# Patient Record
Sex: FEMALE | Race: WHITE | NOT HISPANIC OR LATINO
[De-identification: names, ages, dates, MRNs, and addresses within clinical notes are randomized per-mention and may not be internally consistent; named-entity substitution may affect disease eponyms.]

---

## 2017-01-09 ENCOUNTER — RX RENEWAL (OUTPATIENT)
Age: 82
End: 2017-01-09

## 2017-02-06 ENCOUNTER — NON-APPOINTMENT (OUTPATIENT)
Age: 82
End: 2017-02-06

## 2017-02-06 ENCOUNTER — APPOINTMENT (OUTPATIENT)
Dept: CARDIOLOGY | Facility: CLINIC | Age: 82
End: 2017-02-06

## 2017-02-06 VITALS
WEIGHT: 136 LBS | DIASTOLIC BLOOD PRESSURE: 80 MMHG | HEART RATE: 68 BPM | BODY MASS INDEX: 25.68 KG/M2 | HEIGHT: 61 IN | SYSTOLIC BLOOD PRESSURE: 180 MMHG | OXYGEN SATURATION: 96 %

## 2017-02-08 LAB
25(OH)D3 SERPL-MCNC: 34.2 NG/ML
ALBUMIN SERPL ELPH-MCNC: 4.5 G/DL
ALP BLD-CCNC: 90 U/L
ALT SERPL-CCNC: 21 U/L
ANION GAP SERPL CALC-SCNC: 14 MMOL/L
AST SERPL-CCNC: 27 U/L
BASOPHILS # BLD AUTO: 0.04 K/UL
BASOPHILS NFR BLD AUTO: 0.5 %
BILIRUB SERPL-MCNC: 0.8 MG/DL
BUN SERPL-MCNC: 22 MG/DL
CALCIUM SERPL-MCNC: 10.5 MG/DL
CHLORIDE SERPL-SCNC: 99 MMOL/L
CHOLEST SERPL-MCNC: 298 MG/DL
CHOLEST/HDLC SERPL: 5.5 RATIO
CO2 SERPL-SCNC: 25 MMOL/L
CREAT SERPL-MCNC: 0.9 MG/DL
EOSINOPHIL # BLD AUTO: 0.14 K/UL
EOSINOPHIL NFR BLD AUTO: 1.6 %
GLUCOSE SERPL-MCNC: 126 MG/DL
HBA1C MFR BLD HPLC: 6.2 %
HCT VFR BLD CALC: 37.6 %
HDLC SERPL-MCNC: 54 MG/DL
HGB BLD-MCNC: 11.5 G/DL
IMM GRANULOCYTES NFR BLD AUTO: 0.3 %
LDLC SERPL CALC-MCNC: 177 MG/DL
LYMPHOCYTES # BLD AUTO: 2.04 K/UL
LYMPHOCYTES NFR BLD AUTO: 23.3 %
MAN DIFF?: NORMAL
MCHC RBC-ENTMCNC: 28.6 PG
MCHC RBC-ENTMCNC: 30.6 GM/DL
MCV RBC AUTO: 93.5 FL
MONOCYTES # BLD AUTO: 0.55 K/UL
MONOCYTES NFR BLD AUTO: 6.3 %
NEUTROPHILS # BLD AUTO: 5.95 K/UL
NEUTROPHILS NFR BLD AUTO: 68 %
PLATELET # BLD AUTO: 448 K/UL
POTASSIUM SERPL-SCNC: 5.5 MMOL/L
PROT SERPL-MCNC: 7.1 G/DL
RBC # BLD: 4.02 M/UL
RBC # FLD: 12.8 %
SODIUM SERPL-SCNC: 138 MMOL/L
TRIGL SERPL-MCNC: 336 MG/DL
TSH SERPL-ACNC: 2.71 UIU/ML
WBC # FLD AUTO: 8.75 K/UL

## 2017-03-24 ENCOUNTER — NON-APPOINTMENT (OUTPATIENT)
Age: 82
End: 2017-03-24

## 2017-03-24 ENCOUNTER — APPOINTMENT (OUTPATIENT)
Dept: CARDIOLOGY | Facility: CLINIC | Age: 82
End: 2017-03-24

## 2017-03-24 VITALS
WEIGHT: 141 LBS | HEIGHT: 61 IN | DIASTOLIC BLOOD PRESSURE: 78 MMHG | HEART RATE: 79 BPM | OXYGEN SATURATION: 97 % | BODY MASS INDEX: 26.62 KG/M2 | SYSTOLIC BLOOD PRESSURE: 179 MMHG

## 2017-03-27 LAB
ALBUMIN SERPL ELPH-MCNC: 4.7 G/DL
ALP BLD-CCNC: 98 U/L
ALT SERPL-CCNC: 19 U/L
ANION GAP SERPL CALC-SCNC: 17 MMOL/L
AST SERPL-CCNC: 23 U/L
BASOPHILS # BLD AUTO: 0.07 K/UL
BASOPHILS NFR BLD AUTO: 1.2 %
BILIRUB SERPL-MCNC: 0.4 MG/DL
BUN SERPL-MCNC: 31 MG/DL
CALCIUM SERPL-MCNC: 9.8 MG/DL
CHLORIDE SERPL-SCNC: 100 MMOL/L
CHOLEST SERPL-MCNC: 274 MG/DL
CHOLEST/HDLC SERPL: 4.8 RATIO
CO2 SERPL-SCNC: 21 MMOL/L
CREAT SERPL-MCNC: 0.97 MG/DL
EOSINOPHIL # BLD AUTO: 0.26 K/UL
EOSINOPHIL NFR BLD AUTO: 4.4 %
GLUCOSE SERPL-MCNC: 120 MG/DL
HCT VFR BLD CALC: 34.5 %
HDLC SERPL-MCNC: 57 MG/DL
HGB BLD-MCNC: 11.1 G/DL
IMM GRANULOCYTES NFR BLD AUTO: 0.3 %
LDLC SERPL CALC-MCNC: 145 MG/DL
LYMPHOCYTES # BLD AUTO: 2.35 K/UL
LYMPHOCYTES NFR BLD AUTO: 40 %
MAN DIFF?: NORMAL
MCHC RBC-ENTMCNC: 28.8 PG
MCHC RBC-ENTMCNC: 32.2 GM/DL
MCV RBC AUTO: 89.4 FL
MONOCYTES # BLD AUTO: 0.45 K/UL
MONOCYTES NFR BLD AUTO: 7.7 %
NEUTROPHILS # BLD AUTO: 2.73 K/UL
NEUTROPHILS NFR BLD AUTO: 46.4 %
PLATELET # BLD AUTO: 393 K/UL
POTASSIUM SERPL-SCNC: 4.7 MMOL/L
PROT SERPL-MCNC: 7.3 G/DL
RBC # BLD: 3.86 M/UL
RBC # FLD: 13.4 %
SODIUM SERPL-SCNC: 138 MMOL/L
TRIGL SERPL-MCNC: 359 MG/DL
WBC # FLD AUTO: 5.88 K/UL

## 2017-04-21 ENCOUNTER — APPOINTMENT (OUTPATIENT)
Dept: CARDIOLOGY | Facility: CLINIC | Age: 82
End: 2017-04-21

## 2017-05-12 ENCOUNTER — APPOINTMENT (OUTPATIENT)
Dept: CARDIOLOGY | Facility: CLINIC | Age: 82
End: 2017-05-12

## 2017-05-12 ENCOUNTER — NON-APPOINTMENT (OUTPATIENT)
Age: 82
End: 2017-05-12

## 2017-05-12 VITALS
OXYGEN SATURATION: 93 % | HEART RATE: 82 BPM | SYSTOLIC BLOOD PRESSURE: 166 MMHG | BODY MASS INDEX: 25.51 KG/M2 | WEIGHT: 135 LBS | DIASTOLIC BLOOD PRESSURE: 66 MMHG

## 2017-05-30 ENCOUNTER — APPOINTMENT (OUTPATIENT)
Dept: CARDIOLOGY | Facility: CLINIC | Age: 82
End: 2017-05-30

## 2017-06-19 ENCOUNTER — RX RENEWAL (OUTPATIENT)
Age: 82
End: 2017-06-19

## 2017-07-10 ENCOUNTER — RX RENEWAL (OUTPATIENT)
Age: 82
End: 2017-07-10

## 2017-09-08 ENCOUNTER — APPOINTMENT (OUTPATIENT)
Dept: CARDIOLOGY | Facility: CLINIC | Age: 82
End: 2017-09-08
Payer: MEDICARE

## 2017-09-08 ENCOUNTER — NON-APPOINTMENT (OUTPATIENT)
Age: 82
End: 2017-09-08

## 2017-09-08 VITALS
HEIGHT: 61 IN | OXYGEN SATURATION: 97 % | DIASTOLIC BLOOD PRESSURE: 74 MMHG | SYSTOLIC BLOOD PRESSURE: 181 MMHG | HEART RATE: 70 BPM | BODY MASS INDEX: 24.92 KG/M2 | WEIGHT: 132 LBS

## 2017-09-08 PROCEDURE — 93000 ELECTROCARDIOGRAM COMPLETE: CPT

## 2017-09-08 PROCEDURE — 99214 OFFICE O/P EST MOD 30 MIN: CPT | Mod: 25

## 2017-10-17 ENCOUNTER — INPATIENT (INPATIENT)
Facility: HOSPITAL | Age: 82
LOS: 2 days | Discharge: HOME | End: 2017-10-20
Attending: INTERNAL MEDICINE | Admitting: INTERNAL MEDICINE
Payer: MEDICARE

## 2017-10-17 VITALS — HEIGHT: 60 IN | WEIGHT: 136.03 LBS

## 2017-10-17 DIAGNOSIS — E78.5 HYPERLIPIDEMIA, UNSPECIFIED: ICD-10-CM

## 2017-10-17 DIAGNOSIS — Z98.89 OTHER SPECIFIED POSTPROCEDURAL STATES: Chronic | ICD-10-CM

## 2017-10-17 DIAGNOSIS — I63.9 CEREBRAL INFARCTION, UNSPECIFIED: ICD-10-CM

## 2017-10-17 LAB
ALBUMIN SERPL ELPH-MCNC: 4.1 G/DL — SIGNIFICANT CHANGE UP (ref 3.3–5)
ALP SERPL-CCNC: 90 U/L — SIGNIFICANT CHANGE UP (ref 40–120)
ALT FLD-CCNC: 21 U/L — SIGNIFICANT CHANGE UP (ref 12–78)
ANION GAP SERPL CALC-SCNC: 7 MMOL/L — SIGNIFICANT CHANGE UP (ref 5–17)
AST SERPL-CCNC: 19 U/L — SIGNIFICANT CHANGE UP (ref 15–37)
BASOPHILS # BLD AUTO: 0.1 K/UL — SIGNIFICANT CHANGE UP (ref 0–0.2)
BASOPHILS NFR BLD AUTO: 1.1 % — SIGNIFICANT CHANGE UP (ref 0–2)
BILIRUB SERPL-MCNC: 0.7 MG/DL — SIGNIFICANT CHANGE UP (ref 0.2–1.2)
BUN SERPL-MCNC: 22 MG/DL — SIGNIFICANT CHANGE UP (ref 7–23)
CALCIUM SERPL-MCNC: 9.5 MG/DL — SIGNIFICANT CHANGE UP (ref 8.5–10.1)
CHLORIDE SERPL-SCNC: 104 MMOL/L — SIGNIFICANT CHANGE UP (ref 96–108)
CO2 SERPL-SCNC: 24 MMOL/L — SIGNIFICANT CHANGE UP (ref 22–31)
CREAT SERPL-MCNC: 0.98 MG/DL — SIGNIFICANT CHANGE UP (ref 0.5–1.3)
EOSINOPHIL # BLD AUTO: 0.1 K/UL — SIGNIFICANT CHANGE UP (ref 0–0.5)
EOSINOPHIL NFR BLD AUTO: 1.6 % — SIGNIFICANT CHANGE UP (ref 0–6)
GLUCOSE SERPL-MCNC: 151 MG/DL — HIGH (ref 70–99)
HCT VFR BLD CALC: 34.2 % — LOW (ref 34.5–45)
HGB BLD-MCNC: 11.6 G/DL — SIGNIFICANT CHANGE UP (ref 11.5–15.5)
LYMPHOCYTES # BLD AUTO: 1.9 K/UL — SIGNIFICANT CHANGE UP (ref 1–3.3)
LYMPHOCYTES # BLD AUTO: 25.3 % — SIGNIFICANT CHANGE UP (ref 13–44)
MAGNESIUM SERPL-MCNC: 2.2 MG/DL — SIGNIFICANT CHANGE UP (ref 1.6–2.6)
MCHC RBC-ENTMCNC: 30 PG — SIGNIFICANT CHANGE UP (ref 27–34)
MCHC RBC-ENTMCNC: 33.8 GM/DL — SIGNIFICANT CHANGE UP (ref 32–36)
MCV RBC AUTO: 89 FL — SIGNIFICANT CHANGE UP (ref 80–100)
MONOCYTES # BLD AUTO: 0.5 K/UL — SIGNIFICANT CHANGE UP (ref 0–0.9)
MONOCYTES NFR BLD AUTO: 7.3 % — SIGNIFICANT CHANGE UP (ref 2–14)
NEUTROPHILS # BLD AUTO: 4.9 K/UL — SIGNIFICANT CHANGE UP (ref 1.8–7.4)
NEUTROPHILS NFR BLD AUTO: 64.7 % — SIGNIFICANT CHANGE UP (ref 43–77)
PLATELET # BLD AUTO: 375 K/UL — SIGNIFICANT CHANGE UP (ref 150–400)
POTASSIUM SERPL-MCNC: 4.8 MMOL/L — SIGNIFICANT CHANGE UP (ref 3.5–5.3)
POTASSIUM SERPL-SCNC: 4.8 MMOL/L — SIGNIFICANT CHANGE UP (ref 3.5–5.3)
PROT SERPL-MCNC: 7.2 GM/DL — SIGNIFICANT CHANGE UP (ref 6–8.3)
RBC # BLD: 3.84 M/UL — SIGNIFICANT CHANGE UP (ref 3.8–5.2)
RBC # FLD: 12.5 % — SIGNIFICANT CHANGE UP (ref 10.3–14.5)
SODIUM SERPL-SCNC: 135 MMOL/L — SIGNIFICANT CHANGE UP (ref 135–145)
TROPONIN I SERPL-MCNC: <0.015 NG/ML — SIGNIFICANT CHANGE UP (ref 0.01–0.04)
TROPONIN I SERPL-MCNC: <0.015 NG/ML — SIGNIFICANT CHANGE UP (ref 0.01–0.04)
WBC # BLD: 7.5 K/UL — SIGNIFICANT CHANGE UP (ref 3.8–10.5)
WBC # FLD AUTO: 7.5 K/UL — SIGNIFICANT CHANGE UP (ref 3.8–10.5)

## 2017-10-17 PROCEDURE — 71010: CPT | Mod: 26

## 2017-10-17 PROCEDURE — 93010 ELECTROCARDIOGRAM REPORT: CPT

## 2017-10-17 PROCEDURE — 99285 EMERGENCY DEPT VISIT HI MDM: CPT

## 2017-10-17 RX ORDER — CARVEDILOL PHOSPHATE 80 MG/1
25 CAPSULE, EXTENDED RELEASE ORAL EVERY 12 HOURS
Qty: 0 | Refills: 0 | Status: DISCONTINUED | OUTPATIENT
Start: 2017-10-17 | End: 2017-10-20

## 2017-10-17 RX ORDER — LABETALOL HCL 100 MG
2 TABLET ORAL
Qty: 200 | Refills: 0 | Status: DISCONTINUED | OUTPATIENT
Start: 2017-10-17 | End: 2017-10-17

## 2017-10-17 RX ORDER — ONDANSETRON 8 MG/1
4 TABLET, FILM COATED ORAL EVERY 6 HOURS
Qty: 0 | Refills: 0 | Status: DISCONTINUED | OUTPATIENT
Start: 2017-10-17 | End: 2017-10-20

## 2017-10-17 RX ORDER — LABETALOL HCL 100 MG
10 TABLET ORAL ONCE
Qty: 0 | Refills: 0 | Status: COMPLETED | OUTPATIENT
Start: 2017-10-17 | End: 2017-10-17

## 2017-10-17 RX ORDER — ACETAMINOPHEN 500 MG
650 TABLET ORAL EVERY 6 HOURS
Qty: 0 | Refills: 0 | Status: DISCONTINUED | OUTPATIENT
Start: 2017-10-17 | End: 2017-10-20

## 2017-10-17 RX ORDER — AMLODIPINE BESYLATE 2.5 MG/1
10 TABLET ORAL ONCE
Qty: 0 | Refills: 0 | Status: COMPLETED | OUTPATIENT
Start: 2017-10-17 | End: 2017-10-17

## 2017-10-17 RX ORDER — LABETALOL HCL 100 MG
20 TABLET ORAL ONCE
Qty: 0 | Refills: 0 | Status: COMPLETED | OUTPATIENT
Start: 2017-10-17 | End: 2017-10-17

## 2017-10-17 RX ORDER — MULTIVIT-MIN/FERROUS GLUCONATE 9 MG/15 ML
1 LIQUID (ML) ORAL DAILY
Qty: 0 | Refills: 0 | Status: DISCONTINUED | OUTPATIENT
Start: 2017-10-17 | End: 2017-10-20

## 2017-10-17 RX ORDER — ENOXAPARIN SODIUM 100 MG/ML
40 INJECTION SUBCUTANEOUS DAILY
Qty: 0 | Refills: 0 | Status: DISCONTINUED | OUTPATIENT
Start: 2017-10-17 | End: 2017-10-20

## 2017-10-17 RX ORDER — LOSARTAN POTASSIUM 100 MG/1
25 TABLET, FILM COATED ORAL DAILY
Qty: 0 | Refills: 0 | Status: DISCONTINUED | OUTPATIENT
Start: 2017-10-17 | End: 2017-10-18

## 2017-10-17 RX ORDER — CLOPIDOGREL BISULFATE 75 MG/1
75 TABLET, FILM COATED ORAL DAILY
Qty: 0 | Refills: 0 | Status: DISCONTINUED | OUTPATIENT
Start: 2017-10-17 | End: 2017-10-20

## 2017-10-17 RX ORDER — HYDRALAZINE HCL 50 MG
25 TABLET ORAL
Qty: 0 | Refills: 0 | Status: DISCONTINUED | OUTPATIENT
Start: 2017-10-17 | End: 2017-10-18

## 2017-10-17 RX ORDER — CHOLECALCIFEROL (VITAMIN D3) 125 MCG
1000 CAPSULE ORAL DAILY
Qty: 0 | Refills: 0 | Status: DISCONTINUED | OUTPATIENT
Start: 2017-10-17 | End: 2017-10-20

## 2017-10-17 RX ORDER — OLMESARTAN MEDOXOMIL 5 MG/1
0 TABLET, FILM COATED ORAL
Qty: 0 | Refills: 0 | COMMUNITY

## 2017-10-17 RX ORDER — CALCIUM CARBONATE 500(1250)
1 TABLET ORAL DAILY
Qty: 0 | Refills: 0 | Status: DISCONTINUED | OUTPATIENT
Start: 2017-10-17 | End: 2017-10-20

## 2017-10-17 RX ORDER — INFLUENZA VIRUS VACCINE 15; 15; 15; 15 UG/.5ML; UG/.5ML; UG/.5ML; UG/.5ML
0.5 SUSPENSION INTRAMUSCULAR ONCE
Qty: 0 | Refills: 0 | Status: COMPLETED | OUTPATIENT
Start: 2017-10-17 | End: 2017-10-20

## 2017-10-17 RX ORDER — LABETALOL HCL 100 MG
5 TABLET ORAL
Qty: 200 | Refills: 0 | Status: DISCONTINUED | OUTPATIENT
Start: 2017-10-17 | End: 2017-10-17

## 2017-10-17 RX ADMIN — AMLODIPINE BESYLATE 10 MILLIGRAM(S): 2.5 TABLET ORAL at 12:07

## 2017-10-17 RX ADMIN — Medication 120 MG/MIN: at 13:48

## 2017-10-17 RX ADMIN — Medication 20 MILLIGRAM(S): at 10:43

## 2017-10-17 RX ADMIN — Medication 10 MILLIGRAM(S): at 09:47

## 2017-10-17 NOTE — H&P ADULT - NSHPPHYSICALEXAM_GEN_ALL_CORE
Physical Exam:   GENERAL APPEARANCE:  NAD, hemodynamically stable  T(C): 36.7 (10-17-17 @ 19:35), Max: 37 (10-17-17 @ 16:26)  HR: 77 (10-17-17 @ 19:35) (74 - 79)  BP: 127/83 (10-17-17 @ 19:35) (127/83 - 200/90)  RR: 17 (10-17-17 @ 19:35) (13 - 18)  SpO2: 98% (10-17-17 @ 19:35) (98% - 100%)    HEENT:  Head is normocephalic    Skin:  Warm and dry without any rash   NECK:  Supple without lymphadenopathy.   HEART:  Regular rate and rhythm. normal S1 and S2, No M/R/G  LUNGS:  Good ins/exp effort, no W/R/R/C  ABDOMEN:  Soft, nontender, nondistended with good bowel sounds heard  EXTREMITIES:  Without cyanosis, clubbing or edema.   NEUROLOGICAL:  Gross nonfocal

## 2017-10-17 NOTE — H&P ADULT - PROBLEM SELECTOR PLAN 1
Patient with hypertensive urgency  Admit to medicine: cardiac stepdown unit  BP improved s/p labetalol gtt -->130s  Resume home BP meds coreg, hydralazine, and benicar  consult cardiology  CHRIS panel  2D ECHO, TSH, and fasting lipid panel

## 2017-10-17 NOTE — ED PROVIDER NOTE - PMH
Basal cell carcinoma    Cerebrovascular accident (CVA), unspecified mechanism    Dyslipidemia    Edema, unspecified type    Essential hypertension    Hyponatremia    Mitral valve insufficiency, unspecified etiology    UTI (urinary tract infection)

## 2017-10-17 NOTE — H&P ADULT - NSHPLABSRESULTS_GEN_ALL_CORE
11.6   7.5   )-----------( 375      ( 17 Oct 2017 09:30 )             34.2     10-17    135  |  104  |  22  ----------------------------<  151<H>  4.8   |  24  |  0.98    Calcium    9.5      17 Oct 2017 09:30  Mg     2.2     10-17    T Protein  7.2  /  Alb  4.1  /  TBili  0.7  /  DBili  x   /  AST  19  /  ALT  21  /  AlkPhos  90  10-17    CARDIAC MARKERS ( 17 Oct 2017 09:30 )  <0.015 ng/mL / x     / x     / x     / x

## 2017-10-17 NOTE — ED PROVIDER NOTE - OBJECTIVE STATEMENT
Pt comes to the ED complaining of 3 weeks worth of HTN. Pt states has been working with her nephrologist to lower her Pt comes to the ED complaining of 3 weeks worth of HTN. Pt states has been working with her nephrologist to lower her BP and has been started on new BP meds. Pt is on carvedolol states she was on nifedepide and her BP was well controlle dbut she developed swelling and was taking off this. No chest pain no headache. Pt BP is 210/110 in ED. Pt is takign her meds.

## 2017-10-17 NOTE — ED ADULT NURSE REASSESSMENT NOTE - COMFORT CARE
4pm rounding complete, vitals done no other assistance needed
repositioned/side rails up/darkened lights/assisted in changing patient brief, patient is clean and dry at this time. hourly rounding completed

## 2017-10-17 NOTE — H&P ADULT - HISTORY OF PRESENT ILLNESS
The patient is a 86 year old female with a PMHx notable for CVA, HTN, basal cell carcinoma, dyslipidemia, and edema.  He presents to the ED complaining of 3 weeks worth of HTN. Pt states has been working with her nephrologist to lower her BP and has been started on new BP meds.  Patient is on coreg and states she was on nifedeipine and her BP was well controlled , however she developed swelling and was taking off this.   Denies chest pain no headache.  Pts BP is 210/110 in ED.   Pt reports compliance with her BP meds.    ED course:    BP = 210/110 patient was given norvasc 10mg, labetalol 10mg IV, labetalol 20mg IV, and started on labetalol infusion which was tapered off after BP check was 130/80

## 2017-10-18 DIAGNOSIS — I16.0 HYPERTENSIVE URGENCY: ICD-10-CM

## 2017-10-18 DIAGNOSIS — I38 ENDOCARDITIS, VALVE UNSPECIFIED: ICD-10-CM

## 2017-10-18 LAB
CHOLEST SERPL-MCNC: 214 MG/DL — HIGH (ref 10–199)
HDLC SERPL-MCNC: 42 MG/DL — SIGNIFICANT CHANGE UP (ref 40–125)
LIPID PNL WITH DIRECT LDL SERPL: 124 MG/DL — SIGNIFICANT CHANGE UP
TOTAL CHOLESTEROL/HDL RATIO MEASUREMENT: 5.1 RATIO — SIGNIFICANT CHANGE UP (ref 3.3–7.1)
TRIGL SERPL-MCNC: 240 MG/DL — HIGH (ref 10–149)
TSH SERPL-MCNC: 2.53 UU/ML — SIGNIFICANT CHANGE UP (ref 0.36–3.74)

## 2017-10-18 PROCEDURE — 93306 TTE W/DOPPLER COMPLETE: CPT | Mod: 26

## 2017-10-18 PROCEDURE — 99223 1ST HOSP IP/OBS HIGH 75: CPT

## 2017-10-18 RX ORDER — HYDRALAZINE HCL 50 MG
25 TABLET ORAL ONCE
Qty: 0 | Refills: 0 | Status: COMPLETED | OUTPATIENT
Start: 2017-10-18 | End: 2017-10-18

## 2017-10-18 RX ORDER — HYDRALAZINE HCL 50 MG
10 TABLET ORAL EVERY 6 HOURS
Qty: 0 | Refills: 0 | Status: DISCONTINUED | OUTPATIENT
Start: 2017-10-18 | End: 2017-10-20

## 2017-10-18 RX ORDER — LOSARTAN POTASSIUM 100 MG/1
50 TABLET, FILM COATED ORAL AT BEDTIME
Qty: 0 | Refills: 0 | Status: DISCONTINUED | OUTPATIENT
Start: 2017-10-18 | End: 2017-10-20

## 2017-10-18 RX ORDER — ACETAMINOPHEN 500 MG
650 TABLET ORAL EVERY 6 HOURS
Qty: 0 | Refills: 0 | Status: DISCONTINUED | OUTPATIENT
Start: 2017-10-18 | End: 2017-10-20

## 2017-10-18 RX ADMIN — CARVEDILOL PHOSPHATE 25 MILLIGRAM(S): 80 CAPSULE, EXTENDED RELEASE ORAL at 16:38

## 2017-10-18 RX ADMIN — LOSARTAN POTASSIUM 25 MILLIGRAM(S): 100 TABLET, FILM COATED ORAL at 05:29

## 2017-10-18 RX ADMIN — Medication 1000 UNIT(S): at 11:56

## 2017-10-18 RX ADMIN — Medication 1 TABLET(S): at 11:56

## 2017-10-18 RX ADMIN — Medication 650 MILLIGRAM(S): at 21:51

## 2017-10-18 RX ADMIN — CLOPIDOGREL BISULFATE 75 MILLIGRAM(S): 75 TABLET, FILM COATED ORAL at 11:56

## 2017-10-18 RX ADMIN — LOSARTAN POTASSIUM 50 MILLIGRAM(S): 100 TABLET, FILM COATED ORAL at 21:51

## 2017-10-18 RX ADMIN — ENOXAPARIN SODIUM 40 MILLIGRAM(S): 100 INJECTION SUBCUTANEOUS at 11:56

## 2017-10-18 RX ADMIN — Medication 25 MILLIGRAM(S): at 05:29

## 2017-10-18 RX ADMIN — CARVEDILOL PHOSPHATE 25 MILLIGRAM(S): 80 CAPSULE, EXTENDED RELEASE ORAL at 05:29

## 2017-10-18 RX ADMIN — Medication 25 MILLIGRAM(S): at 17:22

## 2017-10-18 RX ADMIN — Medication 1 PATCH: at 13:47

## 2017-10-18 RX ADMIN — Medication 650 MILLIGRAM(S): at 03:21

## 2017-10-18 NOTE — CONSULT NOTE ADULT - PROBLEM SELECTOR RECOMMENDATION 9
BP has improved over past 24 hours; continue Coreg, Hydralazine, Losartan and observe; can increase Losartan if BP rises.  I asked Dr. Victoria to see patient - she has been seeing patient in the outpatient setting for BP management.

## 2017-10-18 NOTE — CONSULT NOTE ADULT - ASSESSMENT
87 yo woman with HTN admitted with HTN urgency.  HTN control has been very labile as outpt.     Med adjustment has been difficult due to side effects including hyponatremia, hyperkalemia and GI and subjective symptoms.  Secondary HT work has been negative.  Will repeat at this point with Renal artery doppler and plasma metanephrines.  Will attempt to stabilize BP control and add catapress patch and d/c Hydralazine, --May respond better to longer acting agents.

## 2017-10-18 NOTE — CONSULT NOTE ADULT - SUBJECTIVE AND OBJECTIVE BOX
Chief complaints.  Admitted with SBP >220 at home with HA.    HPI:  87 yo woman with Long term Hx of HTN and CVA with recent attempt to adjust BP meds due to intolerable side effects.   Pt had been on Nifedipine for some time.   Pt reported intolerable side effects when Nifedipine  dose was increased to 60 mg from 30mg.  However, even with decreased dose, pt continued to complain of generalized malaise including "heavy feeling in her chest and vague GI symptoms.    Therefore, Nifedipine  was changed to Hydralazine with much improved symptoms and fair BP control until yesterday  am.   Of note, pt's Benicar dose was reduced as well due to recurrent Hyperkalemia even on K restricted diet.  Pt has been very compliant with Salt restriction and her meds.  Pt awoke up yesterday morning with vague HA and generalized malaise and tingling in her LE.  SBP was 220 at home and was brought to ER.  Pt was treated with IV Labetalol infusion in ED and now BP in 120-150 range.  Pt reports feeling well today and wants to go home.      PMHX and PSHX.  1.HTN  2.CVA  3.Hx of Basal cell CA post Moh's procedure   4.Hx of Hyponatremia due to diuretics and post op  5.Hx of Bilateral total Hip replacements.    FAMILY HISTORY:  No pertinent family history in first degree relatives      SOCIAL HISTORY :  No hx of smoking or ETOH.  Allergies    Keflex (Unknown)  verapamil (Other)    REVIEW OF SYSTEMS  At present feeling well  Had HA yesterday.  Denies chest pain  Denies SOB  Denies Abdominal discomfort  Denies LE pain.    Home Medications:   * Patient Currently Takes Medications as of 17-Oct-2017 14:46 documented in Structured Notes  · 	Multiple Vitamins with Minerals oral tablet: 1 tab(s) orally once a day, Last Dose Taken:    · 	Tylenol 500 mg oral tablet: 2 tab(s) orally every 8 hours, As Needed, Last Dose Taken:    · 	Vitamin D3 1000 intl units oral tablet: 1 tab(s) orally once a day, Last Dose Taken:    · 	calcium (as carbonate) 500 mg oral tablet: 1 tab(s) orally once a day, Last Dose Taken:    · 	hydrALAZINE 25 mg oral tablet: 1 tab(s) orally 4 times a day  	**Pt takes 35mg four times a day, Last Dose Taken:    · 	hydrALAZINE 10 mg oral tablet: 1 tab(s) orally 4 times a day  	**Pt takes 35mg 4 times a day, Last Dose Taken:    · 	Benicar 20 mg oral tablet: 1 tab(s) orally once a day (at bedtime), Last Dose Taken:    · 	carvedilol 25 mg oral tablet: 1 tab(s) orally 2 times a day, Last Dose Taken:    · 	clopidogrel 75 mg oral tablet: 1 tab(s) orally once a day, Last Dose Taken:      MEDICATIONS  (STANDING):  calcium carbonate 500 mG (Tums) Chewable 1 Tablet(s) Chew daily  carvedilol 25 milliGRAM(s) Oral every 12 hours  cholecalciferol 1000 Unit(s) Oral daily  clopidogrel Tablet 75 milliGRAM(s) Oral daily  enoxaparin Injectable 40 milliGRAM(s) SubCutaneous daily  hydrALAZINE 25 milliGRAM(s) Oral four times a day  influenza   Vaccine 0.5 milliLiter(s) IntraMuscular once  losartan 25 milliGRAM(s) Oral daily  multivitamin/minerals 1 Tablet(s) Oral daily    MEDICATIONS  (PRN):  acetaminophen   Tablet 650 milliGRAM(s) Oral every 6 hours PRN For Temp greater than 38.5 C (101.3 F)  acetaminophen   Tablet 650 milliGRAM(s) Oral every 6 hours PRN pain and fever  ondansetron Injectable 4 milliGRAM(s) IV Push every 6 hours PRN Nausea         Vital Signs Last 24 Hrs  T(C): 36.4 (18 Oct 2017 08:00), Max: 37 (17 Oct 2017 16:26)  T(F): 97.5 (18 Oct 2017 08:00), Max: 98.6 (17 Oct 2017 16:26)  HR: 76 (18 Oct 2017 08:00) (66 - 88)  BP: 140/65 (18 Oct 2017 08:00) (109/55 - 196/82)  BP(mean): 82 (18 Oct 2017 08:00) (61 - 104)  RR: 20 (18 Oct 2017 01:00) (13 - 22)  SpO2: 97% (18 Oct 2017 07:00) (91% - 100%)  Daily     Daily   I&O's Summary    18 Oct 2017 07:01  -  18 Oct 2017 09:19  --------------------------------------------------------  IN: 240 mL / OUT: 0 mL / NET: 240 mL        PHYSICAL EXAM:  Alert and appropriate  GEN: No acute distress  HEENT: WNL  NECK : supple  CV: S1S2 RRR  LUNGS: clear to aus  ABD: soft  EXT: trace edema    LABS:                        11.6   7.5   )-----------( 375      ( 17 Oct 2017 09:30 )             34.2     10-17    135  |  104  |  22  ----------------------------<  151<H>  4.8   |  24  |  0.98    Ca    9.5      17 Oct 2017 09:30  Mg     2.2     10-17    TPro  7.2  /  Alb  4.1  /  TBili  0.7  /  DBili  x   /  AST  19  /  ALT  21  /  AlkPhos  90  10-17        Magnesium, Serum: 2.2 mg/dL (10-17 @ 09:30)

## 2017-10-18 NOTE — PROGRESS NOTE ADULT - SUBJECTIVE AND OBJECTIVE BOX
History of Present Illness:  Chief Complaint/Reason for Admission: hypertension    History of Present Illness:   The patient is a 86 year old female with a PMHx notable for CVA, HTN, basal cell carcinoma, dyslipidemia, and edema.  He presents to the ED complaining of 3 weeks worth of HTN. Pt states has been working with her nephrologist to lower her BP and has been started on new BP meds.  Patient is on coreg and states she was on nifedeipine and her BP was well controlled , however she developed swelling and was taking off this.   Denies chest pain no headache.  Pts BP is 210/110 in ED.   Pt reports compliance with her BP meds.    ED course:    BP = 210/110 patient was given norvasc 10mg, labetalol 10mg IV, labetalol 20mg IV, and started on labetalol infusion which was tapered off after BP check was 130/80    10/18 - feels well. no cp, sob.  outlined plan of care with patient     ICU Vital Signs Last 24 Hrs  T(C): 36.4 (18 Oct 2017 15:48), Max: 36.9 (17 Oct 2017 19:21)  T(F): 97.6 (18 Oct 2017 15:48), Max: 98.4 (17 Oct 2017 19:21)  HR: 68 (18 Oct 2017 16:00) (65 - 92)  BP: 175/73 (18 Oct 2017 16:00) (109/55 - 188/86)  BP(mean): 99 (18 Oct 2017 16:00) (61 - 109)  ABP: --  ABP(mean): --  RR: 20 (18 Oct 2017 01:00) (16 - 22)  SpO2: 97% (18 Oct 2017 07:00) (91% - 98%)    	HEENT:  Head is normocephalic    	Skin:  Warm and dry without any rash   	NECK:  Supple without lymphadenopathy.   	HEART:  Regular rate and rhythm. normal S1 and S2, No M/R/G  	LUNGS:  Good ins/exp effort, no W/R/R/C  	ABDOMEN:  Soft, nontender, nondistended with good bowel sounds heard  	EXTREMITIES:  Without cyanosis, clubbing or edema.   NEUROLOGICAL:  Gross nonfocal                              11.6   7.5   )-----------( 375      ( 17 Oct 2017 09:30 )             34.2     10-17    135  |  104  |  22  ----------------------------<  151<H>  4.8   |  24  |  0.98    Ca    9.5      17 Oct 2017 09:30  Mg     2.2     10-17    TPro  7.2  /  Alb  4.1  /  TBili  0.7  /  DBili  x   /  AST  19  /  ALT  21  /  AlkPhos  90  10-17    CARDIAC MARKERS ( 17 Oct 2017 22:16 )  <0.015 ng/mL / x     / x     / x     / x      CARDIAC MARKERS ( 17 Oct 2017 09:30 )  <0.015 ng/mL / x     / x     / x     / x          LIVER FUNCTIONS - ( 17 Oct 2017 09:30 )  Alb: 4.1 g/dL / Pro: 7.2 gm/dL / ALK PHOS: 90 U/L / ALT: 21 U/L / AST: 19 U/L / GGT: x                       Assessment and Plan:    Assessment:  · Assessment	  The patient is a 86 year old female with a PMHx notable for CVA, HTN, basal cell carcinoma, dyslipidemia, and edema.  He presents to the ED complaining of 3 weeks worth of HTN. Pt states has been working with her nephrologist to lower her BP and has been started on new BP meds.  Patient is on coreg and states she was on nifedeipine and her BP was well controlled , however she developed swelling and was taking off this.   Denies chest pain no headache.  Pts BP is 210/110 in ED.   Pt reports compliance with her BP meds.    ED course:    BP = 210/110 patient was given norvasc 10mg, labetalol 10mg IV, labetalol 20mg IV, and started on labetalol infusion which was tapered off after BP check was 130/80        #  Hypertensive urgency.  P  -  labile BP with SBP 110s-190s  - BP meds adjusted with clonidine patch, losartan, coreg - as per renal   - will add hydralazine prn for sbP > 200  2D ECHO, TSH, and fasting lipid panel.   - cardio eval     #  Cerebrovascular accident (CVA), unspecified mechanism.  Plan: Stable  Resume home med plavix, and consider addition of statin.     #  Dyslipidemia.  Plan: Low fat diet.       dispo - likley home tomorrow if BP controlled

## 2017-10-19 LAB
ANION GAP SERPL CALC-SCNC: 4 MMOL/L — LOW (ref 5–17)
BUN SERPL-MCNC: 26 MG/DL — HIGH (ref 7–23)
CALCIUM SERPL-MCNC: 8.8 MG/DL — SIGNIFICANT CHANGE UP (ref 8.5–10.1)
CHLORIDE SERPL-SCNC: 107 MMOL/L — SIGNIFICANT CHANGE UP (ref 96–108)
CK SERPL-CCNC: 57 U/L — SIGNIFICANT CHANGE UP (ref 26–192)
CO2 SERPL-SCNC: 26 MMOL/L — SIGNIFICANT CHANGE UP (ref 22–31)
CORTIS AM PEAK SERPL-MCNC: 9.7 UG/DL — SIGNIFICANT CHANGE UP (ref 6–18.4)
CREAT SERPL-MCNC: 0.95 MG/DL — SIGNIFICANT CHANGE UP (ref 0.5–1.3)
GLUCOSE SERPL-MCNC: 111 MG/DL — HIGH (ref 70–99)
HCT VFR BLD CALC: 29.4 % — LOW (ref 34.5–45)
HGB BLD-MCNC: 9.9 G/DL — LOW (ref 11.5–15.5)
MCHC RBC-ENTMCNC: 29.8 PG — SIGNIFICANT CHANGE UP (ref 27–34)
MCHC RBC-ENTMCNC: 33.5 GM/DL — SIGNIFICANT CHANGE UP (ref 32–36)
MCV RBC AUTO: 88.8 FL — SIGNIFICANT CHANGE UP (ref 80–100)
PLATELET # BLD AUTO: 306 K/UL — SIGNIFICANT CHANGE UP (ref 150–400)
POTASSIUM SERPL-MCNC: 3.9 MMOL/L — SIGNIFICANT CHANGE UP (ref 3.5–5.3)
POTASSIUM SERPL-SCNC: 3.9 MMOL/L — SIGNIFICANT CHANGE UP (ref 3.5–5.3)
RBC # BLD: 3.31 M/UL — LOW (ref 3.8–5.2)
RBC # FLD: 12.6 % — SIGNIFICANT CHANGE UP (ref 10.3–14.5)
SODIUM SERPL-SCNC: 137 MMOL/L — SIGNIFICANT CHANGE UP (ref 135–145)
TROPONIN I SERPL-MCNC: <0.015 NG/ML — SIGNIFICANT CHANGE UP (ref 0.01–0.04)
WBC # BLD: 5.6 K/UL — SIGNIFICANT CHANGE UP (ref 3.8–10.5)
WBC # FLD AUTO: 5.6 K/UL — SIGNIFICANT CHANGE UP (ref 3.8–10.5)

## 2017-10-19 PROCEDURE — 99233 SBSQ HOSP IP/OBS HIGH 50: CPT

## 2017-10-19 PROCEDURE — 93010 ELECTROCARDIOGRAM REPORT: CPT

## 2017-10-19 RX ORDER — SPIRONOLACTONE 25 MG/1
25 TABLET, FILM COATED ORAL DAILY
Qty: 0 | Refills: 0 | Status: DISCONTINUED | OUTPATIENT
Start: 2017-10-19 | End: 2017-10-20

## 2017-10-19 RX ADMIN — Medication 1 TABLET(S): at 11:14

## 2017-10-19 RX ADMIN — Medication 1 TABLET(S): at 12:08

## 2017-10-19 RX ADMIN — LOSARTAN POTASSIUM 50 MILLIGRAM(S): 100 TABLET, FILM COATED ORAL at 21:20

## 2017-10-19 RX ADMIN — SPIRONOLACTONE 25 MILLIGRAM(S): 25 TABLET, FILM COATED ORAL at 11:19

## 2017-10-19 RX ADMIN — CARVEDILOL PHOSPHATE 25 MILLIGRAM(S): 80 CAPSULE, EXTENDED RELEASE ORAL at 06:42

## 2017-10-19 RX ADMIN — CARVEDILOL PHOSPHATE 25 MILLIGRAM(S): 80 CAPSULE, EXTENDED RELEASE ORAL at 17:24

## 2017-10-19 RX ADMIN — Medication 0.5 MILLIGRAM(S): at 11:13

## 2017-10-19 RX ADMIN — CLOPIDOGREL BISULFATE 75 MILLIGRAM(S): 75 TABLET, FILM COATED ORAL at 11:14

## 2017-10-19 RX ADMIN — ENOXAPARIN SODIUM 40 MILLIGRAM(S): 100 INJECTION SUBCUTANEOUS at 11:20

## 2017-10-19 RX ADMIN — Medication 1000 UNIT(S): at 11:14

## 2017-10-19 RX ADMIN — Medication 0.5 MILLIGRAM(S): at 21:20

## 2017-10-19 NOTE — PROGRESS NOTE ADULT - SUBJECTIVE AND OBJECTIVE BOX
History of Present Illness:  Chief Complaint/Reason for Admission: hypertension    History of Present Illness:   The patient is a 86 year old female with a PMHx notable for CVA, HTN, basal cell carcinoma, dyslipidemia, and edema.  He presents to the ED complaining of 3 weeks worth of HTN. Pt states has been working with her nephrologist to lower her BP and has been started on new BP meds.  Patient is on coreg and states she was on nifedeipine and her BP was well controlled , however she developed swelling and was taking off this.   Denies chest pain no headache.  Pts BP is 210/110 in ED.   Pt reports compliance with her BP meds.    ED course:    BP = 210/110 patient was given norvasc 10mg, labetalol 10mg IV, labetalol 20mg IV, and started on labetalol infusion which was tapered off after BP check was 130/80    10/18 - feels well. no cp, sob.  outlined plan of care with patient   10/19 - pt feeling a bit anxious today.        ICU Vital Signs Last 24 Hrs  T(C): 36.8 (19 Oct 2017 14:05), Max: 36.8 (19 Oct 2017 14:05)  T(F): 98.2 (19 Oct 2017 14:05), Max: 98.2 (19 Oct 2017 14:05)  HR: 73 (19 Oct 2017 14:00) (66 - 87)  BP: 123/58 (19 Oct 2017 14:00) (121/75 - 194/100)  BP(mean): 73 (19 Oct 2017 14:00) (70 - 124)  ABP: --  ABP(mean): --  RR: 20 (19 Oct 2017 14:00) (16 - 23)  SpO2: 90% (19 Oct 2017 14:00) (90% - 99%)    gen - sitting NAD   	HEENT:  Head is normocephalic    	Skin:  Warm and dry without any rash   	NECK:  Supple without lymphadenopathy.   	HEART:  Regular rate and rhythm. normal S1 and S2, No M/R/G  	LUNGS:  Good ins/exp effort, no W/R/R/C  	ABDOMEN:  Soft, nontender, nondistended with good bowel sounds heard  	EXTREMITIES:  Without cyanosis, clubbing or edema.   NEUROLOGICAL:  Gross nonfocal                                9.9    5.6   )-----------( 306      ( 19 Oct 2017 04:45 )             29.4     10-19    137  |  107  |  26<H>  ----------------------------<  111<H>  3.9   |  26  |  0.95    Ca    8.8      19 Oct 2017 04:45      CARDIAC MARKERS ( 19 Oct 2017 01:42 )  <0.015 ng/mL / x     / 57 U/L / x     / x      CARDIAC MARKERS ( 17 Oct 2017 22:16 )  <0.015 ng/mL / x     / x     / x     / x                      Assessment and Plan:    Assessment:  · Assessment	  The patient is a 86 year old female with a PMHx notable for CVA, HTN, basal cell carcinoma, dyslipidemia, and edema.  He presents to the ED complaining of 3 weeks worth of HTN. Pt states has been working with her nephrologist to lower her BP and has been started on new BP meds.  Patient is on coreg and states she was on nifedeipine and her BP was well controlled , however she developed swelling and was taking off this.   Denies chest pain no headache.  Pts BP is 210/110 in ED.   Pt reports compliance with her BP meds.    ED course:    BP = 210/110 patient was given norvasc 10mg, labetalol 10mg IV, labetalol 20mg IV, and started on labetalol infusion which was tapered off after BP check was 130/80        #  Hypertensive urgency.  improving   -  labile BP with SBP 110s-190s  - BP meds adjusted with clonidine patch, losartan, coreg - as per renal   - add aldactone   - ativan for anxiety as this maybe a component in her BP as per dr traore   - will add hydralazine prn for sbP > 200  2D ECHO, TSH, and fasting lipid panel.   - cardio eval     #  Cerebrovascular accident (CVA), unspecified mechanism.  Plan: Stable  Resume home med plavix, and consider addition of statin.     #  Dyslipidemia.  Plan: Low fat diet.     dispo - likey home tomorrow if BP controlle d  dispo - likley home tomorrow if BP controlled History of Present Illness:  Chief Complaint/Reason for Admission: hypertension    History of Present Illness:   The patient is a 86 year old female with a PMHx notable for CVA, HTN, basal cell carcinoma, dyslipidemia, and edema.  He presents to the ED complaining of 3 weeks worth of HTN. Pt states has been working with her nephrologist to lower her BP and has been started on new BP meds.  Patient is on coreg and states she was on nifedeipine and her BP was well controlled , however she developed swelling and was taking off this.   Denies chest pain no headache.  Pts BP is 210/110 in ED.   Pt reports compliance with her BP meds.    ED course:    BP = 210/110 patient was given norvasc 10mg, labetalol 10mg IV, labetalol 20mg IV, and started on labetalol infusion which was tapered off after BP check was 130/80    10/18 - feels well. no cp, sob.  outlined plan of care with patient   10/19 - pt feeling a bit anxious today.        ICU Vital Signs Last 24 Hrs  T(C): 36.8 (19 Oct 2017 14:05), Max: 36.8 (19 Oct 2017 14:05)  T(F): 98.2 (19 Oct 2017 14:05), Max: 98.2 (19 Oct 2017 14:05)  HR: 73 (19 Oct 2017 14:00) (66 - 87)  BP: 123/58 (19 Oct 2017 14:00) (121/75 - 194/100)  BP(mean): 73 (19 Oct 2017 14:00) (70 - 124)  ABP: --  ABP(mean): --  RR: 20 (19 Oct 2017 14:00) (16 - 23)  SpO2: 90% (19 Oct 2017 14:00) (90% - 99%)    gen - sitting NAD   	HEENT:  Head is normocephalic    	Skin:  Warm and dry without any rash   	NECK:  Supple without lymphadenopathy.   	HEART:  Regular rate and rhythm. normal S1 and S2, No M/R/G  	LUNGS:  Good ins/exp effort, no W/R/R/C  	ABDOMEN:  Soft, nontender, nondistended with good bowel sounds heard  	EXTREMITIES:  Without cyanosis, clubbing or edema.   NEUROLOGICAL:  Gross nonfocal                                9.9    5.6   )-----------( 306      ( 19 Oct 2017 04:45 )             29.4     10-19    137  |  107  |  26<H>  ----------------------------<  111<H>  3.9   |  26  |  0.95    Ca    8.8      19 Oct 2017 04:45      CARDIAC MARKERS ( 19 Oct 2017 01:42 )  <0.015 ng/mL / x     / 57 U/L / x     / x      CARDIAC MARKERS ( 17 Oct 2017 22:16 )  <0.015 ng/mL / x     / x     / x     / x                      Assessment and Plan:    Assessment:  · Assessment	  The patient is a 86 year old female with a PMHx notable for CVA, HTN, basal cell carcinoma, dyslipidemia, and edema.  He presents to the ED complaining of 3 weeks worth of HTN. Pt states has been working with her nephrologist to lower her BP and has been started on new BP meds.  Patient is on coreg and states she was on nifedeipine and her BP was well controlled , however she developed swelling and was taking off this.   Denies chest pain no headache.  Pts BP is 210/110 in ED.   Pt reports compliance with her BP meds.    ED course:    BP = 210/110 patient was given norvasc 10mg, labetalol 10mg IV, labetalol 20mg IV, and started on labetalol infusion which was tapered off after BP check was 130/80        #  Hypertensive urgency.  improving   -  labile BP with SBP 110s-190s  - BP meds adjusted with clonidine patch, losartan, coreg - as per renal   - add aldactone   - ativan for anxiety as this maybe a component in her BP as per dr traore   - will add hydralazine prn for sbP > 200  2D ECHO, TSH, and fasting lipid panel.   - cardio eval appreciated    # anemia - check iron studies  - check CBC in AM  - check FOBT    #  Cerebrovascular accident (CVA), unspecified mechanism.  Plan: Stable  Resume home med plavix, and consider addition of statin.     #  Dyslipidemia.  Plan: Low fat diet.     dispo - likey home tomorrow if BP controlle d

## 2017-10-19 NOTE — PROGRESS NOTE ADULT - ASSESSMENT
87 yo woman with HTN admitted with HTN urgency.  HTN control has been very labile as outpt.     Med adjustment has been difficult due to side effects including hyponatremia, hyperkalemia and GI and subjective symptoms.  Secondary HT work has been negative.  Will repeat at this point with Renal artery doppler and plasma metanephrines.  Will attempt to stabilize BP control and add catapress patch and d/c Hydralazine, --May respond better to longer acting agents.    10/19 SY  --HTN urgency.  BP control slightly improved.  Continue Catapress patch.  --Pt previously had been on diuretic tx with Dyazide which was d/c'd when hosp with Na level below 120.  Pt has had variable degree of LE edema.  Therefore, will give a trial of adding spironolactone as inpt with close follow up.  D/w Dr Vila. 85 yo woman with HTN admitted with HTN urgency.  HTN control has been very labile as outpt.     Med adjustment has been difficult due to side effects including hyponatremia, hyperkalemia and GI and subjective symptoms.  Secondary HT work has been negative.  Will repeat at this point with Renal artery doppler and plasma metanephrines.  Will attempt to stabilize BP control and add catapress patch and d/c Hydralazine, --May respond better to longer acting agents.    10/19 SY  --HTN urgency.  BP control slightly improved.  Continue Catapress patch.  --Pt previously had been on diuretic tx with Dyazide which was d/c'd when hosp with Na level below 120.  Pt has had variable degree of LE edema.  Therefore, will give a trial of adding spironolactone as inpt with close follow up.  D/w Dr Vila.  --Decrease in HGB.  Follow up, no signs of acute loss.

## 2017-10-19 NOTE — PROGRESS NOTE ADULT - SUBJECTIVE AND OBJECTIVE BOX
PCP:    REQUESTING PHYSICIAN:    REASON FOR CONSULT:    CHIEF COMPLAINT:    HPI:  86 year old woman with a history of CVA, HTN, basal cell carcinoma, dyslipidemia presented to the ER with complaints of poorly controlled BP x several weeks acutely worse on the day of presentation.  She says that her BP worsened when she discontinued nifedipine due to lower extremity edema; she has had multiple titrations of other agents (e.g. hydralazine) and has been seeing Dr. Victoria (nephrology) in consultation.  On 10/17/17 she noted a home BP measurement of ~ 210/110 prompting her to come to the ER for evaluation.  She describes associated "funny feeling" in her head and legs; no associated angina or dyspnea.  BP was 200/90 in the ED - treated with IV labetalol and oral amlodipine.  She is unable to identify exacerbating or alleviating factors.    10/19/17: Pt denies chest pain or shortness of breath. Clonidine patch ordered by nephrology. Dtr is at bedside. Pt admits that blood pressure is usually elevated with emotional agitation.       PAST MEDICAL & SURGICAL HISTORY:  Basal cell carcinoma  Mitral valve insufficiency, unspecified etiology  Edema, unspecified type  Cerebrovascular accident (CVA), unspecified mechanism  Hyponatremia  Dyslipidemia  Essential hypertension  UTI (urinary tract infection)  S/P Mohs surgery for basal cell carcinoma      SOCIAL HISTORY:    FAMILY HISTORY:  No pertinent family history in first degree relatives      ALLERGIES:  Allergies    Keflex (Unknown)  verapamil (Other)    Intolerances        MEDICATIONS:    MEDICATIONS  (STANDING):  calcium carbonate 500 mG (Tums) Chewable 1 Tablet(s) Chew daily  carvedilol 25 milliGRAM(s) Oral every 12 hours  cholecalciferol 1000 Unit(s) Oral daily  cloNIDine Patch 0.2 mG/24Hr(s) 1 patch Topical every 7 days  clopidogrel Tablet 75 milliGRAM(s) Oral daily  enoxaparin Injectable 40 milliGRAM(s) SubCutaneous daily  influenza   Vaccine 0.5 milliLiter(s) IntraMuscular once  LORazepam     Tablet 0.5 milliGRAM(s) Oral every 12 hours  losartan 50 milliGRAM(s) Oral at bedtime  multivitamin/minerals 1 Tablet(s) Oral daily    MEDICATIONS  (PRN):  acetaminophen   Tablet 650 milliGRAM(s) Oral every 6 hours PRN For Temp greater than 38.5 C (101.3 F)  acetaminophen   Tablet 650 milliGRAM(s) Oral every 6 hours PRN pain and fever  hydrALAZINE Injectable 10 milliGRAM(s) IV Push every 6 hours PRN for SBP >200  ondansetron Injectable 4 milliGRAM(s) IV Push every 6 hours PRN Nausea      Vital Signs Last 24 Hrs  T(C): 36.1 (19 Oct 2017 05:41), Max: 36.6 (18 Oct 2017 20:46)  T(F): 97 (19 Oct 2017 05:41), Max: 97.8 (18 Oct 2017 20:46)  HR: 82 (19 Oct 2017 08:00) (65 - 87)  BP: 166/77 (19 Oct 2017 08:00) (116/51 - 194/100)  BP(mean): 99 (19 Oct 2017 08:00) (69 - 124)  RR: 16 (19 Oct 2017 08:00) (16 - 22)  SpO2: 98% (19 Oct 2017 08:00) (96% - 99%)Daily     Daily I&O's Summary    18 Oct 2017 07:01  -  19 Oct 2017 07:00  --------------------------------------------------------  IN: 700 mL / OUT: 0 mL / NET: 700 mL        PHYSICAL EXAM:    Constitutional: NAD, awake and alert, well-developed  HEENT: PERR, EOMI,  No oral cyananosis.  Neck:  supple,  No JVD  Respiratory: Breath sounds are clear bilaterally, No wheezing, rales or rhonchi  Cardiovascular: S1 and S2, regular rate and rhythm, no Murmurs, gallops or rubs  Gastrointestinal: Bowel Sounds present, soft, nontender.   Extremities: No peripheral edema. No clubbing or cyanosis.  Vascular: 2+ peripheral pulses  Neurological: A/O x 3, no focal deficits  Musculoskeletal: no calf tenderness.  Skin: No rashes.      LABS: All Labs Reviewed:                        9.9    5.6   )-----------( 306      ( 19 Oct 2017 04:45 )             29.4                         11.6   7.5   )-----------( 375      ( 17 Oct 2017 09:30 )             34.2     19 Oct 2017 04:45    137    |  107    |  26     ----------------------------<  111    3.9     |  26     |  0.95   17 Oct 2017 09:30    135    |  104    |  22     ----------------------------<  151    4.8     |  24     |  0.98     Ca    8.8        19 Oct 2017 04:45  Ca    9.5        17 Oct 2017 09:30  Mg     2.2       17 Oct 2017 09:30    TPro  7.2    /  Alb  4.1    /  TBili  0.7    /  DBili  x      /  AST  19     /  ALT  21     /  AlkPhos  90     17 Oct 2017 09:30      CARDIAC MARKERS ( 19 Oct 2017 01:42 )  <0.015 ng/mL / x     / 57 U/L / x     / x      CARDIAC MARKERS ( 17 Oct 2017 22:16 )  <0.015 ng/mL / x     / x     / x     / x          Blood Culture:     10-18 @ 05:07  TSH: 2.530      RADIOLOGY/EKG:      ECHO/CARDIAC CATHTERIZATION/STRESS TEST:< from: Transthoracic Echocardiogram (10.18.17 @ 09:21) >  Summary     Fibrocalcific changes noted to the Aortic valve leaflets with preserved   leaflet excursion.   Mild (1+) aortic regurgitation is present.   The left atrium is moderately dilated.   The left ventricle is normal in size, wall thickness, wall motion and   contractility.   Estimated left ventricular ejection fraction is 60-65 %.     Signature     ----------------------------------------------------------------   Electronically signed by Samuel Ríos MD(Interpreting   physician) on 10/18/2017 05:13 PM   ----------------------------------------------------------------    Valves     Mitral Valve    < end of copied text >

## 2017-10-19 NOTE — PROGRESS NOTE ADULT - SUBJECTIVE AND OBJECTIVE BOX
NEPHROLOGY INTERVAL HPI/OVERNIGHT EVENTS:  10/19 SY  Resting comfortably.  No acute event.  Catapress patch applied.  SBP continued to increase toward evening,   One dose of Hydralazine was given po.  This am -160 range.    HPI:  85 yo woman with Long term Hx of HTN and CVA with recent attempt to adjust BP meds due to intolerable side effects.   Pt had been on Nifedipine for some time.   Pt reported intolerable side effects when Nifedipine  dose was increased to 60 mg from 30mg.  However, even with decreased dose, pt continued to complain of generalized malaise including "heavy feeling in her chest and vague GI symptoms.    Therefore, Nifedipine  was changed to Hydralazine with much improved symptoms and fair BP control until yesterday  am.   Of note, pt's Benicar dose was reduced as well due to recurrent Hyperkalemia even on K restricted diet.  Pt has been very compliant with Salt restriction and her meds.  Pt awoke up yesterday morning with vague HA and generalized malaise and tingling in her LE.  SBP was 220 at home and was brought to ER.  Pt was treated with IV Labetalol infusion in ED and now BP in 120-150 range.  Pt reports feeling well today and wants to go home.      PMHX and PSHX.  1.HTN  2.CVA  3.Hx of Basal cell CA post Moh's procedure   4.Hx of Hyponatremia due to diuretics and post op    MEDICATIONS  (STANDING):  calcium carbonate 500 mG (Tums) Chewable 1 Tablet(s) Chew daily  carvedilol 25 milliGRAM(s) Oral every 12 hours  cholecalciferol 1000 Unit(s) Oral daily  cloNIDine Patch 0.2 mG/24Hr(s) 1 patch Topical every 7 days  clopidogrel Tablet 75 milliGRAM(s) Oral daily  enoxaparin Injectable 40 milliGRAM(s) SubCutaneous daily  influenza   Vaccine 0.5 milliLiter(s) IntraMuscular once  LORazepam     Tablet 0.5 milliGRAM(s) Oral every 12 hours  losartan 50 milliGRAM(s) Oral at bedtime  multivitamin/minerals 1 Tablet(s) Oral daily    MEDICATIONS  (PRN):  acetaminophen   Tablet 650 milliGRAM(s) Oral every 6 hours PRN For Temp greater than 38.5 C (101.3 F)  acetaminophen   Tablet 650 milliGRAM(s) Oral every 6 hours PRN pain and fever  hydrALAZINE Injectable 10 milliGRAM(s) IV Push every 6 hours PRN for SBP >200  ondansetron Injectable 4 milliGRAM(s) IV Push every 6 hours PRN Nausea          Vital Signs Last 24 Hrs  T(C): 36.1 (19 Oct 2017 05:41), Max: 36.6 (18 Oct 2017 20:46)  T(F): 97 (19 Oct 2017 05:41), Max: 97.8 (18 Oct 2017 20:46)  HR: 82 (19 Oct 2017 08:00) (65 - 87)  BP: 166/77 (19 Oct 2017 08:00) (116/51 - 194/100)  BP(mean): 99 (19 Oct 2017 08:00) (69 - 124)  RR: 16 (19 Oct 2017 08:00) (16 - 22)  SpO2: 98% (19 Oct 2017 08:00) (96% - 99%)  Daily     Daily     10-18 @ 07:01  -  10-19 @ 07:00  --------------------------------------------------------  IN: 700 mL / OUT: 0 mL / NET: 700 mL        PHYSICAL EXAM:  Alert and appropriate  GENERAL: No distress  CHEST/LUNG: fair air entry  HEART: S1S2 RRR  ABDOMEN: soft  EXTREMITIES: 1+ ankle edema  SKIN:     LABS:                        9.9    5.6   )-----------( 306      ( 19 Oct 2017 04:45 )             29.4     10-19    137  |  107  |  26<H>  ----------------------------<  111<H>  3.9   |  26  |  0.95    Ca    8.8      19 Oct 2017 04:45      RADIOLOGY & ADDITIONAL TESTS:

## 2017-10-19 NOTE — PROVIDER CONTACT NOTE (OTHER) - SITUATION
Pt had chest pain with pressure 6/10 NRS, with a some SOB, Ekg done with no changes/ CPK/Trop WNL, resolved and then went back to bed. No distress noted. VSS at this time.

## 2017-10-20 ENCOUNTER — TRANSCRIPTION ENCOUNTER (OUTPATIENT)
Age: 82
End: 2017-10-20

## 2017-10-20 VITALS
HEART RATE: 74 BPM | RESPIRATION RATE: 11 BRPM | SYSTOLIC BLOOD PRESSURE: 167 MMHG | OXYGEN SATURATION: 95 % | DIASTOLIC BLOOD PRESSURE: 70 MMHG

## 2017-10-20 LAB
ANION GAP SERPL CALC-SCNC: 6 MMOL/L — SIGNIFICANT CHANGE UP (ref 5–17)
BUN SERPL-MCNC: 32 MG/DL — HIGH (ref 7–23)
CALCIUM SERPL-MCNC: 9.1 MG/DL — SIGNIFICANT CHANGE UP (ref 8.5–10.1)
CHLORIDE SERPL-SCNC: 106 MMOL/L — SIGNIFICANT CHANGE UP (ref 96–108)
CO2 SERPL-SCNC: 25 MMOL/L — SIGNIFICANT CHANGE UP (ref 22–31)
CREAT SERPL-MCNC: 0.99 MG/DL — SIGNIFICANT CHANGE UP (ref 0.5–1.3)
FERRITIN SERPL-MCNC: 42 NG/ML — SIGNIFICANT CHANGE UP (ref 15–150)
GLUCOSE SERPL-MCNC: 105 MG/DL — HIGH (ref 70–99)
HCT VFR BLD CALC: 29 % — LOW (ref 34.5–45)
HGB BLD-MCNC: 9.9 G/DL — LOW (ref 11.5–15.5)
IRON SATN MFR SERPL: 10 % — LOW (ref 14–50)
IRON SATN MFR SERPL: 34 UG/DL — SIGNIFICANT CHANGE UP (ref 30–160)
MCHC RBC-ENTMCNC: 30.3 PG — SIGNIFICANT CHANGE UP (ref 27–34)
MCHC RBC-ENTMCNC: 34 GM/DL — SIGNIFICANT CHANGE UP (ref 32–36)
MCV RBC AUTO: 89.2 FL — SIGNIFICANT CHANGE UP (ref 80–100)
METANEPHRINE, PL: 51 PG/ML — SIGNIFICANT CHANGE UP (ref 0–62)
NORMETANEPHRINE, PL: 164 PG/ML — HIGH (ref 0–145)
PLATELET # BLD AUTO: 312 K/UL — SIGNIFICANT CHANGE UP (ref 150–400)
POTASSIUM SERPL-MCNC: 4 MMOL/L — SIGNIFICANT CHANGE UP (ref 3.5–5.3)
POTASSIUM SERPL-SCNC: 4 MMOL/L — SIGNIFICANT CHANGE UP (ref 3.5–5.3)
RBC # BLD: 3.25 M/UL — LOW (ref 3.8–5.2)
RBC # FLD: 12.4 % — SIGNIFICANT CHANGE UP (ref 10.3–14.5)
SODIUM SERPL-SCNC: 137 MMOL/L — SIGNIFICANT CHANGE UP (ref 135–145)
TIBC SERPL-MCNC: 332 UG/DL — SIGNIFICANT CHANGE UP (ref 220–430)
UIBC SERPL-MCNC: 298 UG/DL — SIGNIFICANT CHANGE UP (ref 110–370)
WBC # BLD: 5.8 K/UL — SIGNIFICANT CHANGE UP (ref 3.8–10.5)
WBC # FLD AUTO: 5.8 K/UL — SIGNIFICANT CHANGE UP (ref 3.8–10.5)

## 2017-10-20 PROCEDURE — 93975 VASCULAR STUDY: CPT | Mod: 26

## 2017-10-20 PROCEDURE — 99233 SBSQ HOSP IP/OBS HIGH 50: CPT

## 2017-10-20 RX ORDER — ASCORBIC ACID 60 MG
500 TABLET,CHEWABLE ORAL
Qty: 0 | Refills: 0 | Status: DISCONTINUED | OUTPATIENT
Start: 2017-10-20 | End: 2017-10-20

## 2017-10-20 RX ORDER — FERROUS SULFATE 325(65) MG
325 TABLET ORAL
Qty: 0 | Refills: 0 | Status: DISCONTINUED | OUTPATIENT
Start: 2017-10-20 | End: 2017-10-20

## 2017-10-20 RX ORDER — LOSARTAN POTASSIUM 100 MG/1
50 TABLET, FILM COATED ORAL ONCE
Qty: 0 | Refills: 0 | Status: COMPLETED | OUTPATIENT
Start: 2017-10-20 | End: 2017-10-20

## 2017-10-20 RX ORDER — SPIRONOLACTONE 25 MG/1
1 TABLET, FILM COATED ORAL
Qty: 30 | Refills: 0 | OUTPATIENT
Start: 2017-10-20 | End: 2017-11-19

## 2017-10-20 RX ORDER — HYDRALAZINE HCL 50 MG
1 TABLET ORAL
Qty: 0 | Refills: 0 | COMMUNITY

## 2017-10-20 RX ORDER — FERROUS SULFATE 325(65) MG
1 TABLET ORAL
Qty: 0 | Refills: 0 | COMMUNITY
Start: 2017-10-20

## 2017-10-20 RX ORDER — OLMESARTAN MEDOXOMIL 5 MG/1
1 TABLET, FILM COATED ORAL
Qty: 0 | Refills: 0 | COMMUNITY

## 2017-10-20 RX ORDER — ASCORBIC ACID 60 MG
1 TABLET,CHEWABLE ORAL
Qty: 0 | Refills: 0 | COMMUNITY
Start: 2017-10-20

## 2017-10-20 RX ADMIN — Medication 1 TABLET(S): at 10:41

## 2017-10-20 RX ADMIN — Medication 500 MILLIGRAM(S): at 15:21

## 2017-10-20 RX ADMIN — CARVEDILOL PHOSPHATE 25 MILLIGRAM(S): 80 CAPSULE, EXTENDED RELEASE ORAL at 05:11

## 2017-10-20 RX ADMIN — Medication 1 TABLET(S): at 08:49

## 2017-10-20 RX ADMIN — CLOPIDOGREL BISULFATE 75 MILLIGRAM(S): 75 TABLET, FILM COATED ORAL at 08:48

## 2017-10-20 RX ADMIN — Medication 325 MILLIGRAM(S): at 15:21

## 2017-10-20 RX ADMIN — INFLUENZA VIRUS VACCINE 0.5 MILLILITER(S): 15; 15; 15; 15 SUSPENSION INTRAMUSCULAR at 08:49

## 2017-10-20 RX ADMIN — LOSARTAN POTASSIUM 50 MILLIGRAM(S): 100 TABLET, FILM COATED ORAL at 13:36

## 2017-10-20 RX ADMIN — SPIRONOLACTONE 25 MILLIGRAM(S): 25 TABLET, FILM COATED ORAL at 05:11

## 2017-10-20 RX ADMIN — Medication 1 PATCH: at 17:21

## 2017-10-20 RX ADMIN — Medication 0.5 MILLIGRAM(S): at 05:11

## 2017-10-20 RX ADMIN — Medication 1000 UNIT(S): at 08:48

## 2017-10-20 RX ADMIN — CARVEDILOL PHOSPHATE 25 MILLIGRAM(S): 80 CAPSULE, EXTENDED RELEASE ORAL at 17:22

## 2017-10-20 NOTE — DISCHARGE NOTE ADULT - CARE PROVIDER_API CALL
Yun, SukHyeon (MD), Nephrology  [address] [facility]  [address] [place] [address]  Phone: [phone]  Fax: [phone]    Marcos Leal), Internal Medicine  [address] [place] [address]  Phone: [phone]  Fax: [phone]

## 2017-10-20 NOTE — DISCHARGE NOTE ADULT - MEDICATION SUMMARY - MEDICATIONS TO TAKE
I will START or STAY ON the medications listed below when I get home from the hospital:    spironolactone 25 mg oral tablet  -- 1 tab(s) by mouth once a day  -- Indication: For Hypertension    Tylenol 500 mg oral tablet  -- 2 tab(s) by mouth every 8 hours, As Needed  -- Indication: For Home med    valsartan 160 mg oral tablet  -- 1 tab(s) by mouth once a day  -- Indication: For Home med    calcium (as carbonate) 500 mg oral tablet  -- 1 tab(s) by mouth once a day  -- Indication: For Home med    cloNIDine 0.3 mg/24 hr transdermal film, extended release  -- 1 film(s) by transdermal patch every 7 days   -- Indication: For Hypertension      LORazepam 0.5 mg oral tablet  -- 1 tab(s) by mouth 2 times a day x 30 days, As Needed -for anxiety MDD:2 tabs  -- Indication: For anxiety    clopidogrel 75 mg oral tablet  -- 1 tab(s) by mouth once a day  -- Indication: For Home med    carvedilol 25 mg oral tablet  -- 1 tab(s) by mouth 2 times a day  -- Indication: For Home med    ferrous sulfate 325 mg (65 mg elemental iron) oral tablet  -- 1  by mouth 2 times a day  -- Indication: For anemia    Multiple Vitamins with Minerals oral tablet  -- 1 tab(s) by mouth once a day  -- Indication: For Home med    Vitamin D3 1000 intl units oral tablet  -- 1 tab(s) by mouth once a day  -- Indication: For Home med    ascorbic acid 500 mg oral tablet  -- 1 tab(s) by mouth 2 times a day  -- Indication: For to enhance iron absorption

## 2017-10-20 NOTE — DISCHARGE NOTE ADULT - CARE PLAN
Principal Discharge DX:	Hypertensive urgency  Goal:	bp control  Instructions for follow-up, activity and diet:	take meds as advised. Follow up with Dr. Barry on tuesday regarding bp follow up and further management of possible renal artery stenosis (narrowing of your renal artery).

## 2017-10-20 NOTE — DISCHARGE NOTE ADULT - ADDITIONAL INSTRUCTIONS
1. Follow up with Dr Victoria regarding blood pressure/renal artery stenosis  2. Follow up with your primary care doctor for further workup of iron deficiency anemia including GI referral for further workup  3. Also talk to your primary care physician regarding starting cholesterol medicines if no contraindications.

## 2017-10-20 NOTE — PROGRESS NOTE ADULT - ASSESSMENT
85 yo woman with HTN admitted with HTN urgency.  HTN control has been very labile as outpt.     Med adjustment has been difficult due to side effects including hyponatremia, hyperkalemia and GI and subjective symptoms.  Secondary HT work has been negative.  Will repeat at this point with Renal artery doppler and plasma metanephrines.  Will attempt to stabilize BP control and add catapress patch and d/c Hydralazine, --May respond better to longer acting agents.    10/19 SY  --HTN urgency.  BP control slightly improved.  Continue Catapress patch.  --Pt previously had been on diuretic tx with Dyazide which was d/c'd when hosp with Na level below 120.  Pt has had variable degree of LE edema.  Therefore, will give a trial of adding spironolactone as inpt with close follow up.  D/w Dr Vila.  --Decrease in HGB.  Follow up, no signs of acute loss.    10/20 MK  - HTN urgency: BP better controlled.    dw dr Mustafa, will continue with TTS 3, aldactone 25 qd and home dose of valsartan 160 mg qd, coreg 25 bid  fu with dr nicholson next week wednesday  continue with low K diet

## 2017-10-20 NOTE — PROGRESS NOTE ADULT - SUBJECTIVE AND OBJECTIVE BOX
PCP:    REQUESTING PHYSICIAN:    REASON FOR CONSULT:    CHIEF COMPLAINT:    HPI:  86 year old woman with a history of CVA, HTN, basal cell carcinoma, dyslipidemia presented to the ER with complaints of poorly controlled BP x several weeks acutely worse on the day of presentation.  She says that her BP worsened when she discontinued nifedipine due to lower extremity edema; she has had multiple titrations of other agents (e.g. hydralazine) and has been seeing Dr. Victoria (nephrology) in consultation.  On 10/17/17 she noted a home BP measurement of ~ 210/110 prompting her to come to the ER for evaluation.  She describes associated "funny feeling" in her head and legs; no associated angina or dyspnea.  BP was 200/90 in the ED - treated with IV labetalol and oral amlodipine.  She is unable to identify exacerbating or alleviating factors.    10/19/17: Pt denies chest pain or shortness of breath. Clonidine patch ordered by nephrology. Dtr is at bedside. Pt admits that blood pressure is usually elevated with emotional agitation.   10/20/17: Pt in chair. She denies chest pain or shortness of breath. No arrhythmia.      ED course:    BP = 210/110 patient was given norvasc 10mg, labetalol 10mg IV, labetalol 20mg IV, and started on labetalol infusion which was tapered off after BP check was 130/80 (17 Oct 2017 19:01)      PAST MEDICAL & SURGICAL HISTORY:  Basal cell carcinoma  Mitral valve insufficiency, unspecified etiology  Edema, unspecified type  Cerebrovascular accident (CVA), unspecified mechanism  Hyponatremia  Dyslipidemia  Essential hypertension  UTI (urinary tract infection)  S/P Mohs surgery for basal cell carcinoma      SOCIAL HISTORY:    FAMILY HISTORY:  No pertinent family history in first degree relatives      ALLERGIES:  Allergies    Keflex (Unknown)  verapamil (Other)    Intolerances        MEDICATIONS:    MEDICATIONS  (STANDING):  calcium carbonate 500 mG (Tums) Chewable 1 Tablet(s) Chew daily  carvedilol 25 milliGRAM(s) Oral every 12 hours  cholecalciferol 1000 Unit(s) Oral daily  cloNIDine Patch 0.2 mG/24Hr(s) 1 patch Topical every 7 days  clopidogrel Tablet 75 milliGRAM(s) Oral daily  enoxaparin Injectable 40 milliGRAM(s) SubCutaneous daily  LORazepam     Tablet 0.5 milliGRAM(s) Oral every 12 hours  losartan 50 milliGRAM(s) Oral at bedtime  multivitamin/minerals 1 Tablet(s) Oral daily  spironolactone 25 milliGRAM(s) Oral daily    MEDICATIONS  (PRN):  acetaminophen   Tablet 650 milliGRAM(s) Oral every 6 hours PRN For Temp greater than 38.5 C (101.3 F)  acetaminophen   Tablet 650 milliGRAM(s) Oral every 6 hours PRN pain and fever  hydrALAZINE Injectable 10 milliGRAM(s) IV Push every 6 hours PRN for SBP >200  ondansetron Injectable 4 milliGRAM(s) IV Push every 6 hours PRN Nausea        Vital Signs Last 24 Hrs  T(C): 36.3 (20 Oct 2017 07:00), Max: 36.8 (19 Oct 2017 14:05)  T(F): 97.3 (20 Oct 2017 07:00), Max: 98.3 (19 Oct 2017 20:27)  HR: 67 (20 Oct 2017 06:00) (66 - 93)  BP: 131/57 (20 Oct 2017 06:00) (110/87 - 188/75)  BP(mean): 77 (20 Oct 2017 06:00) (73 - 108)  RR: 20 (19 Oct 2017 19:00) (13 - 23)  SpO2: 96% (20 Oct 2017 06:00) (90% - 99%)Daily     Daily Weight in k (20 Oct 2017 03:05)I&O's Summary    19 Oct 2017 07:01  -  20 Oct 2017 07:00  --------------------------------------------------------  IN: 0 mL / OUT: 775 mL / NET: -775 mL        PHYSICAL EXAM:    Constitutional: NAD, awake and alert, well-developed  HEENT: PERR, EOMI,  No oral cyananosis.  Neck:  supple,  No JVD  Respiratory: Breath sounds are clear bilaterally, No wheezing, rales or rhonchi  Cardiovascular: S1 and S2, regular rate and rhythm, no Murmurs, gallops or rubs  Gastrointestinal: Bowel Sounds present, soft, nontender.   Extremities: No peripheral edema. No clubbing or cyanosis.  Vascular: 2+ peripheral pulses  Neurological: A/O x 3, no focal deficits  Musculoskeletal: no calf tenderness.  Skin: No rashes.      LABS: All Labs Reviewed:                        9.9    5.8   )-----------( 312      ( 20 Oct 2017 05:00 )             29.0                         9.9    5.6   )-----------( 306      ( 19 Oct 2017 04:45 )             29.4                         11.6   7.5   )-----------( 375      ( 17 Oct 2017 09:30 )             34.2     20 Oct 2017 05:00    137    |  106    |  32     ----------------------------<  105    4.0     |  25     |  0.99   19 Oct 2017 04:45    137    |  107    |  26     ----------------------------<  111    3.9     |  26     |  0.95   17 Oct 2017 09:30    135    |  104    |  22     ----------------------------<  151    4.8     |  24     |  0.98     Ca    9.1        20 Oct 2017 05:00  Ca    8.8        19 Oct 2017 04:45  Ca    9.5        17 Oct 2017 09:30  Mg     2.2       17 Oct 2017 09:30    TPro  7.2    /  Alb  4.1    /  TBili  0.7    /  DBili  x      /  AST  19     /  ALT  21     /  AlkPhos  90     17 Oct 2017 09:30      CARDIAC MARKERS ( 19 Oct 2017 01:42 )  <0.015 ng/mL / x     / 57 U/L / x     / x          Blood Culture:     10-18 @ 05:07  TSH: 2.530      RADIOLOGY/EKG:      ECHO/CARDIAC CATHTERIZATION/STRESS TEST:

## 2017-10-20 NOTE — DISCHARGE NOTE ADULT - PLAN OF CARE
bp control take meds as advised. Follow up with Dr. Barry on tuesday regarding bp follow up and further management of possible renal artery stenosis (narrowing of your renal artery).

## 2017-10-20 NOTE — DISCHARGE NOTE ADULT - HOSPITAL COURSE
86F, PMHx of CVA, HTN, basal cell carcinoma, dyslipidemia, and edema who presented to the ED complaining of 3 weeks of uncontrolled HTN.  She was on nifedipine for a long time, but recently developed adverse effects, her dose was decreased without improvement so she was started on hydralazine. She also had her arb decreased due to hyperkalemia.  BP was 210/110 in ED. She was given iv labetolol and started on infusion. She was admitted to CCU. Taken off labetolol gtt and meds were titrated for better control. She was also started on xanax for anxiety which was felt to be playing a role in her uncontrolled htn. 2Decho showed mild AI, dilated LA, LVEF 60-65%. Renal doppler did show suspected narrowing at origin of right renal artery. These findings were d/w nephrology. The plan is for the patient to be discharged on aldactone/catapress/home dose of valsartan and coreg. She will f/u with nephrology on tuesday for further mx. of note she was found to have iron deficiency here and has been started on iron supplementation and recommended outpt f/u.   Also has been recommended outpt f/u with pcp for starting statin if no contraindications.  for physical exam please see progress note from 10/20/17  LABS:                        9.9    5.8   )-----------( 312      ( 20 Oct 2017 05:00 )             29.0     10-20    137  |  106  |  32<H>  ----------------------------<  105<H>  4.0   |  25  |  0.99    Ca    9.1      20 Oct 2017 05:00    US Abdomen Doppler (10.20.17 @ 13:01) >  IMPRESSION:  Renal artery stenosis suspected at the right origin.       Transthoracic Echocardiogram (10.18.17 @ 09:21) >   Summary   Fibrocalcific changes noted to the Aortic valve leaflets with preserved   leaflet excursion.   Mild (1+) aortic regurgitation is present.   The left atrium is moderately dilated.   The left ventricle is normal in size, wall thickness, wall motion and   contractility.   Estimated left ventricular ejection fraction is 60-65 %.    FINAL DIAGNOSIS:    1. Hypertensive urgency  2. Right renal artery stenosis  3. Iron deficiency anemia  4. Dyslipidemia  5. h/o CVA  6. Anxiety     Time taken in preparation for dc 75 min   attempted to contact pcp re: dc. (Dr. Fco Leal), Line persistently busy.

## 2017-10-20 NOTE — PROGRESS NOTE ADULT - SUBJECTIVE AND OBJECTIVE BOX
NEPHROLOGY INTERVAL HPI/OVERNIGHT EVENTS:  doing well, no new complaints..no headaches.  Bp starting to rise again        MEDICATIONS  (STANDING):  ascorbic acid 500 milliGRAM(s) Oral two times a day  calcium carbonate 500 mG (Tums) Chewable 1 Tablet(s) Chew daily  carvedilol 25 milliGRAM(s) Oral every 12 hours  cholecalciferol 1000 Unit(s) Oral daily  cloNIDine Patch 0.3 mG/24Hr(s) 1 patch Topical every 7 days  clopidogrel Tablet 75 milliGRAM(s) Oral daily  enoxaparin Injectable 40 milliGRAM(s) SubCutaneous daily  ferrous    sulfate 325 milliGRAM(s) Oral two times a day with meals  LORazepam     Tablet 0.5 milliGRAM(s) Oral every 12 hours  losartan 50 milliGRAM(s) Oral at bedtime  multivitamin/minerals 1 Tablet(s) Oral daily  spironolactone 25 milliGRAM(s) Oral daily    MEDICATIONS  (PRN):  acetaminophen   Tablet 650 milliGRAM(s) Oral every 6 hours PRN For Temp greater than 38.5 C (101.3 F)  acetaminophen   Tablet 650 milliGRAM(s) Oral every 6 hours PRN pain and fever  hydrALAZINE Injectable 10 milliGRAM(s) IV Push every 6 hours PRN for SBP >200  ondansetron Injectable 4 milliGRAM(s) IV Push every 6 hours PRN Nausea      Allergies    Keflex (Unknown)  verapamil (Other)    Intolerances        I&O's Detail    19 Oct 2017 07:  -  20 Oct 2017 07:00  --------------------------------------------------------  IN:  Total IN: 0 mL    OUT:    Voided: 775 mL  Total OUT: 775 mL    Total NET: -775 mL      20 Oct 2017 07:  -  20 Oct 2017 15:52  --------------------------------------------------------  IN:    Oral Fluid: 708 mL  Total IN: 708 mL    OUT:  Total OUT: 0 mL    Total NET: 708 mL           Vital Signs Last 24 Hrs  T(C): 36.3 (20 Oct 2017 07:00), Max: 36.8 (19 Oct 2017 20:27)  T(F): 97.3 (20 Oct 2017 07:00), Max: 98.3 (19 Oct 2017 20:27)  HR: 74 (20 Oct 2017 15:00) (66 - 93)  BP: 167/70 (20 Oct 2017 15:00) (110/87 - 198/90)  BP(mean): 92 (20 Oct 2017 15:00) (73 - 109)  RR: 11 (20 Oct 2017 15:00) (11 - 20)  SpO2: 95% (20 Oct 2017 15:00) (92% - 99%)  Daily     Daily Weight in k (20 Oct 2017 03:05)    PHYSICAL EXAM:  General: alert. awake Ox3  HEENT: MMM  CV: s1s2 rrr  LUNGS: B/L CTA  EXT: no edema    LABS:                        9.9    5.8   )-----------( 312      ( 20 Oct 2017 05:00 )             29.0     10-20    137  |  106  |  32<H>  ----------------------------<  105<H>  4.0   |  25  |  0.99    Ca    9.1      20 Oct 2017 05:00

## 2017-10-20 NOTE — DISCHARGE NOTE ADULT - PATIENT PORTAL LINK FT
“You can access the [employer] Patient Portal, offered by [facility], by registering with the following website: [email or web address]”

## 2017-10-20 NOTE — DISCHARGE NOTE ADULT - MEDICATION SUMMARY - MEDICATIONS TO STOP TAKING
I will STOP taking the medications listed below when I get home from the hospital:    hydrALAZINE 25 mg oral tablet  -- 1 tab(s) by mouth 4 times a day  **Pt takes 35mg four times a day    hydrALAZINE 10 mg oral tablet  -- 1 tab(s) by mouth 4 times a day  **Pt takes 35mg 4 times a day

## 2017-10-20 NOTE — PROGRESS NOTE ADULT - PROBLEM SELECTOR PLAN 1
Continue current meds. D/W Renal. Will add aldactone and low dose lorazepam.
Continue current medications. Pt improved. Ambulating

## 2017-10-20 NOTE — PROGRESS NOTE ADULT - SUBJECTIVE AND OBJECTIVE BOX
c/c: HTN    HPI:  86F, PMHx of CVA, HTN, basal cell carcinoma, dyslipidemia, and edema who presented to the ED complaining of 3 weeks of uncontrolled HTN.  She was on nifedipine for a long time, but recently developed adverse effects, her dose was decreased without improvement so she was started on hydralazine. She also had her arb decreased due to hyperkalemia.  BP was 210/110 in ED. She was given iv labetolol and started on infusion. She was admitted to CCU. Taken off labetolol gtt and meds were titrated for better control. She was also started on xanax for anxiety which was felt to be playing a role in her uncontrolled htn. 2Decho showed mild AI, dilated LA, LVEF 60-65%.     Vital Signs Last 24 Hrs  T(C): 36.3 (20 Oct 2017 07:00), Max: 36.8 (19 Oct 2017 20:27)  T(F): 97.3 (20 Oct 2017 07:00), Max: 98.3 (19 Oct 2017 20:27)  HR: 73 (20 Oct 2017 14:00) (66 - 93)  BP: 195/80 (20 Oct 2017 14:00) (110/87 - 198/90)  BP(mean): 109 (20 Oct 2017 14:00) (73 - 109)  RR: 14 (20 Oct 2017 14:00) (13 - 20)  SpO2: 94% (20 Oct 2017 14:00) (92% - 99%)    PHYSICAL EXAM:    GENERAL: Comfortable, no acute distress   HEAD:  Normocephalic, atraumatic  EYES: EOMI, PERRLA  HEENT: Moist mucous membranes  NECK: Supple, No JVD  NERVOUS SYSTEM:  Alert & Oriented X3, Motor Strength 5/5 B/L upper and lower extremities  CHEST/LUNG: Clear to auscultation bilaterally  HEART: Regular rate and rhythm  ABDOMEN: Soft, Nontender, Nondistended, Bowel sounds present  GENITOURINARY: Voiding, no palpable bladder  EXTREMITIES:   No clubbing, cyanosis. b/l LE edema++  MUSCULOSKELTAL- No muscle tenderness, no joint tenderness  SKIN-no rash      LABS:                        9.9    5.8   )-----------( 312      ( 20 Oct 2017 05:00 )             29.0     10-20    137  |  106  |  32<H>  ----------------------------<  105<H>  4.0   |  25  |  0.99    Ca    9.1      20 Oct 2017 05:00        CARDIAC MARKERS ( 19 Oct 2017 01:42 )  <0.015 ng/mL / x     / 57 U/L / x     / x      MEDICATIONS  (STANDING):  calcium carbonate 500 mG (Tums) Chewable 1 Tablet(s) Chew daily  carvedilol 25 milliGRAM(s) Oral every 12 hours  cholecalciferol 1000 Unit(s) Oral daily  cloNIDine Patch 0.3 mG/24Hr(s) 1 patch Topical every 7 days  clopidogrel Tablet 75 milliGRAM(s) Oral daily  enoxaparin Injectable 40 milliGRAM(s) SubCutaneous daily  LORazepam     Tablet 0.5 milliGRAM(s) Oral every 12 hours  losartan 50 milliGRAM(s) Oral at bedtime  multivitamin/minerals 1 Tablet(s) Oral daily  spironolactone 25 milliGRAM(s) Oral daily    MEDICATIONS  (PRN):  acetaminophen   Tablet 650 milliGRAM(s) Oral every 6 hours PRN For Temp greater than 38.5 C (101.3 F)  acetaminophen   Tablet 650 milliGRAM(s) Oral every 6 hours PRN pain and fever  hydrALAZINE Injectable 10 milliGRAM(s) IV Push every 6 hours PRN for SBP >200  ondansetron Injectable 4 milliGRAM(s) IV Push every 6 hours PRN Nausea        ASSESSMENT AND PLAN:  86F, PMH AS ABOVE A/W:    1. Hypertensive urgency:  -currently on losartan 50mg qhs, catapress 0.2 patch, coreg 25 bid, aldactone 25mg daily, and prn iv hydralazine  -d/w nephrology, will give extra dose losartan X 1 and increase catapress to 0.3  -f/u renal artery doppler  -2Decho as above    2. Iron deficiency anemia:  -start iron supplementation  -check FOBT  -outpt gi eval .    3. Dyslipidemia:  -statin if no contraindication    4. h/o CVA:  -on antiplatelet not on statin    5. Anxiety:  -started on xanax.   6. DVT px        dispo - likey home tomorrow if BP controlle d

## 2017-10-21 RX ORDER — VALSARTAN 80 MG/1
1 TABLET ORAL
Qty: 30 | Refills: 0 | OUTPATIENT
Start: 2017-10-21 | End: 2017-11-20

## 2017-10-24 DIAGNOSIS — I16.0 HYPERTENSIVE URGENCY: ICD-10-CM

## 2017-10-24 DIAGNOSIS — Z88.8 ALLERGY STATUS TO OTHER DRUGS, MEDICAMENTS AND BIOLOGICAL SUBSTANCES: ICD-10-CM

## 2017-10-24 DIAGNOSIS — F41.9 ANXIETY DISORDER, UNSPECIFIED: ICD-10-CM

## 2017-10-24 DIAGNOSIS — Z96.643 PRESENCE OF ARTIFICIAL HIP JOINT, BILATERAL: ICD-10-CM

## 2017-10-24 DIAGNOSIS — I70.1 ATHEROSCLEROSIS OF RENAL ARTERY: ICD-10-CM

## 2017-10-24 DIAGNOSIS — Z85.828 PERSONAL HISTORY OF OTHER MALIGNANT NEOPLASM OF SKIN: ICD-10-CM

## 2017-10-24 DIAGNOSIS — I08.0 RHEUMATIC DISORDERS OF BOTH MITRAL AND AORTIC VALVES: ICD-10-CM

## 2017-10-24 DIAGNOSIS — D50.9 IRON DEFICIENCY ANEMIA, UNSPECIFIED: ICD-10-CM

## 2017-10-24 DIAGNOSIS — E78.5 HYPERLIPIDEMIA, UNSPECIFIED: ICD-10-CM

## 2017-10-27 ENCOUNTER — APPOINTMENT (OUTPATIENT)
Dept: CARDIOLOGY | Facility: CLINIC | Age: 82
End: 2017-10-27

## 2017-11-10 ENCOUNTER — NON-APPOINTMENT (OUTPATIENT)
Age: 82
End: 2017-11-10

## 2017-11-10 ENCOUNTER — APPOINTMENT (OUTPATIENT)
Dept: CARDIOLOGY | Facility: CLINIC | Age: 82
End: 2017-11-10
Payer: MEDICARE

## 2017-11-10 VITALS — OXYGEN SATURATION: 99 % | HEART RATE: 70 BPM

## 2017-11-10 VITALS — HEART RATE: 70 BPM | DIASTOLIC BLOOD PRESSURE: 92 MMHG | OXYGEN SATURATION: 99 % | SYSTOLIC BLOOD PRESSURE: 199 MMHG

## 2017-11-10 VITALS — HEIGHT: 61 IN | BODY MASS INDEX: 23.03 KG/M2 | WEIGHT: 122 LBS

## 2017-11-10 PROCEDURE — 99214 OFFICE O/P EST MOD 30 MIN: CPT | Mod: 25

## 2017-11-10 PROCEDURE — 93000 ELECTROCARDIOGRAM COMPLETE: CPT

## 2017-12-18 ENCOUNTER — APPOINTMENT (OUTPATIENT)
Dept: CARDIOLOGY | Facility: CLINIC | Age: 82
End: 2017-12-18

## 2017-12-19 ENCOUNTER — INPATIENT (INPATIENT)
Facility: HOSPITAL | Age: 82
LOS: 2 days | Discharge: HOME | DRG: 305 | End: 2017-12-22
Attending: INTERNAL MEDICINE | Admitting: INTERNAL MEDICINE
Payer: MEDICARE

## 2017-12-19 VITALS
TEMPERATURE: 98 F | HEART RATE: 70 BPM | OXYGEN SATURATION: 94 % | DIASTOLIC BLOOD PRESSURE: 88 MMHG | WEIGHT: 119.93 LBS | RESPIRATION RATE: 16 BRPM | SYSTOLIC BLOOD PRESSURE: 220 MMHG

## 2017-12-19 DIAGNOSIS — Z98.89 OTHER SPECIFIED POSTPROCEDURAL STATES: Chronic | ICD-10-CM

## 2017-12-19 LAB
ALBUMIN SERPL ELPH-MCNC: 4.1 G/DL — SIGNIFICANT CHANGE UP (ref 3.3–5)
ALP SERPL-CCNC: 80 U/L — SIGNIFICANT CHANGE UP (ref 40–120)
ALT FLD-CCNC: 26 U/L — SIGNIFICANT CHANGE UP (ref 12–78)
ANION GAP SERPL CALC-SCNC: 9 MMOL/L — SIGNIFICANT CHANGE UP (ref 5–17)
AST SERPL-CCNC: 23 U/L — SIGNIFICANT CHANGE UP (ref 15–37)
BASOPHILS # BLD AUTO: 0.1 K/UL — SIGNIFICANT CHANGE UP (ref 0–0.2)
BASOPHILS NFR BLD AUTO: 0.7 % — SIGNIFICANT CHANGE UP (ref 0–2)
BILIRUB SERPL-MCNC: 1.1 MG/DL — SIGNIFICANT CHANGE UP (ref 0.2–1.2)
BUN SERPL-MCNC: 43 MG/DL — HIGH (ref 7–23)
CALCIUM SERPL-MCNC: 8.9 MG/DL — SIGNIFICANT CHANGE UP (ref 8.5–10.1)
CHLORIDE SERPL-SCNC: 102 MMOL/L — SIGNIFICANT CHANGE UP (ref 96–108)
CK SERPL-CCNC: 61 U/L — SIGNIFICANT CHANGE UP (ref 26–192)
CO2 SERPL-SCNC: 21 MMOL/L — LOW (ref 22–31)
CREAT SERPL-MCNC: 1.5 MG/DL — HIGH (ref 0.5–1.3)
EOSINOPHIL # BLD AUTO: 0.1 K/UL — SIGNIFICANT CHANGE UP (ref 0–0.5)
EOSINOPHIL NFR BLD AUTO: 1 % — SIGNIFICANT CHANGE UP (ref 0–6)
GLUCOSE SERPL-MCNC: 139 MG/DL — HIGH (ref 70–99)
HCT VFR BLD CALC: 37.1 % — SIGNIFICANT CHANGE UP (ref 34.5–45)
HGB BLD-MCNC: 12.4 G/DL — SIGNIFICANT CHANGE UP (ref 11.5–15.5)
INR BLD: 0.96 RATIO — SIGNIFICANT CHANGE UP (ref 0.88–1.16)
LYMPHOCYTES # BLD AUTO: 19.8 % — SIGNIFICANT CHANGE UP (ref 13–44)
LYMPHOCYTES # BLD AUTO: 2.1 K/UL — SIGNIFICANT CHANGE UP (ref 1–3.3)
MCHC RBC-ENTMCNC: 31.3 PG — SIGNIFICANT CHANGE UP (ref 27–34)
MCHC RBC-ENTMCNC: 33.4 GM/DL — SIGNIFICANT CHANGE UP (ref 32–36)
MCV RBC AUTO: 93.5 FL — SIGNIFICANT CHANGE UP (ref 80–100)
MONOCYTES # BLD AUTO: 0.5 K/UL — SIGNIFICANT CHANGE UP (ref 0–0.9)
MONOCYTES NFR BLD AUTO: 4.7 % — SIGNIFICANT CHANGE UP (ref 1–9)
NEUTROPHILS # BLD AUTO: 7.9 K/UL — HIGH (ref 1.8–7.4)
NEUTROPHILS NFR BLD AUTO: 73.8 % — SIGNIFICANT CHANGE UP (ref 43–77)
PLATELET # BLD AUTO: 357 K/UL — SIGNIFICANT CHANGE UP (ref 150–400)
POTASSIUM SERPL-MCNC: 3.8 MMOL/L — SIGNIFICANT CHANGE UP (ref 3.5–5.3)
POTASSIUM SERPL-SCNC: 3.8 MMOL/L — SIGNIFICANT CHANGE UP (ref 3.5–5.3)
PROT SERPL-MCNC: 7.1 G/DL — SIGNIFICANT CHANGE UP (ref 6–8.3)
PROTHROM AB SERPL-ACNC: 10.5 SEC — SIGNIFICANT CHANGE UP (ref 9.8–12.7)
RBC # BLD: 3.97 M/UL — SIGNIFICANT CHANGE UP (ref 3.8–5.2)
RBC # FLD: 12.6 % — SIGNIFICANT CHANGE UP (ref 10.3–14.5)
SODIUM SERPL-SCNC: 132 MMOL/L — LOW (ref 135–145)
TROPONIN I SERPL-MCNC: <.015 NG/ML — SIGNIFICANT CHANGE UP (ref 0.01–0.04)
WBC # BLD: 10.8 K/UL — HIGH (ref 3.8–10.5)
WBC # FLD AUTO: 10.8 K/UL — HIGH (ref 3.8–10.5)

## 2017-12-19 PROCEDURE — 71010: CPT | Mod: 26

## 2017-12-19 PROCEDURE — 93010 ELECTROCARDIOGRAM REPORT: CPT

## 2017-12-19 RX ORDER — ACETAMINOPHEN 500 MG
2 TABLET ORAL
Qty: 0 | Refills: 0 | COMMUNITY

## 2017-12-19 RX ORDER — CARVEDILOL PHOSPHATE 80 MG/1
25 CAPSULE, EXTENDED RELEASE ORAL ONCE
Qty: 0 | Refills: 0 | Status: COMPLETED | OUTPATIENT
Start: 2017-12-19 | End: 2017-12-19

## 2017-12-19 RX ORDER — LABETALOL HCL 100 MG
10 TABLET ORAL ONCE
Qty: 0 | Refills: 0 | Status: COMPLETED | OUTPATIENT
Start: 2017-12-19 | End: 2017-12-19

## 2017-12-19 RX ORDER — VALSARTAN 80 MG/1
1 TABLET ORAL
Qty: 0 | Refills: 0 | COMMUNITY

## 2017-12-19 RX ORDER — CALCIUM CARBONATE 500(1250)
1 TABLET ORAL
Qty: 0 | Refills: 0 | COMMUNITY

## 2017-12-19 RX ORDER — SODIUM CHLORIDE 9 MG/ML
500 INJECTION INTRAMUSCULAR; INTRAVENOUS; SUBCUTANEOUS ONCE
Qty: 0 | Refills: 0 | Status: COMPLETED | OUTPATIENT
Start: 2017-12-19 | End: 2017-12-19

## 2017-12-19 RX ORDER — CHOLECALCIFEROL (VITAMIN D3) 125 MCG
1 CAPSULE ORAL
Qty: 0 | Refills: 0 | COMMUNITY

## 2017-12-19 RX ADMIN — Medication 10 MILLIGRAM(S): at 21:47

## 2017-12-19 RX ADMIN — SODIUM CHLORIDE 500 MILLILITER(S): 9 INJECTION INTRAMUSCULAR; INTRAVENOUS; SUBCUTANEOUS at 23:25

## 2017-12-19 RX ADMIN — CARVEDILOL PHOSPHATE 25 MILLIGRAM(S): 80 CAPSULE, EXTENDED RELEASE ORAL at 22:50

## 2017-12-19 NOTE — CONSULT NOTE ADULT - SUBJECTIVE AND OBJECTIVE BOX
CHIEF COMPLAINT: Patient is a 86y old  Female who presents with a chief complaint of     HPI:      EKG:    REVIEW OF SYSTEMS:   All other review of systems are negative unless indicated above    PAST MEDICAL & SURGICAL HISTORY:  Basal cell carcinoma  Mitral valve insufficiency, unspecified etiology  Edema, unspecified type  Cerebrovascular accident (CVA), unspecified mechanism  Hyponatremia  Dyslipidemia  Essential hypertension  UTI (urinary tract infection)  S/P Mohs surgery for basal cell carcinoma      SOCIAL HISTORY:  No tobacco, ethanol, or drug abuse.    FAMILY HISTORY:  No pertinent family history in first degree relatives    No family history of acute MI or sudden cardiac death.    MEDICATIONS  (STANDING):  sodium chloride 0.9% Bolus 500 milliLiter(s) IV Bolus once    MEDICATIONS  (PRN):      Allergies    Keflex (Unknown)  verapamil (Other)    Intolerances        Home meds:  Home Medications:  carvedilol 25 mg oral tablet: 1 tab(s) orally 2 times a day (19 Dec 2017 21:15)  cloNIDine 0.2 mg oral tablet: 1 tab(s) orally 2 times a day (19 Dec 2017 21:15)  Plavix 75 mg oral tablet: 1 tab(s) orally once a day (19 Dec 2017 21:15)  spironolactone 25 mg oral tablet: orally once a day (19 Dec 2017 21:15)  valsartan 160 mg oral tablet: 1 tab(s) orally once a day (19 Dec 2017 21:15)  Veltassa:  (19 Dec 2017 21:15)        VITAL SIGNS:   Vital Signs Last 24 Hrs  T(C): 36.5 (19 Dec 2017 21:06), Max: 36.5 (19 Dec 2017 21:06)  T(F): 97.7 (19 Dec 2017 21:06), Max: 97.7 (19 Dec 2017 21:06)  HR: 72 (19 Dec 2017 22:17) (70 - 72)  BP: 186/76 (19 Dec 2017 22:17) (186/76 - 220/88)  BP(mean): --  RR: 16 (19 Dec 2017 21:06) (16 - 16)  SpO2: 94% (19 Dec 2017 21:06) (94% - 94%)    I&O's Summary      On Exam:  TELE:   Constitutional: NAD, awake and alert, well-developed  HEENT: Moist Mucous Membranes, Anicteric  Pulmonary: Non-labored, breath sounds are clear bilaterally, No wheezing, rales or rhonchi  Cardiovascular: Regular, S1 and S2, No murmurs, rubs, gallops or clicks  Gastrointestinal: Bowel Sounds present, soft, nontender.   Lymph: No peripheral edema. No lymphadenopathy.  Skin: No visible rashes or ulcers.  Psych:  Mood & affect appropriate    LABS: All Labs Reviewed:                        12.4   10.8  )-----------( 357      ( 19 Dec 2017 22:07 )             37.1     19 Dec 2017 22:07    132    |  102    |  43     ----------------------------<  139    3.8     |  21     |  1.50     Ca    8.9        19 Dec 2017 22:07    TPro  7.1    /  Alb  4.1    /  TBili  1.1    /  DBili  x      /  AST  23     /  ALT  26     /  AlkPhos  80     19 Dec 2017 22:07    PT/INR - ( 19 Dec 2017 22:07 )   PT: 10.5 sec;   INR: 0.96 ratio           CARDIAC MARKERS ( 19 Dec 2017 22:07 )  <.015 ng/mL / x     / 61 U/L / x     / x          Blood Culture:         RADIOLOGY: CHIEF COMPLAINT: Patient is a 86y old  Female who presents with a chief complaint of     HPI: 86 year old woman w HTN, CVA, HLD, hyponatremia p.w syncope. She states that her medications were recently changed. She was in her USOH. Yesterday she tried to get up and walk to the other room. The next thing she knew she was on the floor. Denies any chest pain, dyspnea, PND, orthopnea,  lower extremity edema, stroke like symptoms.   She states that she can only drink 6 glass of water a day.       EKG: SR with nonspecific ST changes.    REVIEW OF SYSTEMS:   All other review of systems are negative unless indicated above    PAST MEDICAL & SURGICAL HISTORY:  Basal cell carcinoma  Mitral valve insufficiency, unspecified etiology  Edema, unspecified type  Cerebrovascular accident (CVA), unspecified mechanism  Hyponatremia  Dyslipidemia  Essential hypertension  UTI (urinary tract infection)  S/P Mohs surgery for basal cell carcinoma      SOCIAL HISTORY:  No tobacco, ethanol, or drug abuse.    FAMILY HISTORY:  No pertinent family history in first degree relatives    No family history of acute MI or sudden cardiac death.    MEDICATIONS  (STANDING):  sodium chloride 0.9% Bolus 500 milliLiter(s) IV Bolus once    MEDICATIONS  (PRN):      Allergies    Keflex (Unknown)  verapamil (Other)           Home meds:  Home Medications:  carvedilol 25 mg oral tablet: 1 tab(s) orally 2 times a day (19 Dec 2017 21:15)  cloNIDine 0.2 mg oral tablet: 1 tab(s) orally 3 times a day (19 Dec 2017 21:15)  Plavix 75 mg oral tablet: 1 tab(s) orally once a day (19 Dec 2017 21:15)  spironolactone 25 mg oral tablet: orally once a day (19 Dec 2017 21:15)  valsartan 160 mg oral tablet: 1 tab(s) orally once a day (19 Dec 2017 21:15)  Veltassa:  (19 Dec 2017 21:15)        VITAL SIGNS:   Vital Signs Last 24 Hrs  T(C): 36.5 (19 Dec 2017 21:06), Max: 36.5 (19 Dec 2017 21:06)  T(F): 97.7 (19 Dec 2017 21:06), Max: 97.7 (19 Dec 2017 21:06)  HR: 72 (19 Dec 2017 22:17) (70 - 72)  BP: 186/76 (19 Dec 2017 22:17) (186/76 - 220/88)  BP(mean): --  RR: 16 (19 Dec 2017 21:06) (16 - 16)  SpO2: 94% (19 Dec 2017 21:06) (94% - 94%)    I&O's Summary      On Exam:     Constitutional: NAD, awake and alert, well-developed  HEENT: Dry Mucous Membranes, Anicteric  Pulmonary: Non-labored, breath sounds are clear bilaterally, No wheezing, rales or rhonchi  Cardiovascular: Regular, S1 and S2, No murmurs, rubs, gallops or clicks  Gastrointestinal: Bowel Sounds present, soft, nontender.   Lymph: No peripheral edema. No lymphadenopathy.  Skin: No visible rashes or ulcers.  Psych:  Mood & affect appropriate    LABS: All Labs Reviewed:                        12.4   10.8  )-----------( 357      ( 19 Dec 2017 22:07 )             37.1     19 Dec 2017 22:07    132    |  102    |  43     ----------------------------<  139    3.8     |  21     |  1.50     Ca    8.9        19 Dec 2017 22:07    TPro  7.1    /  Alb  4.1    /  TBili  1.1    /  DBili  x      /  AST  23     /  ALT  26     /  AlkPhos  80     19 Dec 2017 22:07    PT/INR - ( 19 Dec 2017 22:07 )   PT: 10.5 sec;   INR: 0.96 ratio           CARDIAC MARKERS ( 19 Dec 2017 22:07 )  <.015 ng/mL / x     / 61 U/L / x     / x          Blood Culture:         RADIOLOGY:    imaging pending.

## 2017-12-20 ENCOUNTER — TRANSCRIPTION ENCOUNTER (OUTPATIENT)
Age: 82
End: 2017-12-20

## 2017-12-20 DIAGNOSIS — R60.9 EDEMA, UNSPECIFIED: ICD-10-CM

## 2017-12-20 DIAGNOSIS — I63.9 CEREBRAL INFARCTION, UNSPECIFIED: ICD-10-CM

## 2017-12-20 DIAGNOSIS — I16.0 HYPERTENSIVE URGENCY: ICD-10-CM

## 2017-12-20 DIAGNOSIS — R55 SYNCOPE AND COLLAPSE: ICD-10-CM

## 2017-12-20 DIAGNOSIS — E78.5 HYPERLIPIDEMIA, UNSPECIFIED: ICD-10-CM

## 2017-12-20 DIAGNOSIS — Z29.9 ENCOUNTER FOR PROPHYLACTIC MEASURES, UNSPECIFIED: ICD-10-CM

## 2017-12-20 DIAGNOSIS — Z96.643 PRESENCE OF ARTIFICIAL HIP JOINT, BILATERAL: Chronic | ICD-10-CM

## 2017-12-20 DIAGNOSIS — Z90.49 ACQUIRED ABSENCE OF OTHER SPECIFIED PARTS OF DIGESTIVE TRACT: Chronic | ICD-10-CM

## 2017-12-20 DIAGNOSIS — E87.1 HYPO-OSMOLALITY AND HYPONATREMIA: ICD-10-CM

## 2017-12-20 DIAGNOSIS — N17.9 ACUTE KIDNEY FAILURE, UNSPECIFIED: ICD-10-CM

## 2017-12-20 DIAGNOSIS — I70.1 ATHEROSCLEROSIS OF RENAL ARTERY: ICD-10-CM

## 2017-12-20 DIAGNOSIS — Z90.710 ACQUIRED ABSENCE OF BOTH CERVIX AND UTERUS: Chronic | ICD-10-CM

## 2017-12-20 LAB
ANION GAP SERPL CALC-SCNC: 7 MMOL/L — SIGNIFICANT CHANGE UP (ref 5–17)
BUN SERPL-MCNC: 35 MG/DL — HIGH (ref 7–23)
CALCIUM SERPL-MCNC: 9.7 MG/DL — SIGNIFICANT CHANGE UP (ref 8.5–10.1)
CHLORIDE SERPL-SCNC: 106 MMOL/L — SIGNIFICANT CHANGE UP (ref 96–108)
CK MB BLD-MCNC: 4.2 % — HIGH (ref 0–3.5)
CK MB BLD-MCNC: 4.8 % — HIGH (ref 0–3.5)
CK MB CFR SERPL CALC: 2.7 NG/ML — SIGNIFICANT CHANGE UP (ref 0–3.6)
CK MB CFR SERPL CALC: 3.2 NG/ML — SIGNIFICANT CHANGE UP (ref 0–3.6)
CK SERPL-CCNC: 56 U/L — SIGNIFICANT CHANGE UP (ref 26–192)
CK SERPL-CCNC: 76 U/L — SIGNIFICANT CHANGE UP (ref 26–192)
CO2 SERPL-SCNC: 24 MMOL/L — SIGNIFICANT CHANGE UP (ref 22–31)
CREAT SERPL-MCNC: 1.1 MG/DL — SIGNIFICANT CHANGE UP (ref 0.5–1.3)
GLUCOSE SERPL-MCNC: 109 MG/DL — HIGH (ref 70–99)
HCT VFR BLD CALC: 35.5 % — SIGNIFICANT CHANGE UP (ref 34.5–45)
HGB BLD-MCNC: 11.6 G/DL — SIGNIFICANT CHANGE UP (ref 11.5–15.5)
MCHC RBC-ENTMCNC: 30.2 PG — SIGNIFICANT CHANGE UP (ref 27–34)
MCHC RBC-ENTMCNC: 32.8 GM/DL — SIGNIFICANT CHANGE UP (ref 32–36)
MCV RBC AUTO: 92.1 FL — SIGNIFICANT CHANGE UP (ref 80–100)
PLATELET # BLD AUTO: 335 K/UL — SIGNIFICANT CHANGE UP (ref 150–400)
POTASSIUM SERPL-MCNC: 4.5 MMOL/L — SIGNIFICANT CHANGE UP (ref 3.5–5.3)
POTASSIUM SERPL-SCNC: 4.5 MMOL/L — SIGNIFICANT CHANGE UP (ref 3.5–5.3)
RBC # BLD: 3.85 M/UL — SIGNIFICANT CHANGE UP (ref 3.8–5.2)
RBC # FLD: 12.5 % — SIGNIFICANT CHANGE UP (ref 10.3–14.5)
SODIUM SERPL-SCNC: 137 MMOL/L — SIGNIFICANT CHANGE UP (ref 135–145)
TROPONIN I SERPL-MCNC: 0.01 NG/ML — SIGNIFICANT CHANGE UP (ref 0.01–0.04)
TROPONIN I SERPL-MCNC: 0.02 NG/ML — SIGNIFICANT CHANGE UP (ref 0.01–0.04)
WBC # BLD: 9.5 K/UL — SIGNIFICANT CHANGE UP (ref 3.8–10.5)
WBC # FLD AUTO: 9.5 K/UL — SIGNIFICANT CHANGE UP (ref 3.8–10.5)

## 2017-12-20 PROCEDURE — 70450 CT HEAD/BRAIN W/O DYE: CPT | Mod: 26

## 2017-12-20 PROCEDURE — 99233 SBSQ HOSP IP/OBS HIGH 50: CPT

## 2017-12-20 PROCEDURE — 99285 EMERGENCY DEPT VISIT HI MDM: CPT

## 2017-12-20 PROCEDURE — 70551 MRI BRAIN STEM W/O DYE: CPT | Mod: 26

## 2017-12-20 RX ORDER — DOCUSATE SODIUM 100 MG
100 CAPSULE ORAL THREE TIMES A DAY
Qty: 0 | Refills: 0 | Status: DISCONTINUED | OUTPATIENT
Start: 2017-12-20 | End: 2017-12-22

## 2017-12-20 RX ORDER — SENNA PLUS 8.6 MG/1
2 TABLET ORAL AT BEDTIME
Qty: 0 | Refills: 0 | Status: DISCONTINUED | OUTPATIENT
Start: 2017-12-20 | End: 2017-12-22

## 2017-12-20 RX ORDER — SPIRONOLACTONE 25 MG/1
0 TABLET, FILM COATED ORAL
Qty: 0 | Refills: 0 | COMMUNITY

## 2017-12-20 RX ORDER — FAMOTIDINE 10 MG/ML
20 INJECTION INTRAVENOUS DAILY
Qty: 0 | Refills: 0 | Status: DISCONTINUED | OUTPATIENT
Start: 2017-12-20 | End: 2017-12-22

## 2017-12-20 RX ORDER — LABETALOL HCL 100 MG
20 TABLET ORAL ONCE
Qty: 0 | Refills: 0 | Status: COMPLETED | OUTPATIENT
Start: 2017-12-20 | End: 2017-12-20

## 2017-12-20 RX ORDER — VALSARTAN 80 MG/1
160 TABLET ORAL DAILY
Qty: 0 | Refills: 0 | Status: DISCONTINUED | OUTPATIENT
Start: 2017-12-20 | End: 2017-12-22

## 2017-12-20 RX ORDER — CARVEDILOL PHOSPHATE 80 MG/1
25 CAPSULE, EXTENDED RELEASE ORAL EVERY 12 HOURS
Qty: 0 | Refills: 0 | Status: DISCONTINUED | OUTPATIENT
Start: 2017-12-20 | End: 2017-12-22

## 2017-12-20 RX ORDER — CLOPIDOGREL BISULFATE 75 MG/1
75 TABLET, FILM COATED ORAL DAILY
Qty: 0 | Refills: 0 | Status: DISCONTINUED | OUTPATIENT
Start: 2017-12-20 | End: 2017-12-22

## 2017-12-20 RX ORDER — PATIROMER 8.4 G/1
0 POWDER, FOR SUSPENSION ORAL
Qty: 0 | Refills: 0 | COMMUNITY

## 2017-12-20 RX ORDER — ALPRAZOLAM 0.25 MG
0.25 TABLET ORAL
Qty: 0 | Refills: 0 | Status: DISCONTINUED | OUTPATIENT
Start: 2017-12-20 | End: 2017-12-22

## 2017-12-20 RX ORDER — SPIRONOLACTONE 25 MG/1
25 TABLET, FILM COATED ORAL DAILY
Qty: 0 | Refills: 0 | Status: DISCONTINUED | OUTPATIENT
Start: 2017-12-20 | End: 2017-12-22

## 2017-12-20 RX ORDER — HEPARIN SODIUM 5000 [USP'U]/ML
5000 INJECTION INTRAVENOUS; SUBCUTANEOUS EVERY 12 HOURS
Qty: 0 | Refills: 0 | Status: DISCONTINUED | OUTPATIENT
Start: 2017-12-20 | End: 2017-12-22

## 2017-12-20 RX ADMIN — CARVEDILOL PHOSPHATE 25 MILLIGRAM(S): 80 CAPSULE, EXTENDED RELEASE ORAL at 18:08

## 2017-12-20 RX ADMIN — Medication 20 MILLIGRAM(S): at 02:18

## 2017-12-20 RX ADMIN — SENNA PLUS 2 TABLET(S): 8.6 TABLET ORAL at 23:09

## 2017-12-20 RX ADMIN — VALSARTAN 160 MILLIGRAM(S): 80 TABLET ORAL at 06:01

## 2017-12-20 RX ADMIN — SPIRONOLACTONE 25 MILLIGRAM(S): 25 TABLET, FILM COATED ORAL at 06:01

## 2017-12-20 RX ADMIN — HEPARIN SODIUM 5000 UNIT(S): 5000 INJECTION INTRAVENOUS; SUBCUTANEOUS at 18:08

## 2017-12-20 RX ADMIN — Medication 100 MILLIGRAM(S): at 23:09

## 2017-12-20 RX ADMIN — Medication 0.2 MILLIGRAM(S): at 23:11

## 2017-12-20 RX ADMIN — CLOPIDOGREL BISULFATE 75 MILLIGRAM(S): 75 TABLET, FILM COATED ORAL at 11:12

## 2017-12-20 RX ADMIN — Medication 100 MILLIGRAM(S): at 14:34

## 2017-12-20 RX ADMIN — CARVEDILOL PHOSPHATE 25 MILLIGRAM(S): 80 CAPSULE, EXTENDED RELEASE ORAL at 06:00

## 2017-12-20 RX ADMIN — HEPARIN SODIUM 5000 UNIT(S): 5000 INJECTION INTRAVENOUS; SUBCUTANEOUS at 06:01

## 2017-12-20 RX ADMIN — Medication 0.3 MILLIGRAM(S): at 06:00

## 2017-12-20 RX ADMIN — Medication 0.3 MILLIGRAM(S): at 14:34

## 2017-12-20 NOTE — DISCHARGE NOTE ADULT - HOSPITAL COURSE
86F PMhx HTN, Dyslipidemia, Chronic hyponatremia, past CVA in 1986 (with no major residual dysfunction), recent diagnosis Rt renal artery stenosis brought in by ambulance due to syncope. Pt stated that 12/19 around 8:15 pm, she was resting and took her BP which showed systolic BP was 81, which is unusually low for Pt, Pt then stood up and went to bedroom and tried to get second bp machine to verify the reading, pt was suddenly pass out. Pt estimate she was lying on the floor for about 10-12 mins. Pt was able to woke up herself and called her daughter thru her cell phone and her son too. Pt was got up with the help of family member. Upon the ambulance arrival, pt also felt nausea and dizziness. Pt stated this couple days she is not feeling well with unsteady gait. Pt's nephrologist recent changed pt 's BP medications . Pt denies of any CP/palpitaion/sob/dizziness/blurry vison/HA/weakness before her syncope. Pt was hospitalized in [place] for hypertensive urgency and had TTE done which showed grossly normal EF, pt also had renal US showed Rt renal artery stenosis.  Pt reported that she is on low sodium and fluid restriction diet for months.    In ED, pt was hypertensive at 220/88, S/p one coreg and one IV labetalol, pt 's bp trending to 186/76. Mild leukocytosis at 10.8. hyponatremia at 132, Cr 1.5 and BUN 43. GFR is 31. 1st cardiac enzyme was negative. EKG showed normal sinus rhythm with nospecific ? lead II  T wave inversion. CXR and CT head unofficial read showed no acute pathology. Pt was also seen by cardio in ED. 86F PMhx HTN, Dyslipidemia, Chronic hyponatremia, past CVA in 1986 (with no major residual dysfunction), recent diagnosis Rt renal artery stenosis brought in by ambulance due to syncope. Pt stated that 12/19 around 8:15 pm, she was resting and took her BP which showed systolic BP was 81, which is unusually low for Pt, Pt then stood up and went to bedroom and tried to get second bp machine to verify the reading, pt was suddenly pass out. Pt estimate she was lying on the floor for about 10-12 mins. Pt was able to woke up herself and called her daughter thru her cell phone and her son too. Pt was got up with the help of family member. Upon the ambulance arrival, pt also felt nausea and dizziness. Pt stated this couple days she is not feeling well with unsteady gait. Pt's nephrologist recent changed pt 's BP medications . Pt denies of any CP/palpitaion/sob/dizziness/blurry vison/HA/weakness before her syncope. Pt was hospitalized in [place] for hypertensive urgency and had TTE done which showed grossly normal EF, pt also had renal US showed Rt renal artery stenosis.  Pt reported that she is on low sodium and fluid restriction diet for months.    In ED, pt was hypertensive at 220/88, S/p one coreg and one IV labetalol, pt 's bp trending to 186/76. Mild leukocytosis at 10.8. hyponatremia at 132, Cr 1.5 and BUN 43. GFR is 31. 1st cardiac enzyme was negative. EKG showed normal sinus rhythm with nonspecific ? lead II  T wave inversion. CXR and CT head unofficial read showed no acute pathology. Pt was also seen by cardio in ED.     Cardiac enzymes continued to be negative x3.  It was felt that the pt's syncopal episode was due to structural cardiac issue given the home SBP reading in the 80s and recent BP medication adjustments. 86F PMhx HTN, Dyslipidemia, Chronic hyponatremia, past CVA in 1986 (with no major residual dysfunction), recent diagnosis Rt renal artery stenosis brought in by ambulance due to syncope. Pt stated that 12/19 around 8:15 pm, she was resting and took her BP which showed systolic BP was 81, which is unusually low for Pt, Pt then stood up and went to bedroom and tried to get second bp machine to verify the reading, pt was suddenly pass out. Pt estimate she was lying on the floor for about 10-12 mins. Pt was able to woke up herself and called her daughter thru her cell phone and her son too. Pt was got up with the help of family member. Upon the ambulance arrival, pt also felt nausea and dizziness. Pt stated this couple days she is not feeling well with unsteady gait. Pt's nephrologist recent changed pt 's BP medications . Pt denies of any CP/palpitaion/sob/dizziness/blurry vison/HA/weakness before her syncope. Pt was hospitalized in [place] for hypertensive urgency and had TTE done which showed grossly normal EF, pt also had renal US showed Rt renal artery stenosis.  Pt reported that she is on low sodium and fluid restriction diet for months.    In ED, pt was hypertensive at 220/88, S/p one coreg and one IV labetalol, pt 's bp trending to 186/76. Mild leukocytosis at 10.8. hyponatremia at 132, Cr 1.5 and BUN 43. GFR is 31. 1st cardiac enzyme was negative. EKG showed normal sinus rhythm with nonspecific ? lead II  T wave inversion. CXR and CT head unofficial read showed no acute pathology. Pt was also seen by cardio in ED.     Pt admitted to telemetry. Cardiac enzymes continued to be negative x3.  It was felt that the pt's syncopal episode was due to orthostatic hypotension given the home SBP reading in the 80s and recent BP medication adjustments.  Structural cardiac issue unlikely given lack of arrhythmia during continuous tele monitoring.    CT Head was negative for intracranial hemorrhage or pathology.  Neuro ([facility]) was consulted.  MRI head showed______________.      PT was ordered for evaluation. Recommended __________.      Pt was medically optimized during admission and was stable for discharge to ___________. 86F PMhx HTN, Dyslipidemia, Chronic hyponatremia, past CVA in 1986 (with no major residual dysfunction), recent diagnosis Rt renal artery stenosis brought in by ambulance due to syncope. Pt stated that 12/19 around 8:15 pm, she was resting and took her BP which showed systolic BP was 81, which is unusually low for Pt, Pt then stood up and went to bedroom and tried to get second bp machine to verify the reading, pt was suddenly pass out. Pt estimate she was lying on the floor for about 10-12 mins. Pt was able to woke up herself and called her daughter thru her cell phone and her son too. Pt was got up with the help of family member. Upon the ambulance arrival, pt also felt nausea and dizziness. Pt stated this couple days she is not feeling well with unsteady gait. Pt's nephrologist recent changed pt 's BP medications . Pt denies of any CP/palpitaion/sob/dizziness/blurry vison/HA/weakness before her syncope. Pt was hospitalized in [place] for hypertensive urgency and had TTE done which showed grossly normal EF, pt also had renal US showed Rt renal artery stenosis.  Pt reported that she is on low sodium and fluid restriction diet for months.    In ED, pt was hypertensive at 220/88, S/p one coreg and one IV labetalol, pt 's bp trending to 186/76. Mild leukocytosis at 10.8. hyponatremia at 132, Cr 1.5 and BUN 43. GFR is 31. 1st cardiac enzyme was negative. EKG showed normal sinus rhythm with nonspecific ? lead II  T wave inversion. CXR and CT head unofficial read showed no acute pathology. Pt was also seen by cardio in ED.     Pt admitted to telemetry. Cardiac enzymes continued to be negative x3.  It was felt that the pt's syncopal episode was due to orthostatic hypotension given the home SBP reading in the 80s and recent BP medication adjustments.  Orthostatics positive x3.  Structural cardiac issue unlikely given lack of arrhythmia during continuous tele monitoring.  BP continued to be labile (systolic ) throughout admission.  No medication adjustments made as BP readings fluctuated significantly.  Pt given thigh-high compression stockings or orthostasis and counseled regarding home safety.     CT Head was negative for intracranial hemorrhage or pathology.  [facility]  MRI head negative for acute pathology.      PT evaluation ordered. Recommended discharge home with assistance and home PT.      Pt was medically optimized during admission and was stable for discharge to ___________. 86F PMhx HTN, Dyslipidemia, Chronic hyponatremia, past CVA in 1986 (with no major residual dysfunction), recent diagnosis Rt renal artery stenosis brought in by ambulance due to syncope. Pt stated that 12/19 around 8:15 pm, she was resting and took her BP which showed systolic BP was 81, which is unusually low for Pt.  Shortly after that reading, pt stood up and syncopized.  Pt estimated she was lying on the floor for about 10-12 mins. Pt called her daughter who called EMS. Upon the ambulance arrival, pt also felt nausea and dizziness. Pt stated this couple days she is not feeling well with unsteady gait.  Pt's nephrologist recently changed pt's BP medications.  Pt denied any CP/palpitation/sob/dizziness/blurry vison/HA/weakness before her syncope.  Pt was recently hospitalized in [place] for hypertensive urgency and had TTE done which showed grossly normal EF.  Pt also had renal US showing possible Rt renal artery stenosis.  Pt reported that she is on low sodium, low potassium, and fluid restriction diet for months.    In ED, pt was hypertensive at 220/88, S/p one coreg and one IV labetalol, /76. Mild leukocytosis at 10.8. Hyponatremia at 132, Cr 1.5 and BUN 43. GFR is 31. 1st cardiac enzyme was negative. EKG showed normal sinus rhythm with nonspecific lead II T wave inversion.  CXR and CT head showed no acute pathology. Pt was also seen by cardio ([facility]) in ED.     Pt admitted to telemetry. Cardiac enzymes continued to be negative x3.  It was felt that the pt's syncopal episode was due to orthostatic hypotension given the home SBP reading in the 80s and recent BP medication adjustments.  Orthostatics positive throughout admission.  Structural cardiac issue unlikely given lack of arrhythmia during continuous tele monitoring and multiple recent TTEs showing no abnormalities.  BP continued to be labile (systolic ) throughout admission.  No medication adjustments made as BP readings fluctuated significantly.  Pt given thigh-high compression stockings or orthostasis and counseled regarding home safety. Plan was discussed extensively with daughter.     Initial CT Head was negative for intracranial hemorrhage or pathology.  [facility]  MRI head negative for acute pathology.      PT evaluation ordered. Recommended discharge home with assistance and home PT.      Pt seen and examined on day of discharge.  She was medically optimized during admission and was stable for discharge to home. 86F PMhx HTN, Dyslipidemia, Chronic hyponatremia, past CVA in 1986 (with no major residual dysfunction), recent diagnosis Rt renal artery stenosis brought in by ambulance due to syncope. Pt stated that 12/19 around 8:15 pm, she was resting and took her BP which showed systolic BP was 81, which is unusually low for Pt.  Shortly after that reading, pt stood up and syncopized.  Pt estimated she was lying on the floor for about 10-12 mins. Pt called her daughter who called EMS. Upon the ambulance arrival, pt also felt nausea and dizziness. Pt stated this couple days she is not feeling well with unsteady gait.  Pt's nephrologist recently changed pt's BP medications.  Pt denied any CP/palpitation/sob/dizziness/blurry vison/HA/weakness before her syncope.  Pt was recently hospitalized in [place] for hypertensive urgency and had TTE done which showed grossly normal EF.  Pt also had renal US showing possible Rt renal artery stenosis.  Pt reported that she is on low sodium, low potassium, and fluid restriction diet for months.    In ED, pt was hypertensive at 220/88, S/p one coreg and one IV labetalol, /76. Mild leukocytosis at 10.8. Hyponatremia at 132, Cr 1.5 and BUN 43. GFR is 31. 1st cardiac enzyme was negative. EKG showed normal sinus rhythm with nonspecific lead II T wave inversion.  CXR and CT head showed no acute pathology. Pt was also seen by cardio ([facility]) in ED.     Pt admitted to telemetry. Cardiac enzymes continued to be negative x3.  It was felt that the pt's syncopal episode was due to orthostatic hypotension given the home SBP reading in the 80s and recent BP medication adjustments.  Orthostatics positive throughout admission.  Structural cardiac issue unlikely given lack of arrhythmia during continuous tele monitoring and multiple recent TTEs showing no abnormalities.  BP continued to be labile (systolic ) throughout admission.  No medication adjustments made as BP readings fluctuated significantly.  Pt given thigh-high compression stockings or orthostasis and counseled regarding home safety. Plan was discussed extensively with daughter.     Initial CT Head was negative for intracranial hemorrhage or pathology.  [facility] ([facility]) was consulted.  MRI head negative for acute pathology. Pt was given IV fluids as per cardiology with + Orthostasis, CAMILO stockings started, D/W Dr Victoria & Dr Vila at detail, NO Need for CT angio for Eval of Rt Renal Artery stenosis as they DO NOT Recommend intervention at her age, out pt follow up & work up if needed, dtr refusing anti anxiety meds, pt's BP has improved BUT now positive Orthostasis, Pt remains on all home meds.      PT evaluation ordered. Recommended discharge home with assistance and home PT.      Pt seen and examined on day of discharge.  She was medically optimized during admission and was stable for discharge to home.

## 2017-12-20 NOTE — H&P ADULT - PROBLEM SELECTOR PLAN 9
-heparin for DVT ppx  IMPROVE VTE Individual Risk Assessment        RISK                                                          Points  [  ] Previous VTE                                                3  [  ] Thrombophilia                                             2  [  ] Lower limb paralysis                                   2        (unable to hold up >15 seconds)    [  ] Current Cancer                                            2         (within 6 months)  [  ] Immobilization > 24 hrs                              1  [  ] ICU/CCU stay > 24 hours                            1  [ x ] Age > 60                                                    1  IMPROVE VTE Score 1    Pt's daughter Brittney Smith # [phone] is healthy proxy, who pt will ask to bring [facility] in AM -heparin for DVT ppx  IMPROVE VTE Individual Risk Assessment        RISK                                                          Points  [  ] Previous VTE                                                3  [  ] Thrombophilia                                             2  [  ] Lower limb paralysis                                   2        (unable to hold up >15 seconds)    [  ] Current Cancer                                            2         (within 6 months)  [  ] Immobilization > 24 hrs                              1  [  ] ICU/CCU stay > 24 hours                            1  [ x ] Age > 60                                                    1  IMPROVE VTE Score 1  Pt will need to bring home Mayra   Pt's daughter Brittney Smith # [phone] is healthy proxy, who pt will ask to bring [facility] in AM

## 2017-12-20 NOTE — H&P ADULT - NEGATIVE CARDIOVASCULAR SYMPTOMS
no paroxysmal nocturnal dyspnea/no orthopnea/no peripheral edema/no palpitations/no chest pain/no dyspnea on exertion

## 2017-12-20 NOTE — PATIENT PROFILE ADULT. - FAMILY HISTORY
Sibling  Still living? Unknown  Family history of uterine cancer, Age at diagnosis: Age Unknown  Family history of carotid artery stenosis, Age at diagnosis: Age Unknown

## 2017-12-20 NOTE — H&P ADULT - NEGATIVE MUSCULOSKELETAL SYMPTOMS
no arthralgia/no arthritis/no joint swelling/no myalgia/no stiffness/no muscle cramps/no muscle weakness

## 2017-12-20 NOTE — H&P ADULT - NSHPSOCIALHISTORY_GEN_ALL_CORE
Marriage status:   Living condition: be herself   Physical condition: use cane only outdoor , no O2 needed  no smoking, social drinker, no recreational drug using  Immunization: +flushot,                        +Pneumonia shot,                          last colonoscopy yrs ago and result wnl

## 2017-12-20 NOTE — DISCHARGE NOTE ADULT - CARE PROVIDERS DIRECT ADDRESSES
,[email or web address] ,[email or web address],DirectAddress_Unknown ,[email or web address],DirectAddress_Unknown,DirectAddress_Unknown

## 2017-12-20 NOTE — PROGRESS NOTE ADULT - PROBLEM SELECTOR PLAN 9
-heparin for DVT ppx  IMPROVE VTE Individual Risk Assessment        RISK                                                          Points  [  ] Previous VTE                                                3  [  ] Thrombophilia                                             2  [  ] Lower limb paralysis                                   2        (unable to hold up >15 seconds)    [  ] Current Cancer                                            2         (within 6 months)  [  ] Immobilization > 24 hrs                              1  [  ] ICU/CCU stay > 24 hours                            1  [ x ] Age > 60                                                    1  IMPROVE VTE Score 1  Pt will need to bring home Mayra   Pt's daughter Brittney Smith # [phone] is healthy proxy, who pt will ask to bring [facility] in AM

## 2017-12-20 NOTE — H&P ADULT - NEGATIVE OPHTHALMOLOGIC SYMPTOMS
no blurred vision R/no photophobia/no discharge R/no diplopia/no lacrimation L/no lacrimation R/no blurred vision L/no discharge L

## 2017-12-20 NOTE — H&P ADULT - ATTENDING COMMENTS
Pt seen, examined, case & care plan d/w residents, pt , Cardiology Dr García & Neurology Dr Nair at detail.

## 2017-12-20 NOTE — PROGRESS NOTE ADULT - PROBLEM SELECTOR PLAN 1
- multifactorial essential HTN, possibly secondary to renal artery stenosis and recent meds adjustment   -s/p Coreg and Labetalol in ED, BP remains elevated at 177/74  - Continue with carvedilol, clonidine, valsartan  - Cardiology consulted, appreciate recommendations by Dr. García, may increase valsartan and add hydralazine. Patient likely has resistant hypertension as evidenced by her need for clonidine. Cardiology will continue to monitor closely.  - Continue to monitor vitals - multifactorial essential HTN, possibly secondary to renal artery stenosis and recent meds adjustment   -s/p Coreg and Labetalol in ED, BP remains elevated at 177/74  - Continue with carvedilol, clonidine, valsartan  - Cardiology consulted, appreciate recommendations by Dr. García, may increase valsartan and add hydralazine. Patient likely has resistant hypertension as evidenced by her need for clonidine. Cardiology will continue to monitor closely.  - Continue to monitor vitals  -f/u nephro recs - multifactorial essential HTN, possibly secondary to ?? Rt renal artery stenosis and recent meds adjustment   -s/p Coreg and Labetalol in ED, BP remains elevated at 177/74  - Continue with carvedilol, clonidine, valsartan, aldactone  - Cardiology consulted, appreciate recommendations by Dr. García, may increase valsartan and add hydralazine.   Patient likely has resistant hypertension as evidenced by her need for clonidine. Cardiology will continue to monitor closely.  - Continue to monitor vitals  -f/u nephro recs

## 2017-12-20 NOTE — H&P ADULT - FAMILY HISTORY
Mother  Still living? Unknown  Family history of uterine cancer, Age at diagnosis: Age Unknown     Sibling  Still living? Unknown  Family history of uterine cancer, Age at diagnosis: Age Unknown  Family history of carotid artery stenosis, Age at diagnosis: Age Unknown

## 2017-12-20 NOTE — DISCHARGE NOTE ADULT - NS AS ACTIVITY OBS
Walking-Indoors allowed/No Heavy lifting/straining/Do not drive or operate machinery No Heavy lifting/straining/Showering allowed/Walking-Indoors allowed/Do not drive or operate machinery No Heavy lifting/straining/Showering allowed/Walking-Indoors allowed/Do not drive or operate machinery/walk with walker, please put on Don stockings to your legs before you get out of bed.

## 2017-12-20 NOTE — H&P ADULT - PROBLEM SELECTOR PLAN 4
-acute on chronic  -? medication induced vs SIADH  -s/p fluid resuscitation   -continue f/u with BMP -acute on chronic, stable Na level  -? Etiology unclear , pt is on Free H2O restriction at home  -s/p fluid resuscitation   -continue f/u with BMP

## 2017-12-20 NOTE — H&P ADULT - ASSESSMENT
86 y.o F w/PMhx of HTN, Dyslipidemia, Chronic hyponatremia, past CVA in 1986( with no major residual dysfunction), recently diagnosis Rt renal artery stenosis brought in by ambulance due to syncope a/w hypertensive urgency and syncope

## 2017-12-20 NOTE — DISCHARGE NOTE ADULT - NS AS DC STROKE ED MATERIALS
High Blood pressure is a risk factor for Strokes/Stroke Education Booklet/Risk Factors for Stroke/Prescribed Medications/Need for Followup After Discharge/Stroke Warning Signs and Symptoms/Call 911 for Stroke

## 2017-12-20 NOTE — H&P ADULT - PSH
H/O bilateral hip replacements    History of cholecystectomy    S/P Mohs surgery for basal cell carcinoma    Status post hysterectomy

## 2017-12-20 NOTE — H&P ADULT - PROBLEM SELECTOR PLAN 3
-acute on chronic  -? due to dehydration vs syncope  -s/p 500ml bolus  -will follow AM BMP -acute on chronic Pre Renal with Aldactone  -? due to dehydration   -s/p 500ml bolus  -will follow AM BMP

## 2017-12-20 NOTE — DISCHARGE NOTE ADULT - PLAN OF CARE
BP control You were admitted to the hospital because your blood pressure was very high.  Please continue to be vigilant about your diet, limiting your potassium intake and restricting your fluid intake as instructed by your outpatient cardiologist. Please continue your fluid restricted diet. You fainted prior to coming to the hospital.  This was likely due to a drop in your blood pressure when you stood up.  Please continue to drink water as instructed and to take your blood pressure medications as instructed above.  Please follow up with your cardiologist (Dr. Traylor) within 1-2 weeks of discharge. You fainted prior to coming to the hospital.  This was likely due to a drop in your blood pressure when you stood up.  Please continue to drink water as instructed and to take your blood pressure medications as instructed above.  Please follow up with your cardiologist (Dr. Vila) within 1-2 weeks of discharge. You were admitted to the hospital because your blood pressure was very high.  Please continue to be vigilant about your diet, limiting your potassium intake and restricting your fluid intake as instructed by your outpatient cardiologist and/or primary care physician.  Please continue to take your medications as instructed above and follow up with your cardiologist (Dr. Vila) within 1 week of discharge. Please continue your fluid restricted diet. Please follow up with your kidney doctor/Primary care physician (Dr. Victoria) within 1 week of discharge. Please follow up with Dr. Victoria within 1 week of discharge. You fainted prior to coming to the hospital.  This was likely due to a drop in your blood pressure when you stood up.  Please continue to drink water as instructed and to take your blood pressure medications as instructed above.  Also continue to wear the compression stockings you were given in the hospital.  When going from seated or lying position to standing, take your time and lie down if you start feeling dizzy. Please follow up with your cardiologist (Dr. Vila) within 1 weeks of discharge. You fainted prior to coming to the hospital.  This was likely due to a drop in your blood pressure when you stood up.  Please continue to drink water as instructed and to take your blood pressure medications as instructed above.  Also continue to wear the compression stockings you were given in the hospital.  When going from seated or lying position to standing, take your time and lie down if you start feeling dizzy. Please use your walker or cane when ambulating.  Please follow up with your cardiologist (Dr. Vila) within 1 weeks of discharge. You were admitted to the hospital because your blood pressure was very high. BP stable    Please continue to be vigilant about your diet, limiting your potassium intake  as instructed by your outpatient cardiologist and/or primary care physician.    Please continue to take your medications as instructed above and follow up with your cardiologist (Dr. Vila) within 1 week of discharge. Resolved , Please    Please follow up with your kidney doctor/Primary care physician (Dr. Victoria) within 1 week of discharge. Please follow up with Dr. Victoria within 1 week of discharge. for out pt work up & care You fainted prior to coming to the hospital. likely with orthostatic with Vasovagal event.  Please put on your CAMILO stockings on both your legs before you get out of your bed daily in morning.use walker to walk  - Please continue to drink water as instructed and to take your blood pressure medications as instructed above.  Also continue to wear the compression stockings you were given in the hospital.  When going from seated or lying position to standing, take your time and lie down if you start feeling dizzy. Please use your walker or cane when ambulating.  Please follow up with your cardiologist (Dr. Vila) within 1 weeks of discharge. on Plavix diet control, follow up with Cardiology

## 2017-12-20 NOTE — H&P ADULT - HISTORY OF PRESENT ILLNESS
86 y.o F w/PMhx of HTN, Dyslipidemia, Chronic hyponatremia, past CVA in 1986( with no major residual dysfunction), recently diagnosis Rt renal artery stenosis brought in by ambulance due to syncope. Pt stated that last night (12/19) around 8:15 pm, she was resting and took her BP which showed systolic BP was 81, which is unusually low for Pt, Pt then stood up and went to bedroom and tried to get second bp machine to verify the reading, pt was suddenly pass out. Pt estimate she was lying on the floor for about 10-12 mins. Pt was able to woke up herself and called her daughter thru her cell phone and her son too. Pt was got up with the help of family member. Upon the ambulance arrival, pt also felt nausea and dizziness. Pt stated this couple days she is not feeling well with unsteady gait. Pt's nephrologist recent changed pt 's BP medications . Pt denies of any CP/palpitaion/sob/dizziness/blurry vison/HA/weakness before her syncope. Pt was hospitalized in [place] for hypertensive urgency and had TTE done which showed grossly normal EF, pt also had renal US showed Rt renal artery stenosis.      In ED, pt was hypertensive at 220/88, S/p one coreg and one IV labetalol, pt 's bp trending to 186/76. Mild leukocytosis at 10.8. hyponatremia at 132, Cr 1.5 and BUN 43. GFR is 31. 1st cardiac enzyme was negative. EKG showed normal sinus rhythm with nospecific ? lead II  T wave inversion. CXR and CT head unofficial read showed no acute pathology. Pt was also seen by cardio in ED. 86 y.o F w/PMhx of HTN, Dyslipidemia, Chronic hyponatremia, past CVA in 1986( with no major residual dysfunction), recently diagnosis Rt renal artery stenosis brought in by ambulance due to syncope. Pt stated that last night (12/19) around 8:15 pm, she was resting and took her BP which showed systolic BP was 81, which is unusually low for Pt, Pt then stood up and went to bedroom and tried to get second bp machine to verify the reading, pt was suddenly pass out. Pt estimate she was lying on the floor for about 10-12 mins. Pt was able to woke up herself and called her daughter thru her cell phone and her son too. Pt was got up with the help of family member. Upon the ambulance arrival, pt also felt nausea and dizziness. Pt stated this couple days she is not feeling well with unsteady gait. Pt's nephrologist recent changed pt 's BP medications . Pt denies of any CP/palpitaion/sob/dizziness/blurry vison/HA/weakness before her syncope. Pt was hospitalized in [place] for hypertensive urgency and had TTE done which showed grossly normal EF, pt also had renal US showed Rt renal artery stenosis.  Pt reported that she is on low sodium and fluid restriction diet for months.    In ED, pt was hypertensive at 220/88, S/p one coreg and one IV labetalol, pt 's bp trending to 186/76. Mild leukocytosis at 10.8. hyponatremia at 132, Cr 1.5 and BUN 43. GFR is 31. 1st cardiac enzyme was negative. EKG showed normal sinus rhythm with nospecific ? lead II  T wave inversion. CXR and CT head unofficial read showed no acute pathology. Pt was also seen by cardio in ED. 86 y.o F w/PMhx of HTN, Dyslipidemia, Chronic hyponatremia,Hyperkalemia past CVA in 1986( with no major residual dysfunction), recently was at [facility] for uncontrolled HTN ,work up showed suspect Rt renal artery stenosis brought in by ambulance due to syncope. Pt stated that last night (12/19) around 8:15 pm, she was resting and took her BP which showed systolic BP was 81, which is unusually low for Pt, Pt then stood up and went to bedroom and tried to get second bp machine to verify the reading, pt was suddenly pass out. Pt estimate she was lying on the floor for about 10-12 mints. Pt was able to woke up herself and called her daughter thru her cell phone and her son too. Family came & Pt got up with the help of family member. Upon the ambulance arrival, pt also felt nausea and dizziness. Pt stated this couple days she is not feeling well with unsteady gait. Pt's nephrologist recent changed pt 's BP medications . Pt denies of any CP/palpitaion/sob/dizziness/blurry vison/HA/weakness before her syncope. Pt was hospitalized in [place] few weeks ago  for hypertensive urgency and had TTE,  done which showed grossly normal EF, pt also had CD &  renal US showed suspect Rt renal artery stenosis.  Pt reported that she is on low sodium and fluid restriction diet for months. as per Pt, her  BP meds got changed by Dr Victoria on last week Friday visit.  In ED, pt was hypertensive at 220/88, S/p one coreg and one IV labetalol, pt 's bp trending to 186/76. Mild leukocytosis at 10.8. hyponatremia at 132, Cr 1.5 and BUN 43. GFR is 31. 1st cardiac enzyme was negative. EKG showed normal sinus rhythm with unspecific ? lead II  T wave inversion. CXR and CT head unofficial read showed no acute pathology. Pt was also seen by cardio Dr MADELYN byrne in ED.

## 2017-12-20 NOTE — PROGRESS NOTE ADULT - PROBLEM SELECTOR PLAN 5
-newly diagnosed   -likely contributing to meds resistant HTN -pt had recent US suggesting renal artery stenosis  -likely contributing to meds resistant HTN  -Nephro (Dr. Montano) consulted; f/u recs -pt had recent US suspect renal artery stenosis  As d/w Dr Victoria No intervention for above findings   As per DR Vila Cardiology- No intervention for above findings

## 2017-12-20 NOTE — H&P ADULT - GASTROINTESTINAL DETAILS
soft/bowel sounds normal/no distention/no masses palpable no guarding/no organomegaly/no bruit/no masses palpable/soft/nontender/no rigidity/no distention/bowel sounds normal/no rebound tenderness

## 2017-12-20 NOTE — ED ADULT NURSE REASSESSMENT NOTE - NS ED NURSE REASSESS COMMENT FT1
pt sleeping, equal chest rise and fall. easily arousable. still on cont cardiac monitor. pt ate well this am. will monitor
ct done.  pending results
no distress at this time.  pending bed assignment
pt at MRI
return from ct, pending results
seen by admitting team
sleeping at this time
sleeping, awaiting bed
sleeping, awaiting bed
report received from Ann Newell, assumed pt care. pt resting comfortably in hospital bed, on cardiac monitor. will monitor

## 2017-12-20 NOTE — PROGRESS NOTE ADULT - PROBLEM SELECTOR PLAN 3
-acute on chronic  -likely 2/2 dehydration as pt is fluid restricted at home due to chronic hyponatremia  - BUN/Cr improved s/p fluid bolus, continue to monitor with am BMP -acute on chronic  -likely 2/2 dehydration as pt is free fluid restricted at home due to chronic hyponatremia  - BUN/Cr improved s/p fluid bolus, continue to monitor with am BMP

## 2017-12-20 NOTE — H&P ADULT - PMH
Basal cell carcinoma    Cerebrovascular accident (CVA), unspecified mechanism    Dyslipidemia    Edema, unspecified type    Essential hypertension    Hyponatremia    Mitral valve insufficiency, unspecified etiology    Renal artery stenosis    UTI (urinary tract infection) Basal cell carcinoma    Cerebrovascular accident (CVA), unspecified mechanism    Dyslipidemia    Edema, unspecified type    Essential hypertension    Hyponatremia  h/o Hypokelema  Mitral valve insufficiency, unspecified etiology    Renal artery stenosis  possible on doppler  UTI (urinary tract infection)

## 2017-12-20 NOTE — PROGRESS NOTE ADULT - PROBLEM SELECTOR PLAN 2
- Likely 2/2 acute medication adjustment.  -pt with brief dizziness on standing; able to walk with assistance  - orthostatic BP readings negative, repeat and follow up results   - CT head negative for acute intracranial pathology   -continue with tele monitor, no arrhythmic events noted thus far  -cardio rec. appreciated: syncope likely 2/2 orthostasis vs structural disease. Doubt structural disease as a cause, as 2 echos this year have not shown evidence of structural abnormalities.    -Bloodwork is consistent with a prerenal/volume-depleted state as a cause.  - Neurology consulted, follow up recommendations.  - out of bed to chair and with assistant  - fall precaution - Likely 2/2  likely Orthostasis, H/O CVA  -pt with brief dizziness on standing; able to walk with assistance  - orthostatic BP readings negative, repeat and follow up results   - CT head negative for acute intracranial pathology   -continue with tele monitor, no arrhythmic events noted thus far  -cardio rec. appreciated: syncope likely 2/2 orthostasis vs structural disease. Doubt structural disease as a cause, as 2 echos this year have not shown evidence of structural abnormalities.    -Blood work is consistent with a prerenal/volume-depleted state as a cause.  - Neurology consulted, follow up recommendations.  - out of bed to chair and with assistant  - fall precaution

## 2017-12-20 NOTE — PROGRESS NOTE ADULT - SUBJECTIVE AND OBJECTIVE BOX
[facility]    Stefano Emmanuel, Aleksandar, Ricky, Avelina, Ramsey, Servandobrittany      [phone]    CHIEF COMPLAINT: Patient is a 86y old  Female who presents with a chief complaint of syncope (20 Dec 2017 02:33)      Follow Up: syncope, uncontrolled htn    Interim history: The patient reports no new symptoms.  Denies chest discomfort and shortness of breath.  No abdominal pain.  No new neurologic symptoms.      MEDICATIONS  (STANDING):  carvedilol 25 milliGRAM(s) Oral every 12 hours  cloNIDine 0.3 milliGRAM(s) Oral three times a day  clopidogrel Tablet 75 milliGRAM(s) Oral daily  docusate sodium 100 milliGRAM(s) Oral three times a day  heparin  Injectable 5000 Unit(s) SubCutaneous every 12 hours  senna 2 Tablet(s) Oral at bedtime  spironolactone 25 milliGRAM(s) Oral daily  valsartan 160 milliGRAM(s) Oral daily    MEDICATIONS  (PRN):      REVIEW OF SYSTEMS:  eye, ent, GI, , allergic, dermatologic, musculoskeletal and neurologic are negative except as described above    Vital Signs Last 24 Hrs  T(C): 36.4 (20 Dec 2017 08:03), Max: 36.7 (20 Dec 2017 03:36)  T(F): 97.6 (20 Dec 2017 08:03), Max: 98 (20 Dec 2017 03:36)  HR: 66 (20 Dec 2017 08:03) (66 - 72)  BP: 160/77 (20 Dec 2017 08:03) (160/77 - 220/88)  BP(mean): 132 (20 Dec 2017 02:33) (132 - 132)  RR: 16 (20 Dec 2017 08:03) (16 - 18)  SpO2: 96% (20 Dec 2017 08:03) (94% - 96%)    I&O's Summary      Telemetry past 24h: sr, apcs    PHYSICAL EXAM:    Constitutional: well-nourished, well-developed, NAD   HEENT:  MMM, sclerae anicteric, conjunctivae clear, no oral cyanosis.  Pulmonary: Non-labored, breath sounds are clear bilaterally, No wheezing, rales or rhonchi  Cardiovascular: Regular, S1 and S2.  occasional premature beats.  No murmur.  No rubs, gallops or clicks  Gastrointestinal: Bowel Sounds present, soft, nontender.   Lymph: No peripheral edema.   Neurological: Alert, no focal deficits  Skin: No rashes.  Psych:  Mood & affect appropriate    LABS: All Labs Reviewed:                        11.6   9.5   )-----------( 335      ( 20 Dec 2017 06:17 )             35.5                         12.4   10.8  )-----------( 357      ( 19 Dec 2017 22:07 )             37.1     20 Dec 2017 06:17    137    |  106    |  35     ----------------------------<  109    4.5     |  24     |  1.10   19 Dec 2017 22:07    132    |  102    |  43     ----------------------------<  139    3.8     |  21     |  1.50     Ca    9.7        20 Dec 2017 06:17  Ca    8.9        19 Dec 2017 22:07    TPro  7.1    /  Alb  4.1    /  TBili  1.1    /  DBili  x      /  AST  23     /  ALT  26     /  AlkPhos  80     19 Dec 2017 22:07    PT/INR - ( 19 Dec 2017 22:07 )   PT: 10.5 sec;   INR: 0.96 ratio           CARDIAC MARKERS ( 20 Dec 2017 06:17 )  .016 ng/mL / x     / 56 U/L / x     / 2.7 ng/mL  CARDIAC MARKERS ( 19 Dec 2017 22:07 )  <.015 ng/mL / x     / 61 U/L / x     / x          Blood Culture:         RADIOLOGY:    EKG:    Echo:

## 2017-12-20 NOTE — DISCHARGE NOTE ADULT - CARE PLAN
Principal Discharge DX:	Hypertensive urgency  Goal:	BP control  Instructions for follow-up, activity and diet:	You were admitted to the hospital because your blood pressure was very high.  Please continue to be vigilant about your diet, limiting your potassium intake and restricting your fluid intake as instructed by your outpatient cardiologist.  Secondary Diagnosis:	Hyponatremia  Instructions for follow-up, activity and diet:	Please continue your fluid restricted diet.  Secondary Diagnosis:	Renal artery stenosis  Secondary Diagnosis:	Syncope and collapse  Instructions for follow-up, activity and diet:	You fainted prior to coming to the hospital.  This was likely due to a drop in your blood pressure when you stood up.  Please continue to drink water as instructed and to take your blood pressure medications as instructed above.  Please follow up with your cardiologist (Dr. Traylor) within 1-2 weeks of discharge. Principal Discharge DX:	Hypertensive urgency  Goal:	BP control  Instructions for follow-up, activity and diet:	You were admitted to the hospital because your blood pressure was very high.  Please continue to be vigilant about your diet, limiting your potassium intake and restricting your fluid intake as instructed by your outpatient cardiologist.  Secondary Diagnosis:	Hyponatremia  Instructions for follow-up, activity and diet:	Please continue your fluid restricted diet.  Secondary Diagnosis:	Renal artery stenosis  Secondary Diagnosis:	Syncope and collapse  Instructions for follow-up, activity and diet:	You fainted prior to coming to the hospital.  This was likely due to a drop in your blood pressure when you stood up.  Please continue to drink water as instructed and to take your blood pressure medications as instructed above.  Please follow up with your cardiologist (Dr. Vila) within 1-2 weeks of discharge. Principal Discharge DX:	Hypertensive urgency  Goal:	BP control  Instructions for follow-up, activity and diet:	You were admitted to the hospital because your blood pressure was very high.  Please continue to be vigilant about your diet, limiting your potassium intake and restricting your fluid intake as instructed by your outpatient cardiologist and/or primary care physician.  Please continue to take your medications as instructed above and follow up with your cardiologist (Dr. Vila) within 1 week of discharge.  Secondary Diagnosis:	Hyponatremia  Instructions for follow-up, activity and diet:	Please continue your fluid restricted diet. Please follow up with your kidney doctor/Primary care physician (Dr. Victoria) within 1 week of discharge.  Secondary Diagnosis:	Renal artery stenosis  Instructions for follow-up, activity and diet:	Please follow up with Dr. Vicotria within 1 week of discharge.  Secondary Diagnosis:	Syncope and collapse  Instructions for follow-up, activity and diet:	You fainted prior to coming to the hospital.  This was likely due to a drop in your blood pressure when you stood up.  Please continue to drink water as instructed and to take your blood pressure medications as instructed above.  Also continue to wear the compression stockings you were given in the hospital.  When going from seated or lying position to standing, take your time and lie down if you start feeling dizzy. Please follow up with your cardiologist (Dr. Vila) within 1 weeks of discharge. Principal Discharge DX:	Hypertensive urgency  Goal:	BP control  Instructions for follow-up, activity and diet:	You were admitted to the hospital because your blood pressure was very high.  Please continue to be vigilant about your diet, limiting your potassium intake and restricting your fluid intake as instructed by your outpatient cardiologist and/or primary care physician.  Please continue to take your medications as instructed above and follow up with your cardiologist (Dr. Vila) within 1 week of discharge.  Secondary Diagnosis:	Hyponatremia  Instructions for follow-up, activity and diet:	Please continue your fluid restricted diet. Please follow up with your kidney doctor/Primary care physician (Dr. Victoria) within 1 week of discharge.  Secondary Diagnosis:	Renal artery stenosis  Instructions for follow-up, activity and diet:	Please follow up with Dr. Victoria within 1 week of discharge.  Secondary Diagnosis:	Syncope and collapse  Instructions for follow-up, activity and diet:	You fainted prior to coming to the hospital.  This was likely due to a drop in your blood pressure when you stood up.  Please continue to drink water as instructed and to take your blood pressure medications as instructed above.  Also continue to wear the compression stockings you were given in the hospital.  When going from seated or lying position to standing, take your time and lie down if you start feeling dizzy. Please use your walker or cane when ambulating.  Please follow up with your cardiologist (Dr. Vila) within 1 weeks of discharge. Principal Discharge DX:	Hypertensive urgency  Goal:	BP control  Instructions for follow-up, activity and diet:	You were admitted to the hospital because your blood pressure was very high. BP stable    Please continue to be vigilant about your diet, limiting your potassium intake  as instructed by your outpatient cardiologist and/or primary care physician.    Please continue to take your medications as instructed above and follow up with your cardiologist (Dr. Vila) within 1 week of discharge.  Secondary Diagnosis:	Hyponatremia  Instructions for follow-up, activity and diet:	Resolved , Please    Please follow up with your kidney doctor/Primary care physician (Dr. Victoria) within 1 week of discharge.  Secondary Diagnosis:	Renal artery stenosis  Instructions for follow-up, activity and diet:	Please follow up with Dr. Victoria within 1 week of discharge. for out pt work up & care  Secondary Diagnosis:	Syncope and collapse  Instructions for follow-up, activity and diet:	You fainted prior to coming to the hospital. likely with orthostatic with Vasovagal event.  Please put on your CAMILO stockings on both your legs before you get out of your bed daily in morning.use walker to walk  - Please continue to drink water as instructed and to take your blood pressure medications as instructed above.  Also continue to wear the compression stockings you were given in the hospital.  When going from seated or lying position to standing, take your time and lie down if you start feeling dizzy. Please use your walker or cane when ambulating.  Please follow up with your cardiologist (Dr. Vila) within 1 weeks of discharge.  Secondary Diagnosis:	Cerebrovascular accident (CVA), unspecified mechanism  Instructions for follow-up, activity and diet:	on Plavix  Secondary Diagnosis:	Dyslipidemia  Instructions for follow-up, activity and diet:	diet control, follow up with Cardiology

## 2017-12-20 NOTE — CONSULT NOTE ADULT - ASSESSMENT
loc check orthostatic   tele eval  h/o ataxia check mri   pt eval
86 year old woman w HTN, CVA, HLD, hyponatremia p.w syncope.  - Most likely she had syncope from orthostasis. She appears dry on exam. This could be from the Aldactone.  Check orthostatics. Fluid bolus.   - Her blood pressures are erratic. Will check after IVF  - Monitor and replete electrolytes. Keep K>4.0 and Mg>2.0.    - Doubt ACS. Trend Ce  - HTN. I would increase the clonidine to 0.3q8 and reassess bp. cont all other home meds.   - Further cardiac workup will depend on clinical course.   All other workup per primary team. Will followup.

## 2017-12-20 NOTE — H&P ADULT - NEGATIVE ENMT SYMPTOMS
no hearing difficulty/no nasal congestion/no vertigo/no sinus symptoms/no dry mouth/no tinnitus/no ear pain/no nasal discharge

## 2017-12-20 NOTE — DISCHARGE NOTE ADULT - MEDICATION SUMMARY - MEDICATIONS TO TAKE
I will START or STAY ON the medications listed below when I get home from the hospital:    spironolactone 25 mg oral tablet  -- 1 tab(s) by mouth once a day  -- Indication: For Hypertension    valsartan 160 mg oral tablet  -- 1 tab(s) by mouth once a day  -- Indication: For Hypertension    cloNIDine 0.2 mg oral tablet  -- 1 tab(s) by mouth 3 times a day  -- Indication: For Hypertension    Plavix 75 mg oral tablet  -- 1 tab(s) by mouth once a day  -- Indication: For Stroke prevention    carvedilol 25 mg oral tablet  -- 1 tab(s) by mouth 2 times a day  -- Indication: For Hypertension    Veltassa 16.8 g oral powder for reconstitution  -- 1 dose(s) by mouth once a day  -- Indication: For Hyperkalemia

## 2017-12-20 NOTE — PROGRESS NOTE ADULT - PROBLEM SELECTOR PLAN 4
- resolved, s/p fluid resuscitation  -continue f/u with BMP - resolved, s/p fluid resuscitation, BMP stable  -continue f/u with BMP

## 2017-12-20 NOTE — DISCHARGE NOTE ADULT - PROVIDER TOKENS
TOKJOLIE:'[id number]' TOKEN:'[id number]:'[id number]' TOKEN:'[id number]:'[id number]',TOKEN:'[id number]'

## 2017-12-20 NOTE — H&P ADULT - NSHPOUTPATIENTPROVIDERS_GEN_ALL_CORE
current PCP and nephro:  in [facility]   original PCP Dr. Leal in [place]  Cardio  in [place]  urology  current PCP and nephro:  in [place], Urology Dr Molina, Cardiology Dr Vila   original PCP Dr. Leal in [place]  Cardio  in [place]  urology

## 2017-12-20 NOTE — H&P ADULT - NEUROLOGICAL DETAILS
alert and oriented x 3/responds to pain/sensation intact/responds to verbal commands/cranial nerves intact sensation intact/cranial nerves intact/responds to pain/responds to verbal commands/alert and oriented x 3/normal strength

## 2017-12-20 NOTE — DISCHARGE NOTE ADULT - INSTRUCTIONS
may resume low potassium, fluid restricted diet. may resume low potassium, fluid restricted diet. Please limit your fluid intake as directed by your primary care physician.

## 2017-12-20 NOTE — H&P ADULT - NEGATIVE GASTROINTESTINAL SYMPTOMS
no constipation/no vomiting/no nausea/no diarrhea/no change in bowel habits/no flatulence/no melena/no hematochezia

## 2017-12-20 NOTE — H&P ADULT - NEGATIVE GENERAL GENITOURINARY SYMPTOMS
no urine discoloration/no flank pain R/no bladder infections/no gas in urine/no dysuria/no urinary hesitancy/no renal colic/no incontinence/no flank pain L/no hematuria

## 2017-12-20 NOTE — H&P ADULT - PROBLEM SELECTOR PLAN 1
-admit to tele   -? multifactors essential HTN and secondary to renal artery stenosis and recent meds adjustment   -s/p one po COREG and 10ml Labetalol  -will give one stat 20ml labetalol   -continue with home dose of carvedilol, valsartan, and spironolactone for now  -increase clonidine to 0.3 Q8hr per cardio rec.  -continue with monitor  -cardiologist ( Dr. Mustafa) on board -admit to tele S/P IV labetalol, BP improved  -? multifactors essential HTN and secondary to  suspect renal artery stenosis and recent meds adjustment   -s/p one po COREG and 10ml Labetalol  -will give one stat 20ml labetalol   -continue with home dose of carvedilol, valsartan, and spironolactone for now  -increase clonidine to 0.3 Q8hr per cardio rec.  -continue with monitor  -cardiologist ( Dr. Mustafa) on board

## 2017-12-20 NOTE — H&P ADULT - PROBLEM SELECTOR PLAN 5
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

## 2017-12-20 NOTE — H&P ADULT - PROBLEM SELECTOR PLAN 2
-continue with tele monitor  ? due to acute hypotensive episode due to meds adjustment  -cardio rec. appreciated:        trending CE       orthostatic negative  -Pt had TTE done within 3 month and ? Carotid US done by cardio outpt within 6 months, will hold TEMO and US carotid for now, consider contact outpt provider for report  -consult  ( neuro)  -out of bed to chair and with assistant  -fall precaution -continue with tele monitor  ? due to Orthostasis event likely , s/p acute hypotensive episode due to ? meds adjustment  -cardio rec. appreciated: DR MADELYN byrne       trending CE       orthostatic VS_negative  -Pt had TTE done within 3 month and ? Carotid US done by cardio outpt within 6 months, will hold TEMO and US carotid for now,as done as [facility]  -consult ( neuro)  -out of bed to chair and with assistant, PT Eval  -fall precaution

## 2017-12-20 NOTE — ED PROVIDER NOTE - OBJECTIVE STATEMENT
86 female presents to ER with family at the bedside with report of syncope. Patient states she was at home alone, had called her daughter that she was feeling dizzy and then walked over to the next room and passed out. Daughter arrived at the house and patient was on the floor, awake and oriented and came to ER. Patient currently states she feels better, denies chest pain or diffculty breathing.

## 2017-12-20 NOTE — H&P ADULT - NEGATIVE GENERAL SYMPTOMS
no fever/no chills/no sweating/no anorexia/no malaise/no weight loss/no weight gain/no polyphagia/no polyuria/no polydipsia/no fatigue

## 2017-12-20 NOTE — PROGRESS NOTE ADULT - ASSESSMENT
86 y.o F w/PMhx of HTN, Dyslipidemia, Chronic hyponatremia, past CVA in 1986( with no major residual dysfunction), recently diagnosis Rt renal artery stenosis, recent admission to [facility] for hypertensive urgency, and recent change in her hypertensive medications brought in by ambulance due to syncope a/w hypertensive urgency. 86 y.o F w/PMhx of HTN, Dyslipidemia, Chronic hyponatremia, past CVA in 1986( with no major residual dysfunction), recently diagnosis Rt renal artery stenosis, recent admission to [facility] for hypertensive urgency & work up, and recent change in her hypertensive medications brought in by ambulance due to syncope at home  a/w hypertensive urgency with ..

## 2017-12-20 NOTE — DISCHARGE NOTE ADULT - CARE PROVIDER_API CALL
Rafa Vila), Cardiovascular Disease  [address] [place] [address]  Phone: [phone]  Fax: [phone] Rafa Vila), Cardiovascular Disease  [address] [place] [address]  Phone: [phone]  Fax: [phone]    Yun, SukHyeon (MD), Nephrology  [address] [place] [address]  Phone: [phone]  Fax: [phone] Rafa Vila (MD), Cardiovascular Disease  [address] [place] [address]  Phone: [phone]  Fax: [phone]    Yun, SukHyeon (MD), Nephrology  [address] [place] [address]  Phone: [phone]  Fax: [phone]    Marcos Leal), Internal Medicine  [address] [place] [address]  Phone: [phone]  Fax: [phone]

## 2017-12-20 NOTE — DISCHARGE NOTE ADULT - PATIENT PORTAL LINK FT
“You can access the [facility] Patient Portal, offered by [facility], by registering with the following website: [email or web address]”

## 2017-12-20 NOTE — PROGRESS NOTE ADULT - ASSESSMENT
86 year old woman w HTN, CVA, HLD, hyponatremia presents with syncope.    -there is no evidence of acute ischemia  -troponins negative x 2  -no documented arrhythmias on telemetry to explain her syncope  -it is concerning that her syncopal episode was without warning, which might suggest an arrhythmic basis.  However, her exam does not suggest significant structural heart disease, two echocardiograms in the last year have suggestive a normal lvef, and her bloodwork is consistent with a pre-renal/volume depleted state  -if additional evidence suggests arrhythmia, may need to consider eps or ilr  - Most likely she had syncope from orthostasis, though her orthostatics were negative.   - leo improved with fluid etc  - Her blood pressures are extremely high, and are not yet responding to treatement.  Her valsartan may need to be increased, and hydralazine added. 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 Keep K>4.0 and Mg>2.0.    - Further cardiac workup will depend on clinical course.   - All other workup per primary team. Will follow

## 2017-12-20 NOTE — PROGRESS NOTE ADULT - SUBJECTIVE AND OBJECTIVE BOX
Patient is a 86y old  Female who presents with a chief complaint of syncope (20 Dec 2017 02:33)      INTERVAL HPI: 86 y.o F w/PMhx of HTN, Dyslipidemia, Chronic hyponatremia, past CVA in 1986( with no major residual dysfunction), recently diagnosis Rt renal artery stenosis brought in by ambulance due to syncope. Pt stated that last night (12/19) around 8:15 pm, she was resting and took her BP which showed systolic BP was 81, which is unusually low for Pt, Pt then stood up and went to bedroom and tried to get second bp machine to verify the reading, pt was suddenly pass out. Pt estimate she was lying on the floor for about 10-12 mins. Pt was able to woke up herself and called her daughter thru her cell phone and her son too. Pt was got up with the help of family member. Upon the ambulance arrival, pt also felt nausea and dizziness. Pt stated this couple days she is not feeling well with unsteady gait. Pt's nephrologist recent changed pt 's BP medications . Pt denies of any CP/palpitaion/sob/dizziness/blurry vison/HA/weakness before her syncope. Pt was hospitalized in [place] for hypertensive urgency and had TTE done which showed grossly normal EF, pt also had renal US showed Rt renal artery stenosis.  Pt reported that she is on low sodium and fluid restriction diet for months.    In ED, pt was hypertensive at 220/88, S/p one coreg and one IV labetalol, pt 's bp trending to 186/76. Mild leukocytosis at 10.8. hyponatremia at 132, Cr 1.5 and BUN 43. GFR is 31. 1st cardiac enzyme was negative. EKG showed normal sinus rhythm with nospecific ? lead II  T wave inversion. CXR and CT head unofficial read showed no acute pathology. Pt was also seen by cardio in ED.     OVERNIGHT EVENTS: Patient was seen and examined at bedside in the ED resting comfortably in bed, accompanied by her son. Patient reports no dizziness while lying down, and states she is overall feeling a little better. She denies any current nausea, headache, chest pain, palpitations.    Home Medications:  carvedilol 25 mg oral tablet: 1 tab(s) orally 2 times a day (20 Dec 2017 05:18)  cloNIDine 0.2 mg oral tablet: 1 tab(s) orally 3 times a day (20 Dec 2017 05:18)  Plavix 75 mg oral tablet: 1 tab(s) orally once a day (20 Dec 2017 05:18)  spironolactone 25 mg oral tablet: 1 tab(s) orally once a day (20 Dec 2017 05:18)  valsartan 160 mg oral tablet: 1 tab(s) orally once a day (20 Dec 2017 05:18)  Veltassa 16.8 g oral powder for reconstitution: 1 dose(s) orally once a day (20 Dec 2017 05:18)    MEDICATIONS  (STANDING):  carvedilol 25 milliGRAM(s) Oral every 12 hours  cloNIDine 0.3 milliGRAM(s) Oral three times a day  clopidogrel Tablet 75 milliGRAM(s) Oral daily  docusate sodium 100 milliGRAM(s) Oral three times a day  heparin  Injectable 5000 Unit(s) SubCutaneous every 12 hours  senna 2 Tablet(s) Oral at bedtime  spironolactone 25 milliGRAM(s) Oral daily  valsartan 160 milliGRAM(s) Oral daily    Allergies    Keflex (Unknown)  verapamil (Other)    REVIEW OF SYSTEMS:  CONSTITUTIONAL: No fever, No chills, No fatigue, No myalgia, No Body ache, No Weakness  EYES: No eye pain,  No visual disturbances, No discharge, NO Redness  ENMT:  No ear pain, No nose bleed, No vertigo; No sinus or throat pain, No Congestion  NECK: No pain, No stiffness  RESPIRATORY: No cough, wheezing, No  hemoptysis, No shortness of breath  CARDIOVASCULAR: No chest pain, palpitations  GASTROINTESTINAL: No abdominal or epigastric pain. No nausea, No vomiting; No diarrhea or constipation.   GENITOURINARY: No dysuria, No frequency, No urgency, No hematuria, or incontinence  NEUROLOGICAL: No headaches, No dizziness, No numbness, No tingling, No tremors, No weakness  EXT: No Swelling, No Pain, No Edema  SKIN: No itching, burning, rashes, or lesions   MUSCULOSKELETAL: No joint pain or swelling; No muscle pain, No back pain, No extremity pain  PSYCHIATRIC: No depression, anxiety, mood swings or difficulty sleeping at night  PAIN SCALE: [ x ] None  [  ] Other-  REST OF REVIEW Of SYSTEM - [ x ] Normal     Vital Signs Last 24 Hrs  T(C): 36.4 (20 Dec 2017 08:03), Max: 36.7 (20 Dec 2017 03:36)  T(F): 97.6 (20 Dec 2017 08:03), Max: 98 (20 Dec 2017 03:36)  HR: 66 (20 Dec 2017 08:03) (66 - 72)  BP: 160/77 (20 Dec 2017 08:03) (160/77 - 220/88)  BP(mean): 132 (20 Dec 2017 02:33) (132 - 132)  RR: 16 (20 Dec 2017 08:03) (16 - 18)  SpO2: 96% (20 Dec 2017 08:03) (94% - 96%)    PHYSICAL EXAM:  GENERAL:  [ x ] NAD , [  ] well appearing, [  ] Agitated, [  ] Drowsy,  [  ] Lethargy, [  ] confused   HEAD:  [ x ] Normal, [  ] Other  CHEST/LUNG:  [ x ] Clear to auscultation bilaterally, [  ] Breath Sounds equal OR Decrease,  [ x ] No rales, [ x ] No rhonchi  [ x ]  No wheezing  HEART:  [ x ] Regular rate and rhythm, [  ] tachycardia, [  ] Bradycardia,  [  ] irregular  [ x ] No murmurs, No rubs, No gallops,   ABDOMEN:  [ x ] Soft, [ x ] Nontender, [ x ] Nondistended, [ x ] No mass, [ x ] Bowel sounds present, [  ] obese  NERVOUS SYSTEM:  [ x ] Alert & Oriented X3, [  ] Nonfocal  [  ] Confusion  [  ] Encephalopathic [  ] Sedated [  ] Unable to assess, [  ] Other-  LYMPH: No lymphadenopathy noted  SKIN:  [ x ] No rashes or lesions, [  ] Pressure Ulcers, [  ] ecchymosis, [  ] Skin Tears, [  ] Other    DIET: DASH/TLC    LABS:                        11.6   9.5   )-----------( 335      ( 20 Dec 2017 06:17 )             35.5     20 Dec 2017 06:17    137    |  106    |  35     ----------------------------<  109    4.5     |  24     |  1.10     Ca    9.7        20 Dec 2017 06:17    TPro  7.1    /  Alb  4.1    /  TBili  1.1    /  DBili  x      /  AST  23     /  ALT  26     /  AlkPhos  80     19 Dec 2017 22:07    PT/INR - ( 19 Dec 2017 22:07 )   PT: 10.5 sec;   INR: 0.96 ratio      CARDIAC MARKERS ( 20 Dec 2017 06:17 )  .016 ng/mL / x     / 56 U/L / x     / 2.7 ng/mL  CARDIAC MARKERS ( 19 Dec 2017 22:07 )  <.015 ng/mL / x     / 61 U/L / x     / x        HEALTH ISSUES - PROBLEM Dx:  Prophylactic measure: Prophylactic measure  Edema, unspecified type: Edema, unspecified type  Dyslipidemia: Dyslipidemia  Cerebrovascular accident (CVA), unspecified mechanism: Cerebrovascular accident (CVA), unspecified mechanism  Renal artery stenosis: Renal artery stenosis  Hyponatremia: Hyponatremia  TERESA (acute kidney injury): TERESA (acute kidney injury)  Syncope and collapse: Syncope and collapse  Hypertensive urgency: Hypertensive urgency    Consultant(s) Notes Reviewed:  [ x ] YES     Care Discussed with [X] Consultants  [  ] Patient  [  ] Family  [  ]   [  ] Social Service  [  ] RN, [  ] Physical Therapy  DVT PPX: [  ] Lovenox, [ x ] S C Heparin, [  ] Coumadin, [  ] Xarelto, [  ] Eliquis, [  ] Pradaxa, [  ] IV Heparin drip, [  ] SCD [  ] Contraindication 2 to GI Bleed,[  ] Ambulation  Advanced directive: [ x ] None, [  ] DNR/DNI Patient is a 86y old  Female who presents with a chief complaint of syncope (20 Dec 2017 02:33)      INTERVAL HPI: 86 y.o F w/PMhx of  uncontrolled HTN, Dyslipidemia, Chronic hyponatremia ,Hypokalemia, past CVA in 1986( with no major residual dysfunction), recently diagnosis Rt renal artery stenosis brought in by ambulance due to syncope. Pt stated that last night (12/19) around 8:15 pm, she was resting and took her BP which showed systolic BP was 81, which is unusually low for Pt, Pt then stood up and went to bedroom and tried to get second bp machine to verify the reading, pt was suddenly pass out. Pt estimate she was lying on the floor for about 10-12 mins. Pt was able to woke up herself and called her daughter thru her cell phone and her son too. Pt was got up with the help of family member. Upon the ambulance arrival, pt also felt nausea and dizziness. Pt stated this couple days she is not feeling well with unsteady gait. Pt's nephrologist recent changed pt 's BP medications . Pt denies of any CP/palpitaion/sob/dizziness/blurry vison/HA/weakness before her syncope. Pt was hospitalized in [place] for hypertensive urgency and had TTE done which showed grossly normal EF, pt also had renal US showed Rt renal artery stenosis.  Pt reported that she is on low sodium and fluid restriction diet for months.    In ED, pt was hypertensive at 220/88, S/p one coreg and one IV labetalol, pt 's bp trending to 186/76. Mild leukocytosis at 10.8. hyponatremia at 132, Cr 1.5 and BUN 43. GFR is 31. 1st cardiac enzyme was negative. EKG showed normal sinus rhythm with nospecific ? lead II  T wave inversion. CXR and CT head unofficial read showed no acute pathology. Pt was also seen by cardio in ED.     OVERNIGHT EVENTS: Patient was seen and examined at bedside in the ED resting comfortably in bed, accompanied by her son. Patient reports no dizziness while lying down, and states she is overall feeling a little better. She denies any current nausea, headache, chest pain, palpitations.    Home Medications:  carvedilol 25 mg oral tablet: 1 tab(s) orally 2 times a day (20 Dec 2017 05:18)  cloNIDine 0.2 mg oral tablet: 1 tab(s) orally 3 times a day (20 Dec 2017 05:18)  Plavix 75 mg oral tablet: 1 tab(s) orally once a day (20 Dec 2017 05:18)  spironolactone 25 mg oral tablet: 1 tab(s) orally once a day (20 Dec 2017 05:18)  valsartan 160 mg oral tablet: 1 tab(s) orally once a day (20 Dec 2017 05:18)  Veltassa 16.8 g oral powder for reconstitution: 1 dose(s) orally once a day (20 Dec 2017 05:18)    MEDICATIONS  (STANDING):  carvedilol 25 milliGRAM(s) Oral every 12 hours  cloNIDine 0.3 milliGRAM(s) Oral three times a day  clopidogrel Tablet 75 milliGRAM(s) Oral daily  docusate sodium 100 milliGRAM(s) Oral three times a day  heparin  Injectable 5000 Unit(s) SubCutaneous every 12 hours  senna 2 Tablet(s) Oral at bedtime  spironolactone 25 milliGRAM(s) Oral daily  valsartan 160 milliGRAM(s) Oral daily    Allergies    Keflex (Unknown)  verapamil (Other)    REVIEW OF SYSTEMS:No complaints, wants to walk  CONSTITUTIONAL: No fever, No chills, No fatigue, No myalgia, No Body ache, No Weakness  EYES: No eye pain,  No visual disturbances, No discharge, NO Redness  ENMT:  No ear pain, No nose bleed, No vertigo; No sinus or throat pain, No Congestion  NECK: No pain, No stiffness  RESPIRATORY: No cough, wheezing, No  hemoptysis, No shortness of breath  CARDIOVASCULAR: No chest pain, palpitations  GASTROINTESTINAL: No abdominal or epigastric pain. No nausea, No vomiting; No diarrhea or constipation.   GENITOURINARY: No dysuria, No frequency, No urgency, No hematuria, or incontinence  NEUROLOGICAL: No headaches, No dizziness, No numbness, No tingling, No tremors, No weakness  EXT: No Swelling, No Pain, No Edema  SKIN: No itching, burning, rashes, or lesions   MUSCULOSKELETAL: No joint pain or swelling; No muscle pain, No back pain, No extremity pain  PSYCHIATRIC: No depression, anxiety, mood swings or difficulty sleeping at night  PAIN SCALE: [ x ] None  [  ] Other-  REST OF REVIEW Of SYSTEM - [ x ] Normal     Vital Signs Last 24 Hrs  T(C): 36.4 (20 Dec 2017 08:03), Max: 36.7 (20 Dec 2017 03:36)  T(F): 97.6 (20 Dec 2017 08:03), Max: 98 (20 Dec 2017 03:36)  HR: 66 (20 Dec 2017 08:03) (66 - 72)  BP: 160/77 (20 Dec 2017 08:03) (160/77 - 220/88)  BP(mean): 132 (20 Dec 2017 02:33) (132 - 132)  RR: 16 (20 Dec 2017 08:03) (16 - 18)  SpO2: 96% (20 Dec 2017 08:03) (94% - 96%)    PHYSICAL EXAM:  GENERAL:  [ x ] NAD , [ x ] well appearing, [  ] Agitated, [  ] Drowsy,  [  ] Lethargy, [  ] confused   HEAD:  [ x ] Normal, [  ] Other  CHEST/LUNG:  [ x ] Clear to auscultation bilaterally, [ x ] Breath Sounds equal OR Decrease,  [ x ] No rales, [ x ] No rhonchi  [ x ]  No wheezing  HEART:  [ x ] Regular rate and rhythm, [  ] tachycardia, [  ] Bradycardia,  [  ] irregular  [ x ] No murmurs, No rubs, No gallops,   ABDOMEN:  [ x ] Soft, [ x ] Nontender, [ x ] Nondistended, [ x ] No mass, [ x ] Bowel sounds present, [  ] obese  NERVOUS SYSTEM:  [ x ] Alert & Oriented X3, [x  ] Nonfocal  [  ] Confusion  [  ] Encephalopathic [  ] Sedated [  ] Unable to assess, [  ] Other-  LYMPH: No lymphadenopathy noted  SKIN:  [ x ] No rashes or lesions, [  ] Pressure Ulcers, [  ] ecchymosis, [  ] Skin Tears, [  ] Other    DIET: DASH/TLC    LABS:                        11.6   9.5   )-----------( 335      ( 20 Dec 2017 06:17 )             35.5     20 Dec 2017 06:17    137    |  106    |  35     ----------------------------<  109    4.5     |  24     |  1.10     Ca    9.7        20 Dec 2017 06:17    TPro  7.1    /  Alb  4.1    /  TBili  1.1    /  DBili  x      /  AST  23     /  ALT  26     /  AlkPhos  80     19 Dec 2017 22:07    PT/INR - ( 19 Dec 2017 22:07 )   PT: 10.5 sec;   INR: 0.96 ratio      CARDIAC MARKERS ( 20 Dec 2017 06:17 )  .016 ng/mL / x     / 56 U/L / x     / 2.7 ng/mL  CARDIAC MARKERS ( 19 Dec 2017 22:07 )  <.015 ng/mL / x     / 61 U/L / x     / x        HEALTH ISSUES - PROBLEM Dx:  Prophylactic measure: Prophylactic measure  Edema, unspecified type: Edema, unspecified type  Dyslipidemia: Dyslipidemia  Cerebrovascular accident (CVA), unspecified mechanism: Cerebrovascular accident (CVA), unspecified mechanism  Renal artery stenosis: Renal artery stenosis  Hyponatremia: Hyponatremia  TERESA (acute kidney injury): TERESA (acute kidney injury)  Syncope and collapse: Syncope and collapse  Hypertensive urgency: Hypertensive urgency    Consultant(s) Notes Reviewed:  [ x ] YES     Care Discussed with [X] Consultants  [  ] Patient  [  ] Family  [  ]   [  ] Social Service  [  ] RN, [  ] Physical Therapy  DVT PPX: [  ] Lovenox, [ x ] S C Heparin, [  ] Coumadin, [  ] Xarelto, [  ] Eliquis, [  ] Pradaxa, [  ] IV Heparin drip, [  ] SCD [  ] Contraindication 2 to GI Bleed,[  ] Ambulation  Advanced directive: [ x ] None, [  ] DNR/DNI

## 2017-12-21 DIAGNOSIS — E87.5 HYPERKALEMIA: ICD-10-CM

## 2017-12-21 LAB
ANION GAP SERPL CALC-SCNC: 8 MMOL/L — SIGNIFICANT CHANGE UP (ref 5–17)
BUN SERPL-MCNC: 27 MG/DL — HIGH (ref 7–23)
CALCIUM SERPL-MCNC: 9.3 MG/DL — SIGNIFICANT CHANGE UP (ref 8.5–10.1)
CHLORIDE SERPL-SCNC: 106 MMOL/L — SIGNIFICANT CHANGE UP (ref 96–108)
CO2 SERPL-SCNC: 22 MMOL/L — SIGNIFICANT CHANGE UP (ref 22–31)
CREAT SERPL-MCNC: 1 MG/DL — SIGNIFICANT CHANGE UP (ref 0.5–1.3)
GLUCOSE SERPL-MCNC: 153 MG/DL — HIGH (ref 70–99)
HCT VFR BLD CALC: 32.6 % — LOW (ref 34.5–45)
HGB BLD-MCNC: 11.3 G/DL — LOW (ref 11.5–15.5)
MCHC RBC-ENTMCNC: 31.1 PG — SIGNIFICANT CHANGE UP (ref 27–34)
MCHC RBC-ENTMCNC: 34.5 GM/DL — SIGNIFICANT CHANGE UP (ref 32–36)
MCV RBC AUTO: 90.2 FL — SIGNIFICANT CHANGE UP (ref 80–100)
PLATELET # BLD AUTO: 296 K/UL — SIGNIFICANT CHANGE UP (ref 150–400)
POTASSIUM SERPL-MCNC: 4.3 MMOL/L — SIGNIFICANT CHANGE UP (ref 3.5–5.3)
POTASSIUM SERPL-SCNC: 4.3 MMOL/L — SIGNIFICANT CHANGE UP (ref 3.5–5.3)
RBC # BLD: 3.62 M/UL — LOW (ref 3.8–5.2)
RBC # FLD: 12.5 % — SIGNIFICANT CHANGE UP (ref 10.3–14.5)
SODIUM SERPL-SCNC: 136 MMOL/L — SIGNIFICANT CHANGE UP (ref 135–145)
WBC # BLD: 11 K/UL — HIGH (ref 3.8–10.5)
WBC # FLD AUTO: 11 K/UL — HIGH (ref 3.8–10.5)

## 2017-12-21 PROCEDURE — 99233 SBSQ HOSP IP/OBS HIGH 50: CPT

## 2017-12-21 RX ORDER — ALPRAZOLAM 0.25 MG
1 TABLET ORAL
Qty: 0 | Refills: 0 | COMMUNITY
Start: 2017-12-21

## 2017-12-21 RX ADMIN — CARVEDILOL PHOSPHATE 25 MILLIGRAM(S): 80 CAPSULE, EXTENDED RELEASE ORAL at 05:14

## 2017-12-21 RX ADMIN — HEPARIN SODIUM 5000 UNIT(S): 5000 INJECTION INTRAVENOUS; SUBCUTANEOUS at 05:14

## 2017-12-21 RX ADMIN — VALSARTAN 160 MILLIGRAM(S): 80 TABLET ORAL at 05:14

## 2017-12-21 RX ADMIN — HEPARIN SODIUM 5000 UNIT(S): 5000 INJECTION INTRAVENOUS; SUBCUTANEOUS at 17:23

## 2017-12-21 RX ADMIN — Medication 0.2 MILLIGRAM(S): at 05:14

## 2017-12-21 RX ADMIN — FAMOTIDINE 20 MILLIGRAM(S): 10 INJECTION INTRAVENOUS at 12:12

## 2017-12-21 RX ADMIN — Medication 0.2 MILLIGRAM(S): at 21:26

## 2017-12-21 RX ADMIN — CARVEDILOL PHOSPHATE 25 MILLIGRAM(S): 80 CAPSULE, EXTENDED RELEASE ORAL at 17:23

## 2017-12-21 RX ADMIN — SPIRONOLACTONE 25 MILLIGRAM(S): 25 TABLET, FILM COATED ORAL at 05:14

## 2017-12-21 RX ADMIN — Medication 0.2 MILLIGRAM(S): at 14:41

## 2017-12-21 RX ADMIN — CLOPIDOGREL BISULFATE 75 MILLIGRAM(S): 75 TABLET, FILM COATED ORAL at 12:12

## 2017-12-21 NOTE — PHYSICAL THERAPY INITIAL EVALUATION ADULT - GAIT DEVIATIONS NOTED, PT EVAL
maximino increased, patient told to slow down. unsteady and needs rolling walker and assist for safety/decreased stride length/decreased velocity of limb motion/decreased weight-shifting ability/decreased step length

## 2017-12-21 NOTE — PROGRESS NOTE ADULT - PROBLEM SELECTOR PLAN 3
-acute on chronic  -likely 2/2 dehydration as pt is free fluid restricted at home due to chronic hyponatremia  - BUN/Cr improved s/p fluid bolus, continue to monitor with am BMP -acute on chronic  -likely 2/2 dehydration as pt is fluid restricted at home due to chronic hyponatremia  - BUN/Cr improved s/p fluid bolus, continue to monitor with am BMP -acute on chronic Improved with IV Fluids  -likely 2/2 dehydration as pt is fluid restricted at home due to chronic hyponatremia  - BUN/Cr improved s/p fluid bolus, continue to monitor with am BMP

## 2017-12-21 NOTE — PHYSICAL THERAPY INITIAL EVALUATION ADULT - TRANSFER SAFETY CONCERNS NOTED: SIT/STAND, REHAB EVAL
decreased balance during turns/decreased proprioception/losing balance/decreased weight-shifting ability/decreased safety awareness/decreased sequencing ability

## 2017-12-21 NOTE — PROGRESS NOTE ADULT - SUBJECTIVE AND OBJECTIVE BOX
Neurology follow up note    MARU MORRISI86yFemale      Interval History:    Patient feels ok no new complaints.    MEDICATIONS    ALPRAZolam 0.25 milliGRAM(s) Oral two times a day PRN  carvedilol 25 milliGRAM(s) Oral every 12 hours  cloNIDine 0.2 milliGRAM(s) Oral three times a day  clopidogrel Tablet 75 milliGRAM(s) Oral daily  docusate sodium 100 milliGRAM(s) Oral three times a day  famotidine    Tablet 20 milliGRAM(s) Oral daily  heparin  Injectable 5000 Unit(s) SubCutaneous every 12 hours  senna 2 Tablet(s) Oral at bedtime  spironolactone 25 milliGRAM(s) Oral daily  valsartan 160 milliGRAM(s) Oral daily      Allergies    Keflex (Unknown)  verapamil (Other)    Intolerances            Vital Signs Last 24 Hrs  T(C): 37 (21 Dec 2017 08:35), Max: 37 (21 Dec 2017 08:35)  T(F): 98.6 (21 Dec 2017 08:35), Max: 98.6 (21 Dec 2017 08:35)  HR: 64 (21 Dec 2017 08:35) (64 - 79)  BP: 80/50 (21 Dec 2017 10:11) (80/50 - 185/76)  BP(mean): --  RR: 16 (21 Dec 2017 08:35) (16 - 18)  SpO2: 98% (21 Dec 2017 08:35) (96% - 99%)      REVIEW OF SYSTEMS:     Constitutional: No fever, chills, fatigue, weakness  Eyes: no eye pain, visual disturbances, or discharge  ENT:  No difficulty hearing, tinnitus, vertigo; No sinus or throat pain  Neck: No pain or stiffness  Respiratory: No cough, dyspnea, wheezing   Cardiovascular: No chest pain, palpitations,   Gastrointestinal: No abdominal or epigastric pain. No nausea, vomiting  No diarrhea or constipation.   Genitourinary: No dysuria, frequency, hematuria or incontinence  Neurological: No headaches, lightheadedness, vertigo, numbness or tremors  Psychiatric: No depression, anxiety, mood swings or difficulty sleeping  Musculoskeletal: No joint pain or swelling; No muscle, back or extremity pain  Skin: No itching, burning, rashes or lesions   Lymph Nodes: No enlarged glands  Endocrine: No heat or cold intolerance; No hair loss   Allergy and Immunologic: No hives or eczema    On Neurological Examination:    Mental Status - Patient is alert, awake, oriented X3.       Follow simple commands  Follow complex commands    Speech -   Fluent            Cranial Nerves - Pupils 3 mm equal and reactive to light,   extraocular eye movements intact.   smile symmetric  intact bilateral NLF    Motor Exam -   Right upper 4/5  Left upper 4/5  Right lower 4/5  Left lower  4/5      Muscle tone - is normal all over.  No asymmetry is seen.      Sensory    Bilateral intact to light touch       GENERAL Exam: Nontoxic , No Acute Distress   	  HEENT:  normocephalic, atraumatic  		  LUNGS:   Decreased bilaterally  	  HEART: Normal S1S2   No murmur RRR        	  GI/ ABDOMEN:  Soft  Non tender    EXTREMITIES:   No Edema  No Clubbing  No Cyanosis No Edema    MUSCULOSKELETAL: decreased Range of Motion all 4 extremities   	   SKIN: Normal  No Ecchymosis               LABS:  CBC Full  -  ( 21 Dec 2017 06:50 )  WBC Count : 11.0 K/uL  Hemoglobin : 11.3 g/dL  Hematocrit : 32.6 %  Platelet Count - Automated : 296 K/uL  Mean Cell Volume : 90.2 fl  Mean Cell Hemoglobin : 31.1 pg  Mean Cell Hemoglobin Concentration : 34.5 gm/dL  Auto Neutrophil # : x  Auto Lymphocyte # : x  Auto Monocyte # : x  Auto Eosinophil # : x  Auto Basophil # : x  Auto Neutrophil % : x  Auto Lymphocyte % : x  Auto Monocyte % : x  Auto Eosinophil % : x  Auto Basophil % : x      12-21    136  |  106  |  27<H>  ----------------------------<  153<H>  4.3   |  22  |  1.00    Ca    9.3      21 Dec 2017 06:50    TPro  7.1  /  Alb  4.1  /  TBili  1.1  /  DBili  x   /  AST  23  /  ALT  26  /  AlkPhos  80  12-19    Hemoglobin A1C:     LIVER FUNCTIONS - ( 19 Dec 2017 22:07 )  Alb: 4.1 g/dL / Pro: 7.1 g/dL / ALK PHOS: 80 U/L / ALT: 26 U/L / AST: 23 U/L / GGT: x           Vitamin B12   PT/INR - ( 19 Dec 2017 22:07 )   PT: 10.5 sec;   INR: 0.96 ratio               RADIOLOGY    ANALYSIS AND PLAN:  An 86-year with history of cerebrovascular accident, episode of loss of consciousness.  1.	For episode of loss of consciousness, most likely secondary to hypotension.  2.	check orthostatic as needed   3.	Cardiology followup.  4.	Adjustment of blood pressure medications as needed   5.	For history of cerebrovascular accident, continue the patient on Plavix.  6.	MRI was normal   7.	For episode of ataxia, possibly secondary to age related changes, surgical intervention,   8.	I spoke with daughter, Brittney in great detail.  Her telephone number is [phone]; alternate number [phone]. 12/21/17    Thank you for the courtesy of consultation.    Physical therapy evaluation as tolerated  OOB to chair/ambulation with assistance only if possible.  Advanced care planning was discussed with family.  Pain is accessed and addressed.  Risk of falls accessed. Fall prevention and plan of care was discussed with family.  Plan of care was discussed with family. Questions answered.  Would continue to follow.  Greater than 45 minutes spent in direct patient care reviewing  the notes, lab data/ imaging , discussion with multidisciplinary team.

## 2017-12-21 NOTE — PROGRESS NOTE ADULT - ASSESSMENT
86 y.o F w/PMhx of HTN, Dyslipidemia, Chronic hyponatremia, past CVA in 1986( with no major residual dysfunction), recently diagnosed with Rt renal artery stenosis, recent admission to [facility] for hypertensive urgency & work up, and recent change in her hypertensive medications brought in by ambulance due to syncope at home a/w hypertensive urgency with .. 86 y.o F w/PMhx of HTN, Dyslipidemia, Chronic hyponatremia, past CVA in 1986( with no major residual dysfunction), recently diagnosed with Rt renal artery stenosis, recent admission to [facility] for hypertensive urgency & work up, and recent change in her hypertensive medications brought in by ambulance due to syncope at home a/w hypertensive urgency with ..Pt 's BP has improved, stable but labile with + orthostasis. seen by PT.

## 2017-12-21 NOTE — PHYSICAL THERAPY INITIAL EVALUATION ADULT - ADDITIONAL COMMENTS
Patient lives at home alone.  Patient  has 4 steps to enter the home with bilateral railings.  Patient owns and uses a rolling walker and single axis cane as needed.  Patient has two supportive children who live near by.  Her son helps her with food shopping, laundry and taking care of the house.  Her daughter assists with doctors appointments. Patient lives at home alone.  Patient  has 4 steps to enter the home with bilateral railings.  Patient owns and uses a rolling walker and 2 quad canes as needed but reports she has not been using any assistive devices prior to admission. Pt also has a shower & chair grab. Patient has two supportive children who live near by. Her son helps her with food shopping, laundry and taking care of the house.  Her daughter assists with doctors appointments. Pt reports that her children are looking into getting an aide for pt.

## 2017-12-21 NOTE — PHYSICAL THERAPY INITIAL EVALUATION ADULT - GENERAL OBSERVATIONS, REHAB EVAL
Patient received semi foley ni bed, no apparent distress, cardiac monitor in place and call bell in reach. Patient received in bathroom on toilet /c NAD or c/o. Pt denies any c/o and is agreeable /c PT eval.

## 2017-12-21 NOTE — PHYSICAL THERAPY INITIAL EVALUATION ADULT - GAIT TRAINING, PT EVAL
Ambulate > 200 ft with appropriate assistive device independent within 3-5 sessions. Ambulate > 200 ft with rolling walker and independent within 5 sessions

## 2017-12-21 NOTE — PHYSICAL THERAPY INITIAL EVALUATION ADULT - IMPAIRMENTS FOUND, PT EVAL
aerobic capacity/endurance/stair negotiation/gait, locomotion, and balance/muscle strength stair negotiation/poor safety awareness/cognitive impairment/aerobic capacity/endurance/gait, locomotion, and balance/arousal, attention, and cognition/muscle strength

## 2017-12-21 NOTE — PHYSICAL THERAPY INITIAL EVALUATION ADULT - BALANCE TRAINING, PT EVAL
Balance = G-/G within 3-5 sessions. Improve static and dynamic balance to G-/G /c RW and within 5 sessions

## 2017-12-21 NOTE — PROGRESS NOTE ADULT - PROBLEM SELECTOR PLAN 4
- resolved, s/p fluid resuscitation, BMP stable  -continue f/u with BMP -hx of hyperkalemia  -currently WNL  -continue to monitor daily BMPs, continue tele monitor  -as discussed with pt's daughter, will treat with pt's home veltassa if appropriate

## 2017-12-21 NOTE — PHYSICAL THERAPY INITIAL EVALUATION ADULT - AMBULATION SKILLS, REHAB EVAL
independent rolling walker as needed but states she has not been using any device/independent/needs device

## 2017-12-21 NOTE — PROGRESS NOTE ADULT - PROBLEM SELECTOR PLAN 1
- multifactorial essential HTN, possibly secondary to Right renal artery stenosis and recent meds adjustment   - Continue with carvedilol, clonidine, valsartan, aldactone  - Cardiology consulted, appreciate recommendations by Dr. García, may increase valsartan and add hydralazine.   Patient likely has resistant hypertension as evidenced by her need for clonidine. Cardiology will continue to monitor closely.  - Continue to monitor vitals  -f/u nephro recs - multifactorial essential HTN,  -may also be secondary HTN d/t Right renal artery stenosis   -pt with recent BP med adjustment   - Continue with carvedilol, clonidine, valsartan, aldactone  - Cardiology consulted, appreciate recommendations by Dr. García, may increase valsartan and add hydralazine.   Patient likely has resistant hypertension as evidenced by her need for clonidine. Cardiology will continue to monitor closely.  - Continue to monitor vitals  -f/u nephro recs - multifactorial essential HTN,+ Orthostasis   -may also be secondary HTN possible Right renal artery stenosis   -pt with recent BP med adjustment   - Continue with carvedilol, clonidine, valsartan, aldactone  - Cardiology consulted, appreciate recommendations by Dr. García, may increase valsartan and add hydralazine.   Patient likely has resistant hypertension as evidenced by her need for clonidine. Cardiology will continue to monitor closely.  - Continue to monitor vitals  D/W DR Huertas -No change in Meds, will try thigh high Don stocking to legs when OOB to chairs

## 2017-12-21 NOTE — PHYSICAL THERAPY INITIAL EVALUATION ADULT - DID THE PATIENT HAVE SURGERY?
n/a n/a/CT head: (-) active bleeding, volume loss & chronic lacunar infracts; MRI head: (-) acute hemorrhage or ischemia. Chronic left occipital lobe infarct, Ultrasound Abdomen: (+) renal artery stenosis; Chest X-ray: Sizable hiatal hernia; WBC 11.0

## 2017-12-21 NOTE — PROGRESS NOTE ADULT - PROBLEM SELECTOR PLAN 2
- Likely 2/2  likely Orthostasis, H/O CVA  -pt with brief dizziness on standing; able to walk with assistance  - orthostatic BP readings negative, repeat and follow up results   - CT head negative for acute intracranial pathology   -continue with tele monitor, no arrhythmic events noted thus far  -cardio rec. appreciated: syncope likely 2/2 orthostasis vs structural disease. Doubt structural disease as a cause, as 2 echos this year have not shown evidence of structural abnormalities.    -Blood work is consistent with a prerenal/volume-depleted state as a cause.  - Neurology consulted, follow up recommendations.  - out of bed to chair and with assistant  - fall precaution - Likely 2/2  likely Orthostasis  -pt now ambulating with walker and supervision without dizziness/ataxia  - orthostatics (+) x2   -pt has hx of CVA (1986); MRI head negative for acute intracranial pathology   -continue with tele monitor, no arrhythmic events noted thus far  -cardio rec. appreciated: syncope likely 2/2 orthostasis vs structural disease. Doubt structural disease as a cause, as 2 echos this year have not shown evidence of structural abnormalities.    - Neurology consulted, follow up recommendations.  - out of bed to chair and with assist  - fall precaution - Likely 2/2  likely Orthostasis-Vasovagal syncope  -pt now ambulating with walker and supervision without dizziness/ataxia  - orthostatics (+) x2   -pt has hx of CVA (1986); MRI head negative for acute intracranial pathology   -continue with tele monitor, no arrhythmic events noted thus far  -cardio rec. appreciated: syncope likely 2/2 orthostasis vs structural disease. Doubt structural disease as a cause, as 2 echos this year have not shown evidence of structural abnormalities.    - Neurology consulted, follow up recommendations.  - out of bed to chair and with assist  - fall precaution PT ----> HCPT

## 2017-12-21 NOTE — PHYSICAL THERAPY INITIAL EVALUATION ADULT - DISCHARGE DISPOSITION, PT EVAL
Patient is recommended for Home care physical therapy.  Patients children to assist as needed. home w/ home PT/home w/ assist/HCPT /c increased assistance

## 2017-12-21 NOTE — PROGRESS NOTE ADULT - PROBLEM SELECTOR PLAN 6
-chronic, stable with no residual deficits   -continue with Plavix -pt had recent US suspect renal artery stenosis  As d/w Dr Victoria No intervention for above findings   As per DR [facility]- No intervention for above findings -pt had recent US suspect renal artery stenosis  As d/w Dr Victoria No intervention for above findings   As per DR [facility]- No intervention for above findings, dtr does NOT want any intervention

## 2017-12-21 NOTE — PROGRESS NOTE ADULT - SUBJECTIVE AND OBJECTIVE BOX
Patient is a 86y old  Female who presents with a chief complaint of syncope (20 Dec 2017 14:25)    INTERVAL HPI: 86 y.o F w/PMhx of  uncontrolled HTN, Dyslipidemia, Chronic hyponatremia ,Hypokalemia, past CVA in 1986( with no major residual dysfunction), recently diagnosis Rt renal artery stenosis brought in by ambulance due to syncope. Pt stated that last night (12/19) around 8:15 pm, she was resting and took her BP which showed systolic BP was 81, which is unusually low for Pt, Pt then stood up and went to bedroom and tried to get second bp machine to verify the reading, pt was suddenly pass out. Pt estimate she was lying on the floor for about 10-12 mins. Pt was able to woke up herself and called her daughter thru her cell phone and her son too. Pt was got up with the help of family member. Upon the ambulance arrival, pt also felt nausea and dizziness. Pt stated this couple days she is not feeling well with unsteady gait. Pt's nephrologist recent changed pt 's BP medications . Pt denies of any CP/palpitaion/sob/dizziness/blurry vison/HA/weakness before her syncope. Pt was hospitalized in [place] for hypertensive urgency and had TTE done which showed grossly normal EF, pt also had renal US showed Rt renal artery stenosis.  Pt reported that she is on low sodium and fluid restriction diet for months.    In ED, pt was hypertensive at 220/88, S/p one coreg and one IV labetalol, pt 's bp trending to 186/76. Mild leukocytosis at 10.8. hyponatremia at 132, Cr 1.5 and BUN 43. GFR is 31. 1st cardiac enzyme was negative. EKG showed normal sinus rhythm with nospecific ? lead II  T wave inversion. CXR and CT head unofficial read showed no acute pathology. Pt was also seen by cardio in ED.     OVERNIGHT EVENTS: Patient was seen and examined resting comfortably in bed accompanied by her daughter. Patient is feeling well overnight, with no acute complaints this morning. She denies any dizziness, nausea, headache, visual changes, or unsteadiness.     Home Medications:  carvedilol 25 mg oral tablet: 1 tab(s) orally 2 times a day (20 Dec 2017 05:18)  cloNIDine 0.2 mg oral tablet: 1 tab(s) orally 3 times a day (20 Dec 2017 05:18)  Plavix 75 mg oral tablet: 1 tab(s) orally once a day (20 Dec 2017 05:18)  spironolactone 25 mg oral tablet: 1 tab(s) orally once a day (20 Dec 2017 05:18)  valsartan 160 mg oral tablet: 1 tab(s) orally once a day (20 Dec 2017 05:18)  Veltassa 16.8 g oral powder for reconstitution: 1 dose(s) orally once a day (20 Dec 2017 05:18)    MEDICATIONS  (STANDING):  carvedilol 25 milliGRAM(s) Oral every 12 hours  cloNIDine 0.2 milliGRAM(s) Oral three times a day  clopidogrel Tablet 75 milliGRAM(s) Oral daily  docusate sodium 100 milliGRAM(s) Oral three times a day  famotidine    Tablet 20 milliGRAM(s) Oral daily  heparin  Injectable 5000 Unit(s) SubCutaneous every 12 hours  senna 2 Tablet(s) Oral at bedtime  spironolactone 25 milliGRAM(s) Oral daily  valsartan 160 milliGRAM(s) Oral daily    MEDICATIONS  (PRN):  ALPRAZolam 0.25 milliGRAM(s) Oral two times a day PRN anxiety    Allergies    Keflex (Unknown)  verapamil (Other)    REVIEW OF SYSTEMS:  CONSTITUTIONAL: No fever, No chills, No fatigue, No myalgia, No Body ache, No Weakness  EYES: No eye pain,  No visual disturbances, No discharge, NO Redness  ENMT:  No ear pain, No nose bleed, No vertigo; No sinus or throat pain, No Congestion  NECK: No pain, No stiffness  RESPIRATORY: No cough, wheezing, No  hemoptysis, No shortness of breath  CARDIOVASCULAR: No chest pain, palpitations  GASTROINTESTINAL: No abdominal or epigastric pain. No nausea, No vomiting; No diarrhea or constipation.   GENITOURINARY: No dysuria, No frequency, No urgency, No hematuria, or incontinence  NEUROLOGICAL: No headaches, No dizziness, No numbness, No tingling, No tremors, No weakness  EXT: No Swelling, No Pain, No Edema  SKIN:  [ x ] No itching, burning, rashes, or lesions   MUSCULOSKELETAL: No joint pain or swelling; No muscle pain, No back pain, No extremity pain  PSYCHIATRIC: No depression, anxiety, mood swings or difficulty sleeping at night  PAIN SCALE: [ x ] None  [  ] Other-  REST OF REVIEW Of SYSTEM - [ x ] Normal     Vital Signs Last 24 Hrs  T(C): 37 (21 Dec 2017 08:35), Max: 37 (21 Dec 2017 08:35)  T(F): 98.6 (21 Dec 2017 08:35), Max: 98.6 (21 Dec 2017 08:35)  HR: 64 (21 Dec 2017 08:35) (64 - 79)  BP: 80/50 (21 Dec 2017 10:11) (80/50 - 185/76)  BP(mean): --  RR: 16 (21 Dec 2017 08:35) (16 - 18)  SpO2: 98% (21 Dec 2017 08:35) (96% - 99%)    12-20 @ 07:01  -  12-21 @ 07:00  --------------------------------------------------------  IN: 240 mL / OUT: 0 mL / NET: 240 mL    PHYSICAL EXAM:  GENERAL:  [ x ] NAD , [ x ] well appearing, [  ] Agitated, [  ] Drowsy,  [  ] Lethargy, [  ] confused   HEAD:  [ x ] Normal, [  ] Other  ENMT:  [  ] Normal, [ x ] Moist mucous membranes, [  ] Good dentition, [  ] No Thrush  NECK:  [ x ] Supple, [ x ] No JVD, [  ] Normal thyroid, [  ] Lymphadenopathy [  ] Other  CHEST/LUNG:  [ x ] Clear to auscultation bilaterally, [ x ] Breath Sounds equal  [ x ] No rales, [ x ] No rhonchi  [ x ]  No wheezing  HEART:  [ x ] Regular rate and rhythm, [  ] tachycardia, [  ] Bradycardia,  [  ] irregular  [ x ] No murmurs, No rubs, No gallops, [  ] PPM in place (Mfr:  )  ABDOMEN:  [ x ] Soft, [ x ] Nontender, [ x ] Nondistended, [ x ] No mass, [ x ] Bowel sounds present, [  ] obese  NERVOUS SYSTEM:  [ x ] Alert & Oriented X3, [  ] Nonfocal  [  ] Confusion  [  ] Encephalopathic [  ] Sedated [  ] Unable to assess, [  ] Other-  SKIN:  [ x ] No rashes or lesions, [  ] Pressure Ulcers, [  ] ecchymosis, [  ] Skin Tears, [  ] Other    DIET: DASH/TLC    LABS:                        11.3   11.0  )-----------( 296      ( 21 Dec 2017 06:50 )             32.6     21 Dec 2017 06:50    136    |  106    |  27     ----------------------------<  153    4.3     |  22     |  1.00     Ca    9.3        21 Dec 2017 06:50      PT/INR - ( 19 Dec 2017 22:07 )   PT: 10.5 sec;   INR: 0.96 ratio      CARDIAC MARKERS ( 20 Dec 2017 16:02 )  .015 ng/mL / x     / 76 U/L / x     / 3.2 ng/mL  CARDIAC MARKERS ( 20 Dec 2017 06:17 )  .016 ng/mL / x     / 56 U/L / x     / 2.7 ng/mL  CARDIAC MARKERS ( 19 Dec 2017 22:07 )  <.015 ng/mL / x     / 61 U/L / x     / x        HEALTH ISSUES - PROBLEM Dx:  Prophylactic measure: Prophylactic measure  Edema, unspecified type: Edema, unspecified type  Dyslipidemia: Dyslipidemia  Cerebrovascular accident (CVA), unspecified mechanism: Cerebrovascular accident (CVA), unspecified mechanism  Hyponatremia: Hyponatremia  TERESA (acute kidney injury): TERESA (acute kidney injury)  Syncope and collapse: Syncope and collapse  Hypertensive urgency: Hypertensive urgency    Consultant(s) Notes Reviewed:  [ x ] YES     Care Discussed with [X] Consultants  [  ] Patient  [  ] Family  [  ]   [  ] Social Service  [  ] RN, [  ] Physical Therapy  DVT PPX: [  ] Lovenox, [ x ] S C Heparin, [  ] Coumadin, [  ] Xarelto, [  ] Eliquis, [  ] Pradaxa, [  ] IV Heparin drip, [  ] SCD [  ] Contraindication 2 to GI Bleed,[  ] Ambulation  Advanced directive: [ x ] None, [  ] DNR/DNI Patient is a 86y old  Female who presents with a chief complaint of syncope (20 Dec 2017 14:25)    INTERVAL HPI: 86 y.o F w/PMhx of  uncontrolled HTN, Dyslipidemia, Chronic hyponatremia ,Hypokalemia, past CVA in 1986( with no major residual dysfunction), recently diagnosis Rt renal artery stenosis brought in by ambulance due to syncope. Pt stated that last night (12/19) around 8:15 pm, she was resting and took her BP which showed systolic BP was 81, which is unusually low for Pt, Pt then stood up and went to bedroom and tried to get second bp machine to verify the reading, pt was suddenly pass out. Pt estimate she was lying on the floor for about 10-12 mins. Pt was able to woke up herself and called her daughter thru her cell phone and her son too. Pt was got up with the help of family member. Upon the ambulance arrival, pt also felt nausea and dizziness. Pt stated this couple days she is not feeling well with unsteady gait. Pt's nephrologist recent changed pt 's BP medications . Pt denies of any CP/palpitaion/sob/dizziness/blurry vison/HA/weakness before her syncope. Pt was hospitalized in [place] for hypertensive urgency and had TTE done which showed grossly normal EF, pt also had renal US showed Rt renal artery stenosis.  Pt reported that she is on low sodium and fluid restriction diet for months.    In ED, pt was hypertensive at 220/88, S/p one coreg and one IV labetalol, pt 's bp trending to 186/76. Mild leukocytosis at 10.8. hyponatremia at 132, Cr 1.5 and BUN 43. GFR is 31. 1st cardiac enzyme was negative. EKG showed normal sinus rhythm with nonspecific ? lead II  T wave inversion. CXR and CT head unofficial read showed no acute pathology. Pt was also seen by cardio in ED.    Pt seen, examined. No complaints. Feels fine. MRI brain -No Acute event. BP & Orthostatic VS reviewed    OVERNIGHT EVENTS: Patient was seen and examined resting comfortably in bed accompanied by her daughter. Patient is feeling well overnight, with no acute complaints this morning. She denies any dizziness, nausea, headache, visual changes, or unsteadiness.     Home Medications:  carvedilol 25 mg oral tablet: 1 tab(s) orally 2 times a day (20 Dec 2017 05:18)  cloNIDine 0.2 mg oral tablet: 1 tab(s) orally 3 times a day (20 Dec 2017 05:18)  Plavix 75 mg oral tablet: 1 tab(s) orally once a day (20 Dec 2017 05:18)  spironolactone 25 mg oral tablet: 1 tab(s) orally once a day (20 Dec 2017 05:18)  valsartan 160 mg oral tablet: 1 tab(s) orally once a day (20 Dec 2017 05:18)  Veltassa 16.8 g oral powder for reconstitution: 1 dose(s) orally once a day (20 Dec 2017 05:18)    MEDICATIONS  (STANDING):  carvedilol 25 milliGRAM(s) Oral every 12 hours  cloNIDine 0.2 milliGRAM(s) Oral three times a day  clopidogrel Tablet 75 milliGRAM(s) Oral daily  docusate sodium 100 milliGRAM(s) Oral three times a day  famotidine    Tablet 20 milliGRAM(s) Oral daily  heparin  Injectable 5000 Unit(s) SubCutaneous every 12 hours  senna 2 Tablet(s) Oral at bedtime  spironolactone 25 milliGRAM(s) Oral daily  valsartan 160 milliGRAM(s) Oral daily    MEDICATIONS  (PRN):  ALPRAZolam 0.25 milliGRAM(s) Oral two times a day PRN anxiety    Allergies    Keflex (Unknown)  verapamil (Other)    REVIEW OF SYSTEMS: I feel fine ,  CONSTITUTIONAL: No fever, No chills, No fatigue, No myalgia, No Body ache, No Weakness  EYES: No eye pain,  No visual disturbances, No discharge, NO Redness  ENMT:  No ear pain, No nose bleed, No vertigo; No sinus or throat pain, No Congestion  NECK: No pain, No stiffness  RESPIRATORY: No cough, wheezing, No  hemoptysis, No shortness of breath  CARDIOVASCULAR: No chest pain, palpitations  GASTROINTESTINAL: No abdominal or epigastric pain. No nausea, No vomiting; No diarrhea or constipation.   GENITOURINARY: No dysuria, No frequency, No urgency, No hematuria, or incontinence  NEUROLOGICAL: No headaches, No dizziness, No numbness, No tingling, No tremors, No weakness  EXT: No Swelling, No Pain, No Edema  SKIN:  [ x ] No itching, burning, rashes, or lesions   MUSCULOSKELETAL: No joint pain or swelling; No muscle pain, No back pain, No extremity pain  PSYCHIATRIC: No depression, anxiety, mood swings or difficulty sleeping at night  PAIN SCALE: [ x ] None  [  ] Other-  REST OF REVIEW Of SYSTEM - [ x ] Normal     Vital Signs Last 24 Hrs  T(C): 37 (21 Dec 2017 08:35), Max: 37 (21 Dec 2017 08:35)  T(F): 98.6 (21 Dec 2017 08:35), Max: 98.6 (21 Dec 2017 08:35)  HR: 64 (21 Dec 2017 08:35) (64 - 79)  BP: 80/50 (21 Dec 2017 10:11) (80/50 - 185/76)  BP(mean): --  RR: 16 (21 Dec 2017 08:35) (16 - 18)  SpO2: 98% (21 Dec 2017 08:35) (96% - 99%)    12-20 @ 07:01  -  12-21 @ 07:00  --------------------------------------------------------  IN: 240 mL / OUT: 0 mL / NET: 240 mL    PHYSICAL EXAM:  GENERAL:  [ x ] NAD , [ x ] well appearing, [  ] Agitated, [  ] Drowsy,  [  ] Lethargy, [  ] confused   HEAD:  [ x ] Normal, [  ] Other  ENMT:  [ x ] Normal, [ x ] Moist mucous membranes, [ x ] Good dentition, [ x ] No Thrush  NECK:  [ x ] Supple, [ x ] No JVD, [x  ] Normal thyroid, [  ] Lymphadenopathy [  ] Other  CHEST/LUNG:  [ x ] Clear to auscultation bilaterally, [ x ] Breath Sounds equal  [ x ] No rales, [ x ] No rhonchi  [ x ]  No wheezing  HEART:  [ x ] Regular rate and rhythm, [  ] tachycardia, [  ] Bradycardia,  [  ] irregular  [ x ] No murmurs, No rubs, No gallops, [  ] PPM in place (Mfr:  )  ABDOMEN:  [ x ] Soft, [ x ] Nontender, [ x ] Nondistended, [ x ] No mass, [ x ] Bowel sounds present, [  ] obese  NERVOUS SYSTEM:  [ x ] Alert & Oriented X3, [ x ] Nonfocal  [  ] Confusion  [  ] Encephalopathic [  ] Sedated [  ] Unable to assess, [  ] Other-  SKIN:  [ x ] No rashes or lesions, [  ] Pressure Ulcers, [  ] ecchymosis, [  ] Skin Tears, [  ] Other    DIET: DASH/TLC    LABS:                        11.3   11.0  )-----------( 296      ( 21 Dec 2017 06:50 )             32.6     21 Dec 2017 06:50    136    |  106    |  27     ----------------------------<  153    4.3     |  22     |  1.00     Ca    9.3        21 Dec 2017 06:50    < from: MR Head No Cont (12.20.17 @ 16:52) >    EXAM:  MR BRAIN                            PROCEDURE DATE:  12/20/2017          INTERPRETATION:  .    CLINICAL INFORMATION: Ataxia    TECHNIQUE: Multiplanar multisequential MRI of the brain was acquired   without the administration of IV gadolinium.    COMPARISON: Prior CT examination of the head from 12/20/2017.    FINDINGS: Multiple nonspecific confluent patchy foci of T2/FLAIR   hyperintensity are noted throughout the deep and periventricular white   matter of the cerebral hemispheres. Thereis no associated mass effect.   There is no evidence of acute ischemia on the diffusion-weighted images.   There is redemonstration of a chronic left occipital lobe infarct. There   is ex vacuo dilatation of the left occipital horn.    There is diffuse cerebral volume loss with prominence of the sulci,   fissures, and cisternal spaces which is normal for the patient's age.   Ventricular size and configuration is unremarkable apart from axial vacuo   dilatation of the left occipital horn. Flow-voids are noted throughout   the major intracranial vessels, on the T2 weighted images, consistent   with their patency. The sellar region and posterior fossa appear   unremarkable.    The paranasal sinuses and mastoid air cells are clear. Calvarial signal   is within normal limits. The orbits appear unremarkable.    IMPRESSION: No acute intracranial hemorrhage or evidence of acute   ischemia.    Similar-appearing extensive microvascular disease and chronic left   occipital lobe infarct.        < end of copied text >    PT/INR - ( 19 Dec 2017 22:07 )   PT: 10.5 sec;   INR: 0.96 ratio      CARDIAC MARKERS ( 20 Dec 2017 16:02 )  .015 ng/mL / x     / 76 U/L / x     / 3.2 ng/mL  CARDIAC MARKERS ( 20 Dec 2017 06:17 )  .016 ng/mL / x     / 56 U/L / x     / 2.7 ng/mL  CARDIAC MARKERS ( 19 Dec 2017 22:07 )  <.015 ng/mL / x     / 61 U/L / x     / x        HEALTH ISSUES - PROBLEM Dx:  Prophylactic measure: Prophylactic measure  Edema, unspecified type: Edema, unspecified type  Dyslipidemia: Dyslipidemia  Cerebrovascular accident (CVA), unspecified mechanism: Cerebrovascular accident (CVA), unspecified mechanism  Hyponatremia: Hyponatremia  TERESA (acute kidney injury): TERESA (acute kidney injury)  Syncope and collapse: Syncope and collapse  Hypertensive urgency: Hypertensive urgency    Consultant(s) Notes Reviewed:  [ x ] YES     Care Discussed with [X] Consultants  [ x ] Patient  [ x ] Family  [x  ]   [  ] Social Service  [x  ] RN, [  ] Physical Therapy  DVT PPX: [  ] Lovenox, [ x ] S C Heparin, [  ] Coumadin, [  ] Xarelto, [  ] Eliquis, [  ] Pradaxa, [  ] IV Heparin drip, [  ] SCD [  ] Contraindication 2 to GI Bleed,[  ] Ambulation  Advanced directive: [ x ] None, [  ] DNR/DNI

## 2017-12-21 NOTE — PHYSICAL THERAPY INITIAL EVALUATION ADULT - PERTINENT HX OF CURRENT PROBLEM, REHAB EVAL
Patient reports falling at home after trying to get up to go the bathroom.  Patient states feeling dizzy then fell. As per H&P:"85 y/o F recently was at [facility] for uncontrolled HTN ,work up showed suspect Rt renal artery stenosis brought in by ambulance due to syncope. Pt stated that last night (12/19) around 8:15 pm, she was resting and took her BP which showed systolic BP was 81, which is unusually low for Pt, Pt then stood up and went to bedroom and tried to get second bp machine to verify the reading, pt was suddenly pass out. Pt estimate she was lying on the floor for about 10-12 minutes. "

## 2017-12-21 NOTE — PHYSICAL THERAPY INITIAL EVALUATION ADULT - CRITERIA FOR SKILLED THERAPEUTIC INTERVENTIONS
anticipated discharge recommendation/impairments found/functional limitations in following categories/therapy frequency/risk reduction/prevention/rehab potential

## 2017-12-21 NOTE — PROGRESS NOTE ADULT - SUBJECTIVE AND OBJECTIVE BOX
HPI:  86 y.o F w/PMhx of HTN, Dyslipidemia, Chronic hyponatremia,Hyperkalemia past CVA in ( with no major residual dysfunction), recently was at [facility] for uncontrolled HTN ,work up showed suspect Rt renal artery stenosis brought in by ambulance due to syncope. Pt stated that last night () around 8:15 pm, she was resting and took her BP which showed systolic BP was 81, which is unusually low for Pt, Pt then stood up and went to bedroom and tried to get second bp machine to verify the reading, pt was suddenly pass out. Pt estimate she was lying on the floor for about 10-12 mints. Pt was able to woke up herself and called her daughter thru her cell phone and her son too. Family came & Pt got up with the help of family member. Upon the ambulance arrival, pt also felt nausea and dizziness. Pt stated this couple days she is not feeling well with unsteady gait. Pt's nephrologist recent changed pt 's BP medications . Pt denies of any CP/palpitaion/sob/dizziness/blurry vison/HA/weakness before her syncope. Pt was hospitalized in [place] few weeks ago  for hypertensive urgency and had TTE,  done which showed grossly normal EF, pt also had CD &  renal US showed suspect Rt renal artery stenosis.  Pt reported that she is on low sodium and fluid restriction diet for months. as per Pt, her  BP meds got changed by Dr Victoria on last week Friday visit.  In ED, pt was hypertensive at 220/88, S/p one coreg and one IV labetalol, pt 's bp trending to 186/76. Mild leukocytosis at 10.8. hyponatremia at 132, Cr 1.5 and BUN 43. GFR is 31. 1st cardiac enzyme was negative. EKG showed normal sinus rhythm with unspecific ? lead II  T wave inversion. CXR and CT head unofficial read showed no acute pathology. Pt was also seen by cardio Dr MADELYN byrne in ED. (20 Dec 2017 02:33)      SUBJECTIVE:  Patient is a 86y old  Female who presents with a chief complaint of syncope (20 Dec 2017 14:25)    Awake and alert.  Comfortable on RA.  Denies CP, palpitations, SOB, RASCON, dizziness or lightheadedness.    OBJECTIVE:  Review Of Systems:  Constitutional: [ ] Fever [ ] Chills [ ] Fatigue [ ] Weight change   HEENT: [ ] Blurred vision [ ] Eye Pain [ ] Headache [ ] Runny nose [ ] Sore Throat   Respiratory: [ ] Cough [ ] Wheezing [ ] Shortness of breath  Cardiovascular: [ ] Chest Pain [ ] Palpitations [ ] RASCON [ ] PND [ ] Orthopnea  Gastrointestinal: [ ] Abdominal Pain [ ] Diarrhea [ ] Constipation [ ] Hemorrhoids [ ] Nausea [ ] Vomiting  Genitourinary: [ ] Nocturia [ ] Dysuria [ ] Incontinence  Extremities: [ ] Swelling [ ] Joint Pain  Neurologic: [ ] Focal deficit [ ] Paresthesias [ ] Syncope  Lymphatic: [ ] Swelling [ ] Lymphadenopathy   Skin: [ ] Rash [ ] Ecchymoses [ ] Wounds [ ] Lesions  Psychiatry: [ ] Depression [ ] Suicidal/Homicidal Ideation [ ] Anxiety [ ] Sleep Disturbances  [x ] 10 point review of systems is otherwise negative except as mentioned above            [ ]Unable to obtain    Allergy:  Allergies    Keflex (Unknown)  verapamil (Other)    Intolerances        Medications:  MEDICATIONS  (STANDING):  carvedilol 25 milliGRAM(s) Oral every 12 hours  cloNIDine 0.2 milliGRAM(s) Oral three times a day  clopidogrel Tablet 75 milliGRAM(s) Oral daily  docusate sodium 100 milliGRAM(s) Oral three times a day  famotidine    Tablet 20 milliGRAM(s) Oral daily  heparin  Injectable 5000 Unit(s) SubCutaneous every 12 hours  senna 2 Tablet(s) Oral at bedtime  spironolactone 25 milliGRAM(s) Oral daily  valsartan 160 milliGRAM(s) Oral daily    MEDICATIONS  (PRN):  ALPRAZolam 0.25 milliGRAM(s) Oral two times a day PRN anxiety      PMH/PSH/FH/SH: [ ] Unchanged    Vitals:  T(C): 37 (17 @ 08:35), Max: 37 (17 @ 08:35)  HR: 83 (17 @ 11:59) (64 - 83)  BP: 80/50 (17 @ 10:11) (80/50 - 185/76)  BP(mean): --  RR: 16 (17 @ 08:35) (16 - 18)  SpO2: 97% (17 @ 11:59) (96% - 99%)  Wt(kg): --  Daily     Daily Weight in k.1 (21 Dec 2017 05:22)  I&O's Summary    20 Dec 2017 07:01  -  21 Dec 2017 07:00  --------------------------------------------------------  IN: 240 mL / OUT: 0 mL / NET: 240 mL    Labs:                        11.3   11.0  )-----------( 296      ( 21 Dec 2017 06:50 )             32.6     12    136  |  106  |  27<H>  ----------------------------<  153<H>  4.3   |  22  |  1.00    Ca    9.3      21 Dec 2017 06:50    TPro  7.1  /  Alb  4.1  /  TBili  1.1  /  DBili  x   /  AST  23  /  ALT  26  /  AlkPhos  80  12-19    PT/INR - ( 19 Dec 2017 22:07 )   PT: 10.5 sec;   INR: 0.96 ratio        CARDIAC MARKERS ( 20 Dec 2017 16:02 )  .015 ng/mL / x     / 76 U/L / x     / 3.2 ng/mL  CARDIAC MARKERS ( 20 Dec 2017 06:17 )  .016 ng/mL / x     / 56 U/L / x     / 2.7 ng/mL  CARDIAC MARKERS ( 19 Dec 2017 22:07 )  <.015 ng/mL / x     / 61 U/L / x     / x        ECG:  < from: 12 Lead ECG (17 @ 21:31) >    Ventricular Rate 71 BPM    Atrial Rate 71 BPM    P-R Interval 144 ms    QRS Duration 92 ms    Q-T Interval 400 ms    QTC Calculation(Bezet) 434 ms    P Axis 33 degrees    R Axis 24 degrees    T Axis 34 degrees    Diagnosis Line Normal sinus rhythm  Nonspecific ST and T wave abnormality  Abnormal ECG  When compared with ECG of 2016 03:32,  Nonspecific T wave abnormality now evident in Lateral leads  Confirmed by Robert García MD ([id number]) on 2017 5:33:15 PM    < end of copied text >    Echo:  < from: Transthoracic Echocardiogram (10.18.17 @ 09:21) >     EXAM:  2D ECHOCARDIOGRAM AD         PROCEDURE DATE:  10/18/2017        INTERPRETATION:  Transthoracic Echocardiography Report (TTE)     Demographics     Patient name        NITA          Age           86 year(s)                       MARU     [facility] Rec #           [id number]         Gender        Female     Account #           [id number]           Date of Birth 1931     Interpreting        Samuel Ríos MD  Room Number   0004   Physician     Referring Physician Ginny Marshall    Sonographer   Moraima Pool                                                       [facility]     Date of study       10/18/2017 09:07                       AM     Height              59.84 in          Weight        136.69 pounds    Type of Study:     TTE procedure: 2D echocardiogram, Adult echo - follow up     BP: 123/52 mmHg     Study Location: 3ETechnical Quality: Fair    Indications   1) I11.9 - Hypertensive heart disease without heart failure    M-Mode Measurements (cm)     LVEDd: 4.23 cmLVESd: 2.83 cm   IVSEd: 0.94 cm   LVPWd: 1.1 cm             AO Root Dimension: 3.4 cm                             ACS: 2.2 cm                             LA: 5.1 cm    Doppler Measurements:                                   MV Peak E-Wave: 91.8 cm/s   TR Velocity:280 cm/s          MV Peak A-Wave: 106 cm/s   TR Gradient:31.36 mmHg        MV E/A Ratio: 0.87 %   Estimated RAP:10 mmHg         MV Peak Gradient: 3.37 mmHg   RVSP:41 mmHg     Findings     Mitral Valve   Normal appearing mitral valve structure and function.   Mild (1+) mitral regurgitation is present.     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   Mild (1+) aortic regurgitation is present.     Tricuspid Valve   Normal appearing tricuspid valve structure and function.   Mild (1+) tricuspid valve regurgitation is present.     Pulmonic Valve   Normal appearing pulmonic valve structure and function.     Left Atrium   The left atrium is moderately dilated.     Left Ventricle   The left ventricle is normal in size, wall thickness, wall motion and   contractility.   Estimated left ventricular ejection fraction is 60-65 %.     Right Atrium   Normal appearing right atrium.     Right Ventricle   Normal appearing right ventricle structure and function.     Pericardial Effusion   No evidence of pericardial effusion.     Pleural Effusion   No evidence of pleural effusion.     Miscellaneous   All visualized extra cardiac structures appears to be normal.     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   Mild (1+) aortic regurgitation is present.   The left atrium is moderately dilated.   The left ventricle is normal in size, wall thickness, wall motion and   contractility.   Estimated left ventricular ejection fraction is 60-65 %.     Signature     ----------------------------------------------------------------   Electronically signed by Samuel Ríos MD(Interpreting   physician) on 10/18/2017 05:13 PM   ----------------------------------------------------------------    Valves     Mitral Valve     Peak E-Wave: 91.8 cm/s   Peak A-Wave: 106 cm/s   Peak Gradient: 3.37 mmHg                                                 E/A Ratio: 0.87     Aortic Valve     Cusp Separation: 2.2 cm     Tricuspid Valve     TR Velocity: 280 cm/s                Estimated RAP: 10 mmHg   TR Gradient: 31.36 mmHg              Estimated RVSP: 41 mmHg     Pulmonic Valve              Estimated PASP: 41.36 mmHg    Structures     Left Atrium     LA Dimension: 5.1 cm          LA Area: 22.8 cm^2   LA/Aorta: 1.5                 LA Volume/Index: 70 ml /44m^2     Left Ventricle     Diastolic Dimension: 4.23 cm          Systolic Dimension: 2.83 cm   Septum Diastolic: 0.94 cm   PW Diastolic: 1.1 cm     FS: 33.1 %     Right Atrium     RA Systolic Pressure: 10 mmHg     Right Ventricle              RV Systolic Pressure: 41.36 mmHg     Miscellaneous     Aorta     Aortic Root: 3.4 cm    SAMUEL RÍOS M.D., ATTENDING CARDIOLOGIST  This document has been electronically signed. Oct 18 2017  5:13PM      < end of copied text >    Stress Testing:     Cath:    Imaging:    Interpretation of Telemetry:  NSR at 60's    Physical Exam:  Appearance: [x ] Normal  [ ] abnormal [x ] NAD   Eyes: [x ] PERRL [ ] EOMI  HENT: [x ] Normal [ ] Abnormal oral muscosa [ ]NC/AT  Cardiovascular: [ x] S1 [x ] S2 [ x] RRR [ ] m/r/g [ ]edema [ ] JVP  Procedural Access Site: [ ]  hematoma [ ] tender to palpation [ ] 2+ pulse [ ] bruit [ ] Ecchymosis  Respiratory: [x ] Clear to auscultation bilaterally  Gastrointestinal: [ x] Soft [ ] tenderness[ ] distension [x ] BS  Musculoskeletal: [ ] clubbing [ ] joint deformity   Neurologic: [ x] Non-focal  Lymphatic: [ ] lymphadenopathy  Psychiatry: [x ] AAOx3  [ ] confused [ ] disoriented [x ] Mood & affect appropriate  Skin: [ ]  rashes [ ] ecchymoses [ ] cyanosis

## 2017-12-21 NOTE — PHYSICAL THERAPY INITIAL EVALUATION ADULT - LEVEL OF CONSCIOUSNESS, REHAB EVAL
confused/alert confused, forgetful noted. Pt noted to have socks off when PT went to get a pulse ox, pt states "I thought you told me to check my socks". PT did not./alert/confused

## 2017-12-21 NOTE — PROGRESS NOTE ADULT - PROBLEM SELECTOR PLAN 5
-pt had recent US suspect renal artery stenosis  As d/w Dr Victoria No intervention for above findings   As per DR [facility]- No intervention for above findings -chronic, stable   -Na currently WNL  - s/p fluid resuscitation, BMP stable  -continue f/u with BMP

## 2017-12-21 NOTE — PHYSICAL THERAPY INITIAL EVALUATION ADULT - PLANNED THERAPY INTERVENTIONS, PT EVAL
stair training/gait training/balance training gait training/transfer training/balance training/bed mobility training/Neg. steps up and down 15 /c bilateral HR and supervision in 4-5 sessions

## 2017-12-21 NOTE — PHYSICAL THERAPY INITIAL EVALUATION ADULT - IMPAIRMENTS CONTRIBUTING TO GAIT DEVIATIONS, PT EVAL
impaired balance cognition/impaired postural control/decreased strength/impaired coordination/impaired balance

## 2017-12-21 NOTE — PROGRESS NOTE ADULT - ASSESSMENT
86 year old woman w/ HTN, CVA, HLD, hyponatremia presents with syncope.    - Continue tele to monitor for occult arrhythmias  - TTE showed normal LVSF and no significant valvular disease  - there is no evidence of acute ischemia  - troponins negative x 2  - no documented arrhythmias on telemetry to explain her syncope.  Remained on NSR at 60's  - it is concerning that her syncopal episode was without warning, which might suggest an arrhythmic basis.  However, her exam does not suggest significant structural heart disease, two echocardiograms in the last year have suggestive a normal lvef, and her bloodwork is consistent with a pre-renal/volume depleted state  - if additional evidence suggests arrhythmia, may need to consider eps or ilr.  Patient follows with Dr. Easley for Cards  - Most likely she had syncope from orthostasis, though her orthostatics were negative and patient was totally asymptomatic prior to syncope  -  leo improved with fluid etc; Hyponatremia resolved  - Her blood pressures are labile, ranging from 160 and 80 but remains asymptomatic.    - the use of clonidine itself suggests that she has resistant htn  - Continue present BP management for now.  Patient aware of her very labile BP which makes it harder to change BP meds  - Monitor and replete electrolytes. Keep K>4.0 and Mg>2.0.  - Fall precaution    - Further cardiac workup will depend on clinical course.   - All other workup per primary team. Will follow    Jen Harrison NP   Cardiology 86 year old woman w/ HTN, CVA, HLD, hyponatremia presents with syncope. there is no evidence of acute ischemia TTE showed normal LVSF and no significant valvular disease Portending a benign prognosis.  Possible vasovagal/orthostatic mechanism with hyponatremia      Recommend  - Continue tele to monitor for occult arrhythmias  - no documented arrhythmias on telemetry to explain her syncope.  Remained on NSR at 60's  - it is concerning that her syncopal episode was without warning, which might suggest an arrhythmic basis.  However, her exam does not suggest significant structural heart disease, two echocardiograms in the last year have suggestive a normal lvef, and her bloodwork is consistent with a pre-renal/volume depleted state  - if additional evidence suggests arrhythmia, may need to consider eps or ilr.  Patient follows with Dr. Easley for Cards  -  leo improved with fluid etc; Hyponatremia resolved  - Her blood pressures are labile, ranging from 160 and 80 but remains asymptomatic.    - Continue present BP management for now.  Patient aware of her very labile BP which makes it harder to change BP meds  - Monitor and replete electrolytes. Keep K>4.0 and Mg>2.0.  - Fall precaution    - Further cardiac workup will depend on clinical course.   - All other workup per primary team. Will follow    Jen Harrison NP   Cardiology

## 2017-12-22 VITALS
OXYGEN SATURATION: 98 % | TEMPERATURE: 98 F | SYSTOLIC BLOOD PRESSURE: 130 MMHG | RESPIRATION RATE: 18 BRPM | HEART RATE: 65 BPM | DIASTOLIC BLOOD PRESSURE: 70 MMHG

## 2017-12-22 LAB
ANION GAP SERPL CALC-SCNC: 9 MMOL/L — SIGNIFICANT CHANGE UP (ref 5–17)
BUN SERPL-MCNC: 32 MG/DL — HIGH (ref 7–23)
CALCIUM SERPL-MCNC: 9.4 MG/DL — SIGNIFICANT CHANGE UP (ref 8.5–10.1)
CHLORIDE SERPL-SCNC: 106 MMOL/L — SIGNIFICANT CHANGE UP (ref 96–108)
CO2 SERPL-SCNC: 23 MMOL/L — SIGNIFICANT CHANGE UP (ref 22–31)
CREAT SERPL-MCNC: 1.2 MG/DL — SIGNIFICANT CHANGE UP (ref 0.5–1.3)
GLUCOSE SERPL-MCNC: 129 MG/DL — HIGH (ref 70–99)
HCT VFR BLD CALC: 32 % — LOW (ref 34.5–45)
HGB BLD-MCNC: 10.5 G/DL — LOW (ref 11.5–15.5)
MAGNESIUM SERPL-MCNC: 2 MG/DL — SIGNIFICANT CHANGE UP (ref 1.6–2.6)
MCHC RBC-ENTMCNC: 30.3 PG — SIGNIFICANT CHANGE UP (ref 27–34)
MCHC RBC-ENTMCNC: 32.7 GM/DL — SIGNIFICANT CHANGE UP (ref 32–36)
MCV RBC AUTO: 92.7 FL — SIGNIFICANT CHANGE UP (ref 80–100)
PHOSPHATE SERPL-MCNC: 3.1 MG/DL — SIGNIFICANT CHANGE UP (ref 2.5–4.5)
PLATELET # BLD AUTO: 281 K/UL — SIGNIFICANT CHANGE UP (ref 150–400)
POTASSIUM SERPL-MCNC: 4 MMOL/L — SIGNIFICANT CHANGE UP (ref 3.5–5.3)
POTASSIUM SERPL-SCNC: 4 MMOL/L — SIGNIFICANT CHANGE UP (ref 3.5–5.3)
RBC # BLD: 3.45 M/UL — LOW (ref 3.8–5.2)
RBC # FLD: 13 % — SIGNIFICANT CHANGE UP (ref 10.3–14.5)
SODIUM SERPL-SCNC: 138 MMOL/L — SIGNIFICANT CHANGE UP (ref 135–145)
WBC # BLD: 9.4 K/UL — SIGNIFICANT CHANGE UP (ref 3.8–10.5)
WBC # FLD AUTO: 9.4 K/UL — SIGNIFICANT CHANGE UP (ref 3.8–10.5)

## 2017-12-22 PROCEDURE — 70551 MRI BRAIN STEM W/O DYE: CPT

## 2017-12-22 PROCEDURE — 85610 PROTHROMBIN TIME: CPT

## 2017-12-22 PROCEDURE — 97110 THERAPEUTIC EXERCISES: CPT

## 2017-12-22 PROCEDURE — 80053 COMPREHEN METABOLIC PANEL: CPT

## 2017-12-22 PROCEDURE — 97530 THERAPEUTIC ACTIVITIES: CPT

## 2017-12-22 PROCEDURE — 83735 ASSAY OF MAGNESIUM: CPT

## 2017-12-22 PROCEDURE — 84100 ASSAY OF PHOSPHORUS: CPT

## 2017-12-22 PROCEDURE — 82550 ASSAY OF CK (CPK): CPT

## 2017-12-22 PROCEDURE — 97162 PT EVAL MOD COMPLEX 30 MIN: CPT

## 2017-12-22 PROCEDURE — 96374 THER/PROPH/DIAG INJ IV PUSH: CPT

## 2017-12-22 PROCEDURE — 96372 THER/PROPH/DIAG INJ SC/IM: CPT | Mod: XU

## 2017-12-22 PROCEDURE — 70450 CT HEAD/BRAIN W/O DYE: CPT

## 2017-12-22 PROCEDURE — 99233 SBSQ HOSP IP/OBS HIGH 50: CPT

## 2017-12-22 PROCEDURE — 71045 X-RAY EXAM CHEST 1 VIEW: CPT

## 2017-12-22 PROCEDURE — 93005 ELECTROCARDIOGRAM TRACING: CPT

## 2017-12-22 PROCEDURE — 99285 EMERGENCY DEPT VISIT HI MDM: CPT | Mod: 25

## 2017-12-22 PROCEDURE — 80048 BASIC METABOLIC PNL TOTAL CA: CPT

## 2017-12-22 PROCEDURE — 82553 CREATINE MB FRACTION: CPT

## 2017-12-22 PROCEDURE — 85027 COMPLETE CBC AUTOMATED: CPT

## 2017-12-22 PROCEDURE — 84484 ASSAY OF TROPONIN QUANT: CPT

## 2017-12-22 RX ORDER — SODIUM CHLORIDE 9 MG/ML
500 INJECTION INTRAMUSCULAR; INTRAVENOUS; SUBCUTANEOUS ONCE
Qty: 0 | Refills: 0 | Status: COMPLETED | OUTPATIENT
Start: 2017-12-22 | End: 2017-12-22

## 2017-12-22 RX ORDER — SODIUM CHLORIDE 9 MG/ML
250 INJECTION INTRAMUSCULAR; INTRAVENOUS; SUBCUTANEOUS ONCE
Qty: 0 | Refills: 0 | Status: DISCONTINUED | OUTPATIENT
Start: 2017-12-22 | End: 2017-12-22

## 2017-12-22 RX ORDER — SODIUM CHLORIDE 9 MG/ML
250 INJECTION INTRAMUSCULAR; INTRAVENOUS; SUBCUTANEOUS ONCE
Qty: 0 | Refills: 0 | Status: COMPLETED | OUTPATIENT
Start: 2017-12-22 | End: 2017-12-22

## 2017-12-22 RX ADMIN — HEPARIN SODIUM 5000 UNIT(S): 5000 INJECTION INTRAVENOUS; SUBCUTANEOUS at 17:39

## 2017-12-22 RX ADMIN — CLOPIDOGREL BISULFATE 75 MILLIGRAM(S): 75 TABLET, FILM COATED ORAL at 11:48

## 2017-12-22 RX ADMIN — SODIUM CHLORIDE 250 MILLILITER(S): 9 INJECTION INTRAMUSCULAR; INTRAVENOUS; SUBCUTANEOUS at 10:15

## 2017-12-22 RX ADMIN — CARVEDILOL PHOSPHATE 25 MILLIGRAM(S): 80 CAPSULE, EXTENDED RELEASE ORAL at 17:39

## 2017-12-22 RX ADMIN — FAMOTIDINE 20 MILLIGRAM(S): 10 INJECTION INTRAVENOUS at 11:48

## 2017-12-22 RX ADMIN — SODIUM CHLORIDE 1000 MILLILITER(S): 9 INJECTION INTRAMUSCULAR; INTRAVENOUS; SUBCUTANEOUS at 15:04

## 2017-12-22 RX ADMIN — HEPARIN SODIUM 5000 UNIT(S): 5000 INJECTION INTRAVENOUS; SUBCUTANEOUS at 06:05

## 2017-12-22 RX ADMIN — SPIRONOLACTONE 25 MILLIGRAM(S): 25 TABLET, FILM COATED ORAL at 06:04

## 2017-12-22 RX ADMIN — CARVEDILOL PHOSPHATE 25 MILLIGRAM(S): 80 CAPSULE, EXTENDED RELEASE ORAL at 06:04

## 2017-12-22 RX ADMIN — VALSARTAN 160 MILLIGRAM(S): 80 TABLET ORAL at 06:04

## 2017-12-22 RX ADMIN — Medication 0.2 MILLIGRAM(S): at 06:04

## 2017-12-22 RX ADMIN — SODIUM CHLORIDE 500 MILLILITER(S): 9 INJECTION INTRAMUSCULAR; INTRAVENOUS; SUBCUTANEOUS at 11:45

## 2017-12-22 NOTE — PROGRESS NOTE ADULT - PROBLEM SELECTOR PLAN 1
-likely multifactorial essential HTN, (+) Orthostasis   -may also be secondary/resistant HTN due to possible Right renal artery stenosis   -cardio ([facility]) following closely  -per cardio: given lability of BPs, will not adjust meds at this time.  Thigh high compression stockings started yesterday.  Pt remains orthostatic with stockings.  -pt with recent BP med adjustment transdermal --> PO cardizem  - Continue with carvedilol, clonidine, valsartan, aldactone  - Cardiology consulted, appreciate daily recommendations by [facility] physicians  -Patient has resistant hypertension as evidenced by her need for clonidine.   -Continue to monitor vitals -likely multifactorial essential HTN, (+) Orthostasis   -may also be secondary/resistant HTN due to possible Right renal artery stenosis   -BP remains labile; pt hypotensive on standing despite compression stockings  - mL bolus x2, 250mL x1 given today  -cardio ([facility]) following closely  -per cardio: given lability of BPs, will not adjust meds at this time.  Thigh high compression stockings started yesterday.  Pt remains orthostatic with stockings.  -pt with recent BP med adjustment transdermal --> PO cardizem  - Continue with carvedilol, clonidine, valsartan, aldactone  - Cardiology consulted, appreciate daily recommendations by [facility] physicians  -Patient has resistant hypertension as evidenced by her need for clonidine.   -Continue to monitor vitals

## 2017-12-22 NOTE — PROGRESS NOTE ADULT - PROBLEM SELECTOR PLAN 10
-heparin for DVT ppx  IMPROVE VTE Individual Risk Assessment        RISK                                                          Points  [  ] Previous VTE                                                3  [  ] Thrombophilia                                             2  [  ] Lower limb paralysis                                   2        (unable to hold up >15 seconds)    [  ] Current Cancer                                            2         (within 6 months)  [  ] Immobilization > 24 hrs                              1  [  ] ICU/CCU stay > 24 hours                            1  [ x ] Age > 60                                                    1  IMPROVE VTE Score 1  Pt will need to bring home Mayra   Pt's daughter Brittney Smith # [phone] is healthy proxy, who pt will ask to bring [facility] in AM
-heparin for DVT ppx  IMPROVE VTE Individual Risk Assessment        RISK                                                          Points  [  ] Previous VTE                                                3  [  ] Thrombophilia                                             2  [  ] Lower limb paralysis                                   2        (unable to hold up >15 seconds)    [  ] Current Cancer                                            2         (within 6 months)  [  ] Immobilization > 24 hrs                              1  [  ] ICU/CCU stay > 24 hours                            1  [ x ] Age > 60                                                    1  IMPROVE VTE Score 1  Pt will need to bring home Mayra   Pt's daughter Brittney Smith # [phone] is healthy proxy, who pt will ask to bring [facility] in AM

## 2017-12-22 NOTE — PROGRESS NOTE ADULT - PROBLEM SELECTOR PLAN 4
-hx of hyperkalemia  -currently WNL  -continue to monitor daily BMPs, continue tele monitor  -as discussed with pt's daughter, will treat with pt's home veltassa if appropriate

## 2017-12-22 NOTE — PROGRESS NOTE ADULT - SUBJECTIVE AND OBJECTIVE BOX
Patient is a 86y old  Female who presents with a chief complaint of syncope (20 Dec 2017 14:25)      INTERVAL HPI:      OVERNIGHT EVENTS:    Home Medications:  ALPRAZolam 0.25 mg oral tablet: 1 tab(s) orally 2 times a day, As needed, anxiety (21 Dec 2017 16:14)  carvedilol 25 mg oral tablet: 1 tab(s) orally 2 times a day (20 Dec 2017 05:18)  cloNIDine 0.2 mg oral tablet: 1 tab(s) orally 3 times a day (20 Dec 2017 05:18)  Plavix 75 mg oral tablet: 1 tab(s) orally once a day (20 Dec 2017 05:18)  spironolactone 25 mg oral tablet: 1 tab(s) orally once a day (20 Dec 2017 05:18)  valsartan 160 mg oral tablet: 1 tab(s) orally once a day (20 Dec 2017 05:18)  Veltassa 16.8 g oral powder for reconstitution: 1 dose(s) orally once a day (20 Dec 2017 05:18)      MEDICATIONS  (STANDING):  carvedilol 25 milliGRAM(s) Oral every 12 hours  cloNIDine 0.2 milliGRAM(s) Oral three times a day  clopidogrel Tablet 75 milliGRAM(s) Oral daily  docusate sodium 100 milliGRAM(s) Oral three times a day  famotidine    Tablet 20 milliGRAM(s) Oral daily  heparin  Injectable 5000 Unit(s) SubCutaneous every 12 hours  senna 2 Tablet(s) Oral at bedtime  spironolactone 25 milliGRAM(s) Oral daily  valsartan 160 milliGRAM(s) Oral daily    MEDICATIONS  (PRN):  ALPRAZolam 0.25 milliGRAM(s) Oral two times a day PRN anxiety      Allergies    Keflex (Unknown)  verapamil (Other)    Intolerances        REVIEW OF SYSTEMS:  CONSTITUTIONAL: No fever, No chills, No fatigue, No myalgia, No Body ache, No Weakness  EYES: No eye pain,  No visual disturbances, No discharge, NO Redness  ENMT:  No ear pain, No nose bleed, No vertigo; No sinus or throat pain, No Congestion  NECK: No pain, No stiffness  RESPIRATORY: No cough, wheezing, No  hemoptysis, No shortness of breath  CARDIOVASCULAR: No chest pain, palpitations  GASTROINTESTINAL: No abdominal or epigastric pain. No nausea, No vomiting; No diarrhea or constipation. [  ] BM  GENITOURINARY: No dysuria, No frequency, No urgency, No hematuria, or incontinence  NEUROLOGICAL: No headaches, No dizziness, No numbness, No tingling, No tremors, No weakness  EXT: No Swelling, No Pain, No Edema  SKIN:  [  ] No itching, burning, rashes, or lesions   MUSCULOSKELETAL: No joint pain or swelling; No muscle pain, No back pain, No extremity pain  PSYCHIATRIC: No depression, anxiety, mood swings or difficulty sleeping at night  PAIN SCALE: [  ] None  [  ] Other-  ROS Unable to obtain due to - [  ] Dementia  [  ] Lethargy  [  ] Sedated [  ] non verbal  REST OF REVIEW Of SYSTEM - [  ] Normal     Vital Signs Last 24 Hrs  T(C): 36.5 (22 Dec 2017 07:19), Max: 36.9 (21 Dec 2017 13:00)  T(F): 97.7 (22 Dec 2017 07:19), Max: 98.4 (21 Dec 2017 13:00)  HR: 59 (22 Dec 2017 07:19) (56 - 83)  BP: 176/80 (22 Dec 2017 07:19) (80/50 - 176/80)  BP(mean): --  RR: 18 (22 Dec 2017 07:19) (16 - 18)  SpO2: 96% (22 Dec 2017 07:19) (93% - 98%)  Finger Stick          PHYSICAL EXAM:  GENERAL:  [  ] NAD , [  ] well appearing, [  ] Agitated, [  ] Drowsy,  [  ] Lethargy, [  ] confused   HEAD:  [  ] Normal, [  ] Other  EYES:  [  ] EOMI, [  ] PERRLA, [  ] conjunctiva and sclera clear normal, [  ] Other,  [  ] Pallor,[  ] Discharge  ENMT:  [  ] Normal, [  ] Moist mucous membranes, [  ] Good dentition, [  ] No Thrush  NECK:  [  ] Supple, [  ] No JVD, [  ] Normal thyroid, [  ] Lymphadenopathy [  ] Other  CHEST/LUNG:  [  ] Clear to auscultation bilaterally, [  ] Breath Sounds equal OR Decrease,  [  ] No rales, [  ] No rhonchi  [  ]  No wheezing  HEART:  [  ] Regular rate and rhythm, [  ] tachycardia, [  ] Bradycardia,  [  ] irregular  [  ] No murmurs, No rubs, No gallops, [  ] PPM in place (Mfr:  )  ABDOMEN:  [  ] Soft, [  ] Nontender, [  ] Nondistended, [  ] No mass, [  ] Bowel sounds present, [  ] obese  NERVOUS SYSTEM:  [  ] Alert & Oriented X3, [  ] Nonfocal  [  ] Confusion  [  ] Encephalopathic [  ] Sedated [  ] Unable to assess, [  ] Other-  EXTREMITIES: [  ] 2+ Peripheral Pulses, No clubbing, No cyanosis,  [  ] edema B/L lower EXT. [  ] PVD stasis skin changes B/L Lower EXT  LYMPH: No lymphadenopathy noted  SKIN:  [  ] No rashes or lesions, [  ] Pressure Ulcers, [  ] ecchymosis, [  ] Skin Tears, [  ] Other    DIET:     LABS:                        10.5   9.4   )-----------( 281      ( 22 Dec 2017 07:03 )             32.0     22 Dec 2017 07:03    138    |  106    |  32     ----------------------------<  129    4.0     |  23     |  1.20     Ca    9.4        22 Dec 2017 07:03  Phos  3.1       22 Dec 2017 07:03  Mg     2.0       22 Dec 2017 07:03                    CARDIAC MARKERS ( 20 Dec 2017 16:02 )  .015 ng/mL / x     / 76 U/L / x     / 3.2 ng/mL  CARDIAC MARKERS ( 20 Dec 2017 06:17 )  .016 ng/mL / x     / 56 U/L / x     / 2.7 ng/mL  CARDIAC MARKERS ( 19 Dec 2017 22:07 )  <.015 ng/mL / x     / 61 U/L / x     / x                 Anemia Panel:      Thyroid Panel:                RADIOLOGY & ADDITIONAL TESTS:      HEALTH ISSUES - PROBLEM Dx:  Hyperkalemia: Hyperkalemia  Prophylactic measure: Prophylactic measure  Edema, unspecified type: Edema, unspecified type  Dyslipidemia: Dyslipidemia  Cerebrovascular accident (CVA), unspecified mechanism: Cerebrovascular accident (CVA), unspecified mechanism  Hyponatremia: Hyponatremia  TERESA (acute kidney injury): TERESA (acute kidney injury)  Syncope and collapse: Syncope and collapse  Hypertensive urgency: Hypertensive urgency          Consultant(s) Notes Reviewed:  [  ] YES     Care Discussed with [X] Consultants  [  ] Patient  [  ] Family  [  ]   [  ] Social Service  [  ] RN, [  ] Physical Therapy  DVT PPX: [  ] Lovenox, [  ] S C Heparin, [  ] Coumadin, [  ] Xarelto, [  ] Eliquis, [  ] Pradaxa, [  ] IV Heparin drip, [  ] SCD [  ] Contraindication 2 to GI Bleed,[  ] Ambulation  Advanced directive: [  ] None, [  ] DNR/DNI Patient is a 86y old  Female who presents with a chief complaint of syncope (20 Dec 2017 14:25)      INTERVAL HPI: 86 y.o F w/PMhx of  uncontrolled HTN, Dyslipidemia, Chronic hyponatremia ,Hypokalemia, past CVA in 1986( with no major residual dysfunction), recently diagnosis Rt renal artery stenosis brought in by ambulance due to syncope. Pt stated that last night (12/19) around 8:15 pm, she was resting and took her BP which showed systolic BP was 81, which is unusually low for Pt, Pt then stood up and went to bedroom and tried to get second bp machine to verify the reading, pt was suddenly pass out. Pt estimate she was lying on the floor for about 10-12 mins. Pt was able to woke up herself and called her daughter thru her cell phone and her son too. Pt was got up with the help of family member. Upon the ambulance arrival, pt also felt nausea and dizziness. Pt stated this couple days she is not feeling well with unsteady gait. Pt's nephrologist recent changed pt 's BP medications . Pt denies of any CP/palpitaion/sob/dizziness/blurry vison/HA/weakness before her syncope. Pt was hospitalized in [place] for hypertensive urgency and had TTE done which showed grossly normal EF, pt also had renal US showed Rt renal artery stenosis.  Pt reported that she is on low sodium and fluid restriction diet for months.    In ED, pt was hypertensive at 220/88, S/p one coreg and one IV labetalol, pt 's bp trending to 186/76. Mild leukocytosis at 10.8. hyponatremia at 132, Cr 1.5 and BUN 43. GFR is 31. 1st cardiac enzyme was negative. EKG showed normal sinus rhythm with nonspecific ? lead II  T wave inversion. CXR and CT head unofficial read showed no acute pathology. Pt was also seen by cardio in ED.       OVERNIGHT EVENTS: No acute events.  Pt seen and examined lying in bed.  A/O x3.  Denies dizziness, loss of balance, CP/SOB.  Has been wearing thigh-high compression stockings since last night.  Orthostatics overnight (+) x2.  BP remains extremely labile.     Home Medications:  ALPRAZolam 0.25 mg oral tablet: 1 tab(s) orally 2 times a day, As needed, anxiety (21 Dec 2017 16:14)  carvedilol 25 mg oral tablet: 1 tab(s) orally 2 times a day (20 Dec 2017 05:18)  cloNIDine 0.2 mg oral tablet: 1 tab(s) orally 3 times a day (20 Dec 2017 05:18)  Plavix 75 mg oral tablet: 1 tab(s) orally once a day (20 Dec 2017 05:18)  spironolactone 25 mg oral tablet: 1 tab(s) orally once a day (20 Dec 2017 05:18)  valsartan 160 mg oral tablet: 1 tab(s) orally once a day (20 Dec 2017 05:18)  Veltassa 16.8 g oral powder for reconstitution: 1 dose(s) orally once a day (20 Dec 2017 05:18)      MEDICATIONS  (STANDING):  carvedilol 25 milliGRAM(s) Oral every 12 hours  cloNIDine 0.2 milliGRAM(s) Oral three times a day  clopidogrel Tablet 75 milliGRAM(s) Oral daily  docusate sodium 100 milliGRAM(s) Oral three times a day  famotidine    Tablet 20 milliGRAM(s) Oral daily  heparin  Injectable 5000 Unit(s) SubCutaneous every 12 hours  senna 2 Tablet(s) Oral at bedtime  spironolactone 25 milliGRAM(s) Oral daily  valsartan 160 milliGRAM(s) Oral daily    MEDICATIONS  (PRN):  ALPRAZolam 0.25 milliGRAM(s) Oral two times a day PRN anxiety      Allergies    Keflex (Unknown)  verapamil (Other)    Intolerances      REVIEW OF SYSTEMS:  CONSTITUTIONAL: No fever, No chills, No fatigue, No myalgia, No Body ache, No Weakness  EYES: No eye pain,  No visual disturbances, No discharge, NO Redness  ENMT:  No ear pain, No nose bleed, No vertigo; No sinus or throat pain, No Congestion  NECK: No pain, No stiffness  RESPIRATORY: No cough, wheezing, No  hemoptysis, No shortness of breath  CARDIOVASCULAR: No chest pain, palpitations  GASTROINTESTINAL: No abdominal or epigastric pain. No nausea, No vomiting; No diarrhea or constipation. [  ] BM  GENITOURINARY: No dysuria, No frequency, No urgency, No hematuria, or incontinence  NEUROLOGICAL: No headaches, No dizziness, No numbness, No tingling, No tremors, No weakness  EXT: No Swelling, No Pain, No Edema  SKIN:  [  ] No itching, burning, rashes, or lesions   MUSCULOSKELETAL: No joint pain or swelling; No muscle pain, No back pain, No extremity pain  PSYCHIATRIC: No depression, anxiety, mood swings or difficulty sleeping at night  PAIN SCALE: [x  ] None  [  ] Other-  ROS Unable to obtain due to - [  ] Dementia  [  ] Lethargy  [  ] Sedated [  ] non verbal  REST OF REVIEW Of SYSTEM - [x  ] Normal     Vital Signs Last 24 Hrs  T(C): 36.5 (22 Dec 2017 07:19), Max: 36.9 (21 Dec 2017 13:00)  T(F): 97.7 (22 Dec 2017 07:19), Max: 98.4 (21 Dec 2017 13:00)  HR: 59 (22 Dec 2017 07:19) (56 - 83)  BP: 176/80 (22 Dec 2017 07:19) (80/50 - 176/80)  BP(mean): --  RR: 18 (22 Dec 2017 07:19) (16 - 18)  SpO2: 96% (22 Dec 2017 07:19) (93% - 98%)  Finger Stick          PHYSICAL EXAM:  GENERAL:  [x  ] NAD , [ x ] well appearing, [  ] Agitated, [  ] Drowsy,  [  ] Lethargy, [  ] confused   HEAD:  [x  ] Normal, [  ] Other  EYES:  [x  ] EOMI, [ x ] PERRLA, [x  ] conjunctiva and sclera clear normal, [  ] Other,  [  ] Pallor,[  ] Discharge  ENMT:  [x  ] Normal, [ x ] Moist mucous membranes, [  ] Good dentition, [  ] No Thrush  NECK:  [ x ] Supple, [x  ] No JVD, [  ] Normal thyroid, [  ] Lymphadenopathy [  ] Other  CHEST/LUNG:  [x  ] Clear to auscultation bilaterally, [ x ] Breath Sounds equal,  [ x ] No rales, [x  ] No rhonchi  [ x ]  No wheezing  HEART:  [ x ] Regular rate and rhythm, [ ]  tachycardia, [  ] Bradycardia,  [  ] irregular  [ x ] No murmurs, No rubs, No gallops, [  ] PPM in place (Mfr:  )  ABDOMEN:  [ x ] Soft, [x  ] Nontender, [ x ] Nondistended, [x  ] No mass, [ x ] Bowel sounds present, [  ] obese  NERVOUS SYSTEM:  [x  ] Alert & Oriented X3, [ x ] Nonfocal  [  ] Confusion  [  ] Encephalopathic [  ] Sedated [  ] Unable to assess, [  ] Other-  EXTREMITIES: [ x ] 2+ Peripheral Pulses, No clubbing, No cyanosis,  [  ] edema B/L lower EXT. [  ] PVD stasis skin changes B/L Lower EXT  LYMPH: No lymphadenopathy noted  SKIN:  [ x ] No rashes or lesions, [  ] Pressure Ulcers, [  ] ecchymosis, [  ] Skin Tears, [  ] Other    DIET:     LABS:                        10.5   9.4   )-----------( 281      ( 22 Dec 2017 07:03 )             32.0     22 Dec 2017 07:03    138    |  106    |  32     ----------------------------<  129    4.0     |  23     |  1.20     Ca    9.4        22 Dec 2017 07:03  Phos  3.1       22 Dec 2017 07:03  Mg     2.0       22 Dec 2017 07:03        CARDIAC MARKERS ( 20 Dec 2017 16:02 )  .015 ng/mL / x     / 76 U/L / x     / 3.2 ng/mL  CARDIAC MARKERS ( 20 Dec 2017 06:17 )  .016 ng/mL / x     / 56 U/L / x     / 2.7 ng/mL  CARDIAC MARKERS ( 19 Dec 2017 22:07 )  <.015 ng/mL / x     / 61 U/L / x     / x          HEALTH ISSUES - PROBLEM Dx:  Hyperkalemia: Hyperkalemia  Prophylactic measure: Prophylactic measure  Edema, unspecified type: Edema, unspecified type  Dyslipidemia: Dyslipidemia  Cerebrovascular accident (CVA), unspecified mechanism: Cerebrovascular accident (CVA), unspecified mechanism  Hyponatremia: Hyponatremia  TERESA (acute kidney injury): TERESA (acute kidney injury)  Syncope and collapse: Syncope and collapse  Hypertensive urgency: Hypertensive urgency          Consultant(s) Notes Reviewed:  [x  ] YES     Care Discussed with [X] Consultants  [ x ] Patient  [ x ] Family  [  ]   [  ] Social Service  [  ] RN, [  ] Physical Therapy  DVT PPX: [  ] Lovenox, [x  ] S C Heparin, [  ] Coumadin, [  ] Xarelto, [  ] Eliquis, [  ] Pradaxa, [  ] IV Heparin drip, [  ] SCD [  ] Contraindication 2 to GI Bleed,[  ] Ambulation  Advanced directive: [  ] None, [  ] DNR/DNI Patient is a 86y old  Female who presents with a chief complaint of syncope (20 Dec 2017 14:25)      INTERVAL HPI: 86 y.o F w/PMhx of  uncontrolled HTN, Dyslipidemia, Chronic hyponatremia ,Hypokalemia, past CVA in 1986( with no major residual dysfunction), recently diagnosis Rt renal artery stenosis brought in by ambulance due to syncope. Pt stated that last night (12/19) around 8:15 pm, she was resting and took her BP which showed systolic BP was 81, which is unusually low for Pt, Pt then stood up and went to bedroom and tried to get second bp machine to verify the reading, pt was suddenly pass out. Pt estimate she was lying on the floor for about 10-12 mins. Pt was able to woke up herself and called her daughter thru her cell phone and her son too. Pt was got up with the help of family member. Upon the ambulance arrival, pt also felt nausea and dizziness. Pt stated this couple days she is not feeling well with unsteady gait. Pt's nephrologist recent changed pt 's BP medications . Pt denies of any CP/palpitaion/sob/dizziness/blurry vison/HA/weakness before her syncope. Pt was hospitalized in [place] for hypertensive urgency and had TTE done which showed grossly normal EF, pt also had renal US showed Rt renal artery stenosis.  Pt reported that she is on low sodium and fluid restriction diet for months.    In ED, pt was hypertensive at 220/88, S/p one coreg and one IV labetalol, pt 's bp trending to 186/76. Mild leukocytosis at 10.8. hyponatremia at 132, Cr 1.5 and BUN 43. GFR is 31. 1st cardiac enzyme was negative. EKG showed normal sinus rhythm with nonspecific ? lead II  T wave inversion. CXR and CT head unofficial read showed no acute pathology. Pt was also seen by cardio in ED.       OVERNIGHT EVENTS: No acute events.  Pt seen and examined lying in bed.  A/O x3.  Denies dizziness, loss of balance, CP/SOB.  Has been wearing thigh-high compression stockings since last night.  Orthostatics overnight (+) x2.  BP remains extremely labile, requiring  mL bolus x2, and  mL bolus x1 today.     Home Medications:  ALPRAZolam 0.25 mg oral tablet: 1 tab(s) orally 2 times a day, As needed, anxiety (21 Dec 2017 16:14)  carvedilol 25 mg oral tablet: 1 tab(s) orally 2 times a day (20 Dec 2017 05:18)  cloNIDine 0.2 mg oral tablet: 1 tab(s) orally 3 times a day (20 Dec 2017 05:18)  Plavix 75 mg oral tablet: 1 tab(s) orally once a day (20 Dec 2017 05:18)  spironolactone 25 mg oral tablet: 1 tab(s) orally once a day (20 Dec 2017 05:18)  valsartan 160 mg oral tablet: 1 tab(s) orally once a day (20 Dec 2017 05:18)  Veltassa 16.8 g oral powder for reconstitution: 1 dose(s) orally once a day (20 Dec 2017 05:18)      MEDICATIONS  (STANDING):  carvedilol 25 milliGRAM(s) Oral every 12 hours  cloNIDine 0.2 milliGRAM(s) Oral three times a day  clopidogrel Tablet 75 milliGRAM(s) Oral daily  docusate sodium 100 milliGRAM(s) Oral three times a day  famotidine    Tablet 20 milliGRAM(s) Oral daily  heparin  Injectable 5000 Unit(s) SubCutaneous every 12 hours  senna 2 Tablet(s) Oral at bedtime  spironolactone 25 milliGRAM(s) Oral daily  valsartan 160 milliGRAM(s) Oral daily    MEDICATIONS  (PRN):  ALPRAZolam 0.25 milliGRAM(s) Oral two times a day PRN anxiety      Allergies    Keflex (Unknown)  verapamil (Other)    Intolerances      REVIEW OF SYSTEMS:  CONSTITUTIONAL: No fever, No chills, No fatigue, No myalgia, No Body ache, No Weakness  EYES: No eye pain,  No visual disturbances, No discharge, NO Redness  ENMT:  No ear pain, No nose bleed, No vertigo; No sinus or throat pain, No Congestion  NECK: No pain, No stiffness  RESPIRATORY: No cough, wheezing, No  hemoptysis, No shortness of breath  CARDIOVASCULAR: No chest pain, palpitations  GASTROINTESTINAL: No abdominal or epigastric pain. No nausea, No vomiting; No diarrhea or constipation. [  ] BM  GENITOURINARY: No dysuria, No frequency, No urgency, No hematuria, or incontinence  NEUROLOGICAL: No headaches, No dizziness, No numbness, No tingling, No tremors, No weakness  EXT: No Swelling, No Pain, No Edema  SKIN:  [  ] No itching, burning, rashes, or lesions   MUSCULOSKELETAL: No joint pain or swelling; No muscle pain, No back pain, No extremity pain  PSYCHIATRIC: No depression, anxiety, mood swings or difficulty sleeping at night  PAIN SCALE: [x  ] None  [  ] Other-  ROS Unable to obtain due to - [  ] Dementia  [  ] Lethargy  [  ] Sedated [  ] non verbal  REST OF REVIEW Of SYSTEM - [x  ] Normal     Vital Signs Last 24 Hrs  T(C): 36.5 (22 Dec 2017 07:19), Max: 36.9 (21 Dec 2017 13:00)  T(F): 97.7 (22 Dec 2017 07:19), Max: 98.4 (21 Dec 2017 13:00)  HR: 59 (22 Dec 2017 07:19) (56 - 83)  BP: 176/80 (22 Dec 2017 07:19) (80/50 - 176/80)  BP(mean): --  RR: 18 (22 Dec 2017 07:19) (16 - 18)  SpO2: 96% (22 Dec 2017 07:19) (93% - 98%)  Finger Stick          PHYSICAL EXAM:  GENERAL:  [x  ] NAD , [ x ] well appearing, [  ] Agitated, [  ] Drowsy,  [  ] Lethargy, [  ] confused   HEAD:  [x  ] Normal, [  ] Other  EYES:  [x  ] EOMI, [ x ] PERRLA, [x  ] conjunctiva and sclera clear normal, [  ] Other,  [  ] Pallor,[  ] Discharge  ENMT:  [x  ] Normal, [ x ] Moist mucous membranes, [  ] Good dentition, [  ] No Thrush  NECK:  [ x ] Supple, [x  ] No JVD, [  ] Normal thyroid, [  ] Lymphadenopathy [  ] Other  CHEST/LUNG:  [x  ] Clear to auscultation bilaterally, [ x ] Breath Sounds equal,  [ x ] No rales, [x  ] No rhonchi  [ x ]  No wheezing  HEART:  [ x ] Regular rate and rhythm, [ ]  tachycardia, [  ] Bradycardia,  [  ] irregular  [ x ] No murmurs, No rubs, No gallops, [  ] PPM in place (Mfr:  )  ABDOMEN:  [ x ] Soft, [x  ] Nontender, [ x ] Nondistended, [x  ] No mass, [ x ] Bowel sounds present, [  ] obese  NERVOUS SYSTEM:  [x  ] Alert & Oriented X3, [ x ] Nonfocal  [  ] Confusion  [  ] Encephalopathic [  ] Sedated [  ] Unable to assess, [  ] Other-  EXTREMITIES: [ x ] 2+ Peripheral Pulses, No clubbing, No cyanosis,  [  ] edema B/L lower EXT. [  ] PVD stasis skin changes B/L Lower EXT  LYMPH: No lymphadenopathy noted  SKIN:  [ x ] No rashes or lesions, [  ] Pressure Ulcers, [  ] ecchymosis, [  ] Skin Tears, [  ] Other    DIET:     LABS:                        10.5   9.4   )-----------( 281      ( 22 Dec 2017 07:03 )             32.0     22 Dec 2017 07:03    138    |  106    |  32     ----------------------------<  129    4.0     |  23     |  1.20     Ca    9.4        22 Dec 2017 07:03  Phos  3.1       22 Dec 2017 07:03  Mg     2.0       22 Dec 2017 07:03        CARDIAC MARKERS ( 20 Dec 2017 16:02 )  .015 ng/mL / x     / 76 U/L / x     / 3.2 ng/mL  CARDIAC MARKERS ( 20 Dec 2017 06:17 )  .016 ng/mL / x     / 56 U/L / x     / 2.7 ng/mL  CARDIAC MARKERS ( 19 Dec 2017 22:07 )  <.015 ng/mL / x     / 61 U/L / x     / x          HEALTH ISSUES - PROBLEM Dx:  Hyperkalemia: Hyperkalemia  Prophylactic measure: Prophylactic measure  Edema, unspecified type: Edema, unspecified type  Dyslipidemia: Dyslipidemia  Cerebrovascular accident (CVA), unspecified mechanism: Cerebrovascular accident (CVA), unspecified mechanism  Hyponatremia: Hyponatremia  TERESA (acute kidney injury): TERESA (acute kidney injury)  Syncope and collapse: Syncope and collapse  Hypertensive urgency: Hypertensive urgency          Consultant(s) Notes Reviewed:  [x  ] YES     Care Discussed with [X] Consultants  [ x ] Patient  [ x ] Family  [  ]   [  ] Social Service  [  ] RN, [  ] Physical Therapy  DVT PPX: [  ] Lovenox, [x  ] S C Heparin, [  ] Coumadin, [  ] Xarelto, [  ] Eliquis, [  ] Pradaxa, [  ] IV Heparin drip, [  ] SCD [  ] Contraindication 2 to GI Bleed,[  ] Ambulation  Advanced directive: [  ] None, [  ] DNR/DNI Patient is a 86y old  Female who presents with a chief complaint of syncope (20 Dec 2017 14:25)      INTERVAL HPI: 86 y.o F w/PMhx of  uncontrolled HTN, Dyslipidemia, Chronic hyponatremia ,Hypokalemia, past CVA in 1986( with no major residual dysfunction), recently diagnosis Rt renal artery stenosis brought in by ambulance due to syncope. Pt stated that last night (12/19) around 8:15 pm, she was resting and took her BP which showed systolic BP was 81, which is unusually low for Pt, Pt then stood up and went to bedroom and tried to get second bp machine to verify the reading, pt was suddenly pass out. Pt estimate she was lying on the floor for about 10-12 mins. Pt was able to woke up herself and called her daughter thru her cell phone and her son too. Pt was got up with the help of family member. Upon the ambulance arrival, pt also felt nausea and dizziness. Pt stated this couple days she is not feeling well with unsteady gait. Pt's nephrologist recent changed pt 's BP medications . Pt denies of any CP/palpitaion/sob/dizziness/blurry vison/HA/weakness before her syncope. Pt was hospitalized in [place] for hypertensive urgency and had TTE done which showed grossly normal EF, pt also had renal US showed Rt renal artery stenosis.  Pt reported that she is on low sodium and fluid restriction diet for months.    In ED, pt was hypertensive at 220/88, S/p one coreg and one IV labetalol, pt 's bp trending to 186/76. Mild leukocytosis at 10.8. hyponatremia at 132, Cr 1.5 and BUN 43. GFR is 31. 1st cardiac enzyme was negative. EKG showed normal sinus rhythm with nonspecific ? lead II  T wave inversion. CXR and CT head unofficial read showed no acute pathology. Pt was also seen by cardio in ED.   Pt seen, examined, case & care plan d/w Cardiology at detail.  + Orthostasis, IV Fluid NS given.      OVERNIGHT EVENTS: No acute events.  Pt seen and examined lying in bed.  A/O x3.  Denies dizziness, loss of balance, CP/SOB.  Has been wearing thigh-high compression stockings since last night.  Orthostatics overnight (+) x2.  BP remains extremely labile, requiring  mL bolus x2, and  mL bolus x1 today.     Home Medications:  ALPRAZolam 0.25 mg oral tablet: 1 tab(s) orally 2 times a day, As needed, anxiety (21 Dec 2017 16:14)  carvedilol 25 mg oral tablet: 1 tab(s) orally 2 times a day (20 Dec 2017 05:18)  cloNIDine 0.2 mg oral tablet: 1 tab(s) orally 3 times a day (20 Dec 2017 05:18)  Plavix 75 mg oral tablet: 1 tab(s) orally once a day (20 Dec 2017 05:18)  spironolactone 25 mg oral tablet: 1 tab(s) orally once a day (20 Dec 2017 05:18)  valsartan 160 mg oral tablet: 1 tab(s) orally once a day (20 Dec 2017 05:18)  Veltassa 16.8 g oral powder for reconstitution: 1 dose(s) orally once a day (20 Dec 2017 05:18)      MEDICATIONS  (STANDING):  carvedilol 25 milliGRAM(s) Oral every 12 hours  cloNIDine 0.2 milliGRAM(s) Oral three times a day  clopidogrel Tablet 75 milliGRAM(s) Oral daily  docusate sodium 100 milliGRAM(s) Oral three times a day  famotidine    Tablet 20 milliGRAM(s) Oral daily  heparin  Injectable 5000 Unit(s) SubCutaneous every 12 hours  senna 2 Tablet(s) Oral at bedtime  spironolactone 25 milliGRAM(s) Oral daily  valsartan 160 milliGRAM(s) Oral daily    MEDICATIONS  (PRN):  ALPRAZolam 0.25 milliGRAM(s) Oral two times a day PRN anxiety      Allergies    Keflex (Unknown)  verapamil (Other)    Intolerances      REVIEW OF SYSTEMS: I Feel fine ,No Complaints  CONSTITUTIONAL: No fever, No chills, No fatigue, No myalgia, No Body ache, No Weakness  EYES: No eye pain,  No visual disturbances, No discharge, NO Redness  ENMT:  No ear pain, No nose bleed, No vertigo; No sinus or throat pain, No Congestion  NECK: No pain, No stiffness  RESPIRATORY: No cough, wheezing, No  hemoptysis, No shortness of breath  CARDIOVASCULAR: No chest pain, palpitations  GASTROINTESTINAL: No abdominal or epigastric pain. No nausea, No vomiting; No diarrhea or constipation. [  ] BM  GENITOURINARY: No dysuria, No frequency, No urgency, No hematuria, or incontinence  NEUROLOGICAL: No headaches, No dizziness, No numbness, No tingling, No tremors, No weakness  EXT: No Swelling, No Pain, No Edema  SKIN:  [x ] No itching, burning, rashes, or lesions   MUSCULOSKELETAL: No joint pain or swelling; No muscle pain, No back pain, No extremity pain  PSYCHIATRIC: No depression, anxiety, mood swings or difficulty sleeping at night  PAIN SCALE: [x  ] None  [  ] Other-  ROS Unable to obtain due to - [  ] Dementia  [  ] Lethargy  [  ] Sedated [  ] non verbal  REST OF REVIEW Of SYSTEM - [x  ] Normal     Vital Signs Last 24 Hrs  T(C): 36.5 (22 Dec 2017 07:19), Max: 36.9 (21 Dec 2017 13:00)  T(F): 97.7 (22 Dec 2017 07:19), Max: 98.4 (21 Dec 2017 13:00)  HR: 59 (22 Dec 2017 07:19) (56 - 83)  BP: 176/80 (22 Dec 2017 07:19) (80/50 - 176/80)  BP(mean): --  RR: 18 (22 Dec 2017 07:19) (16 - 18)  SpO2: 96% (22 Dec 2017 07:19) (93% - 98%)  Finger Stick          PHYSICAL EXAM:  GENERAL:  [x  ] NAD , [ x ] well appearing, [  ] Agitated, [  ] Drowsy,  [  ] Lethargy, [  ] confused   HEAD:  [x  ] Normal, [  ] Other  EYES:  [x  ] EOMI, [ x ] PERRLA, [x  ] conjunctiva and sclera clear normal, [  ] Other,  [  ] Pallor,[  ] Discharge  ENMT:  [x  ] Normal, [ x ] Moist mucous membranes, [ x ] Good dentition, [ x ] No Thrush  NECK:  [ x ] Supple, [x  ] No JVD, [  x] Normal thyroid, [  ] Lymphadenopathy [  ] Other  CHEST/LUNG:  [x  ] Clear to auscultation bilaterally, [ x ] Breath Sounds equal,  [ x ] No rales, [x  ] No rhonchi  [ x ]  No wheezing  HEART:  [ x ] Regular rate and rhythm, [ ]  tachycardia, [  ] Bradycardia,  [  ] irregular  [ x ] No murmurs, No rubs, No gallops, [  ] PPM in place (Mfr:  )  ABDOMEN:  [ x ] Soft, [x  ] Nontender, [ x ] Nondistended, [x  ] No mass, [ x ] Bowel sounds present, [  ] obese  NERVOUS SYSTEM:  [x  ] Alert & Oriented X3, [ x ] Nonfocal  [  ] Confusion  [  ] Encephalopathic [  ] Sedated [  ] Unable to assess, [  ] Other-  EXTREMITIES: [ x ] 2+ Peripheral Pulses, No clubbing, No cyanosis,  [  ] edema B/L lower EXT. [  ] PVD stasis skin changes B/L Lower EXT  LYMPH: No lymphadenopathy noted  SKIN:  [ x ] No rashes or lesions, [  ] Pressure Ulcers, [  ] ecchymosis, [  ] Skin Tears, [  ] Other    DIET: DASH    LABS:                        10.5   9.4   )-----------( 281      ( 22 Dec 2017 07:03 )             32.0     22 Dec 2017 07:03    138    |  106    |  32     ----------------------------<  129    4.0     |  23     |  1.20     Ca    9.4        22 Dec 2017 07:03  Phos  3.1       22 Dec 2017 07:03  Mg     2.0       22 Dec 2017 07:03        CARDIAC MARKERS ( 20 Dec 2017 16:02 )  .015 ng/mL / x     / 76 U/L / x     / 3.2 ng/mL  CARDIAC MARKERS ( 20 Dec 2017 06:17 )  .016 ng/mL / x     / 56 U/L / x     / 2.7 ng/mL  CARDIAC MARKERS ( 19 Dec 2017 22:07 )  <.015 ng/mL / x     / 61 U/L / x     / x          HEALTH ISSUES - PROBLEM Dx:  Hyperkalemia: Hyperkalemia  Prophylactic measure: Prophylactic measure  Edema, unspecified type: Edema, unspecified type  Dyslipidemia: Dyslipidemia  Cerebrovascular accident (CVA), unspecified mechanism: Cerebrovascular accident (CVA), unspecified mechanism  Hyponatremia: Hyponatremia  TERESA (acute kidney injury): TERESA (acute kidney injury)  Syncope and collapse: Syncope and collapse  Hypertensive urgency: Hypertensive urgency          Consultant(s) Notes Reviewed:  [x  ] YES     Care Discussed with [X] Consultants  [ x ] Patient  [ x ] Family  [  ]   [  ] Social Service  [  ] RN, [  ] Physical Therapy  DVT PPX: [  ] Lovenox, [x  ] S C Heparin, [  ] Coumadin, [  ] Xarelto, [  ] Eliquis, [  ] Pradaxa, [  ] IV Heparin drip, [  ] SCD [  ] Contraindication 2 to GI Bleed,[  ] Ambulation  Advanced directive: [x  ] None, [  ] DNR/DNI

## 2017-12-22 NOTE — PROGRESS NOTE ADULT - ASSESSMENT
86 year old woman w/ HTN, CVA, HLD, hyponatremia, Rt Renal artery stenosis presents with syncope. there is no evidence of acute ischemia TTE showed normal LVSF and no significant valvular disease Portending a benign prognosis.  Possible vasovagal/orthostatic mechanism with hyponatremia      Recommend  - Continue tele to monitor for occult arrhythmias  - no documented arrhythmias on telemetry to explain her syncope.  Remained on NSR at 60's  - it is concerning that her syncopal episode was without warning, which might suggest an arrhythmic basis.  However, her exam does not suggest significant structural heart disease, two echocardiograms in the last year have suggestive a normal lvef, and her bloodwork is consistent with a pre-renal/volume depleted state  - if additional evidence suggests arrhythmia, may need to consider eps or ilr.  Patient follows with Dr. Easley for Cards  -  leo improved with fluid etc; Hyponatremia resolved  - Her blood pressures are labile, ranging from 170 and 80 but remains asymptomatic.    - Continue present BP management for now.  Patient aware of her very labile BP which makes it harder to change BP meds  - Monitor and replete electrolytes. Keep K>4.0 and Mg>2.0.  - Fall precaution    - Further cardiac workup will depend on clinical course.   - All other workup per primary team. Will follow    JORDAN Soria  Cardiology 86 year old woman w/ HTN, CVA, HLD, hyponatremia, Rt Renal artery stenosis presents with syncope. there is no evidence of acute ischemia TTE showed normal LVSF and no significant valvular disease Portending a benign prognosis.  Possible vasovagal/orthostatic mechanism with hyponatremia      Recommend  - Continue tele to monitor for occult arrhythmias  - no documented arrhythmias on telemetry to explain her syncope.  Remained on NSR at 60's  - it is concerning that her syncopal episode was without warning, which might suggest an arrhythmic basis.  However, her exam does not suggest significant structural heart disease, two echocardiograms in the last year have suggestive a normal lvef, and her bloodwork is consistent with a pre-renal/volume depleted state  - if additional evidence suggests arrhythmia, may need to consider eps or ilr.  Patient follows with Dr. Easley for Cards  -  leo improved with fluid etc; Hyponatremia resolved  - Her blood pressures are labile, ranging from 170 and 80 but remains asymptomatic.    - Would consider further imaging to assess her Rt BOB.  Consider CTA of the renal arteries.  If found to be severe consider consult with Dr. Chad Ríos, Interventional Cardiologist at [facility] for possible Renal artery angiogram with possible intervention.    - Continue present BP management for now.  Patient aware of her very labile BP which makes it harder to change BP meds  - Monitor and replete electrolytes. Keep K>4.0 and Mg>2.0.  - Fall precaution    - Further cardiac workup will depend on clinical course.   - All other workup per primary team. Will follow    JORDAN Soria  Cardiology 86 year old woman w/ HTN, CVA, HLD, hyponatremia, Rt Renal artery stenosis presents with syncope. there is no evidence of acute ischemia TTE showed normal LVSF and no significant valvular disease Portending a benign prognosis.  Possible vasovagal/orthostatic mechanism with hyponatremia      Recommend  - Continue tele to monitor for occult arrhythmias  - no documented arrhythmias on telemetry to explain her syncope.  Remained on NSR at 60's  - it is concerning that her syncopal episode was without warning, which might suggest an arrhythmic basis.  However, her exam does not suggest significant structural heart disease, two echocardiograms in the last year have suggestive a normal lvef, and her bloodwork is consistent with a pre-renal/volume depleted state  - if additional evidence suggests arrhythmia, may need to consider eps or ilr.  Patient follows with Dr. Easley for Cards  -  leo improved with fluid etc; Hyponatremia resolved  - Her blood pressures are labile, ranging from 170 and 80 but remains asymptomatic.    - Would consider further imaging to assess her Rt BOB.  Consider CTA of the renal arteries as outpt.   - Continue present BP management for now.  Patient aware of her very labile BP which makes it harder to change BP meds  - Monitor and replete electrolytes. Keep K>4.0 and Mg>2.0.  - Fall precaution    - Further cardiac workup will depend on clinical course.   - All other workup per primary team. Will follow    JORDAN Soria  Cardiology

## 2017-12-22 NOTE — PROGRESS NOTE ADULT - SUBJECTIVE AND OBJECTIVE BOX
HPI:  86 y.o F w/PMhx of HTN, Dyslipidemia, Chronic hyponatremia,Hyperkalemia past CVA in 1986( with no major residual dysfunction), recently was at [facility] for uncontrolled HTN ,work up showed suspect Rt renal artery stenosis brought in by ambulance due to syncope. Pt stated that last night (12/19) around 8:15 pm, she was resting and took her BP which showed systolic BP was 81, which is unusually low for Pt, Pt then stood up and went to bedroom and tried to get second bp machine to verify the reading, pt was suddenly pass out. Pt estimate she was lying on the floor for about 10-12 mints. Pt was able to woke up herself and called her daughter thru her cell phone and her son too. Family came & Pt got up with the help of family member. Upon the ambulance arrival, pt also felt nausea and dizziness. Pt stated this couple days she is not feeling well with unsteady gait. Pt's nephrologist recent changed pt 's BP medications . Pt denies of any CP/palpitaion/sob/dizziness/blurry vison/HA/weakness before her syncope. Pt was hospitalized in [place] few weeks ago  for hypertensive urgency and had TTE,  done which showed grossly normal EF, pt also had CD &  renal US showed suspect Rt renal artery stenosis.  Pt reported that she is on low sodium and fluid restriction diet for months. as per Pt, her  BP meds got changed by Dr Victoria on last week Friday visit.  In ED, pt was hypertensive at 220/88, S/p one coreg and one IV labetalol, pt 's bp trending to 186/76. Mild leukocytosis at 10.8. hyponatremia at 132, Cr 1.5 and BUN 43. GFR is 31. 1st cardiac enzyme was negative. EKG showed normal sinus rhythm with unspecific ? lead II  T wave inversion. CXR and CT head unofficial read showed no acute pathology. Pt was also seen by cardio Dr MADELYN byrne in ED. (20 Dec 2017 02:33)      SUBJECTIVE:  Patient is a 86y old  Female who presents with a chief complaint of syncope (20 Dec 2017 14:25)          OBJECTIVE:  Review Of Systems:  Constitutional: [ ] Fever [ ] Chills [ ] Fatigue [ ] Weight change   HEENT: [ ] Blurred vision [ ] Eye Pain [ ] Headache [ ] Runny nose [ ] Sore Throat   Respiratory: [ ] Cough [ ] Wheezing [ ] Shortness of breath  Cardiovascular: [ ] Chest Pain [ ] Palpitations [ ] RASCON [ ] PND [ ] Orthopnea  Gastrointestinal: [ ] Abdominal Pain [ ] Diarrhea [ ] Constipation [ ] Hemorrhoids [ ] Nausea [ ] Vomiting  Genitourinary: [ ] Nocturia [ ] Dysuria [ ] Incontinence  Extremities: [ ] Swelling [ ] Joint Pain  Neurologic: [ ] Focal deficit [ ] Paresthesias [ ] Syncope  Lymphatic: [ ] Swelling [ ] Lymphadenopathy   Skin: [ ] Rash [ ] Ecchymoses [ ] Wounds [ ] Lesions  Psychiatry: [ ] Depression [ ] Suicidal/Homicidal Ideation [ ] Anxiety [ ] Sleep Disturbances  [x ] 10 point review of systems is otherwise negative except as mentioned above            [ ]Unable to obtain    Allergy:  Allergies    Keflex (Unknown)  verapamil (Other)    Intolerances        Medications:  MEDICATIONS  (STANDING):  carvedilol 25 milliGRAM(s) Oral every 12 hours  cloNIDine 0.2 milliGRAM(s) Oral three times a day  clopidogrel Tablet 75 milliGRAM(s) Oral daily  docusate sodium 100 milliGRAM(s) Oral three times a day  famotidine    Tablet 20 milliGRAM(s) Oral daily  heparin  Injectable 5000 Unit(s) SubCutaneous every 12 hours  senna 2 Tablet(s) Oral at bedtime  spironolactone 25 milliGRAM(s) Oral daily  valsartan 160 milliGRAM(s) Oral daily    MEDICATIONS  (PRN):  ALPRAZolam 0.25 milliGRAM(s) Oral two times a day PRN anxiety      PMH/PSH/FH/SH: [ ] Unchanged    Vitals:  T(C): 36.4 (12-22-17 @ 11:45), Max: 36.9 (12-21-17 @ 13:00)  HR: 63 (12-22-17 @ 11:45) (56 - 75)  BP: 151/72 (12-22-17 @ 11:45) (88/58 - 176/80)  BP(mean): --  RR: 18 (12-22-17 @ 11:45) (16 - 18)  SpO2: 97% (12-22-17 @ 11:45) (93% - 98%)  Wt(kg): --  Daily     Daily   I&O's Summary      Labs:                        10.5   9.4   )-----------( 281      ( 22 Dec 2017 07:03 )             32.0     12-22    138  |  106  |  32<H>  ----------------------------<  129<H>  4.0   |  23  |  1.20    Ca    9.4      22 Dec 2017 07:03  Phos  3.1     12-22  Mg     2.0     12-22        CARDIAC MARKERS ( 20 Dec 2017 16:02 )  .015 ng/mL / x     / 76 U/L / x     / 3.2 ng/mL      Magnesium, Serum: 2.0 mg/dL (12-22 @ 07:03)  Phosphorus Level, Serum: 3.1 mg/dL (12-22 @ 07:03)              ECG:  < from: 12 Lead ECG (12.19.17 @ 21:31) >  Ventricular Rate 71 BPM    Atrial Rate 71 BPM    P-R Interval 144 ms    QRS Duration 92 ms    Q-T Interval 400 ms    QTC Calculation(Bezet) 434 ms    P Axis 33 degrees    R Axis 24 degrees    T Axis 34 degrees    Diagnosis Line Normal sinus rhythm  Nonspecific ST and T wave abnormality  Abnormal ECG  When compared with ECG of 24-JUL-2016 03:32,  Nonspecific T wave abnormality now evident in Lateral leads  Confirmed by Robert García MD ([id number]) on 12/20/2017 5:33:15 PM    < end of copied text >    Echo:  < from: Transthoracic Echocardiogram (10.18.17 @ 09:21) >     EXAM:  2D ECHOCARDIOGRAM AD         PROCEDURE DATE:  10/18/2017        INTERPRETATION:  Transthoracic Echocardiography Report (TTE)     Demographics     Patient name        NITA          Age           86 year(s)                       [facility] Rec #           [id number]         Gender        Female     Account #           [id number]           Date of Birth 05/14/1931     Interpreting        Samuel Ríos MD  Room Number   0004   Physician     Referring Physician Ginny Marshall    Sonographer   Moraima Pool RDCS     Date of study       10/18/2017 09:07                       AM     Height              59.84 in          Weight        136.69 pounds    Type of Study:     TTE procedure: 2D echocardiogram, Adult echo - follow up     BP: 123/52 mmHg     Study Location: 3ETechnical Quality: Fair    Indications   1) I11.9 - Hypertensive heart disease without heart failure    M-Mode Measurements (cm)     LVEDd: 4.23 cmLVESd: 2.83 cm   IVSEd: 0.94 cm   LVPWd: 1.1 cm             AO Root Dimension: 3.4 cm                             ACS: 2.2 cm                             LA: 5.1 cm    Doppler Measurements:                                   MV Peak E-Wave: 91.8 cm/s   TR Velocity:280 cm/s          MV Peak A-Wave: 106 cm/s   TR Gradient:31.36 mmHg        MV E/A Ratio: 0.87 %   Estimated RAP:10 mmHg         MV Peak Gradient: 3.37 mmHg   RVSP:41 mmHg     Findings     Mitral Valve   Normal appearing mitral valve structure and function.   Mild (1+) mitral regurgitation is present.     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   Mild (1+) aortic regurgitation is present.     Tricuspid Valve   Normal appearing tricuspid valve structure and function.   Mild (1+) tricuspid valve regurgitation is present.     Pulmonic Valve   Normal appearing pulmonic valve structure and function.     Left Atrium   The left atrium is moderately dilated.     Left Ventricle   The left ventricle is normal in size, wall thickness, wall motion and   contractility.   Estimated left ventricular ejection fraction is 60-65 %.     Right Atrium   Normal appearing right atrium.     Right Ventricle   Normal appearing right ventricle structure and function.     Pericardial Effusion   No evidence of pericardial effusion.     Pleural Effusion   No evidence of pleural effusion.     Miscellaneous   All visualized extra cardiac structures appears to be normal.     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   Mild (1+) aortic regurgitation is present.   The left atrium is moderately dilated.   The left ventricle is normal in size, wall thickness, wall motion and   contractility.   Estimated left ventricular ejection fraction is 60-65 %.     Signature     ----------------------------------------------------------------   Electronically signed by Samuel Ríos MD(Interpreting   physician) on 10/18/2017 05:13 PM   ----------------------------------------------------------------      Stress Testing:     Cath:    Imaging:  < from: MR Head No Cont (12.20.17 @ 16:52) >  EXAM:  MR BRAIN                            PROCEDURE DATE:  12/20/2017          INTERPRETATION:  .    CLINICAL INFORMATION: Ataxia    TECHNIQUE: Multiplanar multisequential MRI of the brain was acquired   without the administration of IV gadolinium.    COMPARISON: Prior CT examination of the head from 12/20/2017.    FINDINGS: Multiple nonspecific confluent patchy foci of T2/FLAIR   hyperintensity are noted throughout the deep and periventricular white   matter of the cerebral hemispheres. Thereis no associated mass effect.   There is no evidence of acute ischemia on the diffusion-weighted images.   There is redemonstration of a chronic left occipital lobe infarct. There   is ex vacuo dilatation of the left occipital horn.    There is diffuse cerebral volume loss with prominence of the sulci,   fissures, and cisternal spaces which is normal for the patient's age.   Ventricular size and configuration is unremarkable apart from axial vacuo   dilatation of the left occipital horn. Flow-voids are noted throughout   the major intracranial vessels, on the T2 weighted images, consistent   with their patency. The sellar region and posterior fossa appear   unremarkable.    The paranasal sinuses and mastoid air cells are clear. Calvarial signal   is within normal limits. The orbits appear unremarkable.    IMPRESSION: No acute intracranial hemorrhage or evidence of acute   ischemia.    Similar-appearing extensive microvascular disease and chronic left   occipital lobe infarct.                  FERNANDO KOEHLER M.D., ATTENDING RADIOLOGIST  This document has been electronically signed. Dec 20 2017  4:59PM    < end of copied text >  Interpretation of Telemetry:  NSR @64 no events      Physical Exam:  Appearance: [x ] Normal  [ ] abnormal [x ] NAD   Eyes: [x ] PERRL [x ] EOMI  HENT: [x ] Normal [ ] Abnormal oral muscosa [x ]NC/AT  Cardiovascular: [x ] S1 [ x] S2 [x ] RRR [x ] 2/6 SM [ ]edema [ ] JVP  Procedural Access Site: [ ]  hematoma [ ] tender to palpation [ ] 2+ pulse [ ] bruit [ ] Ecchymosis  Respiratory: [x ] Clear to auscultation bilaterally  Gastrointestinal: [x ] Soft [ ] tenderness[ ] distension [x ] BS  Musculoskeletal: [ ] clubbing [ ] joint deformity   Neurologic: [x ] Non-focal  Lymphatic: [ ] lymphadenopathy [x] neg edema in b/l LE with CAMILO thigh high stockings in place   Psychiatry: [ x] AAOx3  [ ] confused [ ] disoriented [x ] Mood & affect appropriate  Skin: [ ]  rashes [ ] ecchymoses [ ] cyanosis

## 2017-12-22 NOTE — PROGRESS NOTE ADULT - SUBJECTIVE AND OBJECTIVE BOX
Neurology follow up note    MARU MORRISI86yFemale      Interval History:    Patient feels ok no new complaints. seen with daughter     MEDICATIONS    ALPRAZolam 0.25 milliGRAM(s) Oral two times a day PRN  carvedilol 25 milliGRAM(s) Oral every 12 hours  cloNIDine 0.2 milliGRAM(s) Oral three times a day  clopidogrel Tablet 75 milliGRAM(s) Oral daily  docusate sodium 100 milliGRAM(s) Oral three times a day  famotidine    Tablet 20 milliGRAM(s) Oral daily  heparin  Injectable 5000 Unit(s) SubCutaneous every 12 hours  senna 2 Tablet(s) Oral at bedtime  sodium chloride 0.9% Bolus 250 milliLiter(s) IV Bolus once  spironolactone 25 milliGRAM(s) Oral daily  valsartan 160 milliGRAM(s) Oral daily      Allergies    Keflex (Unknown)  verapamil (Other)    Intolerances            Vital Signs Last 24 Hrs  T(C): 36.5 (22 Dec 2017 07:19), Max: 36.9 (21 Dec 2017 13:00)  T(F): 97.7 (22 Dec 2017 07:19), Max: 98.4 (21 Dec 2017 13:00)  HR: 59 (22 Dec 2017 07:19) (56 - 83)  BP: 176/80 (22 Dec 2017 07:19) (80/50 - 176/80)  BP(mean): --  RR: 18 (22 Dec 2017 07:19) (16 - 18)  SpO2: 96% (22 Dec 2017 07:19) (93% - 98%)    REVIEW OF SYSTEMS:     Constitutional: No fever, chills, fatigue, weakness  Eyes: no eye pain, visual disturbances, or discharge  ENT:  No difficulty hearing, tinnitus, vertigo; No sinus or throat pain  Neck: No pain or stiffness  Respiratory: No cough, dyspnea, wheezing   Cardiovascular: No chest pain, palpitations,   Gastrointestinal: No abdominal or epigastric pain. No nausea, vomiting  No diarrhea or constipation.   Genitourinary: No dysuria, frequency, hematuria or incontinence  Neurological: No headaches, lightheadedness, vertigo, numbness or tremors  Psychiatric: No depression, anxiety, mood swings or difficulty sleeping  Musculoskeletal: No joint pain or swelling; No muscle, back or extremity pain  Skin: No itching, burning, rashes or lesions   Lymph Nodes: No enlarged glands  Endocrine: No heat or cold intolerance; No hair loss   Allergy and Immunologic: No hives or eczema    On Neurological Examination:    Mental Status - Patient is alert, awake, oriented X3.       Follow simple commands  Follow complex commands    Speech -   Fluent            Cranial Nerves - Pupils 3 mm equal and reactive to light,   extraocular eye movements intact.   smile symmetric  intact bilateral NLF    Motor Exam -   Right upper 4/5  Left upper 4/5  Right lower 4/5  Left lower  4/5      Muscle tone - is normal all over.  No asymmetry is seen.      Sensory    Bilateral intact to light touch       GENERAL Exam: Nontoxic , No Acute Distress   	  HEENT:  normocephalic, atraumatic  		  LUNGS:   Decreased bilaterally  	  HEART: Normal S1S2   No murmur RRR        	  GI/ ABDOMEN:  Soft  Non tender    EXTREMITIES:   No Edema  No Clubbing  No Cyanosis No Edema    MUSCULOSKELETAL: decreased Range of Motion all 4 extremities   	   SKIN: Normal  No Ecchymosis               LABS:  CBC Full  -  ( 22 Dec 2017 07:03 )  WBC Count : 9.4 K/uL  Hemoglobin : 10.5 g/dL  Hematocrit : 32.0 %  Platelet Count - Automated : 281 K/uL  Mean Cell Volume : 92.7 fl  Mean Cell Hemoglobin : 30.3 pg  Mean Cell Hemoglobin Concentration : 32.7 gm/dL  Auto Neutrophil # : x  Auto Lymphocyte # : x  Auto Monocyte # : x  Auto Eosinophil # : x  Auto Basophil # : x  Auto Neutrophil % : x  Auto Lymphocyte % : x  Auto Monocyte % : x  Auto Eosinophil % : x  Auto Basophil % : x      12-22    138  |  106  |  32<H>  ----------------------------<  129<H>  4.0   |  23  |  1.20    Ca    9.4      22 Dec 2017 07:03  Phos  3.1     12-22  Mg     2.0     12-22      Hemoglobin A1C:       Vitamin B12         RADIOLOGY    ANALYSIS AND PLAN:  An 86-year with history of cerebrovascular accident, episode of loss of consciousness.  1.	For episode of loss of consciousness, most likely secondary to hypotension.  2.	check orthostatic as needed   3.	Cardiology followup.  4.	Adjustment of blood pressure medications as needed   5.	For history of cerebrovascular accident, continue the patient on Plavix.  6.	MRI was normal   7.	For episode of ataxia, possibly secondary to age related changes, surgical intervention,   8.	I spoke with daughterBrittney in great detail.  Her telephone number is [phone]; alternate number [phone]. 12/22/17  9.	Overall neurology wise stable---   10.	discharge planning as per medical team   11.	will sign off please recall if needed     Thank you for the courtesy of consultation.    Physical therapy evaluation as tolerated  OOB to chair/ambulation with assistance only if possible.  Advanced care planning was discussed with family.  Pain is accessed and addressed.  Risk of falls accessed. Fall prevention and plan of care was discussed with family.  Plan of care was discussed with family. Questions answered.  Would continue to follow.  Greater than 35 minutes spent in direct patient care reviewing  the notes, lab data/ imaging , discussion with multidisciplinary team.

## 2017-12-22 NOTE — PROGRESS NOTE ADULT - ASSESSMENT
86 y.o F w/PMhx of HTN, Dyslipidemia, Chronic hyponatremia, past CVA in 1986 (with no major residual dysfunction), recently diagnosed with possible Rt renal artery stenosis, recent admission to [facility] for hypertensive urgency & work up, and recent change in her hypertensive medications brought in by ambulance due to syncope at home a/w hypertensive urgency with  on admission.  BPs remain labile with + orthostasis. Seen by PT. 86 y.o F w/PMhx of HTN, Dyslipidemia, Chronic hyponatremia, past CVA in 1986 (with no major residual dysfunction), recently diagnosed with possible Rt renal artery stenosis, recent admission to [facility] for hypertensive urgency & work up, and recent change in her hypertensive medications brought in by ambulance due to syncope at home a/w hypertensive urgency with  on admission.  BPs remain labile with + orthostasis. Seen by PT.TERESA & Hyponatremia Resolved. NS Bolus x 3 given as + Orthostasis.

## 2017-12-22 NOTE — PROGRESS NOTE ADULT - ATTENDING COMMENTS
Chart reviewed    Patient seen and examined    Agree with plan as outlined above
Seen/examined at bedside. Agree with above.  Labile BP, in 180's this morning, and in 90's in afternoon. Would give IVF.  There is definitely a component of renal artery stenosis, and this should be evaluated as outpatient.  Can consider stopping aldactone in she short term. She will follow up with her private cardiologist.
Pt seen, examined, case & care plan d/w residents, pt, dtr , DR Bajwa & Dr Victoria at detail  Control BP, MRI Brain as per Neuro, PT eval   D/C plan, As per dtr she does nOT want any intervention In the hospital.
Pt seen, examined, case & care plan d/w residents, pt at detail, D/W Dr Willingham & Dr Vila at detail, NO in pt work up for BOB, IV NS Bolus , CAMILO stockings & D/C Home if VS stable  Total d/c care time is 45 minutes.
Pt seen, examined, case & care plan d/w residents, pt , Dr Huertas & Dtr at detail.  D/C in AM  with HCPT

## 2017-12-22 NOTE — PROGRESS NOTE ADULT - PROBLEM SELECTOR PLAN 6
-pt had recent US showing possible renal artery stenosis  As d/w Dr Victoria: No intervention for above findings   As per Dr Vila Cardiology- No intervention for above findings, dtr does NOT want any intervention -pt had recent US showing possible renal artery stenosis  As d/w Dr Victoria: No intervention for above findings   As per Dr Vila Cardiology- No intervention for above findings, No need for CTA Renal,  dtr does NOT want any intervention, out pt follow up.

## 2017-12-22 NOTE — PROGRESS NOTE ADULT - PROVIDER SPECIALTY LIST ADULT
Cardiology
Cardiology
Internal Medicine
Neurology
Neurology
Cardiology

## 2017-12-22 NOTE — PROGRESS NOTE ADULT - PROBLEM SELECTOR PLAN 2
- Likely 2/2  likely Orthostasis/Vasovagal syncope  -pt now ambulating with walker and supervision without dizziness/ataxia  - orthostatics (+)   -pt has hx of CVA (1986); MRI head negative for acute intracranial pathology   -continue with tele monitor, no arrhythmic events noted thus far  -cardio rec. appreciated: syncope likely 2/2 orthostasis vs structural disease. Doubt structural disease as a cause, as 2 echos this year have not shown evidence of structural abnormalities.    - Neurology consulted (Joanie). Pt is stable from neuro standpoint.  - out of bed to chair and with assist  - fall precautions.  -seen by PT ----> HCPT - Likely 2/2  likely Orthostasis/Vasovagal syncope  -pt now ambulating with walker and supervision without dizziness/ataxia  - orthostatics (+) IV NS Bolus x3 given d/w cardiology Dr Willingham  -pt has hx of CVA (1986); MRI head negative for acute intracranial pathology   -continue with tele monitor, no arrhythmic events noted thus far  -cardio rec. appreciated: syncope likely 2/2 orthostasis vs structural disease. Doubt structural disease as a cause, as 2 echos this year have not shown evidence of structural abnormalities.    - Neurology consulted (nikia). Pt is stable from neuro standpoint.  - out of bed to chair and with assist  - fall precautions.  -seen by PT ----> HCPT

## 2017-12-22 NOTE — PROGRESS NOTE ADULT - PROBLEM SELECTOR PLAN 5
-chronic, stable   -Na currently WNL  - s/p fluid resuscitation, BMP stable  -continue f/u with BMP -chronic, stable -Resolved  -Na currently WNL  - s/p fluid resuscitation, BMP stable  -continue f/u with BMP

## 2017-12-22 NOTE — PROGRESS NOTE ADULT - PROBLEM SELECTOR PLAN 3
-acute on chronic Improved with IV Fluids  -resolving. Cr WNL; BUN 32.  -likely 2/2 dehydration as pt is fluid restricted at home due to chronic hyponatremia  -encourage pt to drink all of her daily fluids

## 2017-12-28 ENCOUNTER — NON-APPOINTMENT (OUTPATIENT)
Age: 82
End: 2017-12-28

## 2017-12-28 ENCOUNTER — APPOINTMENT (OUTPATIENT)
Dept: CARDIOLOGY | Facility: CLINIC | Age: 82
End: 2017-12-28
Payer: MEDICARE

## 2017-12-28 VITALS
TEMPERATURE: 97.9 F | HEART RATE: 78 BPM | DIASTOLIC BLOOD PRESSURE: 80 MMHG | WEIGHT: 118 LBS | RESPIRATION RATE: 14 BRPM | HEIGHT: 61 IN | OXYGEN SATURATION: 98 % | SYSTOLIC BLOOD PRESSURE: 160 MMHG | BODY MASS INDEX: 22.28 KG/M2

## 2017-12-28 PROCEDURE — 99215 OFFICE O/P EST HI 40 MIN: CPT | Mod: 25

## 2017-12-28 PROCEDURE — 93000 ELECTROCARDIOGRAM COMPLETE: CPT

## 2017-12-28 PROCEDURE — 36415 COLL VENOUS BLD VENIPUNCTURE: CPT

## 2017-12-28 RX ORDER — HALOBETASOL PROPIONATE 0.5 MG/G
0.05 OINTMENT TOPICAL
Qty: 50 | Refills: 0 | Status: DISCONTINUED | COMMUNITY
Start: 2017-09-06 | End: 2017-12-28

## 2017-12-28 RX ORDER — CLONIDINE HYDROCHLORIDE 0.1 MG/1
0.1 TABLET ORAL
Qty: 30 | Refills: 0 | Status: DISCONTINUED | COMMUNITY
Start: 2017-10-25 | End: 2017-12-28

## 2017-12-28 RX ORDER — CLONIDINE 0.3 MG/24H
0.3 PATCH, EXTENDED RELEASE TRANSDERMAL
Qty: 12 | Refills: 5 | Status: DISCONTINUED | COMMUNITY
End: 2017-12-28

## 2017-12-28 RX ORDER — NIFEDIPINE 30 MG/1
30 TABLET, EXTENDED RELEASE ORAL
Qty: 30 | Refills: 0 | Status: DISCONTINUED | COMMUNITY
Start: 2017-08-23 | End: 2017-12-28

## 2017-12-28 RX ORDER — HYDRALAZINE HYDROCHLORIDE 25 MG/1
25 TABLET ORAL
Qty: 120 | Refills: 0 | Status: DISCONTINUED | COMMUNITY
Start: 2017-10-08 | End: 2017-12-28

## 2017-12-28 RX ORDER — HYDRALAZINE HYDROCHLORIDE 10 MG/1
10 TABLET ORAL
Qty: 120 | Refills: 0 | Status: DISCONTINUED | COMMUNITY
Start: 2017-10-04 | End: 2017-12-28

## 2017-12-28 RX ORDER — SULFAMETHOXAZOLE AND TRIMETHOPRIM 800; 160 MG/1; MG/1
800-160 TABLET ORAL
Qty: 14 | Refills: 0 | Status: DISCONTINUED | COMMUNITY
Start: 2017-08-04 | End: 2017-12-28

## 2017-12-28 RX ORDER — FERROUS SULFATE 325(65) MG
325 (65 FE) TABLET ORAL DAILY
Refills: 0 | Status: DISCONTINUED | COMMUNITY
End: 2017-12-28

## 2017-12-29 ENCOUNTER — APPOINTMENT (OUTPATIENT)
Dept: CARDIOLOGY | Facility: CLINIC | Age: 82
End: 2017-12-29

## 2018-01-02 LAB
ALBUMIN SERPL ELPH-MCNC: 4.5 G/DL
ALP BLD-CCNC: 85 U/L
ALT SERPL-CCNC: 20 U/L
ANION GAP SERPL CALC-SCNC: 22 MMOL/L
AST SERPL-CCNC: 21 U/L
BASOPHILS # BLD AUTO: 0.04 K/UL
BASOPHILS NFR BLD AUTO: 0.5 %
BILIRUB SERPL-MCNC: 0.6 MG/DL
BUN SERPL-MCNC: 33 MG/DL
CALCIUM SERPL-MCNC: 11.1 MG/DL
CHLORIDE SERPL-SCNC: 104 MMOL/L
CO2 SERPL-SCNC: 14 MMOL/L
CREAT SERPL-MCNC: 1 MG/DL
EOSINOPHIL # BLD AUTO: 0.14 K/UL
EOSINOPHIL NFR BLD AUTO: 1.7 %
GLUCOSE SERPL-MCNC: 126 MG/DL
HCT VFR BLD CALC: 36 %
HGB BLD-MCNC: 12 G/DL
IMM GRANULOCYTES NFR BLD AUTO: 1.9 %
LYMPHOCYTES # BLD AUTO: 2.23 K/UL
LYMPHOCYTES NFR BLD AUTO: 27.1 %
MAN DIFF?: NORMAL
MCHC RBC-ENTMCNC: 30.8 PG
MCHC RBC-ENTMCNC: 33.3 GM/DL
MCV RBC AUTO: 92.5 FL
MONOCYTES # BLD AUTO: 0.6 K/UL
MONOCYTES NFR BLD AUTO: 7.3 %
NEUTROPHILS # BLD AUTO: 5.06 K/UL
NEUTROPHILS NFR BLD AUTO: 61.5 %
PLATELET # BLD AUTO: 496 K/UL
POTASSIUM SERPL-SCNC: 5.1 MMOL/L
PROT SERPL-MCNC: 7.4 G/DL
RBC # BLD: 3.89 M/UL
RBC # FLD: 14.1 %
SODIUM SERPL-SCNC: 140 MMOL/L
WBC # FLD AUTO: 8.23 K/UL

## 2018-01-13 ENCOUNTER — MOBILE ON CALL (OUTPATIENT)
Age: 83
End: 2018-01-13

## 2018-01-26 ENCOUNTER — APPOINTMENT (OUTPATIENT)
Dept: CARDIOLOGY | Facility: CLINIC | Age: 83
End: 2018-01-26
Payer: MEDICARE

## 2018-01-26 VITALS
OXYGEN SATURATION: 98 % | BODY MASS INDEX: 21.9 KG/M2 | HEART RATE: 61 BPM | WEIGHT: 116 LBS | DIASTOLIC BLOOD PRESSURE: 80 MMHG | SYSTOLIC BLOOD PRESSURE: 196 MMHG | HEIGHT: 61 IN

## 2018-01-26 LAB
CALCIUM SERPL-MCNC: 10.3 MG/DL
PARATHYROID HORMONE INTACT: 16 PG/ML

## 2018-01-26 PROCEDURE — 99214 OFFICE O/P EST MOD 30 MIN: CPT | Mod: 25

## 2018-01-26 PROCEDURE — 93000 ELECTROCARDIOGRAM COMPLETE: CPT

## 2018-01-27 LAB
ALBUMIN SERPL ELPH-MCNC: 4.7 G/DL
ALP BLD-CCNC: 90 U/L
ALT SERPL-CCNC: 12 U/L
ANION GAP SERPL CALC-SCNC: 17 MMOL/L
AST SERPL-CCNC: 17 U/L
BASOPHILS # BLD AUTO: 0.05 K/UL
BASOPHILS NFR BLD AUTO: 0.6 %
BILIRUB SERPL-MCNC: 1.1 MG/DL
BUN SERPL-MCNC: 43 MG/DL
CALCIUM SERPL-MCNC: 10.3 MG/DL
CHLORIDE SERPL-SCNC: 100 MMOL/L
CO2 SERPL-SCNC: 17 MMOL/L
CREAT SERPL-MCNC: 1.27 MG/DL
EOSINOPHIL # BLD AUTO: 0.13 K/UL
EOSINOPHIL NFR BLD AUTO: 1.6 %
GLUCOSE SERPL-MCNC: 114 MG/DL
HCT VFR BLD CALC: 35.9 %
HGB BLD-MCNC: 11.9 G/DL
IMM GRANULOCYTES NFR BLD AUTO: 0.5 %
LYMPHOCYTES # BLD AUTO: 2.36 K/UL
LYMPHOCYTES NFR BLD AUTO: 29.4 %
MAN DIFF?: NORMAL
MCHC RBC-ENTMCNC: 31.1 PG
MCHC RBC-ENTMCNC: 33.1 GM/DL
MCV RBC AUTO: 93.7 FL
MONOCYTES # BLD AUTO: 0.45 K/UL
MONOCYTES NFR BLD AUTO: 5.6 %
NEUTROPHILS # BLD AUTO: 5 K/UL
NEUTROPHILS NFR BLD AUTO: 62.3 %
PLATELET # BLD AUTO: 422 K/UL
POTASSIUM SERPL-SCNC: 5.4 MMOL/L
PROT SERPL-MCNC: 7.2 G/DL
RBC # BLD: 3.83 M/UL
RBC # FLD: 13.8 %
SODIUM SERPL-SCNC: 134 MMOL/L
WBC # FLD AUTO: 8.03 K/UL

## 2018-02-07 ENCOUNTER — LABORATORY RESULT (OUTPATIENT)
Age: 83
End: 2018-02-07

## 2018-02-22 ENCOUNTER — LABORATORY RESULT (OUTPATIENT)
Age: 83
End: 2018-02-22

## 2018-02-22 ENCOUNTER — APPOINTMENT (OUTPATIENT)
Dept: HEMATOLOGY ONCOLOGY | Facility: CLINIC | Age: 83
End: 2018-02-22
Payer: MEDICARE

## 2018-02-22 VITALS
SYSTOLIC BLOOD PRESSURE: 194 MMHG | DIASTOLIC BLOOD PRESSURE: 66 MMHG | BODY MASS INDEX: 20.67 KG/M2 | HEIGHT: 61 IN | TEMPERATURE: 97.9 F | WEIGHT: 109.5 LBS | HEART RATE: 65 BPM

## 2018-02-22 LAB
HCT VFR BLD CALC: 32.2 %
HGB BLD-MCNC: 10.7 G/DL
MCHC RBC-ENTMCNC: 32 PG
MCHC RBC-ENTMCNC: 33.2 GM/DL
MCV RBC AUTO: 96.4 FL
PLATELET # BLD AUTO: 412 K/UL
RBC # BLD: 3.34 M/UL
RBC # FLD: 11.7 %
WBC # FLD AUTO: 6 K/UL

## 2018-02-22 PROCEDURE — 85025 COMPLETE CBC W/AUTO DIFF WBC: CPT

## 2018-02-22 PROCEDURE — 36415 COLL VENOUS BLD VENIPUNCTURE: CPT

## 2018-02-23 ENCOUNTER — NON-APPOINTMENT (OUTPATIENT)
Age: 83
End: 2018-02-23

## 2018-02-23 ENCOUNTER — APPOINTMENT (OUTPATIENT)
Dept: CARDIOLOGY | Facility: CLINIC | Age: 83
End: 2018-02-23
Payer: MEDICARE

## 2018-02-23 VITALS
DIASTOLIC BLOOD PRESSURE: 90 MMHG | WEIGHT: 107 LBS | SYSTOLIC BLOOD PRESSURE: 224 MMHG | HEART RATE: 75 BPM | HEIGHT: 61 IN | OXYGEN SATURATION: 98 % | BODY MASS INDEX: 20.2 KG/M2

## 2018-02-23 DIAGNOSIS — I07.1 RHEUMATIC TRICUSPID INSUFFICIENCY: ICD-10-CM

## 2018-02-23 DIAGNOSIS — Z87.898 PERSONAL HISTORY OF OTHER SPECIFIED CONDITIONS: ICD-10-CM

## 2018-02-23 DIAGNOSIS — M19.90 UNSPECIFIED OSTEOARTHRITIS, UNSPECIFIED SITE: ICD-10-CM

## 2018-02-23 PROCEDURE — 99215 OFFICE O/P EST HI 40 MIN: CPT

## 2018-03-09 ENCOUNTER — LABORATORY RESULT (OUTPATIENT)
Age: 83
End: 2018-03-09

## 2018-03-09 ENCOUNTER — APPOINTMENT (OUTPATIENT)
Dept: HEMATOLOGY ONCOLOGY | Facility: CLINIC | Age: 83
End: 2018-03-09
Payer: MEDICARE

## 2018-03-09 VITALS
HEART RATE: 91 BPM | WEIGHT: 117 LBS | BODY MASS INDEX: 22.09 KG/M2 | SYSTOLIC BLOOD PRESSURE: 208 MMHG | TEMPERATURE: 98.5 F | DIASTOLIC BLOOD PRESSURE: 86 MMHG | HEIGHT: 61 IN

## 2018-03-09 DIAGNOSIS — Z80.49 FAMILY HISTORY OF MALIGNANT NEOPLASM OF OTHER GENITAL ORGANS: ICD-10-CM

## 2018-03-09 DIAGNOSIS — Z63.4 DISAPPEARANCE AND DEATH OF FAMILY MEMBER: ICD-10-CM

## 2018-03-09 LAB
HCT VFR BLD CALC: 33.4 %
HGB BLD-MCNC: 11.1 G/DL
MCHC RBC-ENTMCNC: 32.4 PG
MCHC RBC-ENTMCNC: 33.1 GM/DL
MCV RBC AUTO: 97.8 FL
PLATELET # BLD AUTO: 401 K/UL
RBC # BLD: 3.41 M/UL
RBC # FLD: 12.3 %
WBC # FLD AUTO: 6.2 K/UL

## 2018-03-09 PROCEDURE — 36415 COLL VENOUS BLD VENIPUNCTURE: CPT

## 2018-03-09 PROCEDURE — 99203 OFFICE O/P NEW LOW 30 MIN: CPT | Mod: 25

## 2018-03-09 PROCEDURE — 85025 COMPLETE CBC W/AUTO DIFF WBC: CPT

## 2018-03-09 SDOH — SOCIAL STABILITY - SOCIAL INSECURITY: DISSAPEARANCE AND DEATH OF FAMILY MEMBER: Z63.4

## 2018-03-12 ENCOUNTER — LABORATORY RESULT (OUTPATIENT)
Age: 83
End: 2018-03-12

## 2018-03-12 NOTE — ED ADULT TRIAGE NOTE - SOURCE OF INFORMATION
----- Message from Matty Campoverde MD sent at 3/12/2018  3:19 PM CDT -----  The Radiologist read your LEFT FOOT XRAY as NORMAL.  I would recommend ice massage to the painful areas, keep off your feet, and when laying down keep the foot elevated.    We will try to get you into a Podiatrist ASAP.  When they call you to schedule, please let them know you are willing to travel to  and they may be able to get you in more quickly.   Patient

## 2018-04-13 VITALS — SYSTOLIC BLOOD PRESSURE: 180 MMHG | WEIGHT: 116 LBS | BODY MASS INDEX: 22.66 KG/M2 | DIASTOLIC BLOOD PRESSURE: 80 MMHG

## 2018-04-26 NOTE — PATIENT PROFILE ADULT. - TEACHING/LEARNING FACTORS INFLUENCE READINESS TO LEARN
Writer attempted to contact pt at mobile and home phone. A message was left on the cell phone to call back regarding his call on medications.    If pt calls back. Writer is trying to clarify which meds and which pharmacy pt want meds sent too.   none

## 2018-04-27 ENCOUNTER — APPOINTMENT (OUTPATIENT)
Dept: CARDIOLOGY | Facility: CLINIC | Age: 83
End: 2018-04-27
Payer: MEDICARE

## 2018-04-27 ENCOUNTER — NON-APPOINTMENT (OUTPATIENT)
Age: 83
End: 2018-04-27

## 2018-04-27 VITALS — BODY MASS INDEX: 22.84 KG/M2 | WEIGHT: 121 LBS | HEIGHT: 61 IN

## 2018-04-27 VITALS — SYSTOLIC BLOOD PRESSURE: 210 MMHG | OXYGEN SATURATION: 94 % | HEART RATE: 64 BPM | DIASTOLIC BLOOD PRESSURE: 87 MMHG

## 2018-04-27 PROCEDURE — 93000 ELECTROCARDIOGRAM COMPLETE: CPT

## 2018-04-27 PROCEDURE — 99214 OFFICE O/P EST MOD 30 MIN: CPT | Mod: 25

## 2018-06-07 ENCOUNTER — NON-APPOINTMENT (OUTPATIENT)
Age: 83
End: 2018-06-07

## 2018-06-07 ENCOUNTER — APPOINTMENT (OUTPATIENT)
Dept: CARDIOLOGY | Facility: CLINIC | Age: 83
End: 2018-06-07
Payer: MEDICARE

## 2018-06-07 VITALS
HEART RATE: 73 BPM | HEIGHT: 61 IN | DIASTOLIC BLOOD PRESSURE: 80 MMHG | SYSTOLIC BLOOD PRESSURE: 190 MMHG | TEMPERATURE: 98.6 F | WEIGHT: 117 LBS | BODY MASS INDEX: 22.09 KG/M2 | RESPIRATION RATE: 14 BRPM | OXYGEN SATURATION: 100 %

## 2018-06-07 PROCEDURE — 99214 OFFICE O/P EST MOD 30 MIN: CPT | Mod: 25

## 2018-06-07 PROCEDURE — 93000 ELECTROCARDIOGRAM COMPLETE: CPT

## 2018-06-07 RX ORDER — PATIROMER 8.4 G/1
8.4 POWDER, FOR SUSPENSION ORAL
Qty: 30 | Refills: 0 | Status: DISCONTINUED | COMMUNITY
Start: 2017-10-27 | End: 2018-06-07

## 2018-06-12 LAB
ALBUMIN SERPL ELPH-MCNC: 4.2 G/DL
ALP BLD-CCNC: 69 U/L
ALT SERPL-CCNC: 7 U/L
ANION GAP SERPL CALC-SCNC: 16 MMOL/L
AST SERPL-CCNC: 16 U/L
BILIRUB SERPL-MCNC: 0.7 MG/DL
BUN SERPL-MCNC: 56 MG/DL
CALCIUM SERPL-MCNC: 10 MG/DL
CHLORIDE SERPL-SCNC: 97 MMOL/L
CO2 SERPL-SCNC: 15 MMOL/L
CREAT SERPL-MCNC: 1.23 MG/DL
GLUCOSE SERPL-MCNC: 140 MG/DL
POTASSIUM SERPL-SCNC: 5.4 MMOL/L
PROT SERPL-MCNC: 7 G/DL
SODIUM SERPL-SCNC: 128 MMOL/L

## 2018-07-09 ENCOUNTER — NON-APPOINTMENT (OUTPATIENT)
Age: 83
End: 2018-07-09

## 2018-07-09 ENCOUNTER — APPOINTMENT (OUTPATIENT)
Dept: CARDIOLOGY | Facility: CLINIC | Age: 83
End: 2018-07-09
Payer: MEDICARE

## 2018-07-09 ENCOUNTER — RX RENEWAL (OUTPATIENT)
Age: 83
End: 2018-07-09

## 2018-07-09 VITALS
DIASTOLIC BLOOD PRESSURE: 84 MMHG | SYSTOLIC BLOOD PRESSURE: 199 MMHG | OXYGEN SATURATION: 95 % | WEIGHT: 120 LBS | HEIGHT: 61 IN | HEART RATE: 65 BPM | BODY MASS INDEX: 22.66 KG/M2

## 2018-07-09 PROCEDURE — 93000 ELECTROCARDIOGRAM COMPLETE: CPT

## 2018-07-09 PROCEDURE — 99215 OFFICE O/P EST HI 40 MIN: CPT | Mod: 25

## 2018-07-26 RX ORDER — VALSARTAN 80 MG/1
80 TABLET, COATED ORAL
Qty: 90 | Refills: 3 | Status: DISCONTINUED | COMMUNITY
Start: 2018-07-09 | End: 2018-07-26

## 2018-08-06 ENCOUNTER — APPOINTMENT (OUTPATIENT)
Dept: CARDIOLOGY | Facility: CLINIC | Age: 83
End: 2018-08-06
Payer: MEDICARE

## 2018-08-06 VITALS
OXYGEN SATURATION: 94 % | DIASTOLIC BLOOD PRESSURE: 89 MMHG | HEIGHT: 61 IN | HEART RATE: 76 BPM | BODY MASS INDEX: 22.09 KG/M2 | SYSTOLIC BLOOD PRESSURE: 208 MMHG | WEIGHT: 117 LBS

## 2018-08-06 PROCEDURE — 99214 OFFICE O/P EST MOD 30 MIN: CPT | Mod: 25

## 2018-08-06 PROCEDURE — 93000 ELECTROCARDIOGRAM COMPLETE: CPT

## 2018-08-16 LAB
ALBUMIN SERPL ELPH-MCNC: 4.3 G/DL
ALP BLD-CCNC: 61 U/L
ALT SERPL-CCNC: 10 U/L
ANION GAP SERPL CALC-SCNC: 16 MMOL/L
AST SERPL-CCNC: 19 U/L
BILIRUB SERPL-MCNC: 1 MG/DL
BUN SERPL-MCNC: 25 MG/DL
CALCIUM SERPL-MCNC: 10.3 MG/DL
CHLORIDE SERPL-SCNC: 99 MMOL/L
CO2 SERPL-SCNC: 24 MMOL/L
CREAT SERPL-MCNC: 0.91 MG/DL
GLUCOSE SERPL-MCNC: 102 MG/DL
POTASSIUM SERPL-SCNC: 4.4 MMOL/L
PROT SERPL-MCNC: 6.7 G/DL
SODIUM SERPL-SCNC: 139 MMOL/L

## 2018-08-20 ENCOUNTER — NON-APPOINTMENT (OUTPATIENT)
Age: 83
End: 2018-08-20

## 2018-08-20 ENCOUNTER — APPOINTMENT (OUTPATIENT)
Dept: CARDIOLOGY | Facility: CLINIC | Age: 83
End: 2018-08-20
Payer: MEDICARE

## 2018-08-20 VITALS
RESPIRATION RATE: 14 BRPM | OXYGEN SATURATION: 96 % | SYSTOLIC BLOOD PRESSURE: 160 MMHG | DIASTOLIC BLOOD PRESSURE: 85 MMHG | HEIGHT: 61 IN | HEART RATE: 82 BPM

## 2018-08-20 PROCEDURE — 99214 OFFICE O/P EST MOD 30 MIN: CPT | Mod: 25

## 2018-08-20 PROCEDURE — 93000 ELECTROCARDIOGRAM COMPLETE: CPT

## 2018-08-22 ENCOUNTER — APPOINTMENT (OUTPATIENT)
Dept: CARDIOLOGY | Facility: CLINIC | Age: 83
End: 2018-08-22
Payer: MEDICARE

## 2018-08-22 PROCEDURE — 93306 TTE W/DOPPLER COMPLETE: CPT

## 2018-09-10 ENCOUNTER — MEDICATION RENEWAL (OUTPATIENT)
Age: 83
End: 2018-09-10

## 2018-09-21 ENCOUNTER — MEDICATION RENEWAL (OUTPATIENT)
Age: 83
End: 2018-09-21

## 2018-09-24 ENCOUNTER — APPOINTMENT (OUTPATIENT)
Dept: CARDIOLOGY | Facility: CLINIC | Age: 83
End: 2018-09-24

## 2018-09-24 ENCOUNTER — APPOINTMENT (OUTPATIENT)
Dept: CARDIOLOGY | Facility: CLINIC | Age: 83
End: 2018-09-24
Payer: MEDICARE

## 2018-09-24 VITALS
HEIGHT: 61 IN | BODY MASS INDEX: 22.09 KG/M2 | WEIGHT: 117 LBS | OXYGEN SATURATION: 98 % | SYSTOLIC BLOOD PRESSURE: 199 MMHG | HEART RATE: 71 BPM | DIASTOLIC BLOOD PRESSURE: 83 MMHG

## 2018-09-24 PROCEDURE — 93000 ELECTROCARDIOGRAM COMPLETE: CPT

## 2018-09-24 PROCEDURE — 99215 OFFICE O/P EST HI 40 MIN: CPT | Mod: 25

## 2018-09-25 ENCOUNTER — NON-APPOINTMENT (OUTPATIENT)
Age: 83
End: 2018-09-25

## 2018-10-09 ENCOUNTER — APPOINTMENT (OUTPATIENT)
Dept: CARDIOLOGY | Facility: CLINIC | Age: 83
End: 2018-10-09
Payer: MEDICARE

## 2018-10-09 VITALS
HEART RATE: 73 BPM | SYSTOLIC BLOOD PRESSURE: 207 MMHG | DIASTOLIC BLOOD PRESSURE: 117 MMHG | WEIGHT: 116 LBS | OXYGEN SATURATION: 96 % | BODY MASS INDEX: 22.78 KG/M2 | HEIGHT: 60 IN

## 2018-10-09 PROCEDURE — 99215 OFFICE O/P EST HI 40 MIN: CPT

## 2018-10-09 PROCEDURE — 93000 ELECTROCARDIOGRAM COMPLETE: CPT

## 2018-11-05 ENCOUNTER — APPOINTMENT (OUTPATIENT)
Dept: CARDIOLOGY | Facility: CLINIC | Age: 83
End: 2018-11-05
Payer: MEDICARE

## 2018-11-05 VITALS
WEIGHT: 116 LBS | DIASTOLIC BLOOD PRESSURE: 128 MMHG | HEIGHT: 60 IN | HEART RATE: 80 BPM | SYSTOLIC BLOOD PRESSURE: 180 MMHG | BODY MASS INDEX: 22.78 KG/M2

## 2018-11-05 PROCEDURE — 99213 OFFICE O/P EST LOW 20 MIN: CPT

## 2018-11-05 NOTE — REASON FOR VISIT
[Follow-Up - Clinic] : a clinic follow-up of [Hypertension] : hypertension [Mitral Regurgitation] : mitral regurgitation

## 2018-11-05 NOTE — HISTORY OF PRESENT ILLNESS
[FreeTextEntry1] : Patient with severe HTN and moderate to severe MR.  Slight improvement in BP since starting labetalol.  She feels well. and denies dyspnea or weakness.

## 2018-11-05 NOTE — PHYSICAL EXAM
[General Appearance - Well Developed] : well developed [Normal Appearance] : normal appearance [Well Groomed] : well groomed [General Appearance - Well Nourished] : well nourished [No Deformities] : no deformities [General Appearance - In No Acute Distress] : no acute distress [Normal Conjunctiva] : the conjunctiva exhibited no abnormalities [Eyelids - No Xanthelasma] : the eyelids demonstrated no xanthelasmas [Normal Oral Mucosa] : normal oral mucosa [No Oral Pallor] : no oral pallor [No Oral Cyanosis] : no oral cyanosis [Normal Jugular Venous A Waves Present] : normal jugular venous A waves present [Normal Jugular Venous V Waves Present] : normal jugular venous V waves present [No Jugular Venous Steven A Waves] : no jugular venous steven A waves [Respiration, Rhythm And Depth] : normal respiratory rhythm and effort [Exaggerated Use Of Accessory Muscles For Inspiration] : no accessory muscle use [Auscultation Breath Sounds / Voice Sounds] : lungs were clear to auscultation bilaterally [Heart Rate And Rhythm] : heart rate and rhythm were normal [Heart Sounds] : normal S1 and S2 [Systolic grade ___/6] : A grade [unfilled]/6 systolic murmur was heard. [Abdomen Soft] : soft [Abdomen Tenderness] : non-tender [Abdomen Mass (___ Cm)] : no abdominal mass palpated [Abnormal Walk] : normal gait [Gait - Sufficient For Exercise Testing] : the gait was sufficient for exercise testing [Nail Clubbing] : no clubbing of the fingernails [Cyanosis, Localized] : no localized cyanosis [Petechial Hemorrhages (___cm)] : no petechial hemorrhages [Skin Color & Pigmentation] : normal skin color and pigmentation [] : no rash [No Venous Stasis] : no venous stasis [Skin Lesions] : no skin lesions [No Skin Ulcers] : no skin ulcer [No Xanthoma] : no  xanthoma was observed [Oriented To Time, Place, And Person] : oriented to person, place, and time [Affect] : the affect was normal [Mood] : the mood was normal [No Anxiety] : not feeling anxious

## 2018-11-19 ENCOUNTER — INPATIENT (INPATIENT)
Facility: HOSPITAL | Age: 83
LOS: 1 days | Discharge: ANOTHER HOSPITAL | DRG: 280 | End: 2018-11-21
Attending: INTERNAL MEDICINE | Admitting: INTERNAL MEDICINE
Payer: MEDICARE

## 2018-11-19 VITALS
TEMPERATURE: 98 F | RESPIRATION RATE: 33 BRPM | DIASTOLIC BLOOD PRESSURE: 100 MMHG | WEIGHT: 115.96 LBS | SYSTOLIC BLOOD PRESSURE: 220 MMHG | HEART RATE: 114 BPM | OXYGEN SATURATION: 81 %

## 2018-11-19 DIAGNOSIS — I16.1 HYPERTENSIVE EMERGENCY: ICD-10-CM

## 2018-11-19 DIAGNOSIS — Z90.49 ACQUIRED ABSENCE OF OTHER SPECIFIED PARTS OF DIGESTIVE TRACT: Chronic | ICD-10-CM

## 2018-11-19 DIAGNOSIS — I50.1 LEFT VENTRICULAR FAILURE, UNSPECIFIED: ICD-10-CM

## 2018-11-19 DIAGNOSIS — I10 ESSENTIAL (PRIMARY) HYPERTENSION: ICD-10-CM

## 2018-11-19 DIAGNOSIS — J96.00 ACUTE RESPIRATORY FAILURE, UNSPECIFIED WHETHER WITH HYPOXIA OR HYPERCAPNIA: ICD-10-CM

## 2018-11-19 DIAGNOSIS — I16.9 HYPERTENSIVE CRISIS, UNSPECIFIED: ICD-10-CM

## 2018-11-19 DIAGNOSIS — Z90.710 ACQUIRED ABSENCE OF BOTH CERVIX AND UTERUS: Chronic | ICD-10-CM

## 2018-11-19 DIAGNOSIS — I70.1 ATHEROSCLEROSIS OF RENAL ARTERY: ICD-10-CM

## 2018-11-19 DIAGNOSIS — I34.0 NONRHEUMATIC MITRAL (VALVE) INSUFFICIENCY: ICD-10-CM

## 2018-11-19 DIAGNOSIS — Z29.9 ENCOUNTER FOR PROPHYLACTIC MEASURES, UNSPECIFIED: ICD-10-CM

## 2018-11-19 DIAGNOSIS — R07.9 CHEST PAIN, UNSPECIFIED: ICD-10-CM

## 2018-11-19 DIAGNOSIS — Z98.89 OTHER SPECIFIED POSTPROCEDURAL STATES: Chronic | ICD-10-CM

## 2018-11-19 DIAGNOSIS — I63.9 CEREBRAL INFARCTION, UNSPECIFIED: ICD-10-CM

## 2018-11-19 DIAGNOSIS — Z96.643 PRESENCE OF ARTIFICIAL HIP JOINT, BILATERAL: Chronic | ICD-10-CM

## 2018-11-19 LAB
ALBUMIN SERPL ELPH-MCNC: 3.7 G/DL — SIGNIFICANT CHANGE UP (ref 3.3–5)
ALP SERPL-CCNC: 97 U/L — SIGNIFICANT CHANGE UP (ref 40–120)
ALT FLD-CCNC: 30 U/L — SIGNIFICANT CHANGE UP (ref 12–78)
ANION GAP SERPL CALC-SCNC: 11 MMOL/L — SIGNIFICANT CHANGE UP (ref 5–17)
APPEARANCE UR: CLEAR — SIGNIFICANT CHANGE UP
APTT BLD: 29.4 SEC — SIGNIFICANT CHANGE UP (ref 27.5–36.3)
AST SERPL-CCNC: 22 U/L — SIGNIFICANT CHANGE UP (ref 15–37)
BILIRUB SERPL-MCNC: 1.2 MG/DL — SIGNIFICANT CHANGE UP (ref 0.2–1.2)
BILIRUB UR-MCNC: NEGATIVE — SIGNIFICANT CHANGE UP
BUN SERPL-MCNC: 30 MG/DL — HIGH (ref 7–23)
CALCIUM SERPL-MCNC: 9.1 MG/DL — SIGNIFICANT CHANGE UP (ref 8.5–10.1)
CHLORIDE SERPL-SCNC: 106 MMOL/L — SIGNIFICANT CHANGE UP (ref 96–108)
CO2 SERPL-SCNC: 22 MMOL/L — SIGNIFICANT CHANGE UP (ref 22–31)
COLOR SPEC: SIGNIFICANT CHANGE UP
CREAT SERPL-MCNC: 0.9 MG/DL — SIGNIFICANT CHANGE UP (ref 0.5–1.3)
DIFF PNL FLD: NEGATIVE — SIGNIFICANT CHANGE UP
GLUCOSE SERPL-MCNC: 174 MG/DL — HIGH (ref 70–99)
GLUCOSE UR QL: NEGATIVE — SIGNIFICANT CHANGE UP
HCT VFR BLD CALC: 38.1 % — SIGNIFICANT CHANGE UP (ref 34.5–45)
HGB BLD-MCNC: 12.4 G/DL — SIGNIFICANT CHANGE UP (ref 11.5–15.5)
INR BLD: 1.01 RATIO — SIGNIFICANT CHANGE UP (ref 0.88–1.16)
KETONES UR-MCNC: NEGATIVE — SIGNIFICANT CHANGE UP
LEUKOCYTE ESTERASE UR-ACNC: NEGATIVE — SIGNIFICANT CHANGE UP
MCHC RBC-ENTMCNC: 30.1 PG — SIGNIFICANT CHANGE UP (ref 27–34)
MCHC RBC-ENTMCNC: 32.5 GM/DL — SIGNIFICANT CHANGE UP (ref 32–36)
MCV RBC AUTO: 92.5 FL — SIGNIFICANT CHANGE UP (ref 80–100)
NITRITE UR-MCNC: NEGATIVE — SIGNIFICANT CHANGE UP
NRBC # BLD: 0 /100 WBCS — SIGNIFICANT CHANGE UP (ref 0–0)
NT-PROBNP SERPL-SCNC: 6155 PG/ML — HIGH (ref 0–450)
PH UR: 5 — SIGNIFICANT CHANGE UP (ref 5–8)
PLATELET # BLD AUTO: 470 K/UL — HIGH (ref 150–400)
POTASSIUM SERPL-MCNC: 4.1 MMOL/L — SIGNIFICANT CHANGE UP (ref 3.5–5.3)
POTASSIUM SERPL-SCNC: 4.1 MMOL/L — SIGNIFICANT CHANGE UP (ref 3.5–5.3)
PROT SERPL-MCNC: 7.2 G/DL — SIGNIFICANT CHANGE UP (ref 6–8.3)
PROT UR-MCNC: 25 MG/DL
PROTHROM AB SERPL-ACNC: 11.4 SEC — SIGNIFICANT CHANGE UP (ref 10–12.9)
RBC # BLD: 4.12 M/UL — SIGNIFICANT CHANGE UP (ref 3.8–5.2)
RBC # FLD: 12.5 % — SIGNIFICANT CHANGE UP (ref 10.3–14.5)
SODIUM SERPL-SCNC: 139 MMOL/L — SIGNIFICANT CHANGE UP (ref 135–145)
SP GR SPEC: 1 — LOW (ref 1.01–1.02)
TROPONIN I SERPL-MCNC: <.015 NG/ML — SIGNIFICANT CHANGE UP (ref 0.01–0.04)
UROBILINOGEN FLD QL: NEGATIVE — SIGNIFICANT CHANGE UP
WBC # BLD: 10.71 K/UL — HIGH (ref 3.8–10.5)
WBC # FLD AUTO: 10.71 K/UL — HIGH (ref 3.8–10.5)

## 2018-11-19 PROCEDURE — 93010 ELECTROCARDIOGRAM REPORT: CPT

## 2018-11-19 PROCEDURE — 99291 CRITICAL CARE FIRST HOUR: CPT

## 2018-11-19 PROCEDURE — 71045 X-RAY EXAM CHEST 1 VIEW: CPT | Mod: 26

## 2018-11-19 PROCEDURE — 93970 EXTREMITY STUDY: CPT | Mod: 26

## 2018-11-19 RX ORDER — FUROSEMIDE 40 MG
40 TABLET ORAL ONCE
Qty: 0 | Refills: 0 | Status: COMPLETED | OUTPATIENT
Start: 2018-11-19 | End: 2018-11-19

## 2018-11-19 RX ORDER — LOSARTAN POTASSIUM 100 MG/1
50 TABLET, FILM COATED ORAL
Qty: 0 | Refills: 0 | Status: DISCONTINUED | OUTPATIENT
Start: 2018-11-19 | End: 2018-11-21

## 2018-11-19 RX ORDER — NITROGLYCERIN 6.5 MG
10 CAPSULE, EXTENDED RELEASE ORAL
Qty: 50 | Refills: 0 | Status: DISCONTINUED | OUTPATIENT
Start: 2018-11-19 | End: 2018-11-20

## 2018-11-19 RX ORDER — ACETAMINOPHEN 500 MG
650 TABLET ORAL ONCE
Qty: 0 | Refills: 0 | Status: COMPLETED | OUTPATIENT
Start: 2018-11-19 | End: 2018-11-19

## 2018-11-19 RX ORDER — CARVEDILOL PHOSPHATE 80 MG/1
1 CAPSULE, EXTENDED RELEASE ORAL
Qty: 0 | Refills: 0 | COMMUNITY

## 2018-11-19 RX ORDER — VALSARTAN 80 MG/1
1 TABLET ORAL
Qty: 0 | Refills: 0 | COMMUNITY

## 2018-11-19 RX ORDER — LABETALOL HCL 100 MG
300 TABLET ORAL EVERY 12 HOURS
Qty: 0 | Refills: 0 | Status: DISCONTINUED | OUTPATIENT
Start: 2018-11-19 | End: 2018-11-20

## 2018-11-19 RX ORDER — NITROGLYCERIN 6.5 MG
10 CAPSULE, EXTENDED RELEASE ORAL
Qty: 50 | Refills: 0 | Status: DISCONTINUED | OUTPATIENT
Start: 2018-11-19 | End: 2018-11-19

## 2018-11-19 RX ORDER — NITROGLYCERIN 6.5 MG
0.4 CAPSULE, EXTENDED RELEASE ORAL
Qty: 0 | Refills: 0 | Status: DISCONTINUED | OUTPATIENT
Start: 2018-11-19 | End: 2018-11-20

## 2018-11-19 RX ORDER — ENOXAPARIN SODIUM 100 MG/ML
40 INJECTION SUBCUTANEOUS DAILY
Qty: 0 | Refills: 0 | Status: DISCONTINUED | OUTPATIENT
Start: 2018-11-19 | End: 2018-11-20

## 2018-11-19 RX ORDER — CLOPIDOGREL BISULFATE 75 MG/1
75 TABLET, FILM COATED ORAL DAILY
Qty: 0 | Refills: 0 | Status: DISCONTINUED | OUTPATIENT
Start: 2018-11-19 | End: 2018-11-21

## 2018-11-19 RX ORDER — SPIRONOLACTONE 25 MG/1
1 TABLET, FILM COATED ORAL
Qty: 0 | Refills: 0 | COMMUNITY

## 2018-11-19 RX ADMIN — Medication 650 MILLIGRAM(S): at 23:16

## 2018-11-19 RX ADMIN — Medication 40 MILLIGRAM(S): at 18:00

## 2018-11-19 RX ADMIN — Medication 0.4 MILLIGRAM(S): at 18:00

## 2018-11-19 RX ADMIN — Medication 3 MICROGRAM(S)/MIN: at 18:34

## 2018-11-19 RX ADMIN — Medication 650 MILLIGRAM(S): at 23:00

## 2018-11-19 RX ADMIN — Medication 40 MILLIGRAM(S): at 23:21

## 2018-11-19 NOTE — ED PROVIDER NOTE - OBJECTIVE STATEMENT
pt is a 88 yo female hx mvr, uncontrolled htn, old cva with no residual. pt about 2 hours pta with sudden onset of sob, and vague chest discomfort, no recent uri, f/c, n/v.  pt with chronic leg swelling but increased over past week or so.  pt with multiple dopplers in past with no dvt.  on arrival pt with severe resp distress and severe htn

## 2018-11-19 NOTE — ED PROVIDER NOTE - PMH
Basal cell carcinoma    Cerebrovascular accident (CVA), unspecified mechanism    Dyslipidemia    Edema, unspecified type    Essential hypertension    Hyponatremia  h/o Hypokelema  Mitral valve insufficiency, unspecified etiology    Renal artery stenosis  possible on doppler  UTI (urinary tract infection)

## 2018-11-19 NOTE — CONSULT NOTE ADULT - SUBJECTIVE AND OBJECTIVE BOX
Patient is a 87y old  Female who presents with a chief complaint of     86 y/o female HTN, HLD, renal artery stenosis, CVA ( no residual deficits) presented to ED w/ worsening SOB. Patient states the past few days she has had worsening dyspnea on exertion and orthopnea. This evening she became severely short of breath while at rest when she came to ED.  She admits to pressure like chest pain as well. She was treated for UTI 10 days ago and just finished her course of antibiotics. She reports bilateral leg swelling as well and this " has been the worst shes ever had it" . She denies any fever/chills, abdominal pain, N/V, palpitations diarrhea , headache dizziness. On arrival to ED patient in severe respiratory distress w/ . She was placed on BiPAP, given 40mg IV lasix and started on nitroglycerin infusion. Labs significant for  BNP >6k, intial set of cardiac enzymes negative. Admitted to ICU       PAST MEDICAL & SURGICAL HISTORY:  Renal artery stenosis: possible on doppler  Basal cell carcinoma  Mitral valve insufficiency, unspecified etiology  Cerebrovascular accident (CVA), unspecified mechanism  Dyslipidemia  Essential hypertension  UTI (urinary tract infection)  H/O bilateral hip replacements  History of cholecystectomy  Status post hysterectomy  S/P Mohs surgery for basal cell carcinoma    Allergies    Keflex (Unknown)  verapamil (Other)    Intolerances      FAMILY HISTORY:  Family history of carotid artery stenosis (Sibling)  Family history of uterine cancer (Sibling)      Review of Systems:  CONSTITUTIONAL: No fever, chills, or fatigue  EYES: No eye pain, visual disturbances, or discharge  ENMT:  No difficulty hearing, tinnitus, vertigo; No sinus or throat pain  NECK: No pain or stiffness  RESPIRATORY: No cough, wheezing, chills or hemoptysis;  (+) shortness of breath  CARDIOVASCULAR: No chest pain, palpitations, dizziness, or leg swelling  GASTROINTESTINAL: No abdominal or epigastric pain. No nausea, vomiting, or hematemesis; No diarrhea or constipation. No melena or hematochezia.  GENITOURINARY: No dysuria, frequency, hematuria, or incontinence  NEUROLOGICAL: No headaches, memory loss, loss of strength, numbness, or tremors  SKIN: No itching, burning, rashes, or lesions   MUSCULOSKELETAL: No joint pain or swelling; No muscle, back, or extremity pain  PSYCHIATRIC: No depression, anxiety, mood swings, or difficulty sleeping      Medications:    nitroglycerin     SubLingual 0.4 milliGRAM(s) SubLingual every 5 minutes PRN  nitroglycerin  Infusion 10 MICROgram(s)/Min IV Continuous <Continuous>  enoxaparin Injectable 40 milliGRAM(s) SubCutaneous daily        ICU Vital Signs Last 24 Hrs  T(C): 36.7 (19 Nov 2018 20:26), Max: 36.8 (19 Nov 2018 17:29)  T(F): 98 (19 Nov 2018 20:26), Max: 98.3 (19 Nov 2018 17:29)  HR: 95 (19 Nov 2018 20:26) (82 - 116)  BP: 188/108 (19 Nov 2018 20:26) (166/83 - 227/123)  RR: 20 (19 Nov 2018 20:26) (20 - 33)  SpO2: 100% (19 Nov 2018 20:26) (81% - 100%)    Vital Signs Last 24 Hrs  T(C): 36.7 (19 Nov 2018 20:26), Max: 36.8 (19 Nov 2018 17:29)  T(F): 98 (19 Nov 2018 20:26), Max: 98.3 (19 Nov 2018 17:29)  HR: 95 (19 Nov 2018 20:26) (82 - 116)  BP: 188/108 (19 Nov 2018 20:26) (166/83 - 227/123)  RR: 20 (19 Nov 2018 20:26) (20 - 33)  SpO2: 100% (19 Nov 2018 20:26) (81% - 100%)        I&O's Detail        LABS:                        12.4   10.71 )-----------( 470      ( 19 Nov 2018 18:12 )             38.1     11-19    139  |  106  |  30<H>  ----------------------------<  174<H>  4.1   |  22  |  0.90    Ca    9.1      19 Nov 2018 18:12    TPro  7.2  /  Alb  3.7  /  TBili  1.2  /  DBili  x   /  AST  22  /  ALT  30  /  AlkPhos  97  11-19      CARDIAC MARKERS ( 19 Nov 2018 18:12 )  <.015 ng/mL / x     / x     / x     / x          CAPILLARY BLOOD GLUCOSE        PT/INR - ( 19 Nov 2018 18:12 )   PT: 11.4 sec;   INR: 1.01 ratio         PTT - ( 19 Nov 2018 18:12 )  PTT:29.4 sec    CULTURES:      Physical Examination:    General: AAOx3. Moderate respiratory distress     HEENT: Pupils equal, reactive to light.  Symmetric.    PULM: b/l diffuse crackles. No wheeze. No sputum production. Increased work of breathing    CVS: +S1/S2. Sinus tachycardia. no murmurs. No JVD     ABD: Soft, nondistended, nontender, normoactive bowel sounds, no masses    EXT: b/l 2+ pitting edema. Tender to touch b/l. LLE swelling grater than right side. Diffuse erythema of left calf and anterior shin     SKIN: Warm and well perfused, no rashes noted.    NEURO: A&Ox3, able to move all extremities Follows commands. No focal deficits     RADIOLOGY: < from: Xray Chest 1 View-PORTABLE IMMEDIATE (11.19.18 @ 18:18) >    EXAM:  XR CHEST PORTABLE IMMED 1V                            PROCEDURE DATE:  11/19/2018          INTERPRETATION:  Short of breath, chest pain.     AP chest. Prior 12/19/2017.  Heart not accurately evaluated on this projection. Low lung volumes. New   extensive bilateral left worse than right predominantly basilar airspace   disease. Correlate for congestion and/or infection/aspiration. No pleural   effusion or pneumothorax.    Impression: As above    < end of copied text >      CRITICAL CARE TIME SPENT:  48 min   Evaluating/treating patient, reviewing data/labs/imaging, discussing case with multidisciplinary team, discussing plan/goals of care with patient/family. Non-inclusive of procedure time.

## 2018-11-19 NOTE — H&P ADULT - SKIN
detailed exam warm and dry/color normal warm and dry/color normal/Ecchymosis left Lower EXT Lateral side

## 2018-11-19 NOTE — H&P ADULT - NEGATIVE OPHTHALMOLOGIC SYMPTOMS
no photophobia/no blurred vision L/no blurred vision R no pain L/no diplopia/no blurred vision R/no photophobia/no blurred vision L/no pain R

## 2018-11-19 NOTE — H&P ADULT - HISTORY OF PRESENT ILLNESS
Pt is a 86 yo F with PMH of HTN, renal artery stenosis, MV insufficiency, dyslipidemia, CVA, hyponatremia, recurrent UTI who presents to the ED with chest pain. Pt states that she began experiencing chest pain at lunchtime. She was with her daughter and felt that she couldn't breath. Pain continued to get worse. States her cardiologists are in [place] but she felt so bad that she needed to get to the closest hospital. States she felt as though she were going to die. CP is located substernal, non-radiating, non-reproducible. Pt admits to CP, SOB, cough, leg edema. Pt denies fever, chills, palpitations, N/V, HA, changes in vision, dizziness. Per discussion with dtr on phone, her mother takes her BP at home and SBP is frequently 170-180 and that occasionally it is 200. Her BP medications were recently changed by her cardiologist (Marquita). She had an echo which revealed MV insufficiency and she is scheduled for follow up visit to evaluate necessity for replacement. Pt states that she has been being treated for a UTI with cefuroxime and just finished her course of Abx today. Pt's sister also had HTN. Pt denies FHx of cardiac problems.    In ED, Pt's vitals were: T 98.3, , /100, RR 33 and 81% on supplemental O2.   Repeat vitals were: HR 82, /83, RR 20 and 100% on BiPAP  Labs significant for proBNP 6155. 1st set troponins neg. Other labs grossly WNL.  EKG - sinus tach, 105  CXR - B/L L>R basilar airspace disease    Pt given nitroglycerin sublingual, nitroglycerin gtt, lasix 40mg IV push. Pt is a 86 yo F with PMH of HTN, renal artery stenosis, MV insufficiency, dyslipidemia, CVA, hyponatremia, recurrent UTI who presents to the ED with chest pain. Pt states that she began experiencing chest pain at lunchtime. She was with her daughter and felt that she couldn't breath. Pain continued to get worse. States her cardiologists are in [place] but she felt so bad that she needed to get to the closest hospital. States she felt as though she were going to die. CP is located substernal, non-radiating, non-reproducible. Pt admits to CP, SOB, cough, leg edema. Pt denies fever, chills, palpitations, N/V, HA, changes in vision, dizziness. Per discussion with dtr on phone, her mother takes her BP at home and SBP is frequently 170-180 and that occasionally it is 200. Her BP medications were recently changed by her cardiologist (Marquita). She had an echo which revealed MV insufficiency and she is scheduled for follow up visit to evaluate necessity for replacement. Pt states that she has been being treated for a UTI with cefuroxime and just finished her course of Abx today. Pt's sister also had HTN. Pt denies FHx of cardiac problems.Pt is suppose to have a Evaluation with Cardiac Sx for leaky Mitral Valve at [facility] next week Monday.    In ED, Pt's vitals were: T 98.3, , /100, RR 33 and 81% on supplemental O2.   Repeat vitals were: HR 82, /83, RR 20 and 100% on BiPAP  Labs significant for proBNP 6155. 1st set troponins neg. Other labs grossly WNL.  EKG - sinus tach, 105  CXR - B/L L>R basilar airspace disease    Pt given nitroglycerin sublingual, started on nitroglycerin gtt, Lasix 40mg IV push. Placed on Bi PAP, Pt seen by ICU & admitted to ICU.

## 2018-11-19 NOTE — H&P ADULT - PROBLEM SELECTOR PLAN 8
identified by ECHO, per Pt to be evaluated with cardiologist Monday  - consult cardiology ([facility]), appreciate recs

## 2018-11-19 NOTE — CONSULT NOTE ADULT - ASSESSMENT
86 y/o female HTN, HLD, renal artery stenosis, CVA ( no residual deficits) presented to ED w/ worsening SOB. Patient admitted w/ acute hypoxic respiratory failure related to hypertensive crisis and acute decompensated heart failure.     Neuro: Stable  CV: Hypertensive crisis, on nitroglycerin infusion, titrate for SBP <175  ( patients baseline -180) and for absence of chest pain. Will restart home medications. Cardiology consult   - Acute decompensated heart failure, given 40mg IV lasix. Placed on BiPAP. Nitro gtt to reduce preload. May need additional lasix if UO decreases. Initial set of cardiac enzymes negative, Trend CE q8hr.  Official TTE. Lower extremity dopplers.   -HTN, restart home medications  - Renal artery Stenosis, c/w Plavix daily  -HLD, c/w statin   Resp: Acute hypoxic respiratory failure related to pulmonary edema from HTN crisis/heart failure Placed on BiPAP, will titrate FiO2 to maintain O2 sat >92%. Trial off BiPAP in am.  GI: NPO while on BiPAP  Renal: Sanders catheter placed, Strict I&O's.  ID: Recent UTI off abx now. Send UA and urine culture. No signs of infection, hold off on antibiotics.      Case discussed w/ eICU Dr. Hare

## 2018-11-19 NOTE — H&P ADULT - PROBLEM SELECTOR PLAN 4
identified by ECHO, no murmur appreciated  - consult cardiology ([facility]), appreciate recs uncontrolled, in /100; likely renovasular HTN  - on labetalol 300 BID, losartan 50 BID, clonidine 0.2 TID at home  - on nitroglycerin gtt now  - consult cardiology ([facility]), appreciate recs - proBNP 6155  -Acute  heart failure, given 40mg IV Lasix in ER . Placed on BiPAP.  - Nitro gtt in ICU   -Continue Lasix  IV Daily, I/O .   -Trend CE q8hr.  TTE. Lower extremity dopplers. - proBNP 6155  -Acute  heart failure, given 40mg IV Lasix in ER . Placed on BiPAP.  - Nitro gtt in ICU   -Continue Lasix  IV Daily, I/O .   -Trend CE q8hr.  TTE.  - RT Lower EXT Erythema, warm, Edema likely 2/2 fluid over load, Lasix    Lower extremity dopplers. Afebrile, Hold off Abx as per ICU

## 2018-11-19 NOTE — H&P ADULT - PROBLEM SELECTOR PLAN 2
uncontrolled, in /100; likely renovasular HTN  - on labetalol 300 BID, losartan 50 BID, clonidine 0.2 TID at home  - consult cardiology ([facility]), appreciate recs uncontrolled, in /100; likely renovasular HTN  - on labetalol 300 BID, losartan 50 BID, clonidine 0.2 TID at home  - on nitroglycerin gtt now  - consult cardiology ([facility]), appreciate recs sec to pulmonary edema in setting of CHF sec to HTN emergency  in ED O2 sat 71%, improved to 100% on BiPAP  - CXR: B/L L>R basilar airspace disease  - on BiPAP  - s/p 40mg lasix IV push   - strict I&Os  - consult cardiology ([facility]), appreciate recs sec to pulmonary edema in setting of CHF sec to HTN Crisis  in ED O2 sat 71%, improved to 100% on BiPAP  - CXR: B/L L>R basilar airspace disease  - on BiPAP  - s/p 40mg lasix IV push   - strict I&Os  - consult cardiology ([facility]), appreciate recs  -Trial off BiPAP in am.

## 2018-11-19 NOTE — PROVIDER CONTACT NOTE (EICU) - BACKGROUND
86 yo female with hx of HTN, CVA, renal artery stenosis, HLD, presented to the ED with chest pain and dyspnea, found to be in hypoxic acute respiratory failure with spo2 in 70s, malignancy hypertension with 's, bilat airspace opacities on cxr.

## 2018-11-19 NOTE — ED PROVIDER NOTE - CRITICAL CARE PROVIDED
consult w/ pt's family directly relating to pts condition/interpretation of diagnostic studies/direct patient care (not related to procedure)/additional history taking/consultation with other physicians/documentation
No

## 2018-11-19 NOTE — H&P ADULT - ASSESSMENT
Pt is a 88 yo F with PMH of HTN, renal artery stenosis, MV insufficiency, dyslipidemia, CVA, hyponatremia, recurrent UTI who presents to the ED with chest pain. Admitted for chest pain and SOB sec to pulmonary edema with CHF likely sec to HTN urgency. Pt is a 86 yo F with PMH of HTN, renal artery stenosis, MV insufficiency, dyslipidemia, CVA, hyponatremia, recurrent UTI who presents to the ED with chest pain. Admitted for chest pain and SOB sec to pulmonary edema with CHF likely sec to HTN emergency.

## 2018-11-19 NOTE — H&P ADULT - PROBLEM SELECTOR PLAN 1
O2 sat 81% in ED, improved on BiPAP  - admit to ICU  - CXR - B/L L>R basilar airspace disease  - s/p 40 mg lasix IV push  - proBNP 6155  - 1st set troponin negative  - trend troponins  - consult cardiology ([facility]), appreciate recs  - c/w nitroglycerin gtt for chest pain and HTN  - lower blood pressure slowly O2 sat 81% in ED, improved on BiPAP  - admit to ICU  - CXR - B/L L>R basilar airspace disease  - s/p 40 mg lasix IV push  - proBNP 6155  - 1st set troponin negative  - trend troponins  - consult cardiology ([facility]), appreciate recs  - c/w nitroglycerin gtt for HTN  - lower blood pressure slowly with CHF and acute pulmonary edema; BP in /100   - admit to ICU  - on nitroglycerin gtt  - s/p 40mg Lasix IV push  - on BiPAP  - proBNP 6155  - 1st set troponin negative  - trend troponins  - lower blood pressure slowly  - consult cardiology ([facility]), appreciate recs on nitroglycerin infusion, titrate for SBP <175  ( patients baseline -180)   - restart home Labetalol   -with CHF and acute pulmonary edema; BP in /100   - admit to ICU  - on nitroglycerin gtt  - s/p 40mg Lasix IV push x 1 in ER   - on BiPAP  - consult cardiology ([facility]), ECHO on nitroglycerin infusion, titrate for SBP <175  ( patients baseline -180)   - restart home Labetalol   -with CHF and acute pulmonary edema; BP in /100   - admit to ICU-H/O Renal artery stenosis  - on nitroglycerin gtt  - s/p 40mg Lasix IV push x 1 in ER   - on BiPAP  - consult cardiology ([facility]), ECHO

## 2018-11-19 NOTE — H&P ADULT - SKIN COMMENTS
B/L dorsal hand ecchymosis B/L dorsal hand ecchymosis, RT Lower EXT Erythema on lateral side, warm Skin Intact

## 2018-11-19 NOTE — H&P ADULT - PROBLEM SELECTOR PLAN 6
identified by ECHO, per Pt to be evaluated with cardiologist Monday  - consult cardiology ([facility]), appreciate recs On Plavix 75 mg daily

## 2018-11-19 NOTE — H&P ADULT - RS GEN PE MLT RESP DETAILS PC
no chest wall tenderness/breath sounds equal/no wheezes/rhonchi no chest wall tenderness/no rhonchi/breath sounds equal/no wheezes/rales

## 2018-11-19 NOTE — H&P ADULT - GASTROINTESTINAL DETAILS
bowel sounds normal/soft/nontender/no distention no bruit/no masses palpable/bowel sounds normal/soft/no distention/normal/nontender/no organomegaly

## 2018-11-19 NOTE — H&P ADULT - PROBLEM SELECTOR PLAN 5
IMPROVE VTE Individual Risk Assessment          RISK                                                          Points    [  ] Previous VTE                                                3  [  ] Thrombophilia                                             2  [  ] Lower limb paralysis                                   2        (unable to hold up >15 seconds)    [  ] Current Cancer                                            2         (within 6 months)  [  ] Immobilization > 24 hrs                              1  [  ] ICU/CCU stay > 24 hours                            1  [ x ] Age > 60                                                    1    IMPROVE VTE Score ____1_____    Lovenox for vte ppx    - bowel regimen for constipation  - further management as per ICU Hx of BOB dx by doppler, likely renovasular HTN  - on plavix 75mg qd at home  - c/w plavix 75mg qd  - consider starting statin uncontrolled, in /100; 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

## 2018-11-19 NOTE — H&P ADULT - PROBLEM SELECTOR PROBLEM 6
Mitral valve insufficiency, unspecified etiology Cerebrovascular accident (CVA), unspecified mechanism

## 2018-11-19 NOTE — ED PROVIDER NOTE - MEDICAL DECISION MAKING DETAILS
pt pw acute sob, chest discomfort, malignant htn,  bipap, cxr with ape, lasix, nitro drip, trop neg, ekg non spec, icu consult.  admit tele or icu pending consult--improved with bipap and ntg

## 2018-11-19 NOTE — ED ADULT NURSE NOTE - OBJECTIVE STATEMENT
Pt presents to the Ed c/o SOB Pt presents to the Ed c/o SOB, right lower leg edema greater than the left leg, + pulses, + sensation, + capillary refill < 2 sec, skin warm and dry.

## 2018-11-19 NOTE — H&P ADULT - NSHPPOAPRESSUREULCER_GEN_ALL_CORE
[f rep st]



                                                             DISCHARGE SUMMARY





NEW AND ACUTE DIAGNOSES:  

1.  Acute pulmonary embolus. 

2.  Atrial fibrillation by history, now stable, in normal sinus rhythm. 

3.  Right knee replacement post 3 months PTA.



CONSULTATIONS:  None.



PROCEDURES:  CTA of the chest showing large volume bilateral pulmonary emboli with mild right heart s
train with flattening of the intraventricular septum.  



Another procedure is an echocardiogram showing an EF of 67%, flattened and paradoxical interventricul
ar septum consistent with RV pressure overload and mild-to-moderate tricuspid regurg.  



Extremity venous study shows occlusive thrombus in the left popliteal and peroneal vein.  The right s
joseph of the lower extremity showed normally compressible veins from the groin to the upper calf.  No t
hrombosis is noted on the right.



HOSPITAL COURSE:  A 73-year-old male presented with shortness of breath.  He was noted on admission t
o be tachypneic and tachycardic, and mildly hypertensive, with a normal respiratory rate, afebrile, a
nd adequate oxygen saturation.  He was found to have large volume pulmonary emboli felt secondary to 
clot from the left lower extremity.  He was initially placed on Lovenox and was going to be transitio
concepc[place] to oral agents on discharge.  He did well, had normal oxygenation, had some very minor pleuritic 
chest pain that was treated with Tylenol, and then no longer required pain medication.



DISCHARGE MEDICATIONS:  New medication is Tylenol for pain. 



He was discharged on Eliquis, but his insurance company changed him to Xarelto 15 mg p.o. b.i.d. x21 
days, and then 20 mg daily following.  His continued medications are omega-3 fatty acids, ascorbic vi
tamin C, and Tessalon Perles.  



Discontinued medication is Zithromax.



PLAN:  The gentleman will follow up with his PCP, Dr. Kervin Burnett, but more specifically Dr. Roxi Ibarra, of [facility], regarding his future pulmonary care.



TIME:  This discharge required 50 minutes, greater than 50% to  and coordinate care, and make 
changes with his insurance company for the Xarelto.





Job #:  [id number]
no

## 2018-11-19 NOTE — H&P ADULT - PROBLEM SELECTOR PLAN 3
Hx of BOB dx by doppler, likely renovasular HTN  - on plavix 75mg qd at home  - c/w plavix 75mg qd poss sec to demand ischemia vs ACS  - EKG: sinus tach 104  - 1st set troponin negative  - trend troponins  - consult cardiology ([facility]), appreciate recs  - consider starting statin poss sec to demand ischemia R/O  ACS  - EKG: sinus tach 104  - 1st set troponin negative  - trend  CK/MB/troponin x Q 8 hrs  - consult cardiology ([facility]), appreciate recs  - consider starting statin, ECHO

## 2018-11-19 NOTE — H&P ADULT - PROBLEM SELECTOR PLAN 9
IMPROVE VTE Individual Risk Assessment          RISK                                                          Points    [  ] Previous VTE                                                3  [  ] Thrombophilia                                             2  [  ] Lower limb paralysis                                   2        (unable to hold up >15 seconds)    [  ] Current Cancer                                            2         (within 6 months)  [  ] Immobilization > 24 hrs                              1  [  ] ICU/CCU stay > 24 hours                            1  [ x ] Age > 60                                                    1    IMPROVE VTE Score ____1_____    Lovenox for vte ppx    - bowel regimen for constipation  - further management as per ICU

## 2018-11-19 NOTE — H&P ADULT - PROBLEM SELECTOR PLAN 7
IMPROVE VTE Individual Risk Assessment          RISK                                                          Points    [  ] Previous VTE                                                3  [  ] Thrombophilia                                             2  [  ] Lower limb paralysis                                   2        (unable to hold up >15 seconds)    [  ] Current Cancer                                            2         (within 6 months)  [  ] Immobilization > 24 hrs                              1  [  ] ICU/CCU stay > 24 hours                            1  [ x ] Age > 60                                                    1    IMPROVE VTE Score ____1_____    Lovenox for vte ppx    - bowel regimen for constipation  - further management as per ICU Hx of BOB dx by doppler, likely renovasular HTN  - on plavix 75mg qd at home  - c/w plavix 75mg qd  - consider starting statin Hx of BOB dx by doppler, 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

## 2018-11-20 LAB
ALBUMIN SERPL ELPH-MCNC: 3.5 G/DL — SIGNIFICANT CHANGE UP (ref 3.3–5)
ALP SERPL-CCNC: 91 U/L — SIGNIFICANT CHANGE UP (ref 40–120)
ALT FLD-CCNC: 27 U/L — SIGNIFICANT CHANGE UP (ref 12–78)
ANION GAP SERPL CALC-SCNC: 11 MMOL/L — SIGNIFICANT CHANGE UP (ref 5–17)
AST SERPL-CCNC: 23 U/L — SIGNIFICANT CHANGE UP (ref 15–37)
BILIRUB SERPL-MCNC: 2 MG/DL — HIGH (ref 0.2–1.2)
BUN SERPL-MCNC: 29 MG/DL — HIGH (ref 7–23)
CALCIUM SERPL-MCNC: 9.7 MG/DL — SIGNIFICANT CHANGE UP (ref 8.5–10.1)
CHLORIDE SERPL-SCNC: 102 MMOL/L — SIGNIFICANT CHANGE UP (ref 96–108)
CK MB BLD-MCNC: 2.3 % — SIGNIFICANT CHANGE UP (ref 0–3.5)
CK MB BLD-MCNC: 2.4 % — SIGNIFICANT CHANGE UP (ref 0–3.5)
CK MB BLD-MCNC: 3 % — SIGNIFICANT CHANGE UP (ref 0–3.5)
CK MB CFR SERPL CALC: 2.7 NG/ML — SIGNIFICANT CHANGE UP (ref 0–3.6)
CK MB CFR SERPL CALC: 5.5 NG/ML — HIGH (ref 0–3.6)
CK MB CFR SERPL CALC: 5.7 NG/ML — HIGH (ref 0–3.6)
CK SERPL-CCNC: 230 U/L — HIGH (ref 26–192)
CK SERPL-CCNC: 248 U/L — HIGH (ref 26–192)
CK SERPL-CCNC: 89 U/L — SIGNIFICANT CHANGE UP (ref 26–192)
CO2 SERPL-SCNC: 25 MMOL/L — SIGNIFICANT CHANGE UP (ref 22–31)
CREAT SERPL-MCNC: 0.92 MG/DL — SIGNIFICANT CHANGE UP (ref 0.5–1.3)
CULTURE RESULTS: NO GROWTH — SIGNIFICANT CHANGE UP
GLUCOSE SERPL-MCNC: 114 MG/DL — HIGH (ref 70–99)
HCT VFR BLD CALC: 32.4 % — LOW (ref 34.5–45)
HGB BLD-MCNC: 11.1 G/DL — LOW (ref 11.5–15.5)
INR BLD: 1.04 RATIO — SIGNIFICANT CHANGE UP (ref 0.88–1.16)
MAGNESIUM SERPL-MCNC: 1.7 MG/DL — SIGNIFICANT CHANGE UP (ref 1.6–2.6)
MCHC RBC-ENTMCNC: 30.1 PG — SIGNIFICANT CHANGE UP (ref 27–34)
MCHC RBC-ENTMCNC: 34.3 GM/DL — SIGNIFICANT CHANGE UP (ref 32–36)
MCV RBC AUTO: 87.8 FL — SIGNIFICANT CHANGE UP (ref 80–100)
NRBC # BLD: 0 /100 WBCS — SIGNIFICANT CHANGE UP (ref 0–0)
PHOSPHATE SERPL-MCNC: 1.9 MG/DL — LOW (ref 2.5–4.5)
PLATELET # BLD AUTO: 417 K/UL — HIGH (ref 150–400)
POTASSIUM SERPL-MCNC: 3.5 MMOL/L — SIGNIFICANT CHANGE UP (ref 3.5–5.3)
POTASSIUM SERPL-SCNC: 3.5 MMOL/L — SIGNIFICANT CHANGE UP (ref 3.5–5.3)
PROT SERPL-MCNC: 6.6 G/DL — SIGNIFICANT CHANGE UP (ref 6–8.3)
PROTHROM AB SERPL-ACNC: 11.9 SEC — SIGNIFICANT CHANGE UP (ref 10–12.9)
RBC # BLD: 3.69 M/UL — LOW (ref 3.8–5.2)
RBC # FLD: 12.4 % — SIGNIFICANT CHANGE UP (ref 10.3–14.5)
SODIUM SERPL-SCNC: 138 MMOL/L — SIGNIFICANT CHANGE UP (ref 135–145)
SPECIMEN SOURCE: SIGNIFICANT CHANGE UP
TROPONIN I SERPL-MCNC: 0.1 NG/ML — HIGH (ref 0.01–0.04)
TROPONIN I SERPL-MCNC: 0.19 NG/ML — HIGH (ref 0.01–0.04)
TROPONIN I SERPL-MCNC: 0.2 NG/ML — HIGH (ref 0.01–0.04)
WBC # BLD: 9.8 K/UL — SIGNIFICANT CHANGE UP (ref 3.8–10.5)
WBC # FLD AUTO: 9.8 K/UL — SIGNIFICANT CHANGE UP (ref 3.8–10.5)

## 2018-11-20 PROCEDURE — 99291 CRITICAL CARE FIRST HOUR: CPT

## 2018-11-20 PROCEDURE — 99233 SBSQ HOSP IP/OBS HIGH 50: CPT

## 2018-11-20 PROCEDURE — 93306 TTE W/DOPPLER COMPLETE: CPT | Mod: 26

## 2018-11-20 PROCEDURE — 93010 ELECTROCARDIOGRAM REPORT: CPT

## 2018-11-20 RX ORDER — LABETALOL HCL 100 MG
600 TABLET ORAL EVERY 12 HOURS
Qty: 0 | Refills: 0 | Status: DISCONTINUED | OUTPATIENT
Start: 2018-11-20 | End: 2018-11-21

## 2018-11-20 RX ORDER — MORPHINE SULFATE 50 MG/1
1 CAPSULE, EXTENDED RELEASE ORAL ONCE
Qty: 0 | Refills: 0 | Status: DISCONTINUED | OUTPATIENT
Start: 2018-11-20 | End: 2018-11-20

## 2018-11-20 RX ORDER — ENOXAPARIN SODIUM 100 MG/ML
50 INJECTION SUBCUTANEOUS EVERY 12 HOURS
Qty: 0 | Refills: 0 | Status: DISCONTINUED | OUTPATIENT
Start: 2018-11-20 | End: 2018-11-20

## 2018-11-20 RX ORDER — ENOXAPARIN SODIUM 100 MG/ML
50 INJECTION SUBCUTANEOUS EVERY 12 HOURS
Qty: 0 | Refills: 0 | Status: DISCONTINUED | OUTPATIENT
Start: 2018-11-20 | End: 2018-11-21

## 2018-11-20 RX ORDER — POTASSIUM PHOSPHATE, MONOBASIC POTASSIUM PHOSPHATE, DIBASIC 236; 224 MG/ML; MG/ML
30 INJECTION, SOLUTION INTRAVENOUS ONCE
Qty: 0 | Refills: 0 | Status: COMPLETED | OUTPATIENT
Start: 2018-11-20 | End: 2018-11-20

## 2018-11-20 RX ORDER — METOPROLOL TARTRATE 50 MG
25 TABLET ORAL
Qty: 0 | Refills: 0 | Status: DISCONTINUED | OUTPATIENT
Start: 2018-11-20 | End: 2018-11-20

## 2018-11-20 RX ORDER — SENNA PLUS 8.6 MG/1
2 TABLET ORAL AT BEDTIME
Qty: 0 | Refills: 0 | Status: CANCELLED | OUTPATIENT
Start: 2018-11-20 | End: 2018-11-21

## 2018-11-20 RX ORDER — DOCUSATE SODIUM 100 MG
100 CAPSULE ORAL
Qty: 0 | Refills: 0 | Status: CANCELLED | OUTPATIENT
Start: 2018-11-20 | End: 2018-11-21

## 2018-11-20 RX ORDER — ASPIRIN/CALCIUM CARB/MAGNESIUM 324 MG
81 TABLET ORAL DAILY
Qty: 0 | Refills: 0 | Status: DISCONTINUED | OUTPATIENT
Start: 2018-11-20 | End: 2018-11-21

## 2018-11-20 RX ORDER — FUROSEMIDE 40 MG
40 TABLET ORAL ONCE
Qty: 0 | Refills: 0 | Status: COMPLETED | OUTPATIENT
Start: 2018-11-20 | End: 2018-11-20

## 2018-11-20 RX ORDER — POTASSIUM CHLORIDE 20 MEQ
40 PACKET (EA) ORAL ONCE
Qty: 0 | Refills: 0 | Status: COMPLETED | OUTPATIENT
Start: 2018-11-20 | End: 2018-11-20

## 2018-11-20 RX ORDER — ACETAMINOPHEN 500 MG
1000 TABLET ORAL ONCE
Qty: 0 | Refills: 0 | Status: COMPLETED | OUTPATIENT
Start: 2018-11-20 | End: 2018-11-20

## 2018-11-20 RX ORDER — ATORVASTATIN CALCIUM 80 MG/1
40 TABLET, FILM COATED ORAL AT BEDTIME
Qty: 0 | Refills: 0 | Status: DISCONTINUED | OUTPATIENT
Start: 2018-11-20 | End: 2018-11-21

## 2018-11-20 RX ORDER — MAGNESIUM SULFATE 500 MG/ML
2 VIAL (ML) INJECTION ONCE
Qty: 0 | Refills: 0 | Status: COMPLETED | OUTPATIENT
Start: 2018-11-20 | End: 2018-11-20

## 2018-11-20 RX ADMIN — Medication 1000 MILLIGRAM(S): at 02:00

## 2018-11-20 RX ADMIN — Medication 25 MILLIGRAM(S): at 18:27

## 2018-11-20 RX ADMIN — Medication 300 MILLIGRAM(S): at 05:07

## 2018-11-20 RX ADMIN — LOSARTAN POTASSIUM 50 MILLIGRAM(S): 100 TABLET, FILM COATED ORAL at 18:27

## 2018-11-20 RX ADMIN — ATORVASTATIN CALCIUM 40 MILLIGRAM(S): 80 TABLET, FILM COATED ORAL at 22:03

## 2018-11-20 RX ADMIN — CLOPIDOGREL BISULFATE 75 MILLIGRAM(S): 75 TABLET, FILM COATED ORAL at 11:20

## 2018-11-20 RX ADMIN — Medication 300 MILLIGRAM(S): at 18:27

## 2018-11-20 RX ADMIN — ENOXAPARIN SODIUM 50 MILLIGRAM(S): 100 INJECTION SUBCUTANEOUS at 22:03

## 2018-11-20 RX ADMIN — POTASSIUM PHOSPHATE, MONOBASIC POTASSIUM PHOSPHATE, DIBASIC 85 MILLIMOLE(S): 236; 224 INJECTION, SOLUTION INTRAVENOUS at 12:22

## 2018-11-20 RX ADMIN — Medication 50 GRAM(S): at 11:20

## 2018-11-20 RX ADMIN — Medication 40 MILLIEQUIVALENT(S): at 11:20

## 2018-11-20 RX ADMIN — ENOXAPARIN SODIUM 40 MILLIGRAM(S): 100 INJECTION SUBCUTANEOUS at 11:20

## 2018-11-20 RX ADMIN — Medication 3 MICROGRAM(S)/MIN: at 05:08

## 2018-11-20 RX ADMIN — Medication 0.2 MILLIGRAM(S): at 13:45

## 2018-11-20 RX ADMIN — Medication 0.2 MILLIGRAM(S): at 22:03

## 2018-11-20 RX ADMIN — MORPHINE SULFATE 1 MILLIGRAM(S): 50 CAPSULE, EXTENDED RELEASE ORAL at 03:47

## 2018-11-20 RX ADMIN — Medication 81 MILLIGRAM(S): at 18:27

## 2018-11-20 RX ADMIN — Medication 40 MILLIGRAM(S): at 22:02

## 2018-11-20 RX ADMIN — Medication 400 MILLIGRAM(S): at 01:22

## 2018-11-20 RX ADMIN — MORPHINE SULFATE 1 MILLIGRAM(S): 50 CAPSULE, EXTENDED RELEASE ORAL at 04:00

## 2018-11-20 RX ADMIN — Medication 0.2 MILLIGRAM(S): at 05:08

## 2018-11-20 RX ADMIN — LOSARTAN POTASSIUM 50 MILLIGRAM(S): 100 TABLET, FILM COATED ORAL at 05:07

## 2018-11-20 NOTE — CONSULT NOTE ADULT - SUBJECTIVE AND OBJECTIVE BOX
CHIEF COMPLAINT: Patient is a 87y old  Female who presents with a chief complaint of Chest pain, SOB (20 Nov 2018 11:00)      HPI:  86 yo F with PMH of malignant HTN, possible right BOB,  moderate to severe MR,  dyslipidemia, CVA, hyponatremia, recurrent UTI who presents to the ED with chest pain. Pt states that she began experiencing chest pain at lunchtime. She was with her daughter and felt that she couldn't breath. Pain continued to get worse. States her cardiologists are in [place] but she felt so bad that she needed to get to the closest hospital. States she felt as though she were going to die. CP is located substernal, non-radiating, non-reproducible. Pt admits to CP, SOB, cough, leg edema. Pt denies fever, chills, palpitations, N/V, HA, changes in vision, dizziness. Per discussion with dtr on phone, her mother takes her BP at home and SBP is frequently 170-180 and that occasionally it is 200. Her BP medications were recently changed by her cardiologist (Marquita). She had an echo which revealed MV insufficiency and she is scheduled for follow up visit to evaluate necessity for replacement. Pt states that she has been being treated for a UTI with cefuroxime and just finished her course of Abx today.     She was found to be in pulmonary edema and started on Nitro gtt and given lasix. Today was noted to have an abnormal EKG     Her initial EKG was NSR nonspecific ST changes  Today she has anterior TWI c/w ischemia.     She still feel dyspneic but denies any further chest pain.                REVIEW OF SYSTEMS:   All other review of systems are negative unless indicated above    PAST MEDICAL & SURGICAL HISTORY:  Renal artery stenosis: possible on doppler  Basal cell carcinoma  Mitral valve insufficiency, unspecified etiology  Edema, unspecified type  Cerebrovascular accident (CVA), unspecified mechanism  Hyponatremia: h/o Hypokelema  Dyslipidemia  Essential hypertension  UTI (urinary tract infection)  H/O bilateral hip replacements  History of cholecystectomy  Status post hysterectomy  S/P Mohs surgery for basal cell carcinoma      SOCIAL HISTORY:  No tobacco, ethanol, or drug abuse.    FAMILY HISTORY:  Family history of carotid artery stenosis (Sibling)  Family history of uterine cancer (Sibling)    No family history of acute MI or sudden cardiac death.    MEDICATIONS  (STANDING):  aspirin  chewable 81 milliGRAM(s) Oral daily  atorvastatin 40 milliGRAM(s) Oral at bedtime  cloNIDine 0.2 milliGRAM(s) Oral three times a day  clopidogrel Tablet 75 milliGRAM(s) Oral daily  enoxaparin Injectable 50 milliGRAM(s) SubCutaneous every 12 hours  labetalol 300 milliGRAM(s) Oral every 12 hours  losartan 50 milliGRAM(s) Oral two times a day  metoprolol tartrate 25 milliGRAM(s) Oral two times a day    MEDICATIONS  (PRN):      Allergies    Keflex (Unknown)  verapamil (Other)    Intolerances        Home meds:  Home Medications:  cloNIDine 0.2 mg oral tablet: 1 tab(s) orally 3 times a day (19 Nov 2018 21:22)  labetalol 300 mg oral tablet: 1 tab(s) orally 2 times a day (19 Nov 2018 21:22)  losartan 50 mg oral tablet: 1 tab(s) orally 2 times a day (19 Nov 2018 21:22)  Plavix 75 mg oral tablet: 1 tab(s) orally once a day (19 Nov 2018 21:22)  Veltassa 16.8 g oral powder for reconstitution: 1 dose(s) orally once a day (19 Nov 2018 21:22)        VITAL SIGNS:   Vital Signs Last 24 Hrs  T(C): 36.6 (20 Nov 2018 15:26), Max: 37 (20 Nov 2018 07:30)  T(F): 97.8 (20 Nov 2018 15:26), Max: 98.6 (20 Nov 2018 07:30)  HR: 68 (20 Nov 2018 17:00) (67 - 119)  BP: 143/74 (20 Nov 2018 17:00) (119/56 - 227/123)  BP(mean): 102 (20 Nov 2018 17:00) (81 - 157)  RR: 29 (20 Nov 2018 17:00) (18 - 35)  SpO2: 96% (20 Nov 2018 17:00) (92% - 100%)    I&O's Summary    19 Nov 2018 07:01  -  20 Nov 2018 07:00  --------------------------------------------------------  IN: 247 mL / OUT: 1950 mL / NET: -1703 mL    20 Nov 2018 07:01  -  20 Nov 2018 18:10  --------------------------------------------------------  IN: 1543 mL / OUT: 1000 mL / NET: 543 mL        On Exam:  TELE: SR  Constitutional: NAD, awake and alert   HEENT: Moist Mucous Membranes, Anicteric  Pulmonary: Decreased breath sounds b/l. crackles b/l bases  Cardiovascular: Regular, S1 and S2, 1/6 SM  Gastrointestinal: Bowel Sounds present, soft, nontender.   Lymph: 1-2 peripheral edema. No lymphadenopathy.  Skin: No visible rashes or ulcers.  Psych:  Mood & affect appropriate    LABS: All Labs Reviewed:                        11.1   9.80  )-----------( 417      ( 20 Nov 2018 05:59 )             32.4                         12.4   10.71 )-----------( 470      ( 19 Nov 2018 18:12 )             38.1     20 Nov 2018 05:59    138    |  102    |  29     ----------------------------<  114    3.5     |  25     |  0.92   19 Nov 2018 18:12    139    |  106    |  30     ----------------------------<  174    4.1     |  22     |  0.90     Ca    9.7        20 Nov 2018 05:59  Ca    9.1        19 Nov 2018 18:12  Phos  1.9       20 Nov 2018 05:59  Mg     1.7       20 Nov 2018 05:59    TPro  6.6    /  Alb  3.5    /  TBili  2.0    /  DBili  x      /  AST  23     /  ALT  27     /  AlkPhos  91     20 Nov 2018 05:59  TPro  7.2    /  Alb  3.7    /  TBili  1.2    /  DBili  x      /  AST  22     /  ALT  30     /  AlkPhos  97     19 Nov 2018 18:12    PT/INR - ( 20 Nov 2018 05:59 )   PT: 11.9 sec;   INR: 1.04 ratio         PTT - ( 19 Nov 2018 18:12 )  PTT:29.4 sec  CARDIAC MARKERS ( 20 Nov 2018 09:48 )  .192 ng/mL / x     / 248 U/L / x     / 5.7 ng/mL  CARDIAC MARKERS ( 20 Nov 2018 02:30 )  .200 ng/mL / x     / 89 U/L / x     / 2.7 ng/mL  CARDIAC MARKERS ( 19 Nov 2018 18:12 )  <.015 ng/mL / x     / x     / x     / x          Blood Culture:   11-19 @ 18:12  Pro Bnp 6155        RADIOLOGY:       < from: Xray Chest 1 View-PORTABLE IMMEDIATE (11.19.18 @ 18:18) >    EXAM:  XR CHEST PORTABLE IMMED 1V                            PROCEDURE DATE:  11/19/2018          INTERPRETATION:  Short of breath, chest pain.     AP chest. Prior 12/19/2017.  Heart not accurately evaluated on this projection. Low lung volumes. New   extensive bilateral left worse than right predominantly basilar airspace   disease. Correlate for congestion and/or infection/aspiration. No pleural   effusion or pneumothorax.    Impression: As above                    YANETH MERIDA M.D., ATTENDING RADIOLOGIST  This document has been electronically signed. Nov 19 2018  6:29PM                < end of copied text >

## 2018-11-20 NOTE — PROGRESS NOTE ADULT - PROBLEM SELECTOR PLAN 8
identified by ECHO, per Pt to be evaluated with cardiologist Monday  - consult cardiology ([facility]), appreciate recs identified by previous ECHO, per Pt to be evaluated with cardiologist Monday  - f/u cardio recs

## 2018-11-20 NOTE — CONSULT NOTE ADULT - ASSESSMENT
86 yo F with PMH of malignant HTN, possible right BOB,  moderate to severe MR,  dyslipidemia, CVA, hyponatremia, recurrent UTI p/w HTN emergency and ADHF with dynamic EKG changes  - She appears to have malignant HTN that has been recently been getting worse.   - This could be secondary to OBB but this has been incompletely assessed in the past.     - At thsi point she is still HTN and vol ol  - I would give LAsix 40mg IV q12   - Please continue to maintain strict I/Os, monitor daily weights, Cr, and K.     - Has dynamic EKG changes concerning for anterior ischemia.   - Start hep gtt or full dose lovenox  - Cont to check serial ekgs  - Cont trending Cristhian.   - Cont DAPT and statin  - Spoke with pt at length. She will likely need a coronary angiogram +/- renal angio once better optimized    - her previous echos should mod to severe MR in setting of sig HTN  - Echo was repeat in setting of more controlled BP. Prelim MR is not severe.     - BP still uncontrolled.   - Cont Clonidine 0.2mg q12  - Cont labetelol. Can titrate to 600mg q12  - Cont losartan 50mg qday  - dc metoprolol  - if necessary start on isordil 10mg q8 for BP and ischemia    - Monitor and replete electrolytes. Keep K>4.0 and Mg>2.0.   - Further cardiac workup will depend on clinical course.   - All other workup per ICU. Will followup.     Patient at high risk for decompensation.  >35 minutes of critical care time was spent with this patient.

## 2018-11-20 NOTE — PROGRESS NOTE ADULT - PROBLEM SELECTOR PLAN 4
- proBNP 6155  - Acute heart failure, s/p 40mg IV Lasix in ER . Placed on BiPAP.  - Nitro gtt DC'ed.  - Continue Lasix  IV Daily, I/O.   -Trend CE q8hr.  TTE.  - RT Lower EXT Erythema, warm, Edema likely 2/2 fluid over load, Lasix    Lower extremity dopplers negative. Afebrile, Hold off Abx as per ICU - proBNP 6155, f/u repeat proBNP  - Acute heart failure, s/p 40mg IV Lasix in ER, hold for now  - Nitro gtt DC'ed.  - B/L LE swelling with pain likely 2/2 fluid OL. Lower extremity dopplers negative. Afebrile, Hold off Abx as per ICU

## 2018-11-20 NOTE — DIETITIAN INITIAL EVALUATION ADULT. - PROBLEM SELECTOR PLAN 4
- proBNP 6155  -Acute  heart failure, given 40mg IV Lasix in ER . Placed on BiPAP.  - Nitro gtt in ICU   -Continue Lasix  IV Daily, I/O .   -Trend CE q8hr.  TTE.  - RT Lower EXT Erythema, warm, Edema likely 2/2 fluid over load, Lasix    Lower extremity dopplers. Afebrile, Hold off Abx as per ICU

## 2018-11-20 NOTE — PROGRESS NOTE ADULT - PROBLEM SELECTOR PLAN 5
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

## 2018-11-20 NOTE — DIETITIAN INITIAL EVALUATION ADULT. - PROBLEM SELECTOR PLAN 5
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

## 2018-11-20 NOTE — DIETITIAN INITIAL EVALUATION ADULT. - ORAL INTAKE PTA
Pt reports good appetite/intake PTA. States she is very diligent about her diet; typically consuming 3 meals per day. Breakfast: eggs with toast, yogurt, or cereal with milk, lunch: sandwich, dinner: daughter cooks for her - typically a protein, vegetable, and salad. Pt taking Multivitamin, B-complex prior to admission./good

## 2018-11-20 NOTE — DIETITIAN INITIAL EVALUATION ADULT. - NS AS NUTRI INTERV ED CONTENT
Pt provided with written and verbal heart healthy/heart failure nutrition education. Topics discussed include: limiting sodium intake, increasing consumption of fruits/vegetables/whole grains, and limiting intake of saturated fat from red meat and full-fat dairy. Pt verbalized understanding of nutrition education and had no questions at this time. Will follow up regarding teach back ability.

## 2018-11-20 NOTE — DIETITIAN INITIAL EVALUATION ADULT. - PROBLEM SELECTOR PLAN 1
on nitroglycerin infusion, titrate for SBP <175  ( patients baseline -180)   - restart home Labetalol   -with CHF and acute pulmonary edema; BP in /100   - admit to ICU-H/O Renal artery stenosis  - on nitroglycerin gtt  - s/p 40mg Lasix IV push x 1 in ER   - on BiPAP  - consult cardiology ([facility]), ECHO

## 2018-11-20 NOTE — DIETITIAN INITIAL EVALUATION ADULT. - ENERGY NEEDS
Wt: 117 pounds, Ht: 60 inches, BMI: 22.8 kg/m2  +2 L ankle, leg, foot, +2 R leg, knee, ankle edema  No pressure injuries

## 2018-11-20 NOTE — PROGRESS NOTE ADULT - PROBLEM SELECTOR PLAN 1
- resolved, pt stable with -160s. (patients baseline -180)  - nitro infusion DC'ed  - continue home Labetalol  - admit to ICU-H/O Renal artery stenosis  - on nitroglycerin gtt  - s/p 40mg Lasix IV push x 1 in ER  - off BiPAP, monitor on NC  - cardiology consulted ([facility]), ECHO - resolved, pt stable with -160s. (patients baseline -180)  - nitro infusion DC'ed  - continue home Labetalol, clonidine, losartan  - admit to ICU-H/O Renal artery stenosis  - s/p 40mg Lasix IV push x 1 in ER, no further diuresis at this time  - cardiology consulted ([facility]), f/u ECHO read  - advance PO diet  - stable for transfer to tele if BP remains controlled  - mgmt per ICU

## 2018-11-20 NOTE — DIETITIAN INITIAL EVALUATION ADULT. - PROBLEM SELECTOR PLAN 3
poss sec to demand ischemia R/O  ACS  - EKG: sinus tach 104  - 1st set troponin negative  - trend  CK/MB/troponin x Q 8 hrs  - consult cardiology ([facility]), appreciate recs  - consider starting statin, ECHO

## 2018-11-20 NOTE — PROGRESS NOTE ADULT - ASSESSMENT
Neuro:  No active issues    Cardiac  Hypertensive emergency: SBP between 110s on nitroglycerin drip currently infusing at 10mcg/min  Received all outpatient oral antihypertensive agents this AM--stop nitroglycerin drip, continue outpatient medications, goal SBP between 150-180, patients SBP outpatient is erratic and varies from 160-180    Trend Cardiac Enzymes--first set negative, 2nd set @0230 troponin elevated (0.2) all other enzymes are WNL, no EKG changes, BNP elevated, could be in elevated due to demand ischemia in setting of acute heart failure exacerbation and hypertensive emergency, will check next set of enzymes at 10am with EKG.          Monitor lower extremity edema    Pulmonary  Patient on BIPAP overnight, now on oxygen supplementation via nasal cannula (2L) with oxygen saturation varying from 92-97%, supplemental oxygen as needed to maintain SPO2>92%    /Renal  Continue plavix 75mg once daily for renal artery stenosis, monitor electrolytes as patient is on valtessa at home  Intake and output monitoring  maintain potassium >4, phosphorus, >3, Magnesium >2    GI  resume diet now that patient no longer requires BIPAP: DASH diet    Heme  No active issues    ID  Patient recently completed outpatient oral antibiotics with cefuroxime for a UTI, UA negative for active infection, patient afebrile and WBC within normal limits    Endocrine  No active issues 87 year old female with past medical history of HTN, HLD, renal artery stenosis, CVA ( no residual deficits) presented to ED with worsening shortness of breath & chest pain. Patient admitted with acute hypoxic respiratory failure related to hypertensive crisis (SBP 220s) and acute decompensated heart failure.  She was placed on BiPAP & given 40mg IV lasix and started on nitroglycerin infusion. Labs significant for  BNP >6k, initial set of cardiac enzymes negative. Admitted to ICU for management of blood pressure and BIPAP.    Neuro:  No active issues    Cardiac  Hypertensive emergency: SBP between 110s on nitroglycerin drip currently infusing at 10mcg/min  Received all outpatient oral antihypertensive agents this AM--stop nitroglycerin drip, continue outpatient medications, goal SBP between 150-180, patients SBP outpatient is erratic and varies from 160-180    Trend Cardiac Enzymes--first set negative, 2nd set @0230 troponin elevated (0.2) all other enzymes are WNL, no EKG changes, BNP elevated, could be in elevated due to demand ischemia in setting of acute heart failure exacerbation and hypertensive emergency, will check next set of enzymes at 10am with EKG.      Had recent outpatient echo in August with normal LV and EF 60-65%, mitral insufficiency-patient is following up with cardiology and has appointment @ [facility] on Monday    Monitor lower extremity edema, currently improving as per patient, R>L, right lower extremity slightly erythematous no concern for cellulitis at present as WBC WNL and patient afebrile. Negative for DVT on doppler study    Pending echocardiogram    Pulmonary  Patient on BIPAP overnight, now on oxygen supplementation via nasal cannula (2L) with oxygen saturation varying from 92-97%, supplemental oxygen as needed to maintain SPO2>92%    /Renal  Continue plavix 75mg once daily for renal artery stenosis, monitor electrolytes as patient is on valtessa at home  Intake and output monitoring  maintain potassium >4, phosphorus, >3, Magnesium >2    GI  resume diet now that patient no longer requires BIPAP: DASH diet    Heme  No active issues    ID  Patient recently completed outpatient oral antibiotics with cefuroxime for a UTI, UA negative for active infection, patient afebrile and WBC within normal limits    Endocrine  No active issues

## 2018-11-20 NOTE — PROGRESS NOTE ADULT - PROBLEM SELECTOR PLAN 3
poss sec to demand ischemia R/O  ACS  - EKG: sinus tach 104  - 1st set troponin negative  - trend  CK/MB/troponin x Q 8 hrs  - consult cardiology ([facility]), appreciate recs  - consider starting statin, ECHO poss sec to demand ischemia R/O  ACS  likely 2/2 demand ischemia from HTN crisis  - EKG:   - 1st set troponin negative, 2nd set 2.0, 3rd set .192.   - f/u lipid panel  - consulted cardiology ([facility]), awaiting recs  - consider starting statin, f/u ECHO

## 2018-11-20 NOTE — PROGRESS NOTE ADULT - ASSESSMENT
Pt is a 86 yo F with PMH of HTN, renal artery stenosis, MV insufficiency, dyslipidemia, CVA, hyponatremia, recurrent UTI who presents to the ED with chest pain. Admitted for chest pain and SOB likely 2/2 pulmonary edema with CHF and hypertensive emergency.

## 2018-11-20 NOTE — DIETITIAN INITIAL EVALUATION ADULT. - OTHER INFO
Pt seen for nutrition assessment in ICU. Per chart, pt is 86 Y/O F with PMH of MV valve insufficiency, HTN, renal artery stenosis, dyslipidemia, CVA, hyponatremia, recurrent UTI admitted for chest pain/SOB and found to be in hypertensive crisis. Per pt, UBW is 116-120 pounds which has been stable since she was 30 years old. Denied N/V/diarrhea/constipation, no BM in house yet. Denied difficulties chewing/swallowing. NKFA.

## 2018-11-20 NOTE — DIETITIAN INITIAL EVALUATION ADULT. - ADHERENCE
Pt follows a heart healthy diet PTA. States she tries to avoid salt, fat, and monitor her portion size. Pt reports good dietary adherence and knowledge of nutrition (i.e., she tries to "eat the rainbow")./good

## 2018-11-20 NOTE — PROGRESS NOTE ADULT - PROBLEM SELECTOR PLAN 2
sec to pulmonary edema in setting of CHF sec to HTN Crisis  in ED O2 sat 71%, improved to 100% on BiPAP  - CXR: B/L L>R basilar airspace disease  - on BiPAP  - s/p 40mg lasix IV push   - strict I&Os  - consult cardiology ([facility]), appreciate recs  -Trial off BiPAP in am. markedly improved, likely 2/2 to pulmonary edema in setting of CHF with HTN Crisis in ED O2 sat 71%, improved to 100% on BiPAP.   - Now off BiPAP tolerating NC well.  - CXR: B/L L>R basilar airspace disease  - strict I&Os  - mgmt per ICU

## 2018-11-20 NOTE — PROGRESS NOTE ADULT - SUBJECTIVE AND OBJECTIVE BOX
24 hour events:     Review of Systems:  Constitutional: No fever, chills, fatigue  Neuro: No headache, numbness, weakness  Resp: No cough, wheezing, shortness of breath  CVS: No chest pain, palpitations, leg swelling  GI: No abdominal pain, nausea, vomiting, diarrhea   : No dysuria, frequency, incontinence  Skin: No itching, burning, rashes, or lesions   Msk: No joint pain or swelling  Psych: No depression, anxiety, mood swings    T(F): 98.6 (18 @ 07:30), Max: 98.6 (18 @ 07:30)  HR: 77 (18 @ 10:00) (70 - 119)  BP: 169/75 (18 @ 10:00) (119/56 - 227/123)  RR: 26 (18 @ 10:00) (18 - 35)  SpO2: 93% (18 @ 10:00) (81% - 100%)  Wt(kg): --        CAPILLARY BLOOD GLUCOSE          I&O's Summary    2018 07:01  -  2018 07:00  --------------------------------------------------------  IN: 247 mL / OUT: 1950 mL / NET: -1703 mL    2018 07:01  -  2018 10:23  --------------------------------------------------------  IN: 3 mL / OUT: 300 mL / NET: -297 mL        Physical Exam:   Gen:  Neuro:  HEENT:  Resp:  CVS:  Abd:  Ext:  Skin:    Meds:    cloNIDine Oral  labetalol Oral  losartan Oral            clopidogrel Tablet Oral  enoxaparin Injectable SubCutaneous        potassium chloride    Tablet ER Oral                                  11.1   9.80  )-----------( 417      ( 2018 05:59 )             32.4           138  |  102  |  29<H>  ----------------------------<  114<H>  3.5   |  25  |  0.92    Ca    9.7      2018 05:59  Phos  1.9     11-20  Mg     1.7     11-20    TPro  6.6  /  Alb  3.5  /  TBili  2.0<H>  /  DBili  x   /  AST  23  /  ALT  27  /  AlkPhos  91  11-20      CARDIAC MARKERS ( 2018 02:30 )  .200 ng/mL / x     / 89 U/L / x     / 2.7 ng/mL  CARDIAC MARKERS ( 2018 18:12 )  <.015 ng/mL / x     / x     / x     / x          PT/INR - ( 2018 05:59 )   PT: 11.9 sec;   INR: 1.04 ratio         PTT - ( 2018 18:12 )  PTT:29.4 sec  Urinalysis Basic - ( 2018 22:02 )    Color: Pale Yellow / Appearance: Clear / S.005 / pH: x  Gluc: x / Ketone: Negative  / Bili: Negative / Urobili: Negative   Blood: x / Protein: 25 mg/dL / Nitrite: Negative   Leuk Esterase: Negative / RBC: Negative /HPF / WBC Negative   Sq Epi: x / Non Sq Epi: Negative / Bacteria: Negative                  Radiology: ***    Bedside ultrasound: ***    CENTRAL LINE: N/Y          DATE INSERTED:              REMOVE: Y/N  JAMES: N/Y                       DATE INSERTED:              REMOVE: Y/N  A-LINE: N/Y                       DATE INSERTED:              REMOVE: Y/N    GLOBAL ISSUE/BEST PRACTICE:  Analgesia:  Sedation:  CAM-ICU:   HOB elevation: yes  Stress ulcer prophylaxis:  VTE prophylaxis:  Glycemic control:  Nutrition:    CODE STATUS: *** 24 hour events:   remains on nitroglycerin drip with SBP 110s  On BIPAP overnight-now off and on nasal cannula with saturations >92%    Review of Systems:  Constitutional: No fever, chills, fatigue  Neuro: Denies headache, numbness, weakness  Resp: Denies cough, shortness of breath or wheezing  Cardiovascular: Denies chest pain or palpitations, endorses lower extremity edema which has improved since admission  GI: Denies nausea, vomiting  : Denies dysuria, frequency, incontinence, endorses frequent UTIs  Skin: No itching, burning, rashes, or lesions   Peripheral Vascular: Endorses lower extremity edema R>L which is chronic in nature and improved since admission    T(F): 98.6 (18 @ 07:30), Max: 98.6 (18 @ 07:30)  HR: 77 (18 @ 10:00) (70 - 119)  BP: 169/75 (18 @ 10:00) (119/56 - 227/123)  RR: 26 (18 @ 10:00) (18 - 35)  SpO2: 93% (18 @ 10:00) (81% - 100%)  Wt(kg): --    CAPILLARY BLOOD GLUCOSE    I&O's Summary    2018 07:  -  2018 07:00  --------------------------------------------------------  IN: 247 mL / OUT: 1950 mL / NET: -1703 mL    2018 07:  -  2018 10:23  --------------------------------------------------------  IN: 3 mL / OUT: 300 mL / NET: -297 mL        Physical Exam:   General: Awake, resting in bed comfortably  Neuro: Awake, alert & oriented x4  HEENT: Pupils equal and reactive, no JVD  Pulmonary: fine crackles to left lung bases, diminished right lung base  Cardiac: Regular rate & rhythm, normal S1&S2, no murmurs or gallops, +2 pitting edema to right lower extremity & ankle, +1 pitting edema to left lower extremity,   Abdomen: soft, non-distended, bowel sounds x4  Peripheral Vascular: hypertrophic discolored toenails on all 10 digits, capillary refill <3 seconds, bilateral lower extremities warm and red to touch (R>L)  Skin: intact    Meds:    cloNIDine Oral  labetalol Oral  losartan Oral    clopidogrel Tablet Oral  enoxaparin Injectable SubCutaneous    potassium chloride    Tablet ER Oral               11.1   9.80  )-----------( 417      ( 2018 05:59 )             32.4       11-20    138  |  102  |  29<H>  ----------------------------<  114<H>  3.5   |  25  |  0.92    Ca    9.7      2018 05:59  Phos  1.9     11-20  Mg     1.7     -20    TPro  6.6  /  Alb  3.5  /  TBili  2.0<H>  /  DBili  x   /  AST  23  /  ALT  27  /  AlkPhos  91  11-20      CARDIAC MARKERS ( 2018 02:30 )  .200 ng/mL / x     / 89 U/L / x     / 2.7 ng/mL  CARDIAC MARKERS ( 2018 18:12 )  <.015 ng/mL / x     / x     / x     / x          PT/INR - ( 2018 05:59 )   PT: 11.9 sec;   INR: 1.04 ratio         PTT - ( 2018 18:12 )  PTT:29.4 sec  Urinalysis Basic - ( 2018 22:02 )    Color: Pale Yellow / Appearance: Clear / S.005 / pH: x  Gluc: x / Ketone: Negative  / Bili: Negative / Urobili: Negative   Blood: x / Protein: 25 mg/dL / Nitrite: Negative   Leuk Esterase: Negative / RBC: Negative /HPF / WBC Negative   Sq Epi: x / Non Sq Epi: Negative / Bacteria: Negativ        Radiology: ***< from: US Duplex Venous Lower Ext Complete, Bilateral (18 @ 22:43) >  EXAM:  US DPLX LWR EXT VEINS COMPL BI                            PROCEDURE DATE:  2018          INTERPRETATION:  [facility] RADIOLOGIST PRELIMINARY REPORT    EXAM:    US Bilateral Duplex Lower Extremity Veins     EXAM DATE/TIME:    2018 8:42 PM     CLINICAL HISTORY:    87 years old, female; Signs and symptoms; Swelling (edema) of limb;   Lower   extremity, bilateral     TECHNIQUE:    Real-time duplex ultrasound of the Bilateral Lower Extremities with 2-D   gray   scale, color Doppler flowand spectral waveform analysis. Complete exam   focused   on the bilateral lower extremity veins.     COMPARISON:    No relevant prior studies available.     FINDINGS:    Right deep veins: Unremarkable. The common femoral, femoral and   popliteal   veins are patent without thrombus. Normal compressibility, augmentation   response and Doppler waveforms.    Right superficial veins: Saphenofemoral junction is patent without   thrombus.      Left deep veins: Unremarkable. The common femoral, femoral and popliteal   veins   are patent without thrombus. Normal compressibility, augmentation   response and   Doppler waveforms.    Left superficial veins: Saphenofemoral junction is patent without   thrombus.    Soft tissues: Unremarkable.     IMPRESSION:   No acute findings. No evidence of deep vein thrombosis.    Official report:    CLINICAL STATEMENT: Swelling leg.    TECHNIQUE: Ultrasound of bilateral lower extremity deep venous system.    COMPARISON: None.    FINDINGS:  There is color and spectral flow, compression and augmentation of the   common femoral, superficial femoral and popliteal veins.    There is flow in the posterior tibial vein.    IMPRESSION:  No evidence of DVT.       LORNA ARNOLD M.D., ATTENDING RADIOLOGIST  This document has been electronically signed. 2018  7:38AM            < end of copied text >    < from: Xray Chest 1 View-PORTABLE IMMEDIATE (18 @ 18:18) >  XAM:  XR CHEST PORTABLE IMMED 1V                            PROCEDURE DATE:  2018          INTERPRETATION:  Short of breath, chest pain.     AP chest. Prior 2017.  Heart not accurately evaluated on this projection. Low lung volumes. New   extensive bilateral left worse than right predominantly basilar airspace   disease. Correlate for congestion and/or infection/aspiration. No pleural   effusion or pneumothorax.    Impression: As above          YANETH MERIDA M.D., ATTENDING RADIOLOGIST  This document has been electronically signed. 2018  6:29PM        < end of copied text >      CENTRAL LINE: N/Y          DATE INSERTED:              REMOVE: Y/N  JAMES: N/Y                       DATE INSERTED:              REMOVE: Y/N  A-LINE: N/Y                       DATE INSERTED:              REMOVE: Y/N    GLOBAL ISSUE/BEST PRACTICE:  Analgesia:N/A  Sedation:N/A  CAM-ICU: N/A  HOB elevation: yes  Stress ulcer prophylaxis:N/A  VTE prophylaxis: Lovenox SQ  Glycemic control:N/A  Nutrition:DASH    CODE STATUS: ***Full Code 24 hour events:   remains on nitroglycerin drip with SBP 110s  On BIPAP overnight-now off and on nasal cannula with saturations >92%    Review of Systems:  Constitutional: No fever, chills, fatigue  Neuro: Denies headache, numbness, weakness  Resp: Denies cough, shortness of breath or wheezing  Cardiovascular: Denies chest pain or palpitations, endorses lower extremity edema which has improved since admission  GI: Denies nausea, vomiting  : Denies dysuria, frequency, incontinence, endorses frequent UTIs  Skin: No itching, burning, rashes, or lesions   Peripheral Vascular: Endorses lower extremity edema R>L which is chronic in nature and improved since admission    T(F): 98.6 (18 @ 07:30), Max: 98.6 (18 @ 07:30)  HR: 77 (18 @ 10:00) (70 - 119)  BP: 169/75 (18 @ 10:00) (119/56 - 227/123)  RR: 26 (18 @ 10:00) (18 - 35)  SpO2: 93% (18 @ 10:00) (81% - 100%)  Wt(kg): --    CAPILLARY BLOOD GLUCOSE    I&O's Summary    2018 07:  -  2018 07:00  --------------------------------------------------------  IN: 247 mL / OUT: 1950 mL / NET: -1703 mL    2018 07:  -  2018 10:23  --------------------------------------------------------  IN: 3 mL / OUT: 300 mL / NET: -297 mL        Physical Exam:   General: Awake, resting in bed comfortably  Neuro: Awake, alert & oriented x4  HEENT: Pupils equal and reactive, no JVD  Pulmonary: fine crackles to left lung bases, diminished right lung base  Cardiac: Regular rate & rhythm, normal S1&S2, no murmurs or gallops, +2 pitting edema to right lower extremity & ankle, +1 pitting edema to left lower extremity,   Abdomen: soft, non-distended, bowel sounds x4  Peripheral Vascular: hypertrophic discolored toenails on all 10 digits, capillary refill <3 seconds, bilateral lower extremities warm and red to touch (R>L)  Skin: intact    Meds:    cloNIDine Oral  labetalol Oral  losartan Oral    clopidogrel Tablet Oral  enoxaparin Injectable SubCutaneous    potassium chloride    Tablet ER Oral               11.1   9.80  )-----------( 417      ( 2018 05:59 )             32.4       11-20    138  |  102  |  29<H>  ----------------------------<  114<H>  3.5   |  25  |  0.92    Ca    9.7      2018 05:59  Phos  1.9     11-20  Mg     1.7     -20    TPro  6.6  /  Alb  3.5  /  TBili  2.0<H>  /  DBili  x   /  AST  23  /  ALT  27  /  AlkPhos  91  11-20      CARDIAC MARKERS ( 2018 02:30 )  .200 ng/mL / x     / 89 U/L / x     / 2.7 ng/mL  CARDIAC MARKERS ( 2018 18:12 )  <.015 ng/mL / x     / x     / x     / x          PT/INR - ( 2018 05:59 )   PT: 11.9 sec;   INR: 1.04 ratio         PTT - ( 2018 18:12 )  PTT:29.4 sec  Urinalysis Basic - ( 2018 22:02 )    Color: Pale Yellow / Appearance: Clear / S.005 / pH: x  Gluc: x / Ketone: Negative  / Bili: Negative / Urobili: Negative   Blood: x / Protein: 25 mg/dL / Nitrite: Negative   Leuk Esterase: Negative / RBC: Negative /HPF / WBC Negative   Sq Epi: x / Non Sq Epi: Negative / Bacteria: Negativ        Radiology: ***< from: US Duplex Venous Lower Ext Complete, Bilateral (18 @ 22:43) >  EXAM:  US DPLX LWR EXT VEINS COMPL BI                            PROCEDURE DATE:  2018          INTERPRETATION:  [facility] RADIOLOGIST PRELIMINARY REPORT    EXAM:    US Bilateral Duplex Lower Extremity Veins     EXAM DATE/TIME:    2018 8:42 PM     CLINICAL HISTORY:    87 years old, female; Signs and symptoms; Swelling (edema) of limb;   Lower   extremity, bilateral     TECHNIQUE:    Real-time duplex ultrasound of the Bilateral Lower Extremities with 2-D   gray   scale, color Doppler flowand spectral waveform analysis. Complete exam   focused   on the bilateral lower extremity veins.     COMPARISON:    No relevant prior studies available.     FINDINGS:    Right deep veins: Unremarkable. The common femoral, femoral and   popliteal   veins are patent without thrombus. Normal compressibility, augmentation   response and Doppler waveforms.    Right superficial veins: Saphenofemoral junction is patent without   thrombus.      Left deep veins: Unremarkable. The common femoral, femoral and popliteal   veins   are patent without thrombus. Normal compressibility, augmentation   response and   Doppler waveforms.    Left superficial veins: Saphenofemoral junction is patent without   thrombus.    Soft tissues: Unremarkable.     IMPRESSION:   No acute findings. No evidence of deep vein thrombosis.    Official report:    CLINICAL STATEMENT: Swelling leg.    TECHNIQUE: Ultrasound of bilateral lower extremity deep venous system.    COMPARISON: None.    FINDINGS:  There is color and spectral flow, compression and augmentation of the   common femoral, superficial femoral and popliteal veins.    There is flow in the posterior tibial vein.    IMPRESSION:  No evidence of DVT.       LORNA ARNOLD M.D., ATTENDING RADIOLOGIST  This document has been electronically signed. 2018  7:38AM            < end of copied text >    < from: Xray Chest 1 View-PORTABLE IMMEDIATE (18 @ 18:18) >  XAM:  XR CHEST PORTABLE IMMED 1V                            PROCEDURE DATE:  2018          INTERPRETATION:  Short of breath, chest pain.     AP chest. Prior 2017.  Heart not accurately evaluated on this projection. Low lung volumes. New   extensive bilateral left worse than right predominantly basilar airspace   disease. Correlate for congestion and/or infection/aspiration. No pleural   effusion or pneumothorax.    Impression: As above          YANETH MERIDA M.D., ATTENDING RADIOLOGIST  This document has been electronically signed. 2018  6:29PM        < end of copied text >      CENTRAL LINE: NO  JAMES: Yes, remove later in day   A-LINE: NO    GLOBAL ISSUE/BEST PRACTICE:  Analgesia:N/A  Sedation:N/A  CAM-ICU: N/A  HOB elevation: yes  Stress ulcer prophylaxis:N/A  VTE prophylaxis: Lovenox SQ  Glycemic control:N/A  Nutrition:DASH    CODE STATUS: ***Full Code

## 2018-11-20 NOTE — DIETITIAN INITIAL EVALUATION ADULT. - PROBLEM SELECTOR PLAN 2
sec to pulmonary edema in setting of CHF sec to HTN Crisis  in ED O2 sat 71%, improved to 100% on BiPAP  - CXR: B/L L>R basilar airspace disease  - on BiPAP  - s/p 40mg lasix IV push   - strict I&Os  - consult cardiology ([facility]), appreciate recs  -Trial off BiPAP in am.

## 2018-11-20 NOTE — PROGRESS NOTE ADULT - SUBJECTIVE AND OBJECTIVE BOX
Patient is a 87y old  Female who presents with a chief complaint of Chest pain, SOB (2018 10:22)      INTERVAL HPI:      OVERNIGHT EVENTS:    Home Medications:  cloNIDine 0.2 mg oral tablet: 1 tab(s) orally 3 times a day (2018 21:22)  labetalol 300 mg oral tablet: 1 tab(s) orally 2 times a day (2018 21:22)  losartan 50 mg oral tablet: 1 tab(s) orally 2 times a day (2018 21:22)  Plavix 75 mg oral tablet: 1 tab(s) orally once a day (2018 21:22)  Veltassa 16.8 g oral powder for reconstitution: 1 dose(s) orally once a day (2018 21:22)      MEDICATIONS  (STANDING):  cloNIDine 0.2 milliGRAM(s) Oral three times a day  clopidogrel Tablet 75 milliGRAM(s) Oral daily  enoxaparin Injectable 40 milliGRAM(s) SubCutaneous daily  labetalol 300 milliGRAM(s) Oral every 12 hours  losartan 50 milliGRAM(s) Oral two times a day  magnesium sulfate  IVPB 2 Gram(s) IV Intermittent once  potassium chloride    Tablet ER 40 milliEquivalent(s) Oral once  potassium phosphate IVPB 30 milliMole(s) IV Intermittent once    MEDICATIONS  (PRN):      Allergies    Keflex (Unknown)  verapamil (Other)    Intolerances        REVIEW OF SYSTEMS:  CONSTITUTIONAL: No fever, No chills, No fatigue, No myalgia, No Body ache, No Weakness  EYES: No eye pain,  No visual disturbances, No discharge, NO Redness  ENMT:  No ear pain, No nose bleed, No vertigo; No sinus or throat pain, No Congestion  NECK: No pain, No stiffness  RESPIRATORY: No cough, wheezing, No  hemoptysis, No shortness of breath  CARDIOVASCULAR: No chest pain, palpitations  GASTROINTESTINAL: No abdominal or epigastric pain. No nausea, No vomiting; No diarrhea or constipation. [  ] BM  GENITOURINARY: No dysuria, No frequency, No urgency, No hematuria, or incontinence  NEUROLOGICAL: No headaches, No dizziness, No numbness, No tingling, No tremors, No weakness  EXT: No Swelling, No Pain, No Edema  SKIN:  [  ] No itching, burning, rashes, or lesions   MUSCULOSKELETAL: No joint pain or swelling; No muscle pain, No back pain, No extremity pain  PSYCHIATRIC: No depression, anxiety, mood swings or difficulty sleeping at night  PAIN SCALE: [  ] None  [  ] Other-  ROS Unable to obtain due to - [  ] Dementia  [  ] Lethargy  [  ] Sedated [  ] non verbal  REST OF REVIEW Of SYSTEM - [  ] Normal     Vital Signs Last 24 Hrs  T(C): 37 (2018 07:30), Max: 37 (2018 07:30)  T(F): 98.6 (2018 07:30), Max: 98.6 (2018 07:30)  HR: 84 (2018 10:30) (70 - 119)  BP: 141/67 (2018 10:30) (119/56 - 227/123)  BP(mean): 96 (2018 10:) (81 - 157)  RR: 24 (2018 10:30) (18 - 35)  SpO2: 95% (2018 10:30) (81% - 100%)  Finger Stick         @ 07:  -   @ 07:00  --------------------------------------------------------  IN: 247 mL / OUT: 1950 mL / NET: -1703 mL     @ 07:  -   @ 11:00  --------------------------------------------------------  IN: 3 mL / OUT: 300 mL / NET: -297 mL        PHYSICAL EXAM:  GENERAL:  [  ] NAD , [  ] well appearing, [  ] Agitated, [  ] Drowsy,  [  ] Lethargy, [  ] confused   HEAD:  [  ] Normal, [  ] Other  EYES:  [  ] EOMI, [  ] PERRLA, [  ] conjunctiva and sclera clear normal, [  ] Other,  [  ] Pallor,[  ] Discharge  ENMT:  [  ] Normal, [  ] Moist mucous membranes, [  ] Good dentition, [  ] No Thrush  NECK:  [  ] Supple, [  ] No JVD, [  ] Normal thyroid, [  ] Lymphadenopathy [  ] Other  CHEST/LUNG:  [  ] Clear to auscultation bilaterally, [  ] Breath Sounds equal OR Decrease,  [  ] No rales, [  ] No rhonchi  [  ]  No wheezing  HEART:  [  ] Regular rate and rhythm, [  ] tachycardia, [  ] Bradycardia,  [  ] irregular  [  ] No murmurs, No rubs, No gallops, [  ] PPM in place (Mfr:  )  ABDOMEN:  [  ] Soft, [  ] Nontender, [  ] Nondistended, [  ] No mass, [  ] Bowel sounds present, [  ] obese  NERVOUS SYSTEM:  [  ] Alert & Oriented X3, [  ] Nonfocal  [  ] Confusion  [  ] Encephalopathic [  ] Sedated [  ] Unable to assess, [  ] Other-  EXTREMITIES: [  ] 2+ Peripheral Pulses, No clubbing, No cyanosis,  [  ] edema B/L lower EXT. [  ] PVD stasis skin changes B/L Lower EXT  LYMPH: No lymphadenopathy noted  SKIN:  [  ] No rashes or lesions, [  ] Pressure Ulcers, [  ] ecchymosis, [  ] Skin Tears, [  ] Other    DIET:     LABS:                        11.1   9.80  )-----------( 417      ( 2018 05:59 )             32.4     2018 05:59    138    |  102    |  29     ----------------------------<  114    3.5     |  25     |  0.92     Ca    9.7        2018 05:59  Phos  1.9       2018 05:59  Mg     1.7       2018 05:59    TPro  6.6    /  Alb  3.5    /  TBili  2.0    /  DBili  x      /  AST  23     /  ALT  27     /  AlkPhos  91     2018 05:59    PT/INR - ( 2018 05:59 )   PT: 11.9 sec;   INR: 1.04 ratio         PTT - ( 2018 18:12 )  PTT:29.4 sec  Urinalysis Basic - ( 2018 22:02 )    Color: Pale Yellow / Appearance: Clear / S.005 / pH: x  Gluc: x / Ketone: Negative  / Bili: Negative / Urobili: Negative   Blood: x / Protein: 25 mg/dL / Nitrite: Negative   Leuk Esterase: Negative / RBC: Negative /HPF / WBC Negative   Sq Epi: x / Non Sq Epi: Negative / Bacteria: Negative                CARDIAC MARKERS ( 2018 09:48 )  .192 ng/mL / x     / 248 U/L / x     / 5.7 ng/mL  CARDIAC MARKERS ( 2018 02:30 )  .200 ng/mL / x     / 89 U/L / x     / 2.7 ng/mL  CARDIAC MARKERS ( 2018 18:12 )  <.015 ng/mL / x     / x     / x     / x                 Anemia Panel:      Thyroid Panel:            Serum Pro-Brain Natriuretic Peptide: 6155 pg/mL (18 @ 18:12)      RADIOLOGY & ADDITIONAL TESTS:      HEALTH ISSUES - PROBLEM Dx:  Cerebrovascular accident (CVA), unspecified mechanism: Cerebrovascular accident (CVA), unspecified mechanism  Hypertensive crisis: Hypertensive crisis  Chest pain, unspecified type: Chest pain, unspecified type  Acute respiratory failure: Acute respiratory failure  Hypertensive emergency: Hypertensive emergency  Prophylactic measure: Prophylactic measure  Mitral valve insufficiency, unspecified etiology: Mitral valve insufficiency, unspecified etiology  Renal artery stenosis: Renal artery stenosis  Essential hypertension: Essential hypertension  Acute pulmonary edema with congestive heart failure: Acute pulmonary edema with congestive heart failure          Consultant(s) Notes Reviewed:  [  ] YES     Care Discussed with [X] Consultants  [  ] Patient  [  ] Family  [  ]   [  ] Social Service  [  ] RN, [  ] Physical Therapy  DVT PPX: [  ] Lovenox, [  ] S C Heparin, [  ] Coumadin, [  ] Xarelto, [  ] Eliquis, [  ] Pradaxa, [  ] IV Heparin drip, [  ] SCD [  ] Contraindication 2 to GI Bleed,[  ] Ambulation  Advanced directive: [  ] None, [  ] DNR/DNI Patient is a 87y old  Female who presents with a chief complaint of Chest pain, SOB (2018 10:22)      INTERVAL HPI: Pt is a 86 yo F with PMH of HTN, renal artery stenosis, MV insufficiency, dyslipidemia, CVA, hyponatremia, recurrent UTI who presents to the ED with chest pain. Pt states that she began experiencing chest pain at lunchtime. She was with her daughter and felt that she couldn't breath. Pain continued to get worse. States her cardiologists are in [place] but she felt so bad that she needed to get to the closest hospital. States she felt as though she were going to die. CP is located substernal, non-radiating, non-reproducible. Pt admits to CP, SOB, cough, leg edema. Pt denies fever, chills, palpitations, N/V, HA, changes in vision, dizziness. Per discussion with dtr on phone, her mother takes her BP at home and SBP is frequently 170-180 and that occasionally it is 200. Her BP medications were recently changed by her cardiologist (Marquita). She had an echo which revealed MV insufficiency and she is scheduled for follow up visit to evaluate necessity for replacement. Pt states that she has been being treated for a UTI with cefuroxime and just finished her course of Abx today. Pt's sister also had HTN. Pt denies FHx of cardiac problems. Pt is suppose to have a Evaluation with Cardiac Sx for leaky Mitral Valve at [facility] next week Monday.    In ED, Pt's vitals were: T 98.3, , /100, RR 33 and 81% on supplemental O2.   Repeat vitals were: HR 82, /83, RR 20 and 100% on BiPAP  Labs significant for proBNP 6155. 1st set troponins neg. Other labs grossly WNL.  EKG - sinus tachy, 105  CXR - B/L L>R basilar airspace disease    Pt given nitroglycerin sublingual, started on nitroglycerin gtt, Lasix 40mg IV push. Placed on Bi PAP, Pt seen by ICU & admitted to ICU.    18: Pt seen and examined at ICU bedside. Pt states she feels much better than yesterday. Currently denying HA, dizziness, CP, SOB, N/V/D. Off BiPAP, currently tolerating NC well.    OVERNIGHT EVENTS: NONE    Home Medications:  cloNIDine 0.2 mg oral tablet: 1 tab(s) orally 3 times a day (2018 21:22)  labetalol 300 mg oral tablet: 1 tab(s) orally 2 times a day (2018 21:22)  losartan 50 mg oral tablet: 1 tab(s) orally 2 times a day (2018 21:22)  Plavix 75 mg oral tablet: 1 tab(s) orally once a day (2018 21:22)  Veltassa 16.8 g oral powder for reconstitution: 1 dose(s) orally once a day (2018 21:22)      MEDICATIONS  (STANDING):  cloNIDine 0.2 milliGRAM(s) Oral three times a day  clopidogrel Tablet 75 milliGRAM(s) Oral daily  enoxaparin Injectable 40 milliGRAM(s) SubCutaneous daily  labetalol 300 milliGRAM(s) Oral every 12 hours  losartan 50 milliGRAM(s) Oral two times a day  magnesium sulfate  IVPB 2 Gram(s) IV Intermittent once  potassium chloride    Tablet ER 40 milliEquivalent(s) Oral once  potassium phosphate IVPB 30 milliMole(s) IV Intermittent once    MEDICATIONS  (PRN):      Allergies    Keflex (Unknown)  verapamil (Other)    Intolerances        REVIEW OF SYSTEMS:  CONSTITUTIONAL: No fever, No chills, No fatigue, No myalgia, No Body ache, No Weakness  EYES: No eye pain,  No visual disturbances, No discharge, NO Redness  ENMT:  No ear pain, No nose bleed, No vertigo; No sinus or throat pain, No Congestion  NECK: No pain, No stiffness  RESPIRATORY: No cough, wheezing, No  hemoptysis, No shortness of breath  CARDIOVASCULAR: No chest pain, palpitations  GASTROINTESTINAL: No abdominal or epigastric pain. No nausea, No vomiting; No diarrhea or constipation. [  ] BM  GENITOURINARY: No dysuria, No frequency, No urgency, No hematuria, or incontinence  NEUROLOGICAL: No headaches, No dizziness, No numbness, No tingling, No tremors, No weakness  EXT: B/L leg swelling and pain on palpation  SKIN:  [ x ] No itching, burning, rashes, or lesions   MUSCULOSKELETAL: No joint pain or swelling; No muscle pain, No back pain, No extremity pain  PSYCHIATRIC: No depression, anxiety, mood swings or difficulty sleeping at night  PAIN SCALE: [  ] None  [  ] Other-  ROS Unable to obtain due to - [  ] Dementia  [  ] Lethargy  [  ] Sedated [  ] non verbal  REST OF REVIEW Of SYSTEM - [ x ] Normal     Vital Signs Last 24 Hrs  T(C): 37 (2018 07:30), Max: 37 (2018 07:30)  T(F): 98.6 (2018 07:30), Max: 98.6 (2018 07:30)  HR: 84 (2018 10:) (70 - 119)  BP: 141/67 (2018 10:30) (119/56 - 227/123)  BP(mean): 96 (2018 10:) (81 - 157)  RR: 24 (2018 10:) (18 - 35)  SpO2: 95% (2018 10:30) (81% - 100%)  Finger Stick         @ 07:01  -   @ 07:00  --------------------------------------------------------  IN: 247 mL / OUT: 1950 mL / NET: -1703 mL     @ 07:  -   @ 11:00  --------------------------------------------------------  IN: 3 mL / OUT: 300 mL / NET: -297 mL        PHYSICAL EXAM:  GENERAL:  [ x ] NAD , [  ] well appearing, [  ] Agitated, [  ] Drowsy,  [  ] Lethargy, [  ] confused   HEAD:  [ x ] Normal, [  ] Other  EYES:  [  ] EOMI, [  ] PERRLA, [  ] conjunctiva and sclera clear normal, [  ] Other,  [  ] Pallor,[  ] Discharge  ENMT:  [ x ] Normal, [ x ] Moist mucous membranes, [  ] Good dentition, [  ] No Thrush [ x ] NC Present  NECK:  [ x ] Supple, [  ] No JVD, [  ] Normal thyroid, [  ] Lymphadenopathy [  ] Other  CHEST/LUNG:  [  ] Clear to auscultation bilaterally, [  ] Breath Sounds equal OR Decrease,  [  ] No rales, [  ] No rhonchi  [  ]  No wheezing  HEART:  [  ] Regular rate and rhythm, [  ] tachycardia, [  ] Bradycardia,  [  ] irregular  [  ] No murmurs, No rubs, No gallops, [  ] PPM in place (Mfr:  )  ABDOMEN:  [  ] Soft, [  ] Nontender, [  ] Nondistended, [  ] No mass, [  ] Bowel sounds present, [  ] obese  NERVOUS SYSTEM:  [  ] Alert & Oriented X3, [  ] Nonfocal  [  ] Confusion  [  ] Encephalopathic [  ] Sedated [  ] Unable to assess, [  ] Other-  EXTREMITIES: [ x ] 2+ Peripheral Pulses, No clubbing, No cyanosis,  [ x ] nonpitting edema B/L lower EXT, painful to palpation B/L LE. [  ] PVD stasis skin changes B/L Lower EXT  LYMPH: No lymphadenopathy noted  SKIN:  [ x ] No rashes or lesions, [  ] Pressure Ulcers, [  ] ecchymosis, [  ] Skin Tears, [  ] Other    DIET:     LABS:                        11.1   9.80  )-----------( 417      ( 2018 05:59 )             32.4     2018 05:59    138    |  102    |  29     ----------------------------<  114    3.5     |  25     |  0.92     Ca    9.7        2018 05:59  Phos  1.9       2018 05:59  Mg     1.7       2018 05:59    TPro  6.6    /  Alb  3.5    /  TBili  2.0    /  DBili  x      /  AST  23     /  ALT  27     /  AlkPhos  91     2018 05:59    PT/INR - ( 2018 05:59 )   PT: 11.9 sec;   INR: 1.04 ratio         PTT - ( 2018 18:12 )  PTT:29.4 sec  Urinalysis Basic - ( 2018 22:02 )    Color: Pale Yellow / Appearance: Clear / S.005 / pH: x  Gluc: x / Ketone: Negative  / Bili: Negative / Urobili: Negative   Blood: x / Protein: 25 mg/dL / Nitrite: Negative   Leuk Esterase: Negative / RBC: Negative /HPF / WBC Negative   Sq Epi: x / Non Sq Epi: Negative / Bacteria: Negative                CARDIAC MARKERS ( 2018 09:48 )  .192 ng/mL / x     / 248 U/L / x     / 5.7 ng/mL  CARDIAC MARKERS ( 2018 02:30 )  .200 ng/mL / x     / 89 U/L / x     / 2.7 ng/mL  CARDIAC MARKERS ( 2018 18:12 )  <.015 ng/mL / x     / x     / x     / x                 Anemia Panel:      Thyroid Panel:            Serum Pro-Brain Natriuretic Peptide: 6155 pg/mL (18 @ 18:12)      RADIOLOGY & ADDITIONAL TESTS:      HEALTH ISSUES - PROBLEM Dx:  Cerebrovascular accident (CVA), unspecified mechanism: Cerebrovascular accident (CVA), unspecified mechanism  Hypertensive crisis: Hypertensive crisis  Chest pain, unspecified type: Chest pain, unspecified type  Acute respiratory failure: Acute respiratory failure  Hypertensive emergency: Hypertensive emergency  Prophylactic measure: Prophylactic measure  Mitral valve insufficiency, unspecified etiology: Mitral valve insufficiency, unspecified etiology  Renal artery stenosis: Renal artery stenosis  Essential hypertension: Essential hypertension  Acute pulmonary edema with congestive heart failure: Acute pulmonary edema with congestive heart failure          Consultant(s) Notes Reviewed:  [ x ] YES     Care Discussed with [X] Consultants  [ x ] Patient  [  ] Family  [  ]   [  ] Social Service  [  ] RN, [  ] Physical Therapy  DVT PPX: [ x ] Lovenox, [  ] S C Heparin, [  ] Coumadin, [  ] Xarelto, [  ] Eliquis, [  ] Pradaxa, [  ] IV Heparin drip, [  ] SCD [  ] Contraindication 2 to GI Bleed [  ] Ambulation  Advanced directive: [  ] None, [  ] DNR/DNI Patient is a 87y old  Female who presents with a chief complaint of Chest pain, SOB (2018 10:22)      INTERVAL HPI: Pt is a 88 yo F with PMH of HTN, renal artery stenosis, MV insufficiency, dyslipidemia, CVA, hyponatremia, recurrent UTI who presents to the ED with chest pain. Pt states that she began experiencing chest pain at lunchtime. She was with her daughter and felt that she couldn't breath. Pain continued to get worse. States her cardiologists are in [place] but she felt so bad that she needed to get to the closest hospital. States she felt as though she were going to die. CP is located substernal, non-radiating, non-reproducible. Pt admits to CP, SOB, cough, leg edema. Pt denies fever, chills, palpitations, N/V, HA, changes in vision, dizziness. Per discussion with dtr on phone, her mother takes her BP at home and SBP is frequently 170-180 and that occasionally it is 200. Her BP medications were recently changed by her cardiologist (Marquita). She had an echo which revealed MV insufficiency and she is scheduled for follow up visit to evaluate necessity for replacement. Pt states that she has been being treated for a UTI with cefuroxime and just finished her course of Abx today. Pt's sister also had HTN. Pt denies FHx of cardiac problems. Pt is suppose to have a Evaluation with Cardiac Sx for leaky Mitral Valve at [facility] next week Monday.    In ED, Pt's vitals were: T 98.3, , /100, RR 33 and 81% on supplemental O2.   Repeat vitals were: HR 82, /83, RR 20 and 100% on BiPAP  Labs significant for proBNP 6155. 1st set troponins neg. Other labs grossly WNL.  EKG - sinus tachy, 105  CXR - B/L L>R basilar airspace disease    Pt given nitroglycerin sublingual, started on nitroglycerin gtt, Lasix 40mg IV push. Placed on Bi PAP, Pt seen by ICU & admitted to ICU.    18: Pt seen and examined at ICU bedside. Pt states she feels much better than yesterday. Currently denying HA, dizziness, CP, SOB, N/V/D. Off BiPAP, currently tolerating NC well.    OVERNIGHT EVENTS: NONE    Home Medications:  cloNIDine 0.2 mg oral tablet: 1 tab(s) orally 3 times a day (2018 21:22)  labetalol 300 mg oral tablet: 1 tab(s) orally 2 times a day (2018 21:22)  losartan 50 mg oral tablet: 1 tab(s) orally 2 times a day (2018 21:22)  Plavix 75 mg oral tablet: 1 tab(s) orally once a day (2018 21:22)  Veltassa 16.8 g oral powder for reconstitution: 1 dose(s) orally once a day (2018 21:22)      MEDICATIONS  (STANDING):  cloNIDine 0.2 milliGRAM(s) Oral three times a day  clopidogrel Tablet 75 milliGRAM(s) Oral daily  enoxaparin Injectable 40 milliGRAM(s) SubCutaneous daily  labetalol 300 milliGRAM(s) Oral every 12 hours  losartan 50 milliGRAM(s) Oral two times a day  magnesium sulfate  IVPB 2 Gram(s) IV Intermittent once  potassium chloride    Tablet ER 40 milliEquivalent(s) Oral once  potassium phosphate IVPB 30 milliMole(s) IV Intermittent once    MEDICATIONS  (PRN):      Allergies    Keflex (Unknown)  verapamil (Other)    Intolerances        REVIEW OF SYSTEMS:  CONSTITUTIONAL: No fever, No chills, No fatigue, No myalgia, No Body ache, No Weakness  EYES: No eye pain,  No visual disturbances, No discharge, NO Redness  ENMT:  No ear pain, No nose bleed, No vertigo; No sinus or throat pain, No Congestion  NECK: No pain, No stiffness  RESPIRATORY: No cough, wheezing, No  hemoptysis, No shortness of breath  CARDIOVASCULAR: No chest pain, palpitations  GASTROINTESTINAL: No abdominal or epigastric pain. No nausea, No vomiting; No diarrhea or constipation. [  ] BM  GENITOURINARY: No dysuria, No frequency, No urgency, No hematuria, or incontinence  NEUROLOGICAL: No headaches, No dizziness, No numbness, No tingling, No tremors, No weakness  SKIN:  [ x ] No itching, burning, rashes, or lesions   MUSCULOSKELETAL: [ x ] B/L LE pain and swelling; No muscle pain, No back pain, No extremity pain  PSYCHIATRIC: No depression, anxiety, mood swings or difficulty sleeping at night  PAIN SCALE: [  ] None  [  ] Other-  ROS Unable to obtain due to - [  ] Dementia  [  ] Lethargy  [  ] Sedated [  ] non verbal  REST OF REVIEW Of SYSTEM - [ x ] Normal     Vital Signs Last 24 Hrs  T(C): 37 (2018 07:30), Max: 37 (2018 07:30)  T(F): 98.6 (2018 07:30), Max: 98.6 (2018 07:30)  HR: 84 (2018 10:) (70 - 119)  BP: 141/67 (2018 10:) (119/56 - 227/123)  BP(mean): 96 (2018 10:30) (81 - 157)  RR: 24 (2018 10:) (18 - 35)  SpO2: 95% (2018 10:) (81% - 100%)  Finger Stick         @ 07:01  -   @ 07:00  --------------------------------------------------------  IN: 247 mL / OUT: 1950 mL / NET: -1703 mL     @ 07: @ 11:00  --------------------------------------------------------  IN: 3 mL / OUT: 300 mL / NET: -297 mL        PHYSICAL EXAM:  GENERAL:  [ x ] NAD , [  ] well appearing, [  ] Agitated, [  ] Drowsy,  [  ] Lethargy, [  ] confused   HEAD:  [ x ] Normal, [  ] Other  EYES:  [  ] EOMI, [  ] PERRLA, [  ] conjunctiva and sclera clear normal, [  ] Other,  [  ] Pallor,[  ] Discharge  ENMT:  [ x ] Normal, [ x ] Moist mucous membranes, [  ] Good dentition, [  ] No Thrush [ x ] NC Present  NECK:  [ x ] Supple, [  ] No JVD, [  ] Normal thyroid, [  ] Lymphadenopathy [  ] Other  CHEST/LUNG:  [  ] Clear to auscultation bilaterally, [  ] Breath Sounds equal OR Decrease,  [  ] No rales, [  ] No rhonchi  [  ]  No wheezing  HEART:  [  ] Regular rate and rhythm, [  ] tachycardia, [  ] Bradycardia,  [  ] irregular  [  ] No murmurs, No rubs, No gallops, [  ] PPM in place (Mfr:  )  ABDOMEN:  [  ] Soft, [  ] Nontender, [  ] Nondistended, [  ] No mass, [  ] Bowel sounds present, [  ] obese  NERVOUS SYSTEM:  [  ] Alert & Oriented X3, [  ] Nonfocal  [  ] Confusion  [  ] Encephalopathic [  ] Sedated [  ] Unable to assess, [  ] Other-  EXTREMITIES: [ x ] 2+ Peripheral Pulses, No clubbing, No cyanosis,  [ x ] nonpitting edema B/L lower EXT, painful to palpation B/L LE. [  ] PVD stasis skin changes B/L Lower EXT  LYMPH: No lymphadenopathy noted  SKIN:  [ x ] No rashes or lesions, [  ] Pressure Ulcers, [  ] ecchymosis, [  ] Skin Tears, [  ] Other    DIET:     LABS:                        11.1   9.80  )-----------( 417      ( 2018 05:59 )             32.4     2018 05:59    138    |  102    |  29     ----------------------------<  114    3.5     |  25     |  0.92     Ca    9.7        2018 05:59  Phos  1.9       2018 05:59  Mg     1.7       2018 05:59    TPro  6.6    /  Alb  3.5    /  TBili  2.0    /  DBili  x      /  AST  23     /  ALT  27     /  AlkPhos  91     2018 05:59    PT/INR - ( 2018 05:59 )   PT: 11.9 sec;   INR: 1.04 ratio         PTT - ( 2018 18:12 )  PTT:29.4 sec  Urinalysis Basic - ( 2018 22:02 )    Color: Pale Yellow / Appearance: Clear / S.005 / pH: x  Gluc: x / Ketone: Negative  / Bili: Negative / Urobili: Negative   Blood: x / Protein: 25 mg/dL / Nitrite: Negative   Leuk Esterase: Negative / RBC: Negative /HPF / WBC Negative   Sq Epi: x / Non Sq Epi: Negative / Bacteria: Negative                CARDIAC MARKERS ( 2018 09:48 )  .192 ng/mL / x     / 248 U/L / x     / 5.7 ng/mL  CARDIAC MARKERS ( 2018 02:30 )  .200 ng/mL / x     / 89 U/L / x     / 2.7 ng/mL  CARDIAC MARKERS ( 2018 18:12 )  <.015 ng/mL / x     / x     / x     / x                 Anemia Panel:      Thyroid Panel:            Serum Pro-Brain Natriuretic Peptide: 6155 pg/mL (18 @ 18:12)      RADIOLOGY & ADDITIONAL TESTS:      HEALTH ISSUES - PROBLEM Dx:  Cerebrovascular accident (CVA), unspecified mechanism: Cerebrovascular accident (CVA), unspecified mechanism  Hypertensive crisis: Hypertensive crisis  Chest pain, unspecified type: Chest pain, unspecified type  Acute respiratory failure: Acute respiratory failure  Hypertensive emergency: Hypertensive emergency  Prophylactic measure: Prophylactic measure  Mitral valve insufficiency, unspecified etiology: Mitral valve insufficiency, unspecified etiology  Renal artery stenosis: Renal artery stenosis  Essential hypertension: Essential hypertension  Acute pulmonary edema with congestive heart failure: Acute pulmonary edema with congestive heart failure          Consultant(s) Notes Reviewed:  [ x ] YES     Care Discussed with [X] Consultants  [ x ] Patient  [  ] Family  [  ]   [  ] Social Service  [  ] RN, [  ] Physical Therapy  DVT PPX: [ x ] Lovenox, [  ] S C Heparin, [  ] Coumadin, [  ] Xarelto, [  ] Eliquis, [  ] Pradaxa, [  ] IV Heparin drip, [  ] SCD [  ] Contraindication 2 to GI Bleed [  ] Ambulation  Advanced directive: [  ] None, [  ] DNR/DNI Patient is a 87y old  Female who presents with a chief complaint of Chest pain, SOB (2018 10:22)      INTERVAL HPI: Pt is a 88 yo F with PMH of HTN, renal artery stenosis, MV insufficiency, dyslipidemia, CVA, hyponatremia, recurrent UTI who presents to the ED with chest pain. Pt states that she began experiencing chest pain at lunchtime. She was with her daughter and felt that she couldn't breath. Pain continued to get worse. States her cardiologists are in [place] but she felt so bad that she needed to get to the closest hospital. States she felt as though she were going to die. CP is located substernal, non-radiating, non-reproducible. Pt admits to CP, SOB, cough, leg edema. Pt denies fever, chills, palpitations, N/V, HA, changes in vision, dizziness. Per discussion with dtr on phone, her mother takes her BP at home and SBP is frequently 170-180 and that occasionally it is 200. Her BP medications were recently changed by her cardiologist (Marquita). She had an echo which revealed MV insufficiency and she is scheduled for follow up visit to evaluate necessity for replacement. Pt states that she has been being treated for a UTI with cefuroxime and just finished her course of Abx today. Pt's sister also had HTN. Pt denies FHx of cardiac problems. Pt is suppose to have a Evaluation with Cardiac Sx for leaky Mitral Valve at [facility] next week Monday.    In ED, Pt's vitals were: T 98.3, , /100, RR 33 and 81% on supplemental O2.   Repeat vitals were: HR 82, /83, RR 20 and 100% on BiPAP  Labs significant for proBNP 6155. 1st set troponins neg. Other labs grossly WNL.  EKG - sinus tachy, 105  CXR - B/L L>R basilar airspace disease    Pt given nitroglycerin sublingual, started on nitroglycerin gtt, Lasix 40mg IV push. Placed on Bi PAP, Pt seen by ICU & admitted to ICU.    18: Pt seen and examined at ICU bedside. Pt states she feels much better than yesterday. Currently denying HA, dizziness, CP, SOB, N/V/D. Off BiPAP, currently tolerating NC well.    OVERNIGHT EVENTS: NONE    Home Medications:  cloNIDine 0.2 mg oral tablet: 1 tab(s) orally 3 times a day (2018 21:22)  labetalol 300 mg oral tablet: 1 tab(s) orally 2 times a day (2018 21:22)  losartan 50 mg oral tablet: 1 tab(s) orally 2 times a day (2018 21:22)  Plavix 75 mg oral tablet: 1 tab(s) orally once a day (2018 21:22)  Veltassa 16.8 g oral powder for reconstitution: 1 dose(s) orally once a day (2018 21:22)      MEDICATIONS  (STANDING):  cloNIDine 0.2 milliGRAM(s) Oral three times a day  clopidogrel Tablet 75 milliGRAM(s) Oral daily  enoxaparin Injectable 40 milliGRAM(s) SubCutaneous daily  labetalol 300 milliGRAM(s) Oral every 12 hours  losartan 50 milliGRAM(s) Oral two times a day  magnesium sulfate  IVPB 2 Gram(s) IV Intermittent once  potassium chloride    Tablet ER 40 milliEquivalent(s) Oral once  potassium phosphate IVPB 30 milliMole(s) IV Intermittent once    MEDICATIONS  (PRN):      Allergies    Keflex (Unknown)  verapamil (Other)    Intolerances        REVIEW OF SYSTEMS: I feel a lot better  CONSTITUTIONAL: No fever, No chills, No fatigue, No myalgia, No Body ache, No Weakness  EYES: No eye pain,  No visual disturbances, No discharge, NO Redness  ENMT:  No ear pain, No nose bleed, No vertigo; No sinus or throat pain, No Congestion  NECK: No pain, No stiffness  RESPIRATORY: No cough, wheezing, No  hemoptysis, No shortness of breath  CARDIOVASCULAR: No chest pain, palpitations  GASTROINTESTINAL: No abdominal or epigastric pain. No nausea, No vomiting; No diarrhea or constipation. [  ] BM  GENITOURINARY: No dysuria, No frequency, No urgency, No hematuria, or incontinence  NEUROLOGICAL: No headaches, No dizziness, No numbness, No tingling, No tremors, No weakness  SKIN:  [ x ] No itching, burning, rashes, or lesions   MUSCULOSKELETAL: [ x ] B/L LE pain and swelling; No muscle pain, No back pain, No extremity pain  PSYCHIATRIC: No depression, anxiety, mood swings or difficulty sleeping at night  PAIN SCALE: [x  ] None  [  ] Other-  ROS Unable to obtain due to - [  ] Dementia  [  ] Lethargy  [  ] Sedated [  ] non verbal  REST OF REVIEW Of SYSTEM - [ x ] Normal     Vital Signs Last 24 Hrs  T(C): 37 (2018 07:30), Max: 37 (2018 07:30)  T(F): 98.6 (2018 07:30), Max: 98.6 (2018 07:30)  HR: 84 (2018 10:) (70 - 119)  BP: 141/67 (2018 10:30) (119/56 - 227/123)  BP(mean): 96 (2018 10:30) (81 - 157)  RR: 24 (2018 10:) (18 - 35)  SpO2: 95% (2018 10:30) (81% - 100%)  Finger Stick         @ 07:01  -   @ 07:00  --------------------------------------------------------  IN: 247 mL / OUT: 1950 mL / NET: -1703 mL     @ 07:01  -   @ 11:00  --------------------------------------------------------  IN: 3 mL / OUT: 300 mL / NET: -297 mL        PHYSICAL EXAM: OOB to chair  GENERAL:  [ x ] NAD , [ x ] well appearing, [  ] Agitated, [  ] Drowsy,  [  ] Lethargy, [  ] confused   HEAD:  [ x ] Normal, [  ] Other  EYES:  [ x ] EOMI, [ x ] PERRLA, [ x ] conjunctiva and sclera clear normal, [  ] Other,  [  ] Pallor,[  ] Discharge  ENMT:  [ x ] Normal, [ x ] Moist mucous membranes, [x  ] Good dentition, [ x ] No Thrush [ x ] NC Present  NECK:  [ x ] Supple, [  x] No JVD, [ x ] Normal thyroid, [  ] Lymphadenopathy [  ] Other  CHEST/LUNG:  [ x ] Clear to auscultation bilaterally, [x  ] Breath Sounds equal B/L  Decrease,  [x  ] No rales, [ x ] No rhonchi  [ x ]  No wheezing  HEART:  [ x ] Regular rate and rhythm, [  ] tachycardia, [  ] Bradycardia,  [  ] irregular  [ x ] No murmurs, No rubs, No gallops, [  ] PPM in place (Mfr:  )  ABDOMEN:  [x  ] Soft, [ x ] Nontender, [ x ] Nondistended, [x  ] No mass, [x  ] Bowel sounds present, [  x] obese  NERVOUS SYSTEM:  [ x ] Alert & Oriented X3, [ x ] Nonfocal  [  ] Confusion  [  ] Encephalopathic [  ] Sedated [  ] Unable to assess, [  ] Other-  EXTREMITIES: [ x ] 2+ Peripheral Pulses, No clubbing, No cyanosis,  [ x ] 2 +pitting edema B/L lower EXT, painful to palpation B/L LE. [x  ] PVD stasis skin changes B/L Lower EXT  LYMPH: No lymphadenopathy noted  SKIN:  [ x ] No rashes or lesions, [  ] Pressure Ulcers, [ x ] ecchymosis, Left leg , Erythema Rt Lower Lateral leg[  ] Skin Tears, [  ] Other    DIET: DASH    LABS:                        11.1   9.80  )-----------( 417      ( 2018 05:59 )             32.4     2018 05:59    138    |  102    |  29     ----------------------------<  114    3.5     |  25     |  0.92     Ca    9.7        2018 05:59  Phos  1.9       2018 05:59  Mg     1.7       2018 05:59    TPro  6.6    /  Alb  3.5    /  TBili  2.0    /  DBili  x      /  AST  23     /  ALT  27     /  AlkPhos  91     2018 05:59    PT/INR - ( 2018 05:59 )   PT: 11.9 sec;   INR: 1.04 ratio         PTT - ( 2018 18:12 )  PTT:29.4 sec  Urinalysis Basic - ( 2018 22:02 )    Color: Pale Yellow / Appearance: Clear / S.005 / pH: x  Gluc: x / Ketone: Negative  / Bili: Negative / Urobili: Negative   Blood: x / Protein: 25 mg/dL / Nitrite: Negative   Leuk Esterase: Negative / RBC: Negative /HPF / WBC Negative   Sq Epi: x / Non Sq Epi: Negative / Bacteria: Negative      CARDIAC MARKERS ( 2018 09:48 )  .192 ng/mL / x     / 248 U/L / x     / 5.7 ng/mL  CARDIAC MARKERS ( 2018 02:30 )  .200 ng/mL / x     / 89 U/L / x     / 2.7 ng/mL  CARDIAC MARKERS ( 2018 18:12 )  <.015 ng/mL / x     / x     / x     / x          Serum Pro-Brain Natriuretic Peptide: 6155 pg/mL (18 @ 18:12)      RADIOLOGY & ADDITIONAL TESTS:NONE      HEALTH ISSUES - PROBLEM Dx:  Cerebrovascular accident (CVA), unspecified mechanism: Cerebrovascular accident (CVA), unspecified mechanism  Hypertensive crisis: Hypertensive crisis  Chest pain, unspecified type: Chest pain, unspecified type  Acute respiratory failure: Acute respiratory failure  Hypertensive emergency: Hypertensive emergency  Prophylactic measure: Prophylactic measure  Mitral valve insufficiency, unspecified etiology: Mitral valve insufficiency, unspecified etiology  Renal artery stenosis: Renal artery stenosis  Essential hypertension: Essential hypertension  Acute pulmonary edema with congestive heart failure: Acute pulmonary edema with congestive heart failure          Consultant(s) Notes Reviewed:  [ x ] YES     Care Discussed with [X] Consultants  [ x ] Patient  [ x ] Family -son [  ]   [  ] Social Service  [x  ] RN, [  ] Physical Therapy  DVT PPX: [ x ] Lovenox, [  ] S C Heparin, [  ] Coumadin, [  ] Xarelto, [  ] Eliquis, [  ] Pradaxa, [  ] IV Heparin drip, [  ] SCD [  ] Contraindication 2 to GI Bleed [  ] Ambulation  Advanced directive: [x  ] None, [  ] DNR/DNI

## 2018-11-20 NOTE — PROGRESS NOTE ADULT - PROBLEM SELECTOR PLAN 7
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

## 2018-11-21 ENCOUNTER — OUTPATIENT (OUTPATIENT)
Dept: INPATIENT UNIT | Facility: HOSPITAL | Age: 83
LOS: 1 days | Discharge: HOME | End: 2018-11-21
Payer: MEDICARE

## 2018-11-21 VITALS
DIASTOLIC BLOOD PRESSURE: 87 MMHG | OXYGEN SATURATION: 98 % | HEART RATE: 89 BPM | RESPIRATION RATE: 16 BRPM | SYSTOLIC BLOOD PRESSURE: 154 MMHG

## 2018-11-21 VITALS — WEIGHT: 111.99 LBS

## 2018-11-21 VITALS
RESPIRATION RATE: 16 BRPM | DIASTOLIC BLOOD PRESSURE: 50 MMHG | OXYGEN SATURATION: 95 % | SYSTOLIC BLOOD PRESSURE: 140 MMHG | HEART RATE: 82 BPM

## 2018-11-21 DIAGNOSIS — Z98.89 OTHER SPECIFIED POSTPROCEDURAL STATES: Chronic | ICD-10-CM

## 2018-11-21 DIAGNOSIS — Z90.49 ACQUIRED ABSENCE OF OTHER SPECIFIED PARTS OF DIGESTIVE TRACT: Chronic | ICD-10-CM

## 2018-11-21 DIAGNOSIS — Z90.710 ACQUIRED ABSENCE OF BOTH CERVIX AND UTERUS: Chronic | ICD-10-CM

## 2018-11-21 DIAGNOSIS — I24.9 ACUTE ISCHEMIC HEART DISEASE, UNSPECIFIED: ICD-10-CM

## 2018-11-21 DIAGNOSIS — Z96.643 PRESENCE OF ARTIFICIAL HIP JOINT, BILATERAL: Chronic | ICD-10-CM

## 2018-11-21 LAB
ALBUMIN SERPL ELPH-MCNC: 3.2 G/DL — LOW (ref 3.3–5)
ALP SERPL-CCNC: 78 U/L — SIGNIFICANT CHANGE UP (ref 40–120)
ALT FLD-CCNC: 26 U/L — SIGNIFICANT CHANGE UP (ref 12–78)
ANION GAP SERPL CALC-SCNC: 9 MMOL/L — SIGNIFICANT CHANGE UP (ref 5–17)
AST SERPL-CCNC: 25 U/L — SIGNIFICANT CHANGE UP (ref 15–37)
BASOPHILS # BLD AUTO: 0.05 K/UL — SIGNIFICANT CHANGE UP (ref 0–0.2)
BASOPHILS NFR BLD AUTO: 0.6 % — SIGNIFICANT CHANGE UP (ref 0–2)
BILIRUB SERPL-MCNC: 1.3 MG/DL — HIGH (ref 0.2–1.2)
BUN SERPL-MCNC: 37 MG/DL — HIGH (ref 7–23)
CALCIUM SERPL-MCNC: 8.7 MG/DL — SIGNIFICANT CHANGE UP (ref 8.5–10.1)
CHLORIDE SERPL-SCNC: 102 MMOL/L — SIGNIFICANT CHANGE UP (ref 96–108)
CHOLEST SERPL-MCNC: 177 MG/DL — SIGNIFICANT CHANGE UP (ref 10–199)
CK MB BLD-MCNC: 2.6 % — SIGNIFICANT CHANGE UP (ref 0–3.5)
CK MB CFR SERPL CALC: 5 NG/ML — HIGH (ref 0–3.6)
CK SERPL-CCNC: 192 U/L — SIGNIFICANT CHANGE UP (ref 26–192)
CO2 SERPL-SCNC: 29 MMOL/L — SIGNIFICANT CHANGE UP (ref 22–31)
CREAT SERPL-MCNC: 1.1 MG/DL — SIGNIFICANT CHANGE UP (ref 0.5–1.3)
EOSINOPHIL # BLD AUTO: 0.19 K/UL — SIGNIFICANT CHANGE UP (ref 0–0.5)
EOSINOPHIL NFR BLD AUTO: 2.4 % — SIGNIFICANT CHANGE UP (ref 0–6)
GLUCOSE SERPL-MCNC: 115 MG/DL — HIGH (ref 70–99)
HCT VFR BLD CALC: 31.9 % — LOW (ref 34.5–45)
HDLC SERPL-MCNC: 46 MG/DL — LOW
HGB BLD-MCNC: 10.6 G/DL — LOW (ref 11.5–15.5)
IMM GRANULOCYTES NFR BLD AUTO: 0.5 % — SIGNIFICANT CHANGE UP (ref 0–1.5)
LIPID PNL WITH DIRECT LDL SERPL: 102 MG/DL — SIGNIFICANT CHANGE UP
LYMPHOCYTES # BLD AUTO: 2.57 K/UL — SIGNIFICANT CHANGE UP (ref 1–3.3)
LYMPHOCYTES # BLD AUTO: 32.4 % — SIGNIFICANT CHANGE UP (ref 13–44)
MAGNESIUM SERPL-MCNC: 2.1 MG/DL — SIGNIFICANT CHANGE UP (ref 1.6–2.6)
MCHC RBC-ENTMCNC: 29.9 PG — SIGNIFICANT CHANGE UP (ref 27–34)
MCHC RBC-ENTMCNC: 33.2 GM/DL — SIGNIFICANT CHANGE UP (ref 32–36)
MCV RBC AUTO: 90.1 FL — SIGNIFICANT CHANGE UP (ref 80–100)
MONOCYTES # BLD AUTO: 0.74 K/UL — SIGNIFICANT CHANGE UP (ref 0–0.9)
MONOCYTES NFR BLD AUTO: 9.3 % — SIGNIFICANT CHANGE UP (ref 2–14)
NEUTROPHILS # BLD AUTO: 4.33 K/UL — SIGNIFICANT CHANGE UP (ref 1.8–7.4)
NEUTROPHILS NFR BLD AUTO: 54.8 % — SIGNIFICANT CHANGE UP (ref 43–77)
PHOSPHATE SERPL-MCNC: 4.1 MG/DL — SIGNIFICANT CHANGE UP (ref 2.5–4.5)
PLATELET # BLD AUTO: 319 K/UL — SIGNIFICANT CHANGE UP (ref 150–400)
POTASSIUM SERPL-MCNC: 3.9 MMOL/L — SIGNIFICANT CHANGE UP (ref 3.5–5.3)
POTASSIUM SERPL-SCNC: 3.9 MMOL/L — SIGNIFICANT CHANGE UP (ref 3.5–5.3)
PROT SERPL-MCNC: 6.3 G/DL — SIGNIFICANT CHANGE UP (ref 6–8.3)
RBC # BLD: 3.54 M/UL — LOW (ref 3.8–5.2)
RBC # FLD: 12.5 % — SIGNIFICANT CHANGE UP (ref 10.3–14.5)
SODIUM SERPL-SCNC: 140 MMOL/L — SIGNIFICANT CHANGE UP (ref 135–145)
TOTAL CHOLESTEROL/HDL RATIO MEASUREMENT: 3.8 RATIO — SIGNIFICANT CHANGE UP (ref 3.3–7.1)
TRIGL SERPL-MCNC: 143 MG/DL — SIGNIFICANT CHANGE UP (ref 10–149)
TROPONIN I SERPL-MCNC: 0.09 NG/ML — HIGH (ref 0.01–0.04)
WBC # BLD: 7.92 K/UL — SIGNIFICANT CHANGE UP (ref 3.8–10.5)
WBC # FLD AUTO: 7.92 K/UL — SIGNIFICANT CHANGE UP (ref 3.8–10.5)

## 2018-11-21 PROCEDURE — 71045 X-RAY EXAM CHEST 1 VIEW: CPT

## 2018-11-21 PROCEDURE — 96374 THER/PROPH/DIAG INJ IV PUSH: CPT

## 2018-11-21 PROCEDURE — 80053 COMPREHEN METABOLIC PANEL: CPT

## 2018-11-21 PROCEDURE — 84484 ASSAY OF TROPONIN QUANT: CPT

## 2018-11-21 PROCEDURE — C8929: CPT

## 2018-11-21 PROCEDURE — 85730 THROMBOPLASTIN TIME PARTIAL: CPT

## 2018-11-21 PROCEDURE — 94664 DEMO&/EVAL PT USE INHALER: CPT

## 2018-11-21 PROCEDURE — 99291 CRITICAL CARE FIRST HOUR: CPT | Mod: 25

## 2018-11-21 PROCEDURE — 82553 CREATINE MB FRACTION: CPT

## 2018-11-21 PROCEDURE — 87086 URINE CULTURE/COLONY COUNT: CPT

## 2018-11-21 PROCEDURE — 83880 ASSAY OF NATRIURETIC PEPTIDE: CPT

## 2018-11-21 PROCEDURE — 80061 LIPID PANEL: CPT

## 2018-11-21 PROCEDURE — 94660 CPAP INITIATION&MGMT: CPT

## 2018-11-21 PROCEDURE — 99221 1ST HOSP IP/OBS SF/LOW 40: CPT

## 2018-11-21 PROCEDURE — 93005 ELECTROCARDIOGRAM TRACING: CPT

## 2018-11-21 PROCEDURE — 99291 CRITICAL CARE FIRST HOUR: CPT

## 2018-11-21 PROCEDURE — 93970 EXTREMITY STUDY: CPT

## 2018-11-21 PROCEDURE — 36415 COLL VENOUS BLD VENIPUNCTURE: CPT

## 2018-11-21 PROCEDURE — 94760 N-INVAS EAR/PLS OXIMETRY 1: CPT

## 2018-11-21 PROCEDURE — 84100 ASSAY OF PHOSPHORUS: CPT

## 2018-11-21 PROCEDURE — 93458 L HRT ARTERY/VENTRICLE ANGIO: CPT | Mod: 26

## 2018-11-21 PROCEDURE — 83735 ASSAY OF MAGNESIUM: CPT

## 2018-11-21 PROCEDURE — 81001 URINALYSIS AUTO W/SCOPE: CPT

## 2018-11-21 PROCEDURE — 82550 ASSAY OF CK (CPK): CPT

## 2018-11-21 PROCEDURE — 85027 COMPLETE CBC AUTOMATED: CPT

## 2018-11-21 PROCEDURE — 93306 TTE W/DOPPLER COMPLETE: CPT

## 2018-11-21 PROCEDURE — 85610 PROTHROMBIN TIME: CPT

## 2018-11-21 RX ORDER — LABETALOL HCL 100 MG
2 TABLET ORAL
Qty: 0 | Refills: 0 | COMMUNITY
Start: 2018-11-21

## 2018-11-21 RX ORDER — LABETALOL HCL 100 MG
10 TABLET ORAL ONCE
Qty: 0 | Refills: 0 | Status: COMPLETED | OUTPATIENT
Start: 2018-11-21 | End: 2018-11-21

## 2018-11-21 RX ORDER — LABETALOL HCL 100 MG
2 TABLET ORAL
Qty: 480 | Refills: 2 | OUTPATIENT
Start: 2018-11-21 | End: 2019-11-15

## 2018-11-21 RX ORDER — LABETALOL HCL 100 MG
1 TABLET ORAL
Qty: 0 | Refills: 0 | COMMUNITY

## 2018-11-21 RX ORDER — PATIROMER 8.4 G/1
1 POWDER, FOR SUSPENSION ORAL
Qty: 0 | Refills: 0 | COMMUNITY

## 2018-11-21 RX ORDER — ENOXAPARIN SODIUM 100 MG/ML
50 INJECTION SUBCUTANEOUS
Qty: 0 | Refills: 0 | COMMUNITY
Start: 2018-11-21

## 2018-11-21 RX ORDER — SENNA PLUS 8.6 MG/1
2 TABLET ORAL
Qty: 0 | Refills: 0 | COMMUNITY
Start: 2018-11-21

## 2018-11-21 RX ORDER — DOCUSATE SODIUM 100 MG
1 CAPSULE ORAL
Qty: 0 | Refills: 0 | COMMUNITY
Start: 2018-11-21

## 2018-11-21 RX ORDER — ASPIRIN/CALCIUM CARB/MAGNESIUM 324 MG
1 TABLET ORAL
Qty: 0 | Refills: 0 | DISCHARGE
Start: 2018-11-21

## 2018-11-21 RX ORDER — ATORVASTATIN CALCIUM 80 MG/1
1 TABLET, FILM COATED ORAL
Qty: 0 | Refills: 0 | COMMUNITY
Start: 2018-11-21

## 2018-11-21 RX ADMIN — LOSARTAN POTASSIUM 50 MILLIGRAM(S): 100 TABLET, FILM COATED ORAL at 05:29

## 2018-11-21 RX ADMIN — Medication 10 MILLIGRAM(S): at 12:15

## 2018-11-21 RX ADMIN — Medication 600 MILLIGRAM(S): at 05:30

## 2018-11-21 RX ADMIN — Medication 0.2 MILLIGRAM(S): at 05:29

## 2018-11-21 NOTE — PROGRESS NOTE ADULT - PROBLEM SELECTOR PLAN 2
- resolved, pt stable with SBP in 160s. (patients baseline -180)  - continue home Labetalol, clonidine, losartan  - downgraded to TELE, H/O Renal artery stenosis  - cardiology consulted ([facility]), recommend Lasix 40mg IVP q12h   - advanced to PO diet, tolerating well  - mgmt as above - resolved, pt stable with SBP in 160s. (patients baseline -180)Off Tridil drip  - continue home Labetalol, clonidine, losartan  - downgraded to TELE, H/O Renal artery stenosis  - cardiology consulted ([facility]), recommend Lasix 40mg IVP q12h   - advanced to PO diet, tolerating well  -BP stable

## 2018-11-21 NOTE — PROGRESS NOTE ADULT - PROBLEM SELECTOR PROBLEM 4
Acute pulmonary edema with congestive heart failure
Acute pulmonary edema with congestive heart failure

## 2018-11-21 NOTE — PROGRESS NOTE ADULT - ASSESSMENT
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  This could be secondary to BOB but this has been incompletely assessed in the past.     - No further clinical evidence of volume overload  - Patient is net negative >800 cc/24 hrs and 2.5L in 2 days  - Please continue to maintain strict I/Os, monitor daily weights, Cr, and K.   - Daily standing weights    - Has dynamic EKG changes concerning for anterior ischemia (with worsening TWI in V1-V4)  - Continue full dose Lovenox  - Cardiac enzymes trended down, no need to trend  - Cont DAPT and statin  - Spoke with pt at length. She will benefit with a coronary angiogram with or without renal angio.  Dr. Quan spoke with daughter at length re: plan for ischemic eval and transfer to [facility] or per patient/daughter's preference, in [facility].  - Echo done showing mild to moderate MR with septal hypokinesis, grade 2 DD; EF 50%    - BP still not fully controlled.   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  This could be secondary to BOB but this has been incompletely assessed in the past.     - No further clinical evidence of volume overload  - Patient is net negative >800 cc/24 hrs and 2.5L in 2 days  - Please continue to maintain strict I/Os, monitor daily weights, Cr, and K.   - Daily standing weights    - Has dynamic EKG changes concerning for anterior ischemia (with worsening TWI in V1-V4)  - Continue full dose Lovenox  - Cardiac enzymes trended down, no need to trend  - Cont DAPT and statin  - Spoke with pt at length. She will benefit with a coronary angiogram with or without renal angio.  Dr. Quan spoke with daughter at length re: plan for ischemic eval and transfer to [facility] or per patient/daughter's preference, in [facility].  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  This could be secondary to BOB but this has been incompletely assessed in the past.     - No further clinical evidence of volume overload  - Patient is net negative >800 cc/24 hrs and 2.5L in 2 days  - Please continue to maintain strict I/Os, monitor daily weights, Cr, and K.   - Daily standing weights    - Has dynamic EKG changes concerning for anterior ischemia (with worsening TWI in V1-V4)  - Continue full dose Lovenox  - Cardiac enzymes trended down, no need to trend  - Cont DAPT and statin  - Spoke with pt at length. She will benefit with a coronary angiogram with or without renal angio.  Dr. Quan spoke with daughter at length re: plan for ischemic eval and transfer to [facility] or per patient/daughter's preference, in [facility].  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  This could be secondary to BOB but this has been incompletely assessed in the past.     - No further clinical evidence of volume overload  - Patient is net negative >800 cc/24 hrs and 2.5L in 2 days  - Please continue to maintain strict I/Os, monitor daily weights, Cr, and K.   - Daily standing weights    - Has dynamic EKG changes concerning for anterior ischemia (with worsening TWI in V1-V4)  - Continue full dose Lovenox  - Cardiac enzymes trended down  - Cont DAPT and statin  - Spoke with pt at length. She will benefit with a coronary angiogram with or without renal angio.  Dr. Quan spoke with daughter at length re: plan for ischemic eval and transfer to [place] or per patient/daughter's preference,    - Echo done showing mild to moderate MR with septal hypokinesis, grade 2 DD; EF 50%    - BP still not fully controlled with SBP up to 160's  - Cont labetelol 600mg q12 for BP control  - Increase losartan to 100 mg qday  - Continue clonidine    Jen Duncan NP  Cardiology

## 2018-11-21 NOTE — PROGRESS NOTE ADULT - PROBLEM SELECTOR PLAN 8
identified by previous ECHO, per Pt to be evaluated with cardiologist Monday  - rpt ECHO showing Grade II diastolic dysfunction

## 2018-11-21 NOTE — DISCHARGE NOTE ADULT - ADDITIONAL INSTRUCTIONS
-Please follow up with your primary doctor within one week.  -Please follow up with cardiology outpatient (information below).  -Patient and family to set up follow up appointments.  -Continue taking your medications as directed above.  -If symptoms persist/worsen, please call your PMD or return to the ED.

## 2018-11-21 NOTE — PROGRESS NOTE ADULT - PROBLEM SELECTOR PLAN 3
resolved, now off BiPAP tolerating NC well.  - CXR: B/L L>R basilar airspace disease  - strict I&Os  - mgmt as above resolved, now off BiPAP tolerating NC well.  - CXR: B/L L>R basilar airspace disease  - strict I&Os

## 2018-11-21 NOTE — DISCHARGE NOTE ADULT - PATIENT PORTAL LINK FT
You can access the [facility] Patient Portal, offered by [facility], by registering with the following website: [email or web address]

## 2018-11-21 NOTE — DISCHARGE NOTE ADULT - PLAN OF CARE
resolution You came in for chest pain. You were found to have high blood pressure, and were short of breath likely related to your heart. During your stay, you were found to have signs of decreased blood flow to your heart. Be sure to follow up with your cardiologist after coronary angiogram at [facility]. You came in with high blood pressure and shortness of breath, likely due to heart failure. We performed an ECHO which showed signs of heart failure. We placed you on a breathing mask until you were stable on regular oxygen. You came in with shortness of breath likely due to increased fluid in your lungs. We gave you diuretics to remove the fluid and monitored your blood pressures. stable You have a history of a stroke with no residual effects. Be sure to follow up with your cardiologist and neurologist as an outpatient. You were found to have swelling of both legs likely due to increased fluid backup from your lungs. We performed an ultrasound of your leg which was negative for any signs of a clot. Be sure to followup with your cardiologist as an outpatient. You came in with high blood pressures. We gave you medications to lower this pressure until it stabilized and performed an ultrasound of your heart. Be sure to followup with your cardiologist as an outpatient. You were found to have signs of decreased blood flow to your heart on EKG. We determined it was best to have a coronary angiogram at [facility]. Be sure to followup with your cardiologist as an outpatient. You came in for chest pain & SOB . You were found to have very high blood pressure, related to your fluid overload 2/2 heart failure 2/2 high BP. During your stay, you were found to have EKG changes  - Plan for cardiac cath today ,On Lovenox 2x day.  -  Be sure to follow up with your cardiologist after coronary angiogram at [facility].   -On ASA daily, Lovenox 50 mg 2x day & Statin You came in with very  high blood pressure and shortness of breath, likely due to heart failure.   -S/P IV Lasix given during hospital.  -We performed an ECHO which showed signs of heart failure.   -We placed you on a Bi PAP until you were stable on regular oxygen. You came in with shortness of breath likely due to Ac Pulmonary edema ,  - You got IV Lasix /diuretics to remove ,monitored your blood pressures. You have a history of a stroke with no residual effects. On ASA , statin  - follow up with your cardiologist and neurologist as an outpatient. You were found to have swelling of both legs due to fluid over load  from heart failure, s/p IV Lasix  given   -We performed an ultrasound of your leg which was negative-for DVT.  -  followup with your cardiologist as an outpatient. You came in with high blood pressures. s/p IV Tridil drip in ICU,    We gave you your Home BP medications, ECHO done .Labetalol, Clonidine, Losartan , ASA,  -. followup with your cardiologist as an outpatient.

## 2018-11-21 NOTE — DISCHARGE NOTE ADULT - CARE PLAN
Principal Discharge DX:	Chest pain, unspecified type  Goal:	resolution  Assessment and plan of treatment:	You came in for chest pain. You were found to have high blood pressure, and were short of breath likely related to your heart. During your stay, you were found to have signs of decreased blood flow to your heart. Be sure to follow up with your cardiologist after coronary angiogram at [facility].  Secondary Diagnosis:	Acute pulmonary edema with congestive heart failure  Goal:	resolution  Assessment and plan of treatment:	You came in with high blood pressure and shortness of breath, likely due to heart failure. We performed an ECHO which showed signs of heart failure. We placed you on a breathing mask until you were stable on regular oxygen.  Secondary Diagnosis:	Acute respiratory failure  Goal:	resolution  Assessment and plan of treatment:	You came in with shortness of breath likely due to increased fluid in your lungs. We gave you diuretics to remove the fluid and monitored your blood pressures.  Secondary Diagnosis:	Cerebrovascular accident (CVA), unspecified mechanism  Goal:	stable  Assessment and plan of treatment:	You have a history of a stroke with no residual effects. Be sure to follow up with your cardiologist and neurologist as an outpatient.  Secondary Diagnosis:	Edema, unspecified type  Goal:	stable  Assessment and plan of treatment:	You were found to have swelling of both legs likely due to increased fluid backup from your lungs. We performed an ultrasound of your leg which was negative for any signs of a clot. Be sure to followup with your cardiologist as an outpatient.  Secondary Diagnosis:	Hypertensive emergency  Assessment and plan of treatment:	You came in with high blood pressures. We gave you medications to lower this pressure until it stabilized and performed an ultrasound of your heart. Be sure to followup with your cardiologist as an outpatient.  Secondary Diagnosis:	T wave inversion in EKG  Assessment and plan of treatment:	You were found to have signs of decreased blood flow to your heart on EKG. We determined it was best to have a coronary angiogram at [facility]. Be sure to followup with your cardiologist as an outpatient. Principal Discharge DX:	Chest pain, unspecified type  Goal:	resolution  Assessment and plan of treatment:	You came in for chest pain & SOB . You were found to have very high blood pressure, related to your fluid overload 2/2 heart failure 2/2 high BP. During your stay, you were found to have EKG changes  - Plan for cardiac cath today ,On Lovenox 2x day.  -  Be sure to follow up with your cardiologist after coronary angiogram at [facility].   -On ASA daily, Lovenox 50 mg 2x day & Statin  Secondary Diagnosis:	Acute pulmonary edema with congestive heart failure  Goal:	resolution  Assessment and plan of treatment:	You came in with very  high blood pressure and shortness of breath, likely due to heart failure.   -S/P IV Lasix given during hospital.  -We performed an ECHO which showed signs of heart failure.   -We placed you on a Bi PAP until you were stable on regular oxygen.  Secondary Diagnosis:	Acute respiratory failure  Goal:	resolution  Assessment and plan of treatment:	You came in with shortness of breath likely due to Ac Pulmonary edema ,  - You got IV Lasix /diuretics to remove ,monitored your blood pressures.  Secondary Diagnosis:	Cerebrovascular accident (CVA), unspecified mechanism  Goal:	stable  Assessment and plan of treatment:	You have a history of a stroke with no residual effects. On ASA , statin  - follow up with your cardiologist and neurologist as an outpatient.  Secondary Diagnosis:	Edema, unspecified type  Goal:	stable  Assessment and plan of treatment:	You were found to have swelling of both legs due to fluid over load  from heart failure, s/p IV Lasix  given   -We performed an ultrasound of your leg which was negative-for DVT.  -  followup with your cardiologist as an outpatient.  Secondary Diagnosis:	Hypertensive emergency  Assessment and plan of treatment:	You came in with high blood pressures. s/p IV Tridil drip in ICU,    We gave you your Home BP medications, ECHO done .Labetalol, Clonidine, Losartan , ASA,  -. followup with your cardiologist as an outpatient.  Secondary Diagnosis:	T wave inversion in EKG  Assessment and plan of treatment:	You were found to have signs of decreased blood flow to your heart on EKG. We determined it was best to have a coronary angiogram at [facility]. Be sure to followup with your cardiologist as an outpatient.

## 2018-11-21 NOTE — H&P ADULT - ASSESSMENT
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 Pt states that she began experiencing chest pain at lunchtime. She was with her daughter and felt that she couldn't breath. Pain continued to get worse.  Stated she felt as though she were going to die.  Her BP medications were recently changed by her cardiologist (Marquita). She had an echo which revealed MV insufficiency and she is scheduled for follow up visit to evaluate necessity for replacement.   She was found to be in pulmonary edema and started on Nitro gtt -now discontinued-and given lasix . Noted to have an abnormal EKG.   +Trop. Pt was transferred to [facility] for cardiac cath and possible PCI by Dr Juárez

## 2018-11-21 NOTE — H&P ADULT - HISTORY OF PRESENT ILLNESS
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 Pt states that she began experiencing chest pain at lunchtime. She was with her daughter and felt that she couldn't breath. Pain continued to get worse.  Stated she felt as though she were going to die.  Her BP medications were recently changed by her cardiologist (Marquita). She had an echo which revealed MV insufficiency and she is scheduled for follow up visit to evaluate necessity for replacement.   She was found to be in pulmonary edema and started on Nitro gtt -now discontinued-and given lasix . Noted to have an abnormal EKG.   +Trop. Pt was transferred to [facility] for cardiac cath and possible PCI by Dr Juárez

## 2018-11-21 NOTE — PROGRESS NOTE ADULT - PROBLEM SELECTOR PLAN 5
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

## 2018-11-21 NOTE — PROGRESS NOTE ADULT - PROBLEM SELECTOR PLAN 1
EKG: now showing new T wave inversions V1-V4. possible anterolateral ischemia  - 1st set troponin negative, 2nd set 2.0, 3rd set .192. 4th set 0.088  - consulted cardiology ([facility]), recommended Lasix 40mg IVP q12h, full dose Lovenox 50mg Subq q12h, transfer to [facility] for coronary angio  - serial ekgs, trend CE,  - ECHO showing Grade II diastolic dysfunction  - losartan increased to 100mg, metoprolol DC'ed EKG: now showing new T wave inversions V1-V4. possible anterolateral ischemia  - 1st set troponin negative, 2nd set 2.0, 3rd set .192. 4th set 0.088  - consulted cardiology ([facility]), recommended Lasix 40mg IVP q12h, full dose Lovenox 50mg Subq q12h, transfer to [facility] for coronary angio  - serial ekgs, trend CE,ASA 81 mg daily  - ECHO showing Grade II diastolic dysfunction  - losartan increased to 100mg, metoprolol DC'ed

## 2018-11-21 NOTE — DISCHARGE NOTE ADULT - CARE PROVIDER_API CALL
Rafa Vila), Cardiovascular Disease  [address] [place] [address]  Phone: [phone]  Fax: [phone] Rafa Vila), Cardiovascular Disease  [address] [place] [address]  Phone: [phone]  Fax: [phone]    Marcos Leal), Internal Medicine  [address] [place] [address]  Phone: [phone]  Fax: [phone]

## 2018-11-21 NOTE — PROGRESS NOTE ADULT - PROBLEM SELECTOR PLAN 9
IMPROVE VTE Individual Risk Assessment          RISK                                                          Points    [  ] Previous VTE                                                3  [  ] Thrombophilia                                             2  [  ] Lower limb paralysis                                   2        (unable to hold up >15 seconds)    [  ] Current Cancer                                            2         (within 6 months)  [  ] Immobilization > 24 hrs                              1  [  ] ICU/CCU stay > 24 hours                            1  [ x ] Age > 60                                                    1    IMPROVE VTE Score ____1_____    Full dose Lovenox for vte ppx/ischemia  - bowel regimen for constipation  - further management as per ICU
IMPROVE VTE Individual Risk Assessment          RISK                                                          Points    [  ] Previous VTE                                                3  [  ] Thrombophilia                                             2  [  ] Lower limb paralysis                                   2        (unable to hold up >15 seconds)    [  ] Current Cancer                                            2         (within 6 months)  [  ] Immobilization > 24 hrs                              1  [  ] ICU/CCU stay > 24 hours                            1  [ x ] Age > 60                                                    1    IMPROVE VTE Score ____1_____    Lovenox for vte ppx    - bowel regimen for constipation  - further management as per ICU

## 2018-11-21 NOTE — PROGRESS NOTE ADULT - SUBJECTIVE AND OBJECTIVE BOX
[facility] -- Stefano Emmanuel, Aleksandar, Ricky, Ramsey Quan Savella  Office # [phone]    Follow Up:  Angina Equivalent    Subjective/Observations: Patient claimed she felt better overnight.  Denies CP, SOB, or RASCON.    REVIEW OF SYSTEMS: All other review of systems is negative unless indicated above    PAST MEDICAL & SURGICAL HISTORY:  Renal artery stenosis: possible on doppler  Basal cell carcinoma  Mitral valve insufficiency, unspecified etiology  Edema, unspecified type  Cerebrovascular accident (CVA), unspecified mechanism  Hyponatremia: h/o Hypokelema  Dyslipidemia  Essential hypertension  UTI (urinary tract infection)  H/O bilateral hip replacements  History of cholecystectomy  Status post hysterectomy  S/P Mohs surgery for basal cell carcinoma    MEDICATIONS  (STANDING):  aspirin  chewable 81 milliGRAM(s) Oral daily  atorvastatin 40 milliGRAM(s) Oral at bedtime  cloNIDine 0.2 milliGRAM(s) Oral three times a day  clopidogrel Tablet 75 milliGRAM(s) Oral daily  enoxaparin Injectable 50 milliGRAM(s) SubCutaneous every 12 hours  labetalol 600 milliGRAM(s) Oral every 12 hours  losartan 50 milliGRAM(s) Oral two times a day    MEDICATIONS  (PRN):    Allergies    Keflex (Unknown)  verapamil (Other)    Intolerances  Vital Signs Last 24 Hrs  T(C): 36.7 (21 Nov 2018 06:05), Max: 36.8 (20 Nov 2018 19:37)  T(F): 98 (21 Nov 2018 06:05), Max: 98.2 (20 Nov 2018 19:37)  HR: 72 (21 Nov 2018 06:05) (60 - 86)  BP: 162/79 (21 Nov 2018 06:05) (119/59 - 170/70)  BP(mean): 108 (21 Nov 2018 05:00) (83 - 123)  RR: 18 (21 Nov 2018 06:05) (15 - 35)  SpO2: 99% (21 Nov 2018 06:05) (86% - 99%)    I&O's Summary    20 Nov 2018 07:01  -  21 Nov 2018 07:00  --------------------------------------------------------  IN: 1663 mL / OUT: 2500 mL / NET: -837 mL    PHYSICAL EXAM:  TELE: NSR  Constitutional: NAD, awake and alert, well-developed  HEENT: Moist Mucous Membranes, Anicteric  Pulmonary: Non-labored, breath sounds are clear bilaterally, No wheezing, rales or rhonchi  Cardiovascular: Regular, S1 and S2, No murmurs, rubs, gallops or clicks  Gastrointestinal: Bowel Sounds present, soft, nontender.   Lymph: No peripheral edema. No lymphadenopathy.  Skin: No visible rashes or ulcers.  Psych:  Mood & affect appropriate    LABS: All Labs Reviewed:                        10.6 7.92  )-----------( 319      ( 21 Nov 2018 05:14 )             31.9                         11.1   9.80  )-----------( 417      ( 20 Nov 2018 05:59 )             32.4                         12.4   10.71 )-----------( 470      ( 19 Nov 2018 18:12 )             38.1     21 Nov 2018 05:14    140    |  102    |  37     ----------------------------<  115    3.9     |  29     |  1.10   20 Nov 2018 05:59    138    |  102    |  29     ----------------------------<  114    3.5     |  25     |  0.92   19 Nov 2018 18:12    139    |  106    |  30     ----------------------------<  174    4.1     |  22     |  0.90     Ca    8.7        21 Nov 2018 05:14  Ca    9.7        20 Nov 2018 05:59  Ca    9.1        19 Nov 2018 18:12  Phos  4.1       21 Nov 2018 05:14  Phos  1.9       20 Nov 2018 05:59  Mg     2.1       21 Nov 2018 05:14  Mg     1.7       20 Nov 2018 05:59    TPro  6.3    /  Alb  3.2    /  TBili  1.3    /  DBili  x      /  AST  25     /  ALT  26     /  AlkPhos  78     21 Nov 2018 05:14  TPro  6.6    /  Alb  3.5    /  TBili  2.0    /  DBili  x      /  AST  23     /  ALT  27     /  AlkPhos  91     20 Nov 2018 05:59  TPro  7.2    /  Alb  3.7    /  TBili  1.2    /  DBili  x      /  AST  22     /  ALT  30     /  AlkPhos  97     19 Nov 2018 18:12    PT/INR - ( 20 Nov 2018 05:59 )   PT: 11.9 sec;   INR: 1.04 ratio       PTT - ( 19 Nov 2018 18:12 )  PTT:29.4 sec  CARDIAC MARKERS ( 21 Nov 2018 00:09 )  .088 ng/mL / x     / 192 U/L / x     / 5.0 ng/mL  CARDIAC MARKERS ( 20 Nov 2018 18:16 )  .101 ng/mL / x     / 230 U/L / x     / 5.5 ng/mL  CARDIAC MARKERS ( 20 Nov 2018 09:48 )  .192 ng/mL / x     / 248 U/L / x     / 5.7 ng/mL  CARDIAC MARKERS ( 20 Nov 2018 02:30 )  .200 ng/mL / x     / 89 U/L / x     / 2.7 ng/mL  CARDIAC MARKERS ( 19 Nov 2018 18:12 )  <.015 ng/mL / x     / x     / x     / x        < from: TTE Echo Doppler w/o Cont (11.20.18 @ 10:49) >     EXAM:  ECHO TTE WO CON COMP W DOPPLR      PROCEDURE DATE:  11/20/2018      INTERPRETATION:  Ordering Physician: DIANA CALI [id number]    Indication: CHF  Technologist Initials: AS  Study Quality: Technically difficult and limited   A complete echocardiographic study was performed utilizing standard   protocol including spectral and color Doppler in all echocardiographic   windows.    Weight: 53 kg  Blood Pressure: 119/59    MEASUREMENTS  IVS: 1.0cm  PWT: 0.9cm  LA: 3.3cm  AO: 2.7cm  LVIDd: 4.9cm  LVIDs: 4.2cm    LVEF: 50%    FINDINGS  Left Ventricle: The endocardium is not well-visualized. In certain views,   septum appears hypokinetic. There is low normal left ventricular systolic   function  Aortic Valve: The aortic valve is not well visualized. It appears   calcified and trileaflet. Mild aortic insufficiency.  Mitral Valve: Mitral annular calcification calcified mitral valve   leaflets. Mild to moderate mitral insufficiency.  Tricuspid Valve: Normal tricuspid valve. Mild tricuspid insufficiency.  Pulmonic Valve: Not well visualized  Left Atrium: Mildly enlarged  Right Ventricle: Normal right ventricular size and systolic function.  Right Atrium: Mildly enlarged  Diastolic Function: Grade 2 diastolic dysfunction.  Pericardium/Pleura: Normal pericardium with trace pericardial effusion.    CONCLUSIONS:  Technically difficult and limited study.  1.  The endocardium is not well-visualized. In certain views, septum   appears hypokinetic. There is low normal left ventricular systolic   function  2.  The aortic valve is not well visualized. It appears calcified and   trileaflet. Mild aortic insufficiency.  3.  Mitral annular calcification calcified mitral valve leaflets. Mild to   moderate mitral insufficiency.  4.  Mild left atrial enlargement  5.  Normal right ventricular size and function.  Mild right atrial   enlargement  6.  Grade 2 diastolic dysfunction    ANGELA CANDELARIA M.D., ATTENDING CARDIOLOGIST  This document has been electronically signed. Nov 21 2018  7:02AM      < end of copied text >     < from: Xray Chest 1 View-PORTABLE IMMEDIATE (11.19.18 @ 18:18) >    EXAM:  XR CHEST PORTABLE IMMED 1V                          PROCEDURE DATE:  11/19/2018      INTERPRETATION:  Short of breath, chest pain.     AP chest. Prior 12/19/2017.  Heart not accurately evaluated on this projection. Low lung volumes. New   extensive bilateral left worse than right predominantly basilar airspace   disease. Correlate for congestion and/or infection/aspiration. No pleural   effusion or pneumothorax.    Impression: As above    YANETH MERIDA M.D., ATTENDING RADIOLOGIST  This document has been electronically signed. Nov 19 2018  6:29PM      < end of copied text > [facility] -- Stefano Emmanuel, Aleksandar, Ricky, Ramsey Quan Savella  Office # [phone]    Follow Up:  Angina Equivalent    Subjective/Observations: Patient claimed she felt better overnight.  Denies CP, SOB, or RASCON.    REVIEW OF SYSTEMS: All other review of systems is negative unless indicated above    PAST MEDICAL & SURGICAL HISTORY:  Renal artery stenosis: possible on doppler  Basal cell carcinoma  Mitral valve insufficiency, unspecified etiology  Edema, unspecified type  Cerebrovascular accident (CVA), unspecified mechanism  Hyponatremia: h/o Hypokelema  Dyslipidemia  Essential hypertension  UTI (urinary tract infection)  H/O bilateral hip replacements  History of cholecystectomy  Status post hysterectomy  S/P Mohs surgery for basal cell carcinoma    MEDICATIONS  (STANDING):  aspirin  chewable 81 milliGRAM(s) Oral daily  atorvastatin 40 milliGRAM(s) Oral at bedtime  cloNIDine 0.2 milliGRAM(s) Oral three times a day  clopidogrel Tablet 75 milliGRAM(s) Oral daily  enoxaparin Injectable 50 milliGRAM(s) SubCutaneous every 12 hours  labetalol 600 milliGRAM(s) Oral every 12 hours  losartan 50 milliGRAM(s) Oral two times a day    MEDICATIONS  (PRN):    Allergies    Keflex (Unknown)  verapamil (Other)    Intolerances  Vital Signs Last 24 Hrs  T(C): 36.7 (21 Nov 2018 06:05), Max: 36.8 (20 Nov 2018 19:37)  T(F): 98 (21 Nov 2018 06:05), Max: 98.2 (20 Nov 2018 19:37)  HR: 72 (21 Nov 2018 06:05) (60 - 86)  BP: 162/79 (21 Nov 2018 06:05) (119/59 - 170/70)  BP(mean): 108 (21 Nov 2018 05:00) (83 - 123)  RR: 18 (21 Nov 2018 06:05) (15 - 35)  SpO2: 99% (21 Nov 2018 06:05) (86% - 99%)    I&O's Summary    20 Nov 2018 07:01  -  21 Nov 2018 07:00  --------------------------------------------------------  IN: 1663 mL / OUT: 2500 mL / NET: -837 mL    PHYSICAL EXAM:  TELE: NSR  Constitutional: NAD, awake and alert, well-developed  HEENT: Moist Mucous Membranes, Anicteric  Pulmonary: Non-labored, breath sounds are clear bilaterally, No wheezing, rales or rhonchi  Cardiovascular: Regular, S1 and S2, + murmurs, No rubs/gallops/clicks  Gastrointestinal: Bowel Sounds present, soft, nontender.   Lymph: No peripheral edema. No lymphadenopathy.  Skin: No visible rashes or ulcers.  Psych:  Mood & affect appropriate    LABS: All Labs Reviewed:                        10.6 7.92  )-----------( 319      ( 21 Nov 2018 05:14 )             31.9                         11.1   9.80  )-----------( 417      ( 20 Nov 2018 05:59 )             32.4                         12.4   10.71 )-----------( 470      ( 19 Nov 2018 18:12 )             38.1     21 Nov 2018 05:14    140    |  102    |  37     ----------------------------<  115    3.9     |  29     |  1.10   20 Nov 2018 05:59    138    |  102    |  29     ----------------------------<  114    3.5     |  25     |  0.92   19 Nov 2018 18:12    139    |  106    |  30     ----------------------------<  174    4.1     |  22     |  0.90     Ca    8.7        21 Nov 2018 05:14  Ca    9.7        20 Nov 2018 05:59  Ca    9.1        19 Nov 2018 18:12  Phos  4.1       21 Nov 2018 05:14  Phos  1.9       20 Nov 2018 05:59  Mg     2.1       21 Nov 2018 05:14  Mg     1.7       20 Nov 2018 05:59    TPro  6.3    /  Alb  3.2    /  TBili  1.3    /  DBili  x      /  AST  25     /  ALT  26     /  AlkPhos  78     21 Nov 2018 05:14  TPro  6.6    /  Alb  3.5    /  TBili  2.0    /  DBili  x      /  AST  23     /  ALT  27     /  AlkPhos  91     20 Nov 2018 05:59  TPro  7.2    /  Alb  3.7    /  TBili  1.2    /  DBili  x      /  AST  22     /  ALT  30     /  AlkPhos  97     19 Nov 2018 18:12    PT/INR - ( 20 Nov 2018 05:59 )   PT: 11.9 sec;   INR: 1.04 ratio       PTT - ( 19 Nov 2018 18:12 )  PTT:29.4 sec  CARDIAC MARKERS ( 21 Nov 2018 00:09 )  .088 ng/mL / x     / 192 U/L / x     / 5.0 ng/mL  CARDIAC MARKERS ( 20 Nov 2018 18:16 )  .101 ng/mL / x     / 230 U/L / x     / 5.5 ng/mL  CARDIAC MARKERS ( 20 Nov 2018 09:48 )  .192 ng/mL / x     / 248 U/L / x     / 5.7 ng/mL  CARDIAC MARKERS ( 20 Nov 2018 02:30 )  .200 ng/mL / x     / 89 U/L / x     / 2.7 ng/mL  CARDIAC MARKERS ( 19 Nov 2018 18:12 )  <.015 ng/mL / x     / x     / x     / x        < from: TTE Echo Doppler w/o Cont (11.20.18 @ 10:49) >     EXAM:  ECHO TTE WO CON COMP W DOPPLR      PROCEDURE DATE:  11/20/2018      INTERPRETATION:  Ordering Physician: DIANA CALI [id number]    Indication: CHF  Technologist Initials: AS  Study Quality: Technically difficult and limited   A complete echocardiographic study was performed utilizing standard   protocol including spectral and color Doppler in all echocardiographic   windows.    Weight: 53 kg  Blood Pressure: 119/59    MEASUREMENTS  IVS: 1.0cm  PWT: 0.9cm  LA: 3.3cm  AO: 2.7cm  LVIDd: 4.9cm  LVIDs: 4.2cm    LVEF: 50%    FINDINGS  Left Ventricle: The endocardium is not well-visualized. In certain views,   septum appears hypokinetic. There is low normal left ventricular systolic   function  Aortic Valve: The aortic valve is not well visualized. It appears   calcified and trileaflet. Mild aortic insufficiency.  Mitral Valve: Mitral annular calcification calcified mitral valve   leaflets. Mild to moderate mitral insufficiency.  Tricuspid Valve: Normal tricuspid valve. Mild tricuspid insufficiency.  Pulmonic Valve: Not well visualized  Left Atrium: Mildly enlarged  Right Ventricle: Normal right ventricular size and systolic function.  Right Atrium: Mildly enlarged  Diastolic Function: Grade 2 diastolic dysfunction.  Pericardium/Pleura: Normal pericardium with trace pericardial effusion.    CONCLUSIONS:  Technically difficult and limited study.  1.  The endocardium is not well-visualized. In certain views, septum   appears hypokinetic. There is low normal left ventricular systolic   function  2.  The aortic valve is not well visualized. It appears calcified and   trileaflet. Mild aortic insufficiency.  3.  Mitral annular calcification calcified mitral valve leaflets. Mild to   moderate mitral insufficiency.  4.  Mild left atrial enlargement  5.  Normal right ventricular size and function.  Mild right atrial   enlargement  6.  Grade 2 diastolic dysfunction    ANGELA CANDELARIA M.D., ATTENDING CARDIOLOGIST  This document has been electronically signed. Nov 21 2018  7:02AM      < end of copied text >     < from: Xray Chest 1 View-PORTABLE IMMEDIATE (11.19.18 @ 18:18) >    EXAM:  XR CHEST PORTABLE IMMED 1V                          PROCEDURE DATE:  11/19/2018      INTERPRETATION:  Short of breath, chest pain.     AP chest. Prior 12/19/2017.  Heart not accurately evaluated on this projection. Low lung volumes. New   extensive bilateral left worse than right predominantly basilar airspace   disease. Correlate for congestion and/or infection/aspiration. No pleural   effusion or pneumothorax.    Impression: As above    YANETH MERIDA M.D., ATTENDING RADIOLOGIST  This document has been electronically signed. Nov 19 2018  6:29PM      < end of copied text >

## 2018-11-21 NOTE — PROGRESS NOTE ADULT - PROBLEM SELECTOR PLAN 4
resolving, pt swelling improved in LE likely 2/2 fluid OL. Lower extremity dopplers negative. Afebrile, Hold off Abx as per ICU  - lasix 40mg IVP q12h  - off Nitro gtt

## 2018-11-21 NOTE — DISCHARGE NOTE ADULT - HOSPITAL COURSE
Pt is a 86 yo F with PMH of HTN, renal artery stenosis, MV insufficiency, dyslipidemia, CVA, hyponatremia, recurrent UTI who presents to the ED with chest pain. Pt states that she began experiencing chest pain at lunchtime. She was with her daughter and felt that she couldn't breathe. Pain continued to get worse. Stated her cardiologists are in [place] but she felt so bad that she needed to get to the closest hospital. Stated she felt as though she were going to die. CP was located substernal, non-radiating, non-reproducible. Pt admitted to CP, SOB, cough, leg edema. Pt denies fever, chills, palpitations, N/V, HA, changes in vision, dizziness. Per discussion with doctor on phone, her mother takes her BP at home and SBP is frequently 170-180 and that occasionally it is 200. Her BP medications were recently changed by her cardiologist (Marquita). She had an echo which revealed MV insufficiency and she is scheduled for follow up visit to evaluate necessity for replacement. Pt stated that she has been being treated for a UTI with cefuroxime and just finished her course of Abx today. Pt's sister also had HTN. Pt denies FHx of cardiac problems. Pt is suppose to have a Evaluation with Cardiac Sx for leaky Mitral Valve at [facility] next week Monday. In ED, Pt's vitals were: T 98.3, , /100, RR 33 and 81% on supplemental O2. Pt was placed on BiPaP. Repeat vitals were: HR 82, /83, RR 20 and 100% on BiPAP  Labs significant for proBNP 6155.  1st set troponins neg. Other labs grossly WNL. EKG at this time showed sinus tachycardia at 105, and CXR showed B/L L>R basilar airspace disease. Pt was given nitroglycerin sublingual, started on nitroglycerin gtt, Lasix 40mg IV push. Pt seen by ICU & admitted to ICU. The next day, pt stated she feels much better than previous and denied HA, dizziness, CP, SOB, N/V/D. Pt BP improved to 160s SBP. Pt was taken Off BiPAP, tolerateed NC well. Pt was evaluated by cardiology who recommended Lasix 40mg IV q12h. An EKG later that night showed new T wave inversions in V1-V4, and pt was advised to be transferred to [facility] for coronary angiogram. Patient improved clinically throughout hospital course. Patient seen and examined on day of discharge.    Vital Signs Last 24 Hrs  T(C): 36.6 (21 Nov 2018 08:00), Max: 36.8 (20 Nov 2018 19:37)  T(F): 97.9 (21 Nov 2018 08:00), Max: 98.2 (20 Nov 2018 19:37)  HR: 69 (21 Nov 2018 08:00) (60 - 86)  BP: 167/77 (21 Nov 2018 08:00) (121/58 - 170/70)  BP(mean): 108 (21 Nov 2018 05:00) (83 - 117)  RR: 18 (21 Nov 2018 08:00) (15 - 35)  SpO2: 96% (21 Nov 2018 08:00) (86% - 99%)    Physical Exam:  General: elderly female NAD  HEENT: NCAT, PERRLA, EOMI bl, moist mucous membranes   Neck: Supple, nontender, no mass  Neurology: A&Ox3, nonfocal, CN II-XII grossly intact, sensation intact, no gait abnormalities   Respiratory: CTA B/L, No W/R/R  CV: RRR, +S1/S2, no murmurs, rubs or gallops  Abdominal: Soft, NT, ND +BSx4  Extremities: mild nonpitting edema B/L LE, painful to palpation  MSK: Normal ROM, no joint erythema or warmth  Skin: warm, dry, normal color, no obvious rash or abnormal lesions    Patient is medically stable for transfer to [facility] with outpatient follow up with cardiologist in week. Pt is a 88 yo F with PMH of HTN, renal artery stenosis, MV insufficiency, dyslipidemia, CVA, hyponatremia, recurrent UTI who presents to the ED with chest pain. Pt states that she began experiencing chest pain at lunchtime. She was with her daughter and felt that she couldn't breathe. Pain continued to get worse. Stated her cardiologists are in [place] but she felt so bad that she needed to get to the closest hospital. Stated she felt as though she were going to die. CP was located substernal, non-radiating, non-reproducible. Pt admitted to CP, SOB, cough, leg edema. Pt denies fever, chills, palpitations, N/V, HA, changes in vision, dizziness. Per discussion with doctor on phone, her mother takes her BP at home and SBP is frequently 170-180 and that occasionally it is 200. Her BP medications were recently changed by her cardiologist (Marquita). She had an echo which revealed MV insufficiency and she is scheduled for follow up visit to evaluate necessity for replacement. Pt stated that she has been being treated for a UTI with cefuroxime and just finished her course of Abx today. Pt's sister also had HTN. Pt denies FHx of cardiac problems. Pt is suppose to have a Evaluation with Cardiac Sx for leaky Mitral Valve at [facility] next week Monday. In ED, Pt's vitals were: T 98.3, , /100, RR 33 and 81% on supplemental O2. Pt was placed on BiPaP. Repeat vitals were: HR 82, /83, RR 20 and 100% on BiPAP  Labs significant for proBNP 6155.  1st set troponin neg. Other labs grossly WNL. EKG at this time showed sinus tachycardia at 105, and CXR showed B/L L>R basilar airspace disease. Pt was given nitroglycerin sublingual, started on nitroglycerin gtt, Lasix 40mg IV push. Pt placed on BI PAP,in ER, Pt seen by ICU & admitted to ICU.  Pt was given  2nd dose of Lasix, Pt restarted on home BP meds Labetalol, Clonidine, Losartan, The next day, pt stated she feels much better than previous and denied HA, dizziness, CP, SOB, N/V/D. Pt BP improved to 160s SBP. Pt was taken Off BiPAP, tolerated NC well. Pt was evaluated by cardiology who recommended Lasix 40mg IV q12h. An EKG later that night showed new T wave inversions in V1-V4, started on SC Lovenox full dose 2x day and pt was advised to be transferred to [facility] for coronary angiogram/ cardiac cath. Patient improved clinically throughout hospital course. Patient seen and examined on day of discharge.    Patient is medically stable for transfer to [facility] with outpatient follow up with cardiologist in week.

## 2018-11-21 NOTE — PROGRESS NOTE ADULT - SUBJECTIVE AND OBJECTIVE BOX
Patient is a 87y old  Female who presents with a chief complaint of Chest pain, SOB (2018 07:33)      INTERVAL HPI:      OVERNIGHT EVENTS:    Home Medications:  cloNIDine 0.2 mg oral tablet: 1 tab(s) orally 3 times a day (2018 21:22)  labetalol 300 mg oral tablet: 1 tab(s) orally 2 times a day (2018 21:22)  losartan 50 mg oral tablet: 1 tab(s) orally 2 times a day (2018 21:22)  Plavix 75 mg oral tablet: 1 tab(s) orally once a day (2018 21:22)  Veltassa 16.8 g oral powder for reconstitution: 1 dose(s) orally once a day (2018 21:22)      MEDICATIONS  (STANDING):  aspirin  chewable 81 milliGRAM(s) Oral daily  atorvastatin 40 milliGRAM(s) Oral at bedtime  cloNIDine 0.2 milliGRAM(s) Oral three times a day  clopidogrel Tablet 75 milliGRAM(s) Oral daily  enoxaparin Injectable 50 milliGRAM(s) SubCutaneous every 12 hours  labetalol 600 milliGRAM(s) Oral every 12 hours  losartan 50 milliGRAM(s) Oral two times a day    MEDICATIONS  (PRN):      Allergies    Keflex (Unknown)  verapamil (Other)    Intolerances        REVIEW OF SYSTEMS:  CONSTITUTIONAL: No fever, No chills, No fatigue, No myalgia, No Body ache, No Weakness  EYES: No eye pain,  No visual disturbances, No discharge, NO Redness  ENMT:  No ear pain, No nose bleed, No vertigo; No sinus or throat pain, No Congestion  NECK: No pain, No stiffness  RESPIRATORY: No cough, wheezing, No  hemoptysis, No shortness of breath  CARDIOVASCULAR: No chest pain, palpitations  GASTROINTESTINAL: No abdominal or epigastric pain. No nausea, No vomiting; No diarrhea or constipation. [  ] BM  GENITOURINARY: No dysuria, No frequency, No urgency, No hematuria, or incontinence  NEUROLOGICAL: No headaches, No dizziness, No numbness, No tingling, No tremors, No weakness  EXT: No Swelling, No Pain, No Edema  SKIN:  [  ] No itching, burning, rashes, or lesions   MUSCULOSKELETAL: No joint pain or swelling; No muscle pain, No back pain, No extremity pain  PSYCHIATRIC: No depression, anxiety, mood swings or difficulty sleeping at night  PAIN SCALE: [  ] None  [  ] Other-  ROS Unable to obtain due to - [  ] Dementia  [  ] Lethargy  [  ] Sedated [  ] non verbal  REST OF REVIEW Of SYSTEM - [  ] Normal     Vital Signs Last 24 Hrs  T(C): 36.6 (2018 08:00), Max: 36.8 (2018 19:37)  T(F): 97.9 (2018 08:00), Max: 98.2 (2018 19:37)  HR: 69 (2018 08:00) (60 - 86)  BP: 167/77 (2018 08:00) (121/58 - 170/70)  BP(mean): 108 (2018 05:00) (83 - 123)  RR: 18 (2018 08:00) (15 - 35)  SpO2: 96% (2018 08:00) (86% - 99%)  Finger Stick        11-20 @ 07:01  -  - @ 07:00  --------------------------------------------------------  IN: 1663 mL / OUT: 2500 mL / NET: -837 mL        PHYSICAL EXAM:  GENERAL:  [  ] NAD , [  ] well appearing, [  ] Agitated, [  ] Drowsy,  [  ] Lethargy, [  ] confused   HEAD:  [  ] Normal, [  ] Other  EYES:  [  ] EOMI, [  ] PERRLA, [  ] conjunctiva and sclera clear normal, [  ] Other,  [  ] Pallor,[  ] Discharge  ENMT:  [  ] Normal, [  ] Moist mucous membranes, [  ] Good dentition, [  ] No Thrush  NECK:  [  ] Supple, [  ] No JVD, [  ] Normal thyroid, [  ] Lymphadenopathy [  ] Other  CHEST/LUNG:  [  ] Clear to auscultation bilaterally, [  ] Breath Sounds equal OR Decrease,  [  ] No rales, [  ] No rhonchi  [  ]  No wheezing  HEART:  [  ] Regular rate and rhythm, [  ] tachycardia, [  ] Bradycardia,  [  ] irregular  [  ] No murmurs, No rubs, No gallops, [  ] PPM in place (Mfr:  )  ABDOMEN:  [  ] Soft, [  ] Nontender, [  ] Nondistended, [  ] No mass, [  ] Bowel sounds present, [  ] obese  NERVOUS SYSTEM:  [  ] Alert & Oriented X3, [  ] Nonfocal  [  ] Confusion  [  ] Encephalopathic [  ] Sedated [  ] Unable to assess, [  ] Other-  EXTREMITIES: [  ] 2+ Peripheral Pulses, No clubbing, No cyanosis,  [  ] edema B/L lower EXT. [  ] PVD stasis skin changes B/L Lower EXT  LYMPH: No lymphadenopathy noted  SKIN:  [  ] No rashes or lesions, [  ] Pressure Ulcers, [  ] ecchymosis, [  ] Skin Tears, [  ] Other    DIET:     LABS:                        10.6   7.92  )-----------( 319      ( 2018 05:14 )             31.9     2018 05:14    140    |  102    |  37     ----------------------------<  115    3.9     |  29     |  1.10     Ca    8.7        2018 05:14  Phos  4.1       2018 05:14  Mg     2.1       2018 05:14    TPro  6.3    /  Alb  3.2    /  TBili  1.3    /  DBili  x      /  AST  25     /  ALT  26     /  AlkPhos  78     2018 05:14    PT/INR - ( 2018 05:59 )   PT: 11.9 sec;   INR: 1.04 ratio         PTT - ( 2018 18:12 )  PTT:29.4 sec  Urinalysis Basic - ( 2018 22:02 )    Color: Pale Yellow / Appearance: Clear / S.005 / pH: x  Gluc: x / Ketone: Negative  / Bili: Negative / Urobili: Negative   Blood: x / Protein: 25 mg/dL / Nitrite: Negative   Leuk Esterase: Negative / RBC: Negative /HPF / WBC Negative   Sq Epi: x / Non Sq Epi: Negative / Bacteria: Negative        Culture Results:   No growth ( @ 23:38)      culture blood  -- .Urine Catheterized  @ 23:38    culture urine  --   @ 23:38      CARDIAC MARKERS ( 2018 00:09 )  .088 ng/mL / x     / 192 U/L / x     / 5.0 ng/mL  CARDIAC MARKERS ( 2018 18:16 )  .101 ng/mL / x     / 230 U/L / x     / 5.5 ng/mL  CARDIAC MARKERS ( 2018 09:48 )  .192 ng/mL / x     / 248 U/L / x     / 5.7 ng/mL  CARDIAC MARKERS ( 2018 02:30 )  .200 ng/mL / x     / 89 U/L / x     / 2.7 ng/mL  CARDIAC MARKERS ( 2018 18:12 )  <.015 ng/mL / x     / x     / x     / x              Culture - Urine (collected 2018 23:38)  Source: .Urine Catheterized  Final Report (2018 21:50):    No growth         Anemia Panel:      Thyroid Panel:            Serum Pro-Brain Natriuretic Peptide: 6155 pg/mL (18 @ 18:12)      RADIOLOGY & ADDITIONAL TESTS:      HEALTH ISSUES - PROBLEM Dx:  Cerebrovascular accident (CVA), unspecified mechanism: Cerebrovascular accident (CVA), unspecified mechanism  Hypertensive crisis: Hypertensive crisis  Chest pain, unspecified type: Chest pain, unspecified type  Acute respiratory failure: Acute respiratory failure  Hypertensive emergency: Hypertensive emergency  Prophylactic measure: Prophylactic measure  Mitral valve insufficiency, unspecified etiology: Mitral valve insufficiency, unspecified etiology  Renal artery stenosis: Renal artery stenosis  Essential hypertension: Essential hypertension  Acute pulmonary edema with congestive heart failure: Acute pulmonary edema with congestive heart failure          Consultant(s) Notes Reviewed:  [  ] YES     Care Discussed with [X] Consultants  [  ] Patient  [  ] Family  [  ]   [  ] Social Service  [  ] RN, [  ] Physical Therapy  DVT PPX: [  ] Lovenox, [  ] S C Heparin, [  ] Coumadin, [  ] Xarelto, [  ] Eliquis, [  ] Pradaxa, [  ] IV Heparin drip, [  ] SCD [  ] Contraindication 2 to GI Bleed,[  ] Ambulation  Advanced directive: [  ] None, [  ] DNR/DNI Patient is a 87y old  Female who presents with a chief complaint of Chest pain, SOB (2018 07:33)      INTERVAL HPI: Pt is a 88 yo F with PMH of HTN, renal artery stenosis, MV insufficiency, dyslipidemia, CVA, hyponatremia, recurrent UTI who presents to the ED with chest pain. Pt states that she began experiencing chest pain at lunchtime. She was with her daughter and felt that she couldn't breath. Pain continued to get worse. States her cardiologists are in [place] but she felt so bad that she needed to get to the closest hospital. States she felt as though she were going to die. CP is located substernal, non-radiating, non-reproducible. Pt admits to CP, SOB, cough, leg edema. Pt denies fever, chills, palpitations, N/V, HA, changes in vision, dizziness. Per discussion with dtr on phone, her mother takes her BP at home and SBP is frequently 170-180 and that occasionally it is 200. Her BP medications were recently changed by her cardiologist (Marquita). She had an echo which revealed MV insufficiency and she is scheduled for follow up visit to evaluate necessity for replacement. Pt states that she has been being treated for a UTI with cefuroxime and just finished her course of Abx today. Pt's sister also had HTN. Pt denies FHx of cardiac problems. Pt is suppose to have a Evaluation with Cardiac Sx for leaky Mitral Valve at [facility] next week Monday.    In ED, Pt's vitals were: T 98.3, , /100, RR 33 and 81% on supplemental O2.   Repeat vitals were: HR 82, /83, RR 20 and 100% on BiPAP  Labs significant for proBNP 6155. 1st set troponins neg. Other labs grossly WNL.  EKG - sinus tachy, 105  CXR - B/L L>R basilar airspace disease    Pt given nitroglycerin sublingual, started on nitroglycerin gtt, Lasix 40mg IV push. Placed on Bi PAP, Pt seen by ICU & admitted to ICU.    18: Pt seen and examined at ICU bedside. Pt states she feels much better than yesterday. Currently denying HA, dizziness, CP, SOB, N/V/D. Off BiPAP, currently tolerating NC well.  18: Pt seen and examined at bedside. Pt feels well, however new T wave inversions seen V1-V4. Pt to be transferred to [facility] for coronary angiogram. ECHO: Grade II Diastolic dysfunctino.      OVERNIGHT EVENTS: NONE    Home Medications:  cloNIDine 0.2 mg oral tablet: 1 tab(s) orally 3 times a day (2018 21:22)  labetalol 300 mg oral tablet: 1 tab(s) orally 2 times a day (2018 21:22)  losartan 50 mg oral tablet: 1 tab(s) orally 2 times a day (2018 21:22)  Plavix 75 mg oral tablet: 1 tab(s) orally once a day (2018 21:22)  Veltassa 16.8 g oral powder for reconstitution: 1 dose(s) orally once a day (2018 21:22)      MEDICATIONS  (STANDING):  aspirin  chewable 81 milliGRAM(s) Oral daily  atorvastatin 40 milliGRAM(s) Oral at bedtime  cloNIDine 0.2 milliGRAM(s) Oral three times a day  clopidogrel Tablet 75 milliGRAM(s) Oral daily  enoxaparin Injectable 50 milliGRAM(s) SubCutaneous every 12 hours  labetalol 600 milliGRAM(s) Oral every 12 hours  losartan 50 milliGRAM(s) Oral two times a day    MEDICATIONS  (PRN):      Allergies    Keflex (Unknown)  verapamil (Other)    Intolerances        REVIEW OF SYSTEMS:  CONSTITUTIONAL: No fever, No chills, No fatigue, No myalgia, No Body ache, No Weakness  EYES: No eye pain,  No visual disturbances, No discharge, NO Redness  ENMT:  No ear pain, No nose bleed, No vertigo; No sinus or throat pain, No Congestion  NECK: No pain, No stiffness  RESPIRATORY: No cough, wheezing, No  hemoptysis, No shortness of breath  CARDIOVASCULAR: No chest pain, palpitations  GASTROINTESTINAL: No abdominal or epigastric pain. No nausea, No vomiting; No diarrhea or constipation. [  ] BM  GENITOURINARY: No dysuria, No frequency, No urgency, No hematuria, or incontinence  NEUROLOGICAL: No headaches, No dizziness, No numbness, No tingling, No tremors, No weakness  EXT: (+) Swelling and Pain in both legs  SKIN:  [ x ] No itching, burning, rashes, or lesions   MUSCULOSKELETAL: No joint pain or swelling; No muscle pain, No back pain, No extremity pain  PSYCHIATRIC: No depression, anxiety, mood swings or difficulty sleeping at night  ROS Unable to obtain due to - [  ] Dementia  [  ] Lethargy  [  ] Sedated [  ] non verbal  REST OF REVIEW Of SYSTEM - [ x ] Normal     Vital Signs Last 24 Hrs  T(C): 36.6 (2018 08:00), Max: 36.8 (2018 19:37)  T(F): 97.9 (2018 08:00), Max: 98.2 (2018 19:37)  HR: 69 (2018 08:00) (60 - 86)  BP: 167/77 (2018 08:00) (121/58 - 170/70)  BP(mean): 108 (2018 05:00) (83 - 123)  RR: 18 (2018 08:00) (15 - 35)  SpO2: 96% (2018 08:00) (86% - 99%)  Finger Stick        - @ 07:01  -   @ 07:00  --------------------------------------------------------  IN: 1663 mL / OUT: 2500 mL / NET: -837 mL        PHYSICAL EXAM:  GENERAL:  [ x ] NAD , [ x ] well appearing, [  ] Agitated, [  ] Drowsy,  [  ] Lethargy, [  ] confused   HEAD:  [ x ] Normal, [  ] Other  EYES:  [ x ] EOMI, [ x ] PERRLA, [ x ] conjunctiva and sclera clear normal, [  ] Other,  [  ] Pallor,[  ] Discharge  ENMT:  [ x ] Normal, [ x ] Moist mucous membranes, [  ] Good dentition, [  ] No Thrush  NECK:  [ x ] Supple, [ x ] No JVD, [ x ] Normal thyroid, [  ] Lymphadenopathy [  ] Other  CHEST/LUNG:  [ x ] Clear to auscultation bilaterally, [ x ] Breath Sounds equal OR Decrease,  [ x ] No rales, [ x ] No rhonchi  [ x ]  No wheezing  HEART:  [ x ] Regular rate and rhythm, [  ] tachycardia, [  ] Bradycardia,  [  ] irregular  [ x ] No murmurs, No rubs, No gallops, [  ] PPM in place (Mfr:  )  ABDOMEN:  [ x ] Soft, [ x ] Nontender, [ x ] Nondistended, [ x ] No mass, [ x ] Bowel sounds present, [  ] obese  NERVOUS SYSTEM:  [ x ] Alert & Oriented X3, [ x ] Nonfocal  [  ] Confusion  [  ] Encephalopathic [  ] Sedated [  ] Unable to assess, [  ] Other-  EXTREMITIES: [ x ] 2+ Peripheral Pulses, No clubbing, No cyanosis,  [ x ] nonpitting edema B/L lower EXT. [  ] PVD stasis skin changes B/L Lower EXT  LYMPH: No lymphadenopathy noted  SKIN:  [ x ] No rashes or lesions, [  ] Pressure Ulcers, [  ] ecchymosis, [  ] Skin Tears, [  ] Other    DIET:     LABS:                        10.6   7.92  )-----------( 319      ( 2018 05:14 )             31.9     2018 05:14    140    |  102    |  37     ----------------------------<  115    3.9     |  29     |  1.10     Ca    8.7        2018 05:14  Phos  4.1       2018 05:14  Mg     2.1       2018 05:14    TPro  6.3    /  Alb  3.2    /  TBili  1.3    /  DBili  x      /  AST  25     /  ALT  26     /  AlkPhos  78     2018 05:14    PT/INR - ( 2018 05:59 )   PT: 11.9 sec;   INR: 1.04 ratio         PTT - ( 2018 18:12 )  PTT:29.4 sec  Urinalysis Basic - ( 2018 22:02 )    Color: Pale Yellow / Appearance: Clear / S.005 / pH: x  Gluc: x / Ketone: Negative  / Bili: Negative / Urobili: Negative   Blood: x / Protein: 25 mg/dL / Nitrite: Negative   Leuk Esterase: Negative / RBC: Negative /HPF / WBC Negative   Sq Epi: x / Non Sq Epi: Negative / Bacteria: Negative        Culture Results:   No growth ( @ 23:38)      culture blood  -- .Urine Catheterized 11-19 @ 23:38    culture urine  --   @ 23:38      CARDIAC MARKERS ( 2018 00:09 )  .088 ng/mL / x     / 192 U/L / x     / 5.0 ng/mL  CARDIAC MARKERS ( 2018 18:16 )  .101 ng/mL / x     / 230 U/L / x     / 5.5 ng/mL  CARDIAC MARKERS ( 2018 09:48 )  .192 ng/mL / x     / 248 U/L / x     / 5.7 ng/mL  CARDIAC MARKERS ( 2018 02:30 )  .200 ng/mL / x     / 89 U/L / x     / 2.7 ng/mL  CARDIAC MARKERS ( 2018 18:12 )  <.015 ng/mL / x     / x     / x     / x              Culture - Urine (collected 2018 23:38)  Source: .Urine Catheterized  Final Report (2018 21:50):    No growth         Anemia Panel:      Thyroid Panel:            Serum Pro-Brain Natriuretic Peptide: 6155 pg/mL (18 @ 18:12)      RADIOLOGY & ADDITIONAL TESTS:      HEALTH ISSUES - PROBLEM Dx:  Cerebrovascular accident (CVA), unspecified mechanism: Cerebrovascular accident (CVA), unspecified mechanism  Hypertensive crisis: Hypertensive crisis  Chest pain, unspecified type: Chest pain, unspecified type  Acute respiratory failure: Acute respiratory failure  Hypertensive emergency: Hypertensive emergency  Prophylactic measure: Prophylactic measure  Mitral valve insufficiency, unspecified etiology: Mitral valve insufficiency, unspecified etiology  Renal artery stenosis: Renal artery stenosis  Essential hypertension: Essential hypertension  Acute pulmonary edema with congestive heart failure: Acute pulmonary edema with congestive heart failure          Consultant(s) Notes Reviewed:  [ x ] YES     Care Discussed with [X] Consultants  [ x ] Patient  [  ] Family  [  ]   [  ] Social Service  [  ] RN, [  ] Physical Therapy  DVT PPX: [ x ] Lovenox, [  ] S C Heparin, [  ] Coumadin, [  ] Xarelto, [  ] Eliquis, [  ] Pradaxa, [  ] IV Heparin drip, [  ] SCD [  ] Contraindication 2 to GI Bleed,[  ] Ambulation  Advanced directive: [  ] None, [  ] DNR/DNI Patient is a 87y old  Female who presents with a chief complaint of Chest pain, SOB (2018 07:33)      INTERVAL HPI: Pt is a 88 yo F with PMH of HTN, renal artery stenosis, MV insufficiency, dyslipidemia, CVA, hyponatremia, recurrent UTI who presents to the ED with chest pain. Pt states that she began experiencing chest pain at lunchtime. She was with her daughter and felt that she couldn't breath. Pain continued to get worse. States her cardiologists are in [place] but she felt so bad that she needed to get to the closest hospital. States she felt as though she were going to die. CP is located substernal, non-radiating, non-reproducible. Pt admits to CP, SOB, cough, leg edema. Pt denies fever, chills, palpitations, N/V, HA, changes in vision, dizziness. Per discussion with dtr on phone, her mother takes her BP at home and SBP is frequently 170-180 and that occasionally it is 200. Her BP medications were recently changed by her cardiologist (Marquita). She had an echo which revealed MV insufficiency and she is scheduled for follow up visit to evaluate necessity for replacement. Pt states that she has been being treated for a UTI with cefuroxime and just finished her course of Abx today. Pt's sister also had HTN. Pt denies FHx of cardiac problems. Pt is suppose to have a Evaluation with Cardiac Sx for leaky Mitral Valve at [facility] next week Monday.    In ED, Pt's vitals were: T 98.3, , /100, RR 33 and 81% on supplemental O2.   Repeat vitals were: HR 82, /83, RR 20 and 100% on BiPAP  Labs significant for proBNP 6155. 1st set troponins neg. Other labs grossly WNL.  EKG - sinus tachy, 105  CXR - B/L L>R basilar airspace disease    Pt given nitroglycerin sublingual, started on nitroglycerin gtt, Lasix 40mg IV push. Placed on Bi PAP, Pt seen by ICU & admitted to ICU.    18: Pt seen and examined at ICU bedside. Pt states she feels much better than yesterday. Currently denying HA, dizziness, CP, SOB, N/V/D. Off BiPAP, currently tolerating NC well. Pt started on SC Lovenox Full dose for EKG changes   18: Pt seen and examined at bedside. Pt feels well, however new T wave inversions seen V1-V4. Pt to be transferred to [facility] for [facility] angiogram. ECHO: Grade II Diastolic dysfunction, stable as per cardiology.      OVERNIGHT EVENTS: NONE    Home Medications:  cloNIDine 0.2 mg oral tablet: 1 tab(s) orally 3 times a day (2018 21:22)  labetalol 300 mg oral tablet: 1 tab(s) orally 2 times a day (2018 21:22)  losartan 50 mg oral tablet: 1 tab(s) orally 2 times a day (2018 21:22)  Plavix 75 mg oral tablet: 1 tab(s) orally once a day (2018 21:22)  Veltassa 16.8 g oral powder for reconstitution: 1 dose(s) orally once a day (2018 21:22)      MEDICATIONS  (STANDING):  aspirin  chewable 81 milliGRAM(s) Oral daily  atorvastatin 40 milliGRAM(s) Oral at bedtime  cloNIDine 0.2 milliGRAM(s) Oral three times a day  clopidogrel Tablet 75 milliGRAM(s) Oral daily  enoxaparin Injectable 50 milliGRAM(s) SubCutaneous every 12 hours  labetalol 600 milliGRAM(s) Oral every 12 hours  losartan 50 milliGRAM(s) Oral two times a day    MEDICATIONS  (PRN):      Allergies    Keflex (Unknown)  verapamil (Other)    Intolerances        REVIEW OF SYSTEMS:  CONSTITUTIONAL: No fever, No chills, No fatigue, No myalgia, No Body ache, No Weakness  EYES: No eye pain,  No visual disturbances, No discharge, NO Redness  ENMT:  No ear pain, No nose bleed, No vertigo; No sinus or throat pain, No Congestion  NECK: No pain, No stiffness  RESPIRATORY: No cough, wheezing, No  hemoptysis, No shortness of breath  CARDIOVASCULAR: No chest pain, palpitations  GASTROINTESTINAL: No abdominal or epigastric pain. No nausea, No vomiting; No diarrhea or constipation. [  ] BM  GENITOURINARY: No dysuria, No frequency, No urgency, No hematuria, or incontinence  NEUROLOGICAL: No headaches, No dizziness, No numbness, No tingling, No tremors, No weakness  EXT: (+) Swelling and Pain in both legs, improved  SKIN:  [ x ] No itching, burning, rashes, or lesions   MUSCULOSKELETAL: No joint pain or swelling; No muscle pain, No back pain, No extremity pain  PSYCHIATRIC: No depression, anxiety, mood swings or difficulty sleeping at night  ROS Unable to obtain due to - [  ] Dementia  [  ] Lethargy  [  ] Sedated [  ] non verbal  REST OF REVIEW Of SYSTEM - [ x ] Normal     Vital Signs Last 24 Hrs  T(C): 36.6 (2018 08:00), Max: 36.8 (2018 19:37)  T(F): 97.9 (2018 08:00), Max: 98.2 (2018 19:37)  HR: 69 (2018 08:00) (60 - 86)  BP: 167/77 (2018 08:00) (121/58 - 170/70)  BP(mean): 108 (2018 05:00) (83 - 123)  RR: 18 (2018 08:00) (15 - 35)  SpO2: 96% (2018 08:00) (86% - 99%)  Finger Stick        -20 @ 07:01  -  - @ 07:00  --------------------------------------------------------  IN: 1663 mL / OUT: 2500 mL / NET: -837 mL        PHYSICAL EXAM:  GENERAL:  [ x ] NAD , [ x ] well appearing, [  ] Agitated, [  ] Drowsy,  [  ] Lethargy, [  ] confused   HEAD:  [ x ] Normal, [  ] Other  EYES:  [ x ] EOMI, [ x ] PERRLA, [ x ] conjunctiva and sclera clear normal, [  ] Other,  [  ] Pallor,[  ] Discharge  ENMT:  [ x ] Normal, [ x ] Moist mucous membranes, [x  ] Good dentition, [ x ] No Thrush  NECK:  [ x ] Supple, [ x ] No JVD, [ x ] Normal thyroid, [  ] Lymphadenopathy [  ] Other  CHEST/LUNG:  [ x ] Clear to auscultation bilaterally, [ x ] Breath Sounds equal OR Decrease,  [ x ] No rales, [ x ] No rhonchi  [ x ]  No wheezing  HEART:  [ x ] Regular rate and rhythm, [  ] tachycardia, [  ] Bradycardia,  [  ] irregular  [ x ] No murmurs, No rubs, No gallops, [  ] PPM in place (Mfr:  )  ABDOMEN:  [ x ] Soft, [ x ] Nontender, [ x ] Nondistended, [ x ] No mass, [ x ] Bowel sounds present, [  ] obese  NERVOUS SYSTEM:  [ x ] Alert & Oriented X3, [ x ] Nonfocal  [  ] Confusion  [  ] Encephalopathic [  ] Sedated [  ] Unable to assess, [  ] Other-  EXTREMITIES: [ x ] 2+ Peripheral Pulses, No clubbing, No cyanosis,  [ x ] 2 + pitting edema B/L lower EXT. [x  ] PVD stasis skin changes B/L Lower EXT  LYMPH: No lymphadenopathy noted  SKIN:  [ x ] No rashes or lesions, [  ] Pressure Ulcers, [x  ] ecchymosis- left lower EXT, Erythema Rt lower lateral EXT, [  ] Skin Tears, [  ] Other    DIET: DASH    LABS:                        10.6   7.92  )-----------( 319      ( 2018 05:14 )             31.9     2018 05:14    140    |  102    |  37     ----------------------------<  115    3.9     |  29     |  1.10     Ca    8.7        2018 05:14  Phos  4.1       2018 05:14  Mg     2.1       2018 05:14    TPro  6.3    /  Alb  3.2    /  TBili  1.3    /  DBili  x      /  AST  25     /  ALT  26     /  AlkPhos  78     2018 05:14    PT/INR - ( 2018 05:59 )   PT: 11.9 sec;   INR: 1.04 ratio         PTT - ( 2018 18:12 )  PTT:29.4 sec  Urinalysis Basic - ( 2018 22:02 )    Color: Pale Yellow / Appearance: Clear / S.005 / pH: x  Gluc: x / Ketone: Negative  / Bili: Negative / Urobili: Negative   Blood: x / Protein: 25 mg/dL / Nitrite: Negative   Leuk Esterase: Negative / RBC: Negative /HPF / WBC Negative   Sq Epi: x / Non Sq Epi: Negative / Bacteria: Negative        Culture Results:   No growth ( @ 23:38)      culture blood  -- .Urine Catheterized  @ 23:38    culture urine  --   @ 23:38      CARDIAC MARKERS ( 2018 00:09 )  .088 ng/mL / x     / 192 U/L / x     / 5.0 ng/mL  CARDIAC MARKERS ( 2018 18:16 )  .101 ng/mL / x     / 230 U/L / x     / 5.5 ng/mL  CARDIAC MARKERS ( 2018 09:48 )  .192 ng/mL / x     / 248 U/L / x     / 5.7 ng/mL  CARDIAC MARKERS ( 2018 02:30 )  .200 ng/mL / x     / 89 U/L / x     / 2.7 ng/mL  CARDIAC MARKERS ( 2018 18:12 )  <.015 ng/mL / x     / x     / x     / x          Culture - Urine (collected 2018 23:38)  Source: .Urine Catheterized  Final Report (2018 21:50):    No growth       Serum Pro-Brain Natriuretic Peptide: 6155 pg/mL (18 @ 18:12)      RADIOLOGY & ADDITIONAL TESTS:< from: TTE Echo Doppler w/o Cont (18 @ 10:49) >   EXAM:  ECHO TTE WO CON COMP W DOPPLR         PROCEDURE DATE:  2018        INTERPRETATION:  Ordering Physician: DIANA CALI [id number]    Indication: CHF  Technologist Initials: AS  Study Quality: Technically difficult and limited   A complete echocardiographic study was performed utilizing standard   protocol including spectral and color Doppler in all echocardiographic   windows.    Weight: 53 kg  Blood Pressure: 119/59    MEASUREMENTS  IVS: 1.0cm  PWT: 0.9cm  LA: 3.3cm  AO: 2.7cm  LVIDd: 4.9cm  LVIDs: 4.2cm    LVEF: 50%    FINDINGS  Left Ventricle: The endocardium is not well-visualized. In certain views,   septum appears hypokinetic. There is low normal left ventricular systolic   function  Aortic Valve: The aortic valve is not well visualized. It appears   calcified and trileaflet. Mild aortic insufficiency.  Mitral Valve: Mitral annular calcification calcified mitral valve   leaflets. Mild to moderate mitral insufficiency.  Tricuspid Valve: Normal tricuspid valve. Mild tricuspid insufficiency.  Pulmonic Valve: Not well visualized  Left Atrium: Mildly enlarged  Right Ventricle: Normal right ventricular size and systolic function.  Right Atrium: Mildly enlarged  Diastolic Function: Grade 2 diastolic dysfunction.  Pericardium/Pleura: Normal pericardium with trace pericardial effusion.      CONCLUSIONS:  Technically difficult and limited study.  1.  The endocardium is not well-visualized. In certain views, septum   appears hypokinetic. There is low normal left ventricular systolic   function  2.  The aortic valve is not well visualized. It appears calcified and   trileaflet. Mild aortic insufficiency.  3.  Mitral annular calcification calcified mitral valve leaflets. Mild to   moderate mitral insufficiency.  4.  Mild left atrial enlargement  5.  Normal right ventricular size and function.  Mild right atrial   enlargement  6.  Grade 2 diastolic dysfunction          HEALTH ISSUES - PROBLEM Dx:  Cerebrovascular accident (CVA), unspecified mechanism: Cerebrovascular accident (CVA), unspecified mechanism  Hypertensive crisis: Hypertensive crisis  Chest pain, unspecified type: Chest pain, unspecified type  Acute respiratory failure: Acute respiratory failure  Hypertensive emergency: Hypertensive emergency  Prophylactic measure: Prophylactic measure  Mitral valve insufficiency, unspecified etiology: Mitral valve insufficiency, unspecified etiology  Renal artery stenosis: Renal artery stenosis  Essential hypertension: Essential hypertension  Acute pulmonary edema with congestive heart failure: Acute pulmonary edema with congestive heart failure      Consultant(s) Notes Reviewed:  [ x ] YES     Care Discussed with [X] Consultants  [ x ] Patient  [  ] Family  [  ]   [  ] Social Service  [ x ] RN, [  ] Physical Therapy  DVT PPX: [ x ] Lovenox, [  ] S C Heparin, [  ] Coumadin, [  ] Xarelto, [  ] Eliquis, [  ] Pradaxa, [  ] IV Heparin drip, [  ] SCD [  ] Contraindication 2 to GI Bleed,[  ] Ambulation  Advanced directive: [ x ] None, [  ] DNR/DNI

## 2018-11-21 NOTE — PROGRESS NOTE ADULT - PROBLEM SELECTOR PLAN 7
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

## 2018-11-21 NOTE — DISCHARGE NOTE ADULT - CARE PROVIDERS DIRECT ADDRESSES
,[email or web address]_Unknown

## 2018-11-21 NOTE — DISCHARGE NOTE ADULT - MEDICATION SUMMARY - MEDICATIONS TO CHANGE
I will SWITCH the dose or number of times a day I take the medications listed below when I get home from the hospital:    labetalol 300 mg oral tablet  -- 1 tab(s) by mouth 2 times a day

## 2018-11-21 NOTE — DISCHARGE NOTE ADULT - SECONDARY DIAGNOSIS.
Acute pulmonary edema with congestive heart failure Acute respiratory failure Cerebrovascular accident (CVA), unspecified mechanism Edema, unspecified type Hypertensive emergency T wave inversion in EKG

## 2018-11-21 NOTE — DISCHARGE NOTE ADULT - MEDICATION SUMMARY - MEDICATIONS TO TAKE
I will START or STAY ON the medications listed below when I get home from the hospital:    aspirin 81 mg oral tablet, chewable  -- 1 tab(s) by mouth once a day  -- Indication: For Cerebrovascular accident (CVA), unspecified mechanism    losartan 50 mg oral tablet  -- 1 tab(s) by mouth 2 times a day  -- Indication: For Essential hypertension    cloNIDine 0.2 mg oral tablet  -- 1 tab(s) by mouth 3 times a day  -- Indication: For Essential hypertension    enoxaparin  -- 50 milligram(s) subcutaneous every 12 hours  -- Indication: For Prophylactic measure    atorvastatin 40 mg oral tablet  -- 1 tab(s) by mouth once a day (at bedtime)  -- Indication: For Cerebrovascular accident (CVA), unspecified mechanism    Plavix 75 mg oral tablet  -- 1 tab(s) by mouth once a day  -- Indication: For Cerebrovascular accident (CVA), unspecified mechanism    labetalol 300 mg oral tablet  -- 2 tab(s) by mouth every 12 hours  -- Indication: For Hypertensive emergency    senna oral tablet  -- 2 tab(s) by mouth once a day (at bedtime)  -- Indication: For Prophylactic measure    docusate sodium 100 mg oral capsule  -- 1 cap(s) by mouth 2 times a day  -- Indication: For Prophylactic measure

## 2018-11-21 NOTE — H&P ADULT - PROBLEM SELECTOR PLAN 1
[facility]  Risks and benefits of procedure explained  Informed consent signed by pt

## 2018-11-21 NOTE — PROGRESS NOTE ADULT - SUBJECTIVE AND OBJECTIVE BOX
s/p LHC.  Denies chest pain, shortness of breath, dizziness or palpitations at this time  A+Ox3  CV: S1S2 reg  Respiratory: CTAB. 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 Pt states that she began experiencing chest pain at lunchtime. She was with her daughter and felt that she couldn't breath. Pain continued to get worse.  Stated she felt as though she were going to die.  Her BP medications were recently changed by her cardiologist (Marquita). She had an echo which revealed MV insufficiency and she is scheduled for follow up visit to evaluate necessity for replacement.   She was found to be in pulmonary edema and started on Nitro gtt -now discontinued-and given lasix . Noted to have an abnormal EKG.   +Trop. Pt was transferred to [facility] for cardiac cath and possible PCI by Dr Juárez (21 Nov 2018 13:14)    now s[facility]p [facility] from: Cardiac Cath Lab - Adult (11.21.18 @ 11:47) >  No significant CAD    Plan:  D/W with Dr Vila  Discharge home  Continue present medical management  Post procedure instructions reviewed with pt  Follow up with Dr Vila within 1 week after discharge

## 2018-11-21 NOTE — PROGRESS NOTE ADULT - ASSESSMENT
Pt is a 88 yo F with PMH of HTN, renal artery stenosis, MV insufficiency, dyslipidemia, CVA, hyponatremia, recurrent UTI who presents to the ED with chest pain. Admitted for chest pain and SOB likely 2/2 pulmonary edema with CHF and hypertensive emergency.

## 2018-11-30 DIAGNOSIS — I24.8 OTHER FORMS OF ACUTE ISCHEMIC HEART DISEASE: ICD-10-CM

## 2018-11-30 DIAGNOSIS — I16.1 HYPERTENSIVE EMERGENCY: ICD-10-CM

## 2018-11-30 DIAGNOSIS — Z86.73 PERSONAL HISTORY OF TRANSIENT ISCHEMIC ATTACK (TIA), AND CEREBRAL INFARCTION WITHOUT RESIDUAL DEFICITS: ICD-10-CM

## 2018-11-30 DIAGNOSIS — I21.09 ST ELEVATION (STEMI) MYOCARDIAL INFARCTION INVOLVING OTHER CORONARY ARTERY OF ANTERIOR WALL: ICD-10-CM

## 2018-11-30 DIAGNOSIS — J96.01 ACUTE RESPIRATORY FAILURE WITH HYPOXIA: ICD-10-CM

## 2018-11-30 DIAGNOSIS — I10 ESSENTIAL (PRIMARY) HYPERTENSION: ICD-10-CM

## 2018-11-30 DIAGNOSIS — I34.0 NONRHEUMATIC MITRAL (VALVE) INSUFFICIENCY: ICD-10-CM

## 2018-11-30 DIAGNOSIS — E78.5 HYPERLIPIDEMIA, UNSPECIFIED: ICD-10-CM

## 2018-11-30 DIAGNOSIS — I70.1 ATHEROSCLEROSIS OF RENAL ARTERY: ICD-10-CM

## 2018-11-30 DIAGNOSIS — I50.31 ACUTE DIASTOLIC (CONGESTIVE) HEART FAILURE: ICD-10-CM

## 2018-11-30 DIAGNOSIS — Z96.643 PRESENCE OF ARTIFICIAL HIP JOINT, BILATERAL: ICD-10-CM

## 2018-11-30 DIAGNOSIS — Z79.02 LONG TERM (CURRENT) USE OF ANTITHROMBOTICS/ANTIPLATELETS: ICD-10-CM

## 2018-11-30 DIAGNOSIS — I11.0 HYPERTENSIVE HEART DISEASE WITH HEART FAILURE: ICD-10-CM

## 2018-12-02 DIAGNOSIS — J81.1 CHRONIC PULMONARY EDEMA: ICD-10-CM

## 2018-12-02 DIAGNOSIS — E87.5 HYPERKALEMIA: ICD-10-CM

## 2018-12-02 DIAGNOSIS — Z96.643 PRESENCE OF ARTIFICIAL HIP JOINT, BILATERAL: ICD-10-CM

## 2018-12-02 DIAGNOSIS — I34.0 NONRHEUMATIC MITRAL (VALVE) INSUFFICIENCY: ICD-10-CM

## 2018-12-02 DIAGNOSIS — E87.1 HYPO-OSMOLALITY AND HYPONATREMIA: ICD-10-CM

## 2018-12-02 DIAGNOSIS — I24.9 ACUTE ISCHEMIC HEART DISEASE, UNSPECIFIED: ICD-10-CM

## 2018-12-02 DIAGNOSIS — I10 ESSENTIAL (PRIMARY) HYPERTENSION: ICD-10-CM

## 2018-12-03 ENCOUNTER — NON-APPOINTMENT (OUTPATIENT)
Age: 83
End: 2018-12-03

## 2018-12-03 ENCOUNTER — APPOINTMENT (OUTPATIENT)
Dept: CARDIOLOGY | Facility: CLINIC | Age: 83
End: 2018-12-03
Payer: MEDICARE

## 2018-12-03 VITALS — OXYGEN SATURATION: 98 % | HEART RATE: 88 BPM | HEIGHT: 60 IN | WEIGHT: 116 LBS | BODY MASS INDEX: 22.78 KG/M2

## 2018-12-03 PROCEDURE — 99214 OFFICE O/P EST MOD 30 MIN: CPT

## 2018-12-03 NOTE — HISTORY OF PRESENT ILLNESS
[FreeTextEntry1] : Patient with intractable hypertension.  Recently admitted to [facility] with flash pulmonary edema.  Cardiac caht showed no CAD, LVEF 40% and modearte MR.  Since discharge, she feels well, but BP once again out of control.

## 2018-12-03 NOTE — DISCUSSION/SUMMARY
[FreeTextEntry1] : Patient is clinically compensated, but blood pressure is not controlled.  Will refer to Dr. Murguia, hypertension specialist.

## 2018-12-03 NOTE — REASON FOR VISIT
[Follow-Up - Clinic] : a clinic follow-up of [Heart Failure] : congestive heart failure [Hypertension] : hypertension

## 2018-12-10 ENCOUNTER — APPOINTMENT (OUTPATIENT)
Dept: CARDIOLOGY | Facility: CLINIC | Age: 83
End: 2018-12-10

## 2018-12-11 ENCOUNTER — APPOINTMENT (OUTPATIENT)
Dept: CARDIOLOGY | Facility: CLINIC | Age: 83
End: 2018-12-11
Payer: MEDICARE

## 2018-12-11 VITALS
BODY MASS INDEX: 21.8 KG/M2 | HEART RATE: 78 BPM | SYSTOLIC BLOOD PRESSURE: 200 MMHG | RESPIRATION RATE: 12 BRPM | OXYGEN SATURATION: 98 % | DIASTOLIC BLOOD PRESSURE: 95 MMHG | WEIGHT: 114 LBS | HEIGHT: 60.5 IN

## 2018-12-11 VITALS — SYSTOLIC BLOOD PRESSURE: 201 MMHG | HEART RATE: 79 BPM | DIASTOLIC BLOOD PRESSURE: 113 MMHG

## 2018-12-11 DIAGNOSIS — I10 ESSENTIAL (PRIMARY) HYPERTENSION: ICD-10-CM

## 2018-12-11 LAB
APPEARANCE: CLEAR
BACTERIA: NEGATIVE
BILIRUBIN URINE: NEGATIVE
BLOOD URINE: NEGATIVE
COLOR: YELLOW
GLUCOSE QUALITATIVE U: NEGATIVE MG/DL
KETONES URINE: NEGATIVE
LEUKOCYTE ESTERASE URINE: NEGATIVE
MICROSCOPIC-UA: NORMAL
NITRITE URINE: NEGATIVE
PH URINE: 6
PROTEIN URINE: 30 MG/DL
RED BLOOD CELLS URINE: 1 /HPF
SPECIFIC GRAVITY URINE: 1.01
SQUAMOUS EPITHELIAL CELLS: 1 /HPF
UROBILINOGEN URINE: NEGATIVE MG/DL
WHITE BLOOD CELLS URINE: 4 /HPF

## 2018-12-11 PROCEDURE — 99215 OFFICE O/P EST HI 40 MIN: CPT | Mod: PD

## 2018-12-11 NOTE — PHYSICAL EXAM
[General Appearance - Well Developed] : well developed [Normal Appearance] : normal appearance [Well Groomed] : well groomed [General Appearance - Well Nourished] : well nourished [No Deformities] : no deformities [General Appearance - In No Acute Distress] : no acute distress [Normal Conjunctiva] : the conjunctiva exhibited no abnormalities [Eyelids - No Xanthelasma] : the eyelids demonstrated no xanthelasmas [Normal Oral Mucosa] : normal oral mucosa [No Oral Pallor] : no oral pallor [No Oral Cyanosis] : no oral cyanosis [Normal Oropharynx] : normal oropharynx [Normal Jugular Venous A Waves Present] : normal jugular venous A waves present [Normal Jugular Venous V Waves Present] : normal jugular venous V waves present [No Jugular Venous Steven A Waves] : no jugular venous steven A waves [5th Left ICS - MCL] : palpated at the 5th LICS in the midclavicular line [Normal] : normal [No Precordial Heave] : no precordial heave was noted [Normal Rate] : normal [Rhythm Regular] : regular [Normal S1] : normal S1 [Normal S2] : normal S2 [S4] : an S4 was heard [I] : a grade 1 [Crescendo-Decrescendo] : crescendo-decrescendo [Low] : low pitched [Blowing] : blowing [Early] : early [2+] : left 2+ [No Abnormalities] : the abdominal aorta was not enlarged and no bruit was heard [___ +] : bilateral [unfilled]U+ pitting edema to the ankles [Rt] : varicose veins of the right leg noted [Lt] : varicose veins of the left leg noted [Respiration, Rhythm And Depth] : normal respiratory rhythm and effort [Exaggerated Use Of Accessory Muscles For Inspiration] : no accessory muscle use [Auscultation Breath Sounds / Voice Sounds] : lungs were clear to auscultation bilaterally [Abdomen Soft] : soft [Abdomen Tenderness] : non-tender [Abdomen Mass (___ Cm)] : no abdominal mass palpated [Nail Clubbing] : no clubbing of the fingernails [Cyanosis, Localized] : no localized cyanosis [Petechial Hemorrhages (___cm)] : no petechial hemorrhages [Skin Color & Pigmentation] : normal skin color and pigmentation [] : no rash [No Venous Stasis] : no venous stasis [Skin Lesions] : no skin lesions [No Skin Ulcers] : no skin ulcer [No Xanthoma] : no  xanthoma was observed [Oriented To Time, Place, And Person] : oriented to person, place, and time [Affect] : the affect was normal [Mood] : the mood was normal [No Anxiety] : not feeling anxious [Macroglossia] : was not enlarged (macroglossia) [Apical Thrill] : no thrill palpable at the apex [Click] : no click [Pericardial Rub] : no pericardial rub [Right Carotid Bruit] : no bruit heard over the right carotid [Left Carotid Bruit] : no bruit heard over the left carotid [Prolonged Exp Time] : with normal expiratory time [Increased E/I Ratio] : with normal expiratory/inspiratory ratio  [FreeTextEntry1] : there was ecchymosis over left  calf

## 2018-12-11 NOTE — REASON FOR VISIT
[Initial Evaluation] : an initial evaluation of [Hypertension] : hypertension [Medication Management] : Medication management [Other: _____] : [unfilled]

## 2018-12-11 NOTE — HISTORY OF PRESENT ILLNESS
[FreeTextEntry1] : Mrs Mueller was initially see by me on 12/11/18, presenting with h/o HTN, HLD, CVA, hyponatremia on HCTZ,  chronic LE edema, mild to mod MR with grade 2 LV diastolic dysfunction by TTE (11/20/18), recurrent UTI,  hyperkalemia and OA. She was hospitalized at [facility] 11/19 – 11/21/2018 presenting with acute chest pain, shortness of breath and /100 mm Hg in the ED, with  bpm and oxygen saturation by pulse oximetry 81% with supplemental oxygen. She was treated for acute pulmonary edema with IV furosemide (Lasix) with immediate improvement in BP and symptoms. Admission labs noted serum creatinine 0.9 mg/dL and serum pro BNP 6155 pg/mL. TTE done 11/20/18 noted low normal LV systolic function, septal wall hypokinesis, grade 2 diastolic dysfunction, mild to moderate mitral regurgitation with mildly enlarged LA. Coronary angiography done 11/21/18 showed no CAD. Since discharge, self obtained BP using [employer] wrist monitor showed systolic BP mostly 160 – 180 mm Hg.  The patient was taking losartan 50 mg twice daily, clonidine 0.2 mg three times daily, and labetalol 600 mg twice daily for BP control. She used prescribed Xanax for  anxiety associated with elevated BP. Atorvastatin was prescribed, but not used due to prior muscle aches (legs) on statin. She was originally diagnosed with HTN in her 50's  with good overall control on verapamil for many years until discontinued because of constipation. BP was easy to control until about two years ago. HCTZ was effective for BP, but was stopped due to hyponatremia. Spironolactone was used in the past, but stopped. She was on patiromer (Veltassa) 16.8 gm packet daily for hyperkalemia. She was told of mild renal artery stenosis by Doppler study. She went to bed 21:30, sleeping poorly during the night, waking at 2:00.  There was heavy snoring and possible witnessed apnea.

## 2018-12-11 NOTE — ADDENDUM
[FreeTextEntry1] : I spent 60 minutes face to face time with the patient, from 11:00 to 12:00 on 12/11/18. Thirty minutes were devoted to patient counseling as outlined above in the End of Visit section. Prior to this visit, I obtained and reviewed records of [facility] hospitalization from 11/19/18 - 11/21/18. I also spoke with Dr. Tyler Juárez (referring physician) by phone to review clinical history.

## 2018-12-11 NOTE — REVIEW OF SYSTEMS
[Shortness Of Breath] : shortness of breath [Chest Pain] : chest pain [see HPI] : see HPI [Joint Pain] : joint pain [Negative] : Heme/Lymph [Recent Weight Gain (___ Lbs)] : no recent weight gain [Recent Weight Loss (___ Lbs)] : no recent weight loss [Blurry Vision] : no blurred vision [Worsening Vision] : vision not worsening vision [Palpitations] : no palpitations [FreeTextEntry2] : ambulated with walker

## 2018-12-11 NOTE — DISCUSSION/SUMMARY
[Hyperlipidemia] : hyperlipidemia [Stable] : stable [Diet Modification] : diet modification [<100] : goal is <100 [<150] : goal is <150 [>50] : goal is >50 [Stage II] : Stage II [Essential Hypertension] : essential hypertension [Secondary Hypertension] : secondary hypertension [Hypertensive Cardiomyopathy] : hypertensive cardiomyopathy [Sleep Apnea] : sleep apnea [Reactive Hypertension] : reactive hypertension [Basic Metabolic Panel] : basic metabolic panel [Thyroid Function Tests] : thyroid function tests [Renin Levels] : renin levels [Aldosterone Levels] : aldosterone levels [Urine Metanephrine] : urine metanephrine [Urinalysis] : urinalysis [Begin] : beginning loop diuretics [Continue] : continuing alpha blockers [Sodium Restriction] : sodium restriction [Patient] : the patient [Family] : the patient's family [At Goal] : The HDL is not at goal [de-identified] : Start pravastatin 20 mg tablets, one tablet daily at bedtime. Start CoQ10 200 mg tablets, one tablet daily [de-identified] : 102 mg/dL on 11/20/18 [FreeTextEntry5] : 143 mg/dL on 11/20/18 [de-identified] : 46 mg/dL on 11/20/18 [de-identified] : resistant hypertension [de-identified] : partially responding to therapy. Initial office BP, obtained by physician, was 200/95 mm Hg, with HR 78 bpm, on the right arm using patient’s wrist Omron ™ automated cuff and 201/113 mm Hg with HR 79 bpm on the left arm using Dinamap ™. Twenty – one serial automated (Dinamap ™) hemodynamic measurements were obtained. Mean BP for all automated measurements was 180/86 +/- 8.7/6.9 mm Hg with mean HR 73 +/- 2.7 bpm and mean oxygen saturation by pulse oximetry 98 +/- 0.6% on room air. The last seated BP was 177/87 mm Hg with HR 72 bpm. Upon standing, BP decreased to 164/76 mm Hg with HR 76 bpm. The patient did not experience lightheadedness or palpitations upon standing. Echocardiogram done 11/20/18 noted low normal overall LV systolic function with LVEF 50%. There was grade 2 diastolicdysfunction. There was mitral annular calcification and calcified mitral valve leaflets with mild to moderate regurgitation. Serum creatinine was 1.10 mg/dL on 11/21/18; 0.9 mg/dL on 11/19/18 (normal 0.5 – 1.3). Estimated glomerular filtration rate was 45 mL/min/1.73 sqm on 11/21/18 (normal >= 60).  Serum potassium was 3.9 mmol/L on 11/21/18 (normal 3.5 – 5.3). Serum magnesium was 2.1 mg/dL on 11/21/18. Urinalysis noted 25 mg/dL protein with no glucose or casts on 11/19/18.  Serum pro BNP was 6155 pg/mL on 11/19/18 [de-identified] : start torsemide 5 mg tablets, one tablet daily in the morning. Maintain losartan 50 mg tablets, one tablet twice daily. Maintain labetalol 300 mg tablets, two tablets (600 mg) twice daily. Maintain clonidine 0.2 mg tablets, one tablet 3X daily [FreeTextEntry1] : \par WEIGHT GOALS:\par \par Category				BMI range - kg/m2	BMI Prime\par Very severely underweight		less than 15		less than 0.60	\par Severely underweight			from 15.0 to 16.0		from 0.60 to 0.64	\par Underweight				from 16.0 to 18.5		from 0.64 to 0.74	\par Normal (healthy weight)			from 18.5 to 25		from 0.74 to 1.0	\par Overweight				from 25 to 30		from 1.0 to 1.2	\par Obese Class I (Moderately obese)		from 30 to 35		from 1.2 to 1.4	\par Obese Class II (Severely obese)		from 35 to 40		from 1.4 to 1.6	\par Obese Class III (Very severely obese)	over 40			over 1.6	\par \par Your current weight is 114 lbs. Given current weight and height 5'0.5", your calculated body mass index (BMI) is 22.3 kg/sqm. Normal BMI is 18.5-25 kg/sqm. Thus current weight is in the normal category. Abdominal waist circumference is measured at the level of the umbilicus and is thus not a pants waist measurement. Your current abdominal waist circumference is 31 inches. Normal abdominal waist circumference is < 32 inches for women and < 37 inches for men\par \par RES[employer] enables patients to reduce sympathetic activity, peripheral resistance and lower blood pressure by harnessing the power of paced breathing. At the beginning of each session, [employer] analyzes the patient's individual breathing rate and pattern, using its respiration sensor and built-in computer. It then creates a personalized michael composed of two distinct guiding tones, one for inhaling, one for exhaling. The patient listens to the michael and synchronizes breathing to the tones. By gradually prolonging the exhalation tone, RES[employer] guides the patient to slow his or her breathing, and reach what was demonstrated to be the "therapeutic zone" of fewer than 10 breaths per minute. Each patient's breathing rate and pattern changes regularly. In response, [employer]'s computer automatically detects, monitors and customizes the guiding tones to the patient. A few minutes after starting the session, the smooth muscles surrounding the small blood vessels relax. Blood flows more freely as the small blood vessels dilate and blood pressure is significantly lowered. The greater lung inflation that accompanies slow breathing activates stretch receptors in the lungs and increases right atrial pre-load that activates cardiac stretch receptors. In response, sympathetic outflow is reduced, leading to a reduction in systemic vascular resistance and blood pressure\par \par DIETARY SALT (SODIUM); DASH DIET AND BLOOD PRESSURE:\par To decrease the sodium in your diet: \par · Use fresh vegetables and foods as much as possible.\par · Avoid canned and processed foods. Cured meats such as tovar, ham, and sausages are high in salt.\par · Try using different herbs and spices in your cooking instead of salt.\par · In restaurants, avoid foods with sauces, cheese, and cured meats. Ask for low-sodium choices.\par To get more potassium in your diet, eat:\par · Bananas, fresh or dried apricots, peaches, citrus fruits, melons\par · Cauliflower, broccoli, tomatoes, carrots, raw spinach, beet greens, potatoes\par To get more magnesium in your diet, eat:\par · Whole grain foods, leafy green vegetables, dried fruits\par • Fish and seafood, poultry \par To get more calcium in your diet, eat:\par · Nonfat milk, yogurt, and low-fat cheeses \par · [place] and sardines\par · Cooked dried beans\par · Broccoli, kale, and bok kika\par · Tofu or soybean curd\par DASH stands for "dietary approaches to stop hypertension." The DASH diet is low in total and saturated fat. It is rich in fruits, vegetables, and low-fat dairy foods. The diet allows you to get natural fiber, calcium, and magnesium from food. It prevents or lowers high blood pressure. It can also help lower cholesterol in your blood. \par Don't change how you eat all at once. It's much more likely that you'll succeed if you make only one or two small changes at a time. Wait until those changes are a habit, then make a couple more changes. Some good starting steps include: \par Add one serving of vegetables to your meals at lunch and dinner. This is an easy way to help you get more vegetables in your diet. \par Have a piece of fruit as an afternoon or after-dinner snack. One glass of juice at breakfast is not enough fruit in your diet. \par Use half your usual amount of butter, margarine, or salad dressing. \par Buy nonfat salad dressing or nonfat sour cream.\par Follow this guide to select your menu of meals. The number of calories we want you to eat each day will tell you how many servings you can choose from each food group.\par Calories: 1600 2100 2600 3100  Servings Grains 6 7 ½ 10 ½ 12 ½  Vegetables 	 4 4 ½ 5 6 Fruit 4 5 5 6 Dairy (low-fat) 2 ½ 3 3 3 ½  Meat, poultry, fish ½ 1 ½ 2 2 ½  Nuts, seeds ½ ½ ½ 1 Fats and sweets 1 ½ 2 ½ 3 4\par Grains and grain products like breads and cereals provide energy, fiber and vitamins. Whole grains have more of these nutrients. One serving equals one of the following:\par Bagel, 1/2 medium; Barley, cooked 1/2 cup; Biscuit, country style 1 medium; Bread, whole wheat, white 1 slice; Cereals, cold or cooked, 1/2 cup; Cornbread, 1 medium piece; Crackers, [place]ool, 2; Crackers, saltine, 4; Dinner roll, 1medium; English muffin, ½; Hamburger bun, ½; Muffin, 1 medium; Pancake, 1 medium; Pasta, 1/2 cup cooked; [place], 1/2 large or 1 small; Popcorn, 1 cup popped; Pretzels, 1 ounce; Rice, white, brown, or wild, 1/2 cup cooked; Tortillas, corn or flour, 1 medium; Waffle, 1 medium; Wheat germ, 1/4 cup; \par Vegetables are rich sources of potassium, magnesium, and fiber. One serving is 1/2 cup of any of the following cooked vegetables:\par Asparagus, Beans (green, yellow), Beets, Broccoli, [place] Sprouts, Carrots, Cauliflower, [place], chicory, mustard and turnip (and other) greens, Corn, Kale, Lima beans, Mixed vegetables, Okra, Parsnips, Peas, green, Potatoes (1/2 medium or 1/2 cup mashed), Pumpkin, Rut[place], Spaghetti or tomato sauce, Spinach, Squash (zucchini or yellow), Stewed tomatoes, [place], Sweet potatoes, Turnips, Yam \par Raw vegetables: Carrots,1/2 cup; Celery, 1/2 cup; Lettuce (uma, loose-leaf, green-leaf), 1 cup; Peppers, 1/2 cup; Spinach, 1 cup; Tomato, 1/2\par Fruits and fruit juices are important sources of potassium and magnesium. Fruits also contain fiber and are low in sodium and fat. One serving equals:\par Any fruit juice, # cup (6 ounces); Canned or frozen fruit, ½ cup (includes applesauce); Dried fruit, ¼ cup; \par Fresh fruit:\par Apple, 1 medium; Apricots, 2 medium; Banana, 1 medium; Berries, 1/2 cup; Melon, 1 wedge, or 1/2 cup; Cherries, 10; Grapefruit, 1/2; Grapes, 15; Kiwi, 1 medium; [place], 1/2 small; Nectarine, 1 medium; Orange, 1 medium; Peach, 1 medium; Pear, 1 medium; Pineapple, 1/2 cup; Plums, 2 medium; Tangerine, 1 large\par Dairy foods provide protein and calcium. Use low-fat or nonfat dairy products to cut down on fat. One serving equals:\par Skim milk, 1 cup (8 ounces); 1% low fat milk, 1 cup (8 ounces); 2% low fat milk, 1 cup (8 ounces) nonfat dry milk powder (1/3 cup); Low-fat cottage cheese, 1 cup (8 ounces); Parmesan cheese, 1 tablespoon; Mozzarella cheese, part skim, 1/4 cup (1 ounce); Low-fat cheddar cheese, 11/2 ounces; Ricotta cheese, part skim milk or nonfat, 1/4 cup (11/2 ounces); Other low fat or nonfat cheeses (11/2 oz.); Low-fat or nonfat yogurt, fruit-flavored or plain, 1 cup (8 ounces)\par Low-fat or nonfat frozen yogurt, 1/2 cup (4 ounces); Note: People who can't digest dairy products can try taking lactase enzyme pills or drops (available at drug and grocery stores) when they eat dairy. There is also milk available with the enzyme already added. Or you can buy lactose-free milk.\par Meat, poultry, and fish are good sources of protein and magnesium. One serving equals:\par Lean meat including beef, veal, or pork, 3 ounces cooked; Skinless, white meat poultry including turkey, chicken, 3 ounces; Fish and shellfish, 3 ounces cooked; Low-fat luncheon meats, 1 ounce; Egg, 1 medium; Tofu, 3 ounces\par Note: Three ounces of cooked meat is about the size of a deck of cards.\par Nuts, seeds, and legumes are rich sources of energy, magnesium, potassium, protein and fiber. Nuts and seeds are also high in fat, so portions should be small.\par Almonds, 1/3 cup; Beans such as kidney, andrew, and navy, 1/2 cup cooked; Chickpeas and lentils, 1/2 cup cooked; Cashews, 1/3 cup; Filberts, 1/3 cup; Mixed nuts, 1/3 cup; Peanut butter, 2 tablespoons; Peanuts, 1/3 cup; Sesame seeds, 2 tablespoons; Sunflower seeds, 2 tablespoons; Tofu, regular, 3 ounces; Walnuts, 1/3 cup \par Following the above diet will give you about 27% fat in your diet. The goal is to have 30% or less of the calories you eat each day be from fat. To meet that goal, do not eat more than 2-3 servings daily of added fat. Also try to limit sweets. One serving equals:\par Butter or margarine, 1 teaspoon; Mayonnaise, 1 teaspoon; Low-fat mayonnaise, 1 tablespoon; Salad dressing, 1 tablespoon; Low-fat salad dressing, 2 tablespoons; Oil, 1 teaspoon (use olive, canola, safflower, or other vegetable oils); Candy, hard, 3 pieces; Jelly or jam, 1 tablespoon); Jell-O, 1/2 cup; Jelly beans, 1/2 ounce; Maple syrup, 1 tablespoon; Popsicle, 1; Sherbet or nonfat or low-fat frozen yogurt, 1/2 cup; Sugar, 1 tablespoon; Sugared lemonade or fruit punch, 1 cup (8 ounces); Note: Try diet fruit-flavored gelatin or frozen, canned, or fresh fruit for dessert.\par \par Small amounts of alcohol may have benefits to the heart and blood pressure. However, excess use of alcohol can cause damage to the brain, liver and other organs. It can lead to high blood pressure. Drinking more than two drinks (15 ml)  every day can raise your blood pressure. 15 ml of alcohol equals: \par • one 12-ounce bottle of beer \par • a half glass (5 ounces) of wine \par • 1 ounce (one shot) of 100 proof hard liquor\par

## 2018-12-12 ENCOUNTER — INPATIENT (INPATIENT)
Facility: HOSPITAL | Age: 83
LOS: 0 days | Discharge: ANOTHER HOSPITAL | DRG: 698 | End: 2018-12-13
Attending: INTERNAL MEDICINE | Admitting: INTERNAL MEDICINE
Payer: MEDICARE

## 2018-12-12 ENCOUNTER — MESSAGE (OUTPATIENT)
Age: 83
End: 2018-12-12

## 2018-12-12 VITALS
WEIGHT: 113.98 LBS | OXYGEN SATURATION: 98 % | TEMPERATURE: 98 F | HEART RATE: 97 BPM | DIASTOLIC BLOOD PRESSURE: 100 MMHG | SYSTOLIC BLOOD PRESSURE: 200 MMHG | HEIGHT: 60 IN | RESPIRATION RATE: 21 BRPM

## 2018-12-12 DIAGNOSIS — I63.9 CEREBRAL INFARCTION, UNSPECIFIED: ICD-10-CM

## 2018-12-12 DIAGNOSIS — I70.1 ATHEROSCLEROSIS OF RENAL ARTERY: ICD-10-CM

## 2018-12-12 DIAGNOSIS — I34.0 NONRHEUMATIC MITRAL (VALVE) INSUFFICIENCY: ICD-10-CM

## 2018-12-12 DIAGNOSIS — Z29.9 ENCOUNTER FOR PROPHYLACTIC MEASURES, UNSPECIFIED: ICD-10-CM

## 2018-12-12 DIAGNOSIS — Z98.89 OTHER SPECIFIED POSTPROCEDURAL STATES: Chronic | ICD-10-CM

## 2018-12-12 DIAGNOSIS — Z90.49 ACQUIRED ABSENCE OF OTHER SPECIFIED PARTS OF DIGESTIVE TRACT: Chronic | ICD-10-CM

## 2018-12-12 DIAGNOSIS — Z41.9 ENCOUNTER FOR PROCEDURE FOR PURPOSES OTHER THAN REMEDYING HEALTH STATE, UNSPECIFIED: Chronic | ICD-10-CM

## 2018-12-12 DIAGNOSIS — Z96.643 PRESENCE OF ARTIFICIAL HIP JOINT, BILATERAL: Chronic | ICD-10-CM

## 2018-12-12 DIAGNOSIS — E78.5 HYPERLIPIDEMIA, UNSPECIFIED: ICD-10-CM

## 2018-12-12 DIAGNOSIS — J81.0 ACUTE PULMONARY EDEMA: ICD-10-CM

## 2018-12-12 DIAGNOSIS — I16.1 HYPERTENSIVE EMERGENCY: ICD-10-CM

## 2018-12-12 DIAGNOSIS — N39.0 URINARY TRACT INFECTION, SITE NOT SPECIFIED: ICD-10-CM

## 2018-12-12 DIAGNOSIS — I16.0 HYPERTENSIVE URGENCY: ICD-10-CM

## 2018-12-12 DIAGNOSIS — R60.0 LOCALIZED EDEMA: ICD-10-CM

## 2018-12-12 DIAGNOSIS — Z90.710 ACQUIRED ABSENCE OF BOTH CERVIX AND UTERUS: Chronic | ICD-10-CM

## 2018-12-12 DIAGNOSIS — I10 ESSENTIAL (PRIMARY) HYPERTENSION: ICD-10-CM

## 2018-12-12 LAB
ALBUMIN SERPL ELPH-MCNC: 3.7 G/DL — SIGNIFICANT CHANGE UP (ref 3.3–5)
ALP SERPL-CCNC: 102 U/L — SIGNIFICANT CHANGE UP (ref 40–120)
ALT FLD-CCNC: 30 U/L — SIGNIFICANT CHANGE UP (ref 12–78)
ANION GAP SERPL CALC-SCNC: 9 MMOL/L — SIGNIFICANT CHANGE UP (ref 5–17)
APPEARANCE UR: CLEAR — SIGNIFICANT CHANGE UP
APTT BLD: 31.8 SEC — SIGNIFICANT CHANGE UP (ref 28.5–37)
AST SERPL-CCNC: 25 U/L — SIGNIFICANT CHANGE UP (ref 15–37)
BASOPHILS # BLD AUTO: 0.05 K/UL — SIGNIFICANT CHANGE UP (ref 0–0.2)
BASOPHILS NFR BLD AUTO: 0.9 % — SIGNIFICANT CHANGE UP (ref 0–2)
BILIRUB SERPL-MCNC: 1 MG/DL — SIGNIFICANT CHANGE UP (ref 0.2–1.2)
BILIRUB UR-MCNC: NEGATIVE — SIGNIFICANT CHANGE UP
BUN SERPL-MCNC: 27 MG/DL — HIGH (ref 7–23)
CALCIUM SERPL-MCNC: 9.1 MG/DL — SIGNIFICANT CHANGE UP (ref 8.5–10.1)
CHLORIDE SERPL-SCNC: 108 MMOL/L — SIGNIFICANT CHANGE UP (ref 96–108)
CO2 SERPL-SCNC: 25 MMOL/L — SIGNIFICANT CHANGE UP (ref 22–31)
COLOR SPEC: YELLOW — SIGNIFICANT CHANGE UP
CREAT SERPL-MCNC: 0.98 MG/DL — SIGNIFICANT CHANGE UP (ref 0.5–1.3)
DIFF PNL FLD: NEGATIVE — SIGNIFICANT CHANGE UP
EOSINOPHIL # BLD AUTO: 0.18 K/UL — SIGNIFICANT CHANGE UP (ref 0–0.5)
EOSINOPHIL NFR BLD AUTO: 3.2 % — SIGNIFICANT CHANGE UP (ref 0–6)
GLUCOSE SERPL-MCNC: 137 MG/DL — HIGH (ref 70–99)
GLUCOSE UR QL: NEGATIVE — SIGNIFICANT CHANGE UP
HCT VFR BLD CALC: 34 % — LOW (ref 34.5–45)
HGB BLD-MCNC: 11 G/DL — LOW (ref 11.5–15.5)
IMM GRANULOCYTES NFR BLD AUTO: 0.7 % — SIGNIFICANT CHANGE UP (ref 0–1.5)
INR BLD: 0.93 RATIO — SIGNIFICANT CHANGE UP (ref 0.88–1.16)
KETONES UR-MCNC: NEGATIVE — SIGNIFICANT CHANGE UP
LACTATE SERPL-SCNC: 1 MMOL/L — SIGNIFICANT CHANGE UP (ref 0.7–2)
LEUKOCYTE ESTERASE UR-ACNC: NEGATIVE — SIGNIFICANT CHANGE UP
LYMPHOCYTES # BLD AUTO: 1.66 K/UL — SIGNIFICANT CHANGE UP (ref 1–3.3)
LYMPHOCYTES # BLD AUTO: 29.6 % — SIGNIFICANT CHANGE UP (ref 13–44)
MCHC RBC-ENTMCNC: 29.7 PG — SIGNIFICANT CHANGE UP (ref 27–34)
MCHC RBC-ENTMCNC: 32.4 GM/DL — SIGNIFICANT CHANGE UP (ref 32–36)
MCV RBC AUTO: 91.9 FL — SIGNIFICANT CHANGE UP (ref 80–100)
MONOCYTES # BLD AUTO: 0.4 K/UL — SIGNIFICANT CHANGE UP (ref 0–0.9)
MONOCYTES NFR BLD AUTO: 7.1 % — SIGNIFICANT CHANGE UP (ref 2–14)
NEUTROPHILS # BLD AUTO: 3.28 K/UL — SIGNIFICANT CHANGE UP (ref 1.8–7.4)
NEUTROPHILS NFR BLD AUTO: 58.5 % — SIGNIFICANT CHANGE UP (ref 43–77)
NITRITE UR-MCNC: NEGATIVE — SIGNIFICANT CHANGE UP
PH UR: 5 — SIGNIFICANT CHANGE UP (ref 5–8)
PLATELET # BLD AUTO: 317 K/UL — SIGNIFICANT CHANGE UP (ref 150–400)
POTASSIUM SERPL-MCNC: 4 MMOL/L — SIGNIFICANT CHANGE UP (ref 3.5–5.3)
POTASSIUM SERPL-SCNC: 4 MMOL/L — SIGNIFICANT CHANGE UP (ref 3.5–5.3)
PROT SERPL-MCNC: 6.9 G/DL — SIGNIFICANT CHANGE UP (ref 6–8.3)
PROT UR-MCNC: 75 MG/DL
PROTHROM AB SERPL-ACNC: 10.6 SEC — SIGNIFICANT CHANGE UP (ref 10–12.9)
RBC # BLD: 3.7 M/UL — LOW (ref 3.8–5.2)
RBC # FLD: 12.6 % — SIGNIFICANT CHANGE UP (ref 10.3–14.5)
SODIUM SERPL-SCNC: 142 MMOL/L — SIGNIFICANT CHANGE UP (ref 135–145)
SP GR SPEC: 1.01 — SIGNIFICANT CHANGE UP (ref 1.01–1.02)
UROBILINOGEN FLD QL: NEGATIVE — SIGNIFICANT CHANGE UP
WBC # BLD: 5.61 K/UL — SIGNIFICANT CHANGE UP (ref 3.8–10.5)
WBC # FLD AUTO: 5.61 K/UL — SIGNIFICANT CHANGE UP (ref 3.8–10.5)

## 2018-12-12 PROCEDURE — 99285 EMERGENCY DEPT VISIT HI MDM: CPT

## 2018-12-12 PROCEDURE — 71045 X-RAY EXAM CHEST 1 VIEW: CPT | Mod: 26

## 2018-12-12 PROCEDURE — 74174 CTA ABD&PLVS W/CONTRAST: CPT | Mod: 26

## 2018-12-12 PROCEDURE — 99223 1ST HOSP IP/OBS HIGH 75: CPT

## 2018-12-12 PROCEDURE — 70450 CT HEAD/BRAIN W/O DYE: CPT | Mod: 26

## 2018-12-12 PROCEDURE — 93010 ELECTROCARDIOGRAM REPORT: CPT

## 2018-12-12 RX ORDER — LABETALOL HCL 100 MG
600 TABLET ORAL
Qty: 0 | Refills: 0 | Status: DISCONTINUED | OUTPATIENT
Start: 2018-12-12 | End: 2018-12-13

## 2018-12-12 RX ORDER — LOSARTAN POTASSIUM 100 MG/1
50 TABLET, FILM COATED ORAL DAILY
Qty: 0 | Refills: 0 | Status: DISCONTINUED | OUTPATIENT
Start: 2018-12-12 | End: 2018-12-12

## 2018-12-12 RX ORDER — ASPIRIN/CALCIUM CARB/MAGNESIUM 324 MG
81 TABLET ORAL DAILY
Qty: 0 | Refills: 0 | Status: DISCONTINUED | OUTPATIENT
Start: 2018-12-12 | End: 2018-12-13

## 2018-12-12 RX ORDER — CLOPIDOGREL BISULFATE 75 MG/1
75 TABLET, FILM COATED ORAL DAILY
Qty: 0 | Refills: 0 | Status: DISCONTINUED | OUTPATIENT
Start: 2018-12-12 | End: 2018-12-13

## 2018-12-12 RX ORDER — INFLUENZA VIRUS VACCINE 15; 15; 15; 15 UG/.5ML; UG/.5ML; UG/.5ML; UG/.5ML
0.5 SUSPENSION INTRAMUSCULAR ONCE
Qty: 0 | Refills: 0 | Status: DISCONTINUED | OUTPATIENT
Start: 2018-12-12 | End: 2018-12-13

## 2018-12-12 RX ORDER — FUROSEMIDE 40 MG
40 TABLET ORAL EVERY 12 HOURS
Qty: 0 | Refills: 0 | Status: DISCONTINUED | OUTPATIENT
Start: 2018-12-12 | End: 2018-12-13

## 2018-12-12 RX ORDER — NITROGLYCERIN 6.5 MG
10 CAPSULE, EXTENDED RELEASE ORAL
Qty: 50 | Refills: 0 | Status: DISCONTINUED | OUTPATIENT
Start: 2018-12-12 | End: 2018-12-12

## 2018-12-12 RX ORDER — CLOPIDOGREL BISULFATE 75 MG/1
1 TABLET, FILM COATED ORAL
Qty: 0 | Refills: 0 | COMMUNITY

## 2018-12-12 RX ORDER — NITROGLYCERIN 6.5 MG
10 CAPSULE, EXTENDED RELEASE ORAL
Qty: 50 | Refills: 0 | Status: DISCONTINUED | OUTPATIENT
Start: 2018-12-12 | End: 2018-12-13

## 2018-12-12 RX ORDER — LOSARTAN POTASSIUM 100 MG/1
50 TABLET, FILM COATED ORAL EVERY 12 HOURS
Qty: 0 | Refills: 0 | Status: DISCONTINUED | OUTPATIENT
Start: 2018-12-12 | End: 2018-12-13

## 2018-12-12 RX ORDER — FUROSEMIDE 40 MG
40 TABLET ORAL ONCE
Qty: 0 | Refills: 0 | Status: COMPLETED | OUTPATIENT
Start: 2018-12-12 | End: 2018-12-12

## 2018-12-12 RX ORDER — ENOXAPARIN SODIUM 100 MG/ML
40 INJECTION SUBCUTANEOUS DAILY
Qty: 0 | Refills: 0 | Status: DISCONTINUED | OUTPATIENT
Start: 2018-12-12 | End: 2018-12-13

## 2018-12-12 RX ORDER — ATORVASTATIN CALCIUM 80 MG/1
20 TABLET, FILM COATED ORAL AT BEDTIME
Qty: 0 | Refills: 0 | Status: DISCONTINUED | OUTPATIENT
Start: 2018-12-12 | End: 2018-12-13

## 2018-12-12 RX ADMIN — ATORVASTATIN CALCIUM 20 MILLIGRAM(S): 80 TABLET, FILM COATED ORAL at 21:29

## 2018-12-12 RX ADMIN — Medication 600 MILLIGRAM(S): at 18:57

## 2018-12-12 RX ADMIN — Medication 40 MILLIGRAM(S): at 17:16

## 2018-12-12 RX ADMIN — Medication 3 MICROGRAM(S)/MIN: at 17:16

## 2018-12-12 RX ADMIN — Medication 0.2 MILLIGRAM(S): at 21:29

## 2018-12-12 RX ADMIN — LOSARTAN POTASSIUM 50 MILLIGRAM(S): 100 TABLET, FILM COATED ORAL at 19:09

## 2018-12-12 NOTE — H&P ADULT - PROBLEM SELECTOR PLAN 2
- patient with questionable history of renal artery stenosis in the past per chart review, no confirmatory records available, now being evaluated by secondary causes of HTN in including renal artery stenosis by cardiology (appreciate recs)  - patient also noted to have hyponatremia in the past in the setting of HTN, possibly c/w hyperaldosteronism though not with hyponatremia on current presentation   - appreciate cardiology recs - patient with questionable history of renal artery stenosis in the past per chart review, no confirmatory records available, now being evaluated by secondary causes of HTN in including renal artery stenosis by cardiology (appreciate recs)  - patient also noted to have hyponatremia in the past in the setting of HTN, possibly c/w hyperaldosteronism though not with hyponatremia on current presentation   - appreciate cardiology recs  - management per ICU (CTA renal artery for stenosis eval and MR w/ and w/o for adrenals possible options) Likely 2/2 HTN urgency ,with Ac respiratory failure  S/P IN Lasix 40 mg x 1 dose, On BI PAP,  CXR -Pulmonary edema, NO CAD on rcent cardiac Cath Likely 2/2 HTN urgency ,with Ac respiratory failure  S/P IN Lasix 40 mg x 1 dose, On BI PAP,  CXR -Pulmonary edema, NO CAD on rcent cardiac Cath- NO CAD, +MR  -Start ASA 81 mg daily, No need for Plavix as per Dr Willingham  Lasix 40 mg IV q 12 hrs, I/o,

## 2018-12-12 NOTE — H&P ADULT - PROBLEM SELECTOR PLAN 1
- patient with hx of malignant HTN, difficult to control,per chart review in [facility] often 170-200s at home, with constant changes in BP meds to help optimize by her cardiologist Dr. Juárez   - diastolic dysfunction on echo Nov 2018 (grade 2 diastolic dysfunction with EF 60-65%) possible contributing factor in this current presentation  - admit to ICU  - nitro ggt, decrease MAP gradually - patient with hx of malignant HTN, difficult to control,per chart review in [facility] often 170-200s at home, with constant changes in BP meds to help optimize by her cardiologist Dr. Juárez   - diastolic dysfunction on echo Nov 2018 (grade 2 diastolic dysfunction with EF 60-65%) possible contributing factor in this current presentation  - patient w/ flash pulmonary edema  - admit to ICU  - nitro ggt, decrease MAP gradually  - BiPAP w/ weaning as appropriate  - management per ICU - admit to ICU for HTN Urgency with Ac pulmonary Edema   - Start Nitro  ggt, decrease MAP gradually  - BiPAP w/ weaning as appropriate  - management per ICU  -- patient with hx of malignant HTN, difficult to control ,per chart review in [facility] often 170-200s at home, with constant changes in BP meds to help optimize by her cardiologist Dr. Juárez / Dr Murguia  - diastolic dysfunction on echo Nov 2018 (grade 2 diastolic dysfunction with EF 60-65%) possible contributing factor in this current presentation  - patient w/ flash pulmonary edema - admit to ICU for HTN Urgency with Ac pulmonary Edema   - Start Nitro  ggt, decrease MAP gradually  - BiPAP w/ weaning as appropriate  -- patient with hx of malignant HTN, difficult to control ,per chart review in [facility] often 170-200s at home, with constant changes in BP meds to help optimize by her cardiologist Dr. Juárez / Dr Murguia  - diastolic dysfunction on echo Nov 2018 (grade 2 diastolic dysfunction with EF 60-65%) possible contributing factor in this current presentation  - patient w/ flash pulmonary edema

## 2018-12-12 NOTE — H&P ADULT - NEGATIVE CARDIOVASCULAR SYMPTOMS
no palpitations/no chest pain/no dyspnea on exertion no orthopnea/no chest pain/no dyspnea on exertion/no palpitations

## 2018-12-12 NOTE — ED PROVIDER NOTE - CONSTITUTIONAL, MLM
normal... ill appearing, awake, alert, oriented to person, place, time/situation and in no resp distress.

## 2018-12-12 NOTE — H&P ADULT - ATTENDING COMMENTS
Pt seen, Examined, case & care plan d/w pt, residents at detail  case D/W Son at detail  Case D/W Dr Willingham

## 2018-12-12 NOTE — PATIENT PROFILE ADULT - FALL HARM RISK
coagulation(Bleeding disorder R/T clinical cond/anti-coags)/age(85 years old or older)/bones(Osteoporosis,prev fx,steroid use,metastatic bone ca)/other

## 2018-12-12 NOTE — H&P ADULT - NEGATIVE GASTROINTESTINAL SYMPTOMS
no constipation/no nausea/no vomiting/no diarrhea no nausea/no constipation/no change in bowel habits/no vomiting/no abdominal pain/no diarrhea

## 2018-12-12 NOTE — H&P ADULT - PROBLEM SELECTOR PLAN 5
- hx of CVA in the past, HTN and dyslipidemia as contributing factors   - stable  - microvascular changes on CT head - hx of CVA in the past, HTN and dyslipidemia as contributing factors   - stable  - microvascular changes on CT head  - management per ICU - patient with lower extremity edema usually, now worse than baseline   - s/p lasix in ED and in EMS  - management per ICU - patient with lower extremity edema usually, now worse than baseline   - s/p Lasix in ED and in EMS  - Lasix 40 mg IV Q 12hrs

## 2018-12-12 NOTE — ED ADULT NURSE NOTE - OBJECTIVE STATEMENT
pt brought in via EMS for respiratory distress, pt received on bipap. pt alert and responsive upon arrival , states she feels better. pt states she started feeling bad earlier today, denies fevers or illness. son at bedside. bipap set at 12/5/30 percent oxygen. pt currently speaking with son. pt also with chest pain at house ,received two nitro with good relief. pt also received lasix via EMS with urine output upon arrival

## 2018-12-12 NOTE — CONSULT NOTE ADULT - SUBJECTIVE AND OBJECTIVE BOX
[facility] - Stefano Emmanuel, Aleksandar, Ricky, Avelina, Maulik Mustafa  Office Number: [phone]    Initial Consult Note    CHIEF COMPLAINT: Patient is a 87y old  Female who presents with a chief complaint of shortness of breath    HPI:  Mrs Mueller has a history of HTN, HLD, CVA, hyponatremia on HCTZ,  chronic LE edema, mild to mod MR with grade 2 LV diastolic dysfunction by TTE (11/20/18), recurrent UTI,  hyperkalemia and OA. She was hospitalized at [facility] 11/19 – 11/21/2018 presenting with acute chest pain, shortness of breath and /100 mm Hg in the ED, with  bpm and oxygen saturation by pulse oximetry 81% with supplemental oxygen. She was treated for acute pulmonary edema with IV furosemide (Lasix) with immediate improvement in BP and symptoms.   TTE done 11/20/18 noted low normal LV systolic function, septal wall hypokinesis, grade 2 diastolic dysfunction, mild to moderate mitral regurgitation with mildly enlarged LA.   Coronary angiography done 11/21/18 showed no CAD but severe MR. Since discharge, systolic BP mostly 160 – 180 mm Hg.    The patient was taking losartan 50 mg twice daily, clonidine 0.2 mg three times daily, and labetalol 600 mg twice daily for BP control. Torsemide 5 mg po bid was added to her regimen, though she did not yet take it.     She was short of breath today, now on bipap.  Feeling better and able to speak without issue.  No chest pain or palpitations.      PAST MEDICAL & SURGICAL HISTORY:  Renal artery stenosis: possible on doppler  Basal cell carcinoma  Mitral valve insufficiency, unspecified etiology  Edema, unspecified type  Cerebrovascular accident (CVA), unspecified mechanism  Hyponatremia: h/o Hypokelema  Dyslipidemia  Essential hypertension  UTI (urinary tract infection)  H/O bilateral hip replacements  History of cholecystectomy  Status post hysterectomy  S/P Mohs surgery for basal cell carcinoma      SOCIAL HISTORY:  No tobacco, ethanol, or drug abuse.    FAMILY HISTORY:  Family history of carotid artery stenosis (Sibling)  Family history of uterine cancer (Sibling)    No family history of acute MI or sudden cardiac death.    MEDICATIONS  (STANDING):  enoxaparin Injectable 40 milliGRAM(s) SubCutaneous daily  nitroglycerin  Infusion 10 MICROgram(s)/Min (3 mL/Hr) IV Continuous <Continuous>    MEDICATIONS  (PRN):      Allergies    Keflex (Unknown)  verapamil (Other)    Intolerances        REVIEW OF SYSTEMS:    CONSTITUTIONAL: No weakness, fevers or chills  EYES/ENT: No visual changes;  No vertigo or throat pain   NECK: No pain or stiffness  RESPIRATORY: No cough, wheezing, hemoptysis; + shortness of breath  CARDIOVASCULAR: No chest pain or palpitations  GASTROINTESTINAL: No abdominal pain. No nausea, vomiting, or hematemesis; No diarrhea or constipation. No melena or hematochezia.  GENITOURINARY: No dysuria, frequency or hematuria  NEUROLOGICAL: No numbness or weakness  SKIN: No itching or rash  All other review of systems is negative unless indicated above    VITAL SIGNS:   Vital Signs Last 24 Hrs  T(C): 36.5 (12 Dec 2018 15:42), Max: 36.5 (12 Dec 2018 15:42)  T(F): 97.7 (12 Dec 2018 15:42), Max: 97.7 (12 Dec 2018 15:42)  HR: 80 (12 Dec 2018 17:26) (75 - 99)  BP: 227/114 (12 Dec 2018 17:26) (200/100 - 227/114)  BP(mean): --  RR: 20 (12 Dec 2018 17:26) (17 - 21)  SpO2: 100% (12 Dec 2018 17:26) (84% - 100%)    I&O's Summary      On Exam:    Constitutional: mild distress, on bipap  Lungs:  coarse BS bilaterally. No wheezing, rales or rhonchi  Cardiovascular: RRR.  S1 and S2 positive. + sm ii/vi at alan/llsb, no rubs, gallops or clicks  Gastrointestinal: Bowel Sounds present, soft, nontender.   Lymph: +peripheral edema. No cervical lymphadenopathy.  Neurological: Alert, no focal deficits  Skin: No rashes or ulcers   Psych:  Mood & affect appropriate.    LABS: All Labs Reviewed:                        11.0   5.61  )-----------( 317      ( 12 Dec 2018 15:56 )             34.0     12 Dec 2018 15:56    142    |  108    |  27     ----------------------------<  137    4.0     |  25     |  0.98     Ca    9.1        12 Dec 2018 15:56    TPro  6.9    /  Alb  3.7    /  TBili  1.0    /  DBili  x      /  AST  25     /  ALT  30     /  AlkPhos  102    12 Dec 2018 15:56    PT/INR - ( 12 Dec 2018 15:56 )   PT: 10.6 sec;   INR: 0.93 ratio         PTT - ( 12 Dec 2018 15:56 )  PTT:31.8 sec  CARDIAC MARKERS ( 12 Dec 2018 15:56 )  <.015 ng/mL / x     / x     / x     / 2.5 ng/mL      Blood Culture:         RADIOLOGY:    EKG: SR, LVH, non-specific ST-T abn

## 2018-12-12 NOTE — ED PROVIDER NOTE - OBJECTIVE STATEMENT
86 y/o F with c/o acute onset of SOB.  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  Pt recently admitted 3 weeks ago resulting in a cardiac cath.  Pt was followed by Dr. Murguia yesterday for testing to evaluate etiology of HBP.  Diagnosis in question is BOB requiring renal artery IS and CT.  Pt received Lasix and sublingual nitro x 2 en route on BIPAP with improved symptoms int  ED. 86 y/o F with c/o acute onset of SOB.  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  Pt recently admitted 3 weeks ago resulting in a cardiac cath.  Pt was followed by Dr. Murguia yesterday for testing to evaluate etiology of HBP.  Diagnosis in question is BOB requiring renal artery IS and CT.  Pt received Lasix and sublingual nitro x 2 en route on BIPAP with improved symptoms in the ED.

## 2018-12-12 NOTE — H&P ADULT - PROBLEM SELECTOR PLAN 6
- on pravastatin 20 mg  at home - on pravastatin 20 mg  at home  - management per ICU - hx of CVA in the past, HTN and dyslipidemia as contributing factors   - stable  - microvascular changes on CT head  - management per ICU - hx of CVA in the past, HTN and dyslipidemia as contributing factors   - Continue Statin daily, On ASA 81 mg daily  - microvascular changes on CT head-NEG

## 2018-12-12 NOTE — H&P ADULT - HISTORY OF PRESENT ILLNESS
86 yo PMHx recurrent UTIs, MV insufficiency, dyslipidemia, CVA, ?hx of renal artery stenosis,  malignant HTN who presents with shortness of breath.      Patient reports SOB starting a few days prior to admission, gradually worsening in intensity. She also noted chest pain. She's had similar presentations in the past in setting of HTN, however this presentation has been severe. She also noted worsening of leg swelling. She then presented to the ED.     Of note, patient recently admitted Nov 2018 with hypertensive urgency treated with nitro ggt in ICU, and transferred to [facility] for new ST depressions in lateral leads, now s/p PCI with no significant CAD found. Echo at that time showed Grade 2 diastolic dysfunction, EF 60-65%, with aortic, mitral and tricuspid insufficiency. Also of note, patient has had hx hyponatremia in the past in the setting of hypertension. Patient has possible history of renal artery stenosis though no radiographic imaging findings available for confirmation; patient now being evaluated by Cardiology for renal artery stenosis.     In the ED, vitals T 97.7, HR 88, /92, RR 17, SPO2 100% (on BiPAP). Labs: CBC wnl, Coags wnl, Chem w/ Na 141 and K 4, Glucose 137, eGFR 52, trops negative, UA wnl, CT head showed advanced chronic microvascular changes cerebral white matter and chronic left parietal occipital infarct similar to prior, no hemorrhage, infarct or mass effect. CXR showed vascular congestion bilateral interstitial edema suggesting CHF/fluid overload. Findings are less severe than on prior. 86 yo PMHx chronic LE edema, MV insufficiency, mild to mod MR with grade 2 LV diastolic dysfunction by TTE (11/20/18),  hyperkalemia, recurrent UTIs, dyslipidemia, CVA, ?hx of renal artery stenosis,  malignant HTN who presents with shortness of breath. As per Son pt is in doing well, yesterday Pt  saw Cardiology Dr Murguia at [facility], pt took Xanax , Pt came home, S/P fall last evening in the bathroom, NO LOC, NO Head Trauma, Pt woke up this Am ,no CP, No SOB, Later started to have SOB,       Patient reports SOB starting a few days prior to admission, gradually worsening in intensity. She also noted chest pain. She's had similar presentations in the past in setting of HTN, however this presentation has been severe. She also noted worsening of leg swelling. She then presented to the ED. Pt Got SL Nitro & IV Lasix in EMS    Of note, patient recently admitted Nov 2018 with hypertensive urgency treated with nitro ggt in ICU, and transferred to [facility] on 11/21/18  for new ST depressions in lateral leads, with elevated troponin now s/p  cardiac cath  with no significant CAD ,  +MR,. Echo at that time showed Grade 2 diastolic dysfunction, EF 60-65%, with aortic, mitral and tricuspid insufficiency. Also of note, patient has had hx hyponatremia in the past in the setting of hypertension. Patient has possible history of renal artery stenosis though no radiographic imaging findings available for confirmation; patient now being evaluated by Cardiology for renal artery stenosis.     In the ED, vitals T 97.7, HR 88, /92, RR 17, SPO2 100% (on BiPAP). Labs: CBC wnl, Coags wnl, Chem w/ Na 141 and K 4, Glucose 137, eGFR 52, trops negative, UA wnl, CT head showed advanced chronic microvascular changes cerebral white matter and chronic left parietal occipital infarct similar to prior, no hemorrhage, infarct or mass effect. CXR showed vascular congestion bilateral interstitial edema suggesting CHF/fluid overload. Findings are less severe than on prior.

## 2018-12-12 NOTE — H&P ADULT - GASTROINTESTINAL DETAILS
soft/nontender/normal no rigidity/no bruit/no rebound tenderness/bowel sounds normal/soft/nontender/no guarding/no masses palpable/normal/no organomegaly

## 2018-12-12 NOTE — ED ADULT NURSE NOTE - NSIMPLEMENTINTERV_GEN_ALL_ED
Implemented All Fall with Harm Risk Interventions:  [place] to call system. Call bell, personal items and telephone within reach. Instruct patient to call for assistance. Room bathroom lighting operational. Non-slip footwear when patient is off stretcher. Physically safe environment: no spills, clutter or unnecessary equipment. Stretcher in lowest position, wheels locked, appropriate side rails in place. Provide visual cue, wrist band, yellow gown, etc. Monitor gait and stability. Monitor for mental status changes and reorient to person, place, and time. Review medications for side effects contributing to fall risk. Reinforce activity limits and safety measures with patient and family. Provide visual clues: red socks.

## 2018-12-12 NOTE — PATIENT PROFILE ADULT - STATED REASON FOR ADMISSION
pt was having difficulty breathing .b/p was taken at home and noted elevated .pts familystated she sounded as if she had water in lungs

## 2018-12-12 NOTE — CONSULT NOTE ADULT - ASSESSMENT
88 y/o female pmhx HTN, HLD,  prior CVA ( no residual deficits) BIBEMS w/ worsening SOB. Patient being admitted w/ acute respiratory failure related to pulmonary edema from hypertensive crisis.     Plan:  Neuro: Stable  Resp: Acute hypoxic respiratory failure, related to pulmonary edema. On BiPAP. Titrate FiO2 to maintain O2 sat >92%. s/p 40mg IV lasix x2.  Add 40mg lasix BID. Transition to NC if able to tolerate being off BiPAP  CV: Hypertensive Crisis, hx of difficult to control BP. Being worked up outpatient for BOB. Now on nitroglycerin infusion, will titrate for -180 ( patients baseline). c/w Labetalol 600mg BID, Clonidine 0.2mg TID, losartan 50mg BID once off BiPAP. c/w ASA.  Trend cardiac enzymes.  f/u cardiology recommendations.  - HLD, c/w statin   GI: Keep NPO while on BiPAP  Renal: r/o renal artery stenosis, CTA renal/adrenal when HD stable.  Strict I&Os.

## 2018-12-12 NOTE — H&P ADULT - ASSESSMENT
88 yo PMHx recurrent UTIs, dyslipidemia, CVA, ?hx of renal artery stenosis (per chart review, no confirmatory imaging available), malignant HTN (on impressive anti-HTN regimen at home with difficulty controlling HTN) and MV insufficiency (echo Nov 2018 60-65% w/ Grade 2 diastolic dysfunction) who presents with shortness of breath, CXR with vascular congestion c/w hypertensive emergency (SOB and chest pain), r/o HTN emergency complications including neurologic (ischemic, hemorrhagic stroke), cardiac (ACS, dissection)

## 2018-12-12 NOTE — H&P ADULT - PROBLEM SELECTOR PLAN 4
- patient with lower extremity edema usually, now worse than baseline   - - patient with lower extremity edema usually, now worse than baseline   - s/p lasix in ED and in EMS  - management per ICU - patient with malignant HTN, ASA 81 mg daily, Losartan 50 mg daily   - appreciate cardiology-D/W pt on Nitro drip,  - Pt takes losartan 50 mg twice daily, clonidine 0.2 mg three times daily, and labetalol 600 mg twice daily at home ,  - management per ICU - patient with malignant HTN, ASA 81 mg daily, Losartan 50 mg 2x daily   - appreciate cardiology-D/W pt on Nitro drip,  - Pt takes losartan 50 mg twice daily, clonidine 0.2 mg three times daily, and labetalol 600 mg twice daily at home ,  - management per ICU

## 2018-12-12 NOTE — CONSULT NOTE ADULT - ATTENDING COMMENTS
Agree with above  Uncontrolled HTN with SBP 170s at baseline, now with SBP 220s and acute pulm edema with resp failure  Recent cardiac cath with no significant CAD  Denies CP, palpitations, nausea, vomiting, fever, chills, cough  Admitted with ICU for BP control  Continue NTG drip, titrate for SBP~160  Continue po antihypertensives (losartan, clonidine, labetalol)  Continue aspirin, plavix, atorvastatin  PRN and QHS bipap  Start po diet if resp status remains stable

## 2018-12-12 NOTE — H&P ADULT - PROBLEM SELECTOR PLAN 8
- recently completed course of abx for UTI, has hx of recurrent UTIs per chart review  - no current signs of active UTi - on multiple echos demonstrated mitral valve insufficiency   - cardiology following, , recent Cardiac cath on 11/21/18 showed severe MR  - management per ICU

## 2018-12-12 NOTE — H&P ADULT - PROBLEM SELECTOR PLAN 7
- on multiple echos demonstrated mitral valve insufficiency   - cardiology following, appreciate recs - on multiple echos demonstrated mitral valve insufficiency   - cardiology following, appreciate recs  - management per ICU - on pravastatin 20 mg  at home  - management per ICU

## 2018-12-12 NOTE — PATIENT PROFILE ADULT - NSPROHMCARDIOMGMTSTRAT_GEN_A_NUR
weight management/diet modification/adequate rest/fluid modification/activity/coping strategies/medication therapy

## 2018-12-12 NOTE — CONSULT NOTE ADULT - SUBJECTIVE AND OBJECTIVE BOX
Patient is a 87y old  Female who presents with a chief complaint of     86 y/o female pmhx HTN, HLD,  prior CVA ( no residual deficits) BIBEMS w/ worsening SOB. Pt had recent admission ( ) to \[facility]\"" ICU for same reason, was sent to [facility] for cardiac cath to r/o ischemic event in which left heart cath revealed no coronary disease. She is being worked up by her cardiologist Dr. Murguia for renal artery stenosis. Pt saw Dr. Murguia in the office yesterday, he added torsemide 5mg  and pravastatin 20mg to her regimen. As per son, patient had an active day yesterday walking around the doctors office and shopping. She became SOB last night and states she required an extra pillow to sleep. She denies any chest pain, N/V, diarrhea, palpitations, abdominal pain.   Today she has worsening SOB in which family called EMS tonight. She received  nitrox2 and 40mg IV lasix en route. On arrival patient w/ /100 and tachypneic. CXR w/ b/l pulmonary vascular congestion. She was given additional 40mg IV lasix, placed on BiPAP and started on Nitroglycerin infusion. Transferred to ICU,.       PAST MEDICAL & SURGICAL HISTORY:  Basal cell carcinoma  Mitral valve insufficiency, unspecified etiology  Edema, unspecified type  Cerebrovascular accident (CVA), unspecified mechanism  Hyponatremia: h/o Hypokelema  Dyslipidemia  Essential hypertension  UTI (urinary tract infection)  H/O bilateral hip replacements  History of cholecystectomy  Status post hysterectomy  S/P Mohs surgery for basal cell carcinoma    Allergies    Keflex (Unknown)  verapamil (Other)    Intolerances      FAMILY HISTORY:  Family history of carotid artery stenosis (Sibling)  Family history of uterine cancer (Sibling)      Review of Systems:  CONSTITUTIONAL: No fever, chills, or fatigue  EYES: No eye pain, visual disturbances, or discharge  ENMT:  No difficulty hearing, tinnitus, vertigo; No sinus or throat pain  NECK: No pain or stiffness  RESPIRATORY: No cough, wheezing, chills or hemoptysis;  (+)shortness of breath  CARDIOVASCULAR: No chest pain, palpitations, dizziness, or leg swelling  GASTROINTESTINAL: No abdominal or epigastric pain. No nausea, vomiting, or hematemesis; No diarrhea or constipation. No melena or hematochezia.  GENITOURINARY: No dysuria, frequency, hematuria, or incontinence  NEUROLOGICAL: No headaches, memory loss, loss of strength, numbness, or tremors  SKIN: No itching, burning, rashes, or lesions   MUSCULOSKELETAL: No joint pain or swelling; No muscle, back, or extremity pain  PSYCHIATRIC: No depression, anxiety, mood swings, or difficulty sleeping      Medications:    nitroglycerin  Infusion 10 MICROgram(s)/Min IV Continuous <Continuous>  enoxaparin Injectable 40 milliGRAM(s) SubCutaneous daily          ICU Vital Signs Last 24 Hrs  T(C): 36.5 (12 Dec 2018 15:42), Max: 36.5 (12 Dec 2018 15:42)  T(F): 97.7 (12 Dec 2018 15:42), Max: 97.7 (12 Dec 2018 15:42)  HR: 80 (12 Dec 2018 17:26) (75 - 99)  BP: 227/114 (12 Dec 2018 17:26) (200/100 - 227/114)  RR: 20 (12 Dec 2018 17:26) (17 - 21)  SpO2: 100% (12 Dec 2018 17:26) (84% - 100%)    Vital Signs Last 24 Hrs  T(C): 36.5 (12 Dec 2018 15:42), Max: 36.5 (12 Dec 2018 15:42)  T(F): 97.7 (12 Dec 2018 15:42), Max: 97.7 (12 Dec 2018 15:42)  HR: 80 (12 Dec 2018 17:26) (75 - 99)  BP: 227/114 (12 Dec 2018 17:26) (200/100 - 227/114)  RR: 20 (12 Dec 2018 17:26) (17 - 21)  SpO2: 100% (12 Dec 2018 17:26) (84% - 100%)        I&O's Detail        LABS:                        11.0   5.61  )-----------( 317      ( 12 Dec 2018 15:56 )             34.0         142  |  108  |  27<H>  ----------------------------<  137<H>  4.0   |  25  |  0.98    Ca    9.1      12 Dec 2018 15:56    TPro  6.9  /  Alb  3.7  /  TBili  1.0  /  DBili  x   /  AST  25  /  ALT  30  /  AlkPhos  102  12-12      CARDIAC MARKERS ( 12 Dec 2018 15:56 )  <.015 ng/mL / x     / x     / x     / 2.5 ng/mL      CAPILLARY BLOOD GLUCOSE        PT/INR - ( 12 Dec 2018 15:56 )   PT: 10.6 sec;   INR: 0.93 ratio         PTT - ( 12 Dec 2018 15:56 )  PTT:31.8 sec  Urinalysis Basic - ( 12 Dec 2018 16:18 )    Color: Yellow / Appearance: Clear / S.010 / pH: x  Gluc: x / Ketone: Negative  / Bili: Negative / Urobili: Negative   Blood: x / Protein: 75 mg/dL / Nitrite: Negative   Leuk Esterase: Negative / RBC: Negative /HPF / WBC 0-2   Sq Epi: x / Non Sq Epi: Occasional / Bacteria: Negative      CULTURES:      Physical Examination:    General: AAOx3. Moderate respiratory distress.     HEENT: Pupils equal, reactive to light.  Symmetric.    PULM: diminished breath sounds b/l. b/l basilar rales. No wheeze/rhonchi.     CVS: + S1/S2. NSR. no murmurs. NO JVD     ABD: Soft, nondistended, nontender, normoactive bowel sounds, no masses    EXT: b/l lower extremity edema. R>L.  tender     SKIN: b/l lower extremity/calf erythema, warm to touch/ painful.     NEURO: A&Ox3, able to move all extremities, follows commands. No focal deficits.     RADIOLOGY: < from: Xray Chest 1 View-PORTABLE IMMEDIATE (18 @ 16:07) >    EXAM:  XR CHEST PORTABLE IMMED 1V                            PROCEDURE DATE:  2018          INTERPRETATION:  Short of breath.    AP chest. Prior 2018.    No change heart mediastinum. There is vascular congestion bilateral   interstitialedema suggesting CHF/fluid overload. Findings are less   severe than on prior. No gross focal infiltrate or pleural effusion.   Right costophrenic angle excluded.    Impression: Congestion as described. No focal infiltrate.    < end of copied text >      CRITICAL CARE TIME SPENT:  35 min   Evaluating/treating patient, reviewing data/labs/imaging, discussing case with multidisciplinary team, discussing plan/goals of care with patient/family. Non-inclusive of procedure time.

## 2018-12-12 NOTE — H&P ADULT - PMH
Basal cell carcinoma    Cerebrovascular accident (CVA), unspecified mechanism    Dyslipidemia    Edema, unspecified type    Essential hypertension    Hyponatremia  h/o Hypokelema  Mitral valve insufficiency, unspecified etiology    Renal artery stenosis  possible on doppler  UTI (urinary tract infection) Basal cell carcinoma    Cerebrovascular accident (CVA), unspecified mechanism    Dyslipidemia    Edema, unspecified type    Essential hypertension    Hyponatremia  ON HCTZ , h/o Hypokelemia  Mitral valve insufficiency, unspecified etiology  Cardiac cath on 11/21/18  Renal artery stenosis  possible on doppler  UTI (urinary tract infection)

## 2018-12-12 NOTE — H&P ADULT - NEUROLOGICAL DETAILS
sensation intact/alert and oriented x 3/responds to verbal commands cranial nerves intact/alert and oriented x 3/sensation intact/responds to verbal commands/normal strength

## 2018-12-12 NOTE — CONSULT NOTE ADULT - ASSESSMENT
Mrs. Mueller is an 86 yo F with PMH of malignant HTN, possible right BOB  dyslipidemia, CVA, hyponatremia, recurrent UTI with recent admission in 11/2018 for HTN emergency and ADHF with dynamic EKG changes.  Coronary angiography done 11/21/18 showed no CAD but severe MR.  TTE done 11/20/18 noted low normal LV systolic function, septal wall hypokinesis, grade 2 diastolic dysfunction, mild to moderate mitral regurgitation with mildly enlarged LA.   She presents today similarly, with acute shortness of breath in the setting of significant hypertension. Her SBP is again in the 220 range.    - Start Lasix 40 IV bid. Please continue to maintain strict I/Os, monitor daily weights, Cr, and K.   - Bipap and oxygen supplementation as necessary  - Start nitro gtt, and will need to be admitted to ICU for closer BP monitoring  - At home, seems to be taking losartan 50 mg twice daily, clonidine 0.2 mg three times daily, and labetalol 600 mg twice daily, which will need to restarted eventually  - If her creatinine is normal, she will have a CTA of her renal arteries for further evaluation of the possible BOB.  - c/w ASA and statin  - Will follow with you.

## 2018-12-12 NOTE — H&P ADULT - PROBLEM SELECTOR PLAN 3
- patient with malignant HTN  - appreciate cardiology recs - patient with malignant HTN  - appreciate cardiology recs  - management per ICU - patient with questionable history of renal artery stenosis in the past per chart review, no confirmatory records available, now being evaluated by secondary causes of HTN in including renal artery stenosis by cardiology (appreciate recs)  - patient also noted to have hyponatremia  with HCTZ in the past in the setting of HTN, possibly c/w hyperaldosteronism though not with hyponatremia on current presentation   - Plan for CTA r/o Renal artery stenosis  - management per ICU (CTA renal artery for stenosis eval and MR w/ and w/o for adrenals possible options)

## 2018-12-12 NOTE — H&P ADULT - PROBLEM SELECTOR PLAN 9
DVT ppx: lovenox  GI ppx: per ICU DVT ppx: lovenox  GI ppx: per ICU    - management per ICU - recently completed course of abx for UTI, has hx of recurrent UTIs per chart review  - no current signs of active UTi

## 2018-12-13 ENCOUNTER — TRANSCRIPTION ENCOUNTER (OUTPATIENT)
Age: 83
End: 2018-12-13

## 2018-12-13 ENCOUNTER — INPATIENT (INPATIENT)
Facility: HOSPITAL | Age: 83
LOS: 6 days | Discharge: HOME | DRG: 698 | End: 2018-12-20
Attending: INTERNAL MEDICINE | Admitting: INTERNAL MEDICINE
Payer: MEDICARE

## 2018-12-13 VITALS
HEIGHT: 61 IN | SYSTOLIC BLOOD PRESSURE: 121 MMHG | TEMPERATURE: 97 F | WEIGHT: 115.08 LBS | HEART RATE: 70 BPM | RESPIRATION RATE: 16 BRPM | OXYGEN SATURATION: 97 % | DIASTOLIC BLOOD PRESSURE: 61 MMHG

## 2018-12-13 VITALS
DIASTOLIC BLOOD PRESSURE: 70 MMHG | SYSTOLIC BLOOD PRESSURE: 152 MMHG | HEART RATE: 77 BPM | RESPIRATION RATE: 28 BRPM | OXYGEN SATURATION: 99 % | TEMPERATURE: 98 F

## 2018-12-13 DIAGNOSIS — Z98.89 OTHER SPECIFIED POSTPROCEDURAL STATES: Chronic | ICD-10-CM

## 2018-12-13 DIAGNOSIS — Z29.9 ENCOUNTER FOR PROPHYLACTIC MEASURES, UNSPECIFIED: ICD-10-CM

## 2018-12-13 DIAGNOSIS — I16.1 HYPERTENSIVE EMERGENCY: ICD-10-CM

## 2018-12-13 DIAGNOSIS — D64.9 ANEMIA, UNSPECIFIED: ICD-10-CM

## 2018-12-13 DIAGNOSIS — Z41.9 ENCOUNTER FOR PROCEDURE FOR PURPOSES OTHER THAN REMEDYING HEALTH STATE, UNSPECIFIED: Chronic | ICD-10-CM

## 2018-12-13 DIAGNOSIS — I20.0 UNSTABLE ANGINA: ICD-10-CM

## 2018-12-13 DIAGNOSIS — I50.33 ACUTE ON CHRONIC DIASTOLIC (CONGESTIVE) HEART FAILURE: ICD-10-CM

## 2018-12-13 DIAGNOSIS — N25.81 SECONDARY HYPERPARATHYROIDISM OF RENAL ORIGIN: ICD-10-CM

## 2018-12-13 DIAGNOSIS — Z96.643 PRESENCE OF ARTIFICIAL HIP JOINT, BILATERAL: Chronic | ICD-10-CM

## 2018-12-13 DIAGNOSIS — Z90.710 ACQUIRED ABSENCE OF BOTH CERVIX AND UTERUS: Chronic | ICD-10-CM

## 2018-12-13 DIAGNOSIS — I70.1 ATHEROSCLEROSIS OF RENAL ARTERY: ICD-10-CM

## 2018-12-13 DIAGNOSIS — Z90.49 ACQUIRED ABSENCE OF OTHER SPECIFIED PARTS OF DIGESTIVE TRACT: Chronic | ICD-10-CM

## 2018-12-13 DIAGNOSIS — E87.6 HYPOKALEMIA: ICD-10-CM

## 2018-12-13 DIAGNOSIS — I63.9 CEREBRAL INFARCTION, UNSPECIFIED: ICD-10-CM

## 2018-12-13 LAB
25(OH)D3 SERPL-MCNC: 34.6 NG/ML
ALBUMIN SERPL ELPH-MCNC: 3.5 G/DL — SIGNIFICANT CHANGE UP (ref 3.3–5)
ALBUMIN SERPL ELPH-MCNC: 4.3 G/DL
ALDOSTERONE SERUM: 6.8 NG/DL
ALP BLD-CCNC: 86 U/L
ALP SERPL-CCNC: 89 U/L — SIGNIFICANT CHANGE UP (ref 40–120)
ALT FLD-CCNC: 26 U/L — SIGNIFICANT CHANGE UP (ref 12–78)
ALT SERPL-CCNC: 17 U/L
ANION GAP SERPL CALC-SCNC: 12 MMOL/L
ANION GAP SERPL CALC-SCNC: 8 MMOL/L — SIGNIFICANT CHANGE UP (ref 5–17)
AST SERPL-CCNC: 19 U/L
AST SERPL-CCNC: 19 U/L — SIGNIFICANT CHANGE UP (ref 15–37)
BASOPHILS # BLD AUTO: 0.04 K/UL
BASOPHILS NFR BLD AUTO: 0.7 %
BILIRUB SERPL-MCNC: 1.1 MG/DL
BILIRUB SERPL-MCNC: 1.6 MG/DL — HIGH (ref 0.2–1.2)
BUN SERPL-MCNC: 25 MG/DL
BUN SERPL-MCNC: 25 MG/DL — HIGH (ref 7–23)
CALCIUM SERPL-MCNC: 8.8 MG/DL — SIGNIFICANT CHANGE UP (ref 8.5–10.1)
CALCIUM SERPL-MCNC: 9.9 MG/DL
CALCIUM SERPL-MCNC: 9.9 MG/DL
CHLORIDE SERPL-SCNC: 104 MMOL/L — SIGNIFICANT CHANGE UP (ref 96–108)
CHLORIDE SERPL-SCNC: 105 MMOL/L
CHOLEST SERPL-MCNC: 174 MG/DL
CHOLEST/HDLC SERPL: 3.5 RATIO
CK MB BLD-MCNC: 3.1 % — SIGNIFICANT CHANGE UP (ref 0–3.5)
CK MB CFR SERPL CALC: 1.7 NG/ML — SIGNIFICANT CHANGE UP (ref 0–3.6)
CK SERPL-CCNC: 55 U/L — SIGNIFICANT CHANGE UP (ref 26–192)
CO2 SERPL-SCNC: 22 MMOL/L
CO2 SERPL-SCNC: 29 MMOL/L — SIGNIFICANT CHANGE UP (ref 22–31)
CORTIS SERPL-MCNC: 5.9 UG/DL
CREAT SERPL-MCNC: 0.93 MG/DL
CREAT SERPL-MCNC: 1 MG/DL — SIGNIFICANT CHANGE UP (ref 0.5–1.3)
CREAT SPEC-SCNC: 19 MG/DL
CULTURE RESULTS: SIGNIFICANT CHANGE UP
EOSINOPHIL # BLD AUTO: 0.16 K/UL
EOSINOPHIL NFR BLD AUTO: 3 %
GLUCOSE SERPL-MCNC: 101 MG/DL — HIGH (ref 70–99)
GLUCOSE SERPL-MCNC: 91 MG/DL
HBA1C MFR BLD HPLC: 6.1 %
HCT VFR BLD CALC: 31.3 % — LOW (ref 34.5–45)
HCT VFR BLD CALC: 33.6 %
HDLC SERPL-MCNC: 50 MG/DL
HGB BLD-MCNC: 10.4 G/DL — LOW (ref 11.5–15.5)
HGB BLD-MCNC: 10.8 G/DL
IMM GRANULOCYTES NFR BLD AUTO: 0.6 %
LDLC SERPL CALC-MCNC: 94 MG/DL
LYMPHOCYTES # BLD AUTO: 1.56 K/UL
LYMPHOCYTES NFR BLD AUTO: 29.2 %
MAGNESIUM SERPL-MCNC: 1.5 MG/DL — LOW (ref 1.6–2.6)
MAGNESIUM SERPL-MCNC: 1.8 MG/DL
MAN DIFF?: NORMAL
MCHC RBC-ENTMCNC: 29.8 PG
MCHC RBC-ENTMCNC: 29.8 PG — SIGNIFICANT CHANGE UP (ref 27–34)
MCHC RBC-ENTMCNC: 32.1 GM/DL
MCHC RBC-ENTMCNC: 33.2 GM/DL — SIGNIFICANT CHANGE UP (ref 32–36)
MCV RBC AUTO: 89.7 FL — SIGNIFICANT CHANGE UP (ref 80–100)
MCV RBC AUTO: 92.6 FL
MICROALBUMIN 24H UR DL<=1MG/L-MCNC: 28.8 MG/DL
MICROALBUMIN/CREAT 24H UR-RTO: 1521 MG/G
MONOCYTES # BLD AUTO: 0.37 K/UL
MONOCYTES NFR BLD AUTO: 6.9 %
NEUTROPHILS # BLD AUTO: 3.18 K/UL
NEUTROPHILS NFR BLD AUTO: 59.6 %
NRBC # BLD: 0 /100 WBCS — SIGNIFICANT CHANGE UP (ref 0–0)
NT-PROBNP SERPL-MCNC: 5970 PG/ML
PARATHYROID HORMONE INTACT: 27 PG/ML
PHOSPHATE SERPL-MCNC: 3.4 MG/DL — SIGNIFICANT CHANGE UP (ref 2.5–4.5)
PLATELET # BLD AUTO: 309 K/UL — SIGNIFICANT CHANGE UP (ref 150–400)
PLATELET # BLD AUTO: 324 K/UL
POTASSIUM SERPL-MCNC: 3.2 MMOL/L — LOW (ref 3.5–5.3)
POTASSIUM SERPL-SCNC: 3.2 MMOL/L — LOW (ref 3.5–5.3)
POTASSIUM SERPL-SCNC: 3.8 MMOL/L
PROT SERPL-MCNC: 6.4 G/DL — SIGNIFICANT CHANGE UP (ref 6–8.3)
PROT SERPL-MCNC: 6.8 G/DL
RBC # BLD: 3.49 M/UL — LOW (ref 3.8–5.2)
RBC # BLD: 3.63 M/UL
RBC # FLD: 12.5 % — SIGNIFICANT CHANGE UP (ref 10.3–14.5)
RBC # FLD: 13.1 %
RENIN PLASMA: <2.1 PG/ML
SODIUM SERPL-SCNC: 139 MMOL/L
SODIUM SERPL-SCNC: 141 MMOL/L — SIGNIFICANT CHANGE UP (ref 135–145)
SPECIMEN SOURCE: SIGNIFICANT CHANGE UP
T4 FREE SERPL-MCNC: 1.3 NG/DL
TRIGL SERPL-MCNC: 150 MG/DL
TROPONIN I SERPL-MCNC: 0.03 NG/ML — SIGNIFICANT CHANGE UP (ref 0.01–0.04)
WBC # BLD: 5.89 K/UL — SIGNIFICANT CHANGE UP (ref 3.8–10.5)
WBC # FLD AUTO: 5.34 K/UL
WBC # FLD AUTO: 5.89 K/UL — SIGNIFICANT CHANGE UP (ref 3.8–10.5)

## 2018-12-13 PROCEDURE — 85027 COMPLETE CBC AUTOMATED: CPT

## 2018-12-13 PROCEDURE — 36415 COLL VENOUS BLD VENIPUNCTURE: CPT

## 2018-12-13 PROCEDURE — 80061 LIPID PANEL: CPT

## 2018-12-13 PROCEDURE — 74174 CTA ABD&PLVS W/CONTRAST: CPT

## 2018-12-13 PROCEDURE — 94660 CPAP INITIATION&MGMT: CPT

## 2018-12-13 PROCEDURE — 84484 ASSAY OF TROPONIN QUANT: CPT

## 2018-12-13 PROCEDURE — 71045 X-RAY EXAM CHEST 1 VIEW: CPT

## 2018-12-13 PROCEDURE — 82550 ASSAY OF CK (CPK): CPT

## 2018-12-13 PROCEDURE — 82553 CREATINE MB FRACTION: CPT

## 2018-12-13 PROCEDURE — 93005 ELECTROCARDIOGRAM TRACING: CPT

## 2018-12-13 PROCEDURE — 84100 ASSAY OF PHOSPHORUS: CPT

## 2018-12-13 PROCEDURE — 93880 EXTRACRANIAL BILAT STUDY: CPT

## 2018-12-13 PROCEDURE — 99291 CRITICAL CARE FIRST HOUR: CPT

## 2018-12-13 PROCEDURE — 99285 EMERGENCY DEPT VISIT HI MDM: CPT | Mod: 25

## 2018-12-13 PROCEDURE — 85610 PROTHROMBIN TIME: CPT

## 2018-12-13 PROCEDURE — 94760 N-INVAS EAR/PLS OXIMETRY 1: CPT

## 2018-12-13 PROCEDURE — 83605 ASSAY OF LACTIC ACID: CPT

## 2018-12-13 PROCEDURE — 80053 COMPREHEN METABOLIC PANEL: CPT

## 2018-12-13 PROCEDURE — 70551 MRI BRAIN STEM W/O DYE: CPT

## 2018-12-13 PROCEDURE — 70544 MR ANGIOGRAPHY HEAD W/O DYE: CPT | Mod: 26,59

## 2018-12-13 PROCEDURE — 99223 1ST HOSP IP/OBS HIGH 75: CPT

## 2018-12-13 PROCEDURE — 71045 X-RAY EXAM CHEST 1 VIEW: CPT | Mod: 26

## 2018-12-13 PROCEDURE — 81001 URINALYSIS AUTO W/SCOPE: CPT

## 2018-12-13 PROCEDURE — 85730 THROMBOPLASTIN TIME PARTIAL: CPT

## 2018-12-13 PROCEDURE — 70544 MR ANGIOGRAPHY HEAD W/O DYE: CPT

## 2018-12-13 PROCEDURE — 87040 BLOOD CULTURE FOR BACTERIA: CPT

## 2018-12-13 PROCEDURE — 93880 EXTRACRANIAL BILAT STUDY: CPT | Mod: 26

## 2018-12-13 PROCEDURE — 83735 ASSAY OF MAGNESIUM: CPT

## 2018-12-13 PROCEDURE — 99233 SBSQ HOSP IP/OBS HIGH 50: CPT | Mod: GC

## 2018-12-13 PROCEDURE — 70450 CT HEAD/BRAIN W/O DYE: CPT

## 2018-12-13 PROCEDURE — 70551 MRI BRAIN STEM W/O DYE: CPT | Mod: 26

## 2018-12-13 PROCEDURE — 87086 URINE CULTURE/COLONY COUNT: CPT

## 2018-12-13 RX ORDER — CLOPIDOGREL BISULFATE 75 MG/1
1 TABLET, FILM COATED ORAL
Qty: 0 | Refills: 0 | DISCHARGE
Start: 2018-12-13

## 2018-12-13 RX ORDER — POTASSIUM CHLORIDE 20 MEQ
40 PACKET (EA) ORAL ONCE
Qty: 0 | Refills: 0 | Status: COMPLETED | OUTPATIENT
Start: 2018-12-13 | End: 2018-12-13

## 2018-12-13 RX ORDER — LOSARTAN POTASSIUM 100 MG/1
50 TABLET, FILM COATED ORAL EVERY 12 HOURS
Qty: 0 | Refills: 0 | Status: DISCONTINUED | OUTPATIENT
Start: 2018-12-13 | End: 2018-12-20

## 2018-12-13 RX ORDER — ATORVASTATIN CALCIUM 80 MG/1
1 TABLET, FILM COATED ORAL
Qty: 0 | Refills: 0 | COMMUNITY
Start: 2018-12-13

## 2018-12-13 RX ORDER — LOSARTAN POTASSIUM 100 MG/1
1 TABLET, FILM COATED ORAL
Qty: 0 | Refills: 0 | COMMUNITY

## 2018-12-13 RX ORDER — LABETALOL HCL 100 MG
2 TABLET ORAL
Qty: 0 | Refills: 0 | COMMUNITY
Start: 2018-12-13

## 2018-12-13 RX ORDER — MAGNESIUM SULFATE 500 MG/ML
2 VIAL (ML) INJECTION ONCE
Qty: 0 | Refills: 0 | Status: COMPLETED | OUTPATIENT
Start: 2018-12-13 | End: 2018-12-13

## 2018-12-13 RX ORDER — FUROSEMIDE 40 MG
40 TABLET ORAL
Qty: 0 | Refills: 0 | COMMUNITY
Start: 2018-12-13

## 2018-12-13 RX ORDER — SODIUM CHLORIDE 9 MG/ML
1000 INJECTION INTRAMUSCULAR; INTRAVENOUS; SUBCUTANEOUS ONCE
Qty: 0 | Refills: 0 | Status: COMPLETED | OUTPATIENT
Start: 2018-12-13 | End: 2018-12-13

## 2018-12-13 RX ORDER — ATORVASTATIN CALCIUM 80 MG/1
40 TABLET, FILM COATED ORAL AT BEDTIME
Qty: 0 | Refills: 0 | Status: DISCONTINUED | OUTPATIENT
Start: 2018-12-13 | End: 2018-12-20

## 2018-12-13 RX ORDER — FUROSEMIDE 40 MG
40 TABLET ORAL DAILY
Qty: 0 | Refills: 0 | Status: DISCONTINUED | OUTPATIENT
Start: 2018-12-13 | End: 2018-12-17

## 2018-12-13 RX ORDER — ASPIRIN/CALCIUM CARB/MAGNESIUM 324 MG
81 TABLET ORAL DAILY
Qty: 0 | Refills: 0 | Status: DISCONTINUED | OUTPATIENT
Start: 2018-12-13 | End: 2018-12-20

## 2018-12-13 RX ADMIN — CLOPIDOGREL BISULFATE 75 MILLIGRAM(S): 75 TABLET, FILM COATED ORAL at 12:38

## 2018-12-13 RX ADMIN — SODIUM CHLORIDE 2000 MILLILITER(S): 9 INJECTION INTRAMUSCULAR; INTRAVENOUS; SUBCUTANEOUS at 10:25

## 2018-12-13 RX ADMIN — ENOXAPARIN SODIUM 40 MILLIGRAM(S): 100 INJECTION SUBCUTANEOUS at 12:38

## 2018-12-13 RX ADMIN — ATORVASTATIN CALCIUM 40 MILLIGRAM(S): 80 TABLET, FILM COATED ORAL at 23:37

## 2018-12-13 RX ADMIN — LOSARTAN POTASSIUM 50 MILLIGRAM(S): 100 TABLET, FILM COATED ORAL at 05:17

## 2018-12-13 RX ADMIN — Medication 0.2 MILLIGRAM(S): at 23:37

## 2018-12-13 RX ADMIN — Medication 40 MILLIGRAM(S): at 05:17

## 2018-12-13 RX ADMIN — Medication 81 MILLIGRAM(S): at 12:38

## 2018-12-13 RX ADMIN — Medication 50 GRAM(S): at 06:59

## 2018-12-13 RX ADMIN — Medication 0.2 MILLIGRAM(S): at 16:05

## 2018-12-13 RX ADMIN — Medication 0.2 MILLIGRAM(S): at 05:17

## 2018-12-13 RX ADMIN — Medication 600 MILLIGRAM(S): at 05:17

## 2018-12-13 RX ADMIN — Medication 40 MILLIEQUIVALENT(S): at 06:59

## 2018-12-13 NOTE — PROGRESS NOTE ADULT - PROBLEM SELECTOR PLAN 3
- patient with questionable history of renal artery stenosis  -  CTA -  Renal artery stenosis+ as above , plan for  Interventional Cardiology Eval for BOB, possible stent?? Dr Ríos at [facility]

## 2018-12-13 NOTE — DIETITIAN INITIAL EVALUATION ADULT. - NS AS NUTRI INTERV MEALS SNACK
Recommend initiating clear liquid diet when medically feasible and advancing as tolerated to Dash/TLC.

## 2018-12-13 NOTE — PROGRESS NOTE ADULT - SUBJECTIVE AND OBJECTIVE BOX
Follow up: acute pulmonary edema, MR, renal artery stenosis    HPI:  88 yo PMHx chronic LE edema, MV insufficiency, mild to mod MR with grade 2 LV diastolic dysfunction by TTE (11/20/18),  hyperkalemia, recurrent UTIs, dyslipidemia, CVA, ?hx of renal artery stenosis,  malignant HTN who presents with shortness of breath. As per Son pt is in doing well, yesterday Pt  saw Cardiology Dr Murguia at [facility], pt took Xanax , Pt came home, S/P fall last evening in the bathroom, NO LOC, NO Head Trauma, Pt woke up this Am ,no CP, No SOB, Later started to have SOB,       Patient reports SOB starting a few days prior to admission, gradually worsening in intensity. She also noted chest pain. She's had similar presentations in the past in setting of HTN, however this presentation has been severe. She also noted worsening of leg swelling. She then presented to the ED. Pt Got SL Nitro & IV Lasix in EMS    Of note, patient recently admitted Nov 2018 with hypertensive urgency treated with nitro ggt in ICU, and transferred to [facility] on 11/21/18  for new ST depressions in lateral leads, with elevated troponin now s/p  cardiac cath  with no significant CAD ,  +MR,. Echo at that time showed Grade 2 diastolic dysfunction, EF 60-65%, with aortic, mitral and tricuspid insufficiency. Also of note, patient has had hx hyponatremia in the past in the setting of hypertension. Patient has possible history of renal artery stenosis though no radiographic imaging findings available for confirmation; patient now being evaluated by Cardiology for renal artery stenosis.     In the ED, vitals T 97.7, HR 88, /92, RR 17, SPO2 100% (on BiPAP). Labs: CBC wnl, Coags wnl, Chem w/ Na 141 and K 4, Glucose 137, eGFR 52, trops negative, UA wnl, CT head showed advanced chronic microvascular changes cerebral white matter and chronic left parietal occipital infarct similar to prior, no hemorrhage, infarct or mass effect. CXR showed vascular congestion bilateral interstitial edema suggesting CHF/fluid overload. Findings are less severe than on prior. (12 Dec 2018 17:07)    She is feeling somewhat better this morning now off intravenous nitroglycerin. She reports no chest pain. No dysrhythmias overnight      PAST MEDICAL & SURGICAL HISTORY:  Renal artery stenosis: possible on doppler  Basal cell carcinoma  Mitral valve insufficiency, unspecified etiology: Cardiac cath on 11/21/18  Edema, unspecified type  Cerebrovascular accident (CVA), unspecified mechanism  Hyponatremia: ON HCTZ , h/o Hypokelemia  Dyslipidemia  Essential hypertension  UTI (urinary tract infection)  H/O bilateral hip replacements  History of cholecystectomy  Status post hysterectomy  S/P Mohs surgery for basal cell carcinoma      MEDICATIONS  (STANDING):  aspirin enteric coated 81 milliGRAM(s) Oral daily  atorvastatin 20 milliGRAM(s) Oral at bedtime  cloNIDine 0.2 milliGRAM(s) Oral three times a day  clopidogrel Tablet 75 milliGRAM(s) Oral daily  enoxaparin Injectable 40 milliGRAM(s) SubCutaneous daily  furosemide   Injectable 40 milliGRAM(s) IV Push every 12 hours  influenza   Vaccine 0.5 milliLiter(s) IntraMuscular once  labetalol 600 milliGRAM(s) Oral two times a day  losartan 50 milliGRAM(s) Oral every 12 hours      Vital Signs Last 24 Hrs  T(C): 36.8 (13 Dec 2018 03:59), Max: 36.8 (12 Dec 2018 20:07)  T(F): 98.2 (13 Dec 2018 03:59), Max: 98.3 (12 Dec 2018 20:07)  HR: 62 (13 Dec 2018 07:00) (60 - 101)  BP: 147/70 (13 Dec 2018 07:00) (123/76 - 228/107)  BP(mean): 100 (13 Dec 2018 07:00) (93 - 156)  RR: 16 (13 Dec 2018 07:00) (13 - 34)  SpO2: 97% (13 Dec 2018 07:00) (84% - 100%)    I&O's Summary    12 Dec 2018 07:01  -  13 Dec 2018 07:00  --------------------------------------------------------  IN: 73.5 mL / OUT: 2800 mL / NET: -2726.5 mL        PHYSICAL EXAM:    Constitutional: NAD, awake and alert, well-developed  Eyes:   Pupils round, no lesions  ENMT: no exudate or erythema  Pulmonary: Non-labored, breath sounds are clear bilaterally, No wheezing, rales or rhonchi  Cardiovascular: PMI not palpable RRR normal S1 and S2, 2/6 syst murmer LSB, no rubs, gallops or clicks  Gastrointestinal: Bowel Sounds present, soft, nontender.   Lymph: No cervical lymphadenopathy.  Neurological: Alert, no focal deficits  Skin: No rashes.  No cyanosis.  Psych:  Mood & affect appropriate   Ext: No lower ext edema      CARDIAC MARKERS ( 13 Dec 2018 01:43 )  .028 ng/mL / x     / 55 U/L / x     / 1.7 ng/mL  CARDIAC MARKERS ( 12 Dec 2018 15:56 )  <.015 ng/mL / x     / x     / x     / 2.5 ng/mL                            10.4   5.89  )-----------( 309      ( 13 Dec 2018 05:52 )             31.3     12-13    141  |  104  |  25<H>  ----------------------------<  101<H>  3.2<L>   |  29  |  1.00    Ca    8.8      13 Dec 2018 05:52  Phos  3.4     12-13  Mg     1.5     12-13    TPro  6.4  /  Alb  3.5  /  TBili  1.6<H>  /  DBili  x   /  AST  19  /  ALT  26  /  AlkPhos  89  12-13    ECG: SR, LVH, non spec ST/T abn    < from: Xray Chest 1 View-PORTABLE IMMEDIATE (12.12.18 @ 16:07) >    EXAM:  XR CHEST PORTABLE IMMED 1V                            PROCEDURE DATE:  12/12/2018          INTERPRETATION:  Short of breath.    AP chest. Prior 11/19/2018.    No change heart mediastinum. There is vascular congestion bilateral   interstitialedema suggesting CHF/fluid overload. Findings are less   severe than on prior. No gross focal infiltrate or pleural effusion.   Right costophrenic angle excluded.    Impression: Congestion as described. No focal infiltrate.                YANETH MERIDA M.D., ATTENDING RADIOLOGIST  This document has been electronically signed. Dec 12 2018  4:09PM                < end of copied text >    < from: CT Angio Abdomen and Pelvis DEREK w/ IV Cont (12.12.18 @ 22:27) >    ******PRELIMINARY REPORT******    ******PRELIMINARY REPORT******          EXAM:  CT ANGIO ABD PELV DEREK(W)AW IC                            PROCEDURE DATE:  12/12/2018    ******PRELIMINARY REPORT******    ******PRELIMINARY REPORT******              INTERPRETATION:  [facility] RADIOLOGIST PRELIMINARY REPORT    EXAM:    CT Angiography Abdomen and Pelvis With Contrast     EXAM DATE/TIME:    12/12/2018 10:14 PM     CLINICAL HISTORY:    87 years old, female; Signs and symptoms; Other: Uncontrolled HTN;   Patient HX:   PT with uncontrolled HTN, R/O renal artery stenosis     TECHNIQUE:    Axial computed tomographic angiography images of the abdomen and pelvis   with   intravenous contrast material, including non-contrast images if   performed.    MIP and/or 3D reconstructed images were created and reviewed.    Coronal and sagittal reformatted images were created and reviewed.    MIP reconstructed images were created and reviewed.     CONTRAST:    100 ml of omnipaque administered intravenously.     COMPARISON:    No relevant prior studies available.     FINDINGS:    Lungs:  Bilateral small pleural effusions with associated atelectasis.    Mediastinum:  Small hiatal hernia.     VASCULATURE:    Aorta: No aortic aneurysm. No aortic dissection.    Celiac trunk and mesenteric arteries:  Mild stenosis origins celiac   artery and   SMA.    Renal arteries:  Near occlusion origin right renal artery. Severe   stenosis   proximal left renal artery.    Right iliac arteries: No occlusion or significant stenosis.    Right femoral/popliteal arteries: No occlusion or significant stenosis   of the   imaged proximal femoral artery. The popliteal artery was not imaged.    Left iliac arteries: No occlusion or significant stenosis.    Left femoral/popliteal arteries: No occlusion or significant stenosis of   the   imaged proximal femoral artery. The popliteal artery was not imaged.     ABDOMEN:    Liver: No mass.    Gallbladder and bile ducts:  Gallbladder surgically absent. Intrahepatic   and   common bile duct dilation . This may be within normal limits status post   cholecystectomy, but a distal common duct obstruction or retained stone   cannot   be completely excluded. Correlate clinically.    Pancreas: Unremarkable. No mass. No ductal dilation.    Spleen: Unremarkable. No splenomegaly.    Adrenals: Unremarkable. No mass.    Kidneys and ureters:  Cysts noted at both kidneys.    Stomach and bowel:  Above average stool. Colonic diverticula present.    Appendix: No evidence of appendicitis.     PELVIS:    Bladder: Unremarkable. No mass.    Reproductive: Unremarkable as visualized.     ABDOMEN and PELVIS:    Intraperitoneal space: Unremarkable. No free air. No significant fluid   collection.    Bones/joints:  Previous bilateral hip replacements.    Soft tissues:  Bilateral fat containing inguinal hernias without   incarceration.    Lymph nodes: Unremarkable. No enlarged lymph nodes.     IMPRESSION:   1. Atherosclerotic disease as described.   2. Near occlusion origin right renal artery. Severe stenosis proximal   left   renal artery.   3. Bilateral small pleural effusions with associated atelectasis.          ******PRELIMINARY REPORT******    ******PRELIMINARY REPORT******              LUCIANA DAVIS M.D.;[facility] RADIOLOGIST                  < end of copied text >  < from: TTE Echo Doppler w/o Cont (11.20.18 @ 10:49) >     EXAM:  ECHO TTE WO CON COMP W DOPPLR         PROCEDURE DATE:  11/20/2018        INTERPRETATION:  Ordering Physician: DIANA CALI [id number]    Indication: CHF  Technologist Initials: AS  Study Quality: Technically difficult and limited   A complete echocardiographic study was performed utilizing standard   protocol including spectral and color Doppler in all echocardiographic   windows.    Weight: 53 kg  Blood Pressure: 119/59    MEASUREMENTS  IVS: 1.0cm  PWT: 0.9cm  LA: 3.3cm  AO: 2.7cm  LVIDd: 4.9cm  LVIDs: 4.2cm    LVEF: 50%    FINDINGS  Left Ventricle: The endocardium is not well-visualized. In certain views,   septum appears hypokinetic. There is low normal left ventricular systolic   function  Aortic Valve: The aortic valve is not well visualized. It appears   calcified and trileaflet. Mild aortic insufficiency.  Mitral Valve: Mitral annular calcification calcified mitral valve   leaflets. Mild to moderate mitral insufficiency.  Tricuspid Valve: Normal tricuspid valve. Mild tricuspid insufficiency.  Pulmonic Valve: Not well visualized  Left Atrium: Mildly enlarged  Right Ventricle: Normal right ventricular size and systolic function.  Right Atrium: Mildly enlarged  Diastolic Function: Grade 2 diastolic dysfunction.  Pericardium/Pleura: Normal pericardium with trace pericardial effusion.      CONCLUSIONS:  Technically difficult and limited study.  1.  The endocardium is not well-visualized. In certain views, septum   appears hypokinetic. There is low normal left ventricular systolic   function  2.  The aortic valve is not well visualized. It appears calcified and   trileaflet. Mild aortic insufficiency.  3.  Mitral annular calcification calcified mitral valve leaflets. Mild to   moderate mitral insufficiency.  4.  Mild left atrial enlargement  5.  Normal right ventricular size and function.  Mild right atrial   enlargement  6.  Grade 2 diastolic dysfunction                    ANGELA CANDELARIA M.D., ATTENDING CARDIOLOGIST  This document has been electronically signed. Nov 21 2018  7:02AM                < end of copied text >  < from: Cardiac Cath Lab - Adult (11.21.18 @ 11:47) >     EXAM:  CARDIAC CATHERIZATION         PROCEDURE DATE:  11/21/2018        INTERPRETATION:  Cardiac Catheterization Report     Demographics     Patient Name        NITA          Height             60 Inches                       MARU     Medical Record      [id number]         Weight             116.85 Pounds   Number     Account Number      [id number]           BSA                1.49 m^2     Date of Birth       05/14/1931        BMI                22.82 kg/m^2     Age                 87 year(s)        Performing         Logan Juárez MD                                         Physician     Gender              Female            Referring                                         Physician    Procedure Start Time: 11/21/2018 11:39.    Procedure  Procedure Type:  Coronary Angiography and Left Heart Cath With Ventriculogram .    Admission Data    Current Diagnosis: ACS (Flash pulmonary edema).    Procedure Data    Continuous Physiologic monitoring was performed pre, elliott, and post  procedure.    Diagnostic Cath Status: Urgent    Entry Locations    - Retrograde Percutaneous access was performed through the Right Femoral      artery. A 4 Fr sheath was inserted.    Diagnostic Catheters    - 4FR JL 3.5 DIAGNOSTIC was used for Left coronary angiography.      - 4FR 3DRC (WR) DIAGNOSTIC was used for Right coronary angiography.      - 4FR KVA606 MOD DIAGNOSTIC was used for Left ventriculography.    Contrast Material:    - Grlgyfbma05 ml    Fluoroscopy Time:Diagnostic: 3:08 minutes. Total:3:08 minutes.    Fluoroscopy Dose:Diagnostic: 300 mGy. Total: 300 mGy.    Estimated Blood Loss:6 ml     Hemodynamics     Condition: Rest    Pressure  +-----+------------------   Angiographic Findings     Cardiac Arteries and Lesion Findings    LMCA: Normal.    LAD: Normal.    LCx: Normal.    RCA: Normal.    LV function assessed .  Ejection Fraction  +----------------------------------------------------------------------+---  +  !Method                                                                  !EF%!  +----------------------------------------------------------------------+---  +  !LV gram                                      !40 !  +----------------------------------------------------------------------+---  +    VA  Ventriculography Findings:  3+ MR     Impression     Diagnostic Conclusions     No significant CAD     Recommendations     Flash pulmonary edema not on an ischemic basis. Consider hypertension, MR   or BOB.    Estimated Blood Loss:6ml.     Signatures     ----------------------------------------------------------------   Electronically signed by Logan Juárez MD(Performing   Physician) on 11/21/2018 12:04   ----------------------------------------------------------------                    LOGAN JUÁREZ M.D., ATTENDING CARDIOLOGIST  This document has been electronically signed. Nov 21 2018 12:05PM                < end of copied text >

## 2018-12-13 NOTE — DISCHARGE NOTE ADULT - PROVIDER TOKENS
TOKEN:'[id number]',TOKEN:'[id number]' TOKEN:'[id number]',TOKEN:'[id number]',TOKEN:'[id number]'

## 2018-12-13 NOTE — DISCHARGE NOTE ADULT - ADDITIONAL INSTRUCTIONS
Follow up with your PCP within 1 week of discharge.   Follow up with your cardiologist within 1 week of discharge.

## 2018-12-13 NOTE — DISCHARGE NOTE ADULT - MEDICATION SUMMARY - MEDICATIONS TO CHANGE
I will SWITCH the dose or number of times a day I take the medications listed below when I get home from the hospital:  None I will SWITCH the dose or number of times a day I take the medications listed below when I get home from the hospital:    pravastatin 20 mg oral tablet  -- 1 tab(s) by mouth once a day

## 2018-12-13 NOTE — DIETITIAN INITIAL EVALUATION ADULT. - PROBLEM SELECTOR PLAN 6
- hx of CVA in the past, HTN and dyslipidemia as contributing factors   - Continue Statin daily, On ASA 81 mg daily  - microvascular changes on CT head-NEG

## 2018-12-13 NOTE — PROGRESS NOTE ADULT - PROBLEM SELECTOR PLAN 2
-  left facial  Droop/weakness and dysarthria c/w right hemispheric ischemia ?   NIHSS 2.  LAST SEEN NORMAL 9.30 AM.as per ICU Dr byrne  NOT A CANDIDATE FOR THROMBOLYTIC AS HER DEFICIT IS MINOR.  BRAIN MRI-MRA.as above   CAROTID DOPPLER.  LIPID PANEL was done on 12/11/18 as out pt  CONTINUE AP/STATIN/DVT  PROPHYLAXIS   D/W Dr Ho & Dr Huertas , pt has CVA on ASA & Plavix , needs further work up at [facility]  -Keep -180 ,for perfusion  hx of CVA in the past, HTN and dyslipidemia as contributing factors   - Continue Statin daily, On ASA 81 mg daily, Plavix 75 mg daily , Statin daily  - microvascular changes on CT head-NEG

## 2018-12-13 NOTE — PROGRESS NOTE ADULT - SUBJECTIVE AND OBJECTIVE BOX
neuro cons dict.  left facial weakness and dysarthria cw right hemispheric ischemia ? right pontine.  NIHSS 2.  LAST SEEN NORMAL 9.30 AM.  NOT A CANDIDATE FOR THROMBOLYTIC AS HER DEFICIT IS MINOR.  BRAIN MRI-MRA.  CAROTID DOPPLER.  LIPID PANEL.  CONTINUE AP/STATIN/DVT  PROPHYLAXIS   DW CRITICAL CARE TEAM AND DR CALI.

## 2018-12-13 NOTE — PROGRESS NOTE ADULT - ATTENDING COMMENTS
Pt seen, Examined, case & care plan d/w pt, residents at [facility], D/W ICU team, Dr Mustafa,Dr Huertas, Dr Ho   case D/W Son at [facility] at bed side  [phone]  Case D/W Dr Igor PHILLIPS at [facility] about further Cardiac neurology  work up, at [facility] with Ac CVA while pt on ASA, Plavix, with recent Cardiac cath -NEG CAD, Pt will need CD, PT Eval, S & S Eval.  Pt stable for transfer to [facility] as per neurology.

## 2018-12-13 NOTE — H&P ADULT - ATTENDING COMMENTS
Patient assigned to me by night hospitalist in charge for management and care for patient for this evening only. Care to be resumed by day hospitalist (Dr. Costa) in the morning and thereafter.

## 2018-12-13 NOTE — DISCHARGE NOTE ADULT - CARE PROVIDERS DIRECT ADDRESSES
,DirectAddress_Unknown,[email or web address] ,DirectAddress_Unknown,[email or web address],DirectAddress_Unknown

## 2018-12-13 NOTE — PROGRESS NOTE ADULT - ASSESSMENT
Mrs. Mueller is an 86 yo F with PMH of malignant HTN, renal artery stenosis  dyslipidemia, CVA, hyponatremia, recurrent UTI with recent admission in 11/2018 for HTN emergency and ADHF with dynamic EKG changes.  Coronary angiography done 11/21/18 showed no CAD but severe MR.  TTE done 11/20/18 noted low normal LV systolic function, septal wall hypokinesis, grade 2 diastolic dysfunction, mild to moderate mitral regurgitation with mildly enlarged LA.   She presents today similarly, with acute shortness of breath in the setting of significant hypertension. Her SBP is again in the 220 range.  Abdominal CTA now reveals near occlusion of the origin of the right renal artery and severe stenosis of the proximal left renal artery. I discussed this extensively with the patient and her daughter and it is reasonable to consider full evaluation for renal artery intervention with possible angioplasty and stenting. I spoke with Dr. Chad Ríos of interventional cardiology and we will plan to transfer her to [facility] for further evaluation and management.  Off IV NTG    Recommend:    - Arranged transfer to [facility] for eval with Dr Chad Ríos for renal artery peripheral intervention  - DVT prophylaxis  - cont ASA, clopidogrel  - cont Lasix 40 IV bid. Please continue to maintain strict I/Os, monitor daily weights, Cr, and K.   - cont clonidine  - cont labetalol  - cont losartan for now  - Bipap and oxygen supplementation as necessary  - Will follow with you.    60 minutes of critical care time spent with patient and twam
86 yo PMHx recurrent UTIs, dyslipidemia, CVA, ?hx of renal artery stenosis (per chart review, no confirmatory imaging available), malignant HTN (on impressive anti-HTN regimen at home with difficulty controlling HTN) and MV insufficiency (echo Nov 2018 60-65% w/ Grade 2 diastolic dysfunction) who presents with shortness of breath, CXR with vascular congestion c/w hypertensive emergency (SOB and chest pain), r/o HTN emergency complications including neurologic (ischemic, hemorrhagic stroke), cardiac (ACS, dissection)

## 2018-12-13 NOTE — DIETITIAN INITIAL EVALUATION ADULT. - PROBLEM SELECTOR PLAN 8
- on multiple echos demonstrated mitral valve insufficiency   - cardiology following, , recent Cardiac cath on 11/21/18 showed severe MR  - management per ICU

## 2018-12-13 NOTE — PROGRESS NOTE ADULT - SUBJECTIVE AND OBJECTIVE BOX
Patient is a 87y old  Female who presents with a chief complaint of shortness of breath (13 Dec 2018 11:22)      INTERVAL HPI:      OVERNIGHT EVENTS:    Home Medications:  aspirin 81 mg oral tablet, chewable: 1 tab(s) orally once a day (2018 11:45)  atorvastatin 20 mg oral tablet: 1 tab(s) orally once a day (at bedtime) (13 Dec 2018 08:41)  cloNIDine 0.2 mg oral tablet: 1 tab(s) orally 3 times a day (2018 21:22)  furosemide 100 mg/100 mL-0.9% intravenous solution: 40 milliliter(s) intravenous every 12 hours (13 Dec 2018 08:41)  losartan 50 mg oral tablet: 1 tab(s) orally 2 times a day (2018 21:22)      MEDICATIONS  (STANDING):  aspirin enteric coated 81 milliGRAM(s) Oral daily  atorvastatin 20 milliGRAM(s) Oral at bedtime  cloNIDine 0.2 milliGRAM(s) Oral three times a day  clopidogrel Tablet 75 milliGRAM(s) Oral daily  enoxaparin Injectable 40 milliGRAM(s) SubCutaneous daily  furosemide   Injectable 40 milliGRAM(s) IV Push every 12 hours  influenza   Vaccine 0.5 milliLiter(s) IntraMuscular once  labetalol 600 milliGRAM(s) Oral two times a day  losartan 50 milliGRAM(s) Oral every 12 hours    MEDICATIONS  (PRN):      Allergies    Keflex (Unknown)  verapamil (Other)    Intolerances        REVIEW OF SYSTEMS:  CONSTITUTIONAL: No fever, No chills, No fatigue, No myalgia, No Body ache, No Weakness  EYES: No eye pain,  No visual disturbances, No discharge, NO Redness  ENMT:  No ear pain, No nose bleed, No vertigo; No sinus or throat pain, No Congestion  NECK: No pain, No stiffness  RESPIRATORY: No cough, wheezing, No  hemoptysis, No shortness of breath  CARDIOVASCULAR: No chest pain, palpitations  GASTROINTESTINAL: No abdominal or epigastric pain. No nausea, No vomiting; No diarrhea or constipation. [  ] BM  GENITOURINARY: No dysuria, No frequency, No urgency, No hematuria, or incontinence  NEUROLOGICAL: No headaches, No dizziness, No numbness, No tingling, No tremors, No weakness  EXT: No Swelling, No Pain, No Edema  SKIN:  [  ] No itching, burning, rashes, or lesions   MUSCULOSKELETAL: No joint pain or swelling; No muscle pain, No back pain, No extremity pain  PSYCHIATRIC: No depression, anxiety, mood swings or difficulty sleeping at night  PAIN SCALE: [  ] None  [  ] Other-  ROS Unable to obtain due to - [  ] Dementia  [  ] Lethargy  [  ] Sedated [  ] non verbal  REST OF REVIEW Of SYSTEM - [  ] Normal     Vital Signs Last 24 Hrs  T(C): 36.9 (13 Dec 2018 11:), Max: 36.9 (13 Dec 2018 11:)  T(F): 98.4 (13 Dec 2018 11:), Max: 98.4 (13 Dec 2018 11:)  HR: 71 (13 Dec 2018 11:) (55 - 101)  BP: 164/106 (13 Dec 2018 11:25) (123/58 - 228/107)  BP(mean): 128 (13 Dec 2018 11:) (83 - 156)  RR: 34 (13 Dec 2018 11:) (13 - 34)  SpO2: 99% (13 Dec 2018 11:) (84% - 100%)  Finger Stick         @ 07:  -   @ 07:00  --------------------------------------------------------  IN: 73.5 mL / OUT: 2800 mL / NET: -2726.5 mL     @ 07: @ 12:25  --------------------------------------------------------  IN: 1000 mL / OUT: 750 mL / NET: 250 mL        PHYSICAL EXAM:  GENERAL:  [  ] NAD , [  ] well appearing, [  ] Agitated, [  ] Drowsy,  [  ] Lethargy, [  ] confused   HEAD:  [  ] Normal, [  ] Other  EYES:  [  ] EOMI, [  ] PERRLA, [  ] conjunctiva and sclera clear normal, [  ] Other,  [  ] Pallor,[  ] Discharge  ENMT:  [  ] Normal, [  ] Moist mucous membranes, [  ] Good dentition, [  ] No Thrush  NECK:  [  ] Supple, [  ] No JVD, [  ] Normal thyroid, [  ] Lymphadenopathy [  ] Other  CHEST/LUNG:  [  ] Clear to auscultation bilaterally, [  ] Breath Sounds equal OR Decrease,  [  ] No rales, [  ] No rhonchi  [  ]  No wheezing  HEART:  [  ] Regular rate and rhythm, [  ] tachycardia, [  ] Bradycardia,  [  ] irregular  [  ] No murmurs, No rubs, No gallops, [  ] PPM in place (Mfr:  )  ABDOMEN:  [  ] Soft, [  ] Nontender, [  ] Nondistended, [  ] No mass, [  ] Bowel sounds present, [  ] obese  NERVOUS SYSTEM:  [  ] Alert & Oriented X3, [  ] Nonfocal  [  ] Confusion  [  ] Encephalopathic [  ] Sedated [  ] Unable to assess, [  ] Other-  EXTREMITIES: [  ] 2+ Peripheral Pulses, No clubbing, No cyanosis,  [  ] edema B/L lower EXT. [  ] PVD stasis skin changes B/L Lower EXT  LYMPH: No lymphadenopathy noted  SKIN:  [  ] No rashes or lesions, [  ] Pressure Ulcers, [  ] ecchymosis, [  ] Skin Tears, [  ] Other    DIET:     LABS:                        10.4   5.89  )-----------( 309      ( 13 Dec 2018 05:52 )             31.3     13 Dec 2018 05:52    141    |  104    |  25     ----------------------------<  101    3.2     |  29     |  1.00     Ca    8.8        13 Dec 2018 05:52  Phos  3.4       13 Dec 2018 05:52  Mg     1.5       13 Dec 2018 05:52    TPro  6.4    /  Alb  3.5    /  TBili  1.6    /  DBili  x      /  AST  19     /  ALT  26     /  AlkPhos  89     13 Dec 2018 05:52    PT/INR - ( 12 Dec 2018 15:56 )   PT: 10.6 sec;   INR: 0.93 ratio         PTT - ( 12 Dec 2018 15:56 )  PTT:31.8 sec  Urinalysis Basic - ( 12 Dec 2018 16:18 )    Color: Yellow / Appearance: Clear / S.010 / pH: x  Gluc: x / Ketone: Negative  / Bili: Negative / Urobili: Negative   Blood: x / Protein: 75 mg/dL / Nitrite: Negative   Leuk Esterase: Negative / RBC: Negative /HPF / WBC 0-2   Sq Epi: x / Non Sq Epi: Occasional / Bacteria: Negative    CARDIAC MARKERS ( 13 Dec 2018 01:43 )  .028 ng/mL / x     / 55 U/L / x     / 1.7 ng/mL  CARDIAC MARKERS ( 12 Dec 2018 15:56 )  <.015 ng/mL / x     / x     / x     / 2.5 ng/mL      RADIOLOGY & ADDITIONAL TESTS:  < from: CT Angio Abdomen and Pelvis DEREK w/ IV Cont (18 @ 22:27) >  COMPARISON:    No relevant prior studies available.     FINDINGS:    Lungs:  Bilateral small pleural effusions with associated atelectasis.    Mediastinum:  Small hiatal hernia.     VASCULATURE:    Aorta: No aortic aneurysm. No aortic dissection.    Celiac trunk and mesenteric arteries:  Mild stenosis origins celiac   artery and   SMA.    Renal arteries:  Near occlusion origin right renal artery. Severe   stenosis   proximal left renal artery.    Right iliac arteries: No occlusion or significant stenosis.    Right femoral/popliteal arteries: No occlusion or significant stenosis   of the   imaged proximal femoral artery. The popliteal artery was not imaged.    Left iliac arteries: No occlusion or significant stenosis.    Left femoral/popliteal arteries: No occlusion or significant stenosis of   the   imaged proximal femoral artery. The popliteal artery was not imaged.     ABDOMEN:    Liver: No mass.    Gallbladder and bile ducts:  Gallbladder surgically absent. Intrahepatic   and   common bile duct dilation . This may be within normal limits status post   cholecystectomy, but a distal common duct obstruction or retained stone   cannot   be completely excluded. Correlate clinically.    Pancreas: Unremarkable. No mass. No ductal dilation.    Spleen: Unremarkable. No splenomegaly.    Adrenals: Unremarkable. No mass.    Kidneys and ureters:  Cysts noted at both kidneys.    Stomach and bowel:  Above average stool. Colonic diverticula present.    Appendix: No evidence of appendicitis.     PELVIS:    Bladder: Unremarkable. No mass.    Reproductive: Unremarkable as visualized.     ABDOMEN and PELVIS:    Intraperitoneal space: Unremarkable. No free air. No significant fluid   collection.    Bones/joints:  Previous bilateral hip replacements.    Soft tissues:  Bilateral fat containing inguinal hernias without   incarceration.    Lymph nodes: Unremarkable. No enlarged lymph nodes.     IMPRESSION:   1. Atherosclerotic disease as described.   2. Near occlusion origin right renal artery. Severe stenosis proximal   left   renal artery.   3. Bilateral small pleural effusions with associated atelectasis.      < end of copied text >      HEALTH ISSUES - PROBLEM Dx:  Pulmonary edema, acute: Pulmonary edema, acute  Hypertensive urgency: Hypertensive urgency  Prophylactic measure: Prophylactic measure  UTI (urinary tract infection): UTI (urinary tract infection)  Mitral valve insufficiency, unspecified etiology: Mitral valve insufficiency, unspecified etiology  Dyslipidemia: Dyslipidemia  Cerebrovascular accident (CVA), unspecified mechanism: Cerebrovascular accident (CVA), unspecified mechanism  Edema of left lower extremity: Edema of left lower extremity  Essential hypertension: Essential hypertension  Renal artery stenosis: Renal artery stenosis  Hypertensive emergency: Hypertensive emergency          Consultant(s) Notes Reviewed:  [ x ] YES     Care Discussed with [X] Consultants  [ x ] Patient  [ x ] Family-son  [  ]   [  ] Social Service  [x  ] RN, [  ] Physical Therapy  DVT PPX: [x ] Lovenox, [  ] S C Heparin, [  ] Coumadin, [  ] Xarelto, [  ] Eliquis, [  ] Pradaxa, [  ] IV Heparin drip, [  ] SCD [  ] Contraindication 2 to GI Bleed,[  ] Ambulation  Advanced directive: [ x ] None, [  ] DNR/DNI Patient is a 87y old  Female who presents with a chief complaint of shortness of breath (13 Dec 2018 11:22)      INTERVAL HPI:  88 yo PMHx chronic LE edema, MV insufficiency, mild to mod MR with grade 2 LV diastolic dysfunction by TTE (18),  hyperkalemia, recurrent UTIs, dyslipidemia, CVA, ?hx of renal artery stenosis,  malignant HTN who presents with shortness of breath. As per Son pt is in doing well, yesterday Pt  saw Cardiology Dr Murguia at [facility], pt took Xanax , Pt came home, S/P fall last evening in the bathroom, NO LOC, NO Head Trauma, Pt woke up this Am ,no CP, No SOB, Later started to have SOB,       Patient reports SOB starting a few days prior to admission, gradually worsening in intensity. She also noted chest pain. She's had similar presentations in the past in setting of HTN, however this presentation has been severe. She also noted worsening of leg swelling. She then presented to the ED. Pt Got SL Nitro & IV Lasix in EMS    Of note, patient recently admitted 2018 with hypertensive urgency treated with nitro ggt in ICU, and transferred to [facility] on 18  for new ST depressions in lateral leads, with elevated troponin now s/p  cardiac cath  with no significant CAD ,  +MR,. Echo at that time showed Grade 2 diastolic dysfunction, EF 60-65%, with aortic, mitral and tricuspid insufficiency. Also of note, patient has had hx hyponatremia in the past in the setting of hypertension. Patient has possible history of renal artery stenosis though no radiographic imaging findings available for confirmation; patient now being evaluated by Cardiology for renal artery stenosis.     In the ED, vitals T 97.7, HR 88, /92, RR 17, SPO2 100% (on BiPAP). Labs: CBC wnl, Coags wnl, Chem w/ Na 141 and K 4, Glucose 137, eGFR 52, trops negative, UA wnl, CT head showed advanced chronic microvascular changes cerebral white matter and chronic left parietal occipital infarct similar to prior, no hemorrhage, infarct or mass effect. CXR showed vascular congestion bilateral interstitial edema suggesting CHF/fluid overload. Findings are less severe than on prior.     18: pt seen, examined, in ICU at bed side, Pt has NO Complaints, Pt seen by Cardiology Dr Huertas , Pt is found to have near occlusion of  Rt renal Artery, plan to transfer pt to [facility] for interventional cardiology Eval for Rt BOB,  BP is atble on meds, off Nitro drip,  As per son he noticed pt has left facial droop, Pt denies any HA, No slurred speech ,No weakness in EXT's  pt was seen by Dr Ho in ICU, MRI Brain & MRA Brain Non Cont .that showed - 6 x 3 mm acute infarct noted in the right centrum semiovale. 6 x 3 mm   acute infarct noted in the posterior limb right internal capsule.1.1 cm meningioma noted in the right frontal parietal region. As per Dr ho Pt is stable for transfer to [facility] today. Mild drop in H/H   K & Mag replaced.    OVERNIGHT EVENTS: NONE    Home Medications:  aspirin 81 mg oral tablet, chewable: 1 tab(s) orally once a day (2018 11:45)  atorvastatin 20 mg oral tablet: 1 tab(s) orally once a day (at bedtime) (13 Dec 2018 08:41)  cloNIDine 0.2 mg oral tablet: 1 tab(s) orally 3 times a day (2018 21:22)  furosemide 100 mg/100 mL-0.9% intravenous solution: 40 milliliter(s) intravenous every 12 hours (13 Dec 2018 08:41)  losartan 50 mg oral tablet: 1 tab(s) orally 2 times a day (2018 21:22)      MEDICATIONS  (STANDING):  aspirin enteric coated 81 milliGRAM(s) Oral daily  atorvastatin 20 milliGRAM(s) Oral at bedtime  cloNIDine 0.2 milliGRAM(s) Oral three times a day  clopidogrel Tablet 75 milliGRAM(s) Oral daily  enoxaparin Injectable 40 milliGRAM(s) SubCutaneous daily  furosemide   Injectable 40 milliGRAM(s) IV Push every 12 hours  influenza   Vaccine 0.5 milliLiter(s) IntraMuscular once  labetalol 600 milliGRAM(s) Oral two times a day  losartan 50 milliGRAM(s) Oral every 12 hours    MEDICATIONS  (PRN):      Allergies    Keflex (Unknown)  verapamil (Other)    Intolerances        REVIEW OF SYSTEMS: i feel fine, No Complaints  CONSTITUTIONAL: No fever, No chills, No fatigue, No myalgia, No Body ache, No Weakness  EYES: No eye pain,  No visual disturbances, No discharge, NO Redness  ENMT:  No ear pain, No nose bleed, No vertigo; No sinus or throat pain, No Congestion  NECK: No pain, No stiffness  RESPIRATORY: No cough, wheezing, No  hemoptysis, No shortness of breath  CARDIOVASCULAR: No chest pain, palpitations  GASTROINTESTINAL: No abdominal or epigastric pain. No nausea, No vomiting; No diarrhea or constipation. [  ] BM  GENITOURINARY: No dysuria, No frequency, No urgency, No hematuria, or incontinence  NEUROLOGICAL: No headaches, No dizziness, No numbness, No tingling, No tremors, No weakness, No speech problem   EXT: No Swelling, No Pain, No Edema  SKIN:  [ x ] No itching, burning, rashes, or lesions   MUSCULOSKELETAL: No joint pain or swelling; No muscle pain, No back pain, No extremity pain  PSYCHIATRIC: No depression, anxiety, mood swings or difficulty sleeping at night  PAIN SCALE: [ x ] None  [  ] Other-  ROS Unable to obtain due to - [  ] Dementia  [  ] Lethargy  [  ] Sedated [  ] non verbal  REST OF REVIEW Of SYSTEM - [ x ] Normal     Vital Signs Last 24 Hrs  T(C): 36.9 (13 Dec 2018 11:25), Max: 36.9 (13 Dec 2018 11:25)  T(F): 98.4 (13 Dec 2018 11:25), Max: 98.4 (13 Dec 2018 11:25)  HR: 71 (13 Dec 2018 11:25) (55 - 101)  BP: 164/106 (13 Dec 2018 11:25) (123/58 - 228/107)  BP(mean): 128 (13 Dec 2018 11:25) (83 - 156)  RR: 34 (13 Dec 2018 11:25) (13 - 34)  SpO2: 99% (13 Dec 2018 11:25) (84% - 100%)  Finger Stick         @ 07:  -   @ 07:00  --------------------------------------------------------  IN: 73.5 mL / OUT: 2800 mL / NET: -2726.5 mL     @ 07: @ 12:25  --------------------------------------------------------  IN: 1000 mL / OUT: 750 mL / NET: 250 mL        PHYSICAL EXAM:  GENERAL:  [ x ] NAD , [x  ] well appearing, [  ] Agitated, [  ] Drowsy,  [  ] Lethargy, [  ] confused   HEAD:  [x  ] Normal, [  ] Other  EYES:  [x  ] EOMI, [ x ] PERRLA, [x  ] conjunctiva and sclera clear normal, [  ] Other,  [  ] Pallor,[  ] Discharge  ENMT:  [ x ] Normal, [ x ] Moist mucous membranes, [ x ] Good dentition, [ x ] No Thrush ++ left Facial droop, left facial weakness and dysarthria   NECK:  [ x ] Supple, [x  ] No JVD, [ x ] Normal thyroid, [  ] Lymphadenopathy [  ] Other  CHEST/LUNG:  [x  ] Clear to auscultation bilaterally, [x  ] Breath Sounds equal B/L  Decrease, poor effort  [ x ] No rales, [ x ] No rhonchi  [ x ]  No wheezing  HEART:  [ x ] Regular rate and rhythm, [  ] tachycardia, [  ] Bradycardia,  [  ] irregular  [ x ]  2/6 S murmurs, No rubs, No gallops, [  ] PPM in place (Mfr:  )  ABDOMEN:  [x  ] Soft, [x  ] Nontender, [ x] Nondistended, [x  ] No mass, [ x ] Bowel sounds present, [  ] obese  NERVOUS SYSTEM:  [x  ] Alert & Oriented X3, [x  ] Nonfocal  [  ] Confusion  [  ] Encephalopathic [  ] Sedated [  ] Unable to assess, [ x ] Other- Left facial droop+  EXTREMITIES: [ x ] 2+ Peripheral Pulses, No clubbing, No cyanosis,  [ x ] Trace edema B/L lower EXT. [x  ] mild  PVD stasis skin changes B/L Lower EXT  LYMPH: No lymphadenopathy noted  SKIN:  [ x ] No rashes or lesions, [  ] Pressure Ulcers, [  ] ecchymosis, [  ] Skin Tears, [  ] Other    DIET: DASH    LABS:                        10.4   5.89  )-----------( 309      ( 13 Dec 2018 05:52 )             31.3     13 Dec 2018 05:52    141    |  104    |  25     ----------------------------<  101    3.2     |  29     |  1.00     Ca    8.8        13 Dec 2018 05:52  Phos  3.4       13 Dec 2018 05:52  Mg     1.5       13 Dec 2018 05:52    TPro  6.4    /  Alb  3.5    /  TBili  1.6    /  DBili  x      /  AST  19     /  ALT  26     /  AlkPhos  89     13 Dec 2018 05:52    PT/INR - ( 12 Dec 2018 15:56 )   PT: 10.6 sec;   INR: 0.93 ratio         PTT - ( 12 Dec 2018 15:56 )  PTT:31.8 sec  Urinalysis Basic - ( 12 Dec 2018 16:18 )    Color: Yellow / Appearance: Clear / S.010 / pH: x  Gluc: x / Ketone: Negative  / Bili: Negative / Urobili: Negative   Blood: x / Protein: 75 mg/dL / Nitrite: Negative   Leuk Esterase: Negative / RBC: Negative /HPF / WBC 0-2   Sq Epi: x / Non Sq Epi: Occasional / Bacteria: Negative    CARDIAC MARKERS ( 13 Dec 2018 01:43 )  .028 ng/mL / x     / 55 U/L / x     / 1.7 ng/mL  CARDIAC MARKERS ( 12 Dec 2018 15:56 )  <.015 ng/mL / x     / x     / x     / 2.5 ng/mL      RADIOLOGY & ADDITIONAL TESTS:  < from: CT Angio Abdomen and Pelvis DEREK w/ IV Cont (18 @ 22:27) >  COMPARISON:    No relevant prior studies available.     FINDINGS:    Lungs:  Bilateral small pleural effusions with associated atelectasis.    Mediastinum:  Small hiatal hernia.     VASCULATURE:    Aorta: No aortic aneurysm. No aortic dissection.    Celiac trunk and mesenteric arteries:  Mild stenosis origins celiac   artery and   SMA.    Renal arteries:  Near occlusion origin right renal artery. Severe   stenosis   proximal left renal artery.    Right iliac arteries: No occlusion or significant stenosis.    Right femoral/popliteal arteries: No occlusion or significant stenosis   of the   imaged proximal femoral artery. The popliteal artery was not imaged.    Left iliac arteries: No occlusion or significant stenosis.    Left femoral/popliteal arteries: No occlusion or significant stenosis of   the   imaged proximal femoral artery. The popliteal artery was not imaged.     ABDOMEN:    Liver: No mass.    Gallbladder and bile ducts:  Gallbladder surgically absent. Intrahepatic   and   common bile duct dilation . This may be within normal limits status post   cholecystectomy, but a distal common duct obstruction or retained stone   cannot   be completely excluded. Correlate clinically.    Pancreas: Unremarkable. No mass. No ductal dilation.    Spleen: Unremarkable. No splenomegaly.    Adrenals: Unremarkable. No mass.    Kidneys and ureters:  Cysts noted at both kidneys.    Stomach and bowel:  Above average stool. Colonic diverticula present.    Appendix: No evidence of appendicitis.     PELVIS:    Bladder: Unremarkable. No mass.    Reproductive: Unremarkable as visualized.     ABDOMEN and PELVIS:    Intraperitoneal space: Unremarkable. No free air. No significant fluid   collection.    Bones/joints:  Previous bilateral hip replacements.    Soft tissues:  Bilateral fat containing inguinal hernias without   incarceration.    Lymph nodes: Unremarkable. No enlarged lymph nodes.     IMPRESSION:   1. Atherosclerotic disease as described.   2. Near occlusion origin right renal artery. Severe stenosis proximal   left   renal artery.   3. Bilateral small pleural effusions with associated atelectasis.    < from: MR Head No Cont (18 @ 12:13) >    INTERPRETATION:  CLINICAL STATEMENT: Left facial droop    TECHNIQUE: MRI of the brain was performed without gadolinium.    COMPARISON: CT head 2018    FINDINGS:  There is moderate diffuse parenchymal volume loss. There are T2   prolongation signal abnormalities in the periventricular subcortical   white matter likely related to severe chronic microvascular ischemic   changes.    Chronic infarct left parietal occipital region with ex vacuo dilatation   of adjacent left lateral ventricle.    1.1 cm meningioma noted in the right frontal parietal region.    There is no acute parenchymal hemorrhage, parenchymal mass, or midline   shift. There is no extra-axial fluid collection.  There is no   hydrocephalus.      6 x 3 mm acute infarct noted in the right centrum semiovale. 6 x 3 mm   acute infarct noted in the posterior limb right internal capsule.    Partial opacification of right mastoid air cells. Mucosalthickening   paranasal sinuses.    IMPRESSION:  Small acute infarcts in the right centrum semiovale and posterior limb of   right internal capsule as described above.    No acute intracranial hemorrhage      < end of copied text >  < from: MR Angio Head No Cont (18 @ 12:15) >    EXAM:  MR ANGIO BRAIN                            PROCEDURE DATE:  2018          INTERPRETATION:  CLINICAL STATEMENT: Evaluate for stenosis. CVA    TECHNIQUE: MRA of the head was performed without gadolinium. 3-D MIP   images were obtained.    COMPARISON: None.    FINDINGS:  Narrowing of supraclinoid internal carotid arteries noted    The proximal anterior, middle and posterior cerebral arteries are patent.   Multifocal stenosis involving proximal M2 branches of bilateral middle   cerebralarteries and distal M2 branches of left middle cerebral artery.   Mild focal narrowing at the proximal left posterior cerebral artery.    The intracranial vertebral and basilar arteries are patent. Dominant   right vertebral artery noted.     There is no MR evidence of intracranial aneurysm.    IMPRESSION:  Multifocal intracranial stenoses as described above    < end of copied text >      HEALTH ISSUES - PROBLEM Dx:  Pulmonary edema, acute: Pulmonary edema, acute  Hypertensive urgency: Hypertensive urgency  Prophylactic measure: Prophylactic measure  UTI (urinary tract infection): UTI (urinary tract infection)  Mitral valve insufficiency, unspecified etiology: Mitral valve insufficiency, unspecified etiology  Dyslipidemia: Dyslipidemia  Cerebrovascular accident (CVA), unspecified mechanism: Cerebrovascular accident (CVA), unspecified mechanism  Edema of left lower extremity: Edema of left lower extremity  Essential hypertension: Essential hypertension  Renal artery stenosis: Renal artery stenosis  Hypertensive emergency: Hypertensive emergency      Consultant(s) Notes Reviewed:  [ x ] YES     Care Discussed with [X] Consultants  [ x ] Patient  [ x ] Family-son  [  ]   [  ] Social Service  [x  ] RN, [  ] Physical Therapy  DVT PPX: [x ] Lovenox, [  ] S C Heparin, [  ] Coumadin, [  ] Xarelto, [  ] Eliquis, [  ] Pradaxa, [  ] IV Heparin drip, [  ] SCD [  ] Contraindication 2 to GI Bleed,[  ] Ambulation  Advanced directive: [ x ] None, [  ] DNR/DNI

## 2018-12-13 NOTE — DISCHARGE NOTE ADULT - PATIENT PORTAL LINK FT
You can access the [facility] Patient Portal, offered by [facility], by registering with the following website: [email or web address]

## 2018-12-13 NOTE — H&P ADULT - NSHPLABSRESULTS_GEN_ALL_CORE
10.4   5.89  )-----------( 309      ( 13 Dec 2018 05:52 )             31.3           141  |  104  |  25<H>  ----------------------------<  101<H>  3.2<L>   |  29  |  1.00    Ca    8.8      13 Dec 2018 05:52  Phos  3.4       Mg     1.5         TPro  6.4  /  Alb  3.5  /  TBili  1.6<H>  /  DBili  x   /  AST  19  /  ALT  26  /  AlkPhos  89            CAPILLARY BLOOD GLUCOSE          PT/INR - ( 12 Dec 2018 15:56 )   PT: 10.6 sec;   INR: 0.93 ratio         PTT - ( 12 Dec 2018 15:56 )  PTT:31.8 sec    Lactate Trend   @ 15:56 Lactate:1.0       CARDIAC MARKERS ( 13 Dec 2018 01:43 )  .028 ng/mL / x     / 55 U/L / x     / 1.7 ng/mL  CARDIAC MARKERS ( 12 Dec 2018 15:56 )  <.015 ng/mL / x     / x     / x     / 2.5 ng/mL            Urinalysis Basic - ( 12 Dec 2018 16:18 )    Color: Yellow / Appearance: Clear / S.010 / pH: x  Gluc: x / Ketone: Negative  / Bili: Negative / Urobili: Negative   Blood: x / Protein: 75 mg/dL / Nitrite: Negative   Leuk Esterase: Negative / RBC: Negative /HPF / WBC 0-2   Sq Epi: x / Non Sq Epi: Occasional / Bacteria: Negative    I personally reviewed & interpreted the lab findings above;   I personally reviewed & interpreted the radiographic findings; CXR pulmonary edema (PV)   I personally reviewed & interpreted the EKG findings; Nonischemic

## 2018-12-13 NOTE — DIETITIAN INITIAL EVALUATION ADULT. - PROBLEM SELECTOR PLAN 9
- recently completed course of abx for UTI, has hx of recurrent UTIs per chart review  - no current signs of active UTi

## 2018-12-13 NOTE — PATIENT PROFILE ADULT - BRADEN FRICTION AND SHEAR
Visit Information Date & Time Provider Department Dept. Phone Encounter #  
 8/1/2017  2:45 PM Christopher Rahman MD [facility] [address] [id number] Follow-up Instructions Return in about 3 months (around 11/1/2017). Your Appointments 10/3/2017  3:30 PM  
Any with Khang Briceño NP [address] ([address]) Appt Note: follow up after botox [address] [employer] [employer] [address] [place] [address] [phone]  
  
   
 [address] [place] [id number] [address] Upcoming Health Maintenance Date Due DTaP/Tdap/Td series (1 - Tdap) 2/12/2009 PAP AKA CERVICAL CYTOLOGY 2/12/2009 INFLUENZA AGE 9 TO ADULT 8/1/2017 Allergies as of 8/1/2017  Review Complete On: 3/9/4764 By: Eleno Bevels Wears Severity Noted Reaction Type Reactions Salicylic Acid-sulfur High 07/06/2017    Anaphylaxis Sulfur  04/15/2016    Unknown (comments) Current Immunizations  Never Reviewed No immunizations on file. Not reviewed this visit You Were Diagnosed With   
  
 Codes Comments B12 deficiency    -  Primary ICD-10-CM: E53.8 ICD-9-CM: 266.2 Urinary pain     ICD-10-CM: R30.9 ICD-9-CM: 788.1 History of nephrolithiasis     ICD-10-CM: E16.765 ICD-9-CM: V13.01 Cystitis     ICD-10-CM: N30.90 ICD-9-CM: 595.9 Hematuria     ICD-10-CM: R31.9 ICD-9-CM: 599.70 Vitals BP Pulse Temp Resp Height(growth percentile) Weight(growth percentile) 112/80 (BP 1 Location: Left arm, BP Patient Position: Sitting) 83 98.5 °F (36.9 °C) (Oral) 16 5' 1\" (1.549 m) 161 lb 12.8 oz (73.4 kg) LMP SpO2 BMI OB Status Smoking Status 07/01/2017 98% 30.57 kg/m2 Having regular periods Never Smoker Vitals History BMI and BSA Data Body Mass Index Body Surface Area 30.57 kg/m 2 1.78 m 2 Preferred Pharmacy Pharmacy Name Phone [facility] [address]  [facility] [phone] Your Updated Medication List  
  
   
This list is accurate as of: 8/1/17  3:45 PM.  Always use your most recent med list.  
  
  
  
  
 ALIGN 4 mg Cap Generic drug:  Bifidobacterium Infantis Take  by mouth. azelastine 137 mcg (0.1 %) nasal spray Commonly known as:  ASTELIN  
1 Spray by Both Nostrils route two (2) times a day. Use in each nostril as directed * cyanocobalamin 1,000 mcg/mL injection Commonly known as:  VITAMIN B-12  
1 mL by IntraMUSCular route once for 1 dose. * cyanocobalamin (vitamin B-12) 5,000 mcg Subl Commonly known as:  VITAMIN B-12  
5,000 mcg by SubLINGual route daily. Diclofenac Potassium 50 mg Pwpk Commonly known as:  CAMBIA  
1 packet by mouth at headache onset. May repeat again in 2 hours X 1 dose. Max 2 doses in 24 hours  
  
 fluconazole 150 mg tablet Commonly known as:  DIFLUCAN Take 1 Tab by mouth daily for 1 day. FDA advises cautious prescribing of oral fluconazole in pregnancy. MICROGESTIN FE 1.5/30 (28) 1.5 mg-30 mcg (21)/75 mg (7) Tab Generic drug:  norethindrone-ethinyl estradiol-iron  
  
 nitrofurantoin (macrocrystal-monohydrate) 100 mg capsule Commonly known as:  MACROBID Take 1 Cap by mouth two (2) times a day for 7 days. onabotulinumtoxinA 200 unit injection Commonly known as:  BOTOX Inject 155 units to 31 fda approved sites to face and neck every 12 [id number]  
  
 propranolol LA 80 mg SR capsule Commonly known as:  INDERAL LA  
TK 1 C PO D  
  
 QNASL 80 mcg/actuation Hfaa Generic drug:  beclomethasone dipropionate SUMAtriptan succinate 3 mg/0.5 mL Pnij Commonly known as:  Sharren Revels  
3 mg by SubCUTAneous route as needed. For migraine. May repeat injection after 1 hour if migraine remains. Limit 2 injections in 24 hours ZOLMitriptan 5 mg nasal solution Commonly known as:  ZOMIG  
1 Spray by Nasal route once as needed for Migraine for up to 1 dose. ZYRTEC PO Take  by mouth. * Notice: This list has 2 medication(s) that are the same as other medications prescribed for you. Read the directions carefully, and ask your doctor or other care provider to review them with you. Prescriptions Sent to Pharmacy Refills  
 cyanocobalamin, vitamin B-12, (VITAMIN B-12) 5,000 mcg subl 5 Si,000 mcg by SubLINGual route daily. Class: Normal  
 Pharmacy: [facility] [address] [place] [address] [address] AT [facility] of [address] ([facility] Ph #: [phone] Route: SubLINGual  
 nitrofurantoin, macrocrystal-monohydrate, (MACROBID) 100 mg capsule 0 Sig: Take 1 Cap by mouth two (2) times a day for 7 days. Class: Normal  
 Pharmacy: [facility] [address] [place] [address] [address] AT [facility] of [address] ([facility] Ph #: [phone] Route: Oral  
 fluconazole (DIFLUCAN) 150 mg tablet 0 Sig: Take 1 Tab by mouth daily for 1 day. FDA advises cautious prescribing of oral fluconazole in pregnancy. Class: Normal  
 Pharmacy: [facility] [address] [place] [address] [address] AT [facility] of [address] ([facility] Ph #: [phone] Route: Oral  
  
We Performed the Following AMB POC URINALYSIS DIP STICK AUTO W/O MICRO [33091 CPT(R)] THER/PROPH/DIAG INJECTION, SUBCUT/IM [id number] CPT(R)] VITAMIN B12 INJECTION [ [facility]] VITAMIN B12 [id number] CPT(R)] Follow-up Instructions Return in about 3 months (around 2017). To-Do List   
 2017 Imaging:  XR ABD (KUB) Introducing [facility]! Dear Blaire Rod: Thank you for requesting a PlastiPure account. Our records indicate that you already have an active PlastiPure account. You can access your account anytime at [email or web address] Did you know that you can access your hospital and ER discharge instructions at any time in NetClarity? You can also review all of your test results from your hospital stay or ER visit. Additional Information If you have questions, please visit the Frequently Asked Questions section of the NetClarity website at [email or web address] Remember, NetClarity is NOT to be used for urgent needs. For medical emergencies, dial 911. Now available from your iPhone and Android! Please provide this summary of care documentation to your next provider. Your primary care clinician is listed as Andrea King. If you have any questions after today's visit, please call [phone]. (2) potential problem

## 2018-12-13 NOTE — H&P ADULT - PROBLEM SELECTOR PLAN 1
-Pt transferred to [facility] for eval of BOB which is confirmed based on [facility] imaging.   -Primary team in AM to notify cards interventionalist /vascular for appropriate follow up care.  -C/w home losartan therapy for HTN management but EXTREME caution should be taken given the potential to cause a rise in creatinine and reduction in GFR in setting of b/l BOB---d/w family at bedside.

## 2018-12-13 NOTE — DISCHARGE NOTE ADULT - HOSPITAL COURSE
87F PMH of uncontrolled HTN, HLD, CVA, recurrent UTIs, ?hx of renal artery stenosis who presents with chest pain and worsening shortness of breath admitted to ICU for HTN emergency (/92) with acute pulmonary edema. Started on nitro gtt and BiPAP. CXR on admission showed vascular congestion bilateral interstitial edema suggesting CHF/fluid overload. CT head was performed due to recent fall 2 days ago showed no hemorrhage, infarct or mass effect. Patient with hx of malignant HTN, per chart review SBPs often 170-200s at home, with constant changes in BP meds to help optimize by her cardiologist Dr. Juárez / Dr Murguia. Recent ECHO (11/2018) with EF 60-65%, grade 2 diastolic dysfunction. CTA abd/pelvis showed near occlusion origin right renal artery and severe stenosis of proximal left renal artery. Evaluated by cardio (Dr. Huertas) and now being transferred to [place] for eval/stenting of renal arteries. 87F PMH of uncontrolled HTN, HLD, CVA, recurrent UTIs, ?hx of renal artery stenosis who presents with chest pain and worsening shortness of breath admitted to ICU for HTN emergency (/92) with acute pulmonary edema. Started on nitro gtt and BiPAP. CXR on admission showed vascular congestion bilateral interstitial edema suggesting CHF/fluid overload. CT head was performed due to recent fall 2 days ago showed no hemorrhage, infarct or mass effect. Patient with hx of malignant HTN, per chart review SBPs often 170-200s at home, with constant changes in BP meds to help optimize by her cardiologist Dr. Juárez / Dr Murguia. Recent ECHO (11/2018) with EF 60-65%, grade 2 diastolic dysfunction. CTA abd/pelvis showed near occlusion origin right renal artery and severe stenosis of proximal left renal artery. Evaluated by cardio (Dr. Huertas) and now being transferred to [place] for eval/stenting of renal arteries.           6 x 3 mm acute infarct noted in the right centrum semiovale. 6 x 3 mm   acute infarct noted in the posterior limb right internal capsule. 87F PMH of uncontrolled HTN, HLD, CVA, recurrent UTIs, ?hx of renal artery stenosis who presents with chest pain and worsening shortness of breath admitted to ICU for HTN emergency (/92) with acute pulmonary edema. Started on nitro gtt and BiPAP. CXR on admission showed vascular congestion bilateral interstitial edema suggesting CHF/fluid overload. CT head was performed due to recent fall 2 days ago showed no hemorrhage, infarct or mass effect. Patient with hx of malignant HTN, per chart review SBPs often 170-200s at home, with constant changes in BP meds to help optimize by her cardiologist Dr. Juárez / Dr Murguia. Recent ECHO (11/2018) with EF 60-65%, grade 2 diastolic dysfunction. CTA abd/pelvis showed near occlusion origin right renal artery and severe stenosis of proximal left renal artery. Evaluated by cardio (Dr. Huertas) and now being transferred to [place] for eval/stenting of renal arteries. Developed left sided facial droop as patient was to be discharged. MRI head ordered and pt found to have 6 x 3 mm acute infarct noted in the right centrum semiovale and 6 x 3 mm acute infarct noted in the posterior limb right internal capsule. Patient already on aspirin and plavix for prior CVA. No TPA was given. 87F PMH of uncontrolled HTN, HLD, CVA, recurrent UTIs, ?hx of renal artery stenosis who presents with chest pain and worsening shortness of breath admitted to ICU for HTN emergency (/92) with acute pulmonary edema. Started on nitro gtt and BiPAP. CXR on admission showed vascular congestion bilateral interstitial edema suggesting CHF/fluid overload. CT head was performed due to recent fall 2 days ago showed no hemorrhage, infarct or mass effect. Patient with hx of malignant HTN, per chart review SBPs often 170-200s at home, with constant changes in BP meds to help optimize by her cardiologist Dr. Juárez / Dr Murguia. Recent ECHO (11/2018) with EF 60-65%, grade 2 diastolic dysfunction. CTA abd/pelvis showed near occlusion origin right renal artery and severe stenosis of proximal left renal artery. Evaluated by cardio (Dr. Huertas) and now being transferred to [place] for eval/stenting of renal arteries. Developed left sided facial droop as patient was to be discharged. MRI head ordered and pt found to have 6 x 3 mm acute infarct noted in the right centrum semiovale and 6 x 3 mm acute infarct noted in the posterior limb right internal capsule. Patient already on aspirin and plavix for prior CVA. No TPA was given. Stable to be transferred to [place] for procedure and further stroke workup.     Accepting physician: Dr. Eric Costa  Interventional Cardiologist: Dr. Kaden Ríos 88 yo PMHx chronic LE edema, MV insufficiency, mild to mod MR with grade 2 LV diastolic dysfunction by TTE (11/20/18),  hyperkalemia, recurrent UTIs, dyslipidemia, CVA, ?hx of renal artery stenosis,  malignant HTN who presents with shortness of breath. As per Son pt is in doing well, yesterday Pt  saw Cardiology Dr Murguia at [facility], pt took Xanax , Pt came home, S/P fall last evening in the bathroom, NO LOC, NO Head Trauma, Pt woke up this Am ,no CP, No SOB, Later started to have SOB,       Patient reports SOB starting a few days prior to admission, gradually worsening in intensity. She also noted chest pain. She's had similar presentations in the past in setting of HTN, however this presentation has been severe. She also noted worsening of leg swelling. She then presented to the ED. Pt Got SL Nitro & IV Lasix in EMS    Of note, patient recently admitted Nov 2018 with hypertensive urgency treated with nitro ggt in ICU, and transferred to [facility] on 11/21/18  for new ST depressions in lateral leads, with elevated troponin now s/p  cardiac cath  with no significant CAD ,  +MR,. Echo at that time showed Grade 2 diastolic dysfunction, EF 60-65%, with aortic, mitral and tricuspid insufficiency. Also of note, patient has had hx hyponatremia in the past in the setting of hypertension. Patient has possible history of renal artery stenosis though no radiographic imaging findings available for confirmation; patient now being evaluated by Cardiology for renal artery stenosis.     In the ED, vitals T 97.7, HR 88, /92, RR 17, SPO2 100% (on BiPAP). Labs: CBC wnl, Coags wnl, Chem w/ Na 141 and K 4, Glucose 137, eGFR 52, trops negative, UA wnl, CT head showed advanced chronic microvascular changes cerebral white matter and chronic left parietal occipital infarct similar to prior, no hemorrhage, infarct or mass effect. CXR showed vascular congestion bilateral interstitial edema suggesting CHF/fluid overload. Findings are less severe than on prior.Pt on Bi PAP in ER      Pt got IV Lasix 40 mg x 1 dose, Pt seen by cardiology- Dr Willingham, Started on IV Nitro drip, Pt seen By ICU PA/MD, pt was admitted to ICU, Pt's home meds ASA, Plavix, Statin, continued along with Losartan, BB, Clonidine, IV Lasix 40 mg q 12 hrs        CTA abd/pelvis showed near occlusion origin right renal artery and severe stenosis of proximal left renal artery. Evaluated by cardio (Dr. Huertas) and now being transferred to [place] for eval/stenting of renal arteries by interventional cardiologist, As per Son today he noticed that pt has  Developed left sided facial droop , pt has NO Complaints , Pt was seen by Neurology Dr gardiner- Pt is found to have dysarthria , MRI head MRA brain  ordered and pt found to have 6 x 3 mm acute infarct noted in the right centrum semiovale and 6 x 3 mm acute infarct noted in the posterior limb right internal capsule. Patient already on aspirin and Plavix for prior CVA. as per neurology pt is Not a candidate for TPA , CD ordered, Fasting lipids added,  Stable to be transferred to [place] for procedure and further stroke workup as per neurology Dr Gardiner, D/W Dr Huertas -cardiology as well, further stroke work up at [facility],  PT & SLP eval at [facility] . D/W son at [facility]. .     Accepting physician: Dr. Eric Costa Case D/W   Interventional Cardiologist: Dr. Kaden Ríos , notified by Dr Huertas.

## 2018-12-13 NOTE — DIETITIAN INITIAL EVALUATION ADULT. - PROBLEM SELECTOR PLAN 2
Likely 2/2 HTN urgency ,with Ac respiratory failure  S/P IN Lasix 40 mg x 1 dose, On BI PAP,  CXR -Pulmonary edema, NO CAD on rcent cardiac Cath- NO CAD, +MR  -Start ASA 81 mg daily, No need for Plavix as per Dr Willingham  Lasix 40 mg IV q 12 hrs, I/o,

## 2018-12-13 NOTE — PROGRESS NOTE ADULT - SUBJECTIVE AND OBJECTIVE BOX
Patient is a 87y old  Female who presents with a chief complaint of shortness of breath (13 Dec 2018 12:25)    24 hour events: off bipap and NTG drip since last night  stable today  -160s today    REVIEW OF SYSTEMS  Constitutional: No fever, chills, fatigue  Neuro: +left facial droop, No headache, numbness, weakness  Resp: No cough, wheezing, shortness of breath  CVS: No chest pain, palpitations, leg swelling  GI: No abdominal pain, nausea, vomiting, diarrhea   : No dysuria, frequency, incontinence  Skin: No itching, burning, rashes, or lesions   Msk: No joint pain or swelling  Psych: No depression, anxiety, mood swings  Heme: No bleeding    T(F): 98.4 (18 @ 14:22), Max: 98.4 (18 @ 11:25)  HR: 62 (18 @ 14:22) (55 - 101)  BP: 182/79 (18 @ 14:22) (123/58 - 228/107)  RR: 18 (18 @ 14:22) (13 - 34)  SpO2: 100% (18 @ 14:22) (84% - 100%)    I&O's Summary     @ 07:  -   @ 07:00  --------------------------------------------------------  IN: 73.5 mL / OUT: 2800 mL / NET: -2726.5 mL     @ 07:  -   @ 14:41  --------------------------------------------------------  IN: 1045 mL / OUT: 1250 mL / NET: -205 mL    PHYSICAL EXAM  General: NAD  CNS: AAOx3, no focal deficits on exam during rounds in am however she had left facial droop and mild dysarthria later, no motor deficits  HEENT: PERRL  Resp: bibasilar crackles, clear mostly  CVS: S1S2, regular  Abd: soft, NT, +BS  Ext: no edema  Skin: warm    MEDICATIONS  cloNIDine Oral  furosemide   Injectable IV Push  labetalol Oral  losartan Oral  atorvastatin Oral  aspirin enteric coated Oral  clopidogrel Tablet Oral  enoxaparin Injectable SubCutaneous  influenza   Vaccine IntraMuscular                        10.4   5.89  )-----------( 309      ( 13 Dec 2018 05:52 )             31.3     12-    141  |  104  |  25<H>  ----------------------------<  101<H>  3.2<L>   |  29  |  1.00    Ca    8.8      13 Dec 2018 05:52  Phos  3.4     12-  Mg     1.5         TPro  6.4  /  Alb  3.5  /  TBili  1.6<H>  /  DBili  x   /  AST  19  /  ALT  26  /  AlkPhos  89      Lactate 1.0            @ 15:56    CARDIAC MARKERS ( 13 Dec 2018 01:43 )  .028 ng/mL / x     / 55 U/L / x     / 1.7 ng/mL  CARDIAC MARKERS ( 12 Dec 2018 15:56 )  <.015 ng/mL / x     / x     / x     / 2.5 ng/mL    PT/INR - ( 12 Dec 2018 15:56 )   PT: 10.6 sec;   INR: 0.93 ratio       PTT - ( 12 Dec 2018 15:56 )  PTT:31.8 sec  Urinalysis Basic - ( 12 Dec 2018 16:18 )    Color: Yellow / Appearance: Clear / S.010 / pH: x  Gluc: x / Ketone: Negative  / Bili: Negative / Urobili: Negative   Blood: x / Protein: 75 mg/dL / Nitrite: Negative   Leuk Esterase: Negative / RBC: Negative /HPF / WBC 0-2   Sq Epi: x / Non Sq Epi: Occasional / Bacteria: Negative    CENTRAL LINE: N          JAMES: N                        A-LINE: N                        GLOBAL ISSUE/BEST PRACTICE  Analgesia: NA  Sedation: NA  CAM-ICU: neg  HOB elevation: yes  Stress ulcer prophylaxis: NA  VTE prophylaxis: Y  Glycemic control: Y  Nutrition: Y    CODE STATUS: full  GOC discussion: Y

## 2018-12-13 NOTE — PROGRESS NOTE ADULT - ATTENDING COMMENTS
87F PMH HTN, HLD admitted with hypertensive emergency, acute pulmonary edema, 87F PMH HTN, HLD admitted with hypertensive emergency, acute pulmonary edema and acute resp failure. Improved with NIV, diuresis and NTG drip - now off NIV and NTG drip. She is on her po meds.     CTA abd/pelvis revealed b/l renal artery stenosis    This morning, she had dysarthria and left facial droop - emergent MRI with small areas of acute CVA on right, she was not a tPA candidate due to minimal deficits and low NIHSS.     She should continue aspirin, plavix  Continue losartan, lasix, clonidine and labetalol  Check lipid panel in am  Check carotid duplex    Patient is being transferred to [facility] for intervention for BOB  I spoke with Dr. Costa (hospitalist), she will call Neurology for f/u  Dr. Ríos aware of the events  Daughter updated by Dr. Huertas 87F PMH HTN, HLD admitted with hypertensive emergency, acute pulmonary edema and acute resp failure. Improved with NIV, diuresis and NTG drip - now off NIV and NTG drip. She is on her po meds.     CTA abd/pelvis revealed b/l renal artery stenosis    This morning, she had dysarthria and left facial droop - emergent MRI with small areas of acute CVA on right, she was not a tPA candidate due to minimal deficits and low NIHSS.     She should continue aspirin, plavix  Continue losartan, lasix, clonidine and labetalol, maintain SBP>140  Check lipid panel in am  Check carotid duplex  Resp status is stable  Start po diet    Patient is being transferred to [facility] for intervention for BOB  I spoke with Dr. Costa (hospitalist), she will call Neurology for f/u  Dr. Ríos aware of the events  Daughter updated by Dr. Huertas

## 2018-12-13 NOTE — DISCHARGE NOTE ADULT - CARE PLAN
Principal Discharge DX:	Acute pulmonary edema  Secondary Diagnosis:	Hypertensive emergency  Secondary Diagnosis:	Renal artery stenosis Principal Discharge DX:	Renal artery stenosis  Goal:	Stenting  Assessment and plan of treatment:	CTA abd/pelvis showed near occlusion origin right renal artery and severe stenosis of proximal left renal artery. Transfer to [place] for eval/stenting of renal arteries.  Secondary Diagnosis:	Hypertensive emergency  Goal:	BP control  Assessment and plan of treatment:	Uncontrolled due to BOB. Will reevaluate BP med regimen after getting procedure.  Secondary Diagnosis:	Dyslipidemia  Assessment and plan of treatment:	Continue statin. Principal Discharge DX:	Renal artery stenosis  Goal:	Stenting  Assessment and plan of treatment:	CTA abd/pelvis showed near occlusion origin right renal artery and severe stenosis of proximal left renal artery. Transfer to [place] for eval/stenting of renal arteries by interventional cardiology Dr Joshua Alfonso  Secondary Diagnosis:	Hypertensive emergency  Goal:	BP control  Assessment and plan of treatment:	Uncontrolled due to BOB.   BP stable  on Clonidine, Labetalol, IV Lasix , Losartan  Keep -180 with Ac CVA  Cardiology Follow up with DR Quan/Dr Huertas  Secondary Diagnosis:	Dyslipidemia  Assessment and plan of treatment:	Continue statin.  -12/11/18 Total Chl-174, HDL-50, TD-150, LDL- 94  Secondary Diagnosis:	Cerebrovascular accident (CVA), unspecified mechanism  Assessment and plan of treatment:	Pt was found to have left Facial Droop this AM  CT Head last Night-NEG, pt on ASA, Plavix, Statin , Pt  MRI Brain showed- small ac infarct rt central semiovale & Posterior limb of Rt intrenal capsule & pt has 1.1 cm meningioma,  - Further cardiac work up for CVA on ASA/Plavix at [facility], Neurology Follow up at [facility]   -Carotid doppler to be done at [facility], PT Eval.  Secondary Diagnosis:	Mitral valve insufficiency, unspecified etiology  Assessment and plan of treatment:	Pt had cardiac cath at [facility] on 11/21/18-Neg CAD, 3+MR Principal Discharge DX:	Renal artery stenosis  Goal:	Stenting  Assessment and plan of treatment:	CTA abd/pelvis showed near occlusion origin right renal artery and severe stenosis of proximal left renal artery. Transfer to [place] for eval/stenting of renal arteries by interventional cardiology Dr Joshua Alfonso  Secondary Diagnosis:	Hypertensive emergency  Goal:	BP control  Assessment and plan of treatment:	Uncontrolled due to BOB.   BP stable  on Clonidine, Labetalol, IV Lasix , Losartan  Keep -180 with Ac CVA  Cardiology Follow up with DR Quan/Dr Huertas  Secondary Diagnosis:	Dyslipidemia  Assessment and plan of treatment:	Continue statin.  -12/11/18 Total Chl-174, HDL-50, TD-150, LDL- 94  Secondary Diagnosis:	Cerebrovascular accident (CVA), unspecified mechanism  Assessment and plan of treatment:	Pt was found to have left Facial Droop this AM  CT Head last Night-NEG, pt on ASA, Plavix, Statin ,   MRI Brain showed- 6 x 3 mm acute infarct noted in the right centrum semiovale. 6 x 3 mm   acute infarct noted in the posterior limb right internal capsule. pt has 1.1 cm meningioma,  - Further cardiac work up for CVA on ASA/Plavix at [facility], Neurology Follow up at [facility]   -Carotid doppler to be done at [facility], PT Eval.  Secondary Diagnosis:	Mitral valve insufficiency, unspecified etiology  Assessment and plan of treatment:	Pt had cardiac cath at [facility] on 11/21/18-Neg CAD, 3+MR Principal Discharge DX:	Renal artery stenosis  Goal:	Stenting  Assessment and plan of treatment:	CTA abd/pelvis showed near occlusion origin right renal artery and severe stenosis of proximal left renal artery. Hold anticoagulation for procedure. Transfer to [place] for eval/stenting of renal arteries by interventional cardiology (Dr. Kaden Ríos)  Secondary Diagnosis:	Hypertensive emergency  Goal:	BP control  Assessment and plan of treatment:	Uncontrolled due to BOB.   BP stable on Clonidine, Labetalol, IV Lasix , Losartan  Keep -180 with Ac CVA  Cardiology Follow up with Dr. Quan/Dr Huertas  Secondary Diagnosis:	Dyslipidemia  Assessment and plan of treatment:	Continue statin.  -12/11/18 Total Chl-174, HDL-50, TD-150, LDL- 94  Secondary Diagnosis:	Cerebrovascular accident (CVA), unspecified mechanism  Assessment and plan of treatment:	Found to have left facial droop.   CT Head last Night-NEG, pt on ASA, Plavix, Statin ,   MRI Brain showed- 6 x 3 mm acute infarct noted in the right centrum semiovale. 6 x 3 mm   acute infarct noted in the posterior limb right internal capsule. pt has 1.1 cm meningioma,  - Further cardiac work up for CVA on ASA/Plavix at [facility], Neurology Follow up at [facility]   -Carotid doppler to be done at [facility], PT Eval.  Secondary Diagnosis:	Mitral valve insufficiency, unspecified etiology  Assessment and plan of treatment:	Pt had cardiac cath [facility] on 11/21/18-Neg CAD, 3+MR Principal Discharge DX:	Renal artery stenosis  Goal:	Stenting  Assessment and plan of treatment:	CTA abd/pelvis showed near occlusion origin right renal artery and severe stenosis of proximal left renal artery. Hold anticoagulation for procedure. Transfer to [place] for eval/stenting of renal arteries by interventional cardiology (Dr. Kaden Ríos)  Secondary Diagnosis:	Hypertensive emergency  Goal:	BP control  Assessment and plan of treatment:	Uncontrolled due to BOB.   BP stable on Clonidine, Labetalol, IV Lasix , Losartan  Keep -180 with Ac CVA  Cardiology Follow up with Dr. Quan/Dr Huertas  Secondary Diagnosis:	Dyslipidemia  Assessment and plan of treatment:	Continue statin.  -12/11/18 Total Chl-174, HDL-50, TD-150, LDL- 94  Secondary Diagnosis:	Cerebrovascular accident (CVA), unspecified mechanism  Assessment and plan of treatment:	Found to have left facial droop & Dysarthria   CT Head last Night-NEG, pt on ASA, Plavix, Statin ,   MRI Brain showed- 6 x 3 mm acute infarct noted in the right centrum semiovale. 6 x 3 mm   acute infarct noted in the posterior limb right internal capsule. pt has 1.1 cm meningioma,  - Further cardiac work up for CVA on ASA/Plavix at [facility], Neurology Follow up at [facility]   -Carotid doppler to be done at [facility], PT Eval.  SLP -Speech/ swallow eval  Secondary Diagnosis:	Mitral valve insufficiency, unspecified etiology  Assessment and plan of treatment:	Pt had cardiac cath at [facility] on 11/21/18-Neg CAD, 3+MR

## 2018-12-13 NOTE — DIETITIAN INITIAL EVALUATION ADULT. - OTHER INFO
Pt sleeping soundly at time of visit. Dx acute pulmonary edema likely secondary HTN urgency. NPO. GI wdl. BM 12/12. Noted right foot/ankles/legs 1+edema, left foot/ankles/legs 2+edema. Will remain available for diet initiation pending clinical course.

## 2018-12-13 NOTE — PROGRESS NOTE ADULT - PROBLEM SELECTOR PLAN 4
Resolved, Improved, Likely 2/2 HTN urgency ,with Ac respiratory failure- Improved   - Lasix 40 mg IV Q 12 hrs , I/o  S/P IV Lasix 40 mg x 1 dose, S/P  BI PAP, NC O2  CXR -Pulmonary edema

## 2018-12-13 NOTE — DIETITIAN INITIAL EVALUATION ADULT. - PROBLEM SELECTOR PLAN 3
- patient with questionable history of renal artery stenosis in the past per chart review, no confirmatory records available, now being evaluated by secondary causes of HTN in including renal artery stenosis by cardiology (appreciate recs)  - patient also noted to have hyponatremia  with HCTZ in the past in the setting of HTN, possibly c/w hyperaldosteronism though not with hyponatremia on current presentation   - Plan for CTA r/o Renal artery stenosis  - management per ICU (CTA renal artery for stenosis eval and MR w/ and w/o for adrenals possible options)

## 2018-12-13 NOTE — H&P ADULT - PROBLEM SELECTOR PLAN 2
-Pt with new stroke based on MRA/MRH today.   -Monitor on telemetry   -Check TTE  -Neuro checks  -NPO until S&S eval  -PT/OT eval  -c/w aspirin and statin therapy  -Hold plavix for tonight and neurology to comment on its safety given potential to increase brain bleed in setting of dual-anti platelet therapy  -Holding DVT ppx for now, please start subcutaneous heparin for DVT ppx after 24-hr  -Monitor VS q4hr  -Allow permissive HTN ; Goal is SBP < 185 and DBP < 110  -c/w anti-HTN from  hospital but caution w/ ARB therapy and bilateral BOB and potential to raise creatinine.  -Recommend neuro eval.  -check A1c and lipid profile for core measures. -Pt with new stroke based on MRA/MRH today.   -NIHSS score 2 on d/c from  hosptial ; no tPA given.   -Monitor on telemetry   -Check TTE  -Neuro checks  -NPO until S&S eval  -PT/OT eval  -c/w aspirin and statin therapy  -Hold plavix for tonight and neurology to comment on its safety given potential to increase brain bleed in setting of dual-anti platelet therapy  -Holding DVT ppx for now, please start subcutaneous heparin for DVT ppx after 24-hr  -Monitor VS q4hr  -Allow permissive HTN ; Goal is SBP < 185 and DBP < 110  -c/w anti-HTN from  hospital but caution w/ ARB therapy and bilateral BOB and potential to raise creatinine.  -Recommend neuro eval.  -check A1c and lipid profile for core measures.  -stroke education provided to family & Pt  -fall precaution / bed alarm , ambulate w/ assistance

## 2018-12-13 NOTE — PROGRESS NOTE ADULT - PROBLEM SELECTOR PLAN 1
- admit to ICU for HTN Urgency/ Emergency  with Ac pulmonary Edema   - S/p  Nitro drip, stopped   - BiPAP w/ weaned off, O2 NC  -- patient with hx of malignant HTN, difficult to control ,per chart review in SBPs often 170-200s at home, with constant changes in BP meds to help optimize by her cardiologist Dr. Juárez / Dr Murguia  - diastolic dysfunction on echo Nov 2018 (grade 2 diastolic dysfunction with EF 60-65%) possible contributing factor in this current presentation  - BP stable now with Losartan 2x day, Labetalol 600 mg 2x day, Lasix 40 mg IV q 12 hrs, Clonidine 0.2 mg 3x day

## 2018-12-13 NOTE — PROGRESS NOTE ADULT - PROBLEM SELECTOR PLAN 9
- on multiple echos demonstrated mitral valve insufficiency   - cardiology following, , recent Cardiac cath on 11/21/18 showed severe MR  - needs reeval when BP is stable

## 2018-12-13 NOTE — DISCHARGE NOTE ADULT - MEDICATION SUMMARY - MEDICATIONS TO TAKE
I will START or STAY ON the medications listed below when I get home from the hospital:    aspirin 81 mg oral tablet, chewable  -- 1 tab(s) by mouth once a day  -- Indication: For Cerebrovascular accident (CVA), unspecified mechanism    losartan 50 mg oral tablet  -- 1 tab(s) by mouth 2 times a day  -- Indication: For Essential hypertension    cloNIDine 0.2 mg oral tablet  -- 1 tab(s) by mouth 3 times a day  -- Indication: For Essential hypertension    atorvastatin 20 mg oral tablet  -- 1 tab(s) by mouth once a day (at bedtime)  -- Indication: For Dyslipidemia    labetalol 300 mg oral tablet  -- 2 tab(s) by mouth every 12 hours  -- Indication: For Essential hypertension    furosemide 100 mg/100 mL-0.9% intravenous solution  -- 40 milliliter(s) intravenous every 12 hours  -- Indication: For Pulmonary edema, acute I will START or STAY ON the medications listed below when I get home from the hospital:    aspirin 81 mg oral tablet, chewable  -- 1 tab(s) by mouth once a day  -- Indication: For Cerebrovascular accident (CVA), unspecified mechanism ,H/O CVA    losartan 50 mg oral tablet  -- 1 tab(s) by mouth 2 times a day  HOLD for SBP <150  Keep -180  -- Indication: For Essential hypertension    cloNIDine 0.2 mg oral tablet  -- 1 tab(s) by mouth 3 times a day  HOLD for SBP < 150   Keep SBP between  150- 180   -- Indication: For Essential hypertension    pravastatin 40 mg oral tablet  -- 1 tab(s) by mouth once a day  -- Indication: For Dyslipidemia    clopidogrel 75 mg oral tablet  -- 1 tab(s) by mouth once a day  -- Indication: For Cerebrovascular accident (CVA), unspecified mechanism    labetalol 300 mg oral tablet  -- 2 tab(s) by mouth 2 times a day,   HOLD SBP < 150  Keep -180 with Ac CVA  -- Indication: For Essential hypertension    furosemide 100 mg/100 mL-0.9% intravenous solution  -- 40 milliliter(s) intravenous every 12 hours  -- Indication: For Pulmonary edema, acute

## 2018-12-13 NOTE — DIETITIAN INITIAL EVALUATION ADULT. - PROBLEM SELECTOR PLAN 1
- admit to ICU for HTN Urgency with Ac pulmonary Edema   - Start Nitro  ggt, decrease MAP gradually  - BiPAP w/ weaning as appropriate  -- patient with hx of malignant HTN, difficult to control ,per chart review in SBPs often 170-200s at home, with constant changes in BP meds to help optimize by her cardiologist Dr. Juárez / Dr Murguia  - diastolic dysfunction on echo Nov 2018 (grade 2 diastolic dysfunction with EF 60-65%) possible contributing factor in this current presentation  - patient w/ flash pulmonary edema

## 2018-12-13 NOTE — H&P ADULT - NSHPREVIEWOFSYSTEMS_GEN_ALL_CORE
REVIEW OF SYSTEMS:    CONSTITUTIONAL: No  fevers or chills  ENMT:  No ear pain, No vertigo or throat pain  EYES: No visual changes  or photophobia  NECK: No pain or stiffness  RESPIRATORY: No cough, wheezing, hemoptysis; No shortness of breath currently   CARDIOVASCULAR: No chest pain or palpitations  GASTROINTESTINAL: No abdominal or epigastric pain. No nausea, vomiting, or hematemesis; No diarrhea or constipation. No melena or hematochezia.  MUSCULOSKELETAL: No joint swelling  or warmth.  GENITOURINARY: No dysuria, frequency or hematuria  NEUROLOGICAL: No numbness or syncope.   PSYCHIATRIC: No depression or anhedonia.  ENDOCRINE: No sx hypoglycemic episodes.  SKIN: No itching, burning, rashes, or lesions

## 2018-12-13 NOTE — H&P ADULT - PROBLEM SELECTOR PLAN 5
-No signs of hemolysis or bleeding  -check ferritin and iron in AM -No signs of hemolysis or bleeding; minor Tbil of 1.6 --doubt hemolytic in nature ; CT abdomen showed dilated ducts but no RUQ pain or signs of cholangitis.   -check ferritin and iron in AM

## 2018-12-13 NOTE — DISCHARGE NOTE ADULT - SECONDARY DIAGNOSIS.
Hypertensive emergency Renal artery stenosis Dyslipidemia Cerebrovascular accident (CVA), unspecified mechanism Mitral valve insufficiency, unspecified etiology

## 2018-12-13 NOTE — DIETITIAN INITIAL EVALUATION ADULT. - PROBLEM SELECTOR PLAN 5
- patient with lower extremity edema usually, now worse than baseline   - s/p Lasix in ED and in EMS  - Lasix 40 mg IV Q 12hrs

## 2018-12-13 NOTE — PROGRESS NOTE ADULT - PROBLEM SELECTOR PLAN 5
- patient with malignant HTN, ASA 81 mg daily, Losartan 50 mg 2x daily   - Pt takes losartan 50 mg twice daily, clonidine 0.2 mg three times daily, and labetalol 600 mg twice daily ,  - management per ICU

## 2018-12-13 NOTE — DISCHARGE NOTE ADULT - MEDICATION SUMMARY - MEDICATIONS TO STOP TAKING
I will STOP taking the medications listed below when I get home from the hospital:    Plavix 75 mg oral tablet  -- 1 tab(s) by mouth once a day    aspirin 81 mg oral tablet, chewable  -- 1 tab(s) by mouth once a day    torsemide 5 mg oral tablet  -- 1 tab(s) by mouth once a day I will STOP taking the medications listed below when I get home from the hospital:    atorvastatin 40 mg oral tablet  -- 1 tab(s) by mouth once a day (at bedtime)    torsemide 5 mg oral tablet  -- 1 tab(s) by mouth once a day

## 2018-12-13 NOTE — H&P ADULT - PMH
Basal cell carcinoma    Cerebrovascular accident (CVA), unspecified mechanism    Dyslipidemia    Edema, unspecified type    Essential hypertension    Hyponatremia  ON HCTZ , h/o Hypokelemia  Mitral valve insufficiency, unspecified etiology  Cardiac cath on 11/21/18  Renal artery stenosis  possible on doppler  UTI (urinary tract infection)

## 2018-12-13 NOTE — H&P ADULT - NSHPPHYSICALEXAM_GEN_ALL_CORE
PHYSICAL EXAM:  Vital Signs Last 24 Hrs  T(C): 36.7 (12-13-18 @ 20:04)  T(F): 98 (12-13-18 @ 20:04), Max: 98.4 (12-13-18 @ 11:25)  HR: 68 (12-13-18 @ 20:04) (55 - 78)  BP: 159/73 (12-13-18 @ 20:04)  BP(mean): 101 (12-13-18 @ 17:36) (83 - 128)  RR: 17 (12-13-18 @ 20:04) (13 - 34)  SpO2: 96% (12-13-18 @ 20:04) (94% - 100%)  Wt(kg): --    12-13 @ 07:01  -  12-13 @ 22:39  --------------------------------------------------------  IN: 0 mL / OUT: 300 mL / NET: -300 mL      Constitutional: NAD, awake and alert  EYES: EOMI  ENT:  Normal Hearing, no tonsillar exudates   Neck: Soft and supple , no thyromegaly   Respiratory: Breath sounds are clear bilaterally, No wheezing, rales or rhonchi  Cardiovascular: S1 and S2, regular rate and rhythm, no gallops or rubs,  2/6 systolic murmur at apex.   +JVD and pitting edema bilaterally.   Gastrointestinal: Bowel Sounds present, soft, nontender, nondistended, no guarding, no rebound  Extremities: No cyanosis or clubbing; warm to touch  Vascular: 2+ peripheral pulses lower ex  Neurological: Left facial droop, slight weakness in closing left eye, left tongue deviation. Left lower extremity 3-4/5 and right lower ext 5/5 and upper ext 5/5. No loss of proprioception. No loss to light touch. No pronator drift. No babinski.   Musculoskeletal:  no joint swelling or erythema.   Skin: No rashes  Psych: no depression or anhedonia, AAOx3  HEME: no bruises, no nose bleeds

## 2018-12-13 NOTE — H&P ADULT - HISTORY OF PRESENT ILLNESS
86 yo F w/ HTN, renal artery stenosis, MV insufficiency, dyslipidemia, CVA (no deficits ~13 yr ago), diastolic CHF stage II, presents as a transfer from [facility] for SOB and stroke.     Patient recently was admitted to [facility] from 11/19 to 11/21 for acute pulmonary edema in setting of hypertensive emergency requiring ICU admission for nitro gtt and respiratory failure requiring NIPPV. Complicating that hospitalization was new EKG changes w/ T-wave inversion in anterolateral leads. She underwent cardiac angiogram on 11/21/18 which showed no significant obstructive CAD. She also had TTE performed on 11/20/18 which showed grade II diastolic CHF and mild-moderate MV insufficiency.     Following that hospitalization, she had another hospitalization on 12/12/18 for HTN emergency leading pulmonary edema and new CVA. Patient was r 88 yo F w/ HTN, renal artery stenosis, MV insufficiency, dyslipidemia, CVA (no deficits ~13 yr ago), diastolic CHF stage II, presents as a transfer from [facility] for SOB and stroke.     Patient recently was admitted to [facility] from 11/19 to 11/21 for acute pulmonary edema in setting of hypertensive emergency requiring ICU admission for nitro gtt and respiratory failure requiring NIPPV. Complicating that hospitalization was new EKG changes w/ T-wave inversion in anterolateral leads. She underwent cardiac angiogram on 11/21/18 which showed no significant obstructive CAD. She also had TTE performed on 11/20/18 which showed grade II diastolic CHF and mild-moderate MV insufficiency.     Following that hospitalization, she had another hospitalization on 12/12/18 for HTN emergency leading pulmonary edema and new CVA. Patient was transferred here for management of confirmed renal artery stenosis. Patient presented to [facility] during this time with fall and new facial droop. She reports that she was at home, she took xanax and had a fall. She cannot remember episode leading to fall. She remembers being able to carry herself over to her bed without difficulties. When the son came to pick her up he noticed the patient had left facial droop, left-upper ext weakness, and slurring of speech. He thought it might have been the xanax.  During this time she also developed acute onset of SOB and was therefore taken to [facility] on 12/12.     Currently, patient reports she feels "fine." She has no chest pain, shortness of breath, no nausea/vomiting, no noticeable weakness. She also has no paresthesias. She has no visual deficits or headaches. She does still have left facial droop but no noticeable dysphagia to solids yet. 88 yo F w/ HTN, renal artery stenosis, MV insufficiency, dyslipidemia, CVA (no deficits ~13 yr ago), diastolic CHF stage II, presents as a transfer from [facility] for SOB and stroke.     Patient recently was admitted to [facility] from 11/19 to 11/21 for acute pulmonary edema in setting of hypertensive emergency requiring ICU admission for nitro gtt and respiratory failure requiring NIPPV. Complicating that hospitalization was new EKG changes w/ T-wave inversion in anterolateral leads. She underwent cardiac angiogram on 11/21/18 which showed no significant obstructive CAD. She also had TTE performed on 11/20/18 which showed grade II diastolic CHF and mild-moderate MV insufficiency.     Following that hospitalization, she had another hospitalization on 12/12/18 for HTN emergency leading pulmonary edema and new CVA. Patient was transferred here for management of confirmed renal artery stenosis. Patient presented to [facility] during this time with fall and new facial droop. She reports that she was at home, she took xanax and had a fall. She cannot remember episode leading to fall. She remembers being able to carry herself over to her bed without difficulties. When the son came to pick her up he noticed the patient had left facial droop, left-upper ext weakness, and slurring of speech. He thought it might have been the xanax.  During this time she also developed acute onset of SOB and was therefore taken to [facility] on 12/12.     Currently, patient reports she feels "fine." She has no chest pain, shortness of breath, no nausea/vomiting, no noticeable weakness. She also has no paresthesias. She has no visual deficits or headaches. She does still have left facial droop but no noticeable dysphagia to solids yet.

## 2018-12-13 NOTE — H&P ADULT - ASSESSMENT
86 yo F w/ HTN, MV insufficiency, dyslipidemia, CVA (no deficits ~13 yr ago), diastolic CHF stage II, presents as a transfer from [facility] for acute pulmonary edema 2/2 to HTN emergency c/b new CVA and found to have bilateral renal artery stenosis.

## 2018-12-13 NOTE — DISCHARGE NOTE ADULT - PLAN OF CARE
Stenting CTA abd/pelvis showed near occlusion origin right renal artery and severe stenosis of proximal left renal artery. Transfer to [place] for eval/stenting of renal arteries. BP control Uncontrolled due to BOB. Will reevaluate BP med regimen after getting procedure. Continue statin. CTA abd/pelvis showed near occlusion origin right renal artery and severe stenosis of proximal left renal artery. Transfer to [place] for eval/stenting of renal arteries by interventional cardiology Dr Joshua Alfonso Uncontrolled due to BOB.   BP stable  on Clonidine, Labetalol, IV Lasix , Losartan  Keep -180 with Ac CVA  Cardiology Follow up with DR Quan/Dr Huertas Continue statin.  -12/11/18 Total Chl-174, HDL-50, TD-150, LDL- 94 Pt was found to have left Facial Droop this AM  CT Head last Night-NEG, pt on ASA, Plavix, Statin , Pt  MRI Brain showed- small ac infarct rt central semiovale & Posterior limb of Rt intrenal capsule & pt has 1.1 cm meningioma,  - Further cardiac work up for CVA on ASA/Plavix at [facility], Neurology Follow up at [facility]   -Carotid doppler to be done at [facility], PT Eval. Pt had cardiac cath at [facility] on 11/21/18-Neg CAD, 3+MR Pt was found to have left Facial Droop this AM  CT Head last Night-NEG, pt on ASA, Plavix, Statin ,   MRI Brain showed- 6 x 3 mm acute infarct noted in the right centrum semiovale. 6 x 3 mm   acute infarct noted in the posterior limb right internal capsule. pt has 1.1 cm meningioma,  - Further cardiac work up for CVA on ASA/Plavix at [facility], Neurology Follow up at [facility]   -Carotid doppler to be done at [facility], PT Juanial. CTA abd/pelvis showed near occlusion origin right renal artery and severe stenosis of proximal left renal artery. Hold anticoagulation for procedure. Transfer to [place] for eval/stenting of renal arteries by interventional cardiology (Dr. Kaden Ríos) Uncontrolled due to BOB.   BP stable on Clonidine, Labetalol, IV Lasix , Losartan  Keep -180 with Ac CVA  Cardiology Follow up with Dr. Quan/Dr Huertas Found to have left facial droop.   CT Head last Night-NEG, pt on ASA, Plavix, Statin ,   MRI Brain showed- 6 x 3 mm acute infarct noted in the right centrum semiovale. 6 x 3 mm   acute infarct noted in the posterior limb right internal capsule. pt has 1.1 cm meningioma,  - Further cardiac work up for CVA on ASA/Plavix at [facility], Neurology Follow up at [facility]   -Carotid doppler to be done at [facility], PT Juanial. Found to have left facial droop & Dysarthria   CT Head last Night-NEG, pt on ASA, Plavix, Statin ,   MRI Brain showed- 6 x 3 mm acute infarct noted in the right centrum semiovale. 6 x 3 mm   acute infarct noted in the posterior limb right internal capsule. pt has 1.1 cm meningioma,  - Further cardiac work up for CVA on ASA/Plavix at [facility], Neurology Follow up at [facility]   -Carotid doppler to be done at [facility], PT Eval.  SLP -Speech/ swallow eval

## 2018-12-13 NOTE — DIETITIAN INITIAL EVALUATION ADULT. - PROBLEM SELECTOR PLAN 4
- patient with malignant HTN, ASA 81 mg daily, Losartan 50 mg 2x daily   - appreciate cardiology-D/W pt on Nitro drip,  - Pt takes losartan 50 mg twice daily, clonidine 0.2 mg three times daily, and labetalol 600 mg twice daily at home ,  - management per ICU

## 2018-12-14 LAB
ANION GAP SERPL CALC-SCNC: 13 MMOL/L — SIGNIFICANT CHANGE UP (ref 5–17)
BUN SERPL-MCNC: 31 MG/DL — HIGH (ref 7–23)
CALCIUM SERPL-MCNC: 9.7 MG/DL — SIGNIFICANT CHANGE UP (ref 8.4–10.5)
CHLORIDE SERPL-SCNC: 104 MMOL/L — SIGNIFICANT CHANGE UP (ref 96–108)
CHOLEST SERPL-MCNC: 149 MG/DL — SIGNIFICANT CHANGE UP (ref 10–199)
CO2 SERPL-SCNC: 25 MMOL/L — SIGNIFICANT CHANGE UP (ref 22–31)
CREAT SERPL-MCNC: 1.15 MG/DL — SIGNIFICANT CHANGE UP (ref 0.5–1.3)
DOPAMINE UR-MCNC: <30 PG/ML
EPINEPH UR-MCNC: 62 PG/ML
FERRITIN SERPL-MCNC: 32 NG/ML — SIGNIFICANT CHANGE UP (ref 15–150)
GLUCOSE BLDC GLUCOMTR-MCNC: 207 MG/DL — HIGH (ref 70–99)
GLUCOSE SERPL-MCNC: 116 MG/DL — HIGH (ref 70–99)
HBA1C BLD-MCNC: 6 % — HIGH (ref 4–5.6)
HCT VFR BLD CALC: 33.5 % — LOW (ref 34.5–45)
HDLC SERPL-MCNC: 42 MG/DL — LOW
HGB BLD-MCNC: 10.8 G/DL — LOW (ref 11.5–15.5)
IRON SATN MFR SERPL: 39 UG/DL — SIGNIFICANT CHANGE UP (ref 30–160)
LIPID PNL WITH DIRECT LDL SERPL: 75 MG/DL — SIGNIFICANT CHANGE UP
MAGNESIUM SERPL-MCNC: 2.1 MG/DL — SIGNIFICANT CHANGE UP (ref 1.6–2.6)
MCHC RBC-ENTMCNC: 29.6 PG — SIGNIFICANT CHANGE UP (ref 27–34)
MCHC RBC-ENTMCNC: 32.2 GM/DL — SIGNIFICANT CHANGE UP (ref 32–36)
MCV RBC AUTO: 91.8 FL — SIGNIFICANT CHANGE UP (ref 80–100)
METANEPHRINE, PL: 46 PG/ML
METANEPHS UR-SCNC: NORMAL
NOREPINEPH UR-MCNC: 758 PG/ML
NORMETANEPHRINE, PL: 188 PG/ML
PHOSPHATE SERPL-MCNC: 3.5 MG/DL — SIGNIFICANT CHANGE UP (ref 2.5–4.5)
PLATELET # BLD AUTO: 291 K/UL — SIGNIFICANT CHANGE UP (ref 150–400)
POTASSIUM SERPL-MCNC: 3.6 MMOL/L — SIGNIFICANT CHANGE UP (ref 3.5–5.3)
POTASSIUM SERPL-SCNC: 3.6 MMOL/L — SIGNIFICANT CHANGE UP (ref 3.5–5.3)
RBC # BLD: 3.65 M/UL — LOW (ref 3.8–5.2)
RBC # FLD: 13.3 % — SIGNIFICANT CHANGE UP (ref 10.3–14.5)
SODIUM SERPL-SCNC: 142 MMOL/L — SIGNIFICANT CHANGE UP (ref 135–145)
TOTAL CHOLESTEROL/HDL RATIO MEASUREMENT: 3.5 RATIO — SIGNIFICANT CHANGE UP (ref 3.3–7.1)
TRIGL SERPL-MCNC: 161 MG/DL — HIGH (ref 10–149)
WBC # BLD: 5.01 K/UL — SIGNIFICANT CHANGE UP (ref 3.8–10.5)
WBC # FLD AUTO: 5.01 K/UL — SIGNIFICANT CHANGE UP (ref 3.8–10.5)

## 2018-12-14 PROCEDURE — 99223 1ST HOSP IP/OBS HIGH 75: CPT

## 2018-12-14 PROCEDURE — 93306 TTE W/DOPPLER COMPLETE: CPT | Mod: 26

## 2018-12-14 PROCEDURE — 93010 ELECTROCARDIOGRAM REPORT: CPT

## 2018-12-14 PROCEDURE — 70450 CT HEAD/BRAIN W/O DYE: CPT | Mod: 26

## 2018-12-14 RX ORDER — LABETALOL HCL 100 MG
600 TABLET ORAL
Qty: 0 | Refills: 0 | Status: DISCONTINUED | OUTPATIENT
Start: 2018-12-14 | End: 2018-12-14

## 2018-12-14 RX ORDER — SODIUM CHLORIDE 9 MG/ML
250 INJECTION INTRAMUSCULAR; INTRAVENOUS; SUBCUTANEOUS ONCE
Qty: 0 | Refills: 0 | Status: COMPLETED | OUTPATIENT
Start: 2018-12-14 | End: 2018-12-14

## 2018-12-14 RX ADMIN — LOSARTAN POTASSIUM 50 MILLIGRAM(S): 100 TABLET, FILM COATED ORAL at 17:04

## 2018-12-14 RX ADMIN — Medication 40 MILLIGRAM(S): at 05:10

## 2018-12-14 RX ADMIN — Medication 0.2 MILLIGRAM(S): at 05:09

## 2018-12-14 RX ADMIN — Medication 0.2 MILLIGRAM(S): at 22:47

## 2018-12-14 RX ADMIN — LOSARTAN POTASSIUM 50 MILLIGRAM(S): 100 TABLET, FILM COATED ORAL at 05:10

## 2018-12-14 RX ADMIN — Medication 0.2 MILLIGRAM(S): at 14:47

## 2018-12-14 RX ADMIN — SODIUM CHLORIDE 250 MILLILITER(S): 9 INJECTION INTRAMUSCULAR; INTRAVENOUS; SUBCUTANEOUS at 19:38

## 2018-12-14 RX ADMIN — Medication 600 MILLIGRAM(S): at 18:01

## 2018-12-14 RX ADMIN — ATORVASTATIN CALCIUM 40 MILLIGRAM(S): 80 TABLET, FILM COATED ORAL at 22:47

## 2018-12-14 RX ADMIN — Medication 81 MILLIGRAM(S): at 12:26

## 2018-12-14 NOTE — CHART NOTE - NSCHARTNOTEFT_GEN_A_CORE
RRT was called due to unstable vitals. She is not candidate for tPA. Please follow with  for further recommendation.

## 2018-12-14 NOTE — PHYSICAL THERAPY INITIAL EVALUATION ADULT - MANUAL MUSCLE TESTING RESULTS, REHAB EVAL
grossly assessed due to/BUE at least 4/5, BLE grossly at least 3/5; BLE not formally assessed as pt daughter at b/s with c/o mother with BLE pain at baseline from swelling

## 2018-12-14 NOTE — PHYSICAL THERAPY INITIAL EVALUATION ADULT - LIVES WITH, PROFILE
alone Pt reports she lives alone in house with 4 stairs to entrance with bilateral handrails.  Prior to admission pt independent with functional mobility & uses RW due to decreased balance.  pt reports children live in [place] and come over to assist her with laundry and ADL's./alone

## 2018-12-14 NOTE — CONSULT NOTE ADULT - SUBJECTIVE AND OBJECTIVE BOX
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  Pt with hx of HTN, renal artery stenosis, MV insufficiency, dyslipidemia, CVA (no deficits ~13 yr ago), diastolic CHF stage II, presents as a transfer from [facility] for SOB and stroke.     Patient recently was admitted to [facility] from  to  for acute pulmonary edema in setting of hypertensive emergency requiring ICU admission for nitro gtt and respiratory failure requiring NIPPV. Complicating that hospitalization was new EKG changes w/ T-wave inversion in anterolateral leads. She underwent cardiac angiogram on 18 which showed no significant obstructive CAD. She also had TTE performed on 18 which showed grade II diastolic CHF and mild-moderate MV insufficiency.     Following that hospitalization, she had another hospitalization on 18 for HTN emergency leading pulmonary edema and new CVA. Patient was transferred here for management of confirmed renal artery stenosis. Patient presented to [facility] during this time with fall and new facial droop. She reports that she was at home, she took xanax and had a fall. She cannot remember episode leading to fall. She remembers being able to carry herself over to her bed without difficulties. When the son came to pick her up he noticed the patient had left facial droop, left-upper ext weakness, and slurring of speech. He thought it might have been the xanax.  During this time she also developed acute onset of SOB and was therefore taken to [facility] on .     Currently, patient reports she feels "fine." She has no chest pain, shortness of breath, no nausea/vomiting, no noticeable weakness. She also has no paresthesias. She has no visual deficits or headaches. She does still have left facial droop but no noticeable dysphagia to solids yet.      Past Medical History:  Renal artery stenosis (I70.1): possible on doppler  Basal cell carcinoma (C44.91)  Mitral valve insufficiency, unspecified etiology (I34.0): Cardiac cath on 18  Edema, unspecified type (R60.9)  Cerebrovascular accident (CVA), unspecified mechanism (I63.9)  Hyponatremia (E87.1): ON HCTZ , h/o Hypokelemia  Dyslipidemia (E78.5)  Essential hypertension (I10)  UTI (urinary tract infection) (N39.0)      Past Surgical History:  H/O bilateral hip replacements (Z96.643)  History of cholecystectomy (Z90.49)  Status post hysterectomy (Z90.710)  S/P Mohs surgery for basal cell carcinoma (Z98.89)      Social History:  No toxic habits    Family History:  FAMILY HISTORY:  Family history of carotid artery stenosis (Sibling)  Family history of uterine cancer (Sibling)      Allergies:  Keflex (Unknown)  verapamil (Other)      ROS:  Constitutional: Patient offers no complaints of fevers or significant weight loss  Ears, Nose, Mouth and Throat: The patient presents with no abnormalities of the head, ears, eyes, nose or throat  Skin: Patient offers no concerns of new rashes or lesions  Respiratory: The patient presents with no abnormalities of the respiratory tract  Cardiovascular: The patient presents with no cardiac abnormalities  Gastrointestinal: The patient presents with no abnormalities of the GI system  Genitourinary: The patient presents with no dysuria, hematuria or frequent urination  Neurological: See HPI  Endocrine: Patient offers no complaints of excessive thirst, urination, or heat/cold intolerance    Advanced care planning reviewed and noted in the chart.    Medications:  aspirin  chewable 81 milliGRAM(s) Oral daily  atorvastatin 40 milliGRAM(s) Oral at bedtime  cloNIDine 0.2 milliGRAM(s) Oral every 8 hours  furosemide   Injectable 40 milliGRAM(s) IV Push daily  losartan 50 milliGRAM(s) Oral every 12 hours      Labs:  CBC Full  -  ( 14 Dec 2018 07:27 )  WBC Count : 5.01 K/uL  Hemoglobin : 10.8 g/dL  Hematocrit : 33.5 %  Platelet Count - Automated : 291 K/uL  Mean Cell Volume : 91.8 fl  Mean Cell Hemoglobin : 29.6 pg  Mean Cell Hemoglobin Concentration : 32.2 gm/dL  Auto Neutrophil # : x  Auto Lymphocyte # : x  Auto Monocyte # : x  Auto Eosinophil # : x  Auto Basophil # : x  Auto Neutrophil % : x  Auto Lymphocyte % : x  Auto Monocyte % : x  Auto Eosinophil % : x  Auto Basophil % : x    12-14    142  |  104  |  31<H>  ----------------------------<  116<H>  3.6   |  25  |  1.15    Ca    9.7      14 Dec 2018 06:28  Phos  3.5     12-14  Mg     2.1     12-14    TPro  6.4  /  Alb  3.5  /  TBili  1.6<H>  /  DBili  x   /  AST  19  /  ALT  26  /  AlkPhos  89  12-13    CAPILLARY BLOOD GLUCOSE        LIVER FUNCTIONS - ( 13 Dec 2018 05:52 )  Alb: 3.5 g/dL / Pro: 6.4 g/dL / ALK PHOS: 89 U/L / ALT: 26 U/L / AST: 19 U/L / GGT: x           PT/INR - ( 12 Dec 2018 15:56 )   PT: 10.6 sec;   INR: 0.93 ratio         PTT - ( 12 Dec 2018 15:56 )  PTT:31.8 sec  Urinalysis Basic - ( 12 Dec 2018 16:18 )    Color: Yellow / Appearance: Clear / S.010 / pH: x  Gluc: x / Ketone: Negative  / Bili: Negative / Urobili: Negative   Blood: x / Protein: 75 mg/dL / Nitrite: Negative   Leuk Esterase: Negative / RBC: Negative /HPF / WBC 0-2   Sq Epi: x / Non Sq Epi: Occasional / Bacteria: Negative        Vitals:  Vital Signs Last 24 Hrs  T(C): 36.8 (14 Dec 2018 11:50), Max: 36.9 (13 Dec 2018 14:22)  T(F): 98.3 (14 Dec 2018 11:50), Max: 98.4 (13 Dec 2018 14:22)  HR: 74 (14 Dec 2018 11:50) (61 - 78)  BP: 186/87 (14 Dec 2018 11:50) (121/61 - 191/87)  BP(mean): 101 (13 Dec 2018 17:36) (100 - 125)  RR: 18 (14 Dec 2018 11:50) (14 - 28)  SpO2: 100% (14 Dec 2018 11:50) (96% - 100%)    NEUROLOGICAL EXAM:    Mental status: Awake, alert, and in no apparent distress. Oriented to person, place and time. Language function is normal. Recent memory, digit span and concentration were normal. dysarthria    Cranial Nerves: Pupils were equal, round, reactive to light. Extraocular movements were intact. Visual field were full. Fundoscopic exam was deferred. Facial sensation was intact to light touch. There was left umn facial. The palate was upgoing symmetrically and tongue was midline. Hearing acuity was intact to finger rub AU. Shoulder shrug was full bilaterally    Motor exam: Bulk and tone were normal. Strength was 5/5 except left hand 4/5.  dec FFM on left    Reflexes: 2+ in the bilateral upper extremities. 2+ in the bilateral lower extremities. Toes were downgoing bilaterally.     Sensation: Intact to light touch, temperature, vibration and proprioception.     Coordination: Finger-nose-finger and heel-to-shin was without dysmetria.     Gait: deferred      NIHSS 2  Imaging:    EXAM:  MR ANGIO BRAIN                            PROCEDURE DATE:  2018          INTERPRETATION:  CLINICAL STATEMENT: Evaluate for stenosis. CVA    TECHNIQUE: MRA of the head was performed without gadolinium. 3-D MIP   images were obtained.    COMPARISON: None.    FINDINGS:  Narrowing of supraclinoid internal carotid arteries noted    The proximal anterior, middle and posterior cerebral arteries are patent.   Multifocal stenosis involving proximal M2 branches of bilateral middle   cerebralarteries and distal M2 branches of left middle cerebral artery.   Mild focal narrowing at the proximal left posterior cerebral artery.    The intracranial vertebral and basilar arteries are patent. Dominant   right vertebral artery noted.     There is no MR evidence of intracranial aneurysm.    IMPRESSION:  Multifocal intracranial stenoses as described above                LORNA ARNOLD M.D., ATTENDING RADIOLOGIST  This document has been electronically signed. Dec 13 2018 12:48PM        EXAM:  MR BRAIN                            PROCEDURE DATE:  2018          INTERPRETATION:  CLINICAL STATEMENT: Left facial droop    TECHNIQUE: MRI of the brain was performed without gadolinium.    COMPARISON: CT head 2018    FINDINGS:  There is moderate diffuse parenchymal volume loss. There are T2   prolongation signal abnormalities in the periventricular subcortical   white matter likely related to severe chronic microvascular ischemic   changes.    Chronic infarct left parietal occipital region with ex vacuo dilatation   of adjacent left lateral ventricle.    1.1 cm meningioma noted in the right frontal parietal region.    There is no acute parenchymal hemorrhage, parenchymal mass, or midline   shift. There is no extra-axial fluid collection.  There is no   hydrocephalus.      6 x 3 mm acute infarct noted in the right centrum semiovale. 6 x 3 mm   acute infarct noted in the posterior limb right internal capsule.    Partial opacification of right mastoid air cells. Mucosalthickening   paranasal sinuses.    IMPRESSION:  Small acute infarcts in the right centrum semiovale and posterior limb of   right internal capsule as described above.    No acute intracranial hemorrhage                LORNA ARNOLD M.D., ATTENDING RADIOLOGIST  This document has been electronically signed. Dec 13 2018 12:42PM

## 2018-12-14 NOTE — CONSULT NOTE ADULT - SUBJECTIVE AND OBJECTIVE BOX
[facility] - Stefano Emmanuel, Aleksandra, Ricky, Avelina, Maulik Mustafa  Office Number: [phone]    Initial Consult Note    CHIEF COMPLAINT: Patient is a 87y old  Female who presents with a chief complaint of stroke  & SOB (14 Dec 2018 07:33)      HPI:  86 yo F w/ HTN, renal artery stenosis, MV insufficiency, dyslipidemia, CVA (no deficits ~13 yr ago), diastolic CHF stage II, presents as a transfer from [facility] for SOB and stroke.     Patient recently was admitted to [facility] from 11/19 to 11/21 for acute pulmonary edema in setting of hypertensive emergency requiring ICU admission for nitro gtt and respiratory failure requiring NIPPV. Complicating that hospitalization was new EKG changes w/ T-wave inversion in anterolateral leads. She underwent cardiac angiogram on 11/21/18 which showed no significant obstructive CAD. She also had TTE performed on 11/20/18 which showed grade II diastolic CHF and mild-moderate MV insufficiency.     Following that hospitalization, she had another hospitalization on 12/12/18 for HTN emergency leading pulmonary edema and new CVA. Patient was transferred here for management of confirmed renal artery stenosis. Patient presented to [facility] during this time with fall and new facial droop. She reports that she was at home, she took xanax and had a fall. She cannot remember episode leading to fall. She remembers being able to carry herself over to her bed without difficulties. When the son came to pick her up he noticed the patient had left facial droop, left-upper ext weakness, and slurring of speech. He thought it might have been the xanax.  During this time she also developed acute onset of SOB and was therefore taken to [facility] on 12/12.     Currently, patient reports she feels "fine." She has no chest pain, shortness of breath, no nausea/vomiting, no noticeable weakness. She also has no paresthesias. She has no visual deficits or headaches. She does still have left facial droop but no noticeable dysphagia to solids yet. (13 Dec 2018 21:54)    She has been transferred to [facility] for management of her renal artery stenosis.      PAST MEDICAL & SURGICAL HISTORY:  Renal artery stenosis: possible on doppler  Basal cell carcinoma  Mitral valve insufficiency, unspecified etiology: Cardiac cath on 11/21/18  Edema, unspecified type  Cerebrovascular accident (CVA), unspecified mechanism  Hyponatremia: ON HCTZ , h/o Hypokelemia  Dyslipidemia  Essential hypertension  UTI (urinary tract infection)  H/O bilateral hip replacements  History of cholecystectomy  Status post hysterectomy  S/P Mohs surgery for basal cell carcinoma      SOCIAL HISTORY:  No tobacco, ethanol, or drug abuse.    FAMILY HISTORY:  Family history of carotid artery stenosis (Sibling)  Family history of uterine cancer (Sibling)    No family history of acute MI or sudden cardiac death.    MEDICATIONS  (STANDING):  aspirin  chewable 81 milliGRAM(s) Oral daily  atorvastatin 40 milliGRAM(s) Oral at bedtime  cloNIDine 0.2 milliGRAM(s) Oral every 8 hours  furosemide   Injectable 40 milliGRAM(s) IV Push daily  losartan 50 milliGRAM(s) Oral every 12 hours    MEDICATIONS  (PRN):      Allergies    Keflex (Unknown)  verapamil (Other)    Intolerances        REVIEW OF SYSTEMS:    CONSTITUTIONAL: No weakness, fevers or chills  EYES/ENT: No visual changes;  No vertigo or throat pain   NECK: No pain or stiffness  RESPIRATORY: No cough, wheezing, hemoptysis; + shortness of breath  CARDIOVASCULAR: No chest pain or palpitations  GASTROINTESTINAL: No abdominal pain. No nausea, vomiting, or hematemesis; No diarrhea or constipation. No melena or hematochezia.  GENITOURINARY: No dysuria, frequency or hematuria  NEUROLOGICAL: No numbness or weakness  SKIN: No itching or rash  All other review of systems is negative unless indicated above    VITAL SIGNS:   Vital Signs Last 24 Hrs  T(C): 36.4 (14 Dec 2018 04:11), Max: 36.9 (13 Dec 2018 11:25)  T(F): 97.6 (14 Dec 2018 04:11), Max: 98.4 (13 Dec 2018 11:25)  HR: 61 (14 Dec 2018 04:11) (55 - 78)  BP: 163/69 (14 Dec 2018 04:11) (121/61 - 191/87)  BP(mean): 101 (13 Dec 2018 17:36) (83 - 128)  RR: 18 (14 Dec 2018 04:11) (14 - 34)  SpO2: 99% (14 Dec 2018 04:11) (94% - 100%)    I&O's Summary    13 Dec 2018 07:01  -  14 Dec 2018 07:00  --------------------------------------------------------  IN: 0 mL / OUT: 500 mL / NET: -500 mL        On Exam:    Constitutional: NAD, alert and oriented x 3  Lungs:  Non-labored, breath sounds are clear bilaterally, No wheezing, rales or rhonchi  Cardiovascular: RRR.  S1 and S2 positive.  No murmurs, rubs, gallops or clicks  Gastrointestinal: Bowel Sounds present, soft, nontender.   Lymph: Trace peripheral edema. No cervical lymphadenopathy.  Neurological: Alert, no focal deficits  Skin: No rashes or ulcers   Psych:  Mood & affect appropriate.    LABS: All Labs Reviewed:                        10.4   5.89  )-----------( 309      ( 13 Dec 2018 05:52 )             31.3                         11.0   5.61  )-----------( 317      ( 12 Dec 2018 15:56 )             34.0     14 Dec 2018 06:28    142    |  104    |  31     ----------------------------<  116    3.6     |  25     |  1.15   13 Dec 2018 05:52    141    |  104    |  25     ----------------------------<  101    3.2     |  29     |  1.00   12 Dec 2018 15:56    142    |  108    |  27     ----------------------------<  137    4.0     |  25     |  0.98     Ca    9.7        14 Dec 2018 06:28  Ca    8.8        13 Dec 2018 05:52  Ca    9.1        12 Dec 2018 15:56  Phos  3.5       14 Dec 2018 06:28  Phos  3.4       13 Dec 2018 05:52  Mg     2.1       14 Dec 2018 06:28  Mg     1.5       13 Dec 2018 05:52    TPro  6.4    /  Alb  3.5    /  TBili  1.6    /  DBili  x      /  AST  19     /  ALT  26     /  AlkPhos  89     13 Dec 2018 05:52  TPro  6.9    /  Alb  3.7    /  TBili  1.0    /  DBili  x      /  AST  25     /  ALT  30     /  AlkPhos  102    12 Dec 2018 15:56    PT/INR - ( 12 Dec 2018 15:56 )   PT: 10.6 sec;   INR: 0.93 ratio         PTT - ( 12 Dec 2018 15:56 )  PTT:31.8 sec  CARDIAC MARKERS ( 13 Dec 2018 01:43 )  .028 ng/mL / x     / 55 U/L / x     / 1.7 ng/mL  CARDIAC MARKERS ( 12 Dec 2018 15:56 )  <.015 ng/mL / x     / x     / x     / 2.5 ng/mL      Blood Culture: Organism --  Gram Stain Blood -- Gram Stain --  Specimen Source .Blood Blood-Peripheral  Culture-Blood --    Organism --  Gram Stain Blood -- Gram Stain --  Specimen Source .Urine Clean Catch (Midstream)  Culture-Blood --            RADIOLOGY:    EKG: SR, LVH, non-specific ST abn

## 2018-12-14 NOTE — SWALLOW BEDSIDE ASSESSMENT ADULT - SLP GENERAL OBSERVATIONS
Patient encountered supine in bed, receiving 2L/m O2 via NC. Pt A&Ox3, able to make wants and needs known and follow directives for purpose of evaluation. Mildly imprecise articulation noted, overall intelligibility good. Language appears WNL in conversation.

## 2018-12-14 NOTE — CONSULT NOTE ADULT - SUBJECTIVE AND OBJECTIVE BOX
Vascular Cardiology  Consult [place]    CC:  HTN Emergeny    HPI:  87F w/ HTN, renal artery stenosis, hyponatremia on HCTZ,  chronic LE edema, mild to mod MR with grade 2 LV diastolic dysfunction by TTE (11/20/18), , dyslipidemia, CVA (no deficits ~13 yr ago), diastolic CHF stage II, presents as a transfer from [facility] for SOB and stroke.     Patient recently was admitted to [facility] from 11/19 to 11/21 for acute chest pain and pulmonary edema in setting of hypertensive emergency requiring ICU admission for nitro gtt.  /100 mm Hg in the ED, with  bpm and oxygen saturation by pulse oximetry 81% with supplemental oxygen. She was treated for acute pulmonary edema with IV furosemide (Lasix) with immediate improvement in BP and symptoms.    Complicating that hospitalization was new EKG changes w/ T-wave inversion in anterolateral leads. She underwent cardiac angiogram on 11/21/18 which showed no significant obstructive CAD. She also had TTE performed on 11/20/18 which showed grade II diastolic CHF and mild-moderate MV insufficiency.     In in interim patient saw a hypertensive specialist, Dr. Murguia. The patient's medication regiment after the visit losartan 50 mg twice daily, clonidine 0.2 mg three times daily, and labetalol 600 mg twice daily for BP control. Torsemide 5 mg po bid was added to her regimen, though she did not yet take it. She was also taking Xanax for anxiety related to her HTN.     More recently, she had another hospitalization on 12/12/18 for HTN emergency leading pulmonary edema and new CVA. Patient was transferred here for management of confirmed renal artery stenosis. Patient presented to [facility] during this time with fall and new facial droop. She reports that she was at home, she took xanax and had a fall. She cannot remember episode leading to fall. She remembers being able to carry herself over to her bed without difficulties. When the son came to pick her up he noticed the patient had left facial droop, left-upper ext weakness, and slurring of speech. He thought it might have been the xanax.  During this time she also developed acute onset of SOB and was therefore taken to [facility] on 12/12.     Currently, patient reports she feels "fine." She has no chest pain, shortness of breath, no nausea/vomiting, no noticeable weakness. She also has no paresthesias. She has no visual deficits or headaches. She does still have left facial droop but no noticeable dysphagia to solids yet.     Allergies    Keflex (Unknown)  verapamil (Other)    Intolerances    	    MEDICATIONS:  aspirin  chewable 81 milliGRAM(s) Oral daily  cloNIDine 0.2 milliGRAM(s) Oral every 8 hours  furosemide   Injectable 40 milliGRAM(s) IV Push daily  losartan 50 milliGRAM(s) Oral every 12 hours  atorvastatin 40 milliGRAM(s) Oral at bedtime        PAST MEDICAL & SURGICAL HISTORY:  Renal artery stenosis: possible on doppler  Basal cell carcinoma  Mitral valve insufficiency, unspecified etiology: Cardiac cath on 11/21/18  Edema, unspecified type  Cerebrovascular accident (CVA), unspecified mechanism  Hyponatremia: ON HCTZ , h/o Hypokelemia  Dyslipidemia  Essential hypertension  UTI (urinary tract infection)  H/O bilateral hip replacements  History of cholecystectomy  Status post hysterectomy  S/P Mohs surgery for basal cell carcinoma      FAMILY HISTORY:  Family history of carotid artery stenosis (Sibling)  Family history of uterine cancer (Sibling)      SOCIAL HISTORY:  unchanged    REVIEW OF SYSTEMS:  CONSTITUTIONAL: No fever, weight loss, or fatigue  EYES: No eye pain, visual disturbances, or discharge  ENMT:  No difficulty hearing, tinnitus, vertigo; No sinus or throat pain  NECK: No pain or stiffness  RESPIRATORY: No cough, wheezing, chills or hemoptysis; No Shortness of Breath    CARDIOVASCULAR: No chest pain, palpitations, passing out, dizziness, or leg swelling    GASTROINTESTINAL: No abdominal or epigastric pain. No nausea, vomiting, or hematemesis; No diarrhea or constipation. No melena or hematochezia.  GENITOURINARY: No dysuria, frequency, hematuria, or incontinence  NEUROLOGICAL: No headaches, memory loss, loss of strength, numbness, or tremors  SKIN: No itching, burning, rashes, or lesions   LYMPH Nodes: No enlarged glands  ENDOCRINE: No heat or cold intolerance; No hair loss  MUSCULOSKELETAL: No joint pain or swelling; No muscle, back, or extremity pain  PSYCHIATRIC: No depression, anxiety, mood swings, or difficulty sleeping  HEME/LYMPH: No easy bruising, or bleeding gums  ALLERY AND IMMUNOLOGIC: No hives or eczema	    [ x] All others negative	  [ ] Unable to obtain    PHYSICAL EXAM:  T(C): 36.4 (12-14-18 @ 04:11), Max: 36.9 (12-13-18 @ 11:25)  HR: 61 (12-14-18 @ 04:11) (55 - 78)  BP: 163/69 (12-14-18 @ 04:11) (121/61 - 191/87)  RR: 18 (12-14-18 @ 04:11) (14 - 34)  SpO2: 99% (12-14-18 @ 04:11) (94% - 100%)  Wt(kg): --  I&O's Summary    13 Dec 2018 07:01  -  14 Dec 2018 07:00  --------------------------------------------------------  IN: 0 mL / OUT: 500 mL / NET: -500 mL        Appearance: Normal	  HEENT:   Normal oral mucosa, PERRL, EOMI	  Carotid:   Right:  No Bruit      Left:  No bruit  Lymphatic: No lymphadenopathy  Cardiovascular: Normal S1 S2, No JVD, No murmurs  Respiratory: Lungs clear to auscultation	  Psychiatry: A & O x 3, Mood & affect appropriate  Gastrointestinal:  Soft, Non-tender, + BS	  Skin: No rashes, No ecchymoses, No cyanosis	  Neurologic: Non-focal  Extremities: Normal range of motion.    Vascular Pulse Exam:  Right Femoral:  Palpable       Left Femoral:  Palpable  Right Popliteal:  Palpable      Left Popliteal:  Palpable  Right DP:  Palpable                 Left DP:  Palpable  Right PT:  Palpable                 Left DP:  Palpable    Foot Exam:  Warm, no ulceration.        LABS:	 	    CBC Full  -  ( 13 Dec 2018 05:52 )  WBC Count : 5.89 K/uL  Hemoglobin : 10.4 g/dL  Hematocrit : 31.3 %  Platelet Count - Automated : 309 K/uL  Mean Cell Volume : 89.7 fl  Mean Cell Hemoglobin : 29.8 pg  Mean Cell Hemoglobin Concentration : 33.2 gm/dL  Auto Neutrophil # : x  Auto Lymphocyte # : x  Auto Monocyte # : x  Auto Eosinophil # : x  Auto Basophil # : x  Auto Neutrophil % : x  Auto Lymphocyte % : x  Auto Monocyte % : x  Auto Eosinophil % : x  Auto Basophil % : x    12-14    142  |  104  |  31<H>  ----------------------------<  116<H>  3.6   |  25  |  1.15  12-13    141  |  104  |  25<H>  ----------------------------<  101<H>  3.2<L>   |  29  |  1.00    Ca    9.7      14 Dec 2018 06:28  Ca    8.8      13 Dec 2018 05:52  Phos  3.5     12-14  Phos  3.4     12-13  Mg     2.1     12-14  Mg     1.5     12-13    TPro  6.4  /  Alb  3.5  /  TBili  1.6<H>  /  DBili  x   /  AST  19  /  ALT  26  /  AlkPhos  89  12-13  TPro  6.9  /  Alb  3.7  /  TBili  1.0  /  DBili  x   /  AST  25  /  ALT  30  /  AlkPhos  102  12-12      EXAM: US DPLX CAROTIDS COMPL BI       PROCEDURE DATE: 12/13/2018         INTERPRETATION:     REASON FOR EXAM: stroke     TECHNIQUE: Duplex scan of the carotid arteries was performed. Spectral   Doppler analysis, in addition to color flow Doppler was performed using   gray-scale and color imaging.     COMPARISON: None.     FINDINGS:     RIGHT CAROTID ARTERY:   There is slightly tortuous but normal visualized distal right common carotid   artery. There are scattered noncalcified and calcified plaque without   significant stenosis at the distal right common carotid artery and at the   origin of the right internal carotid artery.     Peak systolic flow velocity (PSV) of the right common carotid artery is 87   cm/sec, and the systolic flow velocity (PSV) of the right internal carotid   artery is 79 cm/sec. There are normal Doppler waveforms. The ICA/CCA ratio   is 0.9.     The right external carotid artery is patent.     LEFT CAROTID ARTERY:   There is slightly tortuous but patent visualized distal left common carotid   artery, without intimal thickening. There are scattered atherosclerotic   plaque at the carotid bulb and origin of the left common carotid artery   without hemodynamically significant stenosis.     Peak systolic flow velocity (PSV) of the left common carotid artery is 61   cm/, and peak systolic flow velocity (PSV) of the left internal carotid   artery is 60 cm/sec. The ICA/CCA ratio is 1. There is a normal wave form   with a broad systolic peak and well-maintained diastolic flow of the left   internal carotid artery.     The left external carotid artery is patent.     VERTEBRAL ARTERIES:   There is antegrade flow in the bilateral vertebral arteries.     IMPRESSION:   Scattered atheromatous plaque at the origin of the internal carotid arteries   bilaterally but no hemodynamically significant stenosis or obstructive   disease.       EXAM: MR BRAIN       PROCEDURE DATE: 12/13/2018         INTERPRETATION: CLINICAL STATEMENT: Left facial droop     TECHNIQUE: MRI of the brain was performed without gadolinium.     COMPARISON: CT head 12/12/2018     FINDINGS:   There is moderate diffuse parenchymal volume loss. There are T2 prolongation   signal abnormalities in the periventricular subcortical white matter likely   related to severe chronic microvascular ischemic changes.     Chronic infarct left parietal occipital region with ex vacuo dilatation of   adjacent left lateral ventricle.     1.1 cm meningioma noted in the right frontal parietal region.     There is no acute parenchymal hemorrhage, parenchymal mass, or midline   shift. There is no extra-axial fluid collection. There is no hydrocephalus.       6 x 3 mm acute infarct noted in the right centrum semiovale. 6 x 3 mm acute   infarct noted in the posterior limb right internal capsule.     Partial opacification of right mastoid air cells. Mucosal thickening   paranasal sinuses.     IMPRESSION:   Small acute infarcts in the right centrum semiovale and posterior limb of   right internal capsule as described above.     No acute intracranial hemorrhage       EXAM: CT ANGIO ABD PELV DEREK(W)AW IC       PROCEDURE DATE: 12/12/2018         INTERPRETATION: CTA of the Abdomen with contrast and CT of the Pelvis with   contrast     HISTORY: Uncontrolled hypertension. Assess for possible renal artery   stenosis.     TECHNIQUE: Multiple axial scans of the abdomen and pelvis were obtained   after administration of IV and bowel contrast material. Additional   sagittal, coronal and MIP angiographic images were then created from the   acquired axial data.     Interpretation: Liver: No focal lesions.     Biliary tree: Dilated within the liver and to a diameter of 11 mm at the   distal CBD level which may be physiologic considering the patient has   undergone cholecystectomy.     Solid organs: Bilateral renal cysts.     Retroperitoneum: Tight stenosis of the proximal right renal artery and mild   to moderate narrowing of the proximal left renal artery. 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 The common iliac arteries are ectatic measuring 15 mm in   diameter on the left and 14 mm in diameter on the right.     Bowel: No evidence of ascites, bowel obstruction or free air.     Appendix: Unremarkable     Urinary bladder: Obscured by metal artifact from patient's bilateral hip   replacements.     Pelvis: Shows no free fluid.     The inguinal regions are unremarkable.     The lung bases show small pleural effusions with atelectasis of the adjacent   lower lobes.       IMPRESSION: High-grade stenosis, proximal right renal artery. Enlarged   arteries as described.       The above study was reviewed by the offsite overnight image reading service   at the time of the examination.     Thank you for this referral. Vascular Cardiology Consult Note    CC:  HTN Emergeny    HPI:  87F w/ HTN, renal artery stenosis, hyponatremia on HCTZ,  chronic LE edema, mild to mod MR with grade 2 LV diastolic dysfunction by TTE (11/20/18), , dyslipidemia, CVA (no deficits ~13 yr ago), diastolic CHF stage II, presents as a transfer from [facility] for SOB and stroke.     Patient recently was admitted to [facility] from 11/19 to 11/21 for acute chest pain and pulmonary edema in setting of hypertensive emergency requiring ICU admission for nitro gtt.  /100 mm Hg in the ED, with  bpm and oxygen saturation by pulse oximetry 81% with supplemental oxygen. She was treated for acute pulmonary edema with IV furosemide (Lasix) with immediate improvement in BP and symptoms.    Complicating that hospitalization was new EKG changes w/ T-wave inversion in anterolateral leads. She underwent cardiac angiogram on 11/21/18 which showed no significant obstructive CAD. She also had TTE performed on 11/20/18 which showed grade II diastolic CHF and mild-moderate MV insufficiency.     In in interim patient saw a hypertensive specialist, Dr. Murguia. The patient's medication regiment after the visit losartan 50 mg twice daily, clonidine 0.2 mg three times daily, and labetalol 600 mg twice daily for BP control. Torsemide 5 mg po bid was added to her regimen, though she did not yet take it. She was also taking Xanax for anxiety related to her HTN.     More recently, she had another hospitalization on 12/12/18 for HTN emergency leading pulmonary edema and new CVA. Patient was transferred here for management of confirmed renal artery stenosis. Patient presented to [facility] during this time with fall and new facial droop. She reports that she was at home, she took xanax and had a fall. She cannot remember episode leading to fall. She remembers being able to carry herself over to her bed without difficulties. When the son came to pick her up he noticed the patient had left facial droop, left-upper ext weakness, and slurring of speech. He thought it might have been the xanax.  During this time she also developed acute onset of SOB and was therefore taken to [facility] on 12/12.     Currently, patient reports she feels "fine." She has no chest pain, shortness of breath, no nausea/vomiting, no noticeable weakness. She also has no paresthesias. She has no visual deficits or headaches. She does still have left facial droop but no noticeable dysphagia to solids yet.     Allergies    Keflex (Unknown)  verapamil (Other)    Intolerances    	    MEDICATIONS:  aspirin  chewable 81 milliGRAM(s) Oral daily  cloNIDine 0.2 milliGRAM(s) Oral every 8 hours  furosemide   Injectable 40 milliGRAM(s) IV Push daily  losartan 50 milliGRAM(s) Oral every 12 hours  atorvastatin 40 milliGRAM(s) Oral at bedtime        PAST MEDICAL & SURGICAL HISTORY:  Renal artery stenosis: possible on doppler  Basal cell carcinoma  Mitral valve insufficiency, unspecified etiology: Cardiac cath on 11/21/18  Edema, unspecified type  Cerebrovascular accident (CVA), unspecified mechanism  Hyponatremia: ON HCTZ , h/o Hypokelemia  Dyslipidemia  Essential hypertension  UTI (urinary tract infection)  H/O bilateral hip replacements  History of cholecystectomy  Status post hysterectomy  S/P Mohs surgery for basal cell carcinoma      FAMILY HISTORY:  Family history of carotid artery stenosis (Sibling)  Family history of uterine cancer (Sibling)      SOCIAL HISTORY:  unchanged    REVIEW OF SYSTEMS:  CONSTITUTIONAL: No fever, weight loss, or fatigue  EYES: No eye pain, visual disturbances, or discharge  ENMT:  No difficulty hearing, tinnitus, vertigo; No sinus or throat pain  NECK: No pain or stiffness  RESPIRATORY: No cough, wheezing, chills or hemoptysis; No Shortness of Breath    CARDIOVASCULAR: No chest pain, palpitations, passing out, dizziness, or leg swelling    GASTROINTESTINAL: No abdominal or epigastric pain. No nausea, vomiting, or hematemesis; No diarrhea or constipation. No melena or hematochezia.  GENITOURINARY: No dysuria, frequency, hematuria, or incontinence  NEUROLOGICAL: No headaches, memory loss, loss of strength, numbness, or tremors  SKIN: No itching, burning, rashes, or lesions   LYMPH Nodes: No enlarged glands  ENDOCRINE: No heat or cold intolerance; No hair loss  MUSCULOSKELETAL: No joint pain or swelling; No muscle, back, or extremity pain  PSYCHIATRIC: No depression, anxiety, mood swings, or difficulty sleeping  HEME/LYMPH: No easy bruising, or bleeding gums  ALLERY AND IMMUNOLOGIC: No hives or eczema	    [ x] All others negative	  [ ] Unable to obtain    PHYSICAL EXAM:  T(C): 36.4 (12-14-18 @ 04:11), Max: 36.9 (12-13-18 @ 11:25)  HR: 61 (12-14-18 @ 04:11) (55 - 78)  BP: 163/69 (12-14-18 @ 04:11) (121/61 - 191/87)  RR: 18 (12-14-18 @ 04:11) (14 - 34)  SpO2: 99% (12-14-18 @ 04:11) (94% - 100%)  Wt(kg): --  I&O's Summary    13 Dec 2018 07:01  -  14 Dec 2018 07:00  --------------------------------------------------------  IN: 0 mL / OUT: 500 mL / NET: -500 mL        Appearance: Normal	  HEENT:   Normal oral mucosa, PERRL, EOMI	  Carotid:   Right:  No Bruit      Left:  No bruit  Lymphatic: No lymphadenopathy  Cardiovascular: Normal S1 S2, No JVD, No murmurs  Respiratory: Lungs clear to auscultation	  Psychiatry: A & O x 3, Mood & affect appropriate  Gastrointestinal:  Soft, Non-tender, + BS	  Skin: No rashes, No ecchymoses, No cyanosis	  Neurologic: Non-focal  Extremities: Normal range of motion.    Vascular Pulse Exam:  Right Femoral:  Palpable       Left Femoral:  Palpable  Right Popliteal:  Palpable      Left Popliteal:  Palpable  Right DP:  Palpable                 Left DP:  Palpable  Right PT:  Palpable                 Left DP:  Palpable    Foot Exam:  Warm, no ulceration.        LABS:	 	    CBC Full  -  ( 13 Dec 2018 05:52 )  WBC Count : 5.89 K/uL  Hemoglobin : 10.4 g/dL  Hematocrit : 31.3 %  Platelet Count - Automated : 309 K/uL  Mean Cell Volume : 89.7 fl  Mean Cell Hemoglobin : 29.8 pg  Mean Cell Hemoglobin Concentration : 33.2 gm/dL  Auto Neutrophil # : x  Auto Lymphocyte # : x  Auto Monocyte # : x  Auto Eosinophil # : x  Auto Basophil # : x  Auto Neutrophil % : x  Auto Lymphocyte % : x  Auto Monocyte % : x  Auto Eosinophil % : x  Auto Basophil % : x    12-14    142  |  104  |  31<H>  ----------------------------<  116<H>  3.6   |  25  |  1.15  12-13    141  |  104  |  25<H>  ----------------------------<  101<H>  3.2<L>   |  29  |  1.00    Ca    9.7      14 Dec 2018 06:28  Ca    8.8      13 Dec 2018 05:52  Phos  3.5     12-14  Phos  3.4     12-13  Mg     2.1     12-14  Mg     1.5     12-13    TPro  6.4  /  Alb  3.5  /  TBili  1.6<H>  /  DBili  x   /  AST  19  /  ALT  26  /  AlkPhos  89  12-13  TPro  6.9  /  Alb  3.7  /  TBili  1.0  /  DBili  x   /  AST  25  /  ALT  30  /  AlkPhos  102  12-12      EXAM: US DPLX CAROTIDS COMPL BI       PROCEDURE DATE: 12/13/2018         INTERPRETATION:     REASON FOR EXAM: stroke     TECHNIQUE: Duplex scan of the carotid arteries was performed. Spectral   Doppler analysis, in addition to color flow Doppler was performed using   gray-scale and color imaging.     COMPARISON: None.     FINDINGS:     RIGHT CAROTID ARTERY:   There is slightly tortuous but normal visualized distal right common carotid   artery. There are scattered noncalcified and calcified plaque without   significant stenosis at the distal right common carotid artery and at the   origin of the right internal carotid artery.     Peak systolic flow velocity (PSV) of the right common carotid artery is 87   cm/sec, and the systolic flow velocity (PSV) of the right internal carotid   artery is 79 cm/sec. There are normal Doppler waveforms. The ICA/CCA ratio   is 0.9.     The right external carotid artery is patent.     LEFT CAROTID ARTERY:   There is slightly tortuous but patent visualized distal left common carotid   artery, without intimal thickening. There are scattered atherosclerotic   plaque at the carotid bulb and origin of the left common carotid artery   without hemodynamically significant stenosis.     Peak systolic flow velocity (PSV) of the left common carotid artery is 61   cm/, and peak systolic flow velocity (PSV) of the left internal carotid   artery is 60 cm/sec. The ICA/CCA ratio is 1. There is a normal wave form   with a broad systolic peak and well-maintained diastolic flow of the left   internal carotid artery.     The left external carotid artery is patent.     VERTEBRAL ARTERIES:   There is antegrade flow in the bilateral vertebral arteries.     IMPRESSION:   Scattered atheromatous plaque at the origin of the internal carotid arteries   bilaterally but no hemodynamically significant stenosis or obstructive   disease.       EXAM: MR BRAIN       PROCEDURE DATE: 12/13/2018         INTERPRETATION: CLINICAL STATEMENT: Left facial droop     TECHNIQUE: MRI of the brain was performed without gadolinium.     COMPARISON: CT head 12/12/2018     FINDINGS:   There is moderate diffuse parenchymal volume loss. There are T2 prolongation   signal abnormalities in the periventricular subcortical white matter likely   related to severe chronic microvascular ischemic changes.     Chronic infarct left parietal occipital region with ex vacuo dilatation of   adjacent left lateral ventricle.     1.1 cm meningioma noted in the right frontal parietal region.     There is no acute parenchymal hemorrhage, parenchymal mass, or midline   shift. There is no extra-axial fluid collection. There is no hydrocephalus.       6 x 3 mm acute infarct noted in the right centrum semiovale. 6 x 3 mm acute   infarct noted in the posterior limb right internal capsule.     Partial opacification of right mastoid air cells. Mucosal thickening   paranasal sinuses.     IMPRESSION:   Small acute infarcts in the right centrum semiovale and posterior limb of   right internal capsule as described above.     No acute intracranial hemorrhage       EXAM: CT ANGIO ABD PELV DEREK(W)AW IC       PROCEDURE DATE: 12/12/2018         INTERPRETATION: CTA of the Abdomen with contrast and CT of the Pelvis with   contrast     HISTORY: Uncontrolled hypertension. Assess for possible renal artery   stenosis.     TECHNIQUE: Multiple axial scans of the abdomen and pelvis were obtained   after administration of IV and bowel contrast material. Additional   sagittal, coronal and MIP angiographic images were then created from the   acquired axial data.     Interpretation: Liver: No focal lesions.     Biliary tree: Dilated within the liver and to a diameter of 11 mm at the   distal CBD level which may be physiologic considering the patient has   undergone cholecystectomy.     Solid organs: Bilateral renal cysts.     Retroperitoneum: Tight stenosis of the proximal right renal artery and mild   to moderate narrowing of the proximal left renal artery. 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 The common iliac arteries are ectatic measuring 15 mm in   diameter on the left and 14 mm in diameter on the right.     Bowel: No evidence of ascites, bowel obstruction or free air.     Appendix: Unremarkable     Urinary bladder: Obscured by metal artifact from patient's bilateral hip   replacements.     Pelvis: Shows no free fluid.     The inguinal regions are unremarkable.     The lung bases show small pleural effusions with atelectasis of the adjacent   lower lobes.       IMPRESSION: High-grade stenosis, proximal right renal artery. Enlarged   arteries as described.       The above study was reviewed by the offsite overnight image reading service   at the time of the examination.     Thank you for this referral.

## 2018-12-14 NOTE — PROGRESS NOTE ADULT - ASSESSMENT
86 yo PMHx recurrent UTIs, dyslipidemia, CVA, ?hx of renal artery stenosis (per chart review, no confirmatory imaging available), malignant HTN (on impressive anti-HTN regimen at home with difficulty controlling HTN) and MV insufficiency (echo Nov 2018 60-65% w/ Grade 2 diastolic dysfunction) who presents with shortness of breath, CXR with vascular congestion c/w hypertensive emergency (SOB and chest pain), r/o HTN emergency complications including neurologic (ischemic, hemorrhagic stroke), cardiac (ACS, dissection)

## 2018-12-14 NOTE — SWALLOW BEDSIDE ASSESSMENT ADULT - SWALLOW EVAL: PROGNOSIS
Hx contd: Seen by Neuro, on exam Mental status: Awake, alert, and in no apparent distress. Oriented to person, place and time. Language function is normal. Recent memory, digit span and concentration were normal. dysarthria. Cranial Nerves: Facial sensation was intact to light touch. There was left umn facial. The palate was upgoing symmetrically and tongue was midline. Hearing acuity was intact to finger rub AU. Shoulder shrug was full bilaterally. MRI brain with acute CR and internal capsule infarcts as two separate infarcts.  Though separate, favor thrombotic etiology with intracranial MCA stenosis with issues with labile BP, and less so cardioembolic. Plan: continue asa and Lipitor, ? restart plavix. Family provides as home med. Slowly lower blood pressure as has risk of stroke worsening. Speech/swallow eval.

## 2018-12-14 NOTE — SWALLOW BEDSIDE ASSESSMENT ADULT - SWALLOW EVAL: DIAGNOSIS
Patient presents with 1) oral and suspected pharyngeal dysphagia characterized by reduced anterior-posterior transport, delayed oral transit with chewables, delayed pharyngeal swallow trigger, reduced hyolaryngeal elevation upon palpation, repeat swallows suggestive of pharyngeal retention, and s/s of laryngeal penetration and/or aspiration with honey thickened liquids. Volume/rate reduction via teaspoon eliminates s/s of laryngeal penetration and/or aspiration. 2) Dysarthria characterized by imprecise articulation.

## 2018-12-14 NOTE — CHART NOTE - NSCHARTNOTEFT_GEN_A_CORE
RRT called for altered mental status.   Upon arrival, patient was found to be lethargic. On tele, patient was found to have bradycardia to 40 prior to event. Vitals: afebrile, HR 68 BP 88/60 RR 20 SpO2 100 on NRB. Patient was given 250cc bolus. HR improved to 80s BP improved to 160/70. Patient was mentating at baseline. NRB was removed and she had adequate oxygenation.     Earlier in the day, home BP medications were being resumed, labetalol was given at 5PM. At this time, recommend holding labetalol. Will obtain repeat CT Head in setting of known CVA and new acute change in mental status which is now resolved. Case discussed with primary team, Attending aware.     Jessenia Pérez PGY3  MAR [id number]

## 2018-12-14 NOTE — SWALLOW BEDSIDE ASSESSMENT ADULT - SLP PERTINENT HISTORY OF CURRENT PROBLEM
88 yo F w/ HTN, renal artery stenosis, MV insufficiency, dyslipidemia, CVA (no deficits ~13 yr ago), diastolic CHF stage II, presents as a transfer from [facility] for SOB and stroke. Pt recently was admitted to [facility] from 11/19 to 11/21 for acute pulmonary edema in setting of hypertensive emergency requiring ICU admission for nitro gtt and respiratory failure requiring NIPPV. Complicating that hospitalization was new EKG changes w/ T-wave inversion in anterolateral leads. She underwent cardiac angiogram on 11/21/18 which showed no significant obstructive CAD. She also had TTE performed on 11/20/18 which showed grade II diastolic CHF and mild-moderate MV insufficiency. Following that hospitalization, she had another hospitalization on 12/12/18 for HTN emergency leading pulmonary edema and new CVA. Pt was transferred here for management of confirmed renal artery stenosis. Pt presented to [facility] during this time with fall and new facial droop.

## 2018-12-14 NOTE — SWALLOW BEDSIDE ASSESSMENT ADULT - SWALLOW EVAL: PATIENT/FAMILY GOALS STATEMENT
Patient and daughter endorse h/o CVA 13 yrs ago, after which pt "was talking in complete gibberish and had trouble reading." Reports full and spontaneous recovery in aphasia, dysarthria and ? dysgraphia, denies h/o dysphagia. Denies h/o recent PNA, denies h/o GERD. Per pt and daughter pt's speech remains dysarthric at this time s/p onset ~3 days ago, however is improved from onset. Pt's daughter reports pt has been NPO for >72 hours since presentation to OSH, reportedly unable to receive IVF 2/2 CHF per daughter. Of note pt reports feelings of retention with large pill given in applesauce earlier this date, pointing to suprasternal notch.

## 2018-12-14 NOTE — PROGRESS NOTE ADULT - SUBJECTIVE AND OBJECTIVE BOX
SUBJECTIVE / OVERNIGHT EVENTS: pt denies chest pain, shortness of breath, nausea, vomiting  headache       MEDICATIONS  (STANDING):  aspirin  chewable 81 milliGRAM(s) Oral daily  atorvastatin 40 milliGRAM(s) Oral at bedtime  cloNIDine 0.2 milliGRAM(s) Oral every 8 hours  furosemide   Injectable 40 milliGRAM(s) IV Push daily  losartan 50 milliGRAM(s) Oral every 12 hours    MEDICATIONS  (PRN):  Vital Signs Last 24 Hrs  T(C): 36.8 (14 Dec 2018 11:50), Max: 36.8 (14 Dec 2018 11:50)  T(F): 98.3 (14 Dec 2018 11:50), Max: 98.3 (14 Dec 2018 11:50)  HR: 88 (14 Dec 2018 19:35) (61 - 95)  BP: 118/60 (14 Dec 2018 19:35) (118/60 - 202/87)  BP(mean): --  RR: 18 (14 Dec 2018 19:35) (17 - 18)  SpO2: 98% (14 Dec 2018 19:35) (96% - 100%)      CAPILLARY BLOOD GLUCOSE      POCT Blood Glucose.: 207 mg/dL (14 Dec 2018 19:41)    I&O's Summary    13 Dec 2018 07:01  -  14 Dec 2018 07:00  --------------------------------------------------------  IN: 0 mL / OUT: 500 mL / NET: -500 mL    14 Dec 2018 07:01  -  14 Dec 2018 19:52  --------------------------------------------------------  IN: 0 mL / OUT: 800 mL / NET: -800 mL        Constitutional: No fever, fatigue  Skin: No rash.  Eyes: No recent vision problems or eye pain.  ENT: No congestion, ear pain, or sore throat.  Cardiovascular: No chest pain or palpation.  Respiratory: No cough, shortness of breath, congestion, or wheezing.  Gastrointestinal: No abdominal pain, nausea, vomiting, or diarrhea.  Genitourinary: No dysuria.  Musculoskeletal: No joint swelling.  Neurologic: No headache.    PHYSICAL EXAM:  GENERAL: NAD  EYES: EOMI, PERRLA  NECK: Supple, No JVD  CHEST/LUNG: dec breath sounds at base  HEART:  S1 , S2 +  ABDOMEN: soft, bs+  EXTREMITIES:  no edema  NEUROLOGY:alert awake oriented to place, person       LABS:                        10.8   5.01  )-----------( 291      ( 14 Dec 2018 07:27 )             33.5     12-14    142  |  104  |  31<H>  ----------------------------<  116<H>  3.6   |  25  |  1.15    Ca    9.7      14 Dec 2018 06:28  Phos  3.5     12-14  Mg     2.1     12-14    TPro  6.4  /  Alb  3.5  /  TBili  1.6<H>  /  DBili  x   /  AST  19  /  ALT  26  /  AlkPhos  89  12-13      CARDIAC MARKERS ( 13 Dec 2018 01:43 )  .028 ng/mL / x     / 55 U/L / x     / 1.7 ng/mL          RADIOLOGY & ADDITIONAL TESTS:    Imaging Personally Reviewed:    Consultant(s) Notes Reviewed:      Care Discussed with Consultants/Other Providers:

## 2018-12-14 NOTE — CONSULT NOTE ADULT - ASSESSMENT
This is a 87y Female, consult for CVA, found several days ago with left sided weakness and slurred speech, later more of a facial.  MRI brain with acute CR and internal capsule infarcts as two separate infarcts.  Though separate, favor thrombotic etiology with intracranial MCA stenosis with issues with labile BP, and less so cardioembolic.    Plan:  - continue asa and lipitor  - ? restart plavix.  family provides as home med  - management renal artery stenosis  - slowly lower blood pressure as has risk of stroke worsening.  goal -180  - tele rule out afib  - PT  - speech/swallow eval  - neuro checks  - stroke education  - DVT prophylaxis

## 2018-12-14 NOTE — PROVIDER CONTACT NOTE (CHANGE IN STATUS NOTIFICATION) - ASSESSMENT
Pt Alert and oriented x 1. Pt. symptomatic with pronounced Left Sided facial droop. Pronounced slurred speech. Pt. Bradycardic with HR in 40s. Pt. in Distress. with BPs sub 80 SBP. RRT called at bedside See RRT form for all medications and boluses.

## 2018-12-14 NOTE — CHART NOTE - NSCHARTNOTEFT_GEN_A_CORE
CC: Hypotension & Lethargy      HPI:  RRT initiated earlier this evening for hypotension & lethargy.   Pt seen & examined at bedside @ 20:50, post RRT evaluation, and IVF bolus, with noted appropriate response of BP.  At bedside, pt remained hemodynamically stable, alert & oriented X3., conversive in fluent speech, following commands, moving all 4 extremities.   Patient denied headache or visual disturbance, dizziness, CP, SOB, abdominal  pain, N/V, weakness of extremities or paresthesias.    Pt noted to have mild Lt. facial droop - not a new finding  EKG done at bedside NSR @ 72 bpm      ROS:  CONSTITUTIONAL:  No fever, chills, rigors  CARDIOVASCULAR:  No chest pain or palpitations  RESPIRATORY:   No SOB, cough  GASTROINTESTINAL:  No abd pain, N/V, diarrhea/constipation  EXTREMITIES:  No swelling or joint pain  GENITOURINARY:  No burning on urination, increased frequency or urgency.  No flank pain  NEUROLOGIC:  No HA, visual disturbances  SKIN: No rashes      PAST MEDICAL & SURGICAL HISTORY:  Renal artery stenosis: possible on doppler  Basal cell carcinoma  Mitral valve insufficiency, unspecified etiology: Cardiac cath on 11/21/18  Edema, unspecified type  Cerebrovascular accident (CVA), unspecified mechanism  Hyponatremia: ON HCTZ , h/o Hypokelemia  Dyslipidemia  Essential hypertension  UTI (urinary tract infection)  H/O bilateral hip replacements  History of cholecystectomy  Status post hysterectomy  S/P Mohs surgery for basal cell carcinoma      Vital Signs Last 24 Hrs  T(C): 36.7 (14 Dec 2018 20:41), Max: 36.8 (14 Dec 2018 11:50)  T(F): 98 (14 Dec 2018 20:41), Max: 98.3 (14 Dec 2018 11:50)  HR: 87 (14 Dec 2018 22:46) (61 - 95)  BP: 175/95 (14 Dec 2018 22:46) (118/60 - 202/87)  BP(mean): --  RR: 18 (14 Dec 2018 20:41) (18 - 18)  SpO2: 94% (14 Dec 2018 20:41) (94% - 100%)      Physical Exam:  General: WN/WD NAD, AOx3, nontoxic appearing;   Head:  NC/AT; Eyes: PERRLA, EOMI; Lt facial droop noted  CV: RRR, S1S2   Respiratory: CTA B/L, nonlabored  Abdominal: (+) bowel sounds x4. Soft, non tender, non distended  MSK: 1+  BLLE edema, + peripheral pulses, FROM all 4 extremity                Strength equal all 4 extremities; no paresthesias  Skin: (+) warm, dry   Psych: Appropriate affect       Labs:                        10.8   5.01  )-----------( 291      ( 14 Dec 2018 07:27 )             33.5     12-14    142  |  104  |  31<H>  ----------------------------<  116<H>  3.6   |  25  |  1.15    Ca    9.7      14 Dec 2018 06:28  Phos  3.5     12-14  Mg     2.1     12-14    TPro  6.4  /  Alb  3.5  /  TBili  1.6<H>  /  DBili  x   /  AST  19  /  ALT  26  /  AlkPhos  89  12-13      Urinalysis Basic - ( 12-12 @ 16:18 )    Color: Negative / Appearance: Negative / SG: -- / pH: Negative  Gluc: Negative / Ketone: Yellow  / Bili: -- / Urobili: --   Blood: Occasional / Protein: -- / Nitrite: Negative   Leuk Esterase: Negative / RBC: 1.010 / WBC --   Sq Epi: 75 / Non Sq Epi: Negative / Bacteria: 5.0            Radiology:          Assessment & Plan:  HPI:  86 yo F w/ HTN, renal artery stenosis, MV insufficiency, dyslipidemia, CVA (no deficits ~13 yr ago), diastolic CHF stage II, presents as a transfer from [facility] for SOB and stroke.   Currently, patient reports she feels "fine." She has no chest pain, shortness of breath, no nausea/vomiting, no noticeable weakness. She also has no paresthesias. She has no visual deficits or headaches. She does still have left facial droop but no noticeable dysphagia to solids yet. (13 Dec 2018 21:54)    Pt evaluated post RRT for hypotension & lethargy & sinus bradycardia to 40's.   Pt currently remains hemodynamically stable, with no new focal deficits & return to baseline neurological status.    PLAN:  -Urgent CT scan of the brain non-contrast to assess for new abnormalities                     -Completed, results pending  -Neuro checks q 4 hrs.  -Hold further anti-hypertensives at this time  -Primary Team to follow up in AM, attending to follow       Yvette Juarez PA-C  Dept of Medicine

## 2018-12-14 NOTE — SWALLOW BEDSIDE ASSESSMENT ADULT - ASR SWALLOW ASPIRATION MONITOR
Monitor for s/s aspiration/laryngeal penetration. If noted:  D/C p.o. intake, provide non-oral nutrition/hydration/meds, and contact this service @ [phone]/upper respiratory infection/change of breathing pattern/cough/throat clearing/fever/pneumonia/gurgly voice

## 2018-12-14 NOTE — PHYSICAL THERAPY INITIAL EVALUATION ADULT - PLANNED THERAPY INTERVENTIONS, PT EVAL
gait training/stair training; GOAL: Pt will negotiate up & down 4 stairs independently with unilateral HR & least restrictive AD, in 2 weeks./balance training/bed mobility training/transfer training

## 2018-12-14 NOTE — CONSULT NOTE ADULT - ASSESSMENT
Mrs. Mueller is an 88 yo F with PMH of malignant HTN, dyslipidemia, CVA, hyponatremia, recurrent UTI with recent admission in 11/2018 for HTN emergency and ADHF with dynamic EKG changes.  Coronary angiography done 11/21/18 showed no CAD but severe MR.  TTE done 11/20/18 noted low normal LV systolic function, septal wall hypokinesis, grade 2 diastolic dysfunction, mild to moderate mitral regurgitation with mildly enlarged LA.   She presented on 12/12 with acute shortness of breath in the setting of significant hypertension.   She has been found to have critical proximal right renal artery stenosis.    - Continue Lasix 40 IV daily. Please continue to maintain strict I/Os, monitor daily weights, Cr, and K. Watch her Cr carefullsy  - Bipap and oxygen supplementation as necessary  - Vascular evaluation appreciated. Plan for renal angiogram next Tuesday.  - Continue her home medications of losartan 50 mg twice daily and clonidine 0.2 mg three times daily  - If BP remains >170, please add back labetalol 600 mg po bid  - Neurology evaluation for MRI findings of small acute infarcts  - c/w ASA and statin  - Will follow with you.

## 2018-12-14 NOTE — PROVIDER CONTACT NOTE (CHANGE IN STATUS NOTIFICATION) - SITUATION
Pt. with Hx CVA- BPs elevated. Given losartan 50 mg for /88. Pt. BP increased to 202/87- NP made aware 600 mg of Labetalol ordered. Pt. BP dropped to 112/60 Pt. Symptomatic- Code Stroke Called.

## 2018-12-14 NOTE — CHART NOTE - NSCHARTNOTEFT_GEN_A_CORE
MARU MOYA  87y Female  Patient is a 87y old  Female who presents with a chief complaint of stroke  & SOB (14 Dec 2018 12:23)     Event Summary:  Notified by RN pt's /87. Review of flow sheet vitals, noting with -200s today, goal SBP to remain >150 secondary to small infarcts/CVA. As per Cards note earlier, if BP remains >170, recommends adding back labetalol 600 mg po bid. Pt reintroduced with Labetolol PO x 1 dose @1730 due to elevated BP.  -Notified by RN @ 1930, pt's SBP dropped to 104. Pt currently asymptomatic at this time. Ordered for NS 250cc bolus x 1 stat, with plans to keep BP >150 for perfusion, and will continue to order small boluses until goal SBP met. Nursing staff to monitor for signs of fluid overload while serial monitoring of BP. Will continue to monitor.      Nathaniel Saintus [facility]  #[phone] MARU MOYA  87y Female  Patient is a 87y old  Female who presents with a chief complaint of stroke  & SOB (14 Dec 2018 12:23)     Event Summary:  Notified by RN pt's /87. Review of flow sheet vitals, noting trend of -200s today, goal SBP to remain >150 secondary to small infarcts/CVA. As per Cards note earlier, if BP remains >170, recommends adding back labetalol 600 mg po bid. Pt reintroduced with Labetolol PO x 1 dose @1730 due to elevated BP.  -Notified by RN @ 1930, pt's SBP dropped to 104. Pt currently asymptomatic at this time. Ordered for NS 250cc bolus x 1 stat, with plans to keep BP >150 for perfusion, and will continue to order small boluses until goal SBP met. Nursing staff to monitor for signs of fluid overload while serial monitoring of BP. Will continue to monitor.      Nathaniel Saintus [facility]  #[phone]

## 2018-12-14 NOTE — PHYSICAL THERAPY INITIAL EVALUATION ADULT - PERTINENT HX OF CURRENT PROBLEM, REHAB EVAL
86 yo F w/ HTN, MV insufficiency, dyslipidemia, CVA (no deficits ~13 yr ago), diastolic CHF stage II, presents as a transfer from [facility] for acute pulmonary edema 2/2 to HTN emergency c/b new CVA and found to have bilateral renal artery stenosis. Pt found several days ago with left sided weakness and slurred speech.  MRI brain with acute CR and internal capsule infarcts as two separate infarcts.

## 2018-12-14 NOTE — SWALLOW BEDSIDE ASSESSMENT ADULT - COMMENTS
Hx contd: She reports that she was at home, she took xanax and had a fall. She cannot remember episode leading to fall. She remembers being able to carry herself over to her bed without difficulties. When the son came to pick her up he noticed the pt had left facial droop, LUE weakness, and slurring of speech. He thought it might have been the xanax. During this time she also developed acute onset of SOB and was therefore taken to  hospital on 12/12.     Currently, patient reports she feels "fine." She has no chest pain, shortness of breath, no nausea/vomiting, no noticeable weakness. She also has no paresthesias. She has no visual deficits or headaches. She does still have L facial droop but no noticeable dysphagia to solids yet. Dx: Renal artery stenosis, CVA. Pt with new stroke based on MRA/MRH today. NIHSS score 2 on d/c from  hosptial ; no tPA given. HC: 12/13 MRI Head: Small acute infarcts in the right centrum semiovale and posterior limb of right internal capsule as described above. No acute intracranial hemorrhage. 12/14 Seen by [facility] Cards, re HTN Emergency - likely 2/2 proximal R renal artery stenosis and mild to moderate narrowing of the proximal left renal artery. Blood pressures currently acceptable with an isolated systolic hypertension. Cards following.   *contd below

## 2018-12-14 NOTE — PHYSICAL THERAPY INITIAL EVALUATION ADULT - CRITERIA FOR SKILLED THERAPEUTIC INTERVENTIONS
therapy frequency/anticipated discharge recommendation/impairments found/risk reduction/prevention/rehab potential/anticipated equipment needs at discharge/functional limitations in following categories/predicted duration of therapy intervention

## 2018-12-14 NOTE — SWALLOW BEDSIDE ASSESSMENT ADULT - NS SPL SWALLOW CLINIC TRIAL FT
Pt noted mildly hyperextending neck upon deglutition, states she is "unsure exactly why, the body just wants to go that way." ? behavioral component to aid in oral transit verse s/s of pharyngeal retention vs other.

## 2018-12-14 NOTE — SWALLOW BEDSIDE ASSESSMENT ADULT - SWALLOW EVAL: RECOMMENDED FEEDING/EATING TECHNIQUES
allow for swallow between intakes/small sips/bites/alternate food with liquid/position upright (90 degrees)/crush medication (when feasible)/maintain upright posture during/after eating for 30 mins/no straws

## 2018-12-14 NOTE — CONSULT NOTE ADULT - ASSESSMENT
Assessment:  1. HTN Emergency - likely secondary to proximal right renal artery stenosis and mild   to moderate narrowing of the proximal left renal artery. 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 The common iliac arteries are ectatic measuring 15 mm in   diameter on the left and 14 mm in diameter on the right.            Plan:  1.          Thank you      Vascular Cardiology Service    Please call with any questions:   SPECTRA - [id number]  Office [phone]  email:  [email or web address] Assessment:  1. HTN Emergency - likely secondary to proximal right renal artery stenosis and mild to moderate narrowing of the proximal left renal artery. Blood pressures currently acceptable with an isolated systolic hypertension. Despite bilateral disease she is tolerating Losartan.   2. Pleural effusions  2. Distal/hilar splenic artery 11mm   3. 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 CVA     Plan:  1. Continue anti-hypertensive medications, aspirin and statin.  2. Tentatively schedule for BOB intervention on Tuesday with Dr. Ríos.           Thank you  Narinder Russo MD  Cardiology Fellow  Vascular Cardiology Service    Please call with any questions:   SPECTRA - [phone]  Office [phone]  email:  [email or web address] Assessment:  1. HTN Emergency - likely secondary to proximal right renal artery stenosis and mild to moderate narrowing of the proximal left renal artery. Blood pressures currently acceptable with an isolated systolic hypertension. Despite bilateral disease she is tolerating Losartan.   2. Pleural effusions  2. Distal/hilar splenic artery 11mm   3. 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 CVA     Plan:  1. Continue anti-hypertensive medications, aspirin and statin.  2. Please order renal artery duplex study.  3. Will discuss case and interventional indications with Dr. Ríos.      Thank you  Narinder Russo MD  Cardiology Fellow  Vascular Cardiology Service    Please call with any questions:   SPECTRA - [id number]  Office [phone]  email:  [email or web address]

## 2018-12-14 NOTE — CHART NOTE - NSCHARTNOTEFT_GEN_A_CORE
MARU MOYA  87y Female  Patient is a 87y old  Female who presents with a chief complaint of stroke  & SOB (14 Dec 2018 12:23)     Event Summary:  RRT initiated by RN, for hypotension SBP 70s, HR 40s, and lethargy. Started on NS 250cc bolus, Rapid Response team at bedside, with pt's mentation, BP & HR improving.  Clarified with daughter, pt takes Labetolol 300mg PO BID, will hold off any further doses. Pt remains clinically stable at this time.   Stroke team arrived to bedside, at this time no acute intervention, will repeat Urgent CTH, neuro checks Q4, pacer pads at bedside. Dr. Costa aware, will continue to monitor.          Nathaniel Saintus [facility]  #[phone]

## 2018-12-14 NOTE — SWALLOW BEDSIDE ASSESSMENT ADULT - PHARYNGEAL PHASE
repeat swallow x1 with all thin puree trials, 1-2 repeat swallows with thick puree/Decreased laryngeal elevation/Delayed pharyngeal swallow throat clear post intake via cup, eliminated via tsp/Delayed pharyngeal swallow/Decreased laryngeal elevation Delayed pharyngeal swallow/Decreased laryngeal elevation

## 2018-12-14 NOTE — PROVIDER CONTACT NOTE (CHANGE IN STATUS NOTIFICATION) - BACKGROUND
Pt. admitted with hyperpatathryroidism renal insufficiency- Hx CVA. Pt. SBP Goal 150-180. Pt. with elevated BPs ranging 185-202 SBP. Pt. given PO BP medication for control. Pt. symptomatic CVA.

## 2018-12-14 NOTE — PHYSICAL THERAPY INITIAL EVALUATION ADULT - BALANCE TRAINING, PT EVAL
GOAL: Pt will demonstrate improved static/dynamic standing balance by 1/2 balance grade, in order to improve stability, decrease fall risk and increase independence with transfers and ambulation within 2 weeks.

## 2018-12-15 LAB
ANION GAP SERPL CALC-SCNC: 15 MMOL/L — SIGNIFICANT CHANGE UP (ref 5–17)
BUN SERPL-MCNC: 32 MG/DL — HIGH (ref 7–23)
CALCIUM SERPL-MCNC: 9.4 MG/DL — SIGNIFICANT CHANGE UP (ref 8.4–10.5)
CHLORIDE SERPL-SCNC: 103 MMOL/L — SIGNIFICANT CHANGE UP (ref 96–108)
CO2 SERPL-SCNC: 24 MMOL/L — SIGNIFICANT CHANGE UP (ref 22–31)
CREAT SERPL-MCNC: 1.22 MG/DL — SIGNIFICANT CHANGE UP (ref 0.5–1.3)
GLUCOSE SERPL-MCNC: 117 MG/DL — HIGH (ref 70–99)
HCT VFR BLD CALC: 33.9 % — LOW (ref 34.5–45)
HGB BLD-MCNC: 10.9 G/DL — LOW (ref 11.5–15.5)
MCHC RBC-ENTMCNC: 29.8 PG — SIGNIFICANT CHANGE UP (ref 27–34)
MCHC RBC-ENTMCNC: 32.2 GM/DL — SIGNIFICANT CHANGE UP (ref 32–36)
MCV RBC AUTO: 92.6 FL — SIGNIFICANT CHANGE UP (ref 80–100)
PLATELET # BLD AUTO: 311 K/UL — SIGNIFICANT CHANGE UP (ref 150–400)
POTASSIUM SERPL-MCNC: 3.5 MMOL/L — SIGNIFICANT CHANGE UP (ref 3.5–5.3)
POTASSIUM SERPL-SCNC: 3.5 MMOL/L — SIGNIFICANT CHANGE UP (ref 3.5–5.3)
RBC # BLD: 3.66 M/UL — LOW (ref 3.8–5.2)
RBC # FLD: 13.3 % — SIGNIFICANT CHANGE UP (ref 10.3–14.5)
SODIUM SERPL-SCNC: 142 MMOL/L — SIGNIFICANT CHANGE UP (ref 135–145)
WBC # BLD: 5.17 K/UL — SIGNIFICANT CHANGE UP (ref 3.8–10.5)
WBC # FLD AUTO: 5.17 K/UL — SIGNIFICANT CHANGE UP (ref 3.8–10.5)

## 2018-12-15 PROCEDURE — 74230 X-RAY XM SWLNG FUNCJ C+: CPT | Mod: 26

## 2018-12-15 PROCEDURE — 99233 SBSQ HOSP IP/OBS HIGH 50: CPT

## 2018-12-15 RX ORDER — SODIUM CHLORIDE 9 MG/ML
250 INJECTION INTRAMUSCULAR; INTRAVENOUS; SUBCUTANEOUS
Qty: 0 | Refills: 0 | Status: DISCONTINUED | OUTPATIENT
Start: 2018-12-15 | End: 2018-12-19

## 2018-12-15 RX ADMIN — ATORVASTATIN CALCIUM 40 MILLIGRAM(S): 80 TABLET, FILM COATED ORAL at 21:24

## 2018-12-15 RX ADMIN — Medication 40 MILLIGRAM(S): at 11:00

## 2018-12-15 RX ADMIN — Medication 0.1 MILLIGRAM(S): at 13:22

## 2018-12-15 RX ADMIN — SODIUM CHLORIDE 250 MILLILITER(S): 9 INJECTION INTRAMUSCULAR; INTRAVENOUS; SUBCUTANEOUS at 01:30

## 2018-12-15 RX ADMIN — Medication 81 MILLIGRAM(S): at 11:00

## 2018-12-15 RX ADMIN — Medication 0.1 MILLIGRAM(S): at 21:24

## 2018-12-15 RX ADMIN — LOSARTAN POTASSIUM 50 MILLIGRAM(S): 100 TABLET, FILM COATED ORAL at 17:01

## 2018-12-15 NOTE — SWALLOW VFSS/MBS ASSESSMENT ADULT - ORAL PHASE
mild-moderate oral cavity residue cleared with repeat swallow/Reduced anterior - posterior transport/Residue in oral cavity Reduced anterior - posterior transport/x2-3 spontaneous swallows to clear oral cavity/Residue in oral cavity Residue in oral cavity/mild-moderate oral cavity residue cleared with spontaneous repeat swallows x2/Reduced anterior - posterior transport Delayed oral transit time/Residue in oral cavity/moderate oral cavity residue clears with x2 spontaneous repeat swallows/Reduced anterior - posterior transport Reduced anterior - posterior transport/Incomplete tongue to palate contact/Residue in oral cavity/trace-mild spillover to the valleculae. mild oral  cavity residue cleared with spontaneous repeat swallows x2/Uncontrolled bolus / spillover in stewart-pharynx Uncontrolled bolus / spillover in stewart-pharynx/Uncontrolled bolus / spillover in hypopharynx/Incomplete tongue to palate contact/moderate-max spillover to the valleculae and mild to the pyriform sinus Incomplete tongue to palate contact/Uncontrolled bolus / spillover in stewart-pharynx/Uncontrolled bolus / spillover in hypopharynx/moderate spillover to the valleculae and pyriform sinus

## 2018-12-15 NOTE — SWALLOW VFSS/MBS ASSESSMENT ADULT - MODE OF PRESENTATION
spoon/fed by clinician fed by clinician/spoon cup/spoon/self fed/fed by clinician self fed/cup fed by clinician/cup/self fed/spoon

## 2018-12-15 NOTE — SWALLOW VFSS/MBS ASSESSMENT ADULT - RECOMMENDED FEEDING/EATING TECHNIQUES
crush medication (when feasible)/no straws/oral hygiene/position upright (90 degrees)/Turn head to Left when consuming all liquids/allow for swallow between intakes/small sips/bites

## 2018-12-15 NOTE — SWALLOW VFSS/MBS ASSESSMENT ADULT - ADDITIONAL RECOMMENDATIONS
Recommend st services post discharge re: carryover of dysphagia management, dysphagia therapy to improve swallow mechanism for future diet upgrade.

## 2018-12-15 NOTE — SWALLOW VFSS/MBS ASSESSMENT ADULT - LARYNGEAL PENETRATION DURING THE SWALLOW - SILENT
shallow with retrieval of material/Mild/Trace shallow with retrieval/Trace Mild/material spills over the arytenoids Trace/deep in the laryngeal vestibule

## 2018-12-15 NOTE — SWALLOW VFSS/MBS ASSESSMENT ADULT - SLP GENERAL OBSERVATIONS
Pt found in radiology waiting area secure in ROBLES chair. + Nasal cannula. Pt A&O x3. Cooperative for exam. Able to follow directions for evaluation purposes.

## 2018-12-15 NOTE — SWALLOW VFSS/MBS ASSESSMENT ADULT - ADDITIONAL INFORMATION
Hx contd: Seen by Neuro, on exam Mental status: Awake, alert, and in no apparent distress. Oriented to person, place and time. Language function is normal. Recent memory, digit span and concentration were normal. dysarthria. Cranial Nerves: Facial sensation was intact to light touch. There was left umn facial. The palate was upgoing symmetrically and tongue was midline. Hearing acuity was intact to finger rub AU. Shoulder shrug was full bilaterally. MRI brain with acute CR and internal capsule infarcts as two separate infarcts.  Though separate, favor thrombotic etiology with intracranial MCA stenosis with issues with labile BP, and less so cardioembolic. Plan: continue asa and Lipitor, ? restart plavix. Family provides as home med. Slowly lower blood pressure as has risk of stroke worsening. Speech/swallow eval.    + Calcification of laryngeal structures. + Air distension in proximal esophagus .

## 2018-12-15 NOTE — SWALLOW VFSS/MBS ASSESSMENT ADULT - SLP PERTINENT HISTORY OF CURRENT PROBLEM
88 yo F w/ HTN, renal artery stenosis, MV insufficiency, dyslipidemia, CVA (no deficits ~13 yr ago), diastolic CHF stage II, presents as a transfer from [facility] for SOB and stroke. Pt recently was admitted to [facility] from 11/19 to 11/21 for acute pulmonary edema in setting of hypertensive emergency requiring ICU admission for nitro gtt and respiratory failure requiring NIPPV. Complicating that hospitalization was new EKG changes w/ T-wave inversion in anterolateral leads. She underwent cardiac angiogram on 11/21/18 which showed no significant obstructive CAD. She also had TTE performed on 11/20/18 which showed grade II diastolic CHF and mild-moderate MV insufficiency. Following that hospitalization, she had another hospitalization on 12/12/18 for HTN emergency leading pulmonary edema and new CVA. Pt was transferred here for management of confirmed renal artery stenosis. Pt presented to [facility] during this time with fall and new facial droop.

## 2018-12-15 NOTE — SWALLOW VFSS/MBS ASSESSMENT ADULT - DIAGNOSTIC IMPRESSIONS
Pt presents with a mild-moderate oropharyngeal dysphagia characterized by reduced bolus formation/control/AP transit, reduced mastication on soft solid food, oral cavity residue cleared with repeat swallow x1-2, premature spillover to the valleculae on honey thick liquid, to the pyriform sinus on nectar thick  and thin fluids, mild post swallow residue in the pharynx, laryngeal penetration with retrieval of material on thin puree and honey thick liquid and trace aspiration on nectar thick liquid. Compensatory strategies were employed and a Left head turn is successful to improve airway protection on nectar thick liquid however not on thin liquid.   Disorders: reduced lingual strength/ROM/Rate of motion, reduced BOT to posterior pharyngeal wall contact, delay in trigger of the swallow reflex, reduced hyo-laryngeal excursion, reduced laryngeal closure, reduced pharyngeal contractility, reduced supraglottic sensation

## 2018-12-15 NOTE — SWALLOW VFSS/MBS ASSESSMENT ADULT - NS SWALLOW VFSS REC ASPIR MON
fever/cough/throat clearing/Monitor for s/s aspiration/laryngeal penetration. If noted:  D/C p.o. intake, provide non-oral nutrition/hydration/meds, and contact this service @ [phone]/michael voice/pneumonia/change of breathing pattern/position upright (90Y)/upper respiratory infection

## 2018-12-15 NOTE — PROGRESS NOTE ADULT - SUBJECTIVE AND OBJECTIVE BOX
SUBJECTIVE: No new neurologic events overnight.  No new neurologic complaints.  feels stronger    Medications:  MEDICATIONS  (STANDING):  aspirin  chewable 81 milliGRAM(s) Oral daily  atorvastatin 40 milliGRAM(s) Oral at bedtime  cloNIDine 0.1 milliGRAM(s) Oral three times a day  furosemide   Injectable 40 milliGRAM(s) IV Push daily  losartan 50 milliGRAM(s) Oral every 12 hours  sodium chloride 0.9%. 250 milliLiter(s) (250 mL/Hr) IV Continuous <Continuous>    MEDICATIONS  (PRN):      Labs:  CBC Full  -  ( 15 Dec 2018 07:48 )  WBC Count : 5.17 K/uL  Hemoglobin : 10.9 g/dL  Hematocrit : 33.9 %  Platelet Count - Automated : 311 K/uL  Mean Cell Volume : 92.6 fl  Mean Cell Hemoglobin : 29.8 pg  Mean Cell Hemoglobin Concentration : 32.2 gm/dL  Auto Neutrophil # : x  Auto Lymphocyte # : x  Auto Monocyte # : x  Auto Eosinophil # : x  Auto Basophil # : x  Auto Neutrophil % : x  Auto Lymphocyte % : x  Auto Monocyte % : x  Auto Eosinophil % : x  Auto Basophil % : x    12-15    142  |  103  |  32<H>  ----------------------------<  117<H>  3.5   |  24  |  1.22    Ca    9.4      15 Dec 2018 06:31  Phos  3.5     12-14  Mg     2.1     12-14      CAPILLARY BLOOD GLUCOSE      POCT Blood Glucose.: 207 mg/dL (14 Dec 2018 19:41)            Vitals:  Vital Signs Last 24 Hrs  T(C): 36.8 (15 Dec 2018 08:23), Max: 36.8 (14 Dec 2018 11:50)  T(F): 98.3 (15 Dec 2018 08:23), Max: 98.3 (14 Dec 2018 11:50)  HR: 77 (15 Dec 2018 10:58) (67 - 95)  BP: 180/88 (15 Dec 2018 10:58) (110/56 - 202/87)  BP(mean): --  RR: 18 (15 Dec 2018 08:23) (18 - 18)  SpO2: 94% (15 Dec 2018 08:23) (94% - 100%)        NEUROLOGICAL EXAM:    Mental status: Awake, alert, and in no apparent distress. Oriented to person, place and time. Language function is normal. Recent memory, digit span and concentration were normal. dysarthria    Cranial Nerves: Pupils were equal, round, reactive to light. Extraocular movements were intact. Visual field were full. Fundoscopic exam was deferred. Facial sensation was intact to light touch. There was left umn facial. The palate was upgoing symmetrically and tongue was midline. Hearing acuity was intact to finger rub AU. Shoulder shrug was full bilaterally    Motor exam: Bulk and tone were normal. Strength was 5/5 except left hand 4/5.  dec FFM on left    Reflexes: 2+ in the bilateral upper extremities. 2+ in the bilateral lower extremities. Toes were downgoing bilaterally.     Sensation: Intact to light touch, temperature, vibration and proprioception.     Coordination: Finger-nose-finger and heel-to-shin was without dysmetria.     Gait: deferred      NIHSS 2  Imaging:    EXAM:  MR ANGIO BRAIN                            PROCEDURE DATE:  12/13/2018          INTERPRETATION:  CLINICAL STATEMENT: Evaluate for stenosis. CVA    TECHNIQUE: MRA of the head was performed without gadolinium. 3-D MIP   images were obtained.    COMPARISON: None.    FINDINGS:  Narrowing of supraclinoid internal carotid arteries noted    The proximal anterior, middle and posterior cerebral arteries are patent.   Multifocal stenosis involving proximal M2 branches of bilateral middle   cerebralarteries and distal M2 branches of left middle cerebral artery.   Mild focal narrowing at the proximal left posterior cerebral artery.    The intracranial vertebral and basilar arteries are patent. Dominant   right vertebral artery noted.     There is no MR evidence of intracranial aneurysm.    IMPRESSION:  Multifocal intracranial stenoses as described above                LORNA ARNOLD M.D., ATTENDING RADIOLOGIST  This document has been electronically signed. Dec 13 2018 12:48PM        EXAM:  MR BRAIN                            PROCEDURE DATE:  12/13/2018          INTERPRETATION:  CLINICAL STATEMENT: Left facial droop    TECHNIQUE: MRI of the brain was performed without gadolinium.    COMPARISON: CT head 12/12/2018    FINDINGS:  There is moderate diffuse parenchymal volume loss. There are T2   prolongation signal abnormalities in the periventricular subcortical   white matter likely related to severe chronic microvascular ischemic   changes.    Chronic infarct left parietal occipital region with ex vacuo dilatation   of adjacent left lateral ventricle.    1.1 cm meningioma noted in the right frontal parietal region.    There is no acute parenchymal hemorrhage, parenchymal mass, or midline   shift. There is no extra-axial fluid collection.  There is no   hydrocephalus.      6 x 3 mm acute infarct noted in the right centrum semiovale. 6 x 3 mm   acute infarct noted in the posterior limb right internal capsule.    Partial opacification of right mastoid air cells. Mucosalthickening   paranasal sinuses.    IMPRESSION:  Small acute infarcts in the right centrum semiovale and posterior limb of   right internal capsule as described above.    No acute intracranial hemorrhage                LORNA ARNOLD M.D., ATTENDING RADIOLOGIST  This document has been electronically signed. Dec 13 2018 12:42PM

## 2018-12-15 NOTE — PROGRESS NOTE ADULT - SUBJECTIVE AND OBJECTIVE BOX
SUBJECTIVE / OVERNIGHT EVENTS: pt denies chest pain, shortness of breath, nausea, vomiting  headache     MEDICATIONS  (STANDING):  aspirin  chewable 81 milliGRAM(s) Oral daily  atorvastatin 40 milliGRAM(s) Oral at bedtime  cloNIDine 0.1 milliGRAM(s) Oral three times a day  furosemide   Injectable 40 milliGRAM(s) IV Push daily  losartan 50 milliGRAM(s) Oral every 12 hours  sodium chloride 0.9%. 250 milliLiter(s) (250 mL/Hr) IV Continuous <Continuous>    MEDICATIONS  (PRN):    Vital Signs Last 24 Hrs  T(C): 36.8 (15 Dec 2018 08:23), Max: 36.8 (14 Dec 2018 11:50)  T(F): 98.3 (15 Dec 2018 08:23), Max: 98.3 (14 Dec 2018 11:50)  HR: 77 (15 Dec 2018 10:58) (67 - 95)  BP: 180/88 (15 Dec 2018 10:58) (110/56 - 202/87)  BP(mean): --  RR: 18 (15 Dec 2018 08:23) (18 - 18)  SpO2: 94% (15 Dec 2018 08:23) (94% - 100%)    Constitutional: No fever, fatigue  Skin: No rash.  Eyes: No recent vision problems or eye pain.  ENT: No congestion, ear pain, or sore throat.  Cardiovascular: No chest pain or palpation.  Respiratory: No cough, shortness of breath, congestion, or wheezing.  Gastrointestinal: No abdominal pain, nausea, vomiting, or diarrhea.  Genitourinary: No dysuria.  Musculoskeletal: No joint swelling.  Neurologic: No headache.    PHYSICAL EXAM:  GENERAL: NAD  EYES: EOMI, PERRLA  NECK: Supple, No JVD  CHEST/LUNG: dec breath sounds at base  HEART:  S1 , S2 +  ABDOMEN: soft, bs+  EXTREMITIES:  no edema  NEUROLOGY:alert awake oriented to place, person       LABS:  12-15    142  |  103  |  32<H>  ----------------------------<  117<H>  3.5   |  24  |  1.22    Ca    9.4      15 Dec 2018 06:31  Phos  3.5     12-14  Mg     2.1     12-14      Creatinine Trend: 1.22 <--, 1.15 <--, 1.00 <--, 0.98 <--                        10.9   5.17  )-----------( 311      ( 15 Dec 2018 07:48 )             33.9     Urine Studies:  Urinalysis Basic - ( 12 Dec 2018 16:18 )    Color: Yellow / Appearance: Clear / S.010 / pH:   Gluc:  / Ketone: Negative  / Bili: Negative / Urobili: Negative   Blood:  / Protein: 75 mg/dL / Nitrite: Negative   Leuk Esterase: Negative / RBC: Negative /HPF / WBC 0-2   Sq Epi:  / Non Sq Epi: Occasional / Bacteria: Negative                            RADIOLOGY & ADDITIONAL TESTS:    Imaging Personally Reviewed:    Consultant(s) Notes Reviewed:      Care Discussed with Consultants/Other Providers:

## 2018-12-15 NOTE — PROGRESS NOTE ADULT - SUBJECTIVE AND OBJECTIVE BOX
· Subjective and Objective:   [facility] CARDIOLOGY CONSULTANTS:    Stefano Emmanuel Grossman, Wachsman, Pannella, Patel, Savella, Goodger      [phone]    CHIEF COMPLAINT: Patient is a 87y old  Female who presents with a chief complaint of stroke  & SOB (14 Dec 2018 12:23)    FOLLOW UP: HTN, CVA    INTERIM HISTORY: Pt feeling well this morning - last night with episode hypotension after labetalol dose - responded well to IVF    TELEMETRY: NSR 60-70    REVIEW OF SYSTEMS:  CONSTITUTIONAL: No fever, weight loss, or fatigue  EYES: No eye pain, visual disturbances, or discharge  ENMT:  No difficulty hearing, tinnitus, vertigo; No sinus or throat pain  NECK: No pain or stiffness  RESPIRATORY: denies cough,No wheezing, chills or hemoptysis; No shortness of breath  CARDIOVASCULAR: No chest pain,No palpitations, dizziness, or leg swelling  GASTROINTESTINAL: No abdominal or epigastric pain. No nausea, vomiting, or hematemesis; No diarrhea , no constipation. No melena or hematochezia.  GENITOURINARY: No dysuria, frequency, hematuria, or incontinence  NEUROLOGICAL: No headaches, memory loss, loss of strength, numbness, or tremors  SKIN: No itching, burning, rashes, or lesions   LYMPH NODES: No enlarged glands  ENDOCRINE: No heat or cold intolerance; No hair loss  MUSCULOSKELETAL: No joint pain or swelling; No muscle, back, or extremity pain  PSYCHIATRIC: No depression, anxiety, mood swings, or difficulty sleeping  HEME/LYMPH: No easy bruising, or bleeding gums  ALLERGY AND IMMUNOLOGIC: No hives or eczema          PAST MEDICAL & SURGICAL HISTORY:  Renal artery stenosis: possible on doppler  Basal cell carcinoma  Mitral valve insufficiency, unspecified etiology: Cardiac cath on 11/21/18  Edema, unspecified type  Cerebrovascular accident (CVA), unspecified mechanism  Hyponatremia: ON HCTZ , h/o Hypokelemia  Dyslipidemia  Essential hypertension  UTI (urinary tract infection)  H/O bilateral hip replacements  History of cholecystectomy  Status post hysterectomy  S/P Mohs surgery for basal cell carcinoma      MEDICATIONS  (STANDING):  aspirin  chewable 81 milliGRAM(s) Oral daily  atorvastatin 40 milliGRAM(s) Oral at bedtime  cloNIDine 0.1 milliGRAM(s) Oral three times a day  furosemide   Injectable 40 milliGRAM(s) IV Push daily  losartan 50 milliGRAM(s) Oral every 12 hours  sodium chloride 0.9%. 250 milliLiter(s) (250 mL/Hr) IV Continuous <Continuous>      Allergies    Keflex (Unknown)  verapamil (Other)    Intolerances                              10.8   5.01  )-----------( 291      ( 14 Dec 2018 07:27 )             33.5       12-15    142  |  103  |  32<H>  ----------------------------<  117<H>  3.5   |  24  |  1.22    Ca    9.4      15 Dec 2018 06:31  Phos  3.5     12-14  Mg     2.1     12-14                                  Daily     Daily     I&O's Summary    14 Dec 2018 07:01  -  15 Dec 2018 07:00  --------------------------------------------------------  IN: 0 mL / OUT: 1000 mL / NET: -1000 mL        Vital Signs Last 24 Hrs  T(C): 36.6 (15 Dec 2018 04:09), Max: 36.8 (14 Dec 2018 11:50)  T(F): 97.9 (15 Dec 2018 04:09), Max: 98.3 (14 Dec 2018 11:50)  HR: 70 (15 Dec 2018 04:09) (67 - 95)  BP: 120/61 (15 Dec 2018 04:09) (110/56 - 202/87)  BP(mean): --  RR: 18 (15 Dec 2018 04:09) (18 - 18)  SpO2: 97% (15 Dec 2018 04:09) (94% - 100%)    PHYSICAL EXAM:   · Constitutional	Well-developed, well nourished  · Eyes	EOMI; PERRL; no drainage or redness  · ENMT	No oral lesions; no gross abnormalities  · Neck	No bruits; no thyromegaly or nodules  · Respiratory	Normal breath sounds b/l, No RRW  · Cardiovascular	Regular rate & rhythm, normal S1, S2; no murmurs, gallops or rubs; no S3, S4  · Gastrointestinal	Soft, non-tender, no hepatosplenomegaly, normal bowel sounds  · Extremities	No cyanosis, clubbing or edema  · Vascular	Equal and normal pulses (carotid, femoral, dorsalis pedis)  · Neurological	Alert & oriented; no sensory, motor or coordination deficits, normal reflexes

## 2018-12-15 NOTE — SWALLOW VFSS/MBS ASSESSMENT ADULT - COMMENTS
Hx contd: She reports that she was at home, she took xanax and had a fall. She cannot remember episode leading to fall. She remembers being able to carry herself over to her bed without difficulties. When the son came to pick her up he noticed the pt had left facial droop, LUE weakness, and slurring of speech. He thought it might have been the xanax. During this time she also developed acute onset of SOB and was therefore taken to  hospital on 12/12.     Currently, patient reports she feels "fine." She has no chest pain, shortness of breath, no nausea/vomiting, no noticeable weakness. She also has no paresthesias. She has no visual deficits or headaches. She does still have L facial droop but no noticeable dysphagia to solids yet. Dx: Renal artery stenosis, CVA. Pt with new stroke based on MRA/MRH today. NIHSS score 2 on d/c from  hosptial ; no tPA given. HC: 12/13 MRI Head: Small acute infarcts in the right centrum semiovale and posterior limb of right internal capsule as described above. No acute intracranial hemorrhage. 12/14 Seen by [facility], re HTN Emergency - likely 2/2 proximal R renal artery stenosis and mild to moderate narrowing of the proximal left renal artery. Blood pressures currently acceptable with an isolated systolic hypertension. Cards following.   *contd below

## 2018-12-15 NOTE — SWALLOW VFSS/MBS ASSESSMENT ADULT - ROSENBEK'S PENETRATION ASPIRATION SCALE
(2) contrast enters airway, remains above the vocal cords, no residue remains (penetration) (1) no aspiration, contrast does not enter airway (6) contrast passes glottis, no subglottic residue remains (aspiration) (3) contrast remains above the vocal cords, visible residue remains (penetration)

## 2018-12-15 NOTE — PROGRESS NOTE ADULT - ASSESSMENT
This is a 87y Female, consult for CVA, found several days ago with left sided weakness and slurred speech, later more of a facial.  MRI brain with acute CR and internal capsule infarcts as two separate infarcts.  Though separate, favor thrombotic etiology with intracranial MCA stenosis with issues with labile BP, and less so cardioembolic.    exam improving    Plan:  - continue asa and lipitor  - ? restart plavix.  family provides as home med  - management renal artery stenosis  - slowly lower blood pressure as has risk of stroke worsening.  goal -180.  that being said exam bit better today  - tele rule out afib  - PT  - speech/swallow eval  - neuro checks  - stroke education  - DVT prophylaxis

## 2018-12-15 NOTE — PROVIDER CONTACT NOTE (OTHER) - SITUATION
Pt. experienced PAT on tele for 1.14 seconds, first times, with a HR up to 154. Pt. asymptomatic towards any chest pressure, palpitations or chest pain.

## 2018-12-15 NOTE — PROGRESS NOTE ADULT - ASSESSMENT
Mrs. Mueller is an 86 yo F with PMH of malignant HTN, dyslipidemia, CVA, hyponatremia, recurrent UTI with recent admission in 11/2018 for HTN emergency and ADHF with dynamic EKG changes.  Coronary angiography done 11/21/18 showed no CAD but severe MR.  TTE done 11/20/18 noted low normal LV systolic function, septal wall hypokinesis, grade 2 diastolic dysfunction, mild to moderate mitral regurgitation with mildly enlarged LA.   She presented on 12/12 with acute shortness of breath in the setting of significant hypertension.   She has been found to have critical proximal right renal artery stenosis.    - Continue Lasix 40 IV daily. Please continue to maintain strict I/Os, monitor daily weights, Cr, and K. Watch her Cr carefully  - Bipap and oxygen supplementation as necessary  - Vascular evaluation appreciated. Plan for renal angiogram next Tuesday.  - c/w losartan 50 mg twice daily and will decrease clonidine to 0.1 mg three times daily  - labetalol has been d/c'ed  - Neurology evaluation for MRI findings of small acute infarcts  - c/w ASA and statin  - Will follow with you.

## 2018-12-15 NOTE — SWALLOW VFSS/MBS ASSESSMENT ADULT - LARYNGEAL PENETRATION AFTER THE SWALLOW - SILENT
patient swallows oral cavity residue and trace laryngeal penetration noted with retrieval/Trace Trace/patient swallows oral cavity residue and a trace amount of laryngeal penetration is evident

## 2018-12-15 NOTE — SWALLOW VFSS/MBS ASSESSMENT ADULT - RECOMMENDED CONSISTENCY
Dysphagia 2 and nectar thick liquids. Turn head to left when consuming all liquids. Swallow x2 after each bite of food and sip of liquid. No straws. Single cup sips only.

## 2018-12-16 LAB
ANION GAP SERPL CALC-SCNC: 19 MMOL/L — HIGH (ref 5–17)
BUN SERPL-MCNC: 40 MG/DL — HIGH (ref 7–23)
CALCIUM SERPL-MCNC: 9.8 MG/DL — SIGNIFICANT CHANGE UP (ref 8.4–10.5)
CHLORIDE SERPL-SCNC: 102 MMOL/L — SIGNIFICANT CHANGE UP (ref 96–108)
CO2 SERPL-SCNC: 24 MMOL/L — SIGNIFICANT CHANGE UP (ref 22–31)
CREAT SERPL-MCNC: 1.28 MG/DL — SIGNIFICANT CHANGE UP (ref 0.5–1.3)
GLUCOSE SERPL-MCNC: 199 MG/DL — HIGH (ref 70–99)
HCT VFR BLD CALC: 37.7 % — SIGNIFICANT CHANGE UP (ref 34.5–45)
HGB BLD-MCNC: 12.3 G/DL — SIGNIFICANT CHANGE UP (ref 11.5–15.5)
MAGNESIUM SERPL-MCNC: 2 MG/DL — SIGNIFICANT CHANGE UP (ref 1.6–2.6)
MCHC RBC-ENTMCNC: 30.1 PG — SIGNIFICANT CHANGE UP (ref 27–34)
MCHC RBC-ENTMCNC: 32.6 GM/DL — SIGNIFICANT CHANGE UP (ref 32–36)
MCV RBC AUTO: 92.2 FL — SIGNIFICANT CHANGE UP (ref 80–100)
PLATELET # BLD AUTO: 360 K/UL — SIGNIFICANT CHANGE UP (ref 150–400)
POTASSIUM SERPL-MCNC: 4.1 MMOL/L — SIGNIFICANT CHANGE UP (ref 3.5–5.3)
POTASSIUM SERPL-SCNC: 4.1 MMOL/L — SIGNIFICANT CHANGE UP (ref 3.5–5.3)
RBC # BLD: 4.09 M/UL — SIGNIFICANT CHANGE UP (ref 3.8–5.2)
RBC # FLD: 12 % — SIGNIFICANT CHANGE UP (ref 10.3–14.5)
SODIUM SERPL-SCNC: 145 MMOL/L — SIGNIFICANT CHANGE UP (ref 135–145)
WBC # BLD: 6.5 K/UL — SIGNIFICANT CHANGE UP (ref 3.8–10.5)
WBC # FLD AUTO: 6.5 K/UL — SIGNIFICANT CHANGE UP (ref 3.8–10.5)

## 2018-12-16 PROCEDURE — 99232 SBSQ HOSP IP/OBS MODERATE 35: CPT

## 2018-12-16 RX ADMIN — LOSARTAN POTASSIUM 50 MILLIGRAM(S): 100 TABLET, FILM COATED ORAL at 17:43

## 2018-12-16 RX ADMIN — Medication 0.1 MILLIGRAM(S): at 05:08

## 2018-12-16 RX ADMIN — Medication 0.2 MILLIGRAM(S): at 21:35

## 2018-12-16 RX ADMIN — Medication 40 MILLIGRAM(S): at 05:08

## 2018-12-16 RX ADMIN — Medication 0.2 MILLIGRAM(S): at 13:14

## 2018-12-16 RX ADMIN — ATORVASTATIN CALCIUM 40 MILLIGRAM(S): 80 TABLET, FILM COATED ORAL at 21:35

## 2018-12-16 RX ADMIN — Medication 81 MILLIGRAM(S): at 11:23

## 2018-12-16 RX ADMIN — LOSARTAN POTASSIUM 50 MILLIGRAM(S): 100 TABLET, FILM COATED ORAL at 05:08

## 2018-12-16 NOTE — PROGRESS NOTE ADULT - SUBJECTIVE AND OBJECTIVE BOX
· Subjective and Objective:   [facility] CARDIOLOGY CONSULTANTS:    Stefano Emmanuel Grossman, [place], Pannella, Patel, Savella, Goodger      [phone]    CHIEF COMPLAINT: Patient is a 87y old  Female who presents with a chief complaint of stroke  & SOB (14 Dec 2018 12:23)    FOLLOW UP: HTN, CVA    INTERIM HISTORY: Pt feeling well this morning - now BP elevated    TELEMETRY: NSR 60-70  REVIEW OF SYSTEMS:  CONSTITUTIONAL: No fever, weight loss, or fatigue  EYES: No eye pain, visual disturbances, or discharge  ENMT:  No difficulty hearing, tinnitus, vertigo; No sinus or throat pain  NECK: No pain or stiffness  RESPIRATORY: denies cough,No wheezing, chills or hemoptysis; No shortness of breath  CARDIOVASCULAR: No chest pain,No palpitations, dizziness, or leg swelling  GASTROINTESTINAL: No abdominal or epigastric pain. No nausea, vomiting, or hematemesis; No diarrhea , no constipation. No melena or hematochezia.  GENITOURINARY: No dysuria, frequency, hematuria, or incontinence  NEUROLOGICAL: No headaches, memory loss, loss of strength, numbness, or tremors  SKIN: No itching, burning, rashes, or lesions   LYMPH NODES: No enlarged glands  ENDOCRINE: No heat or cold intolerance; No hair loss  MUSCULOSKELETAL: No joint pain or swelling; No muscle, back, or extremity pain  PSYCHIATRIC: No depression, anxiety, mood swings, or difficulty sleeping  HEME/LYMPH: No easy bruising, or bleeding gums  ALLERGY AND IMMUNOLOGIC: No hives or eczema          PAST MEDICAL & SURGICAL HISTORY:  Renal artery stenosis: possible on doppler  Basal cell carcinoma  Mitral valve insufficiency, unspecified etiology: Cardiac cath on 11/21/18  Edema, unspecified type  Cerebrovascular accident (CVA), unspecified mechanism  Hyponatremia: ON HCTZ , h/o Hypokelemia  Dyslipidemia  Essential hypertension  UTI (urinary tract infection)  H/O bilateral hip replacements  History of cholecystectomy  Status post hysterectomy  S/P Mohs surgery for basal cell carcinoma      MEDICATIONS  (STANDING):  aspirin  chewable 81 milliGRAM(s) Oral daily  atorvastatin 40 milliGRAM(s) Oral at bedtime  cloNIDine 0.2 milliGRAM(s) Oral three times a day  furosemide   Injectable 40 milliGRAM(s) IV Push daily  losartan 50 milliGRAM(s) Oral every 12 hours  sodium chloride 0.9%. 250 milliLiter(s) (250 mL/Hr) IV Continuous <Continuous>      Allergies    Keflex (Unknown)  verapamil (Other)    Intolerances                              10.9   5.17  )-----------( 311      ( 15 Dec 2018 07:48 )             33.9       12-15    142  |  103  |  32<H>  ----------------------------<  117<H>  3.5   |  24  |  1.22    Ca    9.4      15 Dec 2018 06:31                                  Daily     Daily     I&O's Summary    15 Dec 2018 07:01  -  16 Dec 2018 07:00  --------------------------------------------------------  IN: 660 mL / OUT: 300 mL / NET: 360 mL        Vital Signs Last 24 Hrs  T(C): 36.6 (16 Dec 2018 04:39), Max: 36.8 (15 Dec 2018 08:23)  T(F): 97.9 (16 Dec 2018 04:39), Max: 98.3 (15 Dec 2018 08:23)  HR: 64 (16 Dec 2018 05:00) (64 - 85)  BP: 177/76 (16 Dec 2018 05:00) (164/65 - 190/79)  BP(mean): --  RR: 18 (16 Dec 2018 05:00) (18 - 18)  SpO2: 97% (16 Dec 2018 05:00) (94% - 97%)    PHYSICAL EXAM:   · Constitutional	Well-developed, well nourished  · Eyes	EOMI; PERRL; no drainage or redness  · ENMT	No oral lesions; no gross abnormalities  · Neck	No bruits; no thyromegaly or nodules  · Respiratory	Normal breath sounds b/l, No RRW  · Cardiovascular	Regular rate & rhythm, normal S1, S2; no murmurs, gallops or rubs; no S3, S4  · Gastrointestinal	Soft, non-tender, no hepatosplenomegaly, normal bowel sounds  · Extremities	No cyanosis, clubbing or edema  · Vascular	Equal and normal pulses (carotid, femoral, dorsalis pedis)  · Neurological	Alert & oriented; no sensory, motor or coordination deficits, normal reflexes

## 2018-12-16 NOTE — OCCUPATIONAL THERAPY INITIAL EVALUATION ADULT - PLANNED THERAPY INTERVENTIONS, OT EVAL
neuromuscular re-education/bed mobility training/strengthening/ADL retraining/balance training/parent/caregiver training.../transfer training

## 2018-12-16 NOTE — PROGRESS NOTE ADULT - SUBJECTIVE AND OBJECTIVE BOX
SUBJECTIVE: No new neurologic events overnight.  No new neurologic complaints.  feels stronger    Medications:  MEDICATIONS  (STANDING):  aspirin  chewable 81 milliGRAM(s) Oral daily  atorvastatin 40 milliGRAM(s) Oral at bedtime  cloNIDine 0.2 milliGRAM(s) Oral three times a day  furosemide   Injectable 40 milliGRAM(s) IV Push daily  losartan 50 milliGRAM(s) Oral every 12 hours  sodium chloride 0.9%. 250 milliLiter(s) (250 mL/Hr) IV Continuous <Continuous>    MEDICATIONS  (PRN):      Labs:  CBC Full  -  ( 16 Dec 2018 07:30 )  WBC Count : 6.5 K/uL  Hemoglobin : 12.3 g/dL  Hematocrit : 37.7 %  Platelet Count - Automated : 360 K/uL  Mean Cell Volume : 92.2 fl  Mean Cell Hemoglobin : 30.1 pg  Mean Cell Hemoglobin Concentration : 32.6 gm/dL  Auto Neutrophil # : x  Auto Lymphocyte # : x  Auto Monocyte # : x  Auto Eosinophil # : x  Auto Basophil # : x  Auto Neutrophil % : x  Auto Lymphocyte % : x  Auto Monocyte % : x  Auto Eosinophil % : x  Auto Basophil % : x    12-16    145  |  102  |  40<H>  ----------------------------<  199<H>  4.1   |  24  |  1.28    Ca    9.8      16 Dec 2018 07:30  Mg     2.0     12-16      CAPILLARY BLOOD GLUCOSE                Vitals:  Vital Signs Last 24 Hrs  T(C): 36.6 (16 Dec 2018 04:39), Max: 36.7 (15 Dec 2018 21:01)  T(F): 97.9 (16 Dec 2018 04:39), Max: 98.1 (15 Dec 2018 21:01)  HR: 83 (16 Dec 2018 10:35) (64 - 85)  BP: 208/96 (16 Dec 2018 10:35) (164/65 - 208/96)  BP(mean): --  RR: 18 (16 Dec 2018 05:00) (18 - 18)  SpO2: 96% (16 Dec 2018 10:35) (96% - 97%)          NEUROLOGICAL EXAM:    Mental status: Awake, alert, and in no apparent distress. Oriented to person, place and time. Language function is normal. Recent memory, digit span and concentration were normal. dysarthria    Cranial Nerves: Pupils were equal, round, reactive to light. Extraocular movements were intact. Visual field were full. Fundoscopic exam was deferred. Facial sensation was intact to light touch. There was left umn facial. The palate was upgoing symmetrically and tongue was midline. Hearing acuity was intact to finger rub AU. Shoulder shrug was full bilaterally    Motor exam: Bulk and tone were normal. Strength was 5/5 except left hand 4/5.  dec FFM on left    Reflexes: 2+ in the bilateral upper extremities. 2+ in the bilateral lower extremities. Toes were downgoing bilaterally.     Sensation: Intact to light touch, temperature, vibration and proprioception.     Coordination: Finger-nose-finger and heel-to-shin was without dysmetria.     Gait: deferred      NIHSS 2  Imaging:    EXAM:  MR ANGIO BRAIN                            PROCEDURE DATE:  12/13/2018          INTERPRETATION:  CLINICAL STATEMENT: Evaluate for stenosis. CVA    TECHNIQUE: MRA of the head was performed without gadolinium. 3-D MIP   images were obtained.    COMPARISON: None.    FINDINGS:  Narrowing of supraclinoid internal carotid arteries noted    The proximal anterior, middle and posterior cerebral arteries are patent.   Multifocal stenosis involving proximal M2 branches of bilateral middle   cerebralarteries and distal M2 branches of left middle cerebral artery.   Mild focal narrowing at the proximal left posterior cerebral artery.    The intracranial vertebral and basilar arteries are patent. Dominant   right vertebral artery noted.     There is no MR evidence of intracranial aneurysm.    IMPRESSION:  Multifocal intracranial stenoses as described above                LORNA ARNOLD M.D., ATTENDING RADIOLOGIST  This document has been electronically signed. Dec 13 2018 12:48PM        EXAM:  MR BRAIN                            PROCEDURE DATE:  12/13/2018          INTERPRETATION:  CLINICAL STATEMENT: Left facial droop    TECHNIQUE: MRI of the brain was performed without gadolinium.    COMPARISON: CT head 12/12/2018    FINDINGS:  There is moderate diffuse parenchymal volume loss. There are T2   prolongation signal abnormalities in the periventricular subcortical   white matter likely related to severe chronic microvascular ischemic   changes.    Chronic infarct left parietal occipital region with ex vacuo dilatation   of adjacent left lateral ventricle.    1.1 cm meningioma noted in the right frontal parietal region.    There is no acute parenchymal hemorrhage, parenchymal mass, or midline   shift. There is no extra-axial fluid collection.  There is no   hydrocephalus.      6 x 3 mm acute infarct noted in the right centrum semiovale. 6 x 3 mm   acute infarct noted in the posterior limb right internal capsule.    Partial opacification of right mastoid air cells. Mucosalthickening   paranasal sinuses.    IMPRESSION:  Small acute infarcts in the right centrum semiovale and posterior limb of   right internal capsule as described above.    No acute intracranial hemorrhage                LORNA ARNOLD M.D., ATTENDING RADIOLOGIST  This document has been electronically signed. Dec 13 2018 12:42PM

## 2018-12-16 NOTE — CHART NOTE - NSCHARTNOTEFT_GEN_A_CORE
Patient is a 87y old  Female who presents with a chief complaint of stroke  & SOB (16 Dec 2018 08:06)  Pt noted to have had a run of PAT lasting up to 6.2 sec. as noted on tele, while asleep  Pt seen & examined, she denied having cp, sob, dizziness, n/v, but relates feeling palpitations        Vital Signs Last 24 Hrs  T(C): 36.6 (16 Dec 2018 04:39), Max: 36.7 (15 Dec 2018 21:01)  T(F): 97.9 (16 Dec 2018 04:39), Max: 98.1 (15 Dec 2018 21:01)  HR: 64 (16 Dec 2018 05:00) (64 - 85)  BP: 177/76 (16 Dec 2018 05:00) (164/65 - 190/79)  BP(mean): --  RR: 18 (16 Dec 2018 05:00) (18 - 18)  SpO2: 97% (16 Dec 2018 05:00) (96% - 97%)      Physical Exam:  General: WN/WD, NAD, nontoxic appearing  Neurology: A&Ox3, nonfocal, BANDA x 4  Head:  Normocephalic, atraumatic  Respiratory: CTA B/L  CV: RRR, S1S2, no murmur  Abdominal: Soft, non tender, non distended  MSK: No edema, + peripheral pulses, FROM all 4 extremity      Labs:                        10.9   5.17  )-----------( 311      ( 15 Dec 2018 07:48 )             33.9     12-16    145  |  102  |  40<H>  ----------------------------<  199<H>  4.1   |  24  |  1.28    Ca    9.8      16 Dec 2018 07:30  Mg     2.0     12-16        Radiology:    Assessment & Plan:  HPI:  88 yo PMHx recurrent UTIs, dyslipidemia, CVA, ?hx of renal artery stenosis (per chart review, no confirmatory imaging available), malignant HTN (on impressive anti-HTN regimen at home with difficulty controlling HTN) and MV insufficiency (echo Nov 2018 60-65% w/ Grade 2 diastolic dysfunction) who presents with shortness of breath, CXR with vascular congestion c/w hypertensive emergency (SOB and chest pain), r/o HTN emergency complications including neurologic (ischemic, hemorrhagic stroke), cardiac (ACS, dissection)     Pt noted to have had a run of PAT lasting 6.2 sec. on tele  Pt reported having felt palpitations, otherwise remained hemodynamically stable    PLAN:  >Continue with telemetry  >Continue present medication regimen  >Will endorse to primary team in AM      Yvette Juarez PA-C  Dept of Medicine

## 2018-12-16 NOTE — PROVIDER CONTACT NOTE (OTHER) - BACKGROUND
Pt admitted with dx. CVA, PMH: HTN, Renal arteru stenosis, MV insufficiency. Had a PAT during this admission x 1.14 seconds on 12/15.

## 2018-12-16 NOTE — PROGRESS NOTE ADULT - SUBJECTIVE AND OBJECTIVE BOX
SUBJECTIVE / OVERNIGHT EVENTS: pt denies chest pain, shortness of breath, nausea, vomiting  headache     MEDICATIONS  (STANDING):  aspirin  chewable 81 milliGRAM(s) Oral daily  atorvastatin 40 milliGRAM(s) Oral at bedtime  cloNIDine 0.2 milliGRAM(s) Oral three times a day  furosemide   Injectable 40 milliGRAM(s) IV Push daily  losartan 50 milliGRAM(s) Oral every 12 hours  sodium chloride 0.9%. 250 milliLiter(s) (250 mL/Hr) IV Continuous <Continuous>    MEDICATIONS  (PRN):    Vital Signs Last 24 Hrs  T(C): 36.8 (16 Dec 2018 20:40), Max: 36.8 (16 Dec 2018 20:40)  T(F): 98.2 (16 Dec 2018 20:40), Max: 98.2 (16 Dec 2018 20:40)  HR: 76 (16 Dec 2018 20:40) (64 - 87)  BP: 161/77 (16 Dec 2018 20:40) (161/77 - 208/96)  BP(mean): --  RR: 18 (16 Dec 2018 20:40) (18 - 18)  SpO2: 96% (16 Dec 2018 20:40) (96% - 98%)    Constitutional: No fever, fatigue  Skin: No rash.  Eyes: No recent vision problems or eye pain.  ENT: No congestion, ear pain, or sore throat.  Cardiovascular: No chest pain or palpation.  Respiratory: No cough, shortness of breath, congestion, or wheezing.  Gastrointestinal: No abdominal pain, nausea, vomiting, or diarrhea.  Genitourinary: No dysuria.  Musculoskeletal: No joint swelling.  Neurologic: No headache.    PHYSICAL EXAM:  GENERAL: NAD  EYES: EOMI, PERRLA  NECK: Supple, No JVD  CHEST/LUNG: dec breath sounds at base  HEART:  S1 , S2 +  ABDOMEN: soft, bs+  EXTREMITIES:  no edema  NEUROLOGY:alert awake oriented to place, person       LABS:      145  |  102  |  40<H>  ----------------------------<  199<H>  4.1   |  24  |  1.28    Ca    9.8      16 Dec 2018 07:30  Mg     2.0           Creatinine Trend: 1.28 <--, 1.22 <--, 1.15 <--, 1.00 <--, 0.98 <--                        12.3   6.5   )-----------( 360      ( 16 Dec 2018 07:30 )             37.7     Urine Studies:  Urinalysis Basic - ( 12 Dec 2018 16:18 )    Color: Yellow / Appearance: Clear / S.010 / pH:   Gluc:  / Ketone: Negative  / Bili: Negative / Urobili: Negative   Blood:  / Protein: 75 mg/dL / Nitrite: Negative   Leuk Esterase: Negative / RBC: Negative /HPF / WBC 0-2   Sq Epi:  / Non Sq Epi: Occasional / Bacteria: Negative                  RADIOLOGY & ADDITIONAL TESTS:    Imaging Personally Reviewed:    Consultant(s) Notes Reviewed:      Care Discussed with Consultants/Other Providers:

## 2018-12-16 NOTE — PROGRESS NOTE ADULT - ASSESSMENT
Mrs. Mueller is an 86 yo F with PMH of malignant HTN, dyslipidemia, CVA, hyponatremia, recurrent UTI with recent admission in 11/2018 for HTN emergency and ADHF with dynamic EKG changes.  Coronary angiography done 11/21/18 showed no CAD but severe MR.  TTE done 11/20/18 noted low normal LV systolic function, septal wall hypokinesis, grade 2 diastolic dysfunction, mild to moderate mitral regurgitation with mildly enlarged LA.   She presented on 12/12 with acute shortness of breath in the setting of significant hypertension.   She has been found to have critical proximal right renal artery stenosis.    - Continue Lasix 40 IV daily. Please continue to maintain strict I/Os, monitor daily weights, Cr, and K. Watch her Cr carefully  - Bipap and oxygen supplementation as necessary  - Vascular evaluation appreciated. Plan for renal angiogram next Tuesday.  - c/w losartan 50 mg twice daily, now with elevated BP, will return to clonidine to 0.2 mg three times daily with hold parameters  - labetalol has been d/c'ed  - Neurology evaluation for MRI findings of small acute infarcts  - c/w ASA and statin  - Will follow with you.

## 2018-12-16 NOTE — OCCUPATIONAL THERAPY INITIAL EVALUATION ADULT - ANTICIPATED DISCHARGE DISPOSITION, OT EVAL
TBD pending completion of full functional evaluation when BP controlled home w/ OT/Home OT to address deficits, assess home safety, increase independence in ADLs and functional mobility.

## 2018-12-16 NOTE — PROVIDER CONTACT NOTE (OTHER) - ASSESSMENT
Pt remains A&O x 4, VSS, denies pain/discomfort/CP/SOB/palpitation/dizziness, neurological status stable.

## 2018-12-16 NOTE — OCCUPATIONAL THERAPY INITIAL EVALUATION ADULT - PERTINENT HX OF CURRENT PROBLEM, REHAB EVAL
88 yo F w/ HTN, MV insufficiency, dyslipidemia, CVA (no deficits ~13 yr ago), diastolic CHF stage II, presents as a transfer from [facility] for acute pulmonary edema 2/2 to HTN emergency c/b new CVA and found to have bilateral renal artery stenosis. Pt found several days ago with left sided weakness and slurred speech.  MRI brain with acute CR and internal capsule infarcts as two separate infarcts.

## 2018-12-16 NOTE — PROGRESS NOTE ADULT - ASSESSMENT
This is a 87y Female, consult for CVA, found several days ago with left sided weakness and slurred speech, later more of a facial.  MRI brain with acute CR and internal capsule infarcts as two separate infarcts.  Though separate, favor thrombotic etiology with intracranial MCA stenosis with issues with labile BP, and less so cardioembolic.    exam improving    Plan:  - continue asa and lipitor  - ? restart plavix.  family provides as home med  - management renal artery stenosis  - continue slowly lower blood pressure as has risk of stroke worsening.  goal -160.  that being said exam better on daily basis  - tele rule out afib,.  Had run of PAT  - PT  - speech/swallow eval  - neuro checks  - stroke education  - DVT prophylaxis

## 2018-12-17 ENCOUNTER — MESSAGE (OUTPATIENT)
Age: 83
End: 2018-12-17

## 2018-12-17 LAB
ANION GAP SERPL CALC-SCNC: 13 MMOL/L — SIGNIFICANT CHANGE UP (ref 5–17)
BUN SERPL-MCNC: 53 MG/DL — HIGH (ref 7–23)
CALCIUM SERPL-MCNC: 9.4 MG/DL — SIGNIFICANT CHANGE UP (ref 8.4–10.5)
CHLORIDE SERPL-SCNC: 101 MMOL/L — SIGNIFICANT CHANGE UP (ref 96–108)
CO2 SERPL-SCNC: 27 MMOL/L — SIGNIFICANT CHANGE UP (ref 22–31)
CREAT SERPL-MCNC: 1.22 MG/DL — SIGNIFICANT CHANGE UP (ref 0.5–1.3)
CULTURE RESULTS: SIGNIFICANT CHANGE UP
CULTURE RESULTS: SIGNIFICANT CHANGE UP
GLUCOSE SERPL-MCNC: 129 MG/DL — HIGH (ref 70–99)
HCT VFR BLD CALC: 33.7 % — LOW (ref 34.5–45)
HGB BLD-MCNC: 10.6 G/DL — LOW (ref 11.5–15.5)
MCHC RBC-ENTMCNC: 28.9 PG — SIGNIFICANT CHANGE UP (ref 27–34)
MCHC RBC-ENTMCNC: 31.5 GM/DL — LOW (ref 32–36)
MCV RBC AUTO: 91.8 FL — SIGNIFICANT CHANGE UP (ref 80–100)
PLATELET # BLD AUTO: 333 K/UL — SIGNIFICANT CHANGE UP (ref 150–400)
POTASSIUM SERPL-MCNC: 3.8 MMOL/L — SIGNIFICANT CHANGE UP (ref 3.5–5.3)
POTASSIUM SERPL-SCNC: 3.8 MMOL/L — SIGNIFICANT CHANGE UP (ref 3.5–5.3)
RBC # BLD: 3.67 M/UL — LOW (ref 3.8–5.2)
RBC # FLD: 13.2 % — SIGNIFICANT CHANGE UP (ref 10.3–14.5)
SODIUM SERPL-SCNC: 141 MMOL/L — SIGNIFICANT CHANGE UP (ref 135–145)
SPECIMEN SOURCE: SIGNIFICANT CHANGE UP
SPECIMEN SOURCE: SIGNIFICANT CHANGE UP
WBC # BLD: 6.78 K/UL — SIGNIFICANT CHANGE UP (ref 3.8–10.5)
WBC # FLD AUTO: 6.78 K/UL — SIGNIFICANT CHANGE UP (ref 3.8–10.5)

## 2018-12-17 PROCEDURE — 93975 VASCULAR STUDY: CPT | Mod: 26

## 2018-12-17 PROCEDURE — 99232 SBSQ HOSP IP/OBS MODERATE 35: CPT

## 2018-12-17 RX ORDER — CLOPIDOGREL BISULFATE 75 MG/1
75 TABLET, FILM COATED ORAL DAILY
Qty: 0 | Refills: 0 | Status: DISCONTINUED | OUTPATIENT
Start: 2018-12-17 | End: 2018-12-20

## 2018-12-17 RX ORDER — SODIUM CHLORIDE 9 MG/ML
1000 INJECTION INTRAMUSCULAR; INTRAVENOUS; SUBCUTANEOUS
Qty: 0 | Refills: 0 | Status: DISCONTINUED | OUTPATIENT
Start: 2018-12-17 | End: 2018-12-18

## 2018-12-17 RX ADMIN — LOSARTAN POTASSIUM 50 MILLIGRAM(S): 100 TABLET, FILM COATED ORAL at 16:56

## 2018-12-17 RX ADMIN — Medication 0.2 MILLIGRAM(S): at 22:20

## 2018-12-17 RX ADMIN — CLOPIDOGREL BISULFATE 75 MILLIGRAM(S): 75 TABLET, FILM COATED ORAL at 13:03

## 2018-12-17 RX ADMIN — Medication 81 MILLIGRAM(S): at 13:03

## 2018-12-17 RX ADMIN — SODIUM CHLORIDE 50 MILLILITER(S): 9 INJECTION INTRAMUSCULAR; INTRAVENOUS; SUBCUTANEOUS at 15:25

## 2018-12-17 RX ADMIN — ATORVASTATIN CALCIUM 40 MILLIGRAM(S): 80 TABLET, FILM COATED ORAL at 22:20

## 2018-12-17 RX ADMIN — Medication 0.2 MILLIGRAM(S): at 14:54

## 2018-12-17 RX ADMIN — Medication 0.2 MILLIGRAM(S): at 08:53

## 2018-12-17 NOTE — PROGRESS NOTE ADULT - ASSESSMENT
Mrs. Mueller is an 86 yo F with PMH of malignant HTN, dyslipidemia, CVA, hyponatremia, recurrent UTI with recent admission in 11/2018 for HTN emergency and ADHF with dynamic EKG changes.  Coronary angiography done 11/21/18 showed no CAD but severe MR.  TTE done 11/20/18 noted low normal LV systolic function, septal wall hypokinesis, grade 2 diastolic dysfunction, mild to moderate mitral regurgitation with mildly enlarged LA; Repeat on 12/14 with reportedly mild global LV dysfunction.  She presented on 12/12 with acute shortness of breath in the setting of significant hypertension.   She has been found to have critical proximal right renal artery stenosis.    - D/c Lasix given rise in BUN. Please continue to maintain strict I/Os, monitor daily weights, Cr, and K. Watch her Cr carefully  - Oxygen supplementation as necessary  - Vascular evaluation appreciated. Plan for renal angiogram Tuesday.  - BP remains labile. Continue with losartan 50 mg twice daily and clonidine 0.2 mg po tid  - labetalol has been discontinued after an RRT prior to the weekend because of significant drop in BP  - Restart Plavix in setting of CVA  - Neurology evaluation appreciated  - PAT noted on telemetry yesterday, though no AF. Would continue to monitor. She is now off of her BB because of labile BP.  - c/w ASA and statin  - Will follow with you. Mrs. Mueller is an 86 yo F with PMH of malignant HTN, dyslipidemia, CVA, hyponatremia, recurrent UTI with recent admission in 11/2018 for HTN emergency and ADHF with dynamic EKG changes.  Coronary angiography done 11/21/18 showed no CAD but severe MR.  TTE done 11/20/18 noted low normal LV systolic function, septal wall hypokinesis, grade 2 diastolic dysfunction, mild to moderate mitral regurgitation with mildly enlarged LA; Repeat on 12/14 with reportedly mild global LV dysfunction.  She presented on 12/12 with acute shortness of breath in the setting of significant hypertension.   She has been found to have critical proximal right renal artery stenosis.    - D/c Lasix given rise in BUN. Please continue to maintain strict I/Os, monitor daily weights, Cr, and K. Watch her Cr carefully  - Oxygen supplementation as necessary  - Vascular evaluation appreciated. Plan for renal angiogram Tuesday.  - BP remains labile. Continue with losartan 50 mg twice daily and clonidine 0.2 mg po tid  - labetalol has been discontinued after an RRT prior to the weekend because of significant drop in BP  - Can restart Plavix in setting of CVA  - Neurology evaluation appreciated  - PAT noted on telemetry yesterday, though no AF. Would continue to monitor. She is now off of her BB because of labile BP.  - c/w ASA and statin  - Will follow with you.

## 2018-12-17 NOTE — PROGRESS NOTE ADULT - SUBJECTIVE AND OBJECTIVE BOX
[facility] - Stefano Emmanuel, Aleksandar, Ricky, Avelina, Ramsey Servandobrittany  Office Number:  [phone]    Patient resting comfortably in bed in NAD.  Laying flat with no respiratory distress.  No complaints of chest pain, dyspnea, palpitations, PND, or orthopnea.  feeling much better today.    ROS: negative unless otherwise mentioned.    Telemetry:      MEDICATIONS  (STANDING):  aspirin  chewable 81 milliGRAM(s) Oral daily  atorvastatin 40 milliGRAM(s) Oral at bedtime  cloNIDine 0.2 milliGRAM(s) Oral three times a day  furosemide   Injectable 40 milliGRAM(s) IV Push daily  losartan 50 milliGRAM(s) Oral every 12 hours  sodium chloride 0.9%. 250 milliLiter(s) (250 mL/Hr) IV Continuous <Continuous>    MEDICATIONS  (PRN):      Allergies    Keflex (Unknown)  verapamil (Other)    Intolerances        Vital Signs Last 24 Hrs  T(C): 36.4 (17 Dec 2018 04:57), Max: 36.8 (16 Dec 2018 20:40)  T(F): 97.6 (17 Dec 2018 04:57), Max: 98.2 (16 Dec 2018 20:40)  HR: 67 (17 Dec 2018 04:57) (67 - 87)  BP: 148/75 (17 Dec 2018 04:57) (115/58 - 208/96)  BP(mean): --  RR: 17 (17 Dec 2018 04:57) (16 - 18)  SpO2: 97% (17 Dec 2018 04:57) (96% - 98%)    I&O's Summary    16 Dec 2018 07:01  -  17 Dec 2018 07:00  --------------------------------------------------------  IN: 850 mL / OUT: 1150 mL / NET: -300 mL        ON EXAM:    Constitutional: NAD, alert and oriented x 3  Lungs:  Non-labored, breath sounds are clear bilaterally, No wheezing, rales or rhonchi  Cardiovascular: RRR.  S1 and S2 positive.  No murmurs, rubs, gallops or clicks  Gastrointestinal: Bowel Sounds present, soft, nontender.   Lymph: Trace peripheral edema. No cervical lymphadenopathy.  Neurological: Alert, no focal deficits  Skin: No rashes or ulcers   Psych:  Mood & affect appropriate.      LABS: All Labs Reviewed:                        12.3   6.5   )-----------( 360      ( 16 Dec 2018 07:30 )             37.7                         10.9   5.17  )-----------( 311      ( 15 Dec 2018 07:48 )             33.9                         10.8   5.01  )-----------( 291      ( 14 Dec 2018 07:27 )             33.5     17 Dec 2018 05:26    141    |  101    |  53     ----------------------------<  129    3.8     |  27     |  1.22   16 Dec 2018 07:30    145    |  102    |  40     ----------------------------<  199    4.1     |  24     |  1.28   15 Dec 2018 06:31    142    |  103    |  32     ----------------------------<  117    3.5     |  24     |  1.22     Ca    9.4        17 Dec 2018 05:26  Ca    9.8        16 Dec 2018 07:30  Ca    9.4        15 Dec 2018 06:31  Mg     2.0       16 Dec 2018 07:30            Blood Culture: Organism --  Gram Stain Blood -- Gram Stain --  Specimen Source .Blood Blood-Peripheral  Culture-Blood --    Organism --  Gram Stain Blood -- Gram Stain --  Specimen Source .Urine Clean Catch (Midstream)  Culture-Blood -- [facility] - Stefano Emmanuel, Aleksandar, Ricky, Avelina, Ramsey Servandobrittany  Office Number:  [phone]    Patient resting comfortably in bed in NAD.  Laying flat with no respiratory distress.  No complaints of chest pain, dyspnea, palpitations, PND, or orthopnea.  feeling much better today.    ROS: negative unless otherwise mentioned.    Telemetry:  SR, 5 beats NSVT, PAT yesterday    MEDICATIONS  (STANDING):  aspirin  chewable 81 milliGRAM(s) Oral daily  atorvastatin 40 milliGRAM(s) Oral at bedtime  cloNIDine 0.2 milliGRAM(s) Oral three times a day  furosemide   Injectable 40 milliGRAM(s) IV Push daily  losartan 50 milliGRAM(s) Oral every 12 hours  sodium chloride 0.9%. 250 milliLiter(s) (250 mL/Hr) IV Continuous <Continuous>    MEDICATIONS  (PRN):      Allergies    Keflex (Unknown)  verapamil (Other)    Intolerances        Vital Signs Last 24 Hrs  T(C): 36.4 (17 Dec 2018 04:57), Max: 36.8 (16 Dec 2018 20:40)  T(F): 97.6 (17 Dec 2018 04:57), Max: 98.2 (16 Dec 2018 20:40)  HR: 67 (17 Dec 2018 04:57) (67 - 87)  BP: 148/75 (17 Dec 2018 04:57) (115/58 - 208/96)  BP(mean): --  RR: 17 (17 Dec 2018 04:57) (16 - 18)  SpO2: 97% (17 Dec 2018 04:57) (96% - 98%)    I&O's Summary    16 Dec 2018 07:01  -  17 Dec 2018 07:00  --------------------------------------------------------  IN: 850 mL / OUT: 1150 mL / NET: -300 mL        ON EXAM:    Constitutional: NAD, alert and oriented x 3  Lungs:  Non-labored, breath sounds are clear bilaterally, No wheezing, rales or rhonchi  Cardiovascular: RRR.  S1 and S2 positive.  No murmurs, rubs, gallops or clicks  Gastrointestinal: Bowel Sounds present, soft, nontender.   Lymph: Trace peripheral edema. No cervical lymphadenopathy.  Neurological: Alert, no focal deficits  Skin: No rashes or ulcers   Psych:  Mood & affect appropriate.      LABS: All Labs Reviewed:                        12.3   6.5   )-----------( 360      ( 16 Dec 2018 07:30 )             37.7                         10.9   5.17  )-----------( 311      ( 15 Dec 2018 07:48 )             33.9                         10.8   5.01  )-----------( 291      ( 14 Dec 2018 07:27 )             33.5     17 Dec 2018 05:26    141    |  101    |  53     ----------------------------<  129    3.8     |  27     |  1.22   16 Dec 2018 07:30    145    |  102    |  40     ----------------------------<  199    4.1     |  24     |  1.28   15 Dec 2018 06:31    142    |  103    |  32     ----------------------------<  117    3.5     |  24     |  1.22     Ca    9.4        17 Dec 2018 05:26  Ca    9.8        16 Dec 2018 07:30  Ca    9.4        15 Dec 2018 06:31  Mg     2.0       16 Dec 2018 07:30            Blood Culture: Organism --  Gram Stain Blood -- Gram Stain --  Specimen Source .Blood Blood-Peripheral  Culture-Blood --    Organism --  Gram Stain Blood -- Gram Stain --  Specimen Source .Urine Clean Catch (Midstream)  Culture-Blood --

## 2018-12-17 NOTE — PROGRESS NOTE ADULT - SUBJECTIVE AND OBJECTIVE BOX
Vascular Cardiology Consult Note    Subjective:  feeling much better, breathing comfortably, no sob, swelling in legs improved.  plan for renal US today.  BP cont to be not well controlled.     Allergies    Keflex (Unknown)  verapamil (Other)    Intolerances    	    MEDICATIONS:  MEDICATIONS  (STANDING):  aspirin  chewable 81 milliGRAM(s) Oral daily  atorvastatin 40 milliGRAM(s) Oral at bedtime  cloNIDine 0.2 milliGRAM(s) Oral three times a day  clopidogrel Tablet 75 milliGRAM(s) Oral daily  losartan 50 milliGRAM(s) Oral every 12 hours  sodium chloride 0.9%. 250 milliLiter(s) (250 mL/Hr) IV Continuous <Continuous>        PAST MEDICAL & SURGICAL HISTORY:  Renal artery stenosis: possible on doppler  Basal cell carcinoma  Mitral valve insufficiency, unspecified etiology: Cardiac cath on 11/21/18  Edema, unspecified type  Cerebrovascular accident (CVA), unspecified mechanism  Hyponatremia: ON HCTZ , h/o Hypokelemia  Dyslipidemia  Essential hypertension  UTI (urinary tract infection)  H/O bilateral hip replacements  History of cholecystectomy  Status post hysterectomy  S/P Mohs surgery for basal cell carcinoma      FAMILY HISTORY:  Family history of carotid artery stenosis (Sibling)  Family history of uterine cancer (Sibling)      SOCIAL HISTORY:  unchanged    REVIEW OF SYSTEMS:  CONSTITUTIONAL: No fever, weight loss, or fatigue  EYES: No eye pain, visual disturbances, or discharge  ENMT:  No difficulty hearing, tinnitus, vertigo; No sinus or throat pain  NECK: No pain or stiffness  RESPIRATORY: No cough, wheezing, chills or hemoptysis; No Shortness of Breath    CARDIOVASCULAR: No chest pain, palpitations, passing out, dizziness, or leg swelling    GASTROINTESTINAL: No abdominal or epigastric pain. No nausea, vomiting, or hematemesis; No diarrhea or constipation. No melena or hematochezia.  GENITOURINARY: No dysuria, frequency, hematuria, or incontinence  NEUROLOGICAL: No headaches, memory loss, loss of strength, numbness, or tremors  SKIN: No itching, burning, rashes, or lesions   LYMPH Nodes: No enlarged glands  ENDOCRINE: No heat or cold intolerance; No hair loss  MUSCULOSKELETAL: No joint pain or swelling; No muscle, back, or extremity pain  PSYCHIATRIC: No depression, anxiety, mood swings, or difficulty sleeping  HEME/LYMPH: No easy bruising, or bleeding gums  ALLERY AND IMMUNOLOGIC: No hives or eczema	    [ x] All others negative	  [ ] Unable to obtain    PHYSICAL EXAM:  Vital Signs Last 24 Hrs  T(C): 36.8 (17 Dec 2018 08:39), Max: 36.8 (16 Dec 2018 20:40)  T(F): 98.2 (17 Dec 2018 08:39), Max: 98.2 (16 Dec 2018 20:40)  HR: 94 (17 Dec 2018 08:39) (67 - 94)  BP: 204/76 (17 Dec 2018 08:43) (115/58 - 209/91)  BP(mean): --  RR: 18 (17 Dec 2018 08:39) (16 - 18)  SpO2: 97% (17 Dec 2018 08:39) (96% - 98%)      Appearance: Normal	  HEENT:   Normal oral mucosa, PERRL, EOMI	  Carotid:   Right:  No Bruit      Left:  No bruit  Lymphatic: No lymphadenopathy  Cardiovascular: Normal S1 S2, No JVD, No murmurs  Respiratory: Lungs clear to auscultation	  Psychiatry: A & O x 3, Mood & affect appropriate  Gastrointestinal:  Soft, Non-tender, + BS	  Skin: No rashes, No ecchymoses, No cyanosis	  Neurologic: Non-focal  Extremities: Normal range of motion.    Vascular Pulse Exam:  Right Femoral:  Palpable       Left Femoral:  Palpable  Right Popliteal:  Palpable      Left Popliteal:  Palpable  Right DP:  Palpable                 Left DP:  Palpable  Right PT:  Palpable                 Left DP:  Palpable    Foot Exam:  Warm, no ulceration.        LABS:	                         10.6   6.78  )-----------( 333      ( 17 Dec 2018 07:46 )             33.7   12-17    141  |  101  |  53<H>  ----------------------------<  129<H>  3.8   |  27  |  1.22    Ca    9.4      17 Dec 2018 05:26  Mg     2.0     12-16    	    EXAM: US DPLX CAROTIDS COMPL BI   PROCEDURE DATE: 12/13/2018   INTERPRETATION:     REASON FOR EXAM: stroke     TECHNIQUE: Duplex scan of the carotid arteries was performed. Spectral   Doppler analysis, in addition to color flow Doppler was performed using   gray-scale and color imaging.     COMPARISON: None.     FINDINGS:     RIGHT CAROTID ARTERY:   There is slightly tortuous but normal visualized distal right common carotid   artery. There are scattered noncalcified and calcified plaque without   significant stenosis at the distal right common carotid artery and at the   origin of the right internal carotid artery.     Peak systolic flow velocity (PSV) of the right common carotid artery is 87   cm/sec, and the systolic flow velocity (PSV) of the right internal carotid   artery is 79 cm/sec. There are normal Doppler waveforms. The ICA/CCA ratio   is 0.9.     The right external carotid artery is patent.     LEFT CAROTID ARTERY:   There is slightly tortuous but patent visualized distal left common carotid   artery, without intimal thickening. There are scattered atherosclerotic   plaque at the carotid bulb and origin of the left common carotid artery   without hemodynamically significant stenosis.     Peak systolic flow velocity (PSV) of the left common carotid artery is 61   cm/, and peak systolic flow velocity (PSV) of the left internal carotid   artery is 60 cm/sec. The ICA/CCA ratio is 1. There is a normal wave form   with a broad systolic peak and well-maintained diastolic flow of the left   internal carotid artery.     The left external carotid artery is patent.     VERTEBRAL ARTERIES:   There is antegrade flow in the bilateral vertebral arteries.     IMPRESSION:   Scattered atheromatous plaque at the origin of the internal carotid arteries   bilaterally but no hemodynamically significant stenosis or obstructive   disease.       EXAM: MR BRAIN       PROCEDURE DATE: 12/13/2018         INTERPRETATION: CLINICAL STATEMENT: Left facial droop     TECHNIQUE: MRI of the brain was performed without gadolinium.     COMPARISON: CT head 12/12/2018     FINDINGS:   There is moderate diffuse parenchymal volume loss. There are T2 prolongation   signal abnormalities in the periventricular subcortical white matter likely   related to severe chronic microvascular ischemic changes.     Chronic infarct left parietal occipital region with ex vacuo dilatation of   adjacent left lateral ventricle.     1.1 cm meningioma noted in the right frontal parietal region.     There is no acute parenchymal hemorrhage, parenchymal mass, or midline   shift. There is no extra-axial fluid collection. There is no hydrocephalus.       6 x 3 mm acute infarct noted in the right centrum semiovale. 6 x 3 mm acute   infarct noted in the posterior limb right internal capsule.     Partial opacification of right mastoid air cells. Mucosal thickening   paranasal sinuses.     IMPRESSION:   Small acute infarcts in the right centrum semiovale and posterior limb of   right internal capsule as described above.     No acute intracranial hemorrhage       EXAM: CT ANGIO ABD PELV DEREK(W)AW IC       PROCEDURE DATE: 12/12/2018         INTERPRETATION: CTA of the Abdomen with contrast and CT of the Pelvis with   contrast     HISTORY: Uncontrolled hypertension. Assess for possible renal artery   stenosis.     TECHNIQUE: Multiple axial scans of the abdomen and pelvis were obtained   after administration of IV and bowel contrast material. Additional   sagittal, coronal and MIP angiographic images were then created from the   acquired axial data.     Interpretation: Liver: No focal lesions.     Biliary tree: Dilated within the liver and to a diameter of 11 mm at the   distal CBD level which may be physiologic considering the patient has   undergone cholecystectomy.     Solid organs: Bilateral renal cysts.     Retroperitoneum: Tight stenosis of the proximal right renal artery and mild   to moderate narrowing of the proximal left renal artery. 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 The common iliac arteries are ectatic measuring 15 mm in   diameter on the left and 14 mm in diameter on the right.     Bowel: No evidence of ascites, bowel obstruction or free air.     Appendix: Unremarkable     Urinary bladder: Obscured by metal artifact from patient's bilateral hip   replacements.     Pelvis: Shows no free fluid.     The inguinal regions are unremarkable.     The lung bases show small pleural effusions with atelectasis of the adjacent   lower lobes.       IMPRESSION: High-grade stenosis, proximal right renal artery. Enlarged   arteries as described.       The above study was reviewed by the offsite overnight image reading service   at the time of the examination.     Thank you for this referral.

## 2018-12-17 NOTE — PROGRESS NOTE ADULT - SUBJECTIVE AND OBJECTIVE BOX
SUBJECTIVE: No new neurologic events overnight.  No new neurologic complaints.  feels stronger      Medications:  aspirin  chewable 81 milliGRAM(s) Oral daily  atorvastatin 40 milliGRAM(s) Oral at bedtime  cloNIDine 0.2 milliGRAM(s) Oral three times a day  clopidogrel Tablet 75 milliGRAM(s) Oral daily  losartan 50 milliGRAM(s) Oral every 12 hours  sodium chloride 0.9%. 250 milliLiter(s) IV Continuous <Continuous>      Labs:  CBC Full  -  ( 17 Dec 2018 07:46 )  WBC Count : 6.78 K/uL  Hemoglobin : 10.6 g/dL  Hematocrit : 33.7 %  Platelet Count - Automated : 333 K/uL  Mean Cell Volume : 91.8 fl  Mean Cell Hemoglobin : 28.9 pg  Mean Cell Hemoglobin Concentration : 31.5 gm/dL  Auto Neutrophil # : x  Auto Lymphocyte # : x  Auto Monocyte # : x  Auto Eosinophil # : x  Auto Basophil # : x  Auto Neutrophil % : x  Auto Lymphocyte % : x  Auto Monocyte % : x  Auto Eosinophil % : x  Auto Basophil % : x    12-17    141  |  101  |  53<H>  ----------------------------<  129<H>  3.8   |  27  |  1.22    Ca    9.4      17 Dec 2018 05:26  Mg     2.0     12-16      CAPILLARY BLOOD GLUCOSE                  Vitals:  Vital Signs Last 24 Hrs  T(C): 36.8 (17 Dec 2018 08:39), Max: 36.8 (16 Dec 2018 20:40)  T(F): 98.2 (17 Dec 2018 08:39), Max: 98.2 (16 Dec 2018 20:40)  HR: 94 (17 Dec 2018 08:39) (67 - 94)  BP: 204/76 (17 Dec 2018 08:43) (115/58 - 209/91)  BP(mean): --  RR: 18 (17 Dec 2018 08:39) (16 - 18)  SpO2: 97% (17 Dec 2018 08:39) (96% - 98%)  NEUROLOGICAL EXAM:    Mental status: Awake, alert, and in no apparent distress. Oriented to person, place and time. Language function is normal. Recent memory, digit span and concentration were normal. dysarthria    Cranial Nerves: Pupils were equal, round, reactive to light. Extraocular movements were intact. Visual field were full. Fundoscopic exam was deferred. Facial sensation was intact to light touch. There was left umn facial. The palate was upgoing symmetrically and tongue was midline. Hearing acuity was intact to finger rub AU. Shoulder shrug was full bilaterally    Motor exam: Bulk and tone were normal. Strength was 5/5 except left hand 4/5.  dec FFM on left abnl left arm roll    Reflexes: 2+ in the bilateral upper extremities. 2+ in the bilateral lower extremities. Toes were downgoing bilaterally.     Sensation: Intact to light touch, temperature, vibration and proprioception.     Coordination: Finger-nose-finger and heel-to-shin was without dysmetria.     Gait: deferred      NIHSS 2  Imaging:    EXAM:  MR ANGIO BRAIN                            PROCEDURE DATE:  12/13/2018          INTERPRETATION:  CLINICAL STATEMENT: Evaluate for stenosis. CVA    TECHNIQUE: MRA of the head was performed without gadolinium. 3-D MIP   images were obtained.    COMPARISON: None.    FINDINGS:  Narrowing of supraclinoid internal carotid arteries noted    The proximal anterior, middle and posterior cerebral arteries are patent.   Multifocal stenosis involving proximal M2 branches of bilateral middle   cerebralarteries and distal M2 branches of left middle cerebral artery.   Mild focal narrowing at the proximal left posterior cerebral artery.    The intracranial vertebral and basilar arteries are patent. Dominant   right vertebral artery noted.     There is no MR evidence of intracranial aneurysm.    IMPRESSION:  Multifocal intracranial stenoses as described above                LORNA ARNOLD M.D., ATTENDING RADIOLOGIST  This document has been electronically signed. Dec 13 2018 12:48PM        EXAM:  MR BRAIN                            PROCEDURE DATE:  12/13/2018          INTERPRETATION:  CLINICAL STATEMENT: Left facial droop    TECHNIQUE: MRI of the brain was performed without gadolinium.    COMPARISON: CT head 12/12/2018    FINDINGS:  There is moderate diffuse parenchymal volume loss. There are T2   prolongation signal abnormalities in the periventricular subcortical   white matter likely related to severe chronic microvascular ischemic   changes.    Chronic infarct left parietal occipital region with ex vacuo dilatation   of adjacent left lateral ventricle.    1.1 cm meningioma noted in the right frontal parietal region.    There is no acute parenchymal hemorrhage, parenchymal mass, or midline   shift. There is no extra-axial fluid collection.  There is no   hydrocephalus.      6 x 3 mm acute infarct noted in the right centrum semiovale. 6 x 3 mm   acute infarct noted in the posterior limb right internal capsule.    Partial opacification of right mastoid air cells. Mucosalthickening   paranasal sinuses.    IMPRESSION:  Small acute infarcts in the right centrum semiovale and posterior limb of   right internal capsule as described above.    No acute intracranial hemorrhage                LORNA ARNOLD M.D., ATTENDING RADIOLOGIST  This document has been electronically signed. Dec 13 2018 12:42PM

## 2018-12-17 NOTE — PROGRESS NOTE ADULT - ASSESSMENT
Assessment:  1. HTN - high grade stenosis in right renal artery seen on CT likely contributory to uncontrolled BP.  BP is labile, systolic readings of 200 today.  Currently on losartan 50mg BID and clonidine 0.2mg PO TID.  Labetalol had been dc due to significant hypotension.  2. Pleural effusions - lungs clear, no sob, lasix stopped today as pt euvolemic and significant rise in BUN.    2. Distal/hilar splenic artery 11mm   3. 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 CVA     Plan:  1. Continue anti-hypertensive medications, aspirin and statin.  2. Renal artery duplex study pending  3. Plan for renal angiogram tomorrow.        Please call with any questions:   [id number] - [phone]

## 2018-12-17 NOTE — PROGRESS NOTE ADULT - ASSESSMENT
This is a 87y Female, consult for CVA, found several days ago with left sided weakness and slurred speech, later more of a facial.  MRI brain with acute CR and internal capsule infarcts as two separate infarcts.  Though separate, favor thrombotic etiology with intracranial MCA stenosis with issues with labile BP, and less so cardioembolic.    exam improving    Plan:  - continue asa and lipitor  - now back on plavix  - management renal artery stenosis  - continue slowly lower blood pressure as has risk of stroke worsening.  goal -160.  that being said exam better on daily basis  - tele rule out afib,.  Had run of PAT  - PT  - speech/swallow eval  - neuro checks  - stroke education  - DVT prophylaxis  d/w pt and son at bedside

## 2018-12-17 NOTE — PROGRESS NOTE ADULT - SUBJECTIVE AND OBJECTIVE BOX
SUBJECTIVE / OVERNIGHT EVENTS: pt denies chest pain, shortness of breath, nausea, vomiting  headache     MEDICATIONS  (STANDING):  aspirin  chewable 81 milliGRAM(s) Oral daily  atorvastatin 40 milliGRAM(s) Oral at bedtime  cloNIDine 0.2 milliGRAM(s) Oral three times a day  clopidogrel Tablet 75 milliGRAM(s) Oral daily  losartan 50 milliGRAM(s) Oral every 12 hours  sodium chloride 0.9%. 250 milliLiter(s) (250 mL/Hr) IV Continuous <Continuous>    MEDICATIONS  (PRN):      Vital Signs Last 24 Hrs  T(C): 36.8 (17 Dec 2018 08:39), Max: 36.8 (16 Dec 2018 20:40)  T(F): 98.2 (17 Dec 2018 08:39), Max: 98.2 (16 Dec 2018 20:40)  HR: 93 (17 Dec 2018 09:55) (67 - 94)  BP: 176/78 (17 Dec 2018 09:55) (115/58 - 209/91)  BP(mean): --  RR: 18 (17 Dec 2018 08:39) (16 - 18)  SpO2: 97% (17 Dec 2018 08:39) (96% - 98%)    Constitutional: No fever, fatigue  Skin: No rash.  Eyes: No recent vision problems or eye pain.  ENT: No congestion, ear pain, or sore throat.  Cardiovascular: No chest pain or palpation.  Respiratory: No cough, shortness of breath, congestion, or wheezing.  Gastrointestinal: No abdominal pain, nausea, vomiting, or diarrhea.  Genitourinary: No dysuria.  Musculoskeletal: No joint swelling.  Neurologic: No headache.    PHYSICAL EXAM:  GENERAL: NAD  EYES: EOMI, PERRLA  NECK: Supple, No JVD  CHEST/LUNG: dec breath sounds at base  HEART:  S1 , S2 +  ABDOMEN: soft, bs+  EXTREMITIES:  no edema  NEUROLOGY:alert awake oriented to place, person     LABS:      141  |  101  |  53<H>  ----------------------------<  129<H>  3.8   |  27  |  1.22    Ca    9.4      17 Dec 2018 05:26  Mg     2.0     12-      Creatinine Trend: 1.22 <--, 1.28 <--, 1.22 <--, 1.15 <--, 1.00 <--, 0.98 <--                        10.6   6.78  )-----------( 333      ( 17 Dec 2018 07:46 )             33.7     Urine Studies:  Urinalysis Basic - ( 12 Dec 2018 16:18 )    Color: Yellow / Appearance: Clear / S.010 / pH:   Gluc:  / Ketone: Negative  / Bili: Negative / Urobili: Negative   Blood:  / Protein: 75 mg/dL / Nitrite: Negative   Leuk Esterase: Negative / RBC: Negative /HPF / WBC 0-2   Sq Epi:  / Non Sq Epi: Occasional / Bacteria: Negative                    Imaging Personally Reviewed:    Consultant(s) Notes Reviewed:      Care Discussed with Consultants/Other Providers:

## 2018-12-18 LAB
ANION GAP SERPL CALC-SCNC: 13 MMOL/L — SIGNIFICANT CHANGE UP (ref 5–17)
BUN SERPL-MCNC: 45 MG/DL — HIGH (ref 7–23)
CALCIUM SERPL-MCNC: 9.6 MG/DL — SIGNIFICANT CHANGE UP (ref 8.4–10.5)
CHLORIDE SERPL-SCNC: 104 MMOL/L — SIGNIFICANT CHANGE UP (ref 96–108)
CO2 SERPL-SCNC: 25 MMOL/L — SIGNIFICANT CHANGE UP (ref 22–31)
CREAT SERPL-MCNC: 1 MG/DL — SIGNIFICANT CHANGE UP (ref 0.5–1.3)
GLUCOSE SERPL-MCNC: 118 MG/DL — HIGH (ref 70–99)
HCT VFR BLD CALC: 36.6 % — SIGNIFICANT CHANGE UP (ref 34.5–45)
HGB BLD-MCNC: 11.4 G/DL — LOW (ref 11.5–15.5)
MCHC RBC-ENTMCNC: 29.8 PG — SIGNIFICANT CHANGE UP (ref 27–34)
MCHC RBC-ENTMCNC: 31.1 GM/DL — LOW (ref 32–36)
MCV RBC AUTO: 95.8 FL — SIGNIFICANT CHANGE UP (ref 80–100)
PLATELET # BLD AUTO: 323 K/UL — SIGNIFICANT CHANGE UP (ref 150–400)
POTASSIUM SERPL-MCNC: 3.8 MMOL/L — SIGNIFICANT CHANGE UP (ref 3.5–5.3)
POTASSIUM SERPL-SCNC: 3.8 MMOL/L — SIGNIFICANT CHANGE UP (ref 3.5–5.3)
RBC # BLD: 3.82 M/UL — SIGNIFICANT CHANGE UP (ref 3.8–5.2)
RBC # FLD: 13.2 % — SIGNIFICANT CHANGE UP (ref 10.3–14.5)
SODIUM SERPL-SCNC: 142 MMOL/L — SIGNIFICANT CHANGE UP (ref 135–145)
WBC # BLD: 6 K/UL — SIGNIFICANT CHANGE UP (ref 3.8–10.5)
WBC # FLD AUTO: 6 K/UL — SIGNIFICANT CHANGE UP (ref 3.8–10.5)

## 2018-12-18 PROCEDURE — 36245 INS CATH ABD/L-EXT ART 1ST: CPT

## 2018-12-18 PROCEDURE — 99232 SBSQ HOSP IP/OBS MODERATE 35: CPT

## 2018-12-18 PROCEDURE — 37236 OPEN/PERQ PLACE STENT 1ST: CPT

## 2018-12-18 RX ORDER — ACETAMINOPHEN 500 MG
650 TABLET ORAL ONCE
Qty: 0 | Refills: 0 | Status: COMPLETED | OUTPATIENT
Start: 2018-12-18 | End: 2018-12-18

## 2018-12-18 RX ORDER — SODIUM CHLORIDE 9 MG/ML
1000 INJECTION INTRAMUSCULAR; INTRAVENOUS; SUBCUTANEOUS
Qty: 0 | Refills: 0 | Status: DISCONTINUED | OUTPATIENT
Start: 2018-12-18 | End: 2018-12-19

## 2018-12-18 RX ORDER — HYDRALAZINE HCL 50 MG
5 TABLET ORAL ONCE
Qty: 0 | Refills: 0 | Status: DISCONTINUED | OUTPATIENT
Start: 2018-12-18 | End: 2018-12-18

## 2018-12-18 RX ORDER — SODIUM CHLORIDE 9 MG/ML
1000 INJECTION INTRAMUSCULAR; INTRAVENOUS; SUBCUTANEOUS
Qty: 0 | Refills: 0 | Status: DISCONTINUED | OUTPATIENT
Start: 2018-12-18 | End: 2018-12-18

## 2018-12-18 RX ORDER — HYDRALAZINE HCL 50 MG
25 TABLET ORAL EVERY 8 HOURS
Qty: 0 | Refills: 0 | Status: DISCONTINUED | OUTPATIENT
Start: 2018-12-18 | End: 2018-12-20

## 2018-12-18 RX ADMIN — Medication 25 MILLIGRAM(S): at 21:15

## 2018-12-18 RX ADMIN — Medication 0.2 MILLIGRAM(S): at 21:16

## 2018-12-18 RX ADMIN — Medication 81 MILLIGRAM(S): at 13:13

## 2018-12-18 RX ADMIN — Medication 650 MILLIGRAM(S): at 22:45

## 2018-12-18 RX ADMIN — LOSARTAN POTASSIUM 50 MILLIGRAM(S): 100 TABLET, FILM COATED ORAL at 18:12

## 2018-12-18 RX ADMIN — Medication 650 MILLIGRAM(S): at 22:02

## 2018-12-18 RX ADMIN — Medication 0.2 MILLIGRAM(S): at 13:13

## 2018-12-18 RX ADMIN — CLOPIDOGREL BISULFATE 75 MILLIGRAM(S): 75 TABLET, FILM COATED ORAL at 13:13

## 2018-12-18 RX ADMIN — SODIUM CHLORIDE 75 MILLILITER(S): 9 INJECTION INTRAMUSCULAR; INTRAVENOUS; SUBCUTANEOUS at 18:12

## 2018-12-18 RX ADMIN — ATORVASTATIN CALCIUM 40 MILLIGRAM(S): 80 TABLET, FILM COATED ORAL at 21:15

## 2018-12-18 RX ADMIN — Medication 0.2 MILLIGRAM(S): at 05:31

## 2018-12-18 RX ADMIN — LOSARTAN POTASSIUM 50 MILLIGRAM(S): 100 TABLET, FILM COATED ORAL at 05:29

## 2018-12-18 NOTE — PROGRESS NOTE ADULT - SUBJECTIVE AND OBJECTIVE BOX
SUBJECTIVE: No new neurologic events overnight.  In bed. Asking about discharge    Medications:  aspirin  chewable 81 milliGRAM(s) Oral daily  atorvastatin 40 milliGRAM(s) Oral at bedtime  cloNIDine 0.2 milliGRAM(s) Oral three times a day  clopidogrel Tablet 75 milliGRAM(s) Oral daily  losartan 50 milliGRAM(s) Oral every 12 hours  sodium chloride 0.9%. 250 milliLiter(s) IV Continuous <Continuous>  sodium chloride 0.9%. 1000 milliLiter(s) IV Continuous <Continuous>      Labs:  CBC Full  -  ( 18 Dec 2018 08:10 )  WBC Count : 6.00 K/uL  Hemoglobin : 11.4 g/dL  Hematocrit : 36.6 %  Platelet Count - Automated : 323 K/uL  Mean Cell Volume : 95.8 fl  Mean Cell Hemoglobin : 29.8 pg  Mean Cell Hemoglobin Concentration : 31.1 gm/dL  Auto Neutrophil # : x  Auto Lymphocyte # : x  Auto Monocyte # : x  Auto Eosinophil # : x  Auto Basophil # : x  Auto Neutrophil % : x  Auto Lymphocyte % : x  Auto Monocyte % : x  Auto Eosinophil % : x  Auto Basophil % : x    12-18    142  |  104  |  45<H>  ----------------------------<  118<H>  3.8   |  25  |  1.00    Ca    9.6      18 Dec 2018 06:48      CAPILLARY BLOOD GLUCOSE                  Vitals:  Vital Signs Last 24 Hrs  T(C): 36.4 (18 Dec 2018 09:57), Max: 36.8 (17 Dec 2018 12:20)  T(F): 97.5 (18 Dec 2018 09:57), Max: 98.3 (17 Dec 2018 16:44)  HR: 91 (18 Dec 2018 09:57) (61 - 94)  BP: 194/88 (18 Dec 2018 09:57) (133/66 - 194/88)  BP(mean): --  RR: 18 (18 Dec 2018 09:57) (17 - 18)  SpO2: 99% (18 Dec 2018 09:57) (95% - 99%)    NEUROLOGICAL EXAM:    Mental status: Awake, alert, and in no apparent distress. Oriented to person, place and time. Language function is normal. Recent memory, digit span and concentration were normal. dysarthria    Cranial Nerves: Pupils were equal, round, reactive to light. Extraocular movements were intact. Visual field were full. Fundoscopic exam was deferred. Facial sensation was intact to light touch. There was left umn facial. The palate was upgoing symmetrically and tongue was midline. Hearing acuity was intact to finger rub AU. Shoulder shrug was full bilaterally    Motor exam: Bulk and tone were normal. Strength was 5/5 except left hand 4/5.  dec FFM on left abnl left arm roll    Reflexes: 2+ in the bilateral upper extremities. 2+ in the bilateral lower extremities. Toes were downgoing bilaterally.     Sensation: Intact to light touch, temperature, vibration and proprioception.     Coordination: Finger-nose-finger and heel-to-shin was without dysmetria.     Gait: deferred      NIHSS 2  Imaging:    EXAM:  MR ANGIO BRAIN                            PROCEDURE DATE:  12/13/2018          INTERPRETATION:  CLINICAL STATEMENT: Evaluate for stenosis. CVA    TECHNIQUE: MRA of the head was performed without gadolinium. 3-D MIP   images were obtained.    COMPARISON: None.    FINDINGS:  Narrowing of supraclinoid internal carotid arteries noted    The proximal anterior, middle and posterior cerebral arteries are patent.   Multifocal stenosis involving proximal M2 branches of bilateral middle   cerebralarteries and distal M2 branches of left middle cerebral artery.   Mild focal narrowing at the proximal left posterior cerebral artery.    The intracranial vertebral and basilar arteries are patent. Dominant   right vertebral artery noted.     There is no MR evidence of intracranial aneurysm.    IMPRESSION:  Multifocal intracranial stenoses as described above                LORNA ARNOLD M.D., ATTENDING RADIOLOGIST  This document has been electronically signed. Dec 13 2018 12:48PM        EXAM:  MR BRAIN                            PROCEDURE DATE:  12/13/2018          INTERPRETATION:  CLINICAL STATEMENT: Left facial droop    TECHNIQUE: MRI of the brain was performed without gadolinium.    COMPARISON: CT head 12/12/2018    FINDINGS:  There is moderate diffuse parenchymal volume loss. There are T2   prolongation signal abnormalities in the periventricular subcortical   white matter likely related to severe chronic microvascular ischemic   changes.    Chronic infarct left parietal occipital region with ex vacuo dilatation   of adjacent left lateral ventricle.    1.1 cm meningioma noted in the right frontal parietal region.    There is no acute parenchymal hemorrhage, parenchymal mass, or midline   shift. There is no extra-axial fluid collection.  There is no   hydrocephalus.      6 x 3 mm acute infarct noted in the right centrum semiovale. 6 x 3 mm   acute infarct noted in the posterior limb right internal capsule.    Partial opacification of right mastoid air cells. Mucosalthickening   paranasal sinuses.    IMPRESSION:  Small acute infarcts in the right centrum semiovale and posterior limb of   right internal capsule as described above.    No acute intracranial hemorrhage                LORNA ARNOLD M.D., ATTENDING RADIOLOGIST  This document has been electronically signed. Dec 13 2018 12:42PM

## 2018-12-18 NOTE — PROGRESS NOTE ADULT - ASSESSMENT
Assessment:  1. HTN - high grade stenosis in right renal artery seen on CT and confirmed on renal US. likely contributory to uncontrolled BP.    2. Pulmonary Edema -  resolved, euvolemic, off lasix  3 Elevated BUN - likely overdiuresis  2. Distal/hilar splenic artery 11mm   3. 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 CVA     Plan:  1. Continue losartan 50mg BID and clonidine 0.2mg PO TID, aspirin, and statin.  Appreciate general cardiology recs.    2. Plan for renal angiogram this afternoon with Dr. Ríos.    3 Continue gentle hydration for likely overdiuresis, elevated BUN, improving with IVF.        Hugo Tello MD  Vascular Cardiology Fellow  Please call with any questions:   [id number] [phone]

## 2018-12-18 NOTE — PROGRESS NOTE ADULT - ASSESSMENT
Mrs. Mueller is an 86 yo F with PMH of malignant HTN, dyslipidemia, CVA, hyponatremia, recurrent UTI with recent admission in 11/2018 for HTN emergency and ADHF with dynamic EKG changes.  Coronary angiography done 11/21/18 showed no CAD but severe MR.  TTE done 11/20/18 noted low normal LV systolic function, septal wall hypokinesis, grade 2 diastolic dysfunction, mild to moderate mitral regurgitation with mildly enlarged LA; Repeat on 12/14 with reportedly mild global LV dysfunction.  She presented on 12/12 with acute shortness of breath in the setting of significant hypertension.   She has been found to have critical proximal right renal artery stenosis.    - now off Lasix given rise in BUN. Renal function improved this morning. Please continue to maintain strict I/Os, monitor daily weights, Cr, and K.   - Vascular evaluation appreciated. Plan for renal angiogram today. Renal US confirms significant right BOB  - BP remains labile. Continue with losartan 50 mg twice daily and clonidine 0.2 mg po tid  - labetalol has been discontinued after an RRT prior to the weekend because of significant drop in BP  - Continue with ASA and Plavix, in setting of recent CVA  - Neurology evaluation appreciated  - PAT noted on telemetry yesterday, though no AF. Would continue to monitor. She is now off of her BB because of labile BP.  - c/w ASA and statin  - Will follow with you.

## 2018-12-18 NOTE — PROGRESS NOTE ADULT - SUBJECTIVE AND OBJECTIVE BOX
SUBJECTIVE / OVERNIGHT EVENTS: pt denies chest pain, shortness of breath, nausea, vomiting  headache     MEDICATIONS  (STANDING):  aspirin  chewable 81 milliGRAM(s) Oral daily  atorvastatin 40 milliGRAM(s) Oral at bedtime  cloNIDine 0.2 milliGRAM(s) Oral three times a day  clopidogrel Tablet 75 milliGRAM(s) Oral daily  losartan 50 milliGRAM(s) Oral every 12 hours  sodium chloride 0.9%. 250 milliLiter(s) (250 mL/Hr) IV Continuous <Continuous>  sodium chloride 0.9%. 1000 milliLiter(s) (50 mL/Hr) IV Continuous <Continuous>    MEDICATIONS  (PRN):    Vital Signs Last 24 Hrs  T(C): 36.7 (18 Dec 2018 12:01), Max: 36.8 (17 Dec 2018 16:44)  T(F): 98.1 (18 Dec 2018 12:01), Max: 98.3 (17 Dec 2018 16:44)  HR: 77 (18 Dec 2018 12:01) (61 - 94)  BP: 181/82 (18 Dec 2018 12:01) (133/66 - 194/88)  BP(mean): --  RR: 18 (18 Dec 2018 12:01) (17 - 18)  SpO2: 97% (18 Dec 2018 12:01) (95% - 99%)    Constitutional: No fever, fatigue  Skin: No rash.  Eyes: No recent vision problems or eye pain.  ENT: No congestion, ear pain, or sore throat.  Cardiovascular: No chest pain or palpation.  Respiratory: No cough, shortness of breath, congestion, or wheezing.  Gastrointestinal: No abdominal pain, nausea, vomiting, or diarrhea.  Genitourinary: No dysuria.  Musculoskeletal: No joint swelling.  Neurologic: No headache.    PHYSICAL EXAM:  GENERAL: NAD  EYES: EOMI, PERRLA  NECK: Supple, No JVD  CHEST/LUNG: dec breath sounds at base  HEART:  S1 , S2 +  ABDOMEN: soft, bs+  EXTREMITIES:  no edema  NEUROLOGY:alert awake oriented to place, person     LABS:      142  |  104  |  45<H>  ----------------------------<  118<H>  3.8   |  25  |  1.00    Ca    9.6      18 Dec 2018 06:48      Creatinine Trend: 1.00 <--, 1.22 <--, 1.28 <--, 1.22 <--, 1.15 <--, 1.00 <--, 0.98 <--                        11.4   6.00  )-----------( 323      ( 18 Dec 2018 08:10 )             36.6     Urine Studies:  Urinalysis Basic - ( 12 Dec 2018 16:18 )    Color: Yellow / Appearance: Clear / S.010 / pH:   Gluc:  / Ketone: Negative  / Bili: Negative / Urobili: Negative   Blood:  / Protein: 75 mg/dL / Nitrite: Negative   Leuk Esterase: Negative / RBC: Negative /HPF / WBC 0-2   Sq Epi:  / Non Sq Epi: Occasional / Bacteria: Negative

## 2018-12-18 NOTE — PROGRESS NOTE ADULT - ASSESSMENT
This is a 87y Female, consult for CVA, found several days ago with left sided weakness and slurred speech, later more of a facial.  MRI brain with acute CR and internal capsule infarcts as two separate infarcts.  Though separate, favor thrombotic etiology with intracranial MCA stenosis with issues with labile BP, and less so cardioembolic.    exam improving    Plan:  - continue asa, plavix and lipitor  - management renal artery stenosis. No neurologic objection to angiogram.   - Optimize BP  - tele rule out afib,.  Had run of PAT  - PT  - speech/swallow eval  - neuro checks  - stroke education  - DVT prophylaxis  - Dispo planning when medically cleared.

## 2018-12-18 NOTE — PROGRESS NOTE ADULT - SUBJECTIVE AND OBJECTIVE BOX
[facility] - Stefano Emmanuel, Aleksandar, Ricky, Avelina, Maulik Mustafa  Office Number:  [phone]    Patient resting comfortably in bed in NAD.  Laying flat with no respiratory distress.  No complaints of chest pain, dyspnea, palpitations, PND, or orthopnea.  anxious for the procedure.    ROS: negative unless otherwise mentioned.    Telemetry:  SR     MEDICATIONS  (STANDING):  aspirin  chewable 81 milliGRAM(s) Oral daily  atorvastatin 40 milliGRAM(s) Oral at bedtime  cloNIDine 0.2 milliGRAM(s) Oral three times a day  clopidogrel Tablet 75 milliGRAM(s) Oral daily  losartan 50 milliGRAM(s) Oral every 12 hours  sodium chloride 0.9%. 250 milliLiter(s) (250 mL/Hr) IV Continuous <Continuous>  sodium chloride 0.9%. 1000 milliLiter(s) (50 mL/Hr) IV Continuous <Continuous>    MEDICATIONS  (PRN):      Allergies    Keflex (Unknown)  verapamil (Other)    Intolerances        Vital Signs Last 24 Hrs  T(C): 36.3 (18 Dec 2018 04:04), Max: 36.8 (17 Dec 2018 08:39)  T(F): 97.4 (18 Dec 2018 04:04), Max: 98.3 (17 Dec 2018 16:44)  HR: 75 (18 Dec 2018 05:26) (61 - 94)  BP: 185/75 (18 Dec 2018 05:26) (133/66 - 209/91)  BP(mean): --  RR: 18 (18 Dec 2018 04:04) (17 - 18)  SpO2: 99% (18 Dec 2018 04:04) (95% - 99%)    I&O's Summary    17 Dec 2018 07:01  -  18 Dec 2018 07:00  --------------------------------------------------------  IN: 1115 mL / OUT: 1350 mL / NET: -235 mL        ON EXAM:      Constitutional: NAD, alert and oriented x 3  Lungs:  Non-labored, breath sounds are clear bilaterally, No wheezing, rales or rhonchi  Cardiovascular: RRR.  S1 and S2 positive.  No murmurs, rubs, gallops or clicks  Gastrointestinal: Bowel Sounds present, soft, nontender.   Lymph: Trace peripheral edema. No cervical lymphadenopathy.  Neurological: Alert, no focal deficits  Skin: No rashes or ulcers   Psych:  Mood & affect appropriate.    LABS: All Labs Reviewed:                        10.6   6.78  )-----------( 333      ( 17 Dec 2018 07:46 )             33.7                         12.3   6.5   )-----------( 360      ( 16 Dec 2018 07:30 )             37.7     18 Dec 2018 06:48    142    |  104    |  45     ----------------------------<  118    3.8     |  25     |  1.00   17 Dec 2018 05:26    141    |  101    |  53     ----------------------------<  129    3.8     |  27     |  1.22   16 Dec 2018 07:30    145    |  102    |  40     ----------------------------<  199    4.1     |  24     |  1.28     Ca    9.6        18 Dec 2018 06:48  Ca    9.4        17 Dec 2018 05:26  Ca    9.8        16 Dec 2018 07:30  Mg     2.0       16 Dec 2018 07:30            Blood Culture:

## 2018-12-18 NOTE — CHART NOTE - NSCHARTNOTEFT_GEN_A_CORE
s/p Right renal stent via right fem artery with Perclose. VSS post procedure HR 67   /71   IVF overnight and added Hydralazine  Q8 hours per Dr. Ríos   Continue ASA and Plavix   Monitor overnight

## 2018-12-18 NOTE — PROGRESS NOTE ADULT - SUBJECTIVE AND OBJECTIVE BOX
Vascular Cardiology Consult Note    Subjective:  no sob or cp.  BP still elevated systolic 170s-180s.  renal US confirmed high grade stenosis origin of RRA.  For renal angiogram this afternoon.     Allergies    Keflex (Unknown)  verapamil (Other)    Intolerances    	    MEDICATIONS  (STANDING):  aspirin  chewable 81 milliGRAM(s) Oral daily  atorvastatin 40 milliGRAM(s) Oral at bedtime  cloNIDine 0.2 milliGRAM(s) Oral three times a day  clopidogrel Tablet 75 milliGRAM(s) Oral daily  losartan 50 milliGRAM(s) Oral every 12 hours  sodium chloride 0.9%. 250 milliLiter(s) (250 mL/Hr) IV Continuous <Continuous>  sodium chloride 0.9%. 1000 milliLiter(s) (50 mL/Hr) IV Continuous <Continuous>        PAST MEDICAL & SURGICAL HISTORY:  Renal artery stenosis: possible on doppler  Basal cell carcinoma  Mitral valve insufficiency, unspecified etiology: Cardiac cath on 11/21/18  Edema, unspecified type  Cerebrovascular accident (CVA), unspecified mechanism  Hyponatremia: ON HCTZ , h/o Hypokelemia  Dyslipidemia  Essential hypertension  UTI (urinary tract infection)  H/O bilateral hip replacements  History of cholecystectomy  Status post hysterectomy  S/P Mohs surgery for basal cell carcinoma      FAMILY HISTORY:  Family history of carotid artery stenosis (Sibling)  Family history of uterine cancer (Sibling)      SOCIAL HISTORY:  unchanged    REVIEW OF SYSTEMS:  CONSTITUTIONAL: No fever, weight loss, or fatigue  EYES: No eye pain, visual disturbances, or discharge  ENMT:  No difficulty hearing, tinnitus, vertigo; No sinus or throat pain  NECK: No pain or stiffness  RESPIRATORY: No cough, wheezing, chills or hemoptysis; No Shortness of Breath    CARDIOVASCULAR: No chest pain, palpitations, passing out, dizziness, or leg swelling    GASTROINTESTINAL: No abdominal or epigastric pain. No nausea, vomiting, or hematemesis; No diarrhea or constipation. No melena or hematochezia.  GENITOURINARY: No dysuria, frequency, hematuria, or incontinence  NEUROLOGICAL: No headaches, memory loss, loss of strength, numbness, or tremors  SKIN: No itching, burning, rashes, or lesions   LYMPH Nodes: No enlarged glands  ENDOCRINE: No heat or cold intolerance; No hair loss  MUSCULOSKELETAL: No joint pain or swelling; No muscle, back, or extremity pain  PSYCHIATRIC: No depression, anxiety, mood swings, or difficulty sleeping  HEME/LYMPH: No easy bruising, or bleeding gums  ALLERY AND IMMUNOLOGIC: No hives or eczema	    [ x] All others negative	  [ ] Unable to obtain    PHYSICAL EXAM:  Vital Signs Last 24 Hrs  T(C): 36.7 (18 Dec 2018 12:01), Max: 36.8 (17 Dec 2018 12:20)  T(F): 98.1 (18 Dec 2018 12:01), Max: 98.3 (17 Dec 2018 16:44)  HR: 77 (18 Dec 2018 12:01) (61 - 94)  BP: 181/82 (18 Dec 2018 12:01) (133/66 - 194/88)  BP(mean): --  RR: 18 (18 Dec 2018 12:01) (17 - 18)  SpO2: 97% (18 Dec 2018 12:01) (95% - 99%)    Appearance: Normal	  HEENT:   Normal oral mucosa, PERRL, EOMI	  Carotid:   Right:  No Bruit      Left:  No bruit  Lymphatic: No lymphadenopathy  Cardiovascular: Normal S1 S2, No JVD, No murmurs  Respiratory: Lungs clear to auscultation	  Psychiatry: A & O x 3, Mood & affect appropriate  Gastrointestinal:  Soft, Non-tender, + BS	  Skin: No rashes, No ecchymoses, No cyanosis	  Neurologic: Non-focal  Extremities: Normal range of motion.    Vascular Pulse Exam:  Right Femoral:  Palpable       Left Femoral:  Palpable  Right Popliteal:  Palpable      Left Popliteal:  Palpable  Right DP:  Palpable                 Left DP:  Palpable  Right PT:  Palpable                 Left DP:  Palpable    Foot Exam:  Warm, no ulceration.        LABS:	                         11.4   6.00  )-----------( 323      ( 18 Dec 2018 08:10 )             36.6   12-18    142  |  104  |  45<H>  ----------------------------<  118<H>  3.8   |  25  |  1.00    Ca    9.6      18 Dec 2018 06:48        INTERPRETATION:   < from: US Duplex Kidneys (12.17.18 @ 11:35) >  IMPRESSION:     Right renal artery stenosis at the origin and proximal artery with a   renal/aortic ratio of 8.5 at the origin.     Increased cortical echogenicity bilaterally, compatible with medical   renal disease.    < end of copied text >    EXAM: US DPLX CAROTIDS COMPL BI   PROCEDURE DATE: 12/13/2018   REASON FOR EXAM: stroke     TECHNIQUE: Duplex scan of the carotid arteries was performed. Spectral   Doppler analysis, in addition to color flow Doppler was performed using   gray-scale and color imaging.     COMPARISON: None.     FINDINGS:     RIGHT CAROTID ARTERY:   There is slightly tortuous but normal visualized distal right common carotid   artery. There are scattered noncalcified and calcified plaque without   significant stenosis at the distal right common carotid artery and at the   origin of the right internal carotid artery.     Peak systolic flow velocity (PSV) of the right common carotid artery is 87   cm/sec, and the systolic flow velocity (PSV) of the right internal carotid   artery is 79 cm/sec. There are normal Doppler waveforms. The ICA/CCA ratio   is 0.9.     The right external carotid artery is patent.     LEFT CAROTID ARTERY:   There is slightly tortuous but patent visualized distal left common carotid   artery, without intimal thickening. There are scattered atherosclerotic   plaque at the carotid bulb and origin of the left common carotid artery   without hemodynamically significant stenosis.     Peak systolic flow velocity (PSV) of the left common carotid artery is 61   cm/, and peak systolic flow velocity (PSV) of the left internal carotid   artery is 60 cm/sec. The ICA/CCA ratio is 1. There is a normal wave form   with a broad systolic peak and well-maintained diastolic flow of the left   internal carotid artery.     The left external carotid artery is patent.     VERTEBRAL ARTERIES:   There is antegrade flow in the bilateral vertebral arteries.     IMPRESSION:   Scattered atheromatous plaque at the origin of the internal carotid arteries   bilaterally but no hemodynamically significant stenosis or obstructive   disease.       EXAM: MR BRAIN       PROCEDURE DATE: 12/13/2018         INTERPRETATION: CLINICAL STATEMENT: Left facial droop     TECHNIQUE: MRI of the brain was performed without gadolinium.     COMPARISON: CT head 12/12/2018     FINDINGS:   There is moderate diffuse parenchymal volume loss. There are T2 prolongation   signal abnormalities in the periventricular subcortical white matter likely   related to severe chronic microvascular ischemic changes.     Chronic infarct left parietal occipital region with ex vacuo dilatation of   adjacent left lateral ventricle.     1.1 cm meningioma noted in the right frontal parietal region.     There is no acute parenchymal hemorrhage, parenchymal mass, or midline   shift. There is no extra-axial fluid collection. There is no hydrocephalus.       6 x 3 mm acute infarct noted in the right centrum semiovale. 6 x 3 mm acute   infarct noted in the posterior limb right internal capsule.     Partial opacification of right mastoid air cells. Mucosal thickening   paranasal sinuses.     IMPRESSION:   Small acute infarcts in the right centrum semiovale and posterior limb of   right internal capsule as described above.     No acute intracranial hemorrhage       EXAM: CT ANGIO ABD PELV DEREK(W)AW IC       PROCEDURE DATE: 12/12/2018         INTERPRETATION: CTA of the Abdomen with contrast and CT of the Pelvis with   contrast     HISTORY: Uncontrolled hypertension. Assess for possible renal artery   stenosis.     TECHNIQUE: Multiple axial scans of the abdomen and pelvis were obtained   after administration of IV and bowel contrast material. Additional   sagittal, coronal and MIP angiographic images were then created from the   acquired axial data.     Interpretation: Liver: No focal lesions.     Biliary tree: Dilated within the liver and to a diameter of 11 mm at the   distal CBD level which may be physiologic considering the patient has   undergone cholecystectomy.     Solid organs: Bilateral renal cysts.     Retroperitoneum: Tight stenosis of the proximal right renal artery and mild   to moderate narrowing of the proximal left renal artery. 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 The common iliac arteries are ectatic measuring 15 mm in   diameter on the left and 14 mm in diameter on the right.     Bowel: No evidence of ascites, bowel obstruction or free air.     Appendix: Unremarkable     Urinary bladder: Obscured by metal artifact from patient's bilateral hip   replacements.     Pelvis: Shows no free fluid.     The inguinal regions are unremarkable.     The lung bases show small pleural effusions with atelectasis of the adjacent   lower lobes.       IMPRESSION: High-grade stenosis, proximal right renal artery. Enlarged   arteries as described.       The above study was reviewed by the offsite overnight image reading service   at the time of the examination.     Thank you for this referral.

## 2018-12-19 ENCOUNTER — TRANSCRIPTION ENCOUNTER (OUTPATIENT)
Age: 83
End: 2018-12-19

## 2018-12-19 LAB
ANION GAP SERPL CALC-SCNC: 11 MMOL/L — SIGNIFICANT CHANGE UP (ref 5–17)
ANION GAP SERPL CALC-SCNC: 12 MMOL/L — SIGNIFICANT CHANGE UP (ref 5–17)
BUN SERPL-MCNC: 29 MG/DL — HIGH (ref 7–23)
BUN SERPL-MCNC: 33 MG/DL — HIGH (ref 7–23)
CALCIUM SERPL-MCNC: 9 MG/DL — SIGNIFICANT CHANGE UP (ref 8.4–10.5)
CALCIUM SERPL-MCNC: 9.5 MG/DL — SIGNIFICANT CHANGE UP (ref 8.4–10.5)
CHLORIDE SERPL-SCNC: 107 MMOL/L — SIGNIFICANT CHANGE UP (ref 96–108)
CHLORIDE SERPL-SCNC: 108 MMOL/L — SIGNIFICANT CHANGE UP (ref 96–108)
CO2 SERPL-SCNC: 24 MMOL/L — SIGNIFICANT CHANGE UP (ref 22–31)
CO2 SERPL-SCNC: 26 MMOL/L — SIGNIFICANT CHANGE UP (ref 22–31)
CREAT SERPL-MCNC: 0.9 MG/DL — SIGNIFICANT CHANGE UP (ref 0.5–1.3)
CREAT SERPL-MCNC: 1.02 MG/DL — SIGNIFICANT CHANGE UP (ref 0.5–1.3)
GLUCOSE SERPL-MCNC: 128 MG/DL — HIGH (ref 70–99)
GLUCOSE SERPL-MCNC: 131 MG/DL — HIGH (ref 70–99)
HCT VFR BLD CALC: 34.1 % — LOW (ref 34.5–45)
HGB BLD-MCNC: 10.9 G/DL — LOW (ref 11.5–15.5)
MCHC RBC-ENTMCNC: 29.4 PG — SIGNIFICANT CHANGE UP (ref 27–34)
MCHC RBC-ENTMCNC: 32.1 GM/DL — SIGNIFICANT CHANGE UP (ref 32–36)
MCV RBC AUTO: 91.8 FL — SIGNIFICANT CHANGE UP (ref 80–100)
PLATELET # BLD AUTO: 326 K/UL — SIGNIFICANT CHANGE UP (ref 150–400)
POTASSIUM SERPL-MCNC: 4 MMOL/L — SIGNIFICANT CHANGE UP (ref 3.5–5.3)
POTASSIUM SERPL-MCNC: 5.5 MMOL/L — HIGH (ref 3.5–5.3)
POTASSIUM SERPL-SCNC: 4 MMOL/L — SIGNIFICANT CHANGE UP (ref 3.5–5.3)
POTASSIUM SERPL-SCNC: 5.5 MMOL/L — HIGH (ref 3.5–5.3)
RBC # BLD: 3.71 M/UL — LOW (ref 3.8–5.2)
RBC # FLD: 11.7 % — SIGNIFICANT CHANGE UP (ref 10.3–14.5)
SODIUM SERPL-SCNC: 144 MMOL/L — SIGNIFICANT CHANGE UP (ref 135–145)
SODIUM SERPL-SCNC: 144 MMOL/L — SIGNIFICANT CHANGE UP (ref 135–145)
WBC # BLD: 6.5 K/UL — SIGNIFICANT CHANGE UP (ref 3.8–10.5)
WBC # FLD AUTO: 6.5 K/UL — SIGNIFICANT CHANGE UP (ref 3.8–10.5)

## 2018-12-19 PROCEDURE — 99232 SBSQ HOSP IP/OBS MODERATE 35: CPT

## 2018-12-19 RX ORDER — ACETAMINOPHEN 500 MG
650 TABLET ORAL ONCE
Qty: 0 | Refills: 0 | Status: COMPLETED | OUTPATIENT
Start: 2018-12-19 | End: 2018-12-19

## 2018-12-19 RX ADMIN — Medication 650 MILLIGRAM(S): at 22:28

## 2018-12-19 RX ADMIN — Medication 81 MILLIGRAM(S): at 05:16

## 2018-12-19 RX ADMIN — ATORVASTATIN CALCIUM 40 MILLIGRAM(S): 80 TABLET, FILM COATED ORAL at 21:01

## 2018-12-19 RX ADMIN — Medication 0.2 MILLIGRAM(S): at 21:01

## 2018-12-19 RX ADMIN — Medication 25 MILLIGRAM(S): at 05:17

## 2018-12-19 RX ADMIN — Medication 25 MILLIGRAM(S): at 21:01

## 2018-12-19 RX ADMIN — Medication 25 MILLIGRAM(S): at 13:22

## 2018-12-19 RX ADMIN — LOSARTAN POTASSIUM 50 MILLIGRAM(S): 100 TABLET, FILM COATED ORAL at 05:17

## 2018-12-19 RX ADMIN — Medication 0.2 MILLIGRAM(S): at 05:17

## 2018-12-19 RX ADMIN — Medication 650 MILLIGRAM(S): at 21:58

## 2018-12-19 RX ADMIN — LOSARTAN POTASSIUM 50 MILLIGRAM(S): 100 TABLET, FILM COATED ORAL at 18:05

## 2018-12-19 RX ADMIN — CLOPIDOGREL BISULFATE 75 MILLIGRAM(S): 75 TABLET, FILM COATED ORAL at 05:17

## 2018-12-19 RX ADMIN — Medication 0.2 MILLIGRAM(S): at 13:20

## 2018-12-19 NOTE — DISCHARGE NOTE ADULT - CARE PLAN
Principal Discharge DX:	Hypertensive urgency  Goal:	Improved  Assessment and plan of treatment:	S/p nitro drip  BP now under control  Pts BP controlled with Losartan, Hydralazine and Clonidine  Pt to follow up with outpatient cardiologist, Dr. Vila for BP management  Low salt diet  Activity as tolerated.  Take all medication as prescribed.  Follow up with your medical doctor for routine blood pressure monitoring at your next visit.  Notify your doctor if you have any of the following symptoms:   Dizziness, Lightheadedness, Blurry vision, Headache, Chest pain, Shortness of breath  Secondary Diagnosis:	Cerebrovascular accident (CVA), unspecified mechanism  Assessment and plan of treatment:	No TPA given  C/w ASA, statin and plavix  Speech/swallow eval recommended for dysphagia 2 diet with nectar thick liquids  History of TIA, continue medications as ordered. TIA is a small transient stroke . A stroke is a brain attack that occurs when an artery or a blood vessel becomes occluded breaks, interrupting blood flow to an area of the brain cells begin to die. Please call [phone] for facial droop slurring speech, unable to move a limb or sudden weakness in one limb or one side of the body or confusion.  Secondary Diagnosis:	Renal artery stenosis  Assessment and plan of treatment:	Underwent stent to right renal artery on 12/19, tolerated procedure well  Secondary Diagnosis:	Pulmonary edema  Assessment and plan of treatment:	Diuresed with lasix  Secondary Diagnosis:	Dyslipidemia  Assessment and plan of treatment:	Follow up with PCP for treatment goals, continue medication, have liver function testing every 3 months as anti lipid medications can cause liver irritation, eat low fat, low cholesterol meals  Secondary Diagnosis:	Mitral valve insufficiency, unspecified etiology  Assessment and plan of treatment:	Recent cardiac cath on 11/21 showed severe MR  Will follow up with her outpatient cardiologist

## 2018-12-19 NOTE — PROGRESS NOTE ADULT - PROBLEM SELECTOR PLAN 1
s/p nitro drip   Continue Lasix 40 IV daily. Please continue to maintain strict I/Os, monitor daily weights, Cr, and K. Watch her Cr carefully, cont clonidine, losartan, labetalol - space htn meds
s/p nitro drip   pts bp labile- cont current htn meds with close monitor of pts neurological status and  vitals
s/p nitrod rip   pts bp labile- cont current htn meds with close monitor of pts neurological status and  vitals
s/p nitro drip   pts bp labile- cont current htn meds with close monitor of pts neurological status and  vitals

## 2018-12-19 NOTE — DISCHARGE NOTE ADULT - PLAN OF CARE
Improved S/p nitro drip  BP now under control  Pts BP controlled with Losartan, Hydralazine and Clonidine  Pt to follow up with outpatient cardiologist, Dr. Vila for BP management  Low salt diet  Activity as tolerated.  Take all medication as prescribed.  Follow up with your medical doctor for routine blood pressure monitoring at your next visit.  Notify your doctor if you have any of the following symptoms:   Dizziness, Lightheadedness, Blurry vision, Headache, Chest pain, Shortness of breath No TPA given  C/w ASA, statin and plavix  Speech/swallow eval recommended for dysphagia 2 diet with nectar thick liquids  History of TIA, continue medications as ordered. TIA is a small transient stroke . A stroke is a brain attack that occurs when an artery or a blood vessel becomes occluded breaks, interrupting blood flow to an area of the brain cells begin to die. Please call 911 for facial droop slurring speech, unable to move a limb or sudden weakness in one limb or one side of the body or confusion. Underwent stent to right renal artery on 12/19, tolerated procedure well Diuresed with lasix Follow up with PCP for treatment goals, continue medication, have liver function testing every 3 months as anti lipid medications can cause liver irritation, eat low fat, low cholesterol meals Recent cardiac cath on 11/21 showed severe MR  Will follow up with her outpatient cardiologist

## 2018-12-19 NOTE — PROGRESS NOTE ADULT - PROBLEM SELECTOR PROBLEM 1
Hypertensive urgency

## 2018-12-19 NOTE — PROGRESS NOTE ADULT - SUBJECTIVE AND OBJECTIVE BOX
SUBJECTIVE / OVERNIGHT EVENTS: pt denies chest pain, shortness of breath, nausea, vomiting  headache   MEDICATIONS  (STANDING):  acetaminophen   Tablet .. 650 milliGRAM(s) Oral once  aspirin  chewable 81 milliGRAM(s) Oral daily  atorvastatin 40 milliGRAM(s) Oral at bedtime  cloNIDine 0.2 milliGRAM(s) Oral three times a day  clopidogrel Tablet 75 milliGRAM(s) Oral daily  hydrALAZINE 25 milliGRAM(s) Oral every 8 hours  losartan 50 milliGRAM(s) Oral every 12 hours    MEDICATIONS  (PRN):    Vital Signs Last 24 Hrs  T(C): 36.7 (19 Dec 2018 20:32), Max: 36.8 (19 Dec 2018 12:03)  T(F): 98 (19 Dec 2018 20:32), Max: 98.3 (19 Dec 2018 12:03)  HR: 71 (19 Dec 2018 20:32) (71 - 96)  BP: 171/73 (19 Dec 2018 20:32) (134/65 - 184/83)  BP(mean): --  RR: 18 (19 Dec 2018 20:32) (16 - 19)  SpO2: 98% (19 Dec 2018 20:32) (94% - 99%)    Constitutional: No fever, fatigue  Skin: No rash.  Eyes: No recent vision problems or eye pain.  ENT: No congestion, ear pain, or sore throat.  Cardiovascular: No chest pain or palpation.  Respiratory: No cough, shortness of breath, congestion, or wheezing.  Gastrointestinal: No abdominal pain, nausea, vomiting, or diarrhea.  Genitourinary: No dysuria.  Musculoskeletal: No joint swelling.  Neurologic: No headache.    PHYSICAL EXAM:  GENERAL: NAD  EYES: EOMI, PERRLA  NECK: Supple, No JVD  CHEST/LUNG: dec breath sounds at base  HEART:  S1 , S2 +  ABDOMEN: soft, bs+  EXTREMITIES:  no edema  NEUROLOGY:alert awake oriented to place, person     LABS:  12-19    144  |  108  |  29<H>  ----------------------------<  131<H>  4.0   |  24  |  0.90    Ca    9.0      19 Dec 2018 06:28      Creatinine Trend: 0.90 <--, 1.02 <--, 1.00 <--, 1.22 <--, 1.28 <--, 1.22 <--, 1.15 <--, 1.00 <--                        10.9   6.5   )-----------( 326      ( 19 Dec 2018 00:35 )             34.1     Urine Studies:

## 2018-12-19 NOTE — PROGRESS NOTE ADULT - PROBLEM SELECTOR PLAN 3
-vascular f/u   - renal sono
-vascular f/u   - renal sono today and pso srenal angio tomorrow
s/p renal artery stenting , cont aspirin, Plavix
-vascular f/u   < from: US Duplex Kidneys (12.17.18 @ 11:35) >    Right renal artery stenosis at the origin and proximal artery with a   renal/aortic ratio of 8.5 at the origin.     < end of copied text >reanl angio today

## 2018-12-19 NOTE — PROGRESS NOTE ADULT - PROBLEM SELECTOR PROBLEM 8
Mitral valve insufficiency, unspecified etiology

## 2018-12-19 NOTE — PROGRESS NOTE ADULT - SUBJECTIVE AND OBJECTIVE BOX
Vascular Cardiology Consult Note    Subjective:  s/p right renal artery stent yesterday, bp improved, no cp/sob.  no groin pain at access site.  Allergies    Keflex (Unknown)  verapamil (Other)    Intolerances    	    MEDICATIONS  (STANDING):  MEDICATIONS  (STANDING):  aspirin  chewable 81 milliGRAM(s) Oral daily  atorvastatin 40 milliGRAM(s) Oral at bedtime  cloNIDine 0.2 milliGRAM(s) Oral three times a day  clopidogrel Tablet 75 milliGRAM(s) Oral daily  hydrALAZINE 25 milliGRAM(s) Oral every 8 hours  losartan 50 milliGRAM(s) Oral every 12 hours  sodium chloride 0.9%. 250 milliLiter(s) (250 mL/Hr) IV Continuous <Continuous>  sodium chloride 0.9%. 1000 milliLiter(s) (75 mL/Hr) IV Continuous <Continuous>          PAST MEDICAL & SURGICAL HISTORY:  Renal artery stenosis: possible on doppler  Basal cell carcinoma  Mitral valve insufficiency, unspecified etiology: Cardiac cath on 11/21/18  Edema, unspecified type  Cerebrovascular accident (CVA), unspecified mechanism  Hyponatremia: ON HCTZ , h/o Hypokelemia  Dyslipidemia  Essential hypertension  UTI (urinary tract infection)  H/O bilateral hip replacements  History of cholecystectomy  Status post hysterectomy  S/P Mohs surgery for basal cell carcinoma      FAMILY HISTORY:  Family history of carotid artery stenosis (Sibling)  Family history of uterine cancer (Sibling)      SOCIAL HISTORY:  unchanged    REVIEW OF SYSTEMS:  CONSTITUTIONAL: No fever, weight loss, or fatigue  EYES: No eye pain, visual disturbances, or discharge  ENMT:  No difficulty hearing, tinnitus, vertigo; No sinus or throat pain  NECK: No pain or stiffness  RESPIRATORY: No cough, wheezing, chills or hemoptysis; No Shortness of Breath    CARDIOVASCULAR: No chest pain, palpitations, passing out, dizziness, or leg swelling    GASTROINTESTINAL: No abdominal or epigastric pain. No nausea, vomiting, or hematemesis; No diarrhea or constipation. No melena or hematochezia.  GENITOURINARY: No dysuria, frequency, hematuria, or incontinence  NEUROLOGICAL: No headaches, memory loss, loss of strength, numbness, or tremors  SKIN: No itching, burning, rashes, or lesions   LYMPH Nodes: No enlarged glands  ENDOCRINE: No heat or cold intolerance; No hair loss  MUSCULOSKELETAL: No joint pain or swelling; No muscle, back, or extremity pain  PSYCHIATRIC: No depression, anxiety, mood swings, or difficulty sleeping  HEME/LYMPH: No easy bruising, or bleeding gums  ALLERY AND IMMUNOLOGIC: No hives or eczema	    [ x] All others negative	  [ ] Unable to obtain    PHYSICAL EXAM:  Vital Signs Last 24 Hrs  T(C): 36.7 (19 Dec 2018 08:44), Max: 36.8 (18 Dec 2018 21:20)  T(F): 98.1 (19 Dec 2018 08:44), Max: 98.3 (18 Dec 2018 21:20)  HR: 89 (19 Dec 2018 08:44) (61 - 89)  BP: 136/63 (19 Dec 2018 08:44) (134/65 - 192/88)  BP(mean): --  RR: 18 (19 Dec 2018 08:44) (16 - 18)  SpO2: 97% (19 Dec 2018 08:44) (94% - 99%)    Appearance: Normal	  HEENT:   Normal oral mucosa, PERRL, EOMI	  Carotid:   Right:  No Bruit      Left:  No bruit  Lymphatic: No lymphadenopathy  Cardiovascular: Normal S1 S2, No JVD, No murmurs  Respiratory: Lungs clear to auscultation	  Psychiatry: A & O x 3, Mood & affect appropriate  Gastrointestinal:  Soft, Non-tender, + BS	  Skin: No rashes, No ecchymoses, No cyanosis	  Neurologic: Non-focal  Extremities: Normal range of motion.    Vascular Pulse Exam:  Right Femoral:  Palpable       Left Femoral:  Palpable  Right Popliteal:  Palpable      Left Popliteal:  Palpable  Right DP:  Palpable                 Left DP:  Palpable  Right PT:  Palpable                 Left DP:  Palpable    Foot Exam:  Warm, no ulceration.        LABS:	                                    10.9   6.5   )-----------( 326      ( 19 Dec 2018 00:35 )             34.1   12-19    144  |  108  |  29<H>  ----------------------------<  131<H>  4.0   |  24  |  0.90    Ca    9.0      19 Dec 2018 06:28          INTERPRETATION:   < from: US Duplex Kidneys (12.17.18 @ 11:35) >  IMPRESSION:     Right renal artery stenosis at the origin and proximal artery with a   renal/aortic ratio of 8.5 at the origin.     Increased cortical echogenicity bilaterally, compatible with medical   renal disease.    < end of copied text >    EXAM: US DPLX CAROTIDS COMPL BI   PROCEDURE DATE: 12/13/2018   REASON FOR EXAM: stroke     TECHNIQUE: Duplex scan of the carotid arteries was performed. Spectral   Doppler analysis, in addition to color flow Doppler was performed using   gray-scale and color imaging.     COMPARISON: None.     FINDINGS:     RIGHT CAROTID ARTERY:   There is slightly tortuous but normal visualized distal right common carotid   artery. There are scattered noncalcified and calcified plaque without   significant stenosis at the distal right common carotid artery and at the   origin of the right internal carotid artery.     Peak systolic flow velocity (PSV) of the right common carotid artery is 87   cm/sec, and the systolic flow velocity (PSV) of the right internal carotid   artery is 79 cm/sec. There are normal Doppler waveforms. The ICA/CCA ratio   is 0.9.     The right external carotid artery is patent.     LEFT CAROTID ARTERY:   There is slightly tortuous but patent visualized distal left common carotid   artery, without intimal thickening. There are scattered atherosclerotic   plaque at the carotid bulb and origin of the left common carotid artery   without hemodynamically significant stenosis.     Peak systolic flow velocity (PSV) of the left common carotid artery is 61   cm/, and peak systolic flow velocity (PSV) of the left internal carotid   artery is 60 cm/sec. The ICA/CCA ratio is 1. There is a normal wave form   with a broad systolic peak and well-maintained diastolic flow of the left   internal carotid artery.     The left external carotid artery is patent.     VERTEBRAL ARTERIES:   There is antegrade flow in the bilateral vertebral arteries.     IMPRESSION:   Scattered atheromatous plaque at the origin of the internal carotid arteries   bilaterally but no hemodynamically significant stenosis or obstructive   disease.       EXAM: MR BRAIN       PROCEDURE DATE: 12/13/2018         INTERPRETATION: CLINICAL STATEMENT: Left facial droop     TECHNIQUE: MRI of the brain was performed without gadolinium.     COMPARISON: CT head 12/12/2018     FINDINGS:   There is moderate diffuse parenchymal volume loss. There are T2 prolongation   signal abnormalities in the periventricular subcortical white matter likely   related to severe chronic microvascular ischemic changes.     Chronic infarct left parietal occipital region with ex vacuo dilatation of   adjacent left lateral ventricle.     1.1 cm meningioma noted in the right frontal parietal region.     There is no acute parenchymal hemorrhage, parenchymal mass, or midline   shift. There is no extra-axial fluid collection. There is no hydrocephalus.       6 x 3 mm acute infarct noted in the right centrum semiovale. 6 x 3 mm acute   infarct noted in the posterior limb right internal capsule.     Partial opacification of right mastoid air cells. Mucosal thickening   paranasal sinuses.     IMPRESSION:   Small acute infarcts in the right centrum semiovale and posterior limb of   right internal capsule as described above.     No acute intracranial hemorrhage       EXAM: CT ANGIO ABD PELV DEREK(W)AW IC       PROCEDURE DATE: 12/12/2018         INTERPRETATION: CTA of the Abdomen with contrast and CT of the Pelvis with   contrast     HISTORY: Uncontrolled hypertension. Assess for possible renal artery   stenosis.     TECHNIQUE: Multiple axial scans of the abdomen and pelvis were obtained   after administration of IV and bowel contrast material. Additional   sagittal, coronal and MIP angiographic images were then created from the   acquired axial data.     Interpretation: Liver: No focal lesions.     Biliary tree: Dilated within the liver and to a diameter of 11 mm at the   distal CBD level which may be physiologic considering the patient has   undergone cholecystectomy.     Solid organs: Bilateral renal cysts.     Retroperitoneum: Tight stenosis of the proximal right renal artery and mild   to moderate narrowing of the proximal left renal artery. 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 The common iliac arteries are ectatic measuring 15 mm in   diameter on the left and 14 mm in diameter on the right.     Bowel: No evidence of ascites, bowel obstruction or free air.     Appendix: Unremarkable     Urinary bladder: Obscured by metal artifact from patient's bilateral hip   replacements.     Pelvis: Shows no free fluid.     The inguinal regions are unremarkable.     The lung bases show small pleural effusions with atelectasis of the adjacent   lower lobes.       IMPRESSION: High-grade stenosis, proximal right renal artery. Enlarged   arteries as described.       The above study was reviewed by the offsite overnight image reading service   at the time of the examination.     Thank you for this referral.

## 2018-12-19 NOTE — PROGRESS NOTE ADULT - PROBLEM SELECTOR PROBLEM 3
Renal artery stenosis

## 2018-12-19 NOTE — DISCHARGE NOTE ADULT - CARE PROVIDER_API CALL
Narinder Lawton), Electrodiagnostic Medicine; Neurology  [address] [facility] [facility]  [address]  [place] [address]  Phone: [phone]  Fax: [phone]    Rafa Vila), Cardiovascular Disease  [address] [place] [address]  Phone: [phone]  Fax: [phone]

## 2018-12-19 NOTE — DISCHARGE NOTE ADULT - PATIENT PORTAL LINK FT
You can access the [facility] Patient Portal, offered by [facility], by registering with the following website: [email or web address]

## 2018-12-19 NOTE — DISCHARGE NOTE ADULT - HOSPITAL COURSE
86 yo female with PMHx HTN, BOB, MV insufficiency, CVA (13 years ago), CHF presents as transfer from [facility] for stroke. CVA diagnosed at [facility] with associated left sided weakness and slurred speech. MRI brain done at [facility] shows two small acute infarcts in the right centrum semiovale and posterior limb of right internal capsule and a chronic infarct of left parietal occipital region. Infarcts most likely 2/2 thrombotic etiology with intracranial MCA stenosis with issues with labile BP. Pt also found to be in hypertensive urgency. BP initially managed with nitro gtt. Home BP med regimen titrated for better BP control, pt educated on necessity of keeping BP stable. Pt also underwent stent to right renal artery for renal artery stenosis, pt tolerated procedure well. Pt is stable for discharge home with home PT and close cardiology/neurology follow up.

## 2018-12-19 NOTE — DISCHARGE NOTE ADULT - SECONDARY DIAGNOSIS.
Cerebrovascular accident (CVA), unspecified mechanism Renal artery stenosis Pulmonary edema Dyslipidemia Mitral valve insufficiency, unspecified etiology

## 2018-12-19 NOTE — DISCHARGE NOTE ADULT - MEDICATION SUMMARY - MEDICATIONS TO TAKE
I will START or STAY ON the medications listed below when I get home from the hospital:    aspirin 81 mg oral tablet, chewable  -- 1 tab(s) by mouth once a day  -- Indication: For Cerebrovascular accident (CVA), unspecified mechanism    losartan 50 mg oral tablet  -- 1 tab(s) by mouth 2 times a day  HOLD for SBP <150  Keep -180  -- Indication: For Hypertension    cloNIDine 0.2 mg oral tablet  -- 1 tab(s) by mouth 3 times a day  HOLD for SBP < 150   Keep SBP between  150- 180   -- Indication: For Hypertension    pravastatin 40 mg oral tablet  -- 1 tab(s) by mouth once a day  -- Indication: For Cerebrovascular accident (CVA), unspecified mechanism    clopidogrel 75 mg oral tablet  -- 1 tab(s) by mouth once a day  -- Indication: For Cerebrovascular accident (CVA), unspecified mechanism    hydrALAZINE 25 mg oral tablet  -- 1 tab(s) by mouth every 8 hours  -- Indication: For Hypertension

## 2018-12-19 NOTE — PROGRESS NOTE ADULT - PROBLEM SELECTOR PLAN 2
speech and swallow eval  frequent neuro checks
speech and swallow eval  frequent neuro checks
speech and swallow evaluated pt   frequent neuro checks

## 2018-12-19 NOTE — DISCHARGE NOTE ADULT - MEDICATION SUMMARY - MEDICATIONS TO STOP TAKING
I will STOP taking the medications listed below when I get home from the hospital:    furosemide 100 mg/100 mL-0.9% intravenous solution  -- 40 milliliter(s) intravenous every 12 hours    labetalol 300 mg oral tablet  -- 2 tab(s) by mouth 2 times a day,   HOLD SBP < 150  Keep -180 with Ac CVA

## 2018-12-19 NOTE — PROGRESS NOTE ADULT - PROBLEM SELECTOR PROBLEM 4
Pulmonary edema, acute

## 2018-12-19 NOTE — PROGRESS NOTE ADULT - ASSESSMENT
Assessment:  1. Renal Artery Stenosis / HTN - high grade stenosis in right renal artery seen on CT and confirmed on renal US. likely contributory to uncontrolled BP.  Now s/p renal artery stenting with improvement in BPs  2. Pulmonary Edema -  resolved, euvolemic, off lasix  3 Elevated BUN - resolving, likely from overdiuresis  2. Distal/hilar splenic artery 11mm   3. 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 CVA     Plan:  1. Continue losartan 50mg BID clonidine 0.2mg PO TID, hydralazine 25mg TID,   Appreciate general cardiology recs.    2. Continue aspirin/plavix for renal artery stent.  Cont high dose statin  3. No IVF needed, euvolemic, high BUN resolving     Hugo Tello MD  Vascular Cardiology Fellow  Please call with any questions:   [id number] - [phone]

## 2018-12-19 NOTE — PROGRESS NOTE ADULT - PROBLEM SELECTOR PROBLEM 7
Edema of left lower extremity

## 2018-12-19 NOTE — PROGRESS NOTE ADULT - ASSESSMENT
Mrs. Mueller is an 88 yo F with PMH of malignant HTN, dyslipidemia, CVA, hyponatremia, recurrent UTI with recent admission in 11/2018 for HTN emergency and ADHF with dynamic EKG changes.  Coronary angiography done 11/21/18 showed no CAD but severe MR.  TTE done 11/20/18 noted low normal LV systolic function, septal wall hypokinesis, grade 2 diastolic dysfunction, mild to moderate mitral regurgitation with mildly enlarged LA; Repeat on 12/14 with reportedly mild global LV dysfunction.  She presented on 12/12 with acute shortness of breath in the setting of significant hypertension.   She has been found to have critical proximal right renal artery stenosis.  She is now s/p PCI of the right renal artery    - Tolerated PCI to right renal well  - Appears compensated from HF POV.  - On IVF. will need to monitor closely for vol ol  - Cr is stable.   - F/u vascular cards.      - BP remains labile. Continue with losartan 50 mg twice daily, hydralazine 25mg Q8 and clonidine 0.2 mg po tid  - labetalol has been discontinued after an RRT prior to the weekend because of significant drop in BP  - May increase Hydralazine to 50mg Q8 if bp remains elevated.    - Continue with ASA and Plavix, in setting of recent CVA  - Neurology evaluation appreciated  - PAT noted on telemetry yesterday, though no AF. Would continue to monitor.  - Cont Lipitor 40mg HS    - Will follow with you.

## 2018-12-19 NOTE — PROGRESS NOTE ADULT - PROBLEM SELECTOR PLAN 5
PO K replaced,   IV Mag 2 gm rider

## 2018-12-20 VITALS
DIASTOLIC BLOOD PRESSURE: 73 MMHG | HEART RATE: 89 BPM | TEMPERATURE: 98 F | SYSTOLIC BLOOD PRESSURE: 158 MMHG | OXYGEN SATURATION: 96 % | RESPIRATION RATE: 18 BRPM

## 2018-12-20 LAB
ANION GAP SERPL CALC-SCNC: 10 MMOL/L — SIGNIFICANT CHANGE UP (ref 5–17)
BUN SERPL-MCNC: 30 MG/DL — HIGH (ref 7–23)
CALCIUM SERPL-MCNC: 9.5 MG/DL — SIGNIFICANT CHANGE UP (ref 8.4–10.5)
CHLORIDE SERPL-SCNC: 107 MMOL/L — SIGNIFICANT CHANGE UP (ref 96–108)
CO2 SERPL-SCNC: 25 MMOL/L — SIGNIFICANT CHANGE UP (ref 22–31)
CREAT SERPL-MCNC: 1.01 MG/DL — SIGNIFICANT CHANGE UP (ref 0.5–1.3)
GLUCOSE SERPL-MCNC: 132 MG/DL — HIGH (ref 70–99)
HCT VFR BLD CALC: 33.8 % — LOW (ref 34.5–45)
HGB BLD-MCNC: 10.7 G/DL — LOW (ref 11.5–15.5)
MCHC RBC-ENTMCNC: 30.1 PG — SIGNIFICANT CHANGE UP (ref 27–34)
MCHC RBC-ENTMCNC: 31.7 GM/DL — LOW (ref 32–36)
MCV RBC AUTO: 94.9 FL — SIGNIFICANT CHANGE UP (ref 80–100)
PLATELET # BLD AUTO: 319 K/UL — SIGNIFICANT CHANGE UP (ref 150–400)
POTASSIUM SERPL-MCNC: 4.4 MMOL/L — SIGNIFICANT CHANGE UP (ref 3.5–5.3)
POTASSIUM SERPL-SCNC: 4.4 MMOL/L — SIGNIFICANT CHANGE UP (ref 3.5–5.3)
RBC # BLD: 3.56 M/UL — LOW (ref 3.8–5.2)
RBC # FLD: 13.2 % — SIGNIFICANT CHANGE UP (ref 10.3–14.5)
SODIUM SERPL-SCNC: 142 MMOL/L — SIGNIFICANT CHANGE UP (ref 135–145)
WBC # BLD: 5.75 K/UL — SIGNIFICANT CHANGE UP (ref 3.8–10.5)
WBC # FLD AUTO: 5.75 K/UL — SIGNIFICANT CHANGE UP (ref 3.8–10.5)

## 2018-12-20 PROCEDURE — 80061 LIPID PANEL: CPT

## 2018-12-20 PROCEDURE — 84100 ASSAY OF PHOSPHORUS: CPT

## 2018-12-20 PROCEDURE — 97530 THERAPEUTIC ACTIVITIES: CPT

## 2018-12-20 PROCEDURE — C1887: CPT

## 2018-12-20 PROCEDURE — 80048 BASIC METABOLIC PNL TOTAL CA: CPT

## 2018-12-20 PROCEDURE — 93306 TTE W/DOPPLER COMPLETE: CPT

## 2018-12-20 PROCEDURE — 93005 ELECTROCARDIOGRAM TRACING: CPT

## 2018-12-20 PROCEDURE — 93975 VASCULAR STUDY: CPT

## 2018-12-20 PROCEDURE — 74230 X-RAY XM SWLNG FUNCJ C+: CPT

## 2018-12-20 PROCEDURE — 83036 HEMOGLOBIN GLYCOSYLATED A1C: CPT

## 2018-12-20 PROCEDURE — C1894: CPT

## 2018-12-20 PROCEDURE — 97162 PT EVAL MOD COMPLEX 30 MIN: CPT

## 2018-12-20 PROCEDURE — 82962 GLUCOSE BLOOD TEST: CPT

## 2018-12-20 PROCEDURE — 83735 ASSAY OF MAGNESIUM: CPT

## 2018-12-20 PROCEDURE — 82728 ASSAY OF FERRITIN: CPT

## 2018-12-20 PROCEDURE — 99232 SBSQ HOSP IP/OBS MODERATE 35: CPT

## 2018-12-20 PROCEDURE — 97166 OT EVAL MOD COMPLEX 45 MIN: CPT

## 2018-12-20 PROCEDURE — C1769: CPT

## 2018-12-20 PROCEDURE — 83540 ASSAY OF IRON: CPT

## 2018-12-20 PROCEDURE — C1876: CPT

## 2018-12-20 PROCEDURE — 92610 EVALUATE SWALLOWING FUNCTION: CPT

## 2018-12-20 PROCEDURE — C1760: CPT

## 2018-12-20 PROCEDURE — 92611 MOTION FLUOROSCOPY/SWALLOW: CPT

## 2018-12-20 PROCEDURE — 92526 ORAL FUNCTION THERAPY: CPT

## 2018-12-20 PROCEDURE — 70450 CT HEAD/BRAIN W/O DYE: CPT

## 2018-12-20 PROCEDURE — C1725: CPT

## 2018-12-20 PROCEDURE — 97116 GAIT TRAINING THERAPY: CPT

## 2018-12-20 PROCEDURE — 97110 THERAPEUTIC EXERCISES: CPT

## 2018-12-20 PROCEDURE — 71045 X-RAY EXAM CHEST 1 VIEW: CPT

## 2018-12-20 PROCEDURE — 85027 COMPLETE CBC AUTOMATED: CPT

## 2018-12-20 RX ORDER — HYDRALAZINE HCL 50 MG
1 TABLET ORAL
Qty: 90 | Refills: 0 | OUTPATIENT
Start: 2018-12-20 | End: 2019-01-18

## 2018-12-20 RX ORDER — HYDRALAZINE HCL 50 MG
1 TABLET ORAL
Qty: 90 | Refills: 0
Start: 2018-12-20 | End: 2019-01-18

## 2018-12-20 RX ORDER — HYDRALAZINE HCL 50 MG
1 TABLET ORAL
Qty: 0 | Refills: 0 | COMMUNITY
Start: 2018-12-20

## 2018-12-20 RX ADMIN — Medication 0.2 MILLIGRAM(S): at 05:20

## 2018-12-20 RX ADMIN — Medication 25 MILLIGRAM(S): at 05:18

## 2018-12-20 RX ADMIN — Medication 0.2 MILLIGRAM(S): at 13:40

## 2018-12-20 RX ADMIN — CLOPIDOGREL BISULFATE 75 MILLIGRAM(S): 75 TABLET, FILM COATED ORAL at 13:43

## 2018-12-20 RX ADMIN — Medication 81 MILLIGRAM(S): at 13:43

## 2018-12-20 RX ADMIN — Medication 25 MILLIGRAM(S): at 13:40

## 2018-12-20 NOTE — PROGRESS NOTE ADULT - SUBJECTIVE AND OBJECTIVE BOX
[facility]    Stefano Emmanuel, Aleksandar, Ricky, Avelina, Ramsey, Servandobrittany      [phone]    CHIEF COMPLAINT: Patient is a 87y old  Female who presents with a chief complaint of stroke  & SOB (20 Dec 2018 09:05)      Follow Up: MR, htn BOB s/p stent    Interim history: The patient reports no new symptoms.  Denies chest discomfort and shortness of breath.  No abdominal pain.  No new neurologic symptoms.      MEDICATIONS  (STANDING):  aspirin  chewable 81 milliGRAM(s) Oral daily  atorvastatin 40 milliGRAM(s) Oral at bedtime  cloNIDine 0.2 milliGRAM(s) Oral three times a day  clopidogrel Tablet 75 milliGRAM(s) Oral daily  hydrALAZINE 25 milliGRAM(s) Oral every 8 hours  losartan 50 milliGRAM(s) Oral every 12 hours    MEDICATIONS  (PRN):      REVIEW OF SYSTEMS:  eye, ent, GI, , allergic, dermatologic, musculoskeletal and neurologic are negative except as described above    Vital Signs Last 24 Hrs  T(C): 36.6 (20 Dec 2018 04:06), Max: 36.8 (19 Dec 2018 12:03)  T(F): 97.8 (20 Dec 2018 04:06), Max: 98.3 (19 Dec 2018 12:03)  HR: 72 (20 Dec 2018 04:06) (68 - 96)  BP: 148/62 (20 Dec 2018 04:06) (137/54 - 184/83)  BP(mean): --  RR: 18 (20 Dec 2018 04:06) (18 - 19)  SpO2: 96% (20 Dec 2018 04:06) (94% - 98%)    I&O's Summary    19 Dec 2018 07:01  -  20 Dec 2018 07:00  --------------------------------------------------------  IN: 1050 mL / OUT: 850 mL / NET: 200 mL    20 Dec 2018 07:01  -  20 Dec 2018 10:08  --------------------------------------------------------  IN: 240 mL / OUT: 200 mL / NET: 40 mL        Telemetry past 24h: sr/st    PHYSICAL EXAM:    Constitutional: well-nourished, well-developed, NAD   HEENT:  MMM, sclerae anicteric, conjunctivae clear, no oral cyanosis.  Pulmonary: Non-labored, breath sounds are clear bilaterally, No wheezing, rales or rhonchi  Cardiovascular: Regular, S1 and S2.  No murmur.  No rubs, gallops or clicks  Gastrointestinal: Bowel Sounds present, soft, nontender.   Lymph: No peripheral edema.   Neurological: Alert, no focal deficits  Skin: No rashes.  Psych:  Mood & affect appropriate    LABS: All Labs Reviewed:                        10.7   5.75  )-----------( 319      ( 20 Dec 2018 08:14 )             33.8                         10.9   6.5   )-----------( 326      ( 19 Dec 2018 00:35 )             34.1                         11.4   6.00  )-----------( 323      ( 18 Dec 2018 08:10 )             36.6     20 Dec 2018 06:37    142    |  107    |  30     ----------------------------<  132    4.4     |  25     |  1.01   19 Dec 2018 06:28    144    |  108    |  29     ----------------------------<  131    4.0     |  24     |  0.90   19 Dec 2018 00:35    144    |  107    |  33     ----------------------------<  128    5.5     |  26     |  1.02     Ca    9.5        20 Dec 2018 06:37  Ca    9.0        19 Dec 2018 06:28  Ca    9.5        19 Dec 2018 00:35            Blood Culture:         RADIOLOGY:    EKG:    Echo:

## 2018-12-20 NOTE — PROGRESS NOTE ADULT - ASSESSMENT
88 yo F with PMH of malignant HTN, dyslipidemia, CVA, hyponatremia, recurrent UTI with recent admission in 11/2018 for HTN emergency and ADHF with dynamic EKG changes.  Coronary angiography done 11/21/18 showed no CAD but severe MR.  TTE done 11/20/18 noted low normal LV systolic function, septal wall hypokinesis, grade 2 diastolic dysfunction, mild to moderate mitral regurgitation with mildly enlarged LA; Repeat on 12/14 with reportedly mild global LV dysfunction.  She presented on 12/12 with acute shortness of breath in the setting of significant hypertension.   She has been found to have critical proximal right renal artery stenosis.  She is now s/p stent of the right renal artery    - Tolerated stent to right renal well  - Appears compensated from HF POV.  - monitor closely for vol ol  - Cr is stable.   - BP remains labile. Continue with losartan 50 mg twice daily, hydralazine 25mg Q8 and clonidine 0.2 mg po tid  - labetalol has been discontinued after an RRT prior to the weekend because of significant drop in BP  - May increase Hydralazine to 50mg Q8 if bp remains elevated.    - Continue with ASA and Plavix, in setting of recent CVA  - Neurology evaluation appreciated  -in sr though had some pat prior to today. Would continue to monitor.  - Cont Lipitor 40mg HS    - Will follow with you.

## 2018-12-20 NOTE — PROGRESS NOTE ADULT - REASON FOR ADMISSION
stroke  & SOB

## 2018-12-20 NOTE — DIETITIAN INITIAL EVALUATION ADULT. - OTHER INFO
Pt seen for length of stay on 6TOW. Pt reports good po intake x 6 days in-patient, diet advanced on 12/15. Pt s/p MBS and recommended for texture modified and thickened fluids diet, which pt reports tolerating well and not impacting her having adequate po intake at this time. Pt reports no recent wt changes PTA with UBW ~115 lbs. Dosing wt noted as 114 lbs (12/18). No c/o nausea, vomiting, diarrhea, or constipation. Pt has no nutrition related questions at this time.

## 2018-12-20 NOTE — PROGRESS NOTE ADULT - ASSESSMENT
This is a 87y Female, consult for CVA, found several days ago with left sided weakness and slurred speech, later more of a facial.  MRI brain with acute CR and internal capsule infarcts as two separate infarcts.  Though separate, favor thrombotic etiology with intracranial MCA stenosis with issues with labile BP, and less so cardioembolic.    exam improving    s/p renal artery stent    Plan:  - continue asa, plavix and lipitor  - bp as per cards  - tele rule out afib,.  Had run of PAT  - PT  - neuro checks can be d/c  - stroke education  - DVT prophylaxis  - no neuro c/i to d/c  can follow as outpt  reconsult prn

## 2018-12-20 NOTE — PROGRESS NOTE ADULT - PROVIDER SPECIALTY LIST ADULT
Cardiology
Internal Medicine
Neurology
Vascular Cardiology
Internal Medicine

## 2018-12-20 NOTE — DIETITIAN INITIAL EVALUATION ADULT. - PHYSICAL APPEARANCE
BMI 22.3, pt appears overall visually well nourished. Nutrition focused physical exam deferred at this time given no recent wt loss and adequate po intake./well nourished 60

## 2018-12-20 NOTE — DIETITIAN INITIAL EVALUATION ADULT. - PROBLEM SELECTOR PLAN 1
-Pt transferred to [facility] for eval of BOB which is confirmed based on [facility] imaging.   -Primary team in AM to notify cards interventionalist /vascular for appropriate follow up care.  -C/w home losartan therapy for HTN management but EXTREME caution should be taken given the potential to cause a rise in creatinine and reduction in GFR in setting of b/l BOB---d/w family at bedside.

## 2018-12-20 NOTE — DIETITIAN INITIAL EVALUATION ADULT. - PROBLEM SELECTOR PLAN 5
-No signs of hemolysis or bleeding; minor Tbil of 1.6 --doubt hemolytic in nature ; CT abdomen showed dilated ducts but no RUQ pain or signs of cholangitis.   -check ferritin and iron in AM

## 2018-12-20 NOTE — DIETITIAN INITIAL EVALUATION ADULT. - ENERGY NEEDS
Height: 60 inches, Weight: 114 pounds (dosing 12/18)  BMI: 22.3 kg/m2 IBW: 100 pounds (+/-10%), %IBW: 114%  Pertinent Info: No edema noted, no pressure injuries noted at this time in nursing flow sheet.  Other pertinent info: Pt is 87yoF admitted for CVA, SOB, HTN emergency. PMH significant for CHF, HTN, renal artery stenosis, MV insufficiency, HLD, CVA 13 years ago.

## 2018-12-20 NOTE — DIETITIAN INITIAL EVALUATION ADULT. - DIET TYPE
DASH/TLC (sodium and cholesterol restricted diet)/dysphagia 2, mechanical soft, nectar consistency fld

## 2018-12-20 NOTE — PROGRESS NOTE ADULT - SUBJECTIVE AND OBJECTIVE BOX
Vascular Cardiology Progress Note    Subjective:  bp improved, mildly elevated at times, no cp/sob.  no groin pain at access site.    Allergies    Keflex (Unknown)  verapamil (Other)    Intolerances    MEDICATIONS  (STANDING):  aspirin  chewable 81 milliGRAM(s) Oral daily  atorvastatin 40 milliGRAM(s) Oral at bedtime  cloNIDine 0.2 milliGRAM(s) Oral three times a day  clopidogrel Tablet 75 milliGRAM(s) Oral daily  hydrALAZINE 25 milliGRAM(s) Oral every 8 hours  losartan 50 milliGRAM(s) Oral every 12 hours            PAST MEDICAL & SURGICAL HISTORY:  Renal artery stenosis: possible on doppler  Basal cell carcinoma  Mitral valve insufficiency, unspecified etiology: Cardiac cath on 11/21/18  Edema, unspecified type  Cerebrovascular accident (CVA), unspecified mechanism  Hyponatremia: ON HCTZ , h/o Hypokelemia  Dyslipidemia  Essential hypertension  UTI (urinary tract infection)  H/O bilateral hip replacements  History of cholecystectomy  Status post hysterectomy  S/P Mohs surgery for basal cell carcinoma      FAMILY HISTORY:  Family history of carotid artery stenosis (Sibling)  Family history of uterine cancer (Sibling)      SOCIAL HISTORY:  unchanged    REVIEW OF SYSTEMS:  CONSTITUTIONAL: No fever, weight loss, or fatigue  EYES: No eye pain, visual disturbances, or discharge  ENMT:  No difficulty hearing, tinnitus, vertigo; No sinus or throat pain  NECK: No pain or stiffness  RESPIRATORY: No cough, wheezing, chills or hemoptysis; No Shortness of Breath    CARDIOVASCULAR: No chest pain, palpitations, passing out, dizziness, or leg swelling    GASTROINTESTINAL: No abdominal or epigastric pain. No nausea, vomiting, or hematemesis; No diarrhea or constipation. No melena or hematochezia.  GENITOURINARY: No dysuria, frequency, hematuria, or incontinence  NEUROLOGICAL: No headaches, memory loss, loss of strength, numbness, or tremors  SKIN: No itching, burning, rashes, or lesions   LYMPH Nodes: No enlarged glands  ENDOCRINE: No heat or cold intolerance; No hair loss  MUSCULOSKELETAL: No joint pain or swelling; No muscle, back, or extremity pain  PSYCHIATRIC: No depression, anxiety, mood swings, or difficulty sleeping  HEME/LYMPH: No easy bruising, or bleeding gums  ALLERY AND IMMUNOLOGIC: No hives or eczema	    [ x] All others negative	  [ ] Unable to obtain    PHYSICAL EXAM:  Vital Signs Last 24 Hrs  T(C): 36.6 (20 Dec 2018 04:06), Max: 36.8 (19 Dec 2018 12:03)  T(F): 97.8 (20 Dec 2018 04:06), Max: 98.3 (19 Dec 2018 12:03)  HR: 72 (20 Dec 2018 04:06) (68 - 96)  BP: 148/62 (20 Dec 2018 04:06) (137/54 - 184/83)  BP(mean): --  RR: 18 (20 Dec 2018 04:06) (18 - 19)  SpO2: 96% (20 Dec 2018 04:06) (94% - 98%)    Appearance: Normal	  HEENT:   Normal oral mucosa, PERRL, EOMI	  Carotid:   Right:  No Bruit      Left:  No bruit  Lymphatic: No lymphadenopathy  Cardiovascular: Normal S1 S2, No JVD, No murmurs  Respiratory: Lungs clear to auscultation	  Psychiatry: A & O x 3, Mood & affect appropriate  Gastrointestinal:  Soft, Non-tender, + BS	  Skin: No rashes, No ecchymoses, No cyanosis	  Neurologic: Non-focal  Extremities: Normal range of motion.    Vascular Pulse Exam:  Right Femoral:  Palpable       Left Femoral:  Palpable  Right Popliteal:  Palpable      Left Popliteal:  Palpable  Right DP:  Palpable                 Left DP:  Palpable  Right PT:  Palpable                 Left DP:  Palpable    Foot Exam:  Warm, no ulceration.        LABS:	                         10.7   5.75  )-----------( 319      ( 20 Dec 2018 08:14 )             33.8   12-20    142  |  107  |  30<H>  ----------------------------<  132<H>  4.4   |  25  |  1.01    Ca    9.5      20 Dec 2018 06:37              INTERPRETATION:   < from: US Duplex Kidneys (12.17.18 @ 11:35) >  IMPRESSION:     Right renal artery stenosis at the origin and proximal artery with a   renal/aortic ratio of 8.5 at the origin.     Increased cortical echogenicity bilaterally, compatible with medical   renal disease.    < end of copied text >    EXAM: US DPLX CAROTIDS COMPL BI   PROCEDURE DATE: 12/13/2018   REASON FOR EXAM: stroke     TECHNIQUE: Duplex scan of the carotid arteries was performed. Spectral   Doppler analysis, in addition to color flow Doppler was performed using   gray-scale and color imaging.     COMPARISON: None.     FINDINGS:     RIGHT CAROTID ARTERY:   There is slightly tortuous but normal visualized distal right common carotid   artery. There are scattered noncalcified and calcified plaque without   significant stenosis at the distal right common carotid artery and at the   origin of the right internal carotid artery.     Peak systolic flow velocity (PSV) of the right common carotid artery is 87   cm/sec, and the systolic flow velocity (PSV) of the right internal carotid   artery is 79 cm/sec. There are normal Doppler waveforms. The ICA/CCA ratio   is 0.9.     The right external carotid artery is patent.     LEFT CAROTID ARTERY:   There is slightly tortuous but patent visualized distal left common carotid   artery, without intimal thickening. There are scattered atherosclerotic   plaque at the carotid bulb and origin of the left common carotid artery   without hemodynamically significant stenosis.     Peak systolic flow velocity (PSV) of the left common carotid artery is 61   cm/, and peak systolic flow velocity (PSV) of the left internal carotid   artery is 60 cm/sec. The ICA/CCA ratio is 1. There is a normal wave form   with a broad systolic peak and well-maintained diastolic flow of the left   internal carotid artery.     The left external carotid artery is patent.     VERTEBRAL ARTERIES:   There is antegrade flow in the bilateral vertebral arteries.     IMPRESSION:   Scattered atheromatous plaque at the origin of the internal carotid arteries   bilaterally but no hemodynamically significant stenosis or obstructive   disease.       EXAM: MR BRAIN       PROCEDURE DATE: 12/13/2018         INTERPRETATION: CLINICAL STATEMENT: Left facial droop     TECHNIQUE: MRI of the brain was performed without gadolinium.     COMPARISON: CT head 12/12/2018     FINDINGS:   There is moderate diffuse parenchymal volume loss. There are T2 prolongation   signal abnormalities in the periventricular subcortical white matter likely   related to severe chronic microvascular ischemic changes.     Chronic infarct left parietal occipital region with ex vacuo dilatation of   adjacent left lateral ventricle.     1.1 cm meningioma noted in the right frontal parietal region.     There is no acute parenchymal hemorrhage, parenchymal mass, or midline   shift. There is no extra-axial fluid collection. There is no hydrocephalus.       6 x 3 mm acute infarct noted in the right centrum semiovale. 6 x 3 mm acute   infarct noted in the posterior limb right internal capsule.     Partial opacification of right mastoid air cells. Mucosal thickening   paranasal sinuses.     IMPRESSION:   Small acute infarcts in the right centrum semiovale and posterior limb of   right internal capsule as described above.     No acute intracranial hemorrhage       EXAM: CT ANGIO ABD PELV DEREK(W)AW IC       PROCEDURE DATE: 12/12/2018         INTERPRETATION: CTA of the Abdomen with contrast and CT of the Pelvis with   contrast     HISTORY: Uncontrolled hypertension. Assess for possible renal artery   stenosis.     TECHNIQUE: Multiple axial scans of the abdomen and pelvis were obtained   after administration of IV and bowel contrast material. Additional   sagittal, coronal and MIP angiographic images were then created from the   acquired axial data.     Interpretation: Liver: No focal lesions.     Biliary tree: Dilated within the liver and to a diameter of 11 mm at the   distal CBD level which may be physiologic considering the patient has   undergone cholecystectomy.     Solid organs: Bilateral renal cysts.     Retroperitoneum: Tight stenosis of the proximal right renal artery and mild   to moderate narrowing of the proximal left renal artery. 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 The common iliac arteries are ectatic measuring 15 mm in   diameter on the left and 14 mm in diameter on the right.     Bowel: No evidence of ascites, bowel obstruction or free air.     Appendix: Unremarkable     Urinary bladder: Obscured by metal artifact from patient's bilateral hip   replacements.     Pelvis: Shows no free fluid.     The inguinal regions are unremarkable.     The lung bases show small pleural effusions with atelectasis of the adjacent   lower lobes.       IMPRESSION: High-grade stenosis, proximal right renal artery. Enlarged   arteries as described.       The above study was reviewed by the offsite overnight image reading service   at the time of the examination.     Thank you for this referral.

## 2018-12-20 NOTE — DIETITIAN INITIAL EVALUATION ADULT. - PROBLEM SELECTOR PLAN 2
-Pt with new stroke based on MRA/MRH today.   -NIHSS score 2 on d/c from  hosptial ; no tPA given.   -Monitor on telemetry   -Check TTE  -Neuro checks  -NPO until S&S eval  -PT/OT eval  -c/w aspirin and statin therapy  -Hold plavix for tonight and neurology to comment on its safety given potential to increase brain bleed in setting of dual-anti platelet therapy  -Holding DVT ppx for now, please start subcutaneous heparin for DVT ppx after 24-hr  -Monitor VS q4hr  -Allow permissive HTN ; Goal is SBP < 185 and DBP < 110  -c/w anti-HTN from  hospital but caution w/ ARB therapy and bilateral BOB and potential to raise creatinine.  -Recommend neuro eval.  -check A1c and lipid profile for core measures.  -stroke education provided to family & Pt  -fall precaution / bed alarm , ambulate w/ assistance

## 2018-12-20 NOTE — DIETITIAN INITIAL EVALUATION ADULT. - NS AS NUTRI INTERV ED CONTENT
Pt declines need for review of DASH/TLC nutrition recommendations. Diet recall indicates good adherence to recommendations PTA and pt has no nutrition-related questions at this time. RD remains available.

## 2018-12-20 NOTE — PROGRESS NOTE ADULT - SUBJECTIVE AND OBJECTIVE BOX
SUBJECTIVE: No new neurologic events overnight.  In chair  Medications:  aspirin  chewable 81 milliGRAM(s) Oral daily  atorvastatin 40 milliGRAM(s) Oral at bedtime  cloNIDine 0.2 milliGRAM(s) Oral three times a day  clopidogrel Tablet 75 milliGRAM(s) Oral daily  hydrALAZINE 25 milliGRAM(s) Oral every 8 hours  losartan 50 milliGRAM(s) Oral every 12 hours      Labs:  CBC Full  -  ( 20 Dec 2018 08:14 )  WBC Count : 5.75 K/uL  Hemoglobin : 10.7 g/dL  Hematocrit : 33.8 %  Platelet Count - Automated : 319 K/uL  Mean Cell Volume : 94.9 fl  Mean Cell Hemoglobin : 30.1 pg  Mean Cell Hemoglobin Concentration : 31.7 gm/dL  Auto Neutrophil # : x  Auto Lymphocyte # : x  Auto Monocyte # : x  Auto Eosinophil # : x  Auto Basophil # : x  Auto Neutrophil % : x  Auto Lymphocyte % : x  Auto Monocyte % : x  Auto Eosinophil % : x  Auto Basophil % : x    12-20    142  |  107  |  30<H>  ----------------------------<  132<H>  4.4   |  25  |  1.01    Ca    9.5      20 Dec 2018 06:37      CAPILLARY BLOOD GLUCOSE                  Vitals:  Vital Signs Last 24 Hrs  T(C): 36.6 (20 Dec 2018 04:06), Max: 36.8 (19 Dec 2018 12:03)  T(F): 97.8 (20 Dec 2018 04:06), Max: 98.3 (19 Dec 2018 12:03)  HR: 72 (20 Dec 2018 04:06) (68 - 96)  BP: 148/62 (20 Dec 2018 04:06) (137/54 - 184/83)  BP(mean): --  RR: 18 (20 Dec 2018 04:06) (18 - 19)  SpO2: 96% (20 Dec 2018 04:06) (94% - 98%)    NEUROLOGICAL EXAM:    Mental status: Awake, alert, and in no apparent distress. Oriented to person, place and time. Language function is normal. Recent memory, digit span and concentration were normal. dysarthria    Cranial Nerves: Pupils were equal, round, reactive to light. Extraocular movements were intact. Visual field were full. Fundoscopic exam was deferred. Facial sensation was intact to light touch. There was left umn facial. The palate was upgoing symmetrically and tongue was midline. Hearing acuity was intact to finger rub AU. Shoulder shrug was full bilaterally    Motor exam: Bulk and tone were normal. Strength was 5/5 except left hand 4/5.  dec FFM on left abnl left arm roll    Reflexes: 2+ in the bilateral upper extremities. 2+ in the bilateral lower extremities. Toes were downgoing bilaterally.     Sensation: Intact to light touch, temperature, vibration and proprioception.     Coordination: Finger-nose-finger and heel-to-shin was without dysmetria.     Gait: deferred      NIHSS 2  Imaging:    EXAM:  MR ANGIO BRAIN                            PROCEDURE DATE:  12/13/2018          INTERPRETATION:  CLINICAL STATEMENT: Evaluate for stenosis. CVA    TECHNIQUE: MRA of the head was performed without gadolinium. 3-D MIP   images were obtained.    COMPARISON: None.    FINDINGS:  Narrowing of supraclinoid internal carotid arteries noted    The proximal anterior, middle and posterior cerebral arteries are patent.   Multifocal stenosis involving proximal M2 branches of bilateral middle   cerebralarteries and distal M2 branches of left middle cerebral artery.   Mild focal narrowing at the proximal left posterior cerebral artery.    The intracranial vertebral and basilar arteries are patent. Dominant   right vertebral artery noted.     There is no MR evidence of intracranial aneurysm.    IMPRESSION:  Multifocal intracranial stenoses as described above                LORNA ARNOLD M.D., ATTENDING RADIOLOGIST  This document has been electronically signed. Dec 13 2018 12:48PM        EXAM:  MR BRAIN                            PROCEDURE DATE:  12/13/2018          INTERPRETATION:  CLINICAL STATEMENT: Left facial droop    TECHNIQUE: MRI of the brain was performed without gadolinium.    COMPARISON: CT head 12/12/2018    FINDINGS:  There is moderate diffuse parenchymal volume loss. There are T2   prolongation signal abnormalities in the periventricular subcortical   white matter likely related to severe chronic microvascular ischemic   changes.    Chronic infarct left parietal occipital region with ex vacuo dilatation   of adjacent left lateral ventricle.    1.1 cm meningioma noted in the right frontal parietal region.    There is no acute parenchymal hemorrhage, parenchymal mass, or midline   shift. There is no extra-axial fluid collection.  There is no   hydrocephalus.      6 x 3 mm acute infarct noted in the right centrum semiovale. 6 x 3 mm   acute infarct noted in the posterior limb right internal capsule.    Partial opacification of right mastoid air cells. Mucosalthickening   paranasal sinuses.    IMPRESSION:  Small acute infarcts in the right centrum semiovale and posterior limb of   right internal capsule as described above.    No acute intracranial hemorrhage                LORNA ARNOLD M.D., ATTENDING RADIOLOGIST  This document has been electronically signed. Dec 13 2018 12:42PM

## 2018-12-20 NOTE — PROGRESS NOTE ADULT - ASSESSMENT
Assessment:  1. Renal Artery Stenosis / HTN - high grade stenosis in right renal artery seen on CT and confirmed on renal US. likely contributory to uncontrolled BP.  Now s/p renal artery stenting with improvement in BPs  2. Pulmonary Edema -  resolved, euvolemic, off lasix  3 Elevated BUN - resolving, likely from overdiuresis  2. Distal/hilar splenic artery 11mm   3. 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 CVA     Plan:  1. Continue losartan 50mg BID clonidine 0.2mg PO TID, hydralazine 25mg TID,   Appreciate general cardiology recs.    2. Continue aspirin/plavix for renal artery stent.  Cont high dose statin  3. Stable for discharge from vascular standpoint.  Will discuss with Dr. Ríos.     Hugo Tello MD  Vascular Cardiology Fellow  Please call with any questions:   [phone]

## 2018-12-24 ENCOUNTER — EMERGENCY (EMERGENCY)
Facility: HOSPITAL | Age: 83
LOS: 1 days | Discharge: HOME | End: 2018-12-24
Attending: EMERGENCY MEDICINE | Admitting: EMERGENCY MEDICINE
Payer: MEDICARE

## 2018-12-24 VITALS
OXYGEN SATURATION: 100 % | RESPIRATION RATE: 17 BRPM | DIASTOLIC BLOOD PRESSURE: 99 MMHG | HEART RATE: 115 BPM | HEIGHT: 61 IN | SYSTOLIC BLOOD PRESSURE: 204 MMHG | WEIGHT: 113.98 LBS | TEMPERATURE: 98 F

## 2018-12-24 VITALS
RESPIRATION RATE: 19 BRPM | OXYGEN SATURATION: 99 % | SYSTOLIC BLOOD PRESSURE: 174 MMHG | DIASTOLIC BLOOD PRESSURE: 67 MMHG | HEART RATE: 89 BPM

## 2018-12-24 DIAGNOSIS — Z90.49 ACQUIRED ABSENCE OF OTHER SPECIFIED PARTS OF DIGESTIVE TRACT: Chronic | ICD-10-CM

## 2018-12-24 DIAGNOSIS — Z90.710 ACQUIRED ABSENCE OF BOTH CERVIX AND UTERUS: Chronic | ICD-10-CM

## 2018-12-24 DIAGNOSIS — Z98.89 OTHER SPECIFIED POSTPROCEDURAL STATES: Chronic | ICD-10-CM

## 2018-12-24 DIAGNOSIS — Z41.9 ENCOUNTER FOR PROCEDURE FOR PURPOSES OTHER THAN REMEDYING HEALTH STATE, UNSPECIFIED: Chronic | ICD-10-CM

## 2018-12-24 DIAGNOSIS — Z96.643 PRESENCE OF ARTIFICIAL HIP JOINT, BILATERAL: Chronic | ICD-10-CM

## 2018-12-24 LAB
ANION GAP SERPL CALC-SCNC: 8 MMOL/L — SIGNIFICANT CHANGE UP (ref 5–17)
BUN SERPL-MCNC: 35 MG/DL — HIGH (ref 7–23)
CALCIUM SERPL-MCNC: 10.2 MG/DL — HIGH (ref 8.5–10.1)
CHLORIDE SERPL-SCNC: 109 MMOL/L — HIGH (ref 96–108)
CO2 SERPL-SCNC: 25 MMOL/L — SIGNIFICANT CHANGE UP (ref 22–31)
CREAT SERPL-MCNC: 1 MG/DL — SIGNIFICANT CHANGE UP (ref 0.5–1.3)
GLUCOSE SERPL-MCNC: 129 MG/DL — HIGH (ref 70–99)
HCT VFR BLD CALC: 36.3 % — SIGNIFICANT CHANGE UP (ref 34.5–45)
HGB BLD-MCNC: 11.6 G/DL — SIGNIFICANT CHANGE UP (ref 11.5–15.5)
MCHC RBC-ENTMCNC: 30 PG — SIGNIFICANT CHANGE UP (ref 27–34)
MCHC RBC-ENTMCNC: 32 GM/DL — SIGNIFICANT CHANGE UP (ref 32–36)
MCV RBC AUTO: 93.8 FL — SIGNIFICANT CHANGE UP (ref 80–100)
NRBC # BLD: 0 /100 WBCS — SIGNIFICANT CHANGE UP (ref 0–0)
PLATELET # BLD AUTO: 426 K/UL — HIGH (ref 150–400)
POTASSIUM SERPL-MCNC: 5.1 MMOL/L — SIGNIFICANT CHANGE UP (ref 3.5–5.3)
POTASSIUM SERPL-SCNC: 5.1 MMOL/L — SIGNIFICANT CHANGE UP (ref 3.5–5.3)
RBC # BLD: 3.87 M/UL — SIGNIFICANT CHANGE UP (ref 3.8–5.2)
RBC # FLD: 12.9 % — SIGNIFICANT CHANGE UP (ref 10.3–14.5)
SODIUM SERPL-SCNC: 142 MMOL/L — SIGNIFICANT CHANGE UP (ref 135–145)
WBC # BLD: 7.12 K/UL — SIGNIFICANT CHANGE UP (ref 3.8–10.5)
WBC # FLD AUTO: 7.12 K/UL — SIGNIFICANT CHANGE UP (ref 3.8–10.5)

## 2018-12-24 PROCEDURE — 99284 EMERGENCY DEPT VISIT MOD MDM: CPT

## 2018-12-24 PROCEDURE — 96374 THER/PROPH/DIAG INJ IV PUSH: CPT

## 2018-12-24 PROCEDURE — 85027 COMPLETE CBC AUTOMATED: CPT

## 2018-12-24 PROCEDURE — 99284 EMERGENCY DEPT VISIT MOD MDM: CPT | Mod: 25

## 2018-12-24 PROCEDURE — 36415 COLL VENOUS BLD VENIPUNCTURE: CPT

## 2018-12-24 PROCEDURE — 93005 ELECTROCARDIOGRAM TRACING: CPT

## 2018-12-24 PROCEDURE — 80048 BASIC METABOLIC PNL TOTAL CA: CPT

## 2018-12-24 RX ORDER — METOPROLOL TARTRATE 50 MG
5 TABLET ORAL ONCE
Qty: 0 | Refills: 0 | Status: COMPLETED | OUTPATIENT
Start: 2018-12-24 | End: 2018-12-24

## 2018-12-24 RX ORDER — LABETALOL HYDROCHLORIDE 300 MG/1
300 TABLET, FILM COATED ORAL
Qty: 180 | Refills: 3 | Status: DISCONTINUED | COMMUNITY
Start: 2018-10-09 | End: 2018-12-24

## 2018-12-24 RX ADMIN — Medication 5 MILLIGRAM(S): at 14:53

## 2018-12-24 NOTE — ED PROVIDER NOTE - CARE PLAN
Principal Discharge DX:	Palpitations Principal Discharge DX:	Palpitations  Secondary Diagnosis:	Essential hypertension

## 2018-12-24 NOTE — ED PROVIDER NOTE - PROGRESS NOTE DETAILS
Feels better at this time. BP is 160/80 with HR of 88 Feels better at this time. BP is 160/80 with HR of 88. Wants to go home.

## 2018-12-24 NOTE — ED PROVIDER NOTE - MEDICAL DECISION MAKING DETAILS
Palpitations most likely related to Beta blocker withdrawal and addition of hydralazine requiring evaluation, labs, ecg and meds.

## 2018-12-24 NOTE — ED ADULT NURSE NOTE - NSIMPLEMENTINTERV_GEN_ALL_ED
Implemented All Fall with Harm Risk Interventions:  [place] to call system. Call bell, personal items and telephone within reach. Instruct patient to call for assistance. Room bathroom lighting operational. Non-slip footwear when patient is off stretcher. Physically safe environment: no spills, clutter or unnecessary equipment. Stretcher in lowest position, wheels locked, appropriate side rails in place. Provide visual cue, wrist band, yellow gown, etc. Monitor gait and stability. Monitor for mental status changes and reorient to person, place, and time. Review medications for side effects contributing to fall risk. Reinforce activity limits and safety measures with patient and family. Provide visual clues: red socks.

## 2018-12-24 NOTE — ED PROVIDER NOTE - CONSTITUTIONAL, MLM
normal... Well appearing, female, anxious, well nourished, awake, alert, oriented to person, place, time/situation and in no apparent distress.

## 2019-01-02 ENCOUNTER — EMERGENCY (EMERGENCY)
Facility: HOSPITAL | Age: 84
LOS: 0 days | Discharge: HOME | End: 2019-01-02
Attending: EMERGENCY MEDICINE | Admitting: EMERGENCY MEDICINE
Payer: MEDICARE

## 2019-01-02 VITALS
HEART RATE: 99 BPM | WEIGHT: 113.98 LBS | OXYGEN SATURATION: 100 % | TEMPERATURE: 98 F | RESPIRATION RATE: 18 BRPM | SYSTOLIC BLOOD PRESSURE: 169 MMHG | DIASTOLIC BLOOD PRESSURE: 76 MMHG

## 2019-01-02 DIAGNOSIS — Z98.89 OTHER SPECIFIED POSTPROCEDURAL STATES: Chronic | ICD-10-CM

## 2019-01-02 DIAGNOSIS — I10 ESSENTIAL (PRIMARY) HYPERTENSION: ICD-10-CM

## 2019-01-02 DIAGNOSIS — Z41.9 ENCOUNTER FOR PROCEDURE FOR PURPOSES OTHER THAN REMEDYING HEALTH STATE, UNSPECIFIED: Chronic | ICD-10-CM

## 2019-01-02 DIAGNOSIS — Z86.73 PERSONAL HISTORY OF TRANSIENT ISCHEMIC ATTACK (TIA), AND CEREBRAL INFARCTION WITHOUT RESIDUAL DEFICITS: ICD-10-CM

## 2019-01-02 DIAGNOSIS — Z90.49 ACQUIRED ABSENCE OF OTHER SPECIFIED PARTS OF DIGESTIVE TRACT: Chronic | ICD-10-CM

## 2019-01-02 DIAGNOSIS — R10.30 LOWER ABDOMINAL PAIN, UNSPECIFIED: ICD-10-CM

## 2019-01-02 DIAGNOSIS — K40.90 UNILATERAL INGUINAL HERNIA, WITHOUT OBSTRUCTION OR GANGRENE, NOT SPECIFIED AS RECURRENT: ICD-10-CM

## 2019-01-02 DIAGNOSIS — Z79.82 LONG TERM (CURRENT) USE OF ASPIRIN: ICD-10-CM

## 2019-01-02 DIAGNOSIS — Z88.8 ALLERGY STATUS TO OTHER DRUGS, MEDICAMENTS AND BIOLOGICAL SUBSTANCES: ICD-10-CM

## 2019-01-02 DIAGNOSIS — C44.91 BASAL CELL CARCINOMA OF SKIN, UNSPECIFIED: ICD-10-CM

## 2019-01-02 DIAGNOSIS — Z90.710 ACQUIRED ABSENCE OF BOTH CERVIX AND UTERUS: Chronic | ICD-10-CM

## 2019-01-02 DIAGNOSIS — Z96.643 PRESENCE OF ARTIFICIAL HIP JOINT, BILATERAL: Chronic | ICD-10-CM

## 2019-01-02 LAB
ALBUMIN SERPL ELPH-MCNC: 4.4 G/DL — SIGNIFICANT CHANGE UP (ref 3.3–5)
ALP SERPL-CCNC: 99 U/L — SIGNIFICANT CHANGE UP (ref 40–120)
ALT FLD-CCNC: 27 U/L — SIGNIFICANT CHANGE UP (ref 12–78)
ANION GAP SERPL CALC-SCNC: 7 MMOL/L — SIGNIFICANT CHANGE UP (ref 5–17)
APPEARANCE UR: CLEAR — SIGNIFICANT CHANGE UP
APTT BLD: 30.6 SEC — SIGNIFICANT CHANGE UP (ref 27.5–36.3)
AST SERPL-CCNC: 21 U/L — SIGNIFICANT CHANGE UP (ref 15–37)
BACTERIA # UR AUTO: NEGATIVE — SIGNIFICANT CHANGE UP
BASOPHILS # BLD AUTO: 0.06 K/UL — SIGNIFICANT CHANGE UP (ref 0–0.2)
BASOPHILS NFR BLD AUTO: 0.9 % — SIGNIFICANT CHANGE UP (ref 0–2)
BILIRUB SERPL-MCNC: 1.2 MG/DL — SIGNIFICANT CHANGE UP (ref 0.2–1.2)
BILIRUB UR-MCNC: NEGATIVE — SIGNIFICANT CHANGE UP
BLD GP AB SCN SERPL QL: SIGNIFICANT CHANGE UP
BUN SERPL-MCNC: 34 MG/DL — HIGH (ref 7–23)
CALCIUM SERPL-MCNC: 10 MG/DL — SIGNIFICANT CHANGE UP (ref 8.5–10.1)
CHLORIDE SERPL-SCNC: 104 MMOL/L — SIGNIFICANT CHANGE UP (ref 96–108)
CO2 SERPL-SCNC: 27 MMOL/L — SIGNIFICANT CHANGE UP (ref 22–31)
COLOR SPEC: YELLOW — SIGNIFICANT CHANGE UP
CREAT SERPL-MCNC: 1.29 MG/DL — SIGNIFICANT CHANGE UP (ref 0.5–1.3)
DIFF PNL FLD: NEGATIVE — SIGNIFICANT CHANGE UP
EOSINOPHIL # BLD AUTO: 0.15 K/UL — SIGNIFICANT CHANGE UP (ref 0–0.5)
EOSINOPHIL NFR BLD AUTO: 2.2 % — SIGNIFICANT CHANGE UP (ref 0–6)
EPI CELLS # UR: NEGATIVE — SIGNIFICANT CHANGE UP
GLUCOSE SERPL-MCNC: 125 MG/DL — HIGH (ref 70–99)
GLUCOSE UR QL: NEGATIVE MG/DL — SIGNIFICANT CHANGE UP
HCT VFR BLD CALC: 36.2 % — SIGNIFICANT CHANGE UP (ref 34.5–45)
HGB BLD-MCNC: 11.8 G/DL — SIGNIFICANT CHANGE UP (ref 11.5–15.5)
IMM GRANULOCYTES NFR BLD AUTO: 0.6 % — SIGNIFICANT CHANGE UP (ref 0–1.5)
INR BLD: 0.9 RATIO — SIGNIFICANT CHANGE UP (ref 0.88–1.16)
KETONES UR-MCNC: NEGATIVE — SIGNIFICANT CHANGE UP
LEUKOCYTE ESTERASE UR-ACNC: NEGATIVE — SIGNIFICANT CHANGE UP
LYMPHOCYTES # BLD AUTO: 1.8 K/UL — SIGNIFICANT CHANGE UP (ref 1–3.3)
LYMPHOCYTES # BLD AUTO: 25.8 % — SIGNIFICANT CHANGE UP (ref 13–44)
MAGNESIUM SERPL-MCNC: 2.3 MG/DL — SIGNIFICANT CHANGE UP (ref 1.6–2.6)
MCHC RBC-ENTMCNC: 30.6 PG — SIGNIFICANT CHANGE UP (ref 27–34)
MCHC RBC-ENTMCNC: 32.6 GM/DL — SIGNIFICANT CHANGE UP (ref 32–36)
MCV RBC AUTO: 93.8 FL — SIGNIFICANT CHANGE UP (ref 80–100)
MONOCYTES # BLD AUTO: 0.38 K/UL — SIGNIFICANT CHANGE UP (ref 0–0.9)
MONOCYTES NFR BLD AUTO: 5.5 % — SIGNIFICANT CHANGE UP (ref 2–14)
NEUTROPHILS # BLD AUTO: 4.54 K/UL — SIGNIFICANT CHANGE UP (ref 1.8–7.4)
NEUTROPHILS NFR BLD AUTO: 65 % — SIGNIFICANT CHANGE UP (ref 43–77)
NITRITE UR-MCNC: NEGATIVE — SIGNIFICANT CHANGE UP
NRBC # BLD: 0 /100 WBCS — SIGNIFICANT CHANGE UP (ref 0–0)
PH UR: 7 — SIGNIFICANT CHANGE UP (ref 5–8)
PLATELET # BLD AUTO: 414 K/UL — HIGH (ref 150–400)
POTASSIUM SERPL-MCNC: 4 MMOL/L — SIGNIFICANT CHANGE UP (ref 3.5–5.3)
POTASSIUM SERPL-SCNC: 4 MMOL/L — SIGNIFICANT CHANGE UP (ref 3.5–5.3)
PROT SERPL-MCNC: 7.8 GM/DL — SIGNIFICANT CHANGE UP (ref 6–8.3)
PROT UR-MCNC: 100 MG/DL
PROTHROM AB SERPL-ACNC: 10 SEC — SIGNIFICANT CHANGE UP (ref 10–12.9)
RBC # BLD: 3.86 M/UL — SIGNIFICANT CHANGE UP (ref 3.8–5.2)
RBC # FLD: 13 % — SIGNIFICANT CHANGE UP (ref 10.3–14.5)
RBC CASTS # UR COMP ASSIST: NEGATIVE /HPF — SIGNIFICANT CHANGE UP (ref 0–4)
SODIUM SERPL-SCNC: 138 MMOL/L — SIGNIFICANT CHANGE UP (ref 135–145)
SP GR SPEC: 1.01 — SIGNIFICANT CHANGE UP (ref 1.01–1.02)
TYPE + AB SCN PNL BLD: SIGNIFICANT CHANGE UP
UROBILINOGEN FLD QL: NEGATIVE MG/DL — SIGNIFICANT CHANGE UP
WBC # BLD: 6.97 K/UL — SIGNIFICANT CHANGE UP (ref 3.8–10.5)
WBC # FLD AUTO: 6.97 K/UL — SIGNIFICANT CHANGE UP (ref 3.8–10.5)
WBC UR QL: SIGNIFICANT CHANGE UP

## 2019-01-02 PROCEDURE — 74177 CT ABD & PELVIS W/CONTRAST: CPT | Mod: 26

## 2019-01-02 PROCEDURE — 99284 EMERGENCY DEPT VISIT MOD MDM: CPT | Mod: 25

## 2019-01-02 NOTE — ED STATDOCS - PROGRESS NOTE DETAILS
Labs WNlL.  CT a/p showed Moderate-sized nonobstructing right groin hernia containing a segment of   proximal transverse colon. Small nonobstructing left groin hernia containing a segment of sigmoid colon.  Results explained to pt, pt currently pain free, hernia reducible.  Plan to d/c home with outpt f/u this week with surgeon for further evaluation.  S&S of incarcerated hernia discussed at length with pt and son and daughter, all verbalized understanding, strict return precautions given.  Pt and family agreeable to d/c and plan of care, all questions answered  Margarita Cordova PA-C Patient seen and evaluated, ED attending note and orders reviewed, will continue with patient follow up and care -Margarita Crodova PA-C

## 2019-01-02 NOTE — ED STATDOCS - ATTENDING CONTRIBUTION TO CARE
I, Jose Salvador MD,  performed the initial face to face bedside interview with this patient regarding history of present illness, review of symptoms and relevant past medical, social and family history.  I completed an independent physical examination.  I was the initial provider who evaluated this patient. I have signed out the follow up of any pending tests (i.e. labs, radiological studies) to the ACP.  I have communicated the patient’s plan of care and disposition with the ACP.

## 2019-01-02 NOTE — ED ADULT TRIAGE NOTE - CHIEF COMPLAINT QUOTE
Pt sent by visiting nurse, accompanied by daughter, for groin swelling x 2 days after renal artery stent placed 12/18/18.  Cardiologist Julito.

## 2019-01-02 NOTE — ED STATDOCS - OBJECTIVE STATEMENT
86 y/o female with a PMHx of basal cell carcinoma, CVA, renal artery stenosis, UTI, mitral valve insufficiency, HTN, hyponatremia, dyslipidemia presents to the ED c/o groin pain. Pt sent by visiting nurse, for groin swelling x 2 days after renal artery stent placed 12/18/18. Last BM: 3 days ago. On Plavix.  Cardiologist: Dr. Vila. No other complaints at this time.

## 2019-01-02 NOTE — ED STATDOCS - SKIN, MLM
skin normal color for race, warm, dry and intact. Right sided bulging from groin, reducible. No ecchymosis.

## 2019-01-02 NOTE — ED ADULT NURSE NOTE - OBJECTIVE STATEMENT
Patient had renal stent placed by Dr. Castelan 12/18/18. hx bladder mesh "years ago". yesterday patient started complaining of groin pain with lump in area.

## 2019-01-02 NOTE — ED STATDOCS - NS_ ATTENDINGSCRIBEDETAILS _ED_A_ED_FT
I, Jose Salvador MD,  performed the initial face to face bedside interview with this patient regarding history of present illness, review of symptoms and relevant past medical, social and family history.  I completed an independent physical examination.    The history, relevant review of systems, past medical and surgical history, medical decision making, and physical examination was documented by the scribe in my presence and I attest to the accuracy of the documentation.

## 2019-01-03 LAB
CULTURE RESULTS: NO GROWTH — SIGNIFICANT CHANGE UP
SPECIMEN SOURCE: SIGNIFICANT CHANGE UP

## 2019-01-07 ENCOUNTER — APPOINTMENT (OUTPATIENT)
Dept: CARDIOLOGY | Facility: CLINIC | Age: 84
End: 2019-01-07
Payer: MEDICARE

## 2019-01-07 VITALS — OXYGEN SATURATION: 99 % | DIASTOLIC BLOOD PRESSURE: 63 MMHG | SYSTOLIC BLOOD PRESSURE: 127 MMHG | HEART RATE: 95 BPM

## 2019-01-07 VITALS — BODY MASS INDEX: 21.79 KG/M2 | WEIGHT: 111 LBS | HEIGHT: 60 IN

## 2019-01-07 PROCEDURE — 93000 ELECTROCARDIOGRAM COMPLETE: CPT

## 2019-01-07 PROCEDURE — 99214 OFFICE O/P EST MOD 30 MIN: CPT | Mod: 25

## 2019-01-07 NOTE — HISTORY OF PRESENT ILLNESS
[FreeTextEntry1] : 88 yo presents for follow up for refractory HTN. Recent symptoms of CVA and stenting to right renal artery. Pt was seen at [facility] and transferred to [facility] for evaluation and treatment. Despite multiple agents , blood pressure, although overall improved, has been labile requiring holding meds when hypotensive. Pt denies chest pain or shortness of breath. She denies peripheral edema or recurrence of stroke symptoms. She has home OT and Speech/swallow.

## 2019-01-07 NOTE — PHYSICAL EXAM
[General Appearance - Well Developed] : well developed [Normal Appearance] : normal appearance [Well Groomed] : well groomed [General Appearance - Well Nourished] : well nourished [No Deformities] : no deformities [General Appearance - In No Acute Distress] : no acute distress [Normal Conjunctiva] : the conjunctiva exhibited no abnormalities [Normal Oral Mucosa] : normal oral mucosa [No Oral Pallor] : no oral pallor [No Oral Cyanosis] : no oral cyanosis [] : no respiratory distress [Respiration, Rhythm And Depth] : normal respiratory rhythm and effort [Exaggerated Use Of Accessory Muscles For Inspiration] : no accessory muscle use [Auscultation Breath Sounds / Voice Sounds] : lungs were clear to auscultation bilaterally [Heart Rate And Rhythm] : heart rate and rhythm were normal [Heart Sounds] : normal S1 and S2 [Abdomen Soft] : soft [Abnormal Walk] : normal gait [FreeTextEntry1] : No LE Edema  [Skin Color & Pigmentation] : normal skin color and pigmentation [Oriented To Time, Place, And Person] : oriented to person, place, and time

## 2019-01-07 NOTE — DISCUSSION/SUMMARY
[Hypertension] : hypertension [Improving] : improving [None] : none [Severe Mitral Regurgitation] : severe mitral regurgitation [Compensated] : compensated [Patient] : the patient [___ Week(s)] : [unfilled] week(s) [With Me] : with me [FreeTextEntry1] : Pt will continue current medication. She will record weight and B/P measurements. She will also note when she is holding a dose because of low readings.

## 2019-01-14 ENCOUNTER — EMERGENCY (EMERGENCY)
Facility: HOSPITAL | Age: 84
LOS: 1 days | Discharge: HOME | End: 2019-01-14
Attending: EMERGENCY MEDICINE | Admitting: EMERGENCY MEDICINE
Payer: MEDICARE

## 2019-01-14 VITALS
RESPIRATION RATE: 14 BRPM | SYSTOLIC BLOOD PRESSURE: 118 MMHG | HEART RATE: 70 BPM | OXYGEN SATURATION: 97 % | DIASTOLIC BLOOD PRESSURE: 55 MMHG

## 2019-01-14 VITALS — WEIGHT: 125 LBS | HEIGHT: 61 IN

## 2019-01-14 DIAGNOSIS — Z90.710 ACQUIRED ABSENCE OF BOTH CERVIX AND UTERUS: Chronic | ICD-10-CM

## 2019-01-14 DIAGNOSIS — Z90.49 ACQUIRED ABSENCE OF OTHER SPECIFIED PARTS OF DIGESTIVE TRACT: Chronic | ICD-10-CM

## 2019-01-14 DIAGNOSIS — Z96.643 PRESENCE OF ARTIFICIAL HIP JOINT, BILATERAL: Chronic | ICD-10-CM

## 2019-01-14 DIAGNOSIS — Z98.89 OTHER SPECIFIED POSTPROCEDURAL STATES: Chronic | ICD-10-CM

## 2019-01-14 DIAGNOSIS — Z41.9 ENCOUNTER FOR PROCEDURE FOR PURPOSES OTHER THAN REMEDYING HEALTH STATE, UNSPECIFIED: Chronic | ICD-10-CM

## 2019-01-14 LAB
ALBUMIN SERPL ELPH-MCNC: 4.1 G/DL — SIGNIFICANT CHANGE UP (ref 3.3–5)
ALP SERPL-CCNC: 108 U/L — SIGNIFICANT CHANGE UP (ref 40–120)
ALT FLD-CCNC: 26 U/L — SIGNIFICANT CHANGE UP (ref 12–78)
ANION GAP SERPL CALC-SCNC: 9 MMOL/L — SIGNIFICANT CHANGE UP (ref 5–17)
APPEARANCE UR: CLEAR — SIGNIFICANT CHANGE UP
APTT BLD: 27.6 SEC — LOW (ref 28.5–37)
AST SERPL-CCNC: 21 U/L — SIGNIFICANT CHANGE UP (ref 15–37)
BACTERIA # UR AUTO: ABNORMAL
BASOPHILS # BLD AUTO: 0.05 K/UL — SIGNIFICANT CHANGE UP (ref 0–0.2)
BASOPHILS NFR BLD AUTO: 0.7 % — SIGNIFICANT CHANGE UP (ref 0–2)
BILIRUB SERPL-MCNC: 1 MG/DL — SIGNIFICANT CHANGE UP (ref 0.2–1.2)
BILIRUB UR-MCNC: NEGATIVE — SIGNIFICANT CHANGE UP
BUN SERPL-MCNC: 33 MG/DL — HIGH (ref 7–23)
CALCIUM SERPL-MCNC: 9.6 MG/DL — SIGNIFICANT CHANGE UP (ref 8.5–10.1)
CHLORIDE SERPL-SCNC: 109 MMOL/L — HIGH (ref 96–108)
CO2 SERPL-SCNC: 23 MMOL/L — SIGNIFICANT CHANGE UP (ref 22–31)
COLOR SPEC: SIGNIFICANT CHANGE UP
CREAT SERPL-MCNC: 1.1 MG/DL — SIGNIFICANT CHANGE UP (ref 0.5–1.3)
DIFF PNL FLD: NEGATIVE — SIGNIFICANT CHANGE UP
EOSINOPHIL # BLD AUTO: 0.16 K/UL — SIGNIFICANT CHANGE UP (ref 0–0.5)
EOSINOPHIL NFR BLD AUTO: 2.3 % — SIGNIFICANT CHANGE UP (ref 0–6)
EPI CELLS # UR: SIGNIFICANT CHANGE UP
GLUCOSE SERPL-MCNC: 117 MG/DL — HIGH (ref 70–99)
GLUCOSE UR QL: NEGATIVE — SIGNIFICANT CHANGE UP
HCT VFR BLD CALC: 33.3 % — LOW (ref 34.5–45)
HGB BLD-MCNC: 11.1 G/DL — LOW (ref 11.5–15.5)
IMM GRANULOCYTES NFR BLD AUTO: 0.7 % — SIGNIFICANT CHANGE UP (ref 0–1.5)
INR BLD: 0.88 RATIO — SIGNIFICANT CHANGE UP (ref 0.88–1.16)
KETONES UR-MCNC: NEGATIVE — SIGNIFICANT CHANGE UP
LEUKOCYTE ESTERASE UR-ACNC: ABNORMAL
LYMPHOCYTES # BLD AUTO: 2.06 K/UL — SIGNIFICANT CHANGE UP (ref 1–3.3)
LYMPHOCYTES # BLD AUTO: 29.1 % — SIGNIFICANT CHANGE UP (ref 13–44)
MCHC RBC-ENTMCNC: 30.5 PG — SIGNIFICANT CHANGE UP (ref 27–34)
MCHC RBC-ENTMCNC: 33.3 GM/DL — SIGNIFICANT CHANGE UP (ref 32–36)
MCV RBC AUTO: 91.5 FL — SIGNIFICANT CHANGE UP (ref 80–100)
MONOCYTES # BLD AUTO: 0.45 K/UL — SIGNIFICANT CHANGE UP (ref 0–0.9)
MONOCYTES NFR BLD AUTO: 6.3 % — SIGNIFICANT CHANGE UP (ref 2–14)
NEUTROPHILS # BLD AUTO: 4.32 K/UL — SIGNIFICANT CHANGE UP (ref 1.8–7.4)
NEUTROPHILS NFR BLD AUTO: 60.9 % — SIGNIFICANT CHANGE UP (ref 43–77)
NITRITE UR-MCNC: NEGATIVE — SIGNIFICANT CHANGE UP
PH UR: 8 — SIGNIFICANT CHANGE UP (ref 5–8)
PLATELET # BLD AUTO: 400 K/UL — SIGNIFICANT CHANGE UP (ref 150–400)
POTASSIUM SERPL-MCNC: 4.1 MMOL/L — SIGNIFICANT CHANGE UP (ref 3.5–5.3)
POTASSIUM SERPL-SCNC: 4.1 MMOL/L — SIGNIFICANT CHANGE UP (ref 3.5–5.3)
PROT SERPL-MCNC: 7.3 G/DL — SIGNIFICANT CHANGE UP (ref 6–8.3)
PROT UR-MCNC: 75 MG/DL
PROTHROM AB SERPL-ACNC: 10 SEC — SIGNIFICANT CHANGE UP (ref 10–12.9)
RBC # BLD: 3.64 M/UL — LOW (ref 3.8–5.2)
RBC # FLD: 12.7 % — SIGNIFICANT CHANGE UP (ref 10.3–14.5)
RBC CASTS # UR COMP ASSIST: NEGATIVE /HPF — SIGNIFICANT CHANGE UP (ref 0–4)
SODIUM SERPL-SCNC: 141 MMOL/L — SIGNIFICANT CHANGE UP (ref 135–145)
SP GR SPEC: 1 — LOW (ref 1.01–1.02)
TROPONIN I SERPL-MCNC: <.015 NG/ML — SIGNIFICANT CHANGE UP (ref 0.01–0.04)
UROBILINOGEN FLD QL: NEGATIVE — SIGNIFICANT CHANGE UP
WBC # BLD: 7.09 K/UL — SIGNIFICANT CHANGE UP (ref 3.8–10.5)
WBC # FLD AUTO: 7.09 K/UL — SIGNIFICANT CHANGE UP (ref 3.8–10.5)
WBC UR QL: ABNORMAL

## 2019-01-14 PROCEDURE — 84484 ASSAY OF TROPONIN QUANT: CPT

## 2019-01-14 PROCEDURE — 85730 THROMBOPLASTIN TIME PARTIAL: CPT

## 2019-01-14 PROCEDURE — 99284 EMERGENCY DEPT VISIT MOD MDM: CPT

## 2019-01-14 PROCEDURE — 71045 X-RAY EXAM CHEST 1 VIEW: CPT | Mod: 26

## 2019-01-14 PROCEDURE — 70450 CT HEAD/BRAIN W/O DYE: CPT

## 2019-01-14 PROCEDURE — 36415 COLL VENOUS BLD VENIPUNCTURE: CPT

## 2019-01-14 PROCEDURE — 70450 CT HEAD/BRAIN W/O DYE: CPT | Mod: 26

## 2019-01-14 PROCEDURE — 99284 EMERGENCY DEPT VISIT MOD MDM: CPT | Mod: 25

## 2019-01-14 PROCEDURE — 81001 URINALYSIS AUTO W/SCOPE: CPT

## 2019-01-14 PROCEDURE — 80053 COMPREHEN METABOLIC PANEL: CPT

## 2019-01-14 PROCEDURE — 93005 ELECTROCARDIOGRAM TRACING: CPT

## 2019-01-14 PROCEDURE — 85027 COMPLETE CBC AUTOMATED: CPT

## 2019-01-14 PROCEDURE — 71045 X-RAY EXAM CHEST 1 VIEW: CPT

## 2019-01-14 PROCEDURE — 83735 ASSAY OF MAGNESIUM: CPT

## 2019-01-14 PROCEDURE — 85610 PROTHROMBIN TIME: CPT

## 2019-01-14 RX ORDER — MECLIZINE HCL 12.5 MG
1 TABLET ORAL
Qty: 15 | Refills: 0
Start: 2019-01-14 | End: 2019-01-18

## 2019-01-14 NOTE — ED ADULT NURSE NOTE - ED STAT RN HANDOFF DETAILS
Patient is being evaluated by Dr. Jacinto. Explained all discharge instructions.  Patient ambulated with walker. Patient had dinner and tolerated well.

## 2019-01-14 NOTE — ED PROVIDER NOTE - ATTENDING CONTRIBUTION TO CARE
86 yo F pw ~ 1 hr pta developed dizziness, described as spinning sensation. NO cp/sob/palp. no fever/chills. no cp/sob/palp. no numb/ting/focal weak. no abd pain. no vomiting / diarrhea. no neck / back pain. no agg/allv factors. pt now asymptomatic, no acute co or changes.  Nl non-focal neuro exam. no signs of trauma. Neck supple no meningeal signs.  Check labs, CT, Neuro - [place]

## 2019-01-14 NOTE — ED PROVIDER NOTE - OBJECTIVE STATEMENT
86 yo female with PMHx HTN, BOB, MV insufficiency, CVA (13 years ago), CHF, CVA 12/2018 no defecits now presenting with dizziness room spinning at 2pm while getting speech therapy at home. Patient stated she felt like the room is spinning and is exacerbated with movement. Patient typically ambulates with a walker. Patient denied CP, SOB, weakness in extremity, numbness/paresthesias, visual disturbances, headache, slurred speech

## 2019-01-14 NOTE — ED PROVIDER NOTE - CARE PLAN
Principal Discharge DX:	Vertigo Principal Discharge DX:	Vertigo  Assessment and plan of treatment:	Follow up with your Primary Care Physician within the next 2-3 days  Bring a copy of your test results with you to your appointment  Continue your current medication regimen  Return to the Emergency Room if you experience new or worsening symptoms

## 2019-01-14 NOTE — ED PROVIDER NOTE - MEDICAL DECISION MAKING DETAILS
88 yo female with PMHx HTN, OBB, MV insufficiency, CVA (13 years ago), CHF, CVA 12/2018 no defecits now presenting with dizziness room spinning at 2pm. Symptoms exacerbated with movement likely vertigo. Patient has no other neuro defecits other than unsteady gait. Will get labs, EKG, trop, UA, CXR, CTH and neuro consult then re-kaylynn -Laly Hall PA-C

## 2019-01-14 NOTE — ED PROVIDER NOTE - PROGRESS NOTE DETAILS
Dw Dr ABRIL Layton, agree with Dr Ho, for eval. - alfonso seen by Dr. Ho, may dc home, f/u as outpatient

## 2019-01-14 NOTE — ED ADULT NURSE NOTE - OBJECTIVE STATEMENT
Received the patient in the Er. patient is alert and oriented. Skin warm and dry. C/O dizziness. As per patient felt like room was spinning. Able to move all extremities.

## 2019-01-14 NOTE — ED ADULT TRIAGE NOTE - CHIEF COMPLAINT QUOTE
pt from home c/o room spinning with diff breathing, pt anxious, dizziness worse with changing positions

## 2019-01-14 NOTE — ED ADULT NURSE NOTE - NSIMPLEMENTINTERV_GEN_ALL_ED
Implemented All Fall with Harm Risk Interventions:  [place] to call system. Call bell, personal items and telephone within reach. Instruct patient to call for assistance. Room bathroom lighting operational. Non-slip footwear when patient is off stretcher. Physically safe environment: no spills, clutter or unnecessary equipment. Stretcher in lowest position, wheels locked, appropriate side rails in place. Provide visual cue, wrist band, yellow gown, etc. Monitor gait and stability. Monitor for mental status changes and reorient to person, place, and time. Review medications for side effects contributing to fall risk. Reinforce activity limits and safety measures with patient and family. Provide visual clues: red socks.

## 2019-01-18 ENCOUNTER — MEDICATION RENEWAL (OUTPATIENT)
Age: 84
End: 2019-01-18

## 2019-01-22 ENCOUNTER — RX RENEWAL (OUTPATIENT)
Age: 84
End: 2019-01-22

## 2019-01-22 ENCOUNTER — APPOINTMENT (OUTPATIENT)
Dept: CV DIAGNOSITCS | Facility: HOSPITAL | Age: 84
End: 2019-01-22

## 2019-01-23 ENCOUNTER — APPOINTMENT (OUTPATIENT)
Dept: INTERNAL MEDICINE | Facility: CLINIC | Age: 84
End: 2019-01-23

## 2019-01-23 ENCOUNTER — RX RENEWAL (OUTPATIENT)
Age: 84
End: 2019-01-23

## 2019-01-25 ENCOUNTER — APPOINTMENT (OUTPATIENT)
Dept: CARDIOLOGY | Facility: CLINIC | Age: 84
End: 2019-01-25

## 2019-02-06 ENCOUNTER — APPOINTMENT (OUTPATIENT)
Dept: NEUROLOGY | Facility: CLINIC | Age: 84
End: 2019-02-06

## 2019-02-07 ENCOUNTER — NON-APPOINTMENT (OUTPATIENT)
Age: 84
End: 2019-02-07

## 2019-02-07 ENCOUNTER — APPOINTMENT (OUTPATIENT)
Dept: CARDIOLOGY | Facility: CLINIC | Age: 84
End: 2019-02-07
Payer: MEDICARE

## 2019-02-07 VITALS
SYSTOLIC BLOOD PRESSURE: 194 MMHG | OXYGEN SATURATION: 97 % | HEIGHT: 60 IN | HEART RATE: 83 BPM | DIASTOLIC BLOOD PRESSURE: 80 MMHG | WEIGHT: 111 LBS | BODY MASS INDEX: 21.79 KG/M2

## 2019-02-07 PROCEDURE — 99214 OFFICE O/P EST MOD 30 MIN: CPT | Mod: 25

## 2019-02-07 PROCEDURE — 93000 ELECTROCARDIOGRAM COMPLETE: CPT

## 2019-02-08 ENCOUNTER — APPOINTMENT (OUTPATIENT)
Dept: CARDIOLOGY | Facility: CLINIC | Age: 84
End: 2019-02-08

## 2019-02-08 NOTE — DISCUSSION/SUMMARY
[Hypertension] : hypertension [Not Responding to Treatment] : not responding to treatment [Medication Changes Per Orders] : as documented in orders [Patient] : the patient [FreeTextEntry1] : \par Uncontrolled HTN/Tachycardia: Is anxious Will increase Metoprolol succinate to 25 MG BID with OV one week BP check/EKG reassess HR ( Consider Holter )   \par \par She will take Xanax 0.25 MG 1/2 tab QD I discussed this medication with daughter and patient.  She will start with only 1/2 tab QD and will erases \par

## 2019-02-08 NOTE — REVIEW OF SYSTEMS
[Negative] : Heme/Lymph [see HPI] : see HPI [Dizziness] : dizziness [Shortness Of Breath] : no shortness of breath [Chest Pain] : no chest pain [Palpitations] : no palpitations

## 2019-02-18 ENCOUNTER — APPOINTMENT (OUTPATIENT)
Dept: CARDIOLOGY | Facility: CLINIC | Age: 84
End: 2019-02-18

## 2019-02-25 ENCOUNTER — NON-APPOINTMENT (OUTPATIENT)
Age: 84
End: 2019-02-25

## 2019-02-25 ENCOUNTER — APPOINTMENT (OUTPATIENT)
Dept: CARDIOLOGY | Facility: CLINIC | Age: 84
End: 2019-02-25
Payer: MEDICARE

## 2019-02-25 VITALS
SYSTOLIC BLOOD PRESSURE: 184 MMHG | HEIGHT: 60 IN | HEART RATE: 83 BPM | BODY MASS INDEX: 23.95 KG/M2 | OXYGEN SATURATION: 98 % | WEIGHT: 122 LBS | DIASTOLIC BLOOD PRESSURE: 90 MMHG

## 2019-02-25 DIAGNOSIS — I10 ESSENTIAL (PRIMARY) HYPERTENSION: ICD-10-CM

## 2019-02-25 DIAGNOSIS — F41.9 ANXIETY DISORDER, UNSPECIFIED: ICD-10-CM

## 2019-02-25 PROCEDURE — 93000 ELECTROCARDIOGRAM COMPLETE: CPT

## 2019-02-25 PROCEDURE — 99214 OFFICE O/P EST MOD 30 MIN: CPT | Mod: 25

## 2019-02-25 NOTE — DISCUSSION/SUMMARY
[Hypertension] : hypertension [Not Responding to Treatment] : not responding to treatment [Medication Changes Per Orders] : as documented in orders [Patient] : the patient [FreeTextEntry1] : \par Uncontrolled HTN/Tachycardia: Is anxious Will continue Metoprolol succinate to 25 MG BID Will hold hydralazine only if blood pressure systolic is 130 or below. \par \par Will start torsemide 10 mg three times a week. if needed for lower extremity edema. \par She will take Xanax 0.25 MG daily only if needed.  I discussed this medication with daughter and patient.  She will start with only 1/2 tab QD and will reassess. \par  OV in 2 months

## 2019-02-25 NOTE — REVIEW OF SYSTEMS
[see HPI] : see HPI [Dizziness] : dizziness [Negative] : Heme/Lymph [Shortness Of Breath] : no shortness of breath [Chest Pain] : no chest pain [Palpitations] : no palpitations

## 2019-02-25 NOTE — HISTORY OF PRESENT ILLNESS
[FreeTextEntry1] : 86 Y/O female PMH: HTN, CVA, RT renal artery stenosis S/P stent, Hyponatremia/Chronic kidney disease followed with Renal Dr Victoria ( not on diuretic therapy ) , Thrombocytosis seen by Hematology who presents today for blood pressure check and to discuss medications.  She was previously VIA Dr Juárez placed on Metoprolol Succinate 25 MG QD 2/2 elevated HR ( hypotension on Labetalol ) Here today today for blood pressure check \par \par Claims to be feeling well no CP/SOB/Palpitations/HA  ( Mild dizziness ) \par \par Pressure today 160/90. have been cutting hydralazine to 1/2 if bp is with in 140- 130s  at home \par \par + 3 lower extremity swelling

## 2019-02-27 ENCOUNTER — MEDICATION RENEWAL (OUTPATIENT)
Age: 84
End: 2019-02-27

## 2019-02-27 ENCOUNTER — RX RENEWAL (OUTPATIENT)
Age: 84
End: 2019-02-27

## 2019-03-11 ENCOUNTER — RX RENEWAL (OUTPATIENT)
Age: 84
End: 2019-03-11

## 2019-04-29 ENCOUNTER — APPOINTMENT (OUTPATIENT)
Dept: CARDIOLOGY | Facility: CLINIC | Age: 84
End: 2019-04-29
Payer: MEDICARE

## 2019-04-29 ENCOUNTER — NON-APPOINTMENT (OUTPATIENT)
Age: 84
End: 2019-04-29

## 2019-04-29 VITALS
OXYGEN SATURATION: 98 % | DIASTOLIC BLOOD PRESSURE: 71 MMHG | HEIGHT: 60 IN | HEART RATE: 84 BPM | BODY MASS INDEX: 24.35 KG/M2 | WEIGHT: 124 LBS | SYSTOLIC BLOOD PRESSURE: 194 MMHG

## 2019-04-29 PROCEDURE — 99214 OFFICE O/P EST MOD 30 MIN: CPT | Mod: 25

## 2019-04-29 PROCEDURE — 93000 ELECTROCARDIOGRAM COMPLETE: CPT

## 2019-04-29 PROCEDURE — 36415 COLL VENOUS BLD VENIPUNCTURE: CPT

## 2019-04-29 NOTE — DISCUSSION/SUMMARY
[Hypertension] : hypertension [Not Responding to Treatment] : not responding to treatment [Medication Changes Per Orders] : as documented in orders [Patient] : the patient [FreeTextEntry1] : Uncontrolled HTN/Tachycardia: Is anxious Will Continue current medications. \par Will continue  torsemide 10 mg three times a week. if needed for lower extremity edema. \par She will take Xanax 0.25 MG daily only if needed.  I discussed this medication with daughter and patient.  \par Will follow up with Surgeon for her right inguinal hernia. \par OV in 3 months.

## 2019-04-29 NOTE — HISTORY OF PRESENT ILLNESS
[FreeTextEntry1] : 86 Y/O female PMH: HTN, CVA, RT renal artery stenosis S/P stent, Hyponatremia/Chronic kidney disease followed with Renal Dr Victoria ( not on diuretic therapy ) , Thrombocytosis seen by Hematology who presents today for routine check up.\par \par Blood pressure high today ? white coat syndrome. BP log at home systolic between 160-140\par \par Patient claims she is feeling much better than before.

## 2019-04-30 ENCOUNTER — MEDICATION RENEWAL (OUTPATIENT)
Age: 84
End: 2019-04-30

## 2019-05-02 LAB
ALBUMIN SERPL ELPH-MCNC: 4.3 G/DL
ALP BLD-CCNC: 99 U/L
ALT SERPL-CCNC: 23 U/L
ANION GAP SERPL CALC-SCNC: 12 MMOL/L
AST SERPL-CCNC: 23 U/L
BASOPHILS # BLD AUTO: 0.05 K/UL
BASOPHILS NFR BLD AUTO: 0.8 %
BILIRUB SERPL-MCNC: 0.7 MG/DL
BUN SERPL-MCNC: 45 MG/DL
CALCIUM SERPL-MCNC: 10 MG/DL
CHLORIDE SERPL-SCNC: 103 MMOL/L
CK SERPL-CCNC: 90 U/L
CO2 SERPL-SCNC: 25 MMOL/L
CREAT SERPL-MCNC: 1.27 MG/DL
EOSINOPHIL # BLD AUTO: 0.19 K/UL
EOSINOPHIL NFR BLD AUTO: 3 %
GLUCOSE SERPL-MCNC: 115 MG/DL
HCT VFR BLD CALC: 34.3 %
HGB BLD-MCNC: 11.1 G/DL
IMM GRANULOCYTES NFR BLD AUTO: 0.9 %
LYMPHOCYTES # BLD AUTO: 2.16 K/UL
LYMPHOCYTES NFR BLD AUTO: 33.6 %
MAN DIFF?: NORMAL
MCHC RBC-ENTMCNC: 30.6 PG
MCHC RBC-ENTMCNC: 32.4 GM/DL
MCV RBC AUTO: 94.5 FL
MONOCYTES # BLD AUTO: 0.52 K/UL
MONOCYTES NFR BLD AUTO: 8.1 %
NEUTROPHILS # BLD AUTO: 3.44 K/UL
NEUTROPHILS NFR BLD AUTO: 53.6 %
PLATELET # BLD AUTO: 347 K/UL
POTASSIUM SERPL-SCNC: 4.6 MMOL/L
PROT SERPL-MCNC: 6.2 G/DL
RBC # BLD: 3.63 M/UL
RBC # FLD: 12.7 %
SODIUM SERPL-SCNC: 140 MMOL/L
WBC # FLD AUTO: 6.42 K/UL

## 2019-05-29 ENCOUNTER — RX RENEWAL (OUTPATIENT)
Age: 84
End: 2019-05-29

## 2019-05-29 ENCOUNTER — MEDICATION RENEWAL (OUTPATIENT)
Age: 84
End: 2019-05-29

## 2019-05-30 ENCOUNTER — MEDICATION RENEWAL (OUTPATIENT)
Age: 84
End: 2019-05-30

## 2019-06-12 ENCOUNTER — APPOINTMENT (OUTPATIENT)
Dept: CARDIOLOGY | Facility: CLINIC | Age: 84
End: 2019-06-12

## 2019-07-18 NOTE — ED PROVIDER NOTE - OBJECTIVE STATEMENT
[facility] Medicine Daily Progress Note    Date of Service  7/18/2019    Chief Complaint  87 y.o. male admitted 7/17/2019 with back pain.     Hospital Course    This is a 86 y/o M with PMHX of Harshad not on AC due previous GI bleed. Was admitted on 7/17/19 after presenting to ER complaining of back pain. Pt states that about a month ago, he developed mid back pain that has been worsening.  He states he did not have any trauma and denies falls. His back pain has become unbearable therefore he presented the emergency room where a CAT scan reveals a compression fracture of T6 and T11.  Neurosurgery was consulted from the emergency room for possible kyphoplasty. Plan is for kypholplasty today        Interval Problem Update  Pt seen and examined, still with some back pain. Neurosurgery following and planing for kyphoplasty today, appreciate rec.   No overnight events, afebrile, no nausea or vomiting.     Consultants/Specialty  Neurosurgery     Code Status  Full Code     Disposition  TBD     Review of Systems  Review of Systems   Constitutional: Negative for chills and fever.   HENT: Negative for ear discharge and ear pain.    Eyes: Negative for pain and redness.   Respiratory: Negative for cough and shortness of breath.    Cardiovascular: Negative for chest pain and palpitations.   Gastrointestinal: Negative for nausea and vomiting.   Genitourinary: Negative for dysuria and urgency.   Musculoskeletal: Positive for back pain. Negative for joint pain and neck pain.   Skin: Negative for itching and rash.   Neurological: Negative for dizziness and headaches.        Physical Exam  Temp:  [36.4 °C (97.6 °F)-36.7 °C (98.1 °F)] 36.7 °C (98.1 °F)  Pulse:  [71-82] 71  Resp:  [16-17] 16  BP: (107-162)/(66-97) 118/97  SpO2:  [94 %-99 %] 97 %    Physical Exam   Constitutional: He is oriented to person, place, and time.   HENT:   Head: Normocephalic and atraumatic.   Mouth/Throat: No oropharyngeal exudate.   Eyes: Conjunctivae are  normal. No scleral icterus.   Neck: Neck supple. No JVD present.   Cardiovascular: Normal rate.    No murmur heard.  Pulmonary/Chest: He has no wheezes. He has no rales.   Abdominal: Soft. Bowel sounds are normal. He exhibits no distension. There is no tenderness.   Musculoskeletal: He exhibits no edema or tenderness.   Neurological: He is alert and oriented to person, place, and time. No cranial nerve deficit.   Skin: Skin is warm and dry. No erythema.   Nursing note and vitals reviewed.      Fluids  No intake or output data in the 24 hours ending 07/18/19 1151    Laboratory  Recent Labs      07/17/19   1210   WBC  5.4   RBC  4.30*   HEMOGLOBIN  13.8*   HEMATOCRIT  41.4*   MCV  96.3   MCH  32.1   MCHC  33.3*   RDW  51.4*   PLATELETCT  195   MPV  10.3     Recent Labs      07/17/19   1210   SODIUM  139   POTASSIUM  4.3   CHLORIDE  107   CO2  23   GLUCOSE  86   BUN  30*   CREATININE  1.43*   CALCIUM  9.7     Recent Labs      07/18/19   0023   APTT  33.0   INR  1.06               Imaging  MR-THORACIC SPINE-W/O   Final Result      1.  There is mild acute compression fracture along the superior endplate of T11 vertebral body. This fracture is amenable for vertebral augmentation procedure.   2.  Mild chronic compression deformity at T6.   3.  Severe chronic compression deformity with previous vertebral augmentation procedure at L1.      MR-LUMBAR SPINE-W/O   Final Result      1.  Acute compression fracture along the superior endplate of T11 vertebral body. This fracture is amenable for vertebral augmentation procedure.   2.  There are changes secondary to the previous vertebral augmentation procedure L1 compression fracture.   3.  Severe degenerative disease in the lumbar spine as described above.   4.  Severe central canal stenosis at L4-5.   5.  Moderate central canal stenosis at L2-3 and L3-4.      CT-LSPINE W/O PLUS RECONS   Final Result      Mild compression fracture of the superior endplate of T11 with minimal  retropulsion. This is of indeterminate age.      Severe compression fracture of L1 with vertebral augmentation material.      Degenerative changes as above described, including facet arthropathy.      Minimal grade 1 anterolisthesis of L4 and L5.      Demineralization.      Atherosclerotic plaque.         CT-TSPINE W/O PLUS RECONS   Final Result      1.  Compression deformities are noted at the T6 and T11 levels of the thoracic spine as described above. These could be due to compression fractures or insufficiency fractures of undetermined age.      2.  Severe compression fracture of L1 is noted containing opaque cement consistent with prior kyphoplasty.      3.  Degenerative changes are also noted as described above.      CT-ABDOMEN-PELVIS WITH   Final Result      1.  There is diverticulosis. No CT evidence of diverticulitis.      2.  4 cm cyst arises laterally from the right kidney with interval increase in size compared to the prior CT.      3.  There is calcific atherosclerosis.         DX-PORTABLE FLUORO > 1 HOUR    (Results Pending)   DX-THORACOLUMBAR SPINE-2 VIEWS    (Results Pending)        Assessment/Plan  * Non-traumatic compression fracture of T11 thoracic vertebra (HCC)- (present on admission)   Assessment & Plan    This is noted on CAT scan and MRI of the spine  This is extremely symptomatic and is failed outpatient management with oral pain medications  Neurosurgery following and planing for kyphoplasty today appreciate rec.   Cont on pain management   PT/Ot after kyphoplasty     Non-traumatic compression fracture of T6 thoracic vertebra (HCC)- (present on admission)   Assessment & Plan    As noted above      Constipation- (present on admission)   Assessment & Plan    On bowel protocol      Paroxysmal atrial fibrillation (HCC)- (present on admission)   Assessment & Plan    Continue amiodarone  He is not on anticoagulation in the setting of recent GI bleed.     Acquired hypothyroidism- (present on  admission)   Assessment & Plan    On his home dose of synthroid           VTE prophylaxis: scd        86 yo white female with H/O Renal Artery Stenosis (S/P Stent-Last Week), HTN, CVA and HLD. Meds were changed last week where Losartan was continued but Clonidine and Hydralazine was added to regimen and Labetalol was discontinued. Since that time children have noted that HR was gradually increasing. Today patient began to experience palpitations and weak feeling just prior to taking her meds. Patient denies any chest pain, headache, SOB, near syncope, dizziness or lightheadedness or vertigo.

## 2019-07-29 ENCOUNTER — APPOINTMENT (OUTPATIENT)
Dept: CARDIOLOGY | Facility: CLINIC | Age: 84
End: 2019-07-29
Payer: MEDICARE

## 2019-07-29 VITALS
HEIGHT: 60 IN | RESPIRATION RATE: 14 BRPM | BODY MASS INDEX: 24.35 KG/M2 | SYSTOLIC BLOOD PRESSURE: 189 MMHG | OXYGEN SATURATION: 96 % | WEIGHT: 124 LBS | TEMPERATURE: 98.6 F | HEART RATE: 74 BPM | DIASTOLIC BLOOD PRESSURE: 78 MMHG

## 2019-07-29 PROCEDURE — 99214 OFFICE O/P EST MOD 30 MIN: CPT | Mod: 25

## 2019-07-29 PROCEDURE — 93000 ELECTROCARDIOGRAM COMPLETE: CPT

## 2019-08-11 NOTE — DISCUSSION/SUMMARY
[Hypertension] : hypertension [Not Responding to Treatment] : not responding to treatment [Medication Changes Per Orders] : as documented in orders [Patient] : the patient [FreeTextEntry1] :  HTN/Tachycardia: Is anxious Will Continue current medications. \par Will continue  torsemide 10 mg three times a week. if needed for lower extremity edema. \par She will take Xanax 0.25 MG daily only if needed.  .  \par Will follow up with Surgeon for her right inguinal hernia. \par OV in 3 months. \par \par 8/9/19: Pt anticipates hernia surgery in 3 weeks with Dr Turcios. Labs and exam from 7/29 were reviewed. Pt is optimized for planned procedure although is at increased risk due to her age and other conditions

## 2019-08-11 NOTE — HISTORY OF PRESENT ILLNESS
[FreeTextEntry1] : 89 Y/O female PMH: HTN, CVA, RT renal artery stenosis S/P stent, Hyponatremia/Chronic kidney disease followed with Renal Dr Victoria ( not on diuretic therapy ) , Thrombocytosis seen by Hematology who presents today for routine check up.\par \par Blood pressure was improved today. \par Patient claims she is feeling much better than before. She reports that she is interested in having her inguinal hernia repaired. \par Addendum: 8/9/19: Pt is anticipating hernia surgery 8/22

## 2019-08-15 ENCOUNTER — MEDICATION RENEWAL (OUTPATIENT)
Age: 84
End: 2019-08-15

## 2019-08-15 ENCOUNTER — OUTPATIENT (OUTPATIENT)
Dept: OUTPATIENT SERVICES | Facility: HOSPITAL | Age: 84
LOS: 1 days | End: 2019-08-15
Payer: MEDICARE

## 2019-08-15 DIAGNOSIS — Z90.710 ACQUIRED ABSENCE OF BOTH CERVIX AND UTERUS: Chronic | ICD-10-CM

## 2019-08-15 DIAGNOSIS — Z98.89 OTHER SPECIFIED POSTPROCEDURAL STATES: Chronic | ICD-10-CM

## 2019-08-15 DIAGNOSIS — Z01.818 ENCOUNTER FOR OTHER PREPROCEDURAL EXAMINATION: ICD-10-CM

## 2019-08-15 DIAGNOSIS — Z41.9 ENCOUNTER FOR PROCEDURE FOR PURPOSES OTHER THAN REMEDYING HEALTH STATE, UNSPECIFIED: Chronic | ICD-10-CM

## 2019-08-15 DIAGNOSIS — Z96.643 PRESENCE OF ARTIFICIAL HIP JOINT, BILATERAL: Chronic | ICD-10-CM

## 2019-08-15 DIAGNOSIS — Z90.49 ACQUIRED ABSENCE OF OTHER SPECIFIED PARTS OF DIGESTIVE TRACT: Chronic | ICD-10-CM

## 2019-08-15 DIAGNOSIS — K40.90 UNILATERAL INGUINAL HERNIA, WITHOUT OBSTRUCTION OR GANGRENE, NOT SPECIFIED AS RECURRENT: ICD-10-CM

## 2019-08-15 DIAGNOSIS — Z98.890 OTHER SPECIFIED POSTPROCEDURAL STATES: Chronic | ICD-10-CM

## 2019-08-15 LAB
ANION GAP SERPL CALC-SCNC: 4 MMOL/L — LOW (ref 5–17)
APTT BLD: 29.2 SEC — SIGNIFICANT CHANGE UP (ref 27.5–36.3)
BASOPHILS # BLD AUTO: 0.06 K/UL — SIGNIFICANT CHANGE UP (ref 0–0.2)
BASOPHILS NFR BLD AUTO: 0.9 % — SIGNIFICANT CHANGE UP (ref 0–2)
BUN SERPL-MCNC: 44 MG/DL — HIGH (ref 7–23)
CALCIUM SERPL-MCNC: 10.1 MG/DL — SIGNIFICANT CHANGE UP (ref 8.5–10.1)
CHLORIDE SERPL-SCNC: 105 MMOL/L — SIGNIFICANT CHANGE UP (ref 96–108)
CO2 SERPL-SCNC: 28 MMOL/L — SIGNIFICANT CHANGE UP (ref 22–31)
CREAT SERPL-MCNC: 1.4 MG/DL — HIGH (ref 0.5–1.3)
EOSINOPHIL # BLD AUTO: 0.12 K/UL — SIGNIFICANT CHANGE UP (ref 0–0.5)
EOSINOPHIL NFR BLD AUTO: 1.7 % — SIGNIFICANT CHANGE UP (ref 0–6)
GLUCOSE SERPL-MCNC: 113 MG/DL — HIGH (ref 70–99)
HCT VFR BLD CALC: 32.8 % — LOW (ref 34.5–45)
HGB BLD-MCNC: 10.9 G/DL — LOW (ref 11.5–15.5)
IMM GRANULOCYTES NFR BLD AUTO: 1.4 % — SIGNIFICANT CHANGE UP (ref 0–1.5)
INR BLD: 0.95 RATIO — SIGNIFICANT CHANGE UP (ref 0.88–1.16)
LYMPHOCYTES # BLD AUTO: 2.07 K/UL — SIGNIFICANT CHANGE UP (ref 1–3.3)
LYMPHOCYTES # BLD AUTO: 29.7 % — SIGNIFICANT CHANGE UP (ref 13–44)
MCHC RBC-ENTMCNC: 31.6 PG — SIGNIFICANT CHANGE UP (ref 27–34)
MCHC RBC-ENTMCNC: 33.2 GM/DL — SIGNIFICANT CHANGE UP (ref 32–36)
MCV RBC AUTO: 95.1 FL — SIGNIFICANT CHANGE UP (ref 80–100)
MONOCYTES # BLD AUTO: 0.7 K/UL — SIGNIFICANT CHANGE UP (ref 0–0.9)
MONOCYTES NFR BLD AUTO: 10 % — SIGNIFICANT CHANGE UP (ref 2–14)
NEUTROPHILS # BLD AUTO: 3.92 K/UL — SIGNIFICANT CHANGE UP (ref 1.8–7.4)
NEUTROPHILS NFR BLD AUTO: 56.3 % — SIGNIFICANT CHANGE UP (ref 43–77)
PLATELET # BLD AUTO: 302 K/UL — SIGNIFICANT CHANGE UP (ref 150–400)
POTASSIUM SERPL-MCNC: 4.5 MMOL/L — SIGNIFICANT CHANGE UP (ref 3.5–5.3)
POTASSIUM SERPL-SCNC: 4.5 MMOL/L — SIGNIFICANT CHANGE UP (ref 3.5–5.3)
PROTHROM AB SERPL-ACNC: 10.5 SEC — SIGNIFICANT CHANGE UP (ref 10–12.9)
RBC # BLD: 3.45 M/UL — LOW (ref 3.8–5.2)
RBC # FLD: 11.9 % — SIGNIFICANT CHANGE UP (ref 10.3–14.5)
SODIUM SERPL-SCNC: 137 MMOL/L — SIGNIFICANT CHANGE UP (ref 135–145)
WBC # BLD: 6.97 K/UL — SIGNIFICANT CHANGE UP (ref 3.8–10.5)
WBC # FLD AUTO: 6.97 K/UL — SIGNIFICANT CHANGE UP (ref 3.8–10.5)

## 2019-08-15 PROCEDURE — 36415 COLL VENOUS BLD VENIPUNCTURE: CPT

## 2019-08-15 PROCEDURE — 80048 BASIC METABOLIC PNL TOTAL CA: CPT

## 2019-08-15 PROCEDURE — 85730 THROMBOPLASTIN TIME PARTIAL: CPT

## 2019-08-15 PROCEDURE — 85025 COMPLETE CBC W/AUTO DIFF WBC: CPT

## 2019-08-15 PROCEDURE — 85610 PROTHROMBIN TIME: CPT

## 2019-08-15 RX ORDER — METOPROLOL TARTRATE 50 MG
0 TABLET ORAL
Qty: 0 | Refills: 0 | DISCHARGE

## 2019-08-15 NOTE — CHART NOTE - NSCHARTNOTEFT_GEN_A_CORE
BP left arm sitting 206/91  recheck 20 minutes later right arm manual 180/90  HR 80 bpm  Veronica 98.5 F  RR 14/min  O2 sat 98% on RA    89 yo female scheduled for open repair right inguinal hernia with mesh on 8/22/19  with Dr. hernadez    1. CBC w diff, BMP, PTT, PT/INR  2. EKG-awaiting fax from Dr Gill  3. Medical evaluation with PCP Dr Gill  4. discussed EZ sponges, mupirocin, NPO after MN & PST day of procedure instructions with patient & daughter Brittney  5. Instructed to increase po fluids the day before surgery. Instructed to hold aspirin, NSAIDs, fish oil, vitamins & herbal supplements 7 days prior to surgery  6. daughter states pt instructed to hold Plavix as of 8/22/19

## 2019-08-15 NOTE — ASU PATIENT PROFILE, ADULT - PSH
H/O bilateral hip replacements  2013, 2014  History of cholecystectomy  1980  Other elective surgery  right renal artery stent  S/P colonoscopy    S/P Mohs surgery for basal cell carcinoma    Status post hysterectomy  and bladder lift with mesh 1990s

## 2019-08-15 NOTE — ASU PATIENT PROFILE, ADULT - PMH
Basal cell carcinoma    Cerebrovascular accident (CVA), unspecified mechanism    CHF (congestive heart failure)    Dyslipidemia    Edema, unspecified type    Essential hypertension    Hyponatremia  ON HCTZ , h/o Hypokelemia  Inguinal hernia, right    Mitral valve insufficiency, unspecified etiology  Cardiac cath on 11/21/18  Renal artery stenosis  right side, stent Basal cell carcinoma    Blood pressure instability    Cerebrovascular accident (CVA), unspecified mechanism    CHF (congestive heart failure)    Dyslipidemia    Edema, unspecified type    Essential hypertension    Hyponatremia  ON HCTZ , h/o Hypokelemia  Inguinal hernia, right    Mitral valve insufficiency, unspecified etiology  Cardiac cath on 11/21/18  Renal artery stenosis  right side, stent

## 2019-08-16 DIAGNOSIS — K40.90 UNILATERAL INGUINAL HERNIA, WITHOUT OBSTRUCTION OR GANGRENE, NOT SPECIFIED AS RECURRENT: ICD-10-CM

## 2019-08-16 DIAGNOSIS — Z01.818 ENCOUNTER FOR OTHER PREPROCEDURAL EXAMINATION: ICD-10-CM

## 2019-08-19 ENCOUNTER — NON-APPOINTMENT (OUTPATIENT)
Age: 84
End: 2019-08-19

## 2019-08-21 RX ORDER — SODIUM CHLORIDE 9 MG/ML
1000 INJECTION, SOLUTION INTRAVENOUS
Refills: 0 | Status: DISCONTINUED | OUTPATIENT
Start: 2019-08-22 | End: 2019-08-22

## 2019-08-21 RX ORDER — OXYCODONE HYDROCHLORIDE 5 MG/1
10 TABLET ORAL ONCE
Refills: 0 | Status: DISCONTINUED | OUTPATIENT
Start: 2019-08-22 | End: 2019-08-22

## 2019-08-21 RX ORDER — FENTANYL CITRATE 50 UG/ML
50 INJECTION INTRAVENOUS
Refills: 0 | Status: DISCONTINUED | OUTPATIENT
Start: 2019-08-22 | End: 2019-08-22

## 2019-08-21 RX ORDER — ONDANSETRON 8 MG/1
4 TABLET, FILM COATED ORAL ONCE
Refills: 0 | Status: DISCONTINUED | OUTPATIENT
Start: 2019-08-22 | End: 2019-08-22

## 2019-08-22 ENCOUNTER — OUTPATIENT (OUTPATIENT)
Dept: INPATIENT UNIT | Facility: HOSPITAL | Age: 84
LOS: 1 days | Discharge: HOME | End: 2019-08-22
Payer: MEDICARE

## 2019-08-22 VITALS
OXYGEN SATURATION: 99 % | RESPIRATION RATE: 14 BRPM | TEMPERATURE: 99 F | HEART RATE: 90 BPM | DIASTOLIC BLOOD PRESSURE: 80 MMHG | WEIGHT: 123.9 LBS | SYSTOLIC BLOOD PRESSURE: 180 MMHG

## 2019-08-22 DIAGNOSIS — Z98.89 OTHER SPECIFIED POSTPROCEDURAL STATES: Chronic | ICD-10-CM

## 2019-08-22 DIAGNOSIS — K40.90 UNILATERAL INGUINAL HERNIA, WITHOUT OBSTRUCTION OR GANGRENE, NOT SPECIFIED AS RECURRENT: ICD-10-CM

## 2019-08-22 DIAGNOSIS — Z90.710 ACQUIRED ABSENCE OF BOTH CERVIX AND UTERUS: Chronic | ICD-10-CM

## 2019-08-22 DIAGNOSIS — Z90.49 ACQUIRED ABSENCE OF OTHER SPECIFIED PARTS OF DIGESTIVE TRACT: Chronic | ICD-10-CM

## 2019-08-22 DIAGNOSIS — Z98.890 OTHER SPECIFIED POSTPROCEDURAL STATES: Chronic | ICD-10-CM

## 2019-08-22 DIAGNOSIS — Z96.643 PRESENCE OF ARTIFICIAL HIP JOINT, BILATERAL: Chronic | ICD-10-CM

## 2019-08-22 DIAGNOSIS — Z41.9 ENCOUNTER FOR PROCEDURE FOR PURPOSES OTHER THAN REMEDYING HEALTH STATE, UNSPECIFIED: Chronic | ICD-10-CM

## 2019-08-22 PROCEDURE — C1781: CPT

## 2019-08-22 RX ORDER — HYDRALAZINE HCL 50 MG
10 TABLET ORAL ONCE
Refills: 0 | Status: COMPLETED | OUTPATIENT
Start: 2019-08-22 | End: 2019-08-22

## 2019-08-22 RX ORDER — OXYCODONE AND ACETAMINOPHEN 5; 325 MG/1; MG/1
1 TABLET ORAL EVERY 4 HOURS
Refills: 0 | Status: DISCONTINUED | OUTPATIENT
Start: 2019-08-22 | End: 2019-08-23

## 2019-08-22 RX ORDER — HYDRALAZINE HCL 50 MG
25 TABLET ORAL EVERY 8 HOURS
Refills: 0 | Status: DISCONTINUED | OUTPATIENT
Start: 2019-08-22 | End: 2019-08-23

## 2019-08-22 RX ORDER — ACETAMINOPHEN 500 MG
650 TABLET ORAL EVERY 6 HOURS
Refills: 0 | Status: DISCONTINUED | OUTPATIENT
Start: 2019-08-22 | End: 2019-08-23

## 2019-08-22 RX ORDER — ONDANSETRON 8 MG/1
4 TABLET, FILM COATED ORAL EVERY 6 HOURS
Refills: 0 | Status: DISCONTINUED | OUTPATIENT
Start: 2019-08-22 | End: 2019-08-23

## 2019-08-22 RX ORDER — KETOROLAC TROMETHAMINE 30 MG/ML
15 SYRINGE (ML) INJECTION EVERY 6 HOURS
Refills: 0 | Status: DISCONTINUED | OUTPATIENT
Start: 2019-08-22 | End: 2019-08-23

## 2019-08-22 RX ORDER — METOPROLOL TARTRATE 50 MG
25 TABLET ORAL DAILY
Refills: 0 | Status: DISCONTINUED | OUTPATIENT
Start: 2019-08-22 | End: 2019-08-23

## 2019-08-22 RX ORDER — LOSARTAN POTASSIUM 100 MG/1
50 TABLET, FILM COATED ORAL DAILY
Refills: 0 | Status: DISCONTINUED | OUTPATIENT
Start: 2019-08-22 | End: 2019-08-23

## 2019-08-22 RX ADMIN — Medication 25 MILLIGRAM(S): at 22:07

## 2019-08-22 RX ADMIN — Medication 15 MILLIGRAM(S): at 17:36

## 2019-08-22 RX ADMIN — LOSARTAN POTASSIUM 50 MILLIGRAM(S): 100 TABLET, FILM COATED ORAL at 17:23

## 2019-08-22 RX ADMIN — SODIUM CHLORIDE 75 MILLILITER(S): 9 INJECTION, SOLUTION INTRAVENOUS at 12:48

## 2019-08-22 RX ADMIN — Medication 10 MILLIGRAM(S): at 13:37

## 2019-08-22 RX ADMIN — FENTANYL CITRATE 50 MICROGRAM(S): 50 INJECTION INTRAVENOUS at 13:35

## 2019-08-22 RX ADMIN — FENTANYL CITRATE 50 MICROGRAM(S): 50 INJECTION INTRAVENOUS at 13:25

## 2019-08-22 RX ADMIN — Medication 10 MILLIGRAM(S): at 14:12

## 2019-08-22 RX ADMIN — Medication 0.2 MILLIGRAM(S): at 17:34

## 2019-08-22 RX ADMIN — OXYCODONE AND ACETAMINOPHEN 1 TABLET(S): 5; 325 TABLET ORAL at 22:24

## 2019-08-22 RX ADMIN — FENTANYL CITRATE 50 MICROGRAM(S): 50 INJECTION INTRAVENOUS at 13:50

## 2019-08-22 RX ADMIN — OXYCODONE AND ACETAMINOPHEN 1 TABLET(S): 5; 325 TABLET ORAL at 21:24

## 2019-08-22 NOTE — BRIEF OPERATIVE NOTE - OPERATION/FINDINGS
right incarcerated inguinal hernia repair with mesh  reduced colon   reinforced the floor  mesh placed

## 2019-08-22 NOTE — BRIEF OPERATIVE NOTE - NSICDXBRIEFPREOP_GEN_ALL_CORE_FT
PRE-OP DIAGNOSIS:  Inguinal hernia, incarcerated 22-Aug-2019 12:32:12  ATIYA AQUINO  Incarcerated right inguinal hernia 22-Aug-2019 12:31:53  ATIYA AQUINO

## 2019-08-22 NOTE — ASU PATIENT PROFILE, ADULT - PMH
Basal cell carcinoma    Blood pressure instability    Cerebrovascular accident (CVA), unspecified mechanism    CHF (congestive heart failure)    Dyslipidemia    Edema, unspecified type    Essential hypertension    Hyponatremia  ON HCTZ , h/o Hypokelemia  Inguinal hernia, right    Mitral valve insufficiency, unspecified etiology  Cardiac cath on 11/21/18  Renal artery stenosis  right side, stent

## 2019-08-22 NOTE — BRIEF OPERATIVE NOTE - NSICDXBRIEFPROCEDURE_GEN_ALL_CORE_FT
PROCEDURES:  Open repair of incarcerated inguinal hernia using mesh in adult 22-Aug-2019 12:28:52  ATIYA AQUINO

## 2019-08-22 NOTE — ASU PATIENT PROFILE, ADULT - PRO INTERPRETER NEED 2
Telephone Encounter by Arsen Garvin MD at 12/29/17 05:58 PM     Author:  Arsen Garvin MD Service:  (none) Author Type:  Physician     Filed:  12/29/17 05:58 PM Encounter Date:  12/29/2017 Status:  Signed     :  Arsen Garvin MD (Physician)            Agree with beta blocker to suppress PACs/PVCs.      If all else fails, start amiodarone.    Thanks.[SK1.1M]    Electronically Signed by:    Arsen Garvin MD , 12/29/2017[SK1.1T]        Revision History        User Key Date/Time User Provider Type Action    > SK1.1 12/29/17 05:58 PM Arsen Garvin MD Physician Sign    M - Manual, T - Template             English

## 2019-08-23 ENCOUNTER — TRANSCRIPTION ENCOUNTER (OUTPATIENT)
Age: 84
End: 2019-08-23

## 2019-08-23 VITALS
OXYGEN SATURATION: 100 % | HEART RATE: 56 BPM | RESPIRATION RATE: 18 BRPM | TEMPERATURE: 99 F | DIASTOLIC BLOOD PRESSURE: 51 MMHG | SYSTOLIC BLOOD PRESSURE: 139 MMHG

## 2019-08-23 RX ADMIN — Medication 0.2 MILLIGRAM(S): at 05:23

## 2019-08-23 RX ADMIN — Medication 25 MILLIGRAM(S): at 08:25

## 2019-08-23 RX ADMIN — ONDANSETRON 4 MILLIGRAM(S): 8 TABLET, FILM COATED ORAL at 02:28

## 2019-08-23 RX ADMIN — Medication 15 MILLIGRAM(S): at 00:01

## 2019-08-23 RX ADMIN — Medication 15 MILLIGRAM(S): at 00:15

## 2019-08-23 RX ADMIN — OXYCODONE AND ACETAMINOPHEN 1 TABLET(S): 5; 325 TABLET ORAL at 01:55

## 2019-08-23 RX ADMIN — LOSARTAN POTASSIUM 50 MILLIGRAM(S): 100 TABLET, FILM COATED ORAL at 05:23

## 2019-08-23 RX ADMIN — Medication 5 MILLIGRAM(S): at 06:29

## 2019-08-23 RX ADMIN — OXYCODONE AND ACETAMINOPHEN 1 TABLET(S): 5; 325 TABLET ORAL at 02:55

## 2019-08-23 NOTE — PROGRESS NOTE ADULT - SUBJECTIVE AND OBJECTIVE BOX
Pt was seen and examined at bedside this morning with surgery team. No acute overnight events reported by nursing staff. Pt offers no new complaints at this time. Denies fever/cp/sob/n/v/d/c. Vitals stable.     T(C): 36.4 (08-23-19 @ 05:10), Max: 37.4 (08-22-19 @ 21:21)  HR: 67 (08-23-19 @ 05:10) (64 - 90)  BP: 165/60 (08-23-19 @ 05:10) (138/62 - 228/68)  RR: 17 (08-23-19 @ 05:10) (14 - 18)  SpO2: 100% (08-23-19 @ 05:10) (97% - 100%)    PHYSICAL EXAM:  Constitutional: NAD, GCS: 15/15  AOX3  Eyes:  WNL  ENMT:  WNL  Neck:  WNL, non tender  Back: Non tender  Respiratory: CTABL  Cardiovascular:  S1+S2+0  Gastrointestinal: Soft Distended, NT,  Genitourinary:  WNL  Extremities: NV intact  Vascular:  Intact  Neurological: No focal neurological deficit,  CN, motor and sensory system grossly intact.  Skin: WNL  Musculoskeletal: WNL  Psychiatric: Grossly WNL

## 2019-08-23 NOTE — DISCHARGE NOTE NURSING/CASE MANAGEMENT/SOCIAL WORK - NSDCDPATPORTLINK_GEN_ALL_CORE
You can access the [facility] Patient Portal, offered by [facility], by registering with the following website: [email or web address]

## 2019-08-23 NOTE — PROGRESS NOTE ADULT - ASSESSMENT
88M with POD#1 [facility]       -Monitor vitals  -Diet:Regular  -Incentive spirometer  -Resume all home meds- can start Plavix this evening  -d/c planning to home today    d/w Dr Turcios

## 2019-08-23 NOTE — DISCHARGE NOTE PROVIDER - HOSPITAL COURSE
Patient admitted for post op inguinal hernia, monitoring and pain control. Tolerating diet. Pain well controlled, is stable for discharge home on post op day 1.

## 2019-08-23 NOTE — DISCHARGE NOTE PROVIDER - CARE PROVIDER_API CALL
Mono Turcios)  Surgery; Surgical Critical Care  [address] [place] [address]  Phone: [phone]  Fax: [phone]  Follow Up Time:

## 2019-08-27 DIAGNOSIS — F41.8 OTHER SPECIFIED ANXIETY DISORDERS: ICD-10-CM

## 2019-08-27 DIAGNOSIS — K40.30 UNILATERAL INGUINAL HERNIA, WITH OBSTRUCTION, WITHOUT GANGRENE, NOT SPECIFIED AS RECURRENT: ICD-10-CM

## 2019-08-27 DIAGNOSIS — Z86.73 PERSONAL HISTORY OF TRANSIENT ISCHEMIC ATTACK (TIA), AND CEREBRAL INFARCTION WITHOUT RESIDUAL DEFICITS: ICD-10-CM

## 2019-08-27 DIAGNOSIS — I10 ESSENTIAL (PRIMARY) HYPERTENSION: ICD-10-CM

## 2019-08-27 DIAGNOSIS — Z91.041 RADIOGRAPHIC DYE ALLERGY STATUS: ICD-10-CM

## 2019-08-27 DIAGNOSIS — Z79.02 LONG TERM (CURRENT) USE OF ANTITHROMBOTICS/ANTIPLATELETS: ICD-10-CM

## 2019-08-28 ENCOUNTER — CHART COPY (OUTPATIENT)
Age: 84
End: 2019-08-28

## 2019-08-28 NOTE — PATIENT PROFILE ADULT. - NS PRO MODE OF ARRIVAL
The patient was seen for neuropsychological evaluation at the request of Dr. Arminda Rivas, for the purposes of diagnostic clarification and treatment planning. 155 minutes of test administration and scoring were provided by this writer, Zbigniew Rausch. Please see Dr. Amanda Hernandez's report for a full interpretation of the findings.   walker/ambulatory

## 2019-09-16 ENCOUNTER — MEDICATION RENEWAL (OUTPATIENT)
Age: 84
End: 2019-09-16

## 2019-09-16 ENCOUNTER — RX RENEWAL (OUTPATIENT)
Age: 84
End: 2019-09-16

## 2019-10-30 ENCOUNTER — MEDICATION RENEWAL (OUTPATIENT)
Age: 84
End: 2019-10-30

## 2019-10-30 ENCOUNTER — RX RENEWAL (OUTPATIENT)
Age: 84
End: 2019-10-30

## 2019-12-06 NOTE — PROVIDER CONTACT NOTE (OTHER) - BACKGROUND
Per Dr. Chaudhari, order for WC at ext BRG placed. Called pt's brother Rm to advise him as such, no answer, left voicemail requesting brother return call to clinic.   Pt admitted with hyperparathyroidism due to renal insufficieny. Pt. with recent hx of CVA with left side facial droop and slurred speech.

## 2019-12-09 ENCOUNTER — MEDICATION RENEWAL (OUTPATIENT)
Age: 84
End: 2019-12-09

## 2020-01-01 NOTE — PATIENT PROFILE ADULT - NSPROPTRIGHTNOTIFYBYWHOM_GEN_A_NUR
Shift report received from Pancho Lacy RN at infants bedside. Infant identified using name and . Care given to infant during previous shift communicated and issues for upcoming shift addressed. A thorough overview of infant status discussed; including lines/drains/airway/infusion sites/dressing status, and assessment of skin condition. Pain assessment is discussed and current pain score visualized, any interventions needed, and reassessments if needed discussed. Interdisciplinary rounds discussed. [facility] Care utilized for reporting : medications, recent lab work results, VS, I&O, assessments, current orders, weight, and previous procedures. Feeding type and schedule reported. Plan of care,and discharge needs discussed. Parents are not available at bedside for this shift report. Infant remains on cardio/resp monitor with VSS. please needs to be notified

## 2020-01-03 PROBLEM — K40.90 UNILATERAL INGUINAL HERNIA, WITHOUT OBSTRUCTION OR GANGRENE, NOT SPECIFIED AS RECURRENT: Chronic | Status: ACTIVE | Noted: 2019-08-15

## 2020-01-03 PROBLEM — I70.1 ATHEROSCLEROSIS OF RENAL ARTERY: Chronic | Status: ACTIVE | Noted: 2017-12-20

## 2020-01-03 PROBLEM — I50.9 HEART FAILURE, UNSPECIFIED: Chronic | Status: ACTIVE | Noted: 2019-08-15

## 2020-01-03 PROBLEM — I99.8 OTHER DISORDER OF CIRCULATORY SYSTEM: Chronic | Status: ACTIVE | Noted: 2019-08-15

## 2020-01-05 ENCOUNTER — EMERGENCY (EMERGENCY)
Facility: HOSPITAL | Age: 85
LOS: 1 days | Discharge: HOME | End: 2020-01-05
Attending: EMERGENCY MEDICINE | Admitting: EMERGENCY MEDICINE
Payer: MEDICARE

## 2020-01-05 VITALS
OXYGEN SATURATION: 98 % | WEIGHT: 134.92 LBS | HEART RATE: 84 BPM | DIASTOLIC BLOOD PRESSURE: 90 MMHG | RESPIRATION RATE: 10 BRPM | SYSTOLIC BLOOD PRESSURE: 144 MMHG

## 2020-01-05 DIAGNOSIS — Z98.89 OTHER SPECIFIED POSTPROCEDURAL STATES: Chronic | ICD-10-CM

## 2020-01-05 DIAGNOSIS — Z90.710 ACQUIRED ABSENCE OF BOTH CERVIX AND UTERUS: Chronic | ICD-10-CM

## 2020-01-05 DIAGNOSIS — Z98.890 OTHER SPECIFIED POSTPROCEDURAL STATES: Chronic | ICD-10-CM

## 2020-01-05 DIAGNOSIS — Z41.9 ENCOUNTER FOR PROCEDURE FOR PURPOSES OTHER THAN REMEDYING HEALTH STATE, UNSPECIFIED: Chronic | ICD-10-CM

## 2020-01-05 DIAGNOSIS — Z96.643 PRESENCE OF ARTIFICIAL HIP JOINT, BILATERAL: Chronic | ICD-10-CM

## 2020-01-05 DIAGNOSIS — Z90.49 ACQUIRED ABSENCE OF OTHER SPECIFIED PARTS OF DIGESTIVE TRACT: Chronic | ICD-10-CM

## 2020-01-05 LAB
ALBUMIN SERPL ELPH-MCNC: 3.7 G/DL — SIGNIFICANT CHANGE UP (ref 3.3–5)
ALP SERPL-CCNC: 108 U/L — SIGNIFICANT CHANGE UP (ref 40–120)
ALT FLD-CCNC: 28 U/L — SIGNIFICANT CHANGE UP (ref 12–78)
ANION GAP SERPL CALC-SCNC: 10 MMOL/L — SIGNIFICANT CHANGE UP (ref 5–17)
APTT BLD: 29.2 SEC — SIGNIFICANT CHANGE UP (ref 28.5–37)
AST SERPL-CCNC: 25 U/L — SIGNIFICANT CHANGE UP (ref 15–37)
BASOPHILS # BLD AUTO: 0.05 K/UL — SIGNIFICANT CHANGE UP (ref 0–0.2)
BASOPHILS NFR BLD AUTO: 0.4 % — SIGNIFICANT CHANGE UP (ref 0–2)
BILIRUB SERPL-MCNC: 0.8 MG/DL — SIGNIFICANT CHANGE UP (ref 0.2–1.2)
BUN SERPL-MCNC: 52 MG/DL — HIGH (ref 7–23)
CALCIUM SERPL-MCNC: 9.4 MG/DL — SIGNIFICANT CHANGE UP (ref 8.5–10.1)
CHLORIDE SERPL-SCNC: 112 MMOL/L — HIGH (ref 96–108)
CO2 SERPL-SCNC: 21 MMOL/L — LOW (ref 22–31)
CREAT SERPL-MCNC: 1.4 MG/DL — HIGH (ref 0.5–1.3)
EOSINOPHIL # BLD AUTO: 0.03 K/UL — SIGNIFICANT CHANGE UP (ref 0–0.5)
EOSINOPHIL NFR BLD AUTO: 0.3 % — SIGNIFICANT CHANGE UP (ref 0–6)
GLUCOSE SERPL-MCNC: 133 MG/DL — HIGH (ref 70–99)
HCT VFR BLD CALC: 31.8 % — LOW (ref 34.5–45)
HGB BLD-MCNC: 10.6 G/DL — LOW (ref 11.5–15.5)
IMM GRANULOCYTES NFR BLD AUTO: 1.1 % — SIGNIFICANT CHANGE UP (ref 0–1.5)
INR BLD: 0.9 RATIO — SIGNIFICANT CHANGE UP (ref 0.88–1.16)
LYMPHOCYTES # BLD AUTO: 1.43 K/UL — SIGNIFICANT CHANGE UP (ref 1–3.3)
LYMPHOCYTES # BLD AUTO: 12.2 % — LOW (ref 13–44)
MCHC RBC-ENTMCNC: 31.5 PG — SIGNIFICANT CHANGE UP (ref 27–34)
MCHC RBC-ENTMCNC: 33.3 GM/DL — SIGNIFICANT CHANGE UP (ref 32–36)
MCV RBC AUTO: 94.6 FL — SIGNIFICANT CHANGE UP (ref 80–100)
MONOCYTES # BLD AUTO: 0.75 K/UL — SIGNIFICANT CHANGE UP (ref 0–0.9)
MONOCYTES NFR BLD AUTO: 6.4 % — SIGNIFICANT CHANGE UP (ref 2–14)
NEUTROPHILS # BLD AUTO: 9.36 K/UL — HIGH (ref 1.8–7.4)
NEUTROPHILS NFR BLD AUTO: 79.6 % — HIGH (ref 43–77)
NRBC # BLD: 0 /100 WBCS — SIGNIFICANT CHANGE UP (ref 0–0)
PLATELET # BLD AUTO: 371 K/UL — SIGNIFICANT CHANGE UP (ref 150–400)
POTASSIUM SERPL-MCNC: 4.1 MMOL/L — SIGNIFICANT CHANGE UP (ref 3.5–5.3)
POTASSIUM SERPL-SCNC: 4.1 MMOL/L — SIGNIFICANT CHANGE UP (ref 3.5–5.3)
PROT SERPL-MCNC: 6.8 G/DL — SIGNIFICANT CHANGE UP (ref 6–8.3)
PROTHROM AB SERPL-ACNC: 10.1 SEC — SIGNIFICANT CHANGE UP (ref 10–12.9)
RBC # BLD: 3.36 M/UL — LOW (ref 3.8–5.2)
RBC # FLD: 11.8 % — SIGNIFICANT CHANGE UP (ref 10.3–14.5)
SODIUM SERPL-SCNC: 143 MMOL/L — SIGNIFICANT CHANGE UP (ref 135–145)
WBC # BLD: 11.75 K/UL — HIGH (ref 3.8–10.5)
WBC # FLD AUTO: 11.75 K/UL — HIGH (ref 3.8–10.5)

## 2020-01-05 PROCEDURE — 72170 X-RAY EXAM OF PELVIS: CPT | Mod: 26,59

## 2020-01-05 PROCEDURE — 71045 X-RAY EXAM CHEST 1 VIEW: CPT | Mod: 26

## 2020-01-05 PROCEDURE — 99285 EMERGENCY DEPT VISIT HI MDM: CPT

## 2020-01-05 PROCEDURE — 73552 X-RAY EXAM OF FEMUR 2/>: CPT | Mod: 26,LT

## 2020-01-05 PROCEDURE — 73502 X-RAY EXAM HIP UNI 2-3 VIEWS: CPT | Mod: 26,LT

## 2020-01-05 RX ORDER — ONDANSETRON 8 MG/1
4 TABLET, FILM COATED ORAL ONCE
Refills: 0 | Status: COMPLETED | OUTPATIENT
Start: 2020-01-05 | End: 2020-01-05

## 2020-01-05 RX ORDER — MORPHINE SULFATE 50 MG/1
2 CAPSULE, EXTENDED RELEASE ORAL ONCE
Refills: 0 | Status: DISCONTINUED | OUTPATIENT
Start: 2020-01-05 | End: 2020-01-05

## 2020-01-05 RX ADMIN — ONDANSETRON 4 MILLIGRAM(S): 8 TABLET, FILM COATED ORAL at 22:01

## 2020-01-05 RX ADMIN — MORPHINE SULFATE 2 MILLIGRAM(S): 50 CAPSULE, EXTENDED RELEASE ORAL at 22:02

## 2020-01-05 RX ADMIN — MORPHINE SULFATE 2 MILLIGRAM(S): 50 CAPSULE, EXTENDED RELEASE ORAL at 22:32

## 2020-01-05 NOTE — ED PROVIDER NOTE - PATIENT PORTAL LINK FT
You can access the FollowMyHealth Patient Portal offered by [facility] by registering at the following website: [email or web address]. By joining [email or web address]’s FollowMyHealth portal, you will also be able to view your health information using other applications (apps) compatible with our system.

## 2020-01-05 NOTE — ED PROVIDER NOTE - PROGRESS NOTE DETAILS
pt got up and ambulated with a walker without difficulty, pt is cleared for d/c home follow up with her orthopedic surgeon dr urena, and return if any symtposm worsen

## 2020-01-05 NOTE — ED PROVIDER NOTE - OBJECTIVE STATEMENT
87yo femael bib ems with left hip pain. pt tripped and fell on her left hip no head trauma no LOC, pt c/o left hip pain, diffuse non radiating no tingling or weakness, pt has had both hips replaced

## 2020-01-05 NOTE — ED ADULT NURSE NOTE - OBJECTIVE STATEMENT
Patient brought in to ED c/o left hip pain radiating to the leg s/p fall tonight. Patient denies hitting head, no nausea/ vomiting.

## 2020-01-05 NOTE — ED ADULT TRIAGE NOTE - CHIEF COMPLAINT QUOTE
BIBEMS from home S/P fall from standing position, denies hitting head, +blood thinners. Patient reports left hip pain.

## 2020-01-06 VITALS
DIASTOLIC BLOOD PRESSURE: 76 MMHG | SYSTOLIC BLOOD PRESSURE: 164 MMHG | RESPIRATION RATE: 18 BRPM | TEMPERATURE: 99 F | OXYGEN SATURATION: 95 % | HEART RATE: 89 BPM

## 2020-01-06 DIAGNOSIS — I63.9 CEREBRAL INFARCTION, UNSPECIFIED: ICD-10-CM

## 2020-01-06 DIAGNOSIS — I10 ESSENTIAL (PRIMARY) HYPERTENSION: ICD-10-CM

## 2020-01-06 DIAGNOSIS — I70.1 ATHEROSCLEROSIS OF RENAL ARTERY: ICD-10-CM

## 2020-01-06 DIAGNOSIS — M25.552 PAIN IN LEFT HIP: ICD-10-CM

## 2020-01-06 PROCEDURE — 73552 X-RAY EXAM OF FEMUR 2/>: CPT

## 2020-01-06 PROCEDURE — 73562 X-RAY EXAM OF KNEE 3: CPT

## 2020-01-06 PROCEDURE — 85027 COMPLETE CBC AUTOMATED: CPT

## 2020-01-06 PROCEDURE — 73502 X-RAY EXAM HIP UNI 2-3 VIEWS: CPT

## 2020-01-06 PROCEDURE — 85610 PROTHROMBIN TIME: CPT

## 2020-01-06 PROCEDURE — 72170 X-RAY EXAM OF PELVIS: CPT

## 2020-01-06 PROCEDURE — 73562 X-RAY EXAM OF KNEE 3: CPT | Mod: 26,LT

## 2020-01-06 PROCEDURE — 96375 TX/PRO/DX INJ NEW DRUG ADDON: CPT

## 2020-01-06 PROCEDURE — 96374 THER/PROPH/DIAG INJ IV PUSH: CPT

## 2020-01-06 PROCEDURE — 99284 EMERGENCY DEPT VISIT MOD MDM: CPT | Mod: 25

## 2020-01-06 PROCEDURE — 36415 COLL VENOUS BLD VENIPUNCTURE: CPT

## 2020-01-06 PROCEDURE — 80053 COMPREHEN METABOLIC PANEL: CPT

## 2020-01-06 PROCEDURE — 71045 X-RAY EXAM CHEST 1 VIEW: CPT

## 2020-01-06 PROCEDURE — 85730 THROMBOPLASTIN TIME PARTIAL: CPT

## 2020-01-06 RX ORDER — LOSARTAN POTASSIUM 100 MG/1
50 TABLET, FILM COATED ORAL
Refills: 0 | Status: DISCONTINUED | OUTPATIENT
Start: 2020-01-06 | End: 2020-01-09

## 2020-01-06 RX ORDER — METOPROLOL TARTRATE 50 MG
25 TABLET ORAL DAILY
Refills: 0 | Status: DISCONTINUED | OUTPATIENT
Start: 2020-01-06 | End: 2020-01-09

## 2020-01-06 RX ORDER — ASPIRIN/CALCIUM CARB/MAGNESIUM 324 MG
81 TABLET ORAL DAILY
Refills: 0 | Status: DISCONTINUED | OUTPATIENT
Start: 2020-01-06 | End: 2020-01-09

## 2020-01-06 RX ORDER — OXYCODONE AND ACETAMINOPHEN 5; 325 MG/1; MG/1
2 TABLET ORAL EVERY 4 HOURS
Refills: 0 | Status: DISCONTINUED | OUTPATIENT
Start: 2020-01-06 | End: 2020-01-06

## 2020-01-06 RX ORDER — CLOPIDOGREL BISULFATE 75 MG/1
75 TABLET, FILM COATED ORAL DAILY
Refills: 0 | Status: DISCONTINUED | OUTPATIENT
Start: 2020-01-06 | End: 2020-01-09

## 2020-01-06 RX ORDER — POLYETHYLENE GLYCOL 3350 17 G/17G
17 POWDER, FOR SOLUTION ORAL DAILY
Refills: 0 | Status: DISCONTINUED | OUTPATIENT
Start: 2020-01-06 | End: 2020-01-09

## 2020-01-06 RX ORDER — MORPHINE SULFATE 50 MG/1
2 CAPSULE, EXTENDED RELEASE ORAL EVERY 4 HOURS
Refills: 0 | Status: DISCONTINUED | OUTPATIENT
Start: 2020-01-06 | End: 2020-01-06

## 2020-01-06 RX ORDER — SENNA PLUS 8.6 MG/1
2 TABLET ORAL AT BEDTIME
Refills: 0 | Status: DISCONTINUED | OUTPATIENT
Start: 2020-01-06 | End: 2020-01-09

## 2020-01-06 RX ORDER — HYDRALAZINE HCL 50 MG
25 TABLET ORAL EVERY 8 HOURS
Refills: 0 | Status: DISCONTINUED | OUTPATIENT
Start: 2020-01-06 | End: 2020-01-09

## 2020-01-06 RX ORDER — TRAMADOL HYDROCHLORIDE 50 MG/1
1 TABLET ORAL
Qty: 20 | Refills: 0
Start: 2020-01-06 | End: 2020-01-10

## 2020-01-06 NOTE — H&P ADULT - HISTORY OF PRESENT ILLNESS
with hx BOB s/p renal artery stent, HTN s/p fall today. Patient states that she tripped and fell today, reports 9-10 pain in her Rt hip, inability to ambulate. No hx loss of consciousness/ chest pain/ palpitations prior or after the episode. No hx falls in the past. 88 F with hx BOB s/p renal artery stent, CVA, HTN, HLD s/p fall today. Patient states that she tripped and fell today, reports 9-10 pain in her Lt hip, non radiating, inability to ambulate. No hx loss of consciousness/ chest pain/ palpitations prior or after the episode. No hx falls in the past.   In the ed patient had Lt hip x rays show no fracture

## 2020-01-06 NOTE — H&P ADULT - NSICDXPASTMEDICALHX_GEN_ALL_CORE_FT
PAST MEDICAL HISTORY:  Basal cell carcinoma     Blood pressure instability     Cerebrovascular accident (CVA), unspecified mechanism     CHF (congestive heart failure)     Dyslipidemia     Edema, unspecified type     Essential hypertension     Hyponatremia ON HCTZ , h/o Hypokelemia    Inguinal hernia, right     Mitral valve insufficiency, unspecified etiology Cardiac cath on 11/21/18    Renal artery stenosis right side, stent

## 2020-01-06 NOTE — H&P ADULT - PROBLEM SELECTOR PLAN 1
Reviewed X rays with ED physician, no fracture noted, will get CT Lt hip r/o fracture. Pain meds- Morphine and Oxycodone for Severe and Moderate pain. Bowel regimen. Ortho consult if CT shows Hip fracture.   PT kaylynn. .

## 2020-01-06 NOTE — H&P ADULT - NSICDXFAMILYHX_GEN_ALL_CORE_FT
FAMILY HISTORY:  Sibling  Still living? Unknown  Family history of carotid artery stenosis, Age at diagnosis: Age Unknown  Family history of uterine cancer, Age at diagnosis: Age Unknown

## 2020-01-06 NOTE — H&P ADULT - MUSCULOSKELETAL
details… detailed exam decreased ROM due to pain/no calf tenderness/decreased ROM/joint swelling/no joint warmth

## 2020-01-06 NOTE — H&P ADULT - NSICDXPASTSURGICALHX_GEN_ALL_CORE_FT
PAST SURGICAL HISTORY:  H/O bilateral hip replacements 2013, 2014    History of cholecystectomy 1980    Other elective surgery right renal artery stent    S/P colonoscopy     S/P Mohs surgery for basal cell carcinoma     Status post hysterectomy and bladder lift with mesh 1990s

## 2020-01-06 NOTE — H&P ADULT - NEGATIVE NEUROLOGICAL SYMPTOMS
no transient paralysis/no weakness/no paresthesias/no generalized seizures/no loss of consciousness/no loss of sensation

## 2020-01-13 NOTE — DISCHARGE NOTE ADULT - ABILITY TO HEAR (WITH HEARING AID OR HEARING APPLIANCE IF NORMALLY USED):
pt transferred to room via stretcher, a/0 x 4, pleasant/cooperative, rates
pain at 5/10, denies n/v, post op vs commenced, stable. pt's  with pt. Adequate: hears normal conversation without difficulty

## 2020-01-20 ENCOUNTER — APPOINTMENT (OUTPATIENT)
Dept: CARDIOLOGY | Facility: CLINIC | Age: 85
End: 2020-01-20
Payer: MEDICARE

## 2020-01-20 VITALS
OXYGEN SATURATION: 98 % | SYSTOLIC BLOOD PRESSURE: 189 MMHG | WEIGHT: 127 LBS | DIASTOLIC BLOOD PRESSURE: 80 MMHG | BODY MASS INDEX: 24.94 KG/M2 | HEART RATE: 87 BPM | HEIGHT: 60 IN

## 2020-01-20 PROCEDURE — 93000 ELECTROCARDIOGRAM COMPLETE: CPT

## 2020-01-20 PROCEDURE — 99214 OFFICE O/P EST MOD 30 MIN: CPT

## 2020-01-20 NOTE — PHYSICAL EXAM
[Normal Appearance] : normal appearance [Well Groomed] : well groomed [General Appearance - Well Developed] : well developed [No Deformities] : no deformities [General Appearance - In No Acute Distress] : no acute distress [General Appearance - Well Nourished] : well nourished [Normal Conjunctiva] : the conjunctiva exhibited no abnormalities [Normal Oral Mucosa] : normal oral mucosa [No Oral Cyanosis] : no oral cyanosis [No Oral Pallor] : no oral pallor [Respiration, Rhythm And Depth] : normal respiratory rhythm and effort [Exaggerated Use Of Accessory Muscles For Inspiration] : no accessory muscle use [] : no respiratory distress [Heart Sounds] : normal S1 and S2 [Heart Rate And Rhythm] : heart rate and rhythm were normal [Auscultation Breath Sounds / Voice Sounds] : lungs were clear to auscultation bilaterally [Abdomen Soft] : soft [Abnormal Walk] : normal gait [Skin Color & Pigmentation] : normal skin color and pigmentation [Oriented To Time, Place, And Person] : oriented to person, place, and time [FreeTextEntry1] : No LE Edema

## 2020-01-20 NOTE — HISTORY OF PRESENT ILLNESS
[FreeTextEntry1] : 87 yo female presents for cardiac evaluation. Pt has felt well but recently fell and was evaluated at [facility]. There was no fracture. She was discharged. Pt denies chest pain or shortness of breath.\par

## 2020-01-20 NOTE — DISCUSSION/SUMMARY
[Hypertension] : hypertension [Patient] : the patient [FreeTextEntry1] : Testing was reviewed from [facility]. Pt will follow up with her PMD as soon as possible. No pharmacological intervention indicated as long as patient's home readings are controlled.

## 2020-01-24 NOTE — ED PROVIDER NOTE - PROGRESS NOTE
PHYSICIAN NEXT STEPS:  Review Only    CHIEF COMPLAINT:  Chief Complaint/Protocol Used: Eye - Swelling  Onset: eye swollen.       ASSESSMENT:  ? Onset: eye swollen.   ? Normal True  ? Onset: Yesterday   ? Location: Right eye   ? Severity: Moderate   ? Itching: Denies   ? Pain: Severe pain   ? Cause: Unsure, thinks it is something in her eye.   ? Other Symptoms: watery eyes, nausea  -------------------------------------------------------    DISPOSITION:  Disposition Recommendation: Go to ED Now (or PCP triage)  Questions that led to disposition:  ? Patient sounds very sick or weak to the triager  Patient Directed To: Unspecified  Patient Intended Action: Go to Hospital / ED      CALL NOTES:  01/24/2020 at 9:40 AM by Sasha Qureshi  ? Patient instructed to seek ER care for evaluation.   ? nausea.      DISPOSITION OVERRIDE/PROVIDER CONSULT:  Disposition Override: N/A  Override Source: Unspecified  Consulted with PCP: No  Consulted with On-Call Physician: No    CALLER CONTACT INFO:  Name: Geoffrey Lemon (Self)  Phone 1: [phone] (Mobile)      ENCOUNTER STARTED:  01/24/20 09:32:32 AM  ENCOUNTER ASSIGNED TO/CLOSED BY:  Sasha Qureshi @ 01/24/20 09:41:05 AM      -------------------------------------------------------    UNDERSTANDS CARE ADVICE: Yes    AGREES WITH CARE ADVICE: Yes    WILL FOLLOW CARE ADVICE: Yes    -------------------------------------------------------   Improved.

## 2020-03-04 NOTE — ED ADULT NURSE NOTE - NS ED NURSE LEVEL OF CONSCIOUSNESS ORIENTATION
Oriented - self; Oriented - place; Oriented - time
Documentation clarification is required for accuracy in coding and billing, claim validation and reporting severity of illness, quality data and risk of mortality.

## 2020-07-20 ENCOUNTER — APPOINTMENT (OUTPATIENT)
Dept: CARDIOLOGY | Facility: CLINIC | Age: 85
End: 2020-07-20

## 2020-08-17 ENCOUNTER — RX RENEWAL (OUTPATIENT)
Age: 85
End: 2020-08-17
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
## 2020-09-08 ENCOUNTER — RX RENEWAL (OUTPATIENT)
Age: 85
End: 2020-09-08

## 2020-09-14 ENCOUNTER — APPOINTMENT (OUTPATIENT)
Dept: CARDIOLOGY | Facility: CLINIC | Age: 85
End: 2020-09-14

## 2020-09-18 ENCOUNTER — APPOINTMENT (OUTPATIENT)
Dept: CARDIOLOGY | Facility: CLINIC | Age: 85
End: 2020-09-18

## 2020-09-29 ENCOUNTER — RESULT REVIEW (OUTPATIENT)
Age: 85
End: 2020-09-29

## 2020-09-29 ENCOUNTER — APPOINTMENT (OUTPATIENT)
Dept: CARDIOLOGY | Facility: CLINIC | Age: 85
End: 2020-09-29
Payer: MEDICARE

## 2020-09-29 ENCOUNTER — APPOINTMENT (OUTPATIENT)
Dept: ULTRASOUND IMAGING | Facility: CLINIC | Age: 85
End: 2020-09-29
Payer: MEDICARE

## 2020-09-29 ENCOUNTER — OUTPATIENT (OUTPATIENT)
Dept: OUTPATIENT SERVICES | Facility: HOSPITAL | Age: 85
LOS: 1 days | End: 2020-09-29
Payer: MEDICARE

## 2020-09-29 VITALS — DIASTOLIC BLOOD PRESSURE: 80 MMHG | SYSTOLIC BLOOD PRESSURE: 175 MMHG

## 2020-09-29 VITALS — BODY MASS INDEX: 24.35 KG/M2 | HEART RATE: 86 BPM | HEIGHT: 60 IN | OXYGEN SATURATION: 96 % | WEIGHT: 124 LBS

## 2020-09-29 DIAGNOSIS — Z41.9 ENCOUNTER FOR PROCEDURE FOR PURPOSES OTHER THAN REMEDYING HEALTH STATE, UNSPECIFIED: Chronic | ICD-10-CM

## 2020-09-29 DIAGNOSIS — Z98.89 OTHER SPECIFIED POSTPROCEDURAL STATES: Chronic | ICD-10-CM

## 2020-09-29 DIAGNOSIS — Z90.710 ACQUIRED ABSENCE OF BOTH CERVIX AND UTERUS: Chronic | ICD-10-CM

## 2020-09-29 DIAGNOSIS — Z96.643 PRESENCE OF ARTIFICIAL HIP JOINT, BILATERAL: Chronic | ICD-10-CM

## 2020-09-29 DIAGNOSIS — Z00.8 ENCOUNTER FOR OTHER GENERAL EXAMINATION: ICD-10-CM

## 2020-09-29 DIAGNOSIS — Z90.49 ACQUIRED ABSENCE OF OTHER SPECIFIED PARTS OF DIGESTIVE TRACT: Chronic | ICD-10-CM

## 2020-09-29 DIAGNOSIS — Z98.890 OTHER SPECIFIED POSTPROCEDURAL STATES: Chronic | ICD-10-CM

## 2020-09-29 PROCEDURE — 93970 EXTREMITY STUDY: CPT | Mod: 26

## 2020-09-29 PROCEDURE — 93000 ELECTROCARDIOGRAM COMPLETE: CPT

## 2020-09-29 PROCEDURE — 93970 EXTREMITY STUDY: CPT

## 2020-09-29 PROCEDURE — 99214 OFFICE O/P EST MOD 30 MIN: CPT | Mod: 25

## 2020-10-01 ENCOUNTER — NON-APPOINTMENT (OUTPATIENT)
Age: 85
End: 2020-10-01

## 2020-10-02 ENCOUNTER — APPOINTMENT (OUTPATIENT)
Dept: CARDIOLOGY | Facility: CLINIC | Age: 85
End: 2020-10-02
Payer: MEDICARE

## 2020-10-02 ENCOUNTER — NON-APPOINTMENT (OUTPATIENT)
Age: 85
End: 2020-10-02

## 2020-10-02 VITALS
BODY MASS INDEX: 24.35 KG/M2 | WEIGHT: 124 LBS | SYSTOLIC BLOOD PRESSURE: 152 MMHG | DIASTOLIC BLOOD PRESSURE: 90 MMHG | HEART RATE: 84 BPM | OXYGEN SATURATION: 99 % | HEIGHT: 60 IN

## 2020-10-02 PROCEDURE — 99214 OFFICE O/P EST MOD 30 MIN: CPT

## 2020-10-02 NOTE — REVIEW OF SYSTEMS
[Lower Ext Edema] : lower extremity edema [see HPI] : see HPI [Dizziness] : dizziness [Negative] : Heme/Lymph [Shortness Of Breath] : no shortness of breath [Dyspnea on exertion] : not dyspnea during exertion [Chest  Pressure] : no chest pressure [Chest Pain] : no chest pain [Leg Claudication] : no intermittent leg claudication [Palpitations] : no palpitations

## 2020-10-02 NOTE — DISCUSSION/SUMMARY
[Hypertension] : hypertension [Not Responding to Treatment] : not responding to treatment [Medication Changes Per Orders] : as documented in orders [Patient] : the patient [FreeTextEntry1] : Cellulitis associated with B/L LE Edema will give course of ABX as directed, elevate extremities continue diuretic with F/U OV next week.  Will call office with any change or progression of symptoms or development of fevers.\par   \par \par

## 2020-10-02 NOTE — PHYSICAL EXAM
[General Appearance - Well Developed] : well developed [Normal Appearance] : normal appearance [Well Groomed] : well groomed [General Appearance - Well Nourished] : well nourished [No Deformities] : no deformities [General Appearance - In No Acute Distress] : no acute distress [Normal Conjunctiva] : the conjunctiva exhibited no abnormalities [] : no respiratory distress [Respiration, Rhythm And Depth] : normal respiratory rhythm and effort [Exaggerated Use Of Accessory Muscles For Inspiration] : no accessory muscle use [Auscultation Breath Sounds / Voice Sounds] : lungs were clear to auscultation bilaterally [Heart Rate And Rhythm] : heart rate and rhythm were normal [Heart Sounds] : normal S1 and S2 [Abdomen Soft] : soft [Abnormal Walk] : normal gait [Skin Color & Pigmentation] : normal skin color and pigmentation [Oriented To Time, Place, And Person] : oriented to person, place, and time [FreeTextEntry1] : 2+ LE Edema + erythema

## 2020-10-02 NOTE — HISTORY OF PRESENT ILLNESS
[FreeTextEntry1] : 88 Y/O female PMH: HTN, CVA, RT renal artery stenosis S/P stent, Hyponatremia/Chronic kidney disease followed with Renal Dr Victoria ( has had no recent F/U )  , Thrombocytosis seen by Hematology who presents today in follow up of B/L LE Edema without associated CP/SOB/PND/Orthopnea/Weight gain However, past 24 Hrs has developed  Erythema anterior aspect B/L Lower extremities remains afebrile \par \par

## 2020-10-03 NOTE — REVIEW OF SYSTEMS
[Dizziness] : dizziness [see HPI] : see HPI [Lower Ext Edema] : lower extremity edema [Negative] : Neurological [Shortness Of Breath] : no shortness of breath [Dyspnea on exertion] : not dyspnea during exertion [Chest  Pressure] : no chest pressure [Chest Pain] : no chest pain [Leg Claudication] : no intermittent leg claudication [Palpitations] : no palpitations

## 2020-10-03 NOTE — HISTORY OF PRESENT ILLNESS
[FreeTextEntry1] : 88 Y/O female PMH: HTN, CVA, RT renal artery stenosis S/P stent, Hyponatremia/Chronic kidney disease followed with Renal Dr Victoria ( has had no recent F/U )  , Thrombocytosis seen by Hematology who presents today with CC of B/L LE Edema without associated CP/SOB/PND/Orthopnea/Weight gain\par \par

## 2020-10-03 NOTE — DISCUSSION/SUMMARY
[Hypertension] : hypertension [Not Responding to Treatment] : not responding to treatment [Medication Changes Per Orders] : as documented in orders [Patient] : the patient [FreeTextEntry1] : B/L LE Edema W/O SOB/Weight gain/PND/Orthopnea possible venous stasis/Dependent Edema/chronic kidney disease  W/O recent renal follow up I have refereed her to Dr Escobedo (per patient request ) At this time prior labs were reviewed she will uptitrate her diuretic as directed complete labs ordered today.  Venous US/Echocardiogram ordered, advised low sodium diet, elevation of extremities with use of support stockings.  Additionally, advised established care with PCP\par   \par \par

## 2020-10-03 NOTE — PHYSICAL EXAM
[General Appearance - Well Developed] : well developed [Normal Appearance] : normal appearance [Well Groomed] : well groomed [General Appearance - Well Nourished] : well nourished [No Deformities] : no deformities [General Appearance - In No Acute Distress] : no acute distress [Normal Conjunctiva] : the conjunctiva exhibited no abnormalities [] : no respiratory distress [Respiration, Rhythm And Depth] : normal respiratory rhythm and effort [Exaggerated Use Of Accessory Muscles For Inspiration] : no accessory muscle use [Auscultation Breath Sounds / Voice Sounds] : lungs were clear to auscultation bilaterally [Heart Rate And Rhythm] : heart rate and rhythm were normal [Heart Sounds] : normal S1 and S2 [Abdomen Soft] : soft [Abnormal Walk] : normal gait [Skin Color & Pigmentation] : normal skin color and pigmentation [Oriented To Time, Place, And Person] : oriented to person, place, and time [FreeTextEntry1] : 2+ LE Edema

## 2020-10-05 LAB
ALBUMIN SERPL ELPH-MCNC: 4 G/DL
ALP BLD-CCNC: 88 U/L
ALT SERPL-CCNC: 24 U/L
ANION GAP SERPL CALC-SCNC: 14 MMOL/L
AST SERPL-CCNC: 24 U/L
BASOPHILS # BLD AUTO: 0.04 K/UL
BASOPHILS NFR BLD AUTO: 0.6 %
BILIRUB SERPL-MCNC: 0.6 MG/DL
BUN SERPL-MCNC: 45 MG/DL
CALCIUM SERPL-MCNC: 9.6 MG/DL
CHLORIDE SERPL-SCNC: 104 MMOL/L
CO2 SERPL-SCNC: 22 MMOL/L
CREAT SERPL-MCNC: 1.46 MG/DL
EOSINOPHIL # BLD AUTO: 0.04 K/UL
EOSINOPHIL NFR BLD AUTO: 0.6 %
GLUCOSE SERPL-MCNC: 144 MG/DL
HCT VFR BLD CALC: 31.5 %
HGB BLD-MCNC: 9.8 G/DL
IMM GRANULOCYTES NFR BLD AUTO: 1.3 %
LYMPHOCYTES # BLD AUTO: 1.39 K/UL
LYMPHOCYTES NFR BLD AUTO: 20 %
MAGNESIUM SERPL-MCNC: 2.1 MG/DL
MAN DIFF?: NORMAL
MCHC RBC-ENTMCNC: 30.8 PG
MCHC RBC-ENTMCNC: 31.1 GM/DL
MCV RBC AUTO: 99.1 FL
MONOCYTES # BLD AUTO: 0.64 K/UL
MONOCYTES NFR BLD AUTO: 9.2 %
NEUTROPHILS # BLD AUTO: 4.74 K/UL
NEUTROPHILS NFR BLD AUTO: 68.3 %
PLATELET # BLD AUTO: 389 K/UL
POTASSIUM SERPL-SCNC: 5.6 MMOL/L
PROT SERPL-MCNC: 5.8 G/DL
RBC # BLD: 3.18 M/UL
RBC # FLD: 12.2 %
SODIUM SERPL-SCNC: 140 MMOL/L
WBC # FLD AUTO: 6.94 K/UL

## 2020-10-09 ENCOUNTER — APPOINTMENT (OUTPATIENT)
Dept: CARDIOLOGY | Facility: CLINIC | Age: 85
End: 2020-10-09
Payer: MEDICARE

## 2020-10-09 VITALS
SYSTOLIC BLOOD PRESSURE: 170 MMHG | WEIGHT: 120 LBS | HEIGHT: 60 IN | OXYGEN SATURATION: 98 % | HEART RATE: 92 BPM | BODY MASS INDEX: 23.56 KG/M2 | DIASTOLIC BLOOD PRESSURE: 82 MMHG

## 2020-10-09 PROCEDURE — 99214 OFFICE O/P EST MOD 30 MIN: CPT | Mod: 25

## 2020-10-09 PROCEDURE — 93306 TTE W/DOPPLER COMPLETE: CPT

## 2020-10-09 NOTE — REVIEW OF SYSTEMS
[see HPI] : see HPI [Dizziness] : dizziness [Negative] : Heme/Lymph [Shortness Of Breath] : no shortness of breath [Dyspnea on exertion] : not dyspnea during exertion [Chest  Pressure] : no chest pressure [Chest Pain] : no chest pain [Lower Ext Edema] : no extremity edema [Leg Claudication] : no intermittent leg claudication [Palpitations] : no palpitations

## 2020-10-09 NOTE — DISCUSSION/SUMMARY
[Hypertension] : hypertension [Not Responding to Treatment] : not responding to treatment [Medication Changes Per Orders] : as documented in orders [Patient] : the patient [FreeTextEntry1] : Cellulitis: Improved on ABX therapy.  Continue and complete ABX course. \par \par HTN: Modestly elevated today.  Reports she is anxious when coming to the doctor and was just out to lunch prior to visit. She will maintain daily home BP log, low sodium diet OV/labs 3 weeks \par   \par \par

## 2020-10-09 NOTE — PHYSICAL EXAM
[General Appearance - Well Developed] : well developed [Normal Appearance] : normal appearance [Well Groomed] : well groomed [General Appearance - Well Nourished] : well nourished [No Deformities] : no deformities [General Appearance - In No Acute Distress] : no acute distress [Normal Conjunctiva] : the conjunctiva exhibited no abnormalities [] : no respiratory distress [Respiration, Rhythm And Depth] : normal respiratory rhythm and effort [Exaggerated Use Of Accessory Muscles For Inspiration] : no accessory muscle use [Auscultation Breath Sounds / Voice Sounds] : lungs were clear to auscultation bilaterally [Heart Rate And Rhythm] : heart rate and rhythm were normal [Heart Sounds] : normal S1 and S2 [Abdomen Soft] : soft [Abnormal Walk] : normal gait [Skin Color & Pigmentation] : normal skin color and pigmentation [Oriented To Time, Place, And Person] : oriented to person, place, and time [FreeTextEntry1] : Erythema LE improved

## 2020-10-09 NOTE — HISTORY OF PRESENT ILLNESS
[FreeTextEntry1] : 88 Y/O female PMH: HTN, CVA, RT renal artery stenosis S/P stent, Hyponatremia/Chronic kidney disease followed with Renal Dr Victoria ( has had no recent F/U )  , Thrombocytosis seen by Hematology who presents today in follow up of cellulitis currently on ABX therapy which there is significant improvement in cellulitis.  Erythema and edema improved.  Patient is afebrile and reports she is feeling well \par \par

## 2020-10-23 ENCOUNTER — NON-APPOINTMENT (OUTPATIENT)
Age: 85
End: 2020-10-23

## 2020-10-23 ENCOUNTER — APPOINTMENT (OUTPATIENT)
Dept: CARDIOLOGY | Facility: CLINIC | Age: 85
End: 2020-10-23
Payer: MEDICARE

## 2020-10-23 VITALS
BODY MASS INDEX: 23.56 KG/M2 | TEMPERATURE: 98.5 F | SYSTOLIC BLOOD PRESSURE: 224 MMHG | RESPIRATION RATE: 14 BRPM | DIASTOLIC BLOOD PRESSURE: 87 MMHG | WEIGHT: 120 LBS | HEART RATE: 93 BPM | OXYGEN SATURATION: 98 % | HEIGHT: 60 IN

## 2020-10-23 VITALS — SYSTOLIC BLOOD PRESSURE: 184 MMHG | DIASTOLIC BLOOD PRESSURE: 84 MMHG

## 2020-10-23 PROCEDURE — 99214 OFFICE O/P EST MOD 30 MIN: CPT | Mod: 25

## 2020-10-23 PROCEDURE — 93000 ELECTROCARDIOGRAM COMPLETE: CPT

## 2020-10-23 NOTE — HISTORY OF PRESENT ILLNESS
[FreeTextEntry1] : 89 Y/O female PMH: HTN, CVA, RT renal artery stenosis S/P stent, Hyponatremia/Chronic kidney disease followed with Renal Dr Victoria ( has had no recent F/U )  , Thrombocytosis seen by Hematology who presents today in follow up of cellulitis which has resolved However, BP elevated today at 184/84 \par \par

## 2020-10-23 NOTE — DISCUSSION/SUMMARY
[Hypertension] : hypertension [Not Responding to Treatment] : not responding to treatment [Medication Changes Per Orders] : as documented in orders [Patient] : the patient [FreeTextEntry1] : HTN: Sub optimal control will increase Metoprolol to 1 1/2 tabs PM with one week OV advised low sodium diet and maintain daily BP log OV one week \par \par Cellulitis: Resolved\par \par   \par \par

## 2020-10-30 ENCOUNTER — APPOINTMENT (OUTPATIENT)
Dept: CARDIOLOGY | Facility: CLINIC | Age: 85
End: 2020-10-30

## 2020-11-17 NOTE — SWALLOW BEDSIDE ASSESSMENT ADULT - ORAL PREPARATORY PHASE
Diabetes Education  Author: Susan Dorman Within functional limits

## 2020-12-08 ENCOUNTER — RX RENEWAL (OUTPATIENT)
Age: 85
End: 2020-12-08

## 2021-01-15 NOTE — ED ADULT NURSE NOTE - CHIEF COMPLAINT
2nd attempt to contact patient to schedule IR Consult with Interventional Radiology as ordered by Dr. Singh. No answer. Left message for patient to return call to schedule.     The patient is a 87y Female complaining of shortness of breath.

## 2021-01-18 ENCOUNTER — APPOINTMENT (OUTPATIENT)
Dept: INTERNAL MEDICINE | Facility: CLINIC | Age: 86
End: 2021-01-18

## 2021-01-26 ENCOUNTER — RX RENEWAL (OUTPATIENT)
Age: 86
End: 2021-01-26

## 2021-02-21 ENCOUNTER — INPATIENT (INPATIENT)
Facility: HOSPITAL | Age: 86
LOS: 1 days | Discharge: HOME HEALTH | DRG: 689 | End: 2021-02-23
Attending: INTERNAL MEDICINE | Admitting: INTERNAL MEDICINE
Payer: MEDICARE

## 2021-02-21 VITALS
HEART RATE: 99 BPM | WEIGHT: 119.93 LBS | SYSTOLIC BLOOD PRESSURE: 189 MMHG | OXYGEN SATURATION: 100 % | TEMPERATURE: 99 F | HEIGHT: 61 IN | RESPIRATION RATE: 18 BRPM | DIASTOLIC BLOOD PRESSURE: 113 MMHG

## 2021-02-21 DIAGNOSIS — Z90.710 ACQUIRED ABSENCE OF BOTH CERVIX AND UTERUS: Chronic | ICD-10-CM

## 2021-02-21 DIAGNOSIS — Z90.49 ACQUIRED ABSENCE OF OTHER SPECIFIED PARTS OF DIGESTIVE TRACT: Chronic | ICD-10-CM

## 2021-02-21 DIAGNOSIS — Z96.643 PRESENCE OF ARTIFICIAL HIP JOINT, BILATERAL: Chronic | ICD-10-CM

## 2021-02-21 DIAGNOSIS — S72.002A FRACTURE OF UNSPECIFIED PART OF NECK OF LEFT FEMUR, INITIAL ENCOUNTER FOR CLOSED FRACTURE: ICD-10-CM

## 2021-02-21 DIAGNOSIS — Z98.890 OTHER SPECIFIED POSTPROCEDURAL STATES: Chronic | ICD-10-CM

## 2021-02-21 DIAGNOSIS — Z41.9 ENCOUNTER FOR PROCEDURE FOR PURPOSES OTHER THAN REMEDYING HEALTH STATE, UNSPECIFIED: Chronic | ICD-10-CM

## 2021-02-21 DIAGNOSIS — Z98.89 OTHER SPECIFIED POSTPROCEDURAL STATES: Chronic | ICD-10-CM

## 2021-02-21 LAB
ANION GAP SERPL CALC-SCNC: 8 MMOL/L — SIGNIFICANT CHANGE UP (ref 5–17)
APPEARANCE UR: CLEAR — SIGNIFICANT CHANGE UP
APTT BLD: 29.7 SEC — SIGNIFICANT CHANGE UP (ref 27.5–35.5)
BASOPHILS # BLD AUTO: 0.04 K/UL — SIGNIFICANT CHANGE UP (ref 0–0.2)
BASOPHILS NFR BLD AUTO: 0.5 % — SIGNIFICANT CHANGE UP (ref 0–2)
BILIRUB UR-MCNC: NEGATIVE — SIGNIFICANT CHANGE UP
BUN SERPL-MCNC: 44 MG/DL — HIGH (ref 7–23)
CALCIUM SERPL-MCNC: 9.1 MG/DL — SIGNIFICANT CHANGE UP (ref 8.5–10.1)
CHLORIDE SERPL-SCNC: 110 MMOL/L — HIGH (ref 96–108)
CK SERPL-CCNC: 87 U/L — SIGNIFICANT CHANGE UP (ref 26–192)
CO2 SERPL-SCNC: 22 MMOL/L — SIGNIFICANT CHANGE UP (ref 22–31)
COLOR SPEC: YELLOW — SIGNIFICANT CHANGE UP
CREAT SERPL-MCNC: 1.29 MG/DL — SIGNIFICANT CHANGE UP (ref 0.5–1.3)
DIFF PNL FLD: ABNORMAL
EOSINOPHIL # BLD AUTO: 0.03 K/UL — SIGNIFICANT CHANGE UP (ref 0–0.5)
EOSINOPHIL NFR BLD AUTO: 0.4 % — SIGNIFICANT CHANGE UP (ref 0–6)
GLUCOSE SERPL-MCNC: 141 MG/DL — HIGH (ref 70–99)
GLUCOSE UR QL: NEGATIVE MG/DL — SIGNIFICANT CHANGE UP
HCT VFR BLD CALC: 33.3 % — LOW (ref 34.5–45)
HGB BLD-MCNC: 10.5 G/DL — LOW (ref 11.5–15.5)
IMM GRANULOCYTES NFR BLD AUTO: 0.9 % — SIGNIFICANT CHANGE UP (ref 0–1.5)
INR BLD: 0.94 RATIO — SIGNIFICANT CHANGE UP (ref 0.88–1.16)
KETONES UR-MCNC: NEGATIVE — SIGNIFICANT CHANGE UP
LEUKOCYTE ESTERASE UR-ACNC: ABNORMAL
LYMPHOCYTES # BLD AUTO: 1.65 K/UL — SIGNIFICANT CHANGE UP (ref 1–3.3)
LYMPHOCYTES # BLD AUTO: 20.8 % — SIGNIFICANT CHANGE UP (ref 13–44)
MCHC RBC-ENTMCNC: 29.1 PG — SIGNIFICANT CHANGE UP (ref 27–34)
MCHC RBC-ENTMCNC: 31.5 GM/DL — LOW (ref 32–36)
MCV RBC AUTO: 92.2 FL — SIGNIFICANT CHANGE UP (ref 80–100)
MONOCYTES # BLD AUTO: 0.88 K/UL — SIGNIFICANT CHANGE UP (ref 0–0.9)
MONOCYTES NFR BLD AUTO: 11.1 % — SIGNIFICANT CHANGE UP (ref 2–14)
NEUTROPHILS # BLD AUTO: 5.28 K/UL — SIGNIFICANT CHANGE UP (ref 1.8–7.4)
NEUTROPHILS NFR BLD AUTO: 66.3 % — SIGNIFICANT CHANGE UP (ref 43–77)
NITRITE UR-MCNC: POSITIVE
PH UR: 6 — SIGNIFICANT CHANGE UP (ref 5–8)
PLATELET # BLD AUTO: 405 K/UL — HIGH (ref 150–400)
POTASSIUM SERPL-MCNC: 3.9 MMOL/L — SIGNIFICANT CHANGE UP (ref 3.5–5.3)
POTASSIUM SERPL-SCNC: 3.9 MMOL/L — SIGNIFICANT CHANGE UP (ref 3.5–5.3)
PROT UR-MCNC: 30 MG/DL
PROTHROM AB SERPL-ACNC: 11 SEC — SIGNIFICANT CHANGE UP (ref 10.6–13.6)
RBC # BLD: 3.61 M/UL — LOW (ref 3.8–5.2)
RBC # FLD: 12.1 % — SIGNIFICANT CHANGE UP (ref 10.3–14.5)
SODIUM SERPL-SCNC: 140 MMOL/L — SIGNIFICANT CHANGE UP (ref 135–145)
SP GR SPEC: 1.01 — SIGNIFICANT CHANGE UP (ref 1.01–1.02)
UROBILINOGEN FLD QL: NEGATIVE MG/DL — SIGNIFICANT CHANGE UP
WBC # BLD: 7.95 K/UL — SIGNIFICANT CHANGE UP (ref 3.8–10.5)
WBC # FLD AUTO: 7.95 K/UL — SIGNIFICANT CHANGE UP (ref 3.8–10.5)

## 2021-02-21 PROCEDURE — 73564 X-RAY EXAM KNEE 4 OR MORE: CPT | Mod: 26,LT

## 2021-02-21 PROCEDURE — 85027 COMPLETE CBC AUTOMATED: CPT

## 2021-02-21 PROCEDURE — 72192 CT PELVIS W/O DYE: CPT | Mod: 26

## 2021-02-21 PROCEDURE — 85730 THROMBOPLASTIN TIME PARTIAL: CPT

## 2021-02-21 PROCEDURE — 97116 GAIT TRAINING THERAPY: CPT | Mod: GP

## 2021-02-21 PROCEDURE — 97162 PT EVAL MOD COMPLEX 30 MIN: CPT | Mod: GP

## 2021-02-21 PROCEDURE — 76376 3D RENDER W/INTRP POSTPROCES: CPT | Mod: 26

## 2021-02-21 PROCEDURE — 73502 X-RAY EXAM HIP UNI 2-3 VIEWS: CPT | Mod: 26,LT

## 2021-02-21 PROCEDURE — 73590 X-RAY EXAM OF LOWER LEG: CPT | Mod: 26,LT

## 2021-02-21 PROCEDURE — 73552 X-RAY EXAM OF FEMUR 2/>: CPT | Mod: 26,LT

## 2021-02-21 PROCEDURE — 36415 COLL VENOUS BLD VENIPUNCTURE: CPT

## 2021-02-21 PROCEDURE — 86769 SARS-COV-2 COVID-19 ANTIBODY: CPT

## 2021-02-21 PROCEDURE — 80053 COMPREHEN METABOLIC PANEL: CPT

## 2021-02-21 PROCEDURE — 97530 THERAPEUTIC ACTIVITIES: CPT | Mod: GP

## 2021-02-21 PROCEDURE — 93005 ELECTROCARDIOGRAM TRACING: CPT

## 2021-02-21 PROCEDURE — 85610 PROTHROMBIN TIME: CPT

## 2021-02-21 PROCEDURE — 93010 ELECTROCARDIOGRAM REPORT: CPT | Mod: 76

## 2021-02-21 RX ORDER — MORPHINE SULFATE 50 MG/1
2 CAPSULE, EXTENDED RELEASE ORAL ONCE
Refills: 0 | Status: DISCONTINUED | OUTPATIENT
Start: 2021-02-21 | End: 2021-02-21

## 2021-02-21 RX ORDER — SODIUM CHLORIDE 9 MG/ML
1000 INJECTION INTRAMUSCULAR; INTRAVENOUS; SUBCUTANEOUS ONCE
Refills: 0 | Status: COMPLETED | OUTPATIENT
Start: 2021-02-21 | End: 2021-02-21

## 2021-02-21 RX ADMIN — Medication 1 TABLET(S): at 21:19

## 2021-02-21 RX ADMIN — Medication 0.1 MILLIGRAM(S): at 22:49

## 2021-02-21 RX ADMIN — SODIUM CHLORIDE 1000 MILLILITER(S): 9 INJECTION INTRAMUSCULAR; INTRAVENOUS; SUBCUTANEOUS at 22:46

## 2021-02-21 RX ADMIN — MORPHINE SULFATE 2 MILLIGRAM(S): 50 CAPSULE, EXTENDED RELEASE ORAL at 18:25

## 2021-02-21 RX ADMIN — SODIUM CHLORIDE 1000 MILLILITER(S): 9 INJECTION INTRAMUSCULAR; INTRAVENOUS; SUBCUTANEOUS at 18:26

## 2021-02-21 NOTE — ED ADULT TRIAGE NOTE - CHIEF COMPLAINT QUOTE
pt bibems from home c/o left hip s/p falling of chair at home when pt fell asleep, 3 days ago. pt reports worsening pain and difficulty bearing weight on left side since fall. pt reports hx of BL hip replacement. hx HTN.

## 2021-02-21 NOTE — CONSULT NOTE ADULT - ASSESSMENT
Assessment/Plan:  89y Female with L??    ******INCOMPLETE NOTE IN PROGRESS******  ******INCOMPLETE NOTE IN PROGRESS******  ******INCOMPLETE NOTE IN PROGRESS******      -Pain control as needed  -DVT ppx  -WBAT // PWB // NWB  -Will discuss with attending, and advise if plan changes   Assessment/Plan:  89y Female with Left periprosthetic greater trochanteric fracture Phelan AG    -Given stable prosthesis, recommend WBAT for greater trochanter avulsion fracture  -No indication for operative fixation at this time  -Pain control as needed  -DVT ppx per medicine   -WBAT / PT  -No acute orthopedic surgical intervention needed at this time  -Given UTI and inability to ambulate patient may require medical admission but is stable from orthopedic standpoint and may follow up as outpatient in 1-2 weeks  -Discussed with attending who agrees with plan  -Ortho stable for discharge

## 2021-02-21 NOTE — ED PROVIDER NOTE - PHYSICAL EXAMINATION
PA NOTE: GEN: AOX3, NAD. HEENT: Throat clear. Airway is patent. EYES: PERRLA. EOMI. Head: NC/AT. NECK: Supple, No JVD. FROM. C-spine non-tender. CV:S1S2, RRR, LUNGS: Non-labored breathing, no tachypnea. O2sat 100% RA. CTA b/l. No w/r/r. CHEST: Equal chest expansion and rise. No deformity. ABD: Soft, NT/ND, no rebound, no guarding. No CVAT. EXT: 1+ pitting edema b/l LE's. 2+ distal pulses. LLE: Left leg internally rotated, not shortened. +Marked tenderness left hip. Unable to straight raise LLE. 2+ distal pulses. NVI. SKIN: No rashes. NEURO: No focal deficits. CN II-XII intact. FROM. 5/5 motor and sensory. ~Prem Partida PA-C

## 2021-02-21 NOTE — ED ADULT NURSE NOTE - NSIMPLEMENTINTERV_GEN_ALL_ED
Implemented All Fall Risk Interventions:  Josh[place] to call system. Call bell, personal items and telephone within reach. Instruct patient to call for assistance. Room bathroom lighting operational. Non-slip footwear when patient is off stretcher. Physically safe environment: no spills, clutter or unnecessary equipment. Stretcher in lowest position, wheels locked, appropriate side rails in place. Provide visual cue, wrist band, yellow gown, etc. Monitor gait and stability. Monitor for mental status changes and reorient to person, place, and time. Review medications for side effects contributing to fall risk. Reinforce activity limits and safety measures with patient and family.

## 2021-02-21 NOTE — ED PROVIDER NOTE - OBJECTIVE STATEMENT
89 y/o F with PMHx of CVA, CHF, HTN, HLD, BCC s/p Mohs, mitral valve insuff, R renal artery stenosis s/p stent placement, s/p cholecystectomy, s/p hysterectomy, and s/p b/l THR presents to the ED BIBEMS from home c/o +L hip pain s/p fall 3 days ago. Reports she was sitting on chair reading when she fell asleep and subsequently fell off the chair onto carpet. Notes +difficulty bearing weight 2/2 pain. No fever. Allergic to IV contrast, Keflex, and verapamil. PCP: Dr. Rafa Vila.

## 2021-02-21 NOTE — ED ADULT NURSE NOTE - OBJECTIVE STATEMENT
Pt reports she was reading on a chair, fell asleep and fell off the chair onto carpeted floor 3 days ago. Pt c/o left leg pain. No deformity noted, c/o pain upon movement.

## 2021-02-21 NOTE — CONSULT NOTE ADULT - SUBJECTIVE AND OBJECTIVE BOX
89y Female presents to  s/p fall from couch to carpeted floor today. Patient reports she has had bilateral THAs done by Dr. Durham 10 years ago. Reports she has been walking with walker. Reports since her fall today she has had LEFT knee pain. Denies numbness/tingling in the affected extremity. Denies head strike/LOC/other orthopedic injuries at this time.     PAST MEDICAL & SURGICAL HISTORY:  Blood pressure instability    Inguinal hernia, right    CHF (congestive heart failure)    Renal artery stenosis  right side, stent    Basal cell carcinoma    Mitral valve insufficiency, unspecified etiology  Cardiac cath on 18    Edema, unspecified type    Cerebrovascular accident (CVA), unspecified mechanism    Hyponatremia  ON HCTZ , h/o Hypokelemia    Dyslipidemia    Essential hypertension    S/P colonoscopy    Other elective surgery  right renal artery stent    H/O bilateral hip replacements  ,     History of cholecystectomy  1980    Status post hysterectomy  and bladder lift with mesh     S/P Mohs surgery for basal cell carcinoma      Home Medications:  cloNIDine 0.2 mg oral tablet: 1 tab(s) orally 3 times a day  HOLD for SBP &lt; 150   Keep SBP between  150- 180  (2020 21:05)  clopidogrel 75 mg oral tablet: 1 tab(s) orally once a day    daughter reports pt instructed to hold as per 19 by Dr Vila (2020 21:05)  losartan 50 mg oral tablet: 1 tab(s) orally 2 times a day  HOLD for SBP &lt;150  Keep -180 (2020 21:05)  Metoprolol Succinate ER 25 mg oral tablet, extended release: 1 tab(s) orally 2 times a day (2020 21:05)  pravastatin 20 mg oral tablet: 1 tab(s) orally once a day (2020 21:05)  torsemide 5 mg oral tablet: 1 tab(s) orally once a day (2020 21:05)    Allergies    IV Contrast (Pruritus; Rash)  Keflex (Other)  verapamil (Other)    Intolerances                              10.5   7.95  )-----------( 405      ( 2021 17:51 )             33.3           PT/INR - ( 2021 17:51 )   PT: 11.0 sec;   INR: 0.94 ratio         PTT - ( 2021 17:51 )  PTT:29.7 sec  Urinalysis Basic - ( 2021 18:21 )    Color: Yellow / Appearance: Clear / S.010 / pH: x  Gluc: x / Ketone: Negative  / Bili: Negative / Urobili: Negative mg/dL   Blood: x / Protein: 30 mg/dL / Nitrite: Positive   Leuk Esterase: Moderate / RBC: x / WBC x   Sq Epi: x / Non Sq Epi: x / Bacteria: x          Vital Signs Last 24 Hrs  T(C): 37.1 (2021 17:36), Max: 37.1 (2021 17:36)  T(F): 98.7 (2021 17:36), Max: 98.7 (2021 17:36)  HR: 99 (2021 17:36) (99 - 99)  BP: 189/113 (2021 17:36) (189/113 - 189/113)  BP(mean): --  RR: 18 (2021 17:36) (18 - 18)  SpO2: 100% (2021 17:36) (100% - 100%)    PHYSICAL EXAM  General: NAD, Awake and Alert    LLE:  skin intact, small superficial hematoma noted about the knee about the lateral joint line  TTP about knee and hip  Pain with axial load / log roll  extensor mechanism intact  Ankle NTTP full painless ROM  +sensation L2-S1  +dorsiflexion/plantarflexion of ankle/hallux  +dorsalis pedis pulse  Soft compartments, - calf tenderness        Secondary Exam: Benign, Skin intact, NTTP along axial spine, SILT throughout, motor grossly intact throughout, no other orthopedic injuries at this time, compartments soft and compressible        IMAGING:  XR : FU IMAGING    ******INCOMPLETE NOTE IN PROGRESS******  ******INCOMPLETE NOTE IN PROGRESS******  ******INCOMPLETE NOTE IN PROGRESS******     89y Female presents to  s/p fall from couch to carpeted floor today. Patient reports she has had bilateral THAs done by Dr. Durham 10 years ago. Reports she has been walking with walker. Reports since her fall today she has had LEFT knee pain. Denies numbness/tingling in the affected extremity. Denies head strike/LOC/other orthopedic injuries at this time.     PAST MEDICAL & SURGICAL HISTORY:  Blood pressure instability    Inguinal hernia, right    CHF (congestive heart failure)    Renal artery stenosis  right side, stent    Basal cell carcinoma    Mitral valve insufficiency, unspecified etiology  Cardiac cath on 18    Edema, unspecified type    Cerebrovascular accident (CVA), unspecified mechanism    Hyponatremia  ON HCTZ , h/o Hypokelemia    Dyslipidemia    Essential hypertension    S/P colonoscopy    Other elective surgery  right renal artery stent    H/O bilateral hip replacements  ,     History of cholecystectomy  1980    Status post hysterectomy  and bladder lift with mesh     S/P Mohs surgery for basal cell carcinoma      Home Medications:  cloNIDine 0.2 mg oral tablet: 1 tab(s) orally 3 times a day  HOLD for SBP &lt; 150   Keep SBP between  150- 180  (2020 21:05)  clopidogrel 75 mg oral tablet: 1 tab(s) orally once a day    daughter reports pt instructed to hold as per 19 by Dr Vila (2020 21:05)  losartan 50 mg oral tablet: 1 tab(s) orally 2 times a day  HOLD for SBP &lt;150  Keep -180 (2020 21:05)  Metoprolol Succinate ER 25 mg oral tablet, extended release: 1 tab(s) orally 2 times a day (2020 21:05)  pravastatin 20 mg oral tablet: 1 tab(s) orally once a day (2020 21:05)  torsemide 5 mg oral tablet: 1 tab(s) orally once a day (2020 21:05)    Allergies    IV Contrast (Pruritus; Rash)  Keflex (Other)  verapamil (Other)    Intolerances                              10.5   7.95  )-----------( 405      ( 2021 17:51 )             33.3           PT/INR - ( 2021 17:51 )   PT: 11.0 sec;   INR: 0.94 ratio         PTT - ( 2021 17:51 )  PTT:29.7 sec  Urinalysis Basic - ( 2021 18:21 )    Color: Yellow / Appearance: Clear / S.010 / pH: x  Gluc: x / Ketone: Negative  / Bili: Negative / Urobili: Negative mg/dL   Blood: x / Protein: 30 mg/dL / Nitrite: Positive   Leuk Esterase: Moderate / RBC: x / WBC x   Sq Epi: x / Non Sq Epi: x / Bacteria: x          Vital Signs Last 24 Hrs  T(C): 37.1 (2021 17:36), Max: 37.1 (2021 17:36)  T(F): 98.7 (2021 17:36), Max: 98.7 (2021 17:36)  HR: 99 (2021 17:36) (99 - 99)  BP: 189/113 (2021 17:36) (189/113 - 189/113)  BP(mean): --  RR: 18 (2021 17:36) (18 - 18)  SpO2: 100% (2021 17:36) (100% - 100%)    PHYSICAL EXAM  General: NAD, Awake and Alert    LLE:  skin intact, small superficial hematoma noted about the knee about the lateral joint line  TTP about knee and hip  Pain with axial load / log roll  extensor mechanism intact  Ankle NTTP full painless ROM  +sensation L2-S1  +dorsiflexion/plantarflexion of ankle/hallux  +dorsalis pedis pulse  Soft compartments, - calf tenderness        Secondary Exam: Benign, Skin intact, NTTP along axial spine, SILT throughout, motor grossly intact throughout, no other orthopedic injuries at this time, compartments soft and compressible        IMAGING:  XR / CT: depict Left greater trochanteric avulsion fracture with minimal displacement. Stem appears well fixed.      89y Female presents to  s/p fall from couch to carpeted floor today. Patient reports she has had bilateral THAs done by Dr. Durham 10 years ago. Reports she has been walking with walker. Reports since her fall today she has had LEFT knee pain. Denies numbness/tingling in the affected extremity. Denies head strike/LOC/other orthopedic injuries at this time.     PAST MEDICAL & SURGICAL HISTORY:  Blood pressure instability    Inguinal hernia, right    CHF (congestive heart failure)    Renal artery stenosis  right side, stent    Basal cell carcinoma    Mitral valve insufficiency, unspecified etiology  Cardiac cath on 18    Edema, unspecified type    Cerebrovascular accident (CVA), unspecified mechanism    Hyponatremia  ON HCTZ , h/o Hypokelemia    Dyslipidemia    Essential hypertension    S/P colonoscopy    Other elective surgery  right renal artery stent    H/O bilateral hip replacements  ,     History of cholecystectomy  1980    Status post hysterectomy  and bladder lift with mesh     S/P Mohs surgery for basal cell carcinoma      Home Medications:  cloNIDine 0.2 mg oral tablet: 1 tab(s) orally 3 times a day  HOLD for SBP &lt; 150   Keep SBP between  150- 180  (2020 21:05)  clopidogrel 75 mg oral tablet: 1 tab(s) orally once a day    daughter reports pt instructed to hold as per 19 by Dr Vila (2020 21:05)  losartan 50 mg oral tablet: 1 tab(s) orally 2 times a day  HOLD for SBP &lt;150  Keep -180 (2020 21:05)  Metoprolol Succinate ER 25 mg oral tablet, extended release: 1 tab(s) orally 2 times a day (2020 21:05)  pravastatin 20 mg oral tablet: 1 tab(s) orally once a day (2020 21:05)  torsemide 5 mg oral tablet: 1 tab(s) orally once a day (2020 21:05)    Allergies    IV Contrast (Pruritus; Rash)  Keflex (Other)  verapamil (Other)    Intolerances                              10.5   7.95  )-----------( 405      ( 2021 17:51 )             33.3           PT/INR - ( 2021 17:51 )   PT: 11.0 sec;   INR: 0.94 ratio         PTT - ( 2021 17:51 )  PTT:29.7 sec  Urinalysis Basic - ( 2021 18:21 )    Color: Yellow / Appearance: Clear / S.010 / pH: x  Gluc: x / Ketone: Negative  / Bili: Negative / Urobili: Negative mg/dL   Blood: x / Protein: 30 mg/dL / Nitrite: Positive   Leuk Esterase: Moderate / RBC: x / WBC x   Sq Epi: x / Non Sq Epi: x / Bacteria: x          Vital Signs Last 24 Hrs  T(C): 37.1 (2021 17:36), Max: 37.1 (2021 17:36)  T(F): 98.7 (2021 17:36), Max: 98.7 (2021 17:36)  HR: 99 (2021 17:36) (99 - 99)  BP: 189/113 (2021 17:36) (189/113 - 189/113)  BP(mean): --  RR: 18 (2021 17:36) (18 - 18)  SpO2: 100% (2021 17:36) (100% - 100%)    PHYSICAL EXAM  General: NAD, Awake and Alert    LLE:  skin intact, small superficial hematoma noted about the knee about the lateral joint line  TTP about knee and lateral hip over great trochanter  Pain with axial load / log roll  unable to SLR 2/2 pain  extensor mechanism intact  Ankle NTTP full painless ROM  +sensation L2-S1  +dorsiflexion/plantarflexion of ankle/hallux  +dorsalis pedis pulse  Soft compartments, - calf tenderness        Secondary Exam: Benign, Skin intact, NTTP along axial spine, SILT throughout, motor grossly intact throughout, no other orthopedic injuries at this time, compartments soft and compressible        IMAGING:  XR / CT: depict Left greater trochanteric avulsion fracture with minimal displacement. Stem appears well fixed.

## 2021-02-21 NOTE — ED PROVIDER NOTE - PROGRESS NOTE DETAILS
Madeleine HERRERA: Spoke with daughter who is emergency contact and agrees patient to be admitted as she cannot walk. PA: 88 y/o F with PMHx of CVA, CHF, HTN, HLD, BCC s/p Mohs, mitral valve insuff, R renal artery stenosis s/p stent placement, s/p cholecystectomy, s/p hysterectomy, and s/p b/l THR who presents to [facility] from home c/o +L hip pain s/p fall 3 days ago. Patient was sitting on chair reading when she fell asleep and subsequently fell off the chair onto carpet. Patient is unable to bear weight on LLE. ~Prem Partida PA-C   Patient seen and evaluated at bedside. Will get xrays, labs. Reassess. ~Prem Partida PA-C PA: Left hip xrays inconclusive, will get CT left hip. +UTI. +Allergic to Keflex, ordered Bactrim DS. ~Prem Partida PA-C  Ortho team already aware of patient's left hip injury, notified by Dr. Durham, in ED to evaluate. ~Prem Partida PA-C PA: Spoke with Ortho resident, prelim CT shows +avulsion fracture great troch, which is non-operable as per dr. Durham. Ok to dc from ortho standpoint. However, patient has a +UTI and unable to bear weight on LLE. Will admit patient. ~Prem Partida PA-C PA: Spoke with Dr. Riddle, will admit patient for PT eval and rehab, UTI. ~Prem Partida PA-C

## 2021-02-22 LAB
ALBUMIN SERPL ELPH-MCNC: 2.5 G/DL — LOW (ref 3.3–5)
ALP SERPL-CCNC: 102 U/L — SIGNIFICANT CHANGE UP (ref 40–120)
ALT FLD-CCNC: 33 U/L — SIGNIFICANT CHANGE UP (ref 12–78)
ANION GAP SERPL CALC-SCNC: 7 MMOL/L — SIGNIFICANT CHANGE UP (ref 5–17)
APTT BLD: 28.8 SEC — SIGNIFICANT CHANGE UP (ref 27.5–35.5)
AST SERPL-CCNC: 36 U/L — SIGNIFICANT CHANGE UP (ref 15–37)
BILIRUB SERPL-MCNC: 0.5 MG/DL — SIGNIFICANT CHANGE UP (ref 0.2–1.2)
BUN SERPL-MCNC: 32 MG/DL — HIGH (ref 7–23)
CALCIUM SERPL-MCNC: 8.8 MG/DL — SIGNIFICANT CHANGE UP (ref 8.5–10.1)
CHLORIDE SERPL-SCNC: 115 MMOL/L — HIGH (ref 96–108)
CO2 SERPL-SCNC: 23 MMOL/L — SIGNIFICANT CHANGE UP (ref 22–31)
CREAT SERPL-MCNC: 1.09 MG/DL — SIGNIFICANT CHANGE UP (ref 0.5–1.3)
GLUCOSE SERPL-MCNC: 111 MG/DL — HIGH (ref 70–99)
HCT VFR BLD CALC: 29.3 % — LOW (ref 34.5–45)
HCT VFR BLD CALC: 33.1 % — LOW (ref 34.5–45)
HGB BLD-MCNC: 10.1 G/DL — LOW (ref 11.5–15.5)
HGB BLD-MCNC: 9.2 G/DL — LOW (ref 11.5–15.5)
INR BLD: 1.02 RATIO — SIGNIFICANT CHANGE UP (ref 0.88–1.16)
MCHC RBC-ENTMCNC: 29.1 PG — SIGNIFICANT CHANGE UP (ref 27–34)
MCHC RBC-ENTMCNC: 29.3 PG — SIGNIFICANT CHANGE UP (ref 27–34)
MCHC RBC-ENTMCNC: 30.5 GM/DL — LOW (ref 32–36)
MCHC RBC-ENTMCNC: 31.4 GM/DL — LOW (ref 32–36)
MCV RBC AUTO: 93.3 FL — SIGNIFICANT CHANGE UP (ref 80–100)
MCV RBC AUTO: 95.4 FL — SIGNIFICANT CHANGE UP (ref 80–100)
PLATELET # BLD AUTO: 374 K/UL — SIGNIFICANT CHANGE UP (ref 150–400)
PLATELET # BLD AUTO: 411 K/UL — HIGH (ref 150–400)
POTASSIUM SERPL-MCNC: 4.1 MMOL/L — SIGNIFICANT CHANGE UP (ref 3.5–5.3)
POTASSIUM SERPL-SCNC: 4.1 MMOL/L — SIGNIFICANT CHANGE UP (ref 3.5–5.3)
PROT SERPL-MCNC: 5.5 GM/DL — LOW (ref 6–8.3)
PROTHROM AB SERPL-ACNC: 11.8 SEC — SIGNIFICANT CHANGE UP (ref 10.6–13.6)
RBC # BLD: 3.14 M/UL — LOW (ref 3.8–5.2)
RBC # BLD: 3.47 M/UL — LOW (ref 3.8–5.2)
RBC # FLD: 12.1 % — SIGNIFICANT CHANGE UP (ref 10.3–14.5)
RBC # FLD: 12.3 % — SIGNIFICANT CHANGE UP (ref 10.3–14.5)
SARS-COV-2 IGG SERPL QL IA: NEGATIVE — SIGNIFICANT CHANGE UP
SARS-COV-2 IGM SERPL IA-ACNC: 0.07 INDEX — SIGNIFICANT CHANGE UP
SARS-COV-2 RNA SPEC QL NAA+PROBE: SIGNIFICANT CHANGE UP
SODIUM SERPL-SCNC: 145 MMOL/L — SIGNIFICANT CHANGE UP (ref 135–145)
WBC # BLD: 11.56 K/UL — HIGH (ref 3.8–10.5)
WBC # BLD: 7.36 K/UL — SIGNIFICANT CHANGE UP (ref 3.8–10.5)
WBC # FLD AUTO: 11.56 K/UL — HIGH (ref 3.8–10.5)
WBC # FLD AUTO: 7.36 K/UL — SIGNIFICANT CHANGE UP (ref 3.8–10.5)

## 2021-02-22 PROCEDURE — 99223 1ST HOSP IP/OBS HIGH 75: CPT

## 2021-02-22 PROCEDURE — 12345: CPT | Mod: NC

## 2021-02-22 RX ORDER — TRAMADOL HYDROCHLORIDE 50 MG/1
25 TABLET ORAL EVERY 6 HOURS
Refills: 0 | Status: DISCONTINUED | OUTPATIENT
Start: 2021-02-22 | End: 2021-02-22

## 2021-02-22 RX ORDER — METOPROLOL TARTRATE 50 MG
25 TABLET ORAL
Refills: 0 | Status: DISCONTINUED | OUTPATIENT
Start: 2021-02-22 | End: 2021-02-22

## 2021-02-22 RX ORDER — LOSARTAN POTASSIUM 100 MG/1
25 TABLET, FILM COATED ORAL
Refills: 0 | Status: DISCONTINUED | OUTPATIENT
Start: 2021-02-22 | End: 2021-02-23

## 2021-02-22 RX ORDER — ENOXAPARIN SODIUM 100 MG/ML
30 INJECTION SUBCUTANEOUS DAILY
Refills: 0 | Status: DISCONTINUED | OUTPATIENT
Start: 2021-02-22 | End: 2021-02-23

## 2021-02-22 RX ORDER — HYDRALAZINE HCL 50 MG
25 TABLET ORAL EVERY 8 HOURS
Refills: 0 | Status: DISCONTINUED | OUTPATIENT
Start: 2021-02-22 | End: 2021-02-23

## 2021-02-22 RX ORDER — ACETAMINOPHEN 500 MG
1000 TABLET ORAL EVERY 6 HOURS
Refills: 0 | Status: DISCONTINUED | OUTPATIENT
Start: 2021-02-22 | End: 2021-02-23

## 2021-02-22 RX ORDER — METOPROLOL TARTRATE 50 MG
25 TABLET ORAL
Refills: 0 | Status: DISCONTINUED | OUTPATIENT
Start: 2021-02-22 | End: 2021-02-23

## 2021-02-22 RX ORDER — LOSARTAN POTASSIUM 100 MG/1
25 TABLET, FILM COATED ORAL
Refills: 0 | Status: DISCONTINUED | OUTPATIENT
Start: 2021-02-22 | End: 2021-02-22

## 2021-02-22 RX ORDER — MORPHINE SULFATE 50 MG/1
2 CAPSULE, EXTENDED RELEASE ORAL EVERY 6 HOURS
Refills: 0 | Status: DISCONTINUED | OUTPATIENT
Start: 2021-02-22 | End: 2021-02-22

## 2021-02-22 RX ORDER — TRAMADOL HYDROCHLORIDE 50 MG/1
25 TABLET ORAL EVERY 6 HOURS
Refills: 0 | Status: DISCONTINUED | OUTPATIENT
Start: 2021-02-22 | End: 2021-02-23

## 2021-02-22 RX ORDER — ATORVASTATIN CALCIUM 80 MG/1
10 TABLET, FILM COATED ORAL AT BEDTIME
Refills: 0 | Status: DISCONTINUED | OUTPATIENT
Start: 2021-02-22 | End: 2021-02-23

## 2021-02-22 RX ORDER — HYDRALAZINE HCL 50 MG
25 TABLET ORAL EVERY 8 HOURS
Refills: 0 | Status: DISCONTINUED | OUTPATIENT
Start: 2021-02-22 | End: 2021-02-22

## 2021-02-22 RX ORDER — CLOPIDOGREL BISULFATE 75 MG/1
75 TABLET, FILM COATED ORAL DAILY
Refills: 0 | Status: DISCONTINUED | OUTPATIENT
Start: 2021-02-22 | End: 2021-02-23

## 2021-02-22 RX ADMIN — ATORVASTATIN CALCIUM 10 MILLIGRAM(S): 80 TABLET, FILM COATED ORAL at 21:13

## 2021-02-22 RX ADMIN — Medication 1 TABLET(S): at 10:20

## 2021-02-22 RX ADMIN — Medication 1 TABLET(S): at 21:13

## 2021-02-22 RX ADMIN — Medication 0.2 MILLIGRAM(S): at 21:13

## 2021-02-22 RX ADMIN — Medication 25 MILLIGRAM(S): at 14:12

## 2021-02-22 RX ADMIN — Medication 0.2 MILLIGRAM(S): at 06:25

## 2021-02-22 RX ADMIN — Medication 25 MILLIGRAM(S): at 10:20

## 2021-02-22 RX ADMIN — Medication 10 MILLIGRAM(S): at 10:20

## 2021-02-22 RX ADMIN — CLOPIDOGREL BISULFATE 75 MILLIGRAM(S): 75 TABLET, FILM COATED ORAL at 10:20

## 2021-02-22 RX ADMIN — Medication 0.2 MILLIGRAM(S): at 14:13

## 2021-02-22 RX ADMIN — LOSARTAN POTASSIUM 25 MILLIGRAM(S): 100 TABLET, FILM COATED ORAL at 10:24

## 2021-02-22 RX ADMIN — Medication 25 MILLIGRAM(S): at 21:13

## 2021-02-22 RX ADMIN — LOSARTAN POTASSIUM 25 MILLIGRAM(S): 100 TABLET, FILM COATED ORAL at 21:13

## 2021-02-22 RX ADMIN — MORPHINE SULFATE 2 MILLIGRAM(S): 50 CAPSULE, EXTENDED RELEASE ORAL at 02:52

## 2021-02-22 RX ADMIN — Medication 25 MILLIGRAM(S): at 06:26

## 2021-02-22 RX ADMIN — ENOXAPARIN SODIUM 30 MILLIGRAM(S): 100 INJECTION SUBCUTANEOUS at 10:20

## 2021-02-22 NOTE — PATIENT PROFILE ADULT - FALL HARM RISK
age(85 years old or older) age(85 years old or older)/bones(Osteoporosis,prev fx,steroid use,metastatic bone ca)

## 2021-02-22 NOTE — H&P ADULT - ATTENDING COMMENTS
Patient seen and examined after initial evaluation above by family [facility] resident. Case discussed and reviewed in detail. Please note my plan below.     89 y/o F w/ PMH of CVA, CHF, HTN, dyslipidemia, BCC s/p Mohs, mitral valve insufficiency, R renal artery stenosis s/p stent placement, p/w fall. Found to have L periprosthetic greater trochanteric fracture.    *L periprosthetic greater trochanteric fracture s/p fall  -Patient evaluated by Ortho, states no indication for operative fixation at this time  -Pain control   -PT consult     *UTI  -Patient started on bactrim in ED, will continue  -F/u urine cx    *H/o CVA / CHF / HTN / dyslipidemia / BCC / mitral valve insufficiency / R renal artery stenosis   -C/w home meds and f/u outpatient for further management     *DVT ppx  -Lovenox Patient seen and examined after initial evaluation above by family [facility] resident. Case discussed and reviewed in detail. Please note my plan below.     88 y/o F w/ PMH of CVA, CHF, HTN, dyslipidemia, BCC s/p Mohs, mitral valve insufficiency, R renal artery stenosis s/p stent placement, p/w fall. Found to have L periprosthetic greater trochanteric fracture.    *L periprosthetic greater trochanteric fracture s/p fall   -Patient evaluated by Ortho, states no indication for operative fixation at this time  -Pain control   -PT consult     *UTI  -Patient started on bactrim in ED, will continue  -F/u urine cx    *H/o CVA / CHF / HTN / dyslipidemia / BCC / mitral valve insufficiency / R renal artery stenosis   -C/w home meds and f/u outpatient for further management     *DVT ppx  -Lovenox

## 2021-02-22 NOTE — PHYSICAL THERAPY INITIAL EVALUATION ADULT - IMPAIRMENTS CONTRIBUTING TO GAIT DEVIATIONS, PT EVAL
due to reactivity/ pain associated with muscle activation during limb advancement while in supine/ performing bed mobility/pain/impaired postural control/decreased strength

## 2021-02-22 NOTE — H&P ADULT - NSHPPHYSICALEXAM_GEN_ALL_CORE
Vitals  T(F): 98.2 (02-21-21 @ 22:15), Max: 98.7 (02-21-21 @ 17:36)  HR: 76 (02-21-21 @ 23:32) (76 - 99)  BP: 166/70 (02-21-21 @ 23:32) (166/70 - 198/93)  RR: 19 (02-21-21 @ 23:32) (17 - 19)  SpO2: 100% (02-21-21 @ 23:32) (96% - 100%)    Physical Exam   Gen: NAD, immobile due to pain  HENT: atraumatic head and ears, no gross abnormalities of ears, mucous membranes moist, no oral lesions, neck supple without masses/goiter/lymphadenopathy  CV: RRR, nl s1/s2, no M/R/G  Pulm: nl respiratory effort, CTAB, no wheezes/crackles/rhonchi  Back: no scoliosis, lordosis, or kyphosis, no tenderness  Abd: normoactive bowel sounds in all 4 quadrants, soft, nontender, nondistended, no rebound, no guarding, no masses  Extremities: tenderness to palpation of left hip, unable to lift against gravity, no pedal edema, pedal pulses palpable   Skin: nl warm and dry, no wounds   Neuro: A&Ox3, answering questions appropriately, PERRL, EOMI, face symmetric, sensation equal bilaterally in face, tongue midline, no dysarthria, 5/5 strength in upper and lower extremities bilaterally, sensation intact in upper and lower extremities bilaterally, nl finger to nose, and nl heel to shin   Pysch: no depression, no SI, no HI

## 2021-02-22 NOTE — PHYSICAL THERAPY INITIAL EVALUATION ADULT - GENERAL OBSERVATIONS, REHAB EVAL
The pt was received on 2N, supine, pleasant and cooperative with PT. The pt agreed to participate in PT evaluation in order to get OOB and ambulate and to sit up in the bedside chair.

## 2021-02-22 NOTE — PATIENT PROFILE ADULT - OVER THE PAST TWO WEEKS HAVE YOU FELT DOWN, DEPRESSED OR HOPELESS?
"Chief Complaint   Patient presents with     Physical     Fasting Px       Initial /78  Pulse 78  Temp 97.9  F (36.6  C) (Oral)  Ht 6' 2.25\" (1.886 m)  Wt 243 lb (110.2 kg)  SpO2 97%  BMI 30.99 kg/m2 Estimated body mass index is 30.99 kg/(m^2) as calculated from the following:    Height as of this encounter: 6' 2.25\" (1.886 m).    Weight as of this encounter: 243 lb (110.2 kg).  Medication Reconciliation: complete   Obdulia Kamara MA      "
no

## 2021-02-22 NOTE — PHYSICAL THERAPY INITIAL EVALUATION ADULT - PERTINENT HX OF CURRENT PROBLEM, REHAB EVAL
89 y/o F with PMHx of CVA, CHF, HTN, HLD, BCC s/p Mohs, mitral valve insuff, R renal artery stenosis s/p stent placement, s/p cholecystectomy, s/p hysterectomy, and s/p b/l THR BIBEMS to the ED due to left hip pain 2/2 fall 3 days ago. Pt states that 3 days ago, she fell asleep on her chair and fell onto carpeted floor.  C/O painful left hip & LLE pain for the past 2 days, unable to walk or bear weight and daughter decided to call ambulance.

## 2021-02-22 NOTE — H&P ADULT - ASSESSMENT
89 y/o F with PMHx of CVA, CHF, HTN, HLD, BCC s/p Mohs, mitral valve insuff, R renal artery stenosis s/p stent placement, s/p cholecystectomy, s/p hysterectomy, and s/p b/l THR BIBEMS to the ED due to left hip pain 2/2 fall 3 days ago. Unwitnessed fall onto carpet, CT imaging of bony pelvis reveals acute appearing fracture of the base of the greater trochanter of the left femur. Ortho was consulted and evaluated patient, no surgical intervention at this time.     #Avulsion fracture of left greater trochanter  -Appreciate Ortho consult, no surgical intervention at this time, F/U outpt  -PT consult  -Pain management    #Abnormal U/A  -+ LE, nitrites & bacteria in UA, however, pt asymptomatic  -No antibiotic treatment required at this time  -Encourage PO hydration and continue to monitor  -F/U Urine culture    #Hx of CVA, HTN, HLD, R renal artery stenosis s/p stent  -Continue home meds w hold parameters, SBP range 150-180     -Hydralazine 25mg q8H     -Clopidogrel 75mg      -Clonidine 0.2mg TID     -Losartan 50mg BID     -Pravastatin 20mg     -Metoprolol succinate ER 25mg BID     -Torsemide 10mg     #Diet  -DASH diet    #DVT PPX  -IMPROVE VTE Individual Risk Assessment    RISK                                                                Points    [  ] Previous VTE                                                  3    [  ] Thrombophilia                                               2    [ 2 ] Lower limb paralysis                                      2        (unable to hold up >15 seconds)      [  ] Current Cancer                                              2         (within 6 months)    [ 1 ] Immobilization > 24 hrs                                1    [  ] ICU/CCU stay > 24 hours                              1    [ 1 ] Age > 60                                                      1    IMPROVE VTE Score ____4_____    IMPROVE Score 0-1: Low Risk, No VTE prophylaxis required for most patients, encourage ambulation.   IMPROVE Score 2-3: At risk, pharmacologic VTE prophylaxis is indicated for most patients (in the absence of a contraindication)  IMPROVE Score > or = 4: High Risk, pharmacologic VTE prophylaxis is indicated for most patients (in the absence of a contraindication)    -Lovenox ppx    #Advanced Directives  -FULL CODE    #Dispo Planning  -Appropriate pain management and PT recommendations    *Above discussed w Dr. Barnard       89 y/o F with PMHx of CVA, CHF, HTN, HLD, BCC s/p Mohs, mitral valve insuff, R renal artery stenosis s/p stent placement, s/p cholecystectomy, s/p hysterectomy, and s/p b/l THR BIBEMS to the ED due to left hip pain 2/2 fall 3 days ago. Unwitnessed fall onto carpet, CT imaging of bony pelvis reveals acute appearing fracture of the base of the greater trochanter of the left femur. Ortho was consulted and evaluated patient, no surgical intervention at this time.     #Avulsion fracture of left greater trochanter  -Appreciate Ortho consult, no surgical intervention at this time, F/U outpt  -PT consult  -Pain management    #UTI  -+ LE, nitrites & bacteria in UA, however, pt asymptomatic  -Bactrim 160/800 BID, day 1/3  -Encourage PO hydration and continue to monitor  -F/U Urine culture    #Hx of CVA, HTN, HLD, R renal artery stenosis s/p stent  -Continue home meds w hold parameters     -Hydralazine 25mg q8H     -Clopidogrel 75mg      -Clonidine 0.2mg TID     -Losartan 50mg BID     -Pravastatin 20mg     -Metoprolol succinate ER 25mg BID     -Torsemide 10mg     #Diet  -DASH diet    #DVT PPX  -IMPROVE VTE Individual Risk Assessment    RISK                                                                Points    [  ] Previous VTE                                                  3    [  ] Thrombophilia                                               2    [ 2 ] Lower limb paralysis                                      2        (unable to hold up >15 seconds)      [  ] Current Cancer                                              2         (within 6 months)    [ 1 ] Immobilization > 24 hrs                                1    [  ] ICU/CCU stay > 24 hours                              1    [ 1 ] Age > 60                                                      1    IMPROVE VTE Score ____4_____    IMPROVE Score 0-1: Low Risk, No VTE prophylaxis required for most patients, encourage ambulation.   IMPROVE Score 2-3: At risk, pharmacologic VTE prophylaxis is indicated for most patients (in the absence of a contraindication)  IMPROVE Score > or = 4: High Risk, pharmacologic VTE prophylaxis is indicated for most patients (in the absence of a contraindication)    -Lovenox 30 ppx    #Advanced Directives  -FULL CODE    #Dispo Planning  -Appropriate pain management and PT recommendations    *Above discussed w Dr. Argueta

## 2021-02-22 NOTE — PROGRESS NOTE ADULT - SUBJECTIVE AND OBJECTIVE BOX
HOSPITALIST ATTENDING PROGRESS NOTE    Chart and meds reviewed.  Patient seen and examined.    CC: fall    Subjective:  21: Admitted overnight for fall, found to have L periprosthetic greater trochanteric fracture, no operative plan per ortho, rec'd for PT and pain control. UA+, pt reports that she doesn't often have classic UTI sx. On Bactrim, planned for 3d course. Reports walks with cane at home. Reports pain when she tries to get up.    All 10 systems reviewed and found to be negative with the exception of what has been described above.    MEDICATIONS  (STANDING):  atorvastatin 10 milliGRAM(s) Oral at bedtime  cloNIDine 0.2 milliGRAM(s) Oral three times a day  clopidogrel Tablet 75 milliGRAM(s) Oral daily  enoxaparin Injectable 30 milliGRAM(s) SubCutaneous daily  hydrALAZINE 25 milliGRAM(s) Oral every 8 hours  losartan 25 milliGRAM(s) Oral two times a day  metoprolol succinate ER 25 milliGRAM(s) Oral two times a day  torsemide 10 milliGRAM(s) Oral daily  trimethoprim  160 mG/sulfamethoxazole 800 mG 1 Tablet(s) Oral two times a day    MEDICATIONS  (PRN):  acetaminophen   Tablet .. 1000 milliGRAM(s) Oral every 6 hours PRN Moderate Pain (4 - 6)  morphine  - Injectable 2 milliGRAM(s) IV Push every 6 hours PRN Severe Pain (7 - 10)      VITALS:  T(F): 97.9 (21 @ 09:11), Max: 98.7 (21 @ 17:36)  HR: 82 (21 @ 09:11) (76 - 99)  BP: 120/47 (21 @ 09:11) (120/47 - 198/93)  RR: 16 (21 @ 09:11) (16 - 19)  SpO2: 97% (21 @ 09:11) (94% - 100%)  Wt(kg): --    I&O's Summary    2021 07:01  -  2021 15:04  --------------------------------------------------------  IN: 240 mL / OUT: 1 mL / NET: 239 mL        CAPILLARY BLOOD GLUCOSE          PHYSICAL EXAM:    HEENT:  pupils equal and reactive, EOMI, no oropharyngeal lesions, erythema, exudates, oral thrush  NECK:   supple, no carotid bruits, no palpable lymph nodes, no thyromegaly  CV:  +S1, +S2, regular, no murmurs or rubs  RESP:   lungs clear to auscultation bilaterally, no wheezing, rales, rhonchi, good air entry bilaterally  BREAST:  not examined  GI:  abdomen soft, non-tender, non-distended, normal BS, no bruits, no abdominal masses, no palpable masses  RECTAL:  not examined  :  not examined  MSK:   normal muscle tone, no atrophy, no rigidity, no contractions  EXT:  no clubbing, no cyanosis, no edema, no calf pain, swelling or erythema  VASCULAR:  pulses equal and symmetric in the upper and lower extremities  NEURO:  AAOX3, no focal neurological deficits, follows all commands, able to move extremities spontaneously  SKIN:  no ulcers, lesions or rashes    LABS:                            9.2    7.36  )-----------( 374      ( 2021 07:05 )             29.3     02-22    145  |  115<H>  |  32<H>  ----------------------------<  111<H>  4.1   |  23  |  1.09    Ca    8.8      2021 07:05    TPro  5.5<L>  /  Alb  2.5<L>  /  TBili  0.5  /  DBili  x   /  AST  36  /  ALT  33  /  AlkPhos  102  02-22    CARDIAC MARKERS ( 2021 17:51 )  x     / x     / 87 U/L / x     / x          LIVER FUNCTIONS - ( 2021 07:05 )  Alb: 2.5 g/dL / Pro: 5.5 gm/dL / ALK PHOS: 102 U/L / ALT: 33 U/L / AST: 36 U/L / GGT: x           PT/INR - ( 2021 07:05 )   PT: 11.8 sec;   INR: 1.02 ratio         PTT - ( 2021 07:05 )  PTT:28.8 sec  Urinalysis Basic - ( 2021 18:21 )    Color: Yellow / Appearance: Clear / S.010 / pH: x  Gluc: x / Ketone: Negative  / Bili: Negative / Urobili: Negative mg/dL   Blood: x / Protein: 30 mg/dL / Nitrite: Positive   Leuk Esterase: Moderate / RBC: 3-5 /HPF / WBC >50   Sq Epi: x / Non Sq Epi: Few / Bacteria: Many          Blood, Urine: Trace ( @ 18:21)    CULTURES:  Blood, Urine: Trace ( @ 18:21)  UCx pending    Additional results/Imaging:  CT bony pelvis 21: Acute mildly displaced left greater trochanteric periprosthetic fracture which extends to the posterior intertrochanteric region as above. Left-sided inguinal hernia containing a loop of nonobstructed bowel.    Xray tib fib Left, 21: IMPRESSION: No acute bony pathology. Note - This does not exclude fractures in perfect alignment and apposition as presence may be indicated at one to three weeks following callus formation.    Xray knee, left 21: IMPRESSION: No acute bony pathology. Note - This does not exclude fractures in perfect alignment and apposition as presence may be indicated at one to three weeksfollowing callus formation.

## 2021-02-22 NOTE — PHYSICAL THERAPY INITIAL EVALUATION ADULT - ADDITIONAL COMMENTS
as per HPI:  pt lives alone and walks w a walker, does not have home health aid or assistance w ADL's.

## 2021-02-22 NOTE — H&P ADULT - HISTORY OF PRESENT ILLNESS
89 y/o F with PMHx of CVA, CHF, HTN, HLD, BCC s/p Mohs, mitral valve insuff, R renal artery stenosis s/p stent placement, s/p cholecystectomy, s/p hysterectomy, and s/p b/l THR BIBEMS to the ED due to left hip pain 2/2 fall 3 days ago. Pt states that 3 days ago, she fell asleep on her chair and fell onto carpeted floor. Denies hitting head, loss of consciousness, syncope. Pt remembers fall, fall was unwitnessed, pt lives alone and walks w a walker, does not have home health aid or assistance w ADL's. C/O painful left hip & LLE pain for the past 2 days, unable to walk or bear weight and daughter decided to call ambulance. Pain aggravated by movement and palpation. Denies dizziness, chest pain, HA or SOB.   In ED, pt was given Bactrim due to LE, nitrites and bacteria in UA although pt denies urinary sx. Given 1L bolus NS, clonidine due to /113 and morphine for pain.   Last admission Jan/06/21 due to fall.

## 2021-02-22 NOTE — PHYSICAL THERAPY INITIAL EVALUATION ADULT - DIAGNOSIS, PT EVAL
89 y.o. female s/p mall with resultant Avulsion fracture of left greater trochanter - non surgical management

## 2021-02-22 NOTE — PROGRESS NOTE ADULT - ASSESSMENT
88 y/o F with PMHx of CVA, CHF, HTN, HLD, BCC s/p Mohs, mitral valve insuff, R renal artery stenosis s/p stent placement, s/p cholecystectomy, s/p hysterectomy, and s/p b/l THR BIBEMS to the ED due to left hip pain 2/2 fall 3 days ago, CT imaging of bony pelvis reveals acute appearing fracture of the base of the greater trochanter of the left femur. Ortho was consulted and evaluated patient, no surgical intervention at this time.     #Avulsion fracture of left greater trochanter  -Appreciate Ortho consult, no surgical intervention at this time, F/U outpt with Dr. Durham in 1-2wks  -PT consult, awaiting recs  -Pain management  -given fall, CPK checked, nl at 87    #UTI  -+ LE, nitrites & bacteria in UA, however, pt asymptomatic however given fall will treat  -Bactrim 160/800 BID, day 1/3  -Encourage PO hydration and continue to monitor  -F/U Urine culture    #Hx of CVA, HTN, HLD, R renal artery stenosis s/p stent  -Continue home meds w hold parameters     -Hydralazine 25mg q8H     -Clopidogrel 75mg      -Clonidine 0.2mg TID     -Losartan 50mg BID     -Pravastatin 20mg     -Metoprolol succinate ER 25mg BID     -Torsemide 10mg     #Diet  -DASH diet    #DVT PPX -Lovenox 30 ppx    #Advanced Directives  -FULL CODE    #Dispo Planning  -Pending PT recs  -Brittney ferris, [phone]  -PCP/cards: Rafa Vila

## 2021-02-23 ENCOUNTER — TRANSCRIPTION ENCOUNTER (OUTPATIENT)
Age: 86
End: 2021-02-23

## 2021-02-23 VITALS
TEMPERATURE: 98 F | OXYGEN SATURATION: 97 % | SYSTOLIC BLOOD PRESSURE: 157 MMHG | DIASTOLIC BLOOD PRESSURE: 54 MMHG | HEART RATE: 82 BPM | RESPIRATION RATE: 16 BRPM

## 2021-02-23 DIAGNOSIS — S72.002A FRACTURE OF UNSPECIFIED PART OF NECK OF LEFT FEMUR, INITIAL ENCOUNTER FOR CLOSED FRACTURE: ICD-10-CM

## 2021-02-23 DIAGNOSIS — I10 ESSENTIAL (PRIMARY) HYPERTENSION: ICD-10-CM

## 2021-02-23 DIAGNOSIS — I38 ENDOCARDITIS, VALVE UNSPECIFIED: ICD-10-CM

## 2021-02-23 DIAGNOSIS — I70.1 ATHEROSCLEROSIS OF RENAL ARTERY: ICD-10-CM

## 2021-02-23 PROCEDURE — 99222 1ST HOSP IP/OBS MODERATE 55: CPT

## 2021-02-23 PROCEDURE — 99239 HOSP IP/OBS DSCHRG MGMT >30: CPT

## 2021-02-23 RX ORDER — TRAMADOL HYDROCHLORIDE 50 MG/1
1 TABLET ORAL
Qty: 20 | Refills: 0
Start: 2021-02-23

## 2021-02-23 RX ORDER — LOSARTAN POTASSIUM 100 MG/1
1 TABLET, FILM COATED ORAL
Qty: 0 | Refills: 0 | DISCHARGE
Start: 2021-02-23

## 2021-02-23 RX ORDER — LOSARTAN POTASSIUM 100 MG/1
1 TABLET, FILM COATED ORAL
Qty: 0 | Refills: 0 | DISCHARGE

## 2021-02-23 RX ORDER — ACETAMINOPHEN 500 MG
2 TABLET ORAL
Qty: 0 | Refills: 0 | DISCHARGE
Start: 2021-02-23

## 2021-02-23 RX ADMIN — Medication 25 MILLIGRAM(S): at 05:28

## 2021-02-23 RX ADMIN — Medication 0.2 MILLIGRAM(S): at 05:28

## 2021-02-23 RX ADMIN — Medication 25 MILLIGRAM(S): at 10:00

## 2021-02-23 RX ADMIN — Medication 0.2 MILLIGRAM(S): at 13:26

## 2021-02-23 RX ADMIN — ENOXAPARIN SODIUM 30 MILLIGRAM(S): 100 INJECTION SUBCUTANEOUS at 10:00

## 2021-02-23 RX ADMIN — Medication 1 TABLET(S): at 10:00

## 2021-02-23 RX ADMIN — Medication 10 MILLIGRAM(S): at 10:00

## 2021-02-23 RX ADMIN — Medication 25 MILLIGRAM(S): at 13:26

## 2021-02-23 RX ADMIN — CLOPIDOGREL BISULFATE 75 MILLIGRAM(S): 75 TABLET, FILM COATED ORAL at 10:00

## 2021-02-23 RX ADMIN — LOSARTAN POTASSIUM 25 MILLIGRAM(S): 100 TABLET, FILM COATED ORAL at 10:00

## 2021-02-23 NOTE — CONSULT NOTE ADULT - SUBJECTIVE AND OBJECTIVE BOX
CHIEF COMPLAINT: fell from chair     HPI:  88 y/o F with PMHx of CVA, CHF, HTN, HLD, BCC s/p Mohs, mitral valve insuff, R renal artery stenosis s/p stent placement, s/p cholecystectomy, s/p hysterectomy, and s/p b/l THR BIBEMS to the ED due to left hip pain 2/2 fall 3 days ago. Pt states that 3 days ago, she fell asleep on her chair and fell onto carpeted floor. Denies hitting head, loss of consciousness, syncope. Pt remembers fall, fall was unwitnessed, pt lives alone and walks w a walker, does not have home health aid or assistance w ADL's. C/O painful left hip & LLE pain for the past 2 days, unable to walk or bear weight and daughter decided to call ambulance. Pain aggravated by movement and palpation. Denies dizziness, chest pain, HA or SOB.   In ED, pt was given Bactrim due to LE, nitrites and bacteria in UA although pt denies urinary sx. Given 1L bolus NS, clonidine due to /113 and morphine for pain.     Patient feeling well ,  denies any sob or chest pain , denies any syncopal episodes , patient  blood pressure is improved on medication           PAST MEDICAL & SURGICAL HISTORY:  Blood pressure instability    Inguinal hernia, right    CHF (congestive heart failure)    Renal artery stenosis renal artery stent   right side, stent    Basal cell carcinoma    Mitral valve insufficiency, unspecified etiology  Cardiac cath on 11/21/18    Edema, unspecified type    Cerebrovascular accident (CVA), unspecified mechanism    Hyponatremia  ON HCTZ , h/o Hypokelemia    Dyslipidemia    Essential hypertension    S/P colonoscopy    Other elective surgery  right renal artery stent    H/O bilateral hip replacements  2013, 2014    History of cholecystectomy  1980    Status post hysterectomy  and bladder lift with mesh 1990s    S/P Mohs surgery for basal cell carcinoma        Allergies    IV Contrast (Pruritus; Rash)  Keflex (Other)  verapamil (Other)    Intolerances        SOCIAL HISTORY: non smoker     FAMILY HISTORY:  Family history of carotid artery stenosis (Sibling)    Family history of uterine cancer (Sibling)        MEDICATIONS:  MEDICATIONS  (STANDING):  atorvastatin 10 milliGRAM(s) Oral at bedtime  cloNIDine 0.2 milliGRAM(s) Oral three times a day  clopidogrel Tablet 75 milliGRAM(s) Oral daily  enoxaparin Injectable 30 milliGRAM(s) SubCutaneous daily  hydrALAZINE 25 milliGRAM(s) Oral every 8 hours  losartan 25 milliGRAM(s) Oral two times a day  metoprolol succinate ER 25 milliGRAM(s) Oral two times a day  torsemide 10 milliGRAM(s) Oral daily  trimethoprim  160 mG/sulfamethoxazole 800 mG 1 Tablet(s) Oral two times a day    MEDICATIONS  (PRN):  acetaminophen   Tablet .. 1000 milliGRAM(s) Oral every 6 hours PRN Moderate Pain (4 - 6)  traMADol 25 milliGRAM(s) Oral every 6 hours PRN Severe Pain (7 - 10)      REVIEW OF SYSTEMS:    CONSTITUTIONAL: No weakness, fevers or chills  EYES/ENT: No visual changes;  No vertigo or throat pain   NECK: No pain or stiffness  RESPIRATORY: No cough, wheezing, hemoptysis; No shortness of breath  CARDIOVASCULAR: No chest pain or palpitations  GASTROINTESTINAL: No abdominal or epigastric pain. No nausea, vomiting, or hematemesis; No diarrhea or constipation. No melena or hematochezia.  GENITOURINARY: No dysuria, frequency or hematuria  NEUROLOGICAL: No numbness or weakness  SKIN: No itching, burning, rashes, or lesions   All other review of systems is negative unless indicated above    Vital Signs Last 24 Hrs  T(C): 36.4 (23 Feb 2021 08:53), Max: 36.7 (22 Feb 2021 20:42)  T(F): 97.5 (23 Feb 2021 08:53), Max: 98.1 (22 Feb 2021 20:42)  HR: 82 (23 Feb 2021 08:53) (72 - 98)  BP: 157/54 (23 Feb 2021 08:53) (134/49 - 173/62)  BP(mean): 82 (23 Feb 2021 08:53) (82 - 82)  RR: 16 (23 Feb 2021 08:53) (16 - 18)  SpO2: 97% (23 Feb 2021 08:53) (95% - 98%)    I&O's Summary    22 Feb 2021 07:01  -  23 Feb 2021 07:00  --------------------------------------------------------  IN: 240 mL / OUT: 301 mL / NET: -61 mL        PHYSICAL EXAM:    Constitutional: NAD, awake and alert, well-developed  HEENT: PERR, EOMI,  No oral cyananosis.  Neck:  supple,  No JVD  Respiratory: Breath sounds are clear bilaterally, No wheezing, rales or rhonchi  Cardiovascular: S1 and S2, regular rate and rhythm,   Gastrointestinal: Bowel Sounds present, soft, nontender.   Extremities: No peripheral edema. No clubbing or cyanosis.  Vascular: 2+ peripheral pulses  Neurological: A/O x 3, no focal deficits  Musculoskeletal:  left hisp tender ,   Skin:  chronic stasis changes     LABS: All Labs Reviewed:                        10.1   11.56 )-----------( 411      ( 22 Feb 2021 16:55 )             33.1                         9.2    7.36  )-----------( 374      ( 22 Feb 2021 07:05 )             29.3                         10.5   7.95  )-----------( 405      ( 21 Feb 2021 17:51 )             33.3     22 Feb 2021 07:05    145    |  115    |  32     ----------------------------<  111    4.1     |  23     |  1.09   21 Feb 2021 17:51    140    |  110    |  44     ----------------------------<  141    3.9     |  22     |  1.29     Ca    8.8        22 Feb 2021 07:05  Ca    9.1        21 Feb 2021 17:51    TPro  5.5    /  Alb  2.5    /  TBili  0.5    /  DBili  x      /  AST  36     /  ALT  33     /  AlkPhos  102    22 Feb 2021 07:05    PT/INR - ( 22 Feb 2021 07:05 )   PT: 11.8 sec;   INR: 1.02 ratio         PTT - ( 22 Feb 2021 07:05 )  PTT:28.8 sec  CARDIAC MARKERS ( 21 Feb 2021 17:51 )  x     / x     / 87 U/L / x     / x          Blood Culture: Organism --  Gram Stain Blood -- Gram Stain --  Specimen Source .Urine None  Culture-Blood --            RADIOLOGY/EKG:< from: 12 Lead ECG (02.21.21 @ 23:40) >  Normal sinus rhythm  Minimal voltage criteria for LVH, may be normal variant  Nonspecific ST and T wave abnormality  Abnormal ECG  When compared with ECG of 21-FEB-2021 23:38,  No significant change was found  Confirmed by HARSHA HERRERA, NIRAV GOETZU ([id number]) on 2/22/2021 9:37:24 AM    < end of copied text >      e< from: Transthoracic Echocardiogram (12.14.18 @ 09:43) >  Conclusions:  1. Mild mitral annular calcification. Mitral annular  dilatation with tethered mitral valve leaflets with normal  opening. Mild-moderate mitral regurgitation.  2. Thickened trileaflet aortic valve with normal opening.  Mild aortic regurgitation.  3. Eccentric left ventricular hypertrophy (dilated left  ventricle with normal relative wall thickness).  4. Mild global left ventricular systolic dysfunction.  5. Mild diastolic dysfunction (Stage I).  6. Normal right ventricular size and function.  7. Right pleural effusion.    < end of copied text >  < from: Cardiac Cath Lab - Adult (11.21.18 @ 11:47) >     Impression     Diagnostic Conclusions     No significant CAD     Recommendations     Flash pulmonary edema not on an ischemic basis. Consider hypertension, MR   or BOB.    Estimated Blood Loss:6ml.     Signatures    < end of copied text >  < from: Cardiac Cath Lab - Adult (12.18.18 @ 15:48) >  --  Hemostasis with Perclose-Intervention.  --  Intervention on right renal: stent.    < end of copied text >

## 2021-02-23 NOTE — DISCHARGE NOTE PROVIDER - NSDCMRMEDTOKEN_GEN_ALL_CORE_FT
acetaminophen 500 mg oral tablet: 2 tab(s) orally every 6 hours, As needed, Moderate Pain (4 - 6)  cloNIDine 0.2 mg oral tablet: 1 tab(s) orally 3 times a day  HOLD for SBP &lt; 150   Keep SBP between  150- 180   clopidogrel 75 mg oral tablet: 1 tab(s) orally once a day    daughter reports pt instructed to hold as per 8/22/19 by Dr Vila  hydrALAZINE 25 mg oral tablet: 1 tab(s) orally every 8 hours  losartan 25 mg oral tablet: 1 tab(s) orally 2 times a day  Metoprolol Succinate ER 25 mg oral tablet, extended release: 1 tab(s) orally 2 times a day  pravastatin 20 mg oral tablet: 1 tab(s) orally once a day  sulfamethoxazole-trimethoprim 800 mg-160 mg oral tablet: 1 tab(s) orally 2 times a day  torsemide 10 mg oral tablet: 1 tab(s) orally once a day  traMADol 50 mg oral tablet: 1 tab(s) orally every 6 hours, As Needed -Severe Pain (7 - 10) MDD:4 tabs

## 2021-02-23 NOTE — DISCHARGE NOTE PROVIDER - NSDCCPCAREPLAN_GEN_ALL_CORE_FT
PRINCIPAL DISCHARGE DIAGNOSIS  Diagnosis: Hip fracture, left  Assessment and Plan of Treatment: physical therapy/pain meds as prescribed.   follow up with dr. urena in 1 week.      SECONDARY DISCHARGE DIAGNOSES  Diagnosis: UTI (urinary tract infection)  Assessment and Plan of Treatment: take bactrim as prescribed

## 2021-02-23 NOTE — CONSULT NOTE ADULT - PROBLEM SELECTOR RECOMMENDATION 9
elevated blood pressure possibly  due to pain which  is improved , continue low salt diet and current prescribed medication    cardiac wise stable , advise to follow up in office dr gonzalez

## 2021-02-23 NOTE — DISCHARGE NOTE PROVIDER - HOSPITAL COURSE
88 y/o F with PMHx of CVA, CHF, HTN, HLD, BCC s/p Mohs, mitral valve insuff, R renal artery stenosis s/p stent placement, s/p cholecystectomy, s/p hysterectomy, and s/p b/l THR BIBEMS to the ED due to left hip pain 2/2 fall 3 days ago, CT imaging of bony pelvis reveals acute appearing fracture of the base of the greater trochanter of the left femur. Ortho was consulted and evaluated patient, no surgical intervention at this time. She was admitted for further mx. Started on bactrim for UTI. She was seen by physical therapy and would like to go home.   she will be discharged home today. Pain is controlled on current meds.     Vital Signs Last 24 Hrs  T(C): 36.4 (2021 08:53), Max: 36.7 (2021 20:42)  T(F): 97.5 (2021 08:53), Max: 98.1 (2021 20:42)  HR: 82 (2021 08:53) (72 - 98)  BP: 157/54 (2021 08:53) (134/49 - 173/62)  BP(mean): 82 (2021 08:53) (82 - 82)  RR: 16 (2021 08:53) (16 - 18)  SpO2: 97% (2021 08:53) (95% - 98%)    PHYSICAL EXAM:    GENERAL: Comfortable, no acute distress   HEAD:  Normocephalic, atraumatic  EYES: EOMI, PERRLA  HEENT: Moist mucous membranes  NECK: Supple, No JVD  NERVOUS SYSTEM:  Alert & Oriented X3, non focal  CHEST/LUNG: Clear to auscultation bilaterally  HEART: Regular rate and rhythm  ABDOMEN: Soft, Nontender, Nondistended, Bowel sounds present  GENITOURINARY: Voiding, no palpable bladder  EXTREMITIES:   No clubbing, cyanosis, or edema  MUSCULOSKELTAL- Left hip pain with movement.  SKIN-no rash  LABS:                        10.1   11.56 )-----------( 411      ( 2021 16:55 )             33.1     02-22    145  |  115<H>  |  32<H>  ----------------------------<  111<H>  4.1   |  23  |  1.09    Ca    8.8      2021 07:05    TPro  5.5<L>  /  Alb  2.5<L>  /  TBili  0.5  /  DBili  x   /  AST  36  /  ALT  33  /  AlkPhos  102  02-    PT/INR - ( 2021 07:05 )   PT: 11.8 sec;   INR: 1.02 ratio    PTT - ( 2021 07:05 )  PTT:28.8 sec  Urinalysis Basic - ( 2021 18:21 )  Color: Yellow / Appearance: Clear / S.010 / pH: x  Gluc: x / Ketone: Negative  / Bili: Negative / Urobili: Negative mg/dL   Blood: x / Protein: 30 mg/dL / Nitrite: Positive   Leuk Esterase: Moderate / RBC: 3-5 /HPF / WBC >50   Sq Epi: x / Non Sq Epi: Few / Bacteria: Many     CT Pelvis Bony Only No Cont (21 @ 20:57) >  IMPRESSION:  Acute mildly displaced left greater trochanteric periprosthetic fracture which extends to the posterior intertrochanteric region as above.  Left-sided inguinal hernia containing a loop of nonobstructed bowel.  FINAL DIAGNOSIS:  #Avulsion fracture of left greater trochanter-NON OPERATIVE MX PER ORTHO AT THIS TIME. NEEDS OUTPT F/U IN 1-2 WEEKS.  #UTI  #Hx of CVA, HTN, HLD, R renal artery stenosis s/p stent    time taken for dc 42 min  d/w pt  pcp group following.

## 2021-02-23 NOTE — DISCHARGE NOTE PROVIDER - PROVIDER TOKENS
PROVIDER:[TOKEN:[[id number]],FOLLOWUP:[1 week]],PROVIDER:[TOKEN:[[id number]],FOLLOWUP:[1 week]]

## 2021-02-23 NOTE — DISCHARGE NOTE PROVIDER - CARE PROVIDER_API CALL
Christopher Durham)  Orthopaedic Surgery  [address], [place] [address]  Phone: [phone]  Fax: [phone]  Follow Up Time: 1 week    Rafa Vila)  Cardiovascular Disease  [address], [address]  [place] [address]  Phone: [phone]  Fax: [phone]  Follow Up Time: 1 week

## 2021-02-23 NOTE — DISCHARGE NOTE NURSING/CASE MANAGEMENT/SOCIAL WORK - PATIENT PORTAL LINK FT
You can access the FollowMyHealth Patient Portal offered by [facility] by registering at the following website: [email or web address]. By joining [facility]’s FollowMyHealth portal, you will also be able to view your health information using other applications (apps) compatible with our system.

## 2021-02-25 ENCOUNTER — TRANSCRIPTION ENCOUNTER (OUTPATIENT)
Age: 86
End: 2021-02-25

## 2021-03-02 DIAGNOSIS — N39.0 URINARY TRACT INFECTION, SITE NOT SPECIFIED: ICD-10-CM

## 2021-03-02 DIAGNOSIS — S72.112A DISPLACED FRACTURE OF GREATER TROCHANTER OF LEFT FEMUR, INITIAL ENCOUNTER FOR CLOSED FRACTURE: ICD-10-CM
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
## 2021-03-02 DIAGNOSIS — I34.0 NONRHEUMATIC MITRAL (VALVE) INSUFFICIENCY: ICD-10-CM

## 2021-03-02 DIAGNOSIS — E78.5 HYPERLIPIDEMIA, UNSPECIFIED: ICD-10-CM
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
## 2021-03-02 DIAGNOSIS — Z86.73 PERSONAL HISTORY OF TRANSIENT ISCHEMIC ATTACK (TIA), AND CEREBRAL INFARCTION WITHOUT RESIDUAL DEFICITS: ICD-10-CM
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
## 2021-04-02 ENCOUNTER — APPOINTMENT (OUTPATIENT)
Dept: CARDIOLOGY | Facility: CLINIC | Age: 86
End: 2021-04-02
Payer: MEDICARE

## 2021-04-02 VITALS
BODY MASS INDEX: 22.19 KG/M2 | OXYGEN SATURATION: 98 % | DIASTOLIC BLOOD PRESSURE: 82 MMHG | SYSTOLIC BLOOD PRESSURE: 180 MMHG | HEART RATE: 81 BPM | HEIGHT: 60 IN | WEIGHT: 113 LBS

## 2021-04-02 PROCEDURE — 93000 ELECTROCARDIOGRAM COMPLETE: CPT

## 2021-04-02 PROCEDURE — 99214 OFFICE O/P EST MOD 30 MIN: CPT | Mod: 25

## 2021-04-02 RX ORDER — ALPRAZOLAM 0.25 MG/1
0.25 TABLET ORAL AS DIRECTED
Qty: 30 | Refills: 0 | Status: DISCONTINUED | COMMUNITY
Start: 2019-02-25 | End: 2021-04-02

## 2021-04-05 ENCOUNTER — NON-APPOINTMENT (OUTPATIENT)
Age: 86
End: 2021-04-05

## 2021-04-06 NOTE — PHYSICAL EXAM
[General Appearance - Well Developed] : well developed [Well Groomed] : well groomed [Normal Appearance] : normal appearance [General Appearance - Well Nourished] : well nourished [No Deformities] : no deformities [General Appearance - In No Acute Distress] : no acute distress [] : no respiratory distress [Respiration, Rhythm And Depth] : normal respiratory rhythm and effort [Exaggerated Use Of Accessory Muscles For Inspiration] : no accessory muscle use [Auscultation Breath Sounds / Voice Sounds] : lungs were clear to auscultation bilaterally [Heart Rate And Rhythm] : heart rate and rhythm were normal [Heart Sounds] : normal S1 and S2 [Abdomen Soft] : soft [Abnormal Walk] : normal gait [Skin Color & Pigmentation] : normal skin color and pigmentation [Oriented To Time, Place, And Person] : oriented to person, place, and time [FreeTextEntry1] : No LE Edema

## 2021-04-06 NOTE — HISTORY OF PRESENT ILLNESS
[FreeTextEntry1] : 89 Y/O female PMH:  CVA, CHF, HTN, HLD, mitral valve insuff, RT renal artery stenosis s/p stent placement,  Hyponatremia/Chronic kidney disease followed with Renal Dr Victoria ( has had no recent F/U does not wish to follow with any other physician at this time other than Dr Vila ) Thrombocytosis seen by Hematology who presents today for BP check \par \par Reports one month ago she fell asleep in a chair with no arms and fell of the side sustaining a fracture left hip she followed at  [facility] and with Dr Durham as an outpatient.  Completed course of Physical therapy and is currently feeling well.  During this time Home care RN and Physical therapist was concerned about her blood pressure I spoke to home care RN and advised to decrease Metoprolol from 25 MG BID to 25 MG QD  Keep Sys BP >150  ( see hospital note ) \par Daughter reports pressure at home 140-150/70-80's\par \par Meds and cardiac testing reviewed  \par Patient currently reports she feels " great " \par \par

## 2021-04-17 NOTE — ED ADULT TRIAGE NOTE - RESPIRATORY RATE (BREATHS/MIN)
Writer explained test to patient.  Patient states no questions.  Writer collected employee health generated 2019 novel coronavirus nasal swab.  Patient states no difficulty with test.  Patient states no questions or concerns.  Specimen delivered to lab.       18

## 2021-05-17 NOTE — ED ADULT TRIAGE NOTE - PAIN RATING/NUMBER SCALE (0-10): REST
A message has been left reminding pt of f/u appointment with [facility] as well as lab work that is needed for this appointment  0

## 2021-05-28 ENCOUNTER — APPOINTMENT (OUTPATIENT)
Dept: RHEUMATOLOGY | Facility: CLINIC | Age: 86
End: 2021-05-28

## 2021-06-04 NOTE — ASU PATIENT PROFILE, ADULT - ANESTHESIA, PREVIOUS REACTION, PROFILE
Rx requested:  Requested Prescriptions     Pending Prescriptions Disp Refills    amLODIPine (NORVASC) 5 MG tablet [Pharmacy Med Name: AMLODIPINE BESYLATE 5 MG TAB] 30 tablet 0     Sig: TAKE 1 TABLET BY MOUTH EVERY DAY       Last Office Visit:   12/18/2020      Last filled:  5/12/2021    Next Visit Date:  Future Appointments   Date Time Provider Thais Bray   10/1/2021  2:00 PM Rena Escobedo, APRN - CNP [address] [facility] nausea/vomiting

## 2021-06-18 NOTE — PATIENT PROFILE ADULT - PRIMARY SOURCE OF SUPPORT/COMFORT
Letter by Tayo Alan MD at      Author: Tayo Alan MD Service: -- Author Type: --    Filed:  Encounter Date: 1/18/2019 Status: (Other)       Michael J Phoenix  [address] [place] [address]             January 18, 2019         Dear Mr. Phoenix,    Below are the results from your recent visit:    Resulted Orders   PSA, Diagnostic (Prostatic-Specific Antigen)   Result Value Ref Range    PSA 6.7 (H) 0.0 - 6.5 ng/mL    Narrative    Method is Abbott Prostate-Specific Antigen (PSA)  Standard-WHO 1st International (90:10)       PSA mildly elevated, please follow-up with urology    Please call with questions or contact us using [employer].    Sincerely,        Electronically signed by Tayo Alan MD        child(kiley)

## 2021-06-25 NOTE — PATIENT PROFILE ADULT. - FALL HARM RISK CONCLUSION
Chief Complaint   Patient presents with   Joe Wagner Annual Wellness Visit     3 most recent PHQ Screens 6/25/2021   PHQ Not Done -   Little interest or pleasure in doing things Several days   Feeling down, depressed, irritable, or hopeless Several days   Total Score PHQ 2 2     Learning Assessment 6/25/2021   PRIMARY LEARNER Patient   PRIMARY LANGUAGE ENGLISH   LEARNER PREFERENCE PRIMARY READING   ANSWERED BY patient   RELATIONSHIP SELF     Fall Risk Assessment, last 12 mths 6/25/2021   Able to walk? Yes   Fall in past 12 months? 1   Do you feel unsteady? 0   Are you worried about falling 0   Is TUG test greater than 12 seconds? 0   Is the gait abnormal? 0   Number of falls in past 12 months 1   Fall with injury? 1     Abuse Screening Questionnaire 6/25/2021   Do you ever feel afraid of your partner? N   Are you in a relationship with someone who physically or mentally threatens you? N   Is it safe for you to go home? Y     ADL Assessment 6/25/2021   Feeding yourself No Help Needed   Getting from bed to chair No Help Needed   Getting dressed No Help Needed   Bathing or showering No Help Needed   Walk across the room (includes cane/walker) No Help Needed   Using the telphone No Help Needed   Taking your medications No Help Needed   Preparing meals No Help Needed   Managing money (expenses/bills) No Help Needed   Moderately strenuous housework (laundry) No Help Needed   Shopping for personal items (toiletries/medicines) No Help Needed   Shopping for groceries No Help Needed   Driving No Help Needed   Climbing a flight of stairs No Help Needed   Getting to places beyond walking distances No Help Needed     1. Have you been to the ER, urgent care clinic since your last visit? Hospitalized since your last visit? No    2. Have you seen or consulted any other health care providers outside of the [address] [place] [address] [place] since your last visit? Include any pap smears or colon screening.  No      Chief Complaint   Patient presents with   Barrett Mandujano Annual Wellness Visit         Visit Vitals  /80 (BP 1 Location: Left arm, BP Patient Position: Sitting, BP Cuff Size: Adult long)   Pulse (!) 59   Temp 97.7 °F (36.5 °C) (Temporal)   Resp 20   Ht 5' 8\" (1.727 m)   Wt 144 lb 12.8 oz (65.7 kg)   SpO2 95%   BMI 22.02 kg/m²       Pain Scale: 6/10  Pain Location: Knee (bilateral \"all over\")    Jessica Horta is a 76 y.o. female presenting for/with: Annual Wellness Visit      Symptom review:    NO  Fever   NO  Shaking chills  NO  Cough  NO  Body aches  NO  Coughing up blood  NO  Chest congestion  NO  Chest pain  NO  Shortness of breath  NO  Profound Loss of smell/taste  NO  Nausea/Vomiting   NO  Loose stool/Diarrhea  NO  any skin issues    Patient Risk Factors Reviewed as follows:  NO  have you been in Close contact with confirmed COVID19 patient   NO  History of recent travel to affected geographical areas within the past 14 days  NO  COPD  NO  Active Cancer/Leukemia/Lymphoma/Chemotherapy  NO  Oral steroid use  NO  Pregnant  NO  Diabetes Mellitus  NO  Heart disease  NO  Asthma  NO Health care worker at home  NO Health care worker  NO Is there a Pregnant Woman in the home  NO Dialysis pt in the home   NO a large number of people living in the home      This is the Subsequent Medicare Annual Wellness Exam, performed 12 months or more after the Initial AWV or the last Subsequent AWV    I have reviewed the patient's medical history in detail and updated the computerized patient record. Assessment/Plan   Education and counseling provided:  Are appropriate based on today's review and evaluation    1. Medicare annual wellness visit, subsequent  2. Essential hypertension  3. Fibromyalgia  4. Stage 3 chronic kidney disease, unspecified whether stage 3a or 3b CKD ([facility] [address]  5. High cholesterol  6. Screening for alcoholism  7.  Screening for depression  -     DEPRESSION SCREEN ANNUAL       Depression Risk Factor Screening     3 most recent PHQ Screens 6/25/2021 PHQ Not Done -   Little interest or pleasure in doing things Several days   Feeling down, depressed, irritable, or hopeless Several days   Total Score PHQ 2 2       Alcohol Risk Screen    Do you average more than 1 drink per night or more than 7 drinks a week:  No    On any one occasion in the past three months have you have had more than 3 drinks containing alcohol:  No        Functional Ability and Level of Safety    Hearing: Hearing is good. Activities of Daily Living: The home contains: no safety equipment. Patient does total self care      Ambulation: with no difficulty     Fall Risk:  Fall Risk Assessment, last 12 mths 6/25/2021   Able to walk? Yes   Fall in past 12 months? 1   Do you feel unsteady? 0   Are you worried about falling 0   Is TUG test greater than 12 seconds? 0   Is the gait abnormal? 0   Number of falls in past 12 months 1   Fall with injury? 1      Abuse Screen:  Patient is not abused       Cognitive Screening    Has your family/caregiver stated any concerns about your memory: no       Health Maintenance Due   There are no preventive care reminders to display for this patient.     Patient Care Team   Patient Care Team:  Kade Pratt MD as PCP - General (Family Medicine)  Kade Pratt MD as PCP - [facility] Empaneled Provider    History     Patient Active Problem List   Diagnosis Code    Lumbar spondylosis M47.816    Back pain, chronic M54.9, G89.29    Rotator cuff tear arthropathy of right shoulder M75.101, M12.811    Cervical spondylosis M47.812    Fibromyalgia M79.7    Insomnia G47.00    Essential hypertension, benign I10    High cholesterol E78.00    Gastroesophageal reflux disease K21.9    Primary osteoarthritis of left hip M16.12    Benign joint hypermobility M35.7    Primary localized osteoarthrosis, left knee M17.10    Status post left hip replacement Z96.642    Sleep apnea G47.30    Allergic rhinitis J30.9    Irritable bowel syndrome without diarrhea K58.9    CKD (chronic kidney disease) stage 3, GFR 30-59 ml/min ([facility]) N18.30    Closed fracture of neck of left femur ([facility]) S72.002A    Hypertension I10     Past Medical History:   Diagnosis Date    Arthritis     generalized     Chronic pain     fibromalgia    Fibromyalgia     GERD (gastroesophageal reflux disease)     Hypercholesterolemia     Hypertension     Unspecified adverse effect of anesthesia     needing more for epidural anesthesia    Unspecified sleep apnea     not using a cpap      Past Surgical History:   Procedure Laterality Date    HX ANKLE FRACTURE TX Right 1998    plate and screws    HX GYN      ovarian cyst removal    HX HYSTERECTOMY  1983    partial     HX ORTHOPAEDIC  5/4/15    RIGHT TOTAL HIP ARTHROPLASTY WITH NAVIGATION     HX OVARIAN CYST REMOVAL  2013    HX TONSILLECTOMY       Current Outpatient Medications   Medication Sig Dispense Refill    potassium chloride (K-DUR, KLOR-CON) 20 mEq tablet TAKE 1 TABLET BY MOUTH EVERY DAY 90 Tab 3    meloxicam (MOBIC) 15 mg tablet TAKE 1 TABLET BY MOUTH EVERY DAY WITH FOOD AS NEEDED FOR PAIN 90 Tab 0    amLODIPine (NORVASC) 10 mg tablet TAKE 1 TABLET BY MOUTH EVERY DAY for pressure 90 Tab 1    zolpidem (AMBIEN) 10 mg tablet TAKE 1 TABLET BY MOUTH EVERY NIGHT. MAX DAILY AMOUNT: 10 MG 90 Tab 1    cetirizine (ZYRTEC) 5 mg tablet Take 5 mg by mouth daily.  omeprazole (PRILOSEC) 40 mg capsule Take 1 Cap by mouth daily. 90 Cap 2    losartan (COZAAR) 50 mg tablet TAKE 1 TABLET BY MOUTH ONCE DAILY for pressure 90 Tab 3    metoprolol succinate (TOPROL-XL) 50 mg XL tablet TAKE 1 AND 1/2 TABLETS BY MOUTH EVERY DAY FOR BLOOD PRESSURE 135 Tab 3    vitamin E (AQUA GEMS) 400 unit capsule Take 400 Units by mouth daily.  aspirin 81 mg chewable tablet Take 81 mg by mouth daily.       chlorthalidone (HYGROTEN) 25 mg tablet TAKE 1 TABLET BY MOUTH ONCE DAILY for pressure 90 Tab 3    oxybutynin (DITROPAN) 5 mg tablet take 1 tablet by mouth twice a day for bladder 180 Tab 3    gabapentin (NEURONTIN) 300 mg capsule take 1 capsule by mouth three times a day  Indications: nerve pain 90 Cap 5    atorvastatin (LIPITOR) 40 mg tablet take 1 tablet by mouth once daily for heart and cholesterol 90 Tab 3    tiZANidine (ZANAFLEX) 4 mg tablet take 1 tablet by mouth three times a day if needed for SPASM 90 Tab 11    FLUoxetine (PROZAC) 20 mg capsule   0    cholecalciferol, VITAMIN D3, (VITAMIN D3) 5,000 unit tab tablet Take  by mouth daily.  flaxseed 1,000 mg cap Take 1,000 mg by mouth daily.  morinda citrifolia fruit 250 mg cap Take 250 mg by mouth.  MV,FE,MIN/LUTEIN (CENTRUM SILVER ULTRA WOMEN'S PO) Take  by mouth.  CALCIUM CARBONATE (CALCIUM 600 PO) Take 600 mg by mouth daily.  fish oil-dha-epa 1,200-144-216 mg cap Take  by mouth.  ascorbic acid, vitamin C, (VITAMIN C) 1,000 mg tablet Take  by mouth.  polyethylene glycol (MIRALAX) 17 gram packet Take 17 g by mouth daily.  diphenhydrAMINE (BENADRYL) 25 mg capsule Take 50 mg by mouth nightly as needed.  cyanocobalamin 1,000 mcg tablet Take 1,000 mcg by mouth daily. Indications: PREVENTION OF VITAMIN B12 DEFICIENCY      dextran 70-hypromellose (ARTIFICIAL TEARS) ophthalmic solution Administer 2 Drops to both eyes as needed.  Indications: DRY EYE       No Known Allergies    Family History   Problem Relation Age of Onset    Hypertension Mother     Hypertension Father     Cancer Brother         colon    Cancer Brother         lung     Social History     Tobacco Use    Smoking status: Never Smoker    Smokeless tobacco: Never Used   Substance Use Topics    Alcohol use: No     Alcohol/week: 0.0 standard drinks     Ml Lofton Fall with Harm Risk

## 2021-07-19 ENCOUNTER — APPOINTMENT (OUTPATIENT)
Dept: CARDIOLOGY | Facility: CLINIC | Age: 86
End: 2021-07-19
Payer: MEDICARE

## 2021-07-19 VITALS
HEART RATE: 103 BPM | HEIGHT: 60 IN | SYSTOLIC BLOOD PRESSURE: 162 MMHG | BODY MASS INDEX: 17.28 KG/M2 | OXYGEN SATURATION: 96 % | WEIGHT: 88 LBS | DIASTOLIC BLOOD PRESSURE: 66 MMHG

## 2021-07-19 PROCEDURE — 99214 OFFICE O/P EST MOD 30 MIN: CPT

## 2021-07-19 PROCEDURE — 93000 ELECTROCARDIOGRAM COMPLETE: CPT

## 2021-07-19 NOTE — REASON FOR VISIT
[Symptom and Test Evaluation] : symptom and test evaluation [Arrhythmia/ECG Abnorrmalities] : arrhythmia/ECG abnormalities [Follow-Up - Clinic] : a clinic follow-up of [Hypertension] : hypertension [Medication Management] : Medication management

## 2021-07-19 NOTE — HISTORY OF PRESENT ILLNESS
[FreeTextEntry1] : 89 Y/O female PMH:  CVA, CHF, HTN, HLD, mitral valve insuff, RT renal artery stenosis s/p stent placement,  Pt's son is present with patient who became slightly confused recently and family suspects medication non compliance and UTI. \par \par Son reports she took all of her medications appx 1 hour ago. She has noticed peripheral edema and associates it with non compliance and possibly mild cellulitis

## 2021-07-19 NOTE — DISCUSSION/SUMMARY
[Hypertension] : hypertension [Not Responding to Treatment] : not responding to treatment [Patient] : the patient [FreeTextEntry1] : \par \par CVA/HTN: Controlled keep SYS BP > 150 ( See prior Neuro note ) \par \par Valvular heart disease:  Yearly Echo\par \par Renal artery stenosis. s/p right renal stent  continue plavix statin.\par \par Complete labs ordered \par Doxycycline 100 mg bid x 10. \par Continue to observe weight and peripheral edema\par OV 2 months \par \par   \par \par

## 2021-07-20 ENCOUNTER — LABORATORY RESULT (OUTPATIENT)
Age: 86
End: 2021-07-20

## 2021-07-23 ENCOUNTER — NON-APPOINTMENT (OUTPATIENT)
Age: 86
End: 2021-07-23

## 2021-07-23 LAB
25(OH)D3 SERPL-MCNC: 38.1 NG/ML
ALBUMIN SERPL ELPH-MCNC: 3.5 G/DL
ALP BLD-CCNC: 124 U/L
ALT SERPL-CCNC: 17 U/L
ANION GAP SERPL CALC-SCNC: 11 MMOL/L
APPEARANCE: ABNORMAL
AST SERPL-CCNC: 28 U/L
BASOPHILS # BLD AUTO: 0.03 K/UL
BASOPHILS NFR BLD AUTO: 0.3 %
BILIRUB SERPL-MCNC: 0.5 MG/DL
BILIRUBIN URINE: NEGATIVE
BLOOD URINE: NEGATIVE
BUN SERPL-MCNC: 33 MG/DL
CALCIUM SERPL-MCNC: 8.5 MG/DL
CHLORIDE SERPL-SCNC: 98 MMOL/L
CHOLEST SERPL-MCNC: 132 MG/DL
CO2 SERPL-SCNC: 23 MMOL/L
COLOR: NORMAL
CREAT SERPL-MCNC: 1.41 MG/DL
EOSINOPHIL # BLD AUTO: 0.02 K/UL
EOSINOPHIL NFR BLD AUTO: 0.2 %
ESTIMATED AVERAGE GLUCOSE: 128 MG/DL
GLUCOSE QUALITATIVE U: NEGATIVE
GLUCOSE SERPL-MCNC: 148 MG/DL
HBA1C MFR BLD HPLC: 6.1 %
HCT VFR BLD CALC: 30.2 %
HDLC SERPL-MCNC: 53 MG/DL
HGB BLD-MCNC: 9.7 G/DL
IMM GRANULOCYTES NFR BLD AUTO: 1.2 %
KETONES URINE: NEGATIVE
LDLC SERPL CALC-MCNC: 52 MG/DL
LEUKOCYTE ESTERASE URINE: ABNORMAL
LYMPHOCYTES # BLD AUTO: 1.22 K/UL
LYMPHOCYTES NFR BLD AUTO: 11 %
MAN DIFF?: NORMAL
MCHC RBC-ENTMCNC: 28 PG
MCHC RBC-ENTMCNC: 32.1 GM/DL
MCV RBC AUTO: 87 FL
MONOCYTES # BLD AUTO: 0.99 K/UL
MONOCYTES NFR BLD AUTO: 8.9 %
NEUTROPHILS # BLD AUTO: 8.69 K/UL
NEUTROPHILS NFR BLD AUTO: 78.4 %
NITRITE URINE: NEGATIVE
NONHDLC SERPL-MCNC: 79 MG/DL
PH URINE: 6
PLATELET # BLD AUTO: 525 K/UL
POTASSIUM SERPL-SCNC: 3.8 MMOL/L
PROT SERPL-MCNC: 5.5 G/DL
PROTEIN URINE: ABNORMAL
RBC # BLD: 3.47 M/UL
RBC # FLD: 13.6 %
SODIUM SERPL-SCNC: 132 MMOL/L
SPECIFIC GRAVITY URINE: 1.01
T3FREE SERPL-MCNC: 2.52 PG/ML
T4 FREE SERPL-MCNC: 1.5 NG/DL
TRIGL SERPL-MCNC: 136 MG/DL
TSH SERPL-ACNC: 3.89 UIU/ML
UROBILINOGEN URINE: NORMAL
WBC # FLD AUTO: 11.08 K/UL

## 2021-08-05 ENCOUNTER — APPOINTMENT (OUTPATIENT)
Dept: CARDIOLOGY | Facility: CLINIC | Age: 86
End: 2021-08-05

## 2021-08-06 NOTE — ED ADULT NURSE NOTE - RESPIRATORY ASSESSMENT
-flush drain with 5cc NS daily forward only; DO NOT aspirate  -change dressing q3 days or when dressing is saturated WDL

## 2021-08-07 LAB
ALBUMIN SERPL ELPH-MCNC: 3.8 G/DL
ALP BLD-CCNC: 154 U/L
ALT SERPL-CCNC: 13 U/L
ANION GAP SERPL CALC-SCNC: 12 MMOL/L
APPEARANCE: CLEAR
AST SERPL-CCNC: 19 U/L
BACTERIA: NEGATIVE
BASOPHILS # BLD AUTO: 0.03 K/UL
BASOPHILS NFR BLD AUTO: 0.3 %
BILIRUB SERPL-MCNC: 0.2 MG/DL
BILIRUBIN URINE: NEGATIVE
BLOOD URINE: NEGATIVE
BUN SERPL-MCNC: 33 MG/DL
CALCIUM SERPL-MCNC: 9.1 MG/DL
CHLORIDE SERPL-SCNC: 99 MMOL/L
CO2 SERPL-SCNC: 21 MMOL/L
COLOR: COLORLESS
CREAT SERPL-MCNC: 1.01 MG/DL
EOSINOPHIL # BLD AUTO: 0.01 K/UL
EOSINOPHIL NFR BLD AUTO: 0.1 %
GLUCOSE QUALITATIVE U: NEGATIVE
GLUCOSE SERPL-MCNC: 171 MG/DL
HCT VFR BLD CALC: 31.2 %
HGB BLD-MCNC: 9.5 G/DL
HYALINE CASTS: 0 /LPF
IMM GRANULOCYTES NFR BLD AUTO: 1.7 %
KETONES URINE: NEGATIVE
LEUKOCYTE ESTERASE URINE: NEGATIVE
LYMPHOCYTES # BLD AUTO: 0.94 K/UL
LYMPHOCYTES NFR BLD AUTO: 9.4 %
MAN DIFF?: NORMAL
MCHC RBC-ENTMCNC: 27.1 PG
MCHC RBC-ENTMCNC: 30.4 GM/DL
MCV RBC AUTO: 89.1 FL
MICROSCOPIC-UA: NORMAL
MONOCYTES # BLD AUTO: 0.68 K/UL
MONOCYTES NFR BLD AUTO: 6.8 %
NEUTROPHILS # BLD AUTO: 8.21 K/UL
NEUTROPHILS NFR BLD AUTO: 81.7 %
NITRITE URINE: NEGATIVE
PH URINE: 6
PLATELET # BLD AUTO: 647 K/UL
POTASSIUM SERPL-SCNC: 5.1 MMOL/L
PROT SERPL-MCNC: 5.7 G/DL
PROTEIN URINE: ABNORMAL
RBC # BLD: 3.5 M/UL
RBC # FLD: 13.8 %
RED BLOOD CELLS URINE: 1 /HPF
SODIUM SERPL-SCNC: 132 MMOL/L
SPECIFIC GRAVITY URINE: 1.01
SQUAMOUS EPITHELIAL CELLS: 1 /HPF
UROBILINOGEN URINE: NORMAL
WBC # FLD AUTO: 10.04 K/UL
WHITE BLOOD CELLS URINE: 4 /HPF

## 2021-08-09 ENCOUNTER — APPOINTMENT (OUTPATIENT)
Dept: CARDIOLOGY | Facility: CLINIC | Age: 86
End: 2021-08-09

## 2021-08-16 ENCOUNTER — APPOINTMENT (OUTPATIENT)
Dept: INTERNAL MEDICINE | Facility: CLINIC | Age: 86
End: 2021-08-16
Payer: MEDICARE

## 2021-08-16 ENCOUNTER — NON-APPOINTMENT (OUTPATIENT)
Age: 86
End: 2021-08-16

## 2021-08-16 VITALS
TEMPERATURE: 98.2 F | SYSTOLIC BLOOD PRESSURE: 152 MMHG | HEIGHT: 60 IN | WEIGHT: 105 LBS | HEART RATE: 98 BPM | RESPIRATION RATE: 14 BRPM | DIASTOLIC BLOOD PRESSURE: 68 MMHG | OXYGEN SATURATION: 97 % | BODY MASS INDEX: 20.62 KG/M2

## 2021-08-16 DIAGNOSIS — Z00.00 ENCOUNTER FOR GENERAL ADULT MEDICAL EXAMINATION W/OUT ABNORMAL FINDINGS: ICD-10-CM

## 2021-08-16 PROCEDURE — G0438: CPT

## 2021-08-16 RX ORDER — DOXYCYCLINE 100 MG/1
100 CAPSULE ORAL
Qty: 20 | Refills: 1 | Status: DISCONTINUED | COMMUNITY
Start: 2020-10-02 | End: 2021-08-16

## 2021-08-16 RX ORDER — TRAMADOL HYDROCHLORIDE 50 MG/1
50 TABLET, COATED ORAL
Qty: 20 | Refills: 0 | Status: DISCONTINUED | COMMUNITY
Start: 2021-02-23 | End: 2021-08-16

## 2021-08-16 NOTE — PHYSICAL EXAM
[Normal] : normal rate, regular rhythm, normal S1 and S2 and no murmur heard [de-identified] : edema LE's

## 2021-09-20 ENCOUNTER — APPOINTMENT (OUTPATIENT)
Dept: CARDIOLOGY | Facility: CLINIC | Age: 86
End: 2021-09-20

## 2021-11-10 LAB
ALBUMIN SERPL ELPH-MCNC: 3.6 G/DL
ALP BLD-CCNC: 117 U/L
ALT SERPL-CCNC: 13 U/L
ANION GAP SERPL CALC-SCNC: 12 MMOL/L
AST SERPL-CCNC: 18 U/L
BASOPHILS # BLD AUTO: 0.04 K/UL
BASOPHILS NFR BLD AUTO: 0.6 %
BILIRUB SERPL-MCNC: 0.5 MG/DL
BUN SERPL-MCNC: 31 MG/DL
CALCIUM SERPL-MCNC: 8.6 MG/DL
CHLORIDE SERPL-SCNC: 108 MMOL/L
CO2 SERPL-SCNC: 21 MMOL/L
CREAT SERPL-MCNC: 1.17 MG/DL
EOSINOPHIL # BLD AUTO: 0.07 K/UL
EOSINOPHIL NFR BLD AUTO: 1.1 %
FERRITIN SERPL-MCNC: 13 NG/ML
GGT SERPL-CCNC: 21 U/L
GLUCOSE SERPL-MCNC: 140 MG/DL
HCT VFR BLD CALC: 30.1 %
HGB BLD-MCNC: 9.1 G/DL
IMM GRANULOCYTES NFR BLD AUTO: 0.8 %
IRON SATN MFR SERPL: 6 %
IRON SERPL-MCNC: 21 UG/DL
LYMPHOCYTES # BLD AUTO: 1.6 K/UL
LYMPHOCYTES NFR BLD AUTO: 25.6 %
MAN DIFF?: NORMAL
MCHC RBC-ENTMCNC: 27.7 PG
MCHC RBC-ENTMCNC: 30.2 GM/DL
MCV RBC AUTO: 91.8 FL
MONOCYTES # BLD AUTO: 0.58 K/UL
MONOCYTES NFR BLD AUTO: 9.3 %
NEUTROPHILS # BLD AUTO: 3.92 K/UL
NEUTROPHILS NFR BLD AUTO: 62.6 %
PLATELET # BLD AUTO: 402 K/UL
POTASSIUM SERPL-SCNC: 5.3 MMOL/L
PROT SERPL-MCNC: 5.2 G/DL
RBC # BLD: 3.28 M/UL
RBC # BLD: 3.28 M/UL
RBC # FLD: 14.2 %
RETICS # AUTO: 1.6 %
RETICS AGGREG/RBC NFR: 51.8 K/UL
SODIUM SERPL-SCNC: 140 MMOL/L
TIBC SERPL-MCNC: 355 UG/DL
UIBC SERPL-MCNC: 333 UG/DL
WBC # FLD AUTO: 6.26 K/UL

## 2021-11-11 ENCOUNTER — NON-APPOINTMENT (OUTPATIENT)
Age: 86
End: 2021-11-11

## 2021-11-11 ENCOUNTER — APPOINTMENT (OUTPATIENT)
Dept: CARDIOLOGY | Facility: CLINIC | Age: 86
End: 2021-11-11
Payer: MEDICARE

## 2021-11-11 VITALS
HEIGHT: 60 IN | DIASTOLIC BLOOD PRESSURE: 74 MMHG | SYSTOLIC BLOOD PRESSURE: 148 MMHG | OXYGEN SATURATION: 98 % | WEIGHT: 113.32 LBS | BODY MASS INDEX: 22.25 KG/M2 | HEART RATE: 74 BPM

## 2021-11-11 PROCEDURE — 93000 ELECTROCARDIOGRAM COMPLETE: CPT

## 2021-11-11 PROCEDURE — 99214 OFFICE O/P EST MOD 30 MIN: CPT

## 2021-11-11 NOTE — DISCUSSION/SUMMARY
[Hypertension] : hypertension [Not Responding to Treatment] : not responding to treatment [Patient] : the patient [FreeTextEntry1] : B/L LE weeping Edema/stasis dermatitis: Will Increase torsemide to 20 MG QD for the next 3 days ( prior labs reviewed ) short course ABX, advised low NA diet ( avoid canned soup/chinese food )  repeat Echocardiogram,  advised support stockings OV 1 week\par \par CVA/HTN: Controlled \par \par Valvular heart disease: Echo ordered \par \par Renal artery stenosis. s/p right renal stent  continue plavix statin.\par \par Anemia/Thrombocytosis followed with Hematology labs to be repeated in one month also advised follow up with nephrologist Dr Victoria ( H/O CKD ) most recent creat 1.17 and GI \par \par Hip fracture  fracture of greater trochanter continue orthopedic management\par \par OV 1 week \par \par   \par \par

## 2021-11-11 NOTE — PHYSICAL EXAM
[Normal S1, S2] : normal S1, S2 [Soft] : abdomen soft [Non Tender] : non-tender [Normal] : moves all extremities, no focal deficits, normal speech [Alert and Oriented] : alert and oriented [de-identified] : walker [de-identified] : +2 B/L LE Edema

## 2021-11-11 NOTE — HISTORY OF PRESENT ILLNESS
[FreeTextEntry1] : 89 Y/O female PMH:  CVA, CHF, HTN, HLD, mitral valve insuff, RT renal artery stenosis s/p stent placement,  Hyponatremia/Chronic kidney disease followed with Renal Dr Victoria ( has had no recent F/U does not wish to follow with any other physician at this time other than Dr Vila ) Thrombocytosis seen by Hematology who presents today in follow up of LE Edema\par Denies CP/SOB/PND/Orthopnea \par \par \par Patient currently reports she feels " great " \par \par Echo 10/9/20 NL LV SYS FX Mild DD EF 55% Mild MR \par \par

## 2021-11-13 LAB
ALP BLD-CCNC: 116 IU/L
ALP BONE CFR SERPL: 51 %
ALP INTEST CFR SERPL: 3 %
ALP LIVER CFR SERPL: 46 %

## 2021-11-18 ENCOUNTER — APPOINTMENT (OUTPATIENT)
Dept: CARDIOLOGY | Facility: CLINIC | Age: 86
End: 2021-11-18
Payer: MEDICARE

## 2021-11-18 VITALS
DIASTOLIC BLOOD PRESSURE: 80 MMHG | WEIGHT: 108.25 LBS | BODY MASS INDEX: 20.44 KG/M2 | SYSTOLIC BLOOD PRESSURE: 170 MMHG | OXYGEN SATURATION: 97 % | HEIGHT: 61 IN | HEART RATE: 82 BPM

## 2021-11-18 PROCEDURE — 99214 OFFICE O/P EST MOD 30 MIN: CPT

## 2021-11-19 NOTE — DISCUSSION/SUMMARY
[Hypertension] : hypertension [Not Responding to Treatment] : not responding to treatment [Patient] : the patient [FreeTextEntry1] : B/L LE weeping Edema/Erythema/stasis dermatitis with minimal improvement she was advised hospitalization However, declines states she does not want to be in the hospital.  At this time she will continue ABX therapy and I will extend duration of therapy to complete a 14 day course, I have again advised the use of support stockings,  Increase torsemide to 20 MG QD for the next few days, labs ordered today.  I have reiterated the significance of a low sodium diet ( avoid canned soup/chinese food )  repeat Echocardiogram and venous Doppler previously ordered  F/U OV scheduled \par \par CVA/HTN: Controlled \par \par Renal artery stenosis. s/p right renal stent  continue plavix statin.\par \par Anemia/Thrombocytosis followed with Hematology labs to be repeated in one month also advised follow up with nephrologist Dr Victoria ( H/O CKD ) most recent creat 1.17 and GI \par \par Hip fracture  fracture of greater trochanter continue orthopedic management\par \par OV 1 week \par \par Plan DW patient and family \par \par   \par \par

## 2021-11-19 NOTE — PHYSICAL EXAM
[Normal S1, S2] : normal S1, S2 [Soft] : abdomen soft [Non Tender] : non-tender [Normal] : moves all extremities, no focal deficits, normal speech [Alert and Oriented] : alert and oriented [de-identified] : with assistance [de-identified] : B/L LE weeping Edema/Erythema/stasis dermatitis

## 2021-11-19 NOTE — HISTORY OF PRESENT ILLNESS
[FreeTextEntry1] : 89 Y/O female PMH:  CVA, CHF, HTN, HLD, mitral valve insufficiency, RT renal artery stenosis s/p stent placement,  Hyponatremia/Chronic kidney disease followed with Renal Dr Victoria ( has had no recent F/U does not wish to follow with any other physician at this time other than Dr Vila ) Thrombocytosis seen by Hematology who presents today in follow up of LE Edema which has minimal improvement She continues to feel well no CP/SOB/PND/Orthopnea/Afebrile \par \par Patient currently reports she feels " great " \par \par Echo 10/9/20 NL LV SYS FX Mild DD EF 55% Mild MR \par \par

## 2021-11-20 ENCOUNTER — APPOINTMENT (OUTPATIENT)
Dept: ULTRASOUND IMAGING | Facility: CLINIC | Age: 86
End: 2021-11-20
Payer: MEDICARE

## 2021-11-20 PROCEDURE — 93970 EXTREMITY STUDY: CPT

## 2021-11-24 ENCOUNTER — APPOINTMENT (OUTPATIENT)
Dept: CARDIOLOGY | Facility: CLINIC | Age: 86
End: 2021-11-24
Payer: MEDICARE

## 2021-11-24 ENCOUNTER — NON-APPOINTMENT (OUTPATIENT)
Age: 86
End: 2021-11-24

## 2021-11-24 VITALS
HEIGHT: 61 IN | HEART RATE: 66 BPM | DIASTOLIC BLOOD PRESSURE: 70 MMHG | SYSTOLIC BLOOD PRESSURE: 128 MMHG | OXYGEN SATURATION: 100 % | BODY MASS INDEX: 19.83 KG/M2 | WEIGHT: 105 LBS

## 2021-11-24 PROCEDURE — 93000 ELECTROCARDIOGRAM COMPLETE: CPT

## 2021-11-24 PROCEDURE — 99214 OFFICE O/P EST MOD 30 MIN: CPT | Mod: 25

## 2021-11-24 NOTE — REASON FOR VISIT
[Cardiac Failure] : cardiac failure [Structural Heart and Valve Disease] : structural heart and valve disease [Hyperlipidemia] : hyperlipidemia [Hypertension] : hypertension [Other: ____] : [unfilled]

## 2021-11-29 ENCOUNTER — APPOINTMENT (OUTPATIENT)
Dept: CARDIOLOGY | Facility: CLINIC | Age: 86
End: 2021-11-29

## 2021-11-30 LAB
ALBUMIN SERPL ELPH-MCNC: 3.8 G/DL
ALP BLD-CCNC: 143 U/L
ALT SERPL-CCNC: 17 U/L
ANION GAP SERPL CALC-SCNC: 15 MMOL/L
AST SERPL-CCNC: 26 U/L
BILIRUB SERPL-MCNC: 0.6 MG/DL
BUN SERPL-MCNC: 39 MG/DL
CALCIUM SERPL-MCNC: 9.2 MG/DL
CHLORIDE SERPL-SCNC: 98 MMOL/L
CO2 SERPL-SCNC: 22 MMOL/L
CREAT SERPL-MCNC: 1.18 MG/DL
GLUCOSE SERPL-MCNC: 127 MG/DL
POTASSIUM SERPL-SCNC: 5.2 MMOL/L
PROT SERPL-MCNC: 6.1 G/DL
SODIUM SERPL-SCNC: 135 MMOL/L

## 2021-11-30 NOTE — HISTORY OF PRESENT ILLNESS
[FreeTextEntry1] : 89 Y/O female PMH:  CVA, CHF, HTN, HLD, mitral valve insufficiency, RT renal artery stenosis s/p stent placement,  Hyponatremia/Chronic kidney disease followed with Renal Dr Victoria ( has had no recent F/U does not wish to follow with any other physician at this time other than Dr Vila ) Thrombocytosis seen by Hematology who presents today for follow up of LE Edema.  She was seen in the office last week for LE edema.  She was advised to go to the ER however patient refused.  She was advised to increase torsemide from 10mg daily to 20mg daily.  \par \par She presents today with her son and daughter.  She offers no complaints of cp/sob/PND or orthopnea.  She notes an improvement in her LE edema (family and pt agree that legs almost back to her baseline) but continues to have a wound on the back of her calf.  She continues to take 20mg torsemide daily.  She is mostly compliant with her sodium restriction. She has not yet had her blood work or echocardiogram done which were recommended at the last visit. A bilateral venous doppler was negative for DVT.  A left Baker's cyst was noted.\par \par Echo 10/9/20 NL LV SYS FX Mild DD EF 55% Mild MR \par \par EKG: Sinus rhythm 70 BPM

## 2021-11-30 NOTE — DISCUSSION/SUMMARY
[Hypertension] : hypertension [Not Responding to Treatment] : not responding to treatment [Patient] : the patient [FreeTextEntry1] : B/L LE Edema/venous stasis dermatitis: Patient and family both agree that patient's legs are almost back to her baseline.  She continues to have venous stasis.  I have recommended she continue to use support stockings,  She will continue taking torsemide 20mg daily until the end of the weekend.  She will go for blood work when she leaves the office today.  She was advised to weigh herself daily and report a weight gain of 2 pounds overnight.  If her weight remains stable and her lower extremities continue to improve, she will start alternating the torsemide 10mg/20mg on monday.  We will arrange to have a wound care specialist come into the home with regards to her lower extremities.  I have reiterated the significance of a low sodium diet ( avoid canned soup/chinese food ). An Echocardiogram will be performed. \par \par CVA/HTN: Controlled \par \par Renal artery stenosis. s/p right renal stent  continue plavix statin.\par \par Anemia/Thrombocytosis followed with Hematology. Also advised follow up with nephrologist Dr Victoria ( H/O CKD ) most recent creat 1.17 and GI \par \par Hip fracture  fracture of greater trochanter continue orthopedic management\par \par OV 2-3 weeks \par \par Plan DW patient and family \par \par   \par \par

## 2021-11-30 NOTE — REVIEW OF SYSTEMS
[Lower Ext Edema] : lower extremity edema [Negative] : Heme/Lymph [SOB] : no shortness of breath [Dyspnea on exertion] : not dyspnea during exertion [Chest Discomfort] : no chest discomfort [Palpitations] : no palpitations [Orthopnea] : no orthopnea [Syncope] : no syncope [de-identified] : see HPI

## 2021-11-30 NOTE — PHYSICAL EXAM
[Normal S1, S2] : normal S1, S2 [Soft] : abdomen soft [Non Tender] : non-tender [Normal] : moves all extremities, no focal deficits, normal speech [Alert and Oriented] : alert and oriented [No Murmur] : no murmur [No Rub] : no rub [Clear Lung Fields] : clear lung fields [de-identified] : with assistance [de-identified] : B/L LE edema with venous stasis dermatitis, small wound back of left calf

## 2021-12-02 ENCOUNTER — APPOINTMENT (OUTPATIENT)
Dept: CARDIOLOGY | Facility: CLINIC | Age: 86
End: 2021-12-02

## 2021-12-02 ENCOUNTER — NON-APPOINTMENT (OUTPATIENT)
Age: 86
End: 2021-12-02

## 2021-12-10 ENCOUNTER — APPOINTMENT (OUTPATIENT)
Dept: CARDIOLOGY | Facility: CLINIC | Age: 86
End: 2021-12-10

## 2021-12-15 NOTE — PROGRESS NOTE ADULT - PROBLEM SELECTOR PLAN 6
PRE-OP EVALUATION    Patient Name: Drew Torres    Admit Diagnosis: UTERINE FIBROID    Pre-op Diagnosis: UTERINE FIBROID    TOTAL LAPAROSCOPIC HYSTERECTOMY, BILATERAL SALPINGECTOMY, cystoscopy &  right ureteral lysis     Anesthesia Procedure: TOTAL LAP
Oral, Q6H PRN        Outpatient Medications:   Norethindrone Acet-Ethinyl Est 1.5-30 MG-MCG Oral Tab, Take 1 tablet by mouth daily for 28 days. , Disp: 84 tablet, Rfl: 1  acetaminophen 500 MG Oral Tab, Take 1,000 mg by mouth one time. , Disp: , Rfl: , 12/15/
12/07/2021    K 3.8 12/07/2021     (H) 12/07/2021    CO2 23.0 12/07/2021    BUN 7 12/07/2021    CREATSERUM 0.64 12/07/2021     (H) 12/07/2021    CA 8.7 12/07/2021            Airway      Mallampati: II  Mouth opening: 3 FB  TM distance: 4 - 6 c
- on pravastatin 20 mg  at home  - management per ICU

## 2021-12-16 ENCOUNTER — APPOINTMENT (OUTPATIENT)
Dept: CARDIOLOGY | Facility: CLINIC | Age: 86
End: 2021-12-16
Payer: MEDICARE

## 2021-12-16 VITALS
HEART RATE: 95 BPM | OXYGEN SATURATION: 98 % | SYSTOLIC BLOOD PRESSURE: 140 MMHG | WEIGHT: 110.23 LBS | BODY MASS INDEX: 20.81 KG/M2 | HEIGHT: 61 IN | DIASTOLIC BLOOD PRESSURE: 78 MMHG

## 2021-12-16 PROCEDURE — 99214 OFFICE O/P EST MOD 30 MIN: CPT

## 2021-12-18 NOTE — DISCHARGE NOTE ADULT - CARE PROVIDER_API CALL
Problem: Pressure Injury - Risk of  Goal: *Prevention of pressure injury  Description: Document Noe Scale and appropriate interventions in the flowsheet. Outcome: Progressing Towards Goal  Note: Pressure Injury Interventions:  Sensory Interventions: Assess changes in LOC    Moisture Interventions: Minimize layers    Activity Interventions: Increase time out of bed    Mobility Interventions: Float heels    Nutrition Interventions: Document food/fluid/supplement intake    Friction and Shear Interventions: Minimize layers                Problem: Falls - Risk of  Goal: *Absence of Falls  Description: Document Purvi Fall Risk and appropriate interventions in the flowsheet.   Outcome: Progressing Towards Goal  Note: Fall Risk Interventions:  Mobility Interventions: Communicate number of staff needed for ambulation/transfer         Medication Interventions: Patient to call before getting OOB,Teach patient to arise slowly    Elimination Interventions: Call light in reach    History of Falls Interventions: Consult care management for discharge planning Marcos Leal), Internal Medicine  [address] [place] [address]  Phone: [phone]  Fax: [phone]    Tyler Juárez), Cardiology; Internal Medicine; Interventional Cardiology  [address] [place] [address]  Phone: [phone]  Fax: [phone] Marcos Leal), Internal Medicine  [address] [place] [address]  Phone: [phone]  Fax: [phone]    Tyler Juárez), Cardiology; Internal Medicine; Interventional Cardiology  [address] [place] [address]  Phone: [phone]  Fax: [phone]    Sofia Ho), Neurology  [address] [place] [address]  Phone: [phone]  Fax: [phone]

## 2021-12-20 NOTE — HISTORY OF PRESENT ILLNESS
[FreeTextEntry1] : 89 Y/O female PMH:  CVA, CHF, HTN, HLD, mitral valve insufficiency, RT renal artery stenosis s/p stent placement,  Hyponatremia/Chronic kidney disease had followed with Renal Dr Victoria  Thrombocytsaul seen by Hematology who presents today in routine cardiac F/U she was previously seen for B/L LE weeping Edema/Erythema/stasis dermatitis which ha fully resolved  She continues to feel well no CP/SOB/PND/Orthopnea/Afebrile \par \par Patient currently reports she feels " great " \par \par Echo 10/9/20 NL LV SYS FX Mild DD EF 55% Mild MR \par \par

## 2021-12-20 NOTE — PHYSICAL EXAM
[Normal S1, S2] : normal S1, S2 [Soft] : abdomen soft [Non Tender] : non-tender [No Edema] : no edema [No Rash] : no rash [Normal] : moves all extremities, no focal deficits, normal speech [Alert and Oriented] : alert and oriented [de-identified] : with assistance

## 2021-12-20 NOTE — DISCUSSION/SUMMARY
[Hypertension] : hypertension [Not Responding to Treatment] : not responding to treatment [Patient] : the patient [FreeTextEntry1] : B/L LE weeping Edema/Erythema/stasis dermatitis fully resolved \par \par HTN: Controlled on current medication regimen\par \par Renal artery stenosis. s/p right renal stent  continue plavix statin.\par \par Anemia/Thrombocytosis followed with Hematology labs to be repeated in one month also advised follow up with nephrologist Dr Victoria ( H/O CKD ) most recent creat 1.17 and GI which she is amendable to \par \par OV 3 months\par \par Plan DW patient and family \par \par   \par \par

## 2022-01-21 ENCOUNTER — APPOINTMENT (OUTPATIENT)
Dept: CARDIOLOGY | Facility: CLINIC | Age: 87
End: 2022-01-21
Payer: MEDICARE

## 2022-01-21 VITALS
BODY MASS INDEX: 21.94 KG/M2 | SYSTOLIC BLOOD PRESSURE: 158 MMHG | HEART RATE: 84 BPM | DIASTOLIC BLOOD PRESSURE: 60 MMHG | OXYGEN SATURATION: 98 % | HEIGHT: 61 IN | WEIGHT: 116.18 LBS

## 2022-01-21 DIAGNOSIS — L03.116 CELLULITIS OF LEFT LOWER LIMB: ICD-10-CM

## 2022-01-21 PROCEDURE — 93000 ELECTROCARDIOGRAM COMPLETE: CPT

## 2022-01-21 PROCEDURE — 99214 OFFICE O/P EST MOD 30 MIN: CPT

## 2022-01-21 RX ORDER — DOXYCYCLINE 100 MG/1
100 CAPSULE ORAL
Qty: 14 | Refills: 0 | Status: DISCONTINUED | COMMUNITY
Start: 2021-11-11 | End: 2022-01-21

## 2022-01-21 NOTE — HISTORY OF PRESENT ILLNESS
[FreeTextEntry1] : 89 Y/O female PMH:  CVA, CHF, HTN, HLD, mitral valve insufficiency, RT renal artery stenosis s/p stent placement,  Hyponatremia/Chronic kidney disease had followed with Renal Dr Victoria  Thrombocytsaul seen by Hematology who presents today in routine cardiac F/U. She reports increasing edema.with weeping. Her weight is increased today at least 6 lbs more than last visit and probably 8-10lbs more than ideal weight. \par

## 2022-01-21 NOTE — DISCUSSION/SUMMARY
[Hypertension] : hypertension [Not Responding to Treatment] : not responding to treatment [Patient] : the patient [FreeTextEntry1] : B/L LE weeping Edema/Erythema/stasis dermatitis with increased weight. Pt will take 20 mg of torsemide a day for 3 days and then alternating 20-10 for 4 days. She will maintain a weight of 106-108 lbs and increase torsemide to accomplish this as necessary. Ace wrap legs. D/W son\par \par HTN: Controlled on current medication regimen\par \par Renal artery stenosis. s/p right renal stent  continue plavix statin.\par \par \par OV 1 months\par \par Plan DW patient and family \par \par   \par \par

## 2022-01-21 NOTE — PHYSICAL EXAM
[Normal S1, S2] : normal S1, S2 [Soft] : abdomen soft [Non Tender] : non-tender [No Edema] : no edema [No Rash] : no rash [Normal] : moves all extremities, no focal deficits, normal speech [Alert and Oriented] : alert and oriented [de-identified] : with assistance

## 2022-02-16 NOTE — DISCUSSION/SUMMARY
[Hypertension] : hypertension [Not Responding to Treatment] : not responding to treatment [Medication Changes Per Orders] : as documented in orders [Patient] : the patient [FreeTextEntry1] : Hip fracture  fracture of greater trochanter continue orthopedic management\par \par CVA/HTN: Controlled keep SYS BP > 150 ( See prior Neuro note ) \par \par Valvular heart disease:  Yearly Echo\par \par Renal artery stenosis. s/p right renal stent  continue plavix statin.\par \par Complete labs ordered \par \par OV 3 months \par \par   \par \par  normal (ped)...

## 2022-02-24 ENCOUNTER — APPOINTMENT (OUTPATIENT)
Dept: CARDIOLOGY | Facility: CLINIC | Age: 87
End: 2022-02-24
Payer: MEDICARE

## 2022-02-24 ENCOUNTER — NON-APPOINTMENT (OUTPATIENT)
Age: 87
End: 2022-02-24

## 2022-02-24 VITALS
HEART RATE: 106 BPM | BODY MASS INDEX: 21.14 KG/M2 | TEMPERATURE: 99.6 F | SYSTOLIC BLOOD PRESSURE: 170 MMHG | WEIGHT: 112 LBS | OXYGEN SATURATION: 98 % | DIASTOLIC BLOOD PRESSURE: 80 MMHG | HEIGHT: 61 IN

## 2022-02-24 DIAGNOSIS — E87.1 HYPO-OSMOLALITY AND HYPONATREMIA: ICD-10-CM

## 2022-02-24 PROCEDURE — 93000 ELECTROCARDIOGRAM COMPLETE: CPT

## 2022-02-24 PROCEDURE — 99214 OFFICE O/P EST MOD 30 MIN: CPT

## 2022-02-24 PROCEDURE — 93306 TTE W/DOPPLER COMPLETE: CPT

## 2022-02-24 NOTE — HISTORY OF PRESENT ILLNESS
[FreeTextEntry1] : 89 Y/O female PMH:  CVA, CHF, HTN, HLD, mitral valve insufficiency, RT renal artery stenosis s/p stent placement,  Hyponatremia/Chronic kidney disease had followed with Renal Dr Victoria but not lately.  Thrombocytosis seen by Hematology who presents today in routine cardiac F/U. She has continued edema but has not increased her torsemide on a constant basis. She denies chest pain or shortness of breath.

## 2022-02-24 NOTE — PHYSICAL EXAM
[Normal S1, S2] : normal S1, S2 [Soft] : abdomen soft [Non Tender] : non-tender [No Edema] : no edema [No Rash] : no rash [Normal] : moves all extremities, no focal deficits, normal speech [Alert and Oriented] : alert and oriented [de-identified] : with assistance

## 2022-02-24 NOTE — DISCUSSION/SUMMARY
[Hypertension] : hypertension [Not Responding to Treatment] : not responding to treatment [Patient] : the patient [FreeTextEntry1] : B/L LE weeping Edema/Erythema/stasis dermatitis with increased weight. Pt will take 20 mg of torsemide a day for 3 days and then alternating 20-10 . She will maintain a weight of 106-108 lbs and increase torsemide to accomplish this as necessary. Ace wrap legs. D/W son. Pt will see vascular next week\par \par HTN: Poorly controlled. Pt will increase metoprolol to 25 mg bid\par \par Renal artery stenosis. s/p right renal stent  continue plavix statin.\par ECG to evaluate for ischemia. \par \par OV 1 months\par \par Plan DW patient and family \par \par   \par \par

## 2022-02-24 NOTE — REASON FOR VISIT
[Symptom and Test Evaluation] : symptom and test evaluation [Structural Heart and Valve Disease] : structural heart and valve disease [Hyperlipidemia] : hyperlipidemia [Hypertension] : hypertension [Coronary Artery Disease] : coronary artery disease

## 2022-02-26 ENCOUNTER — LABORATORY RESULT (OUTPATIENT)
Age: 87
End: 2022-02-26

## 2022-02-28 LAB
ALBUMIN SERPL ELPH-MCNC: 3.7 G/DL
ALP BLD-CCNC: 127 U/L
ALT SERPL-CCNC: 13 U/L
ANION GAP SERPL CALC-SCNC: 13 MMOL/L
AST SERPL-CCNC: 16 U/L
BASOPHILS # BLD AUTO: 0.05 K/UL
BASOPHILS NFR BLD AUTO: 0.7 %
BILIRUB SERPL-MCNC: 0.4 MG/DL
BUN SERPL-MCNC: 28 MG/DL
CALCIUM SERPL-MCNC: 9 MG/DL
CHLORIDE SERPL-SCNC: 106 MMOL/L
CHOLEST SERPL-MCNC: 139 MG/DL
CO2 SERPL-SCNC: 21 MMOL/L
CREAT SERPL-MCNC: 1.11 MG/DL
EOSINOPHIL # BLD AUTO: 0.12 K/UL
EOSINOPHIL NFR BLD AUTO: 1.6 %
ESTIMATED AVERAGE GLUCOSE: 137 MG/DL
GLUCOSE SERPL-MCNC: 119 MG/DL
HBA1C MFR BLD HPLC: 6.4 %
HCT VFR BLD CALC: 28.8 %
HDLC SERPL-MCNC: 62 MG/DL
HGB BLD-MCNC: 8.5 G/DL
IMM GRANULOCYTES NFR BLD AUTO: 1.3 %
LDLC SERPL CALC-MCNC: 56 MG/DL
LYMPHOCYTES # BLD AUTO: 2.16 K/UL
LYMPHOCYTES NFR BLD AUTO: 28.2 %
MAN DIFF?: NORMAL
MCHC RBC-ENTMCNC: 25.4 PG
MCHC RBC-ENTMCNC: 29.5 GM/DL
MCV RBC AUTO: 86.2 FL
MONOCYTES # BLD AUTO: 0.66 K/UL
MONOCYTES NFR BLD AUTO: 8.6 %
NEUTROPHILS # BLD AUTO: 4.58 K/UL
NEUTROPHILS NFR BLD AUTO: 59.6 %
NONHDLC SERPL-MCNC: 76 MG/DL
PLATELET # BLD AUTO: 515 K/UL
POTASSIUM SERPL-SCNC: 5 MMOL/L
PROT SERPL-MCNC: 5.8 G/DL
RBC # BLD: 3.34 M/UL
RBC # FLD: 14.2 %
SODIUM SERPL-SCNC: 140 MMOL/L
TRIGL SERPL-MCNC: 100 MG/DL
TSH SERPL-ACNC: 5.55 UIU/ML
WBC # FLD AUTO: 7.67 K/UL

## 2022-03-04 ENCOUNTER — APPOINTMENT (OUTPATIENT)
Dept: VASCULAR SURGERY | Facility: CLINIC | Age: 87
End: 2022-03-04
Payer: MEDICARE

## 2022-03-04 ENCOUNTER — APPOINTMENT (OUTPATIENT)
Dept: CARDIOLOGY | Facility: CLINIC | Age: 87
End: 2022-03-04

## 2022-03-04 VITALS
DIASTOLIC BLOOD PRESSURE: 95 MMHG | SYSTOLIC BLOOD PRESSURE: 211 MMHG | HEART RATE: 100 BPM | OXYGEN SATURATION: 99 % | BODY MASS INDEX: 22.19 KG/M2 | HEIGHT: 60 IN | WEIGHT: 113 LBS

## 2022-03-04 PROCEDURE — 99203 OFFICE O/P NEW LOW 30 MIN: CPT

## 2022-03-07 NOTE — ASSESSMENT
[FreeTextEntry1] : New 89 yo female with weeping right leg edema. Pt has severe lipodermatosclerosis of lower legs bilaterally. \par \par Pt counseled on above diagnosis.\par RLE UNNA boot placed today. \par RTC 1 week for bandage change and venous duplex. \par Will refer to flexitouch for lymphedema massage pumps \par \par A total of 40 minutes was spent with patient and coordinating care\par \par

## 2022-03-07 NOTE — PHYSICAL EXAM
[JVD] : no jugular venous distention  [1+] : left 1+ [Ankle Swelling (On Exam)] : present [Varicose Veins Of Lower Extremities] : not present [] : bilaterally [Ankle Swelling Bilaterally] : severe [Skin Ulcer] : ulcer [Alert] : alert [Oriented to Person] : oriented to person [Oriented to Place] : oriented to place [Oriented to Time] : oriented to time [Calm] : calm [de-identified] : A&O x 3.  [FreeTextEntry1] : Severe lipodermatosclerosis of legs bilaterally\par right posterior calf weeping ulcer 2 cm x 2 cm.

## 2022-03-07 NOTE — HISTORY OF PRESENT ILLNESS
[FreeTextEntry1] : New 89 yo female PMHx HTN, Stroke (1998), arthritis, CKD 3, mitral and tricuspid regurgitation, presents with right lower leg weeping edema x 4 months. Patient denies any pain, fever or chills. She has been applying non-stick bandages to the right posterior calf area of weeping and wrapping the leg with an ACE wrap. Patient has had weeping of her left leg in the past, not currently. She takes furosemide 10-20 mg every other day. Deniex hx of DVT.

## 2022-03-11 ENCOUNTER — INPATIENT (INPATIENT)
Facility: HOSPITAL | Age: 87
LOS: 6 days | Discharge: HOME HEALTH | DRG: 884 | End: 2022-03-18
Attending: HOSPITALIST | Admitting: INTERNAL MEDICINE
Payer: MEDICARE

## 2022-03-11 ENCOUNTER — APPOINTMENT (OUTPATIENT)
Dept: VASCULAR SURGERY | Facility: CLINIC | Age: 87
End: 2022-03-11
Payer: MEDICARE

## 2022-03-11 VITALS
BODY MASS INDEX: 20.81 KG/M2 | HEIGHT: 60 IN | SYSTOLIC BLOOD PRESSURE: 137 MMHG | OXYGEN SATURATION: 97 % | WEIGHT: 106 LBS | HEART RATE: 89 BPM | DIASTOLIC BLOOD PRESSURE: 71 MMHG

## 2022-03-11 VITALS — HEIGHT: 61 IN | WEIGHT: 106.04 LBS

## 2022-03-11 DIAGNOSIS — Z90.710 ACQUIRED ABSENCE OF BOTH CERVIX AND UTERUS: Chronic | ICD-10-CM

## 2022-03-11 DIAGNOSIS — Z98.89 OTHER SPECIFIED POSTPROCEDURAL STATES: Chronic | ICD-10-CM

## 2022-03-11 DIAGNOSIS — Z41.9 ENCOUNTER FOR PROCEDURE FOR PURPOSES OTHER THAN REMEDYING HEALTH STATE, UNSPECIFIED: Chronic | ICD-10-CM

## 2022-03-11 DIAGNOSIS — Z98.890 OTHER SPECIFIED POSTPROCEDURAL STATES: Chronic | ICD-10-CM

## 2022-03-11 DIAGNOSIS — R55 SYNCOPE AND COLLAPSE: ICD-10-CM

## 2022-03-11 DIAGNOSIS — Z90.49 ACQUIRED ABSENCE OF OTHER SPECIFIED PARTS OF DIGESTIVE TRACT: Chronic | ICD-10-CM

## 2022-03-11 DIAGNOSIS — Z96.643 PRESENCE OF ARTIFICIAL HIP JOINT, BILATERAL: Chronic | ICD-10-CM

## 2022-03-11 LAB
ALBUMIN SERPL ELPH-MCNC: 2.8 G/DL — LOW (ref 3.3–5)
ALP SERPL-CCNC: 115 U/L — SIGNIFICANT CHANGE UP (ref 40–120)
ALT FLD-CCNC: 19 U/L — SIGNIFICANT CHANGE UP (ref 12–78)
ANION GAP SERPL CALC-SCNC: 5 MMOL/L — SIGNIFICANT CHANGE UP (ref 5–17)
APTT BLD: 29.9 SEC — SIGNIFICANT CHANGE UP (ref 27.5–35.5)
AST SERPL-CCNC: 27 U/L — SIGNIFICANT CHANGE UP (ref 15–37)
BASOPHILS # BLD AUTO: 0.04 K/UL — SIGNIFICANT CHANGE UP (ref 0–0.2)
BASOPHILS NFR BLD AUTO: 0.4 % — SIGNIFICANT CHANGE UP (ref 0–2)
BILIRUB SERPL-MCNC: 0.7 MG/DL — SIGNIFICANT CHANGE UP (ref 0.2–1.2)
BUN SERPL-MCNC: 39 MG/DL — HIGH (ref 7–23)
CALCIUM SERPL-MCNC: 8.4 MG/DL — LOW (ref 8.5–10.1)
CHLORIDE SERPL-SCNC: 107 MMOL/L — SIGNIFICANT CHANGE UP (ref 96–108)
CO2 SERPL-SCNC: 25 MMOL/L — SIGNIFICANT CHANGE UP (ref 22–31)
CREAT SERPL-MCNC: 1.13 MG/DL — SIGNIFICANT CHANGE UP (ref 0.5–1.3)
EGFR: 46 ML/MIN/1.73M2 — LOW
EOSINOPHIL # BLD AUTO: 0.03 K/UL — SIGNIFICANT CHANGE UP (ref 0–0.5)
EOSINOPHIL NFR BLD AUTO: 0.3 % — SIGNIFICANT CHANGE UP (ref 0–6)
GLUCOSE SERPL-MCNC: 115 MG/DL — HIGH (ref 70–99)
HCT VFR BLD CALC: 26.2 % — LOW (ref 34.5–45)
HGB BLD-MCNC: 8.1 G/DL — LOW (ref 11.5–15.5)
IMM GRANULOCYTES NFR BLD AUTO: 0.9 % — SIGNIFICANT CHANGE UP (ref 0–1.5)
INR BLD: 1.01 RATIO — SIGNIFICANT CHANGE UP (ref 0.88–1.16)
LYMPHOCYTES # BLD AUTO: 1.37 K/UL — SIGNIFICANT CHANGE UP (ref 1–3.3)
LYMPHOCYTES # BLD AUTO: 14.6 % — SIGNIFICANT CHANGE UP (ref 13–44)
MCHC RBC-ENTMCNC: 25.9 PG — LOW (ref 27–34)
MCHC RBC-ENTMCNC: 30.9 GM/DL — LOW (ref 32–36)
MCV RBC AUTO: 83.7 FL — SIGNIFICANT CHANGE UP (ref 80–100)
MONOCYTES # BLD AUTO: 0.81 K/UL — SIGNIFICANT CHANGE UP (ref 0–0.9)
MONOCYTES NFR BLD AUTO: 8.6 % — SIGNIFICANT CHANGE UP (ref 2–14)
NEUTROPHILS # BLD AUTO: 7.08 K/UL — SIGNIFICANT CHANGE UP (ref 1.8–7.4)
NEUTROPHILS NFR BLD AUTO: 75.2 % — SIGNIFICANT CHANGE UP (ref 43–77)
PLATELET # BLD AUTO: 407 K/UL — HIGH (ref 150–400)
POTASSIUM SERPL-MCNC: 4.6 MMOL/L — SIGNIFICANT CHANGE UP (ref 3.5–5.3)
POTASSIUM SERPL-SCNC: 4.6 MMOL/L — SIGNIFICANT CHANGE UP (ref 3.5–5.3)
PROT SERPL-MCNC: 5.7 GM/DL — LOW (ref 6–8.3)
PROTHROM AB SERPL-ACNC: 11.7 SEC — SIGNIFICANT CHANGE UP (ref 10.5–13.4)
RBC # BLD: 3.13 M/UL — LOW (ref 3.8–5.2)
RBC # FLD: 14 % — SIGNIFICANT CHANGE UP (ref 10.3–14.5)
SODIUM SERPL-SCNC: 137 MMOL/L — SIGNIFICANT CHANGE UP (ref 135–145)
TROPONIN I, HIGH SENSITIVITY RESULT: 49.47 NG/L — SIGNIFICANT CHANGE UP
TROPONIN I, HIGH SENSITIVITY RESULT: 60.14 NG/L — HIGH
WBC # BLD: 9.41 K/UL — SIGNIFICANT CHANGE UP (ref 3.8–10.5)
WBC # FLD AUTO: 9.41 K/UL — SIGNIFICANT CHANGE UP (ref 3.8–10.5)

## 2022-03-11 PROCEDURE — 88342 IMHCHEM/IMCYTCHM 1ST ANTB: CPT

## 2022-03-11 PROCEDURE — 82607 VITAMIN B-12: CPT

## 2022-03-11 PROCEDURE — 83540 ASSAY OF IRON: CPT

## 2022-03-11 PROCEDURE — 83880 ASSAY OF NATRIURETIC PEPTIDE: CPT

## 2022-03-11 PROCEDURE — 72125 CT NECK SPINE W/O DYE: CPT | Mod: 26,MA

## 2022-03-11 PROCEDURE — 81376 HLA II TYPING 1 LOCUS LR: CPT

## 2022-03-11 PROCEDURE — P9016: CPT

## 2022-03-11 PROCEDURE — 97116 GAIT TRAINING THERAPY: CPT | Mod: GP

## 2022-03-11 PROCEDURE — 86901 BLOOD TYPING SEROLOGIC RH(D): CPT

## 2022-03-11 PROCEDURE — 73503 X-RAY EXAM HIP UNI 4/> VIEWS: CPT | Mod: 26,LT

## 2022-03-11 PROCEDURE — 93306 TTE W/DOPPLER COMPLETE: CPT

## 2022-03-11 PROCEDURE — 84443 ASSAY THYROID STIM HORMONE: CPT

## 2022-03-11 PROCEDURE — 84484 ASSAY OF TROPONIN QUANT: CPT

## 2022-03-11 PROCEDURE — 99213 OFFICE O/P EST LOW 20 MIN: CPT

## 2022-03-11 PROCEDURE — 86900 BLOOD TYPING SEROLOGIC ABO: CPT

## 2022-03-11 PROCEDURE — 82728 ASSAY OF FERRITIN: CPT

## 2022-03-11 PROCEDURE — 82784 ASSAY IGA/IGD/IGG/IGM EACH: CPT

## 2022-03-11 PROCEDURE — 12001 RPR S/N/AX/GEN/TRNK 2.5CM/<: CPT

## 2022-03-11 PROCEDURE — 97530 THERAPEUTIC ACTIVITIES: CPT | Mod: GP

## 2022-03-11 PROCEDURE — 82746 ASSAY OF FOLIC ACID SERUM: CPT

## 2022-03-11 PROCEDURE — 85018 HEMOGLOBIN: CPT

## 2022-03-11 PROCEDURE — 99222 1ST HOSP IP/OBS MODERATE 55: CPT

## 2022-03-11 PROCEDURE — 84100 ASSAY OF PHOSPHORUS: CPT

## 2022-03-11 PROCEDURE — 85025 COMPLETE CBC W/AUTO DIFF WBC: CPT

## 2022-03-11 PROCEDURE — 80061 LIPID PANEL: CPT

## 2022-03-11 PROCEDURE — 99285 EMERGENCY DEPT VISIT HI MDM: CPT | Mod: 25

## 2022-03-11 PROCEDURE — 85014 HEMATOCRIT: CPT

## 2022-03-11 PROCEDURE — 88313 SPECIAL STAINS GROUP 2: CPT

## 2022-03-11 PROCEDURE — 93010 ELECTROCARDIOGRAM REPORT: CPT

## 2022-03-11 PROCEDURE — C9113: CPT

## 2022-03-11 PROCEDURE — 36430 TRANSFUSION BLD/BLD COMPNT: CPT

## 2022-03-11 PROCEDURE — 88305 TISSUE EXAM BY PATHOLOGIST: CPT

## 2022-03-11 PROCEDURE — 97162 PT EVAL MOD COMPLEX 30 MIN: CPT | Mod: GP

## 2022-03-11 PROCEDURE — 80053 COMPREHEN METABOLIC PANEL: CPT

## 2022-03-11 PROCEDURE — 83550 IRON BINDING TEST: CPT

## 2022-03-11 PROCEDURE — 86850 RBC ANTIBODY SCREEN: CPT

## 2022-03-11 PROCEDURE — 70450 CT HEAD/BRAIN W/O DYE: CPT | Mod: 26,MA

## 2022-03-11 PROCEDURE — 86923 COMPATIBILITY TEST ELECTRIC: CPT

## 2022-03-11 PROCEDURE — 95819 EEG AWAKE AND ASLEEP: CPT

## 2022-03-11 PROCEDURE — 71045 X-RAY EXAM CHEST 1 VIEW: CPT | Mod: 26

## 2022-03-11 PROCEDURE — 85027 COMPLETE CBC AUTOMATED: CPT

## 2022-03-11 PROCEDURE — 83735 ASSAY OF MAGNESIUM: CPT

## 2022-03-11 PROCEDURE — C1889: CPT

## 2022-03-11 PROCEDURE — 82306 VITAMIN D 25 HYDROXY: CPT

## 2022-03-11 PROCEDURE — 88312 SPECIAL STAINS GROUP 1: CPT

## 2022-03-11 PROCEDURE — 81001 URINALYSIS AUTO W/SCOPE: CPT

## 2022-03-11 PROCEDURE — 36415 COLL VENOUS BLD VENIPUNCTURE: CPT

## 2022-03-11 PROCEDURE — 93005 ELECTROCARDIOGRAM TRACING: CPT

## 2022-03-11 PROCEDURE — 73552 X-RAY EXAM OF FEMUR 2/>: CPT | Mod: 26,LT

## 2022-03-11 RX ORDER — METOPROLOL TARTRATE 50 MG
25 TABLET ORAL
Refills: 0 | Status: DISCONTINUED | OUTPATIENT
Start: 2022-03-11 | End: 2022-03-18

## 2022-03-11 RX ORDER — ATORVASTATIN CALCIUM 80 MG/1
10 TABLET, FILM COATED ORAL AT BEDTIME
Refills: 0 | Status: DISCONTINUED | OUTPATIENT
Start: 2022-03-11 | End: 2022-03-18

## 2022-03-11 RX ORDER — LOSARTAN POTASSIUM 100 MG/1
50 TABLET, FILM COATED ORAL
Refills: 0 | Status: DISCONTINUED | OUTPATIENT
Start: 2022-03-11 | End: 2022-03-18

## 2022-03-11 RX ORDER — ASPIRIN/CALCIUM CARB/MAGNESIUM 324 MG
81 TABLET ORAL DAILY
Refills: 0 | Status: DISCONTINUED | OUTPATIENT
Start: 2022-03-11 | End: 2022-03-12

## 2022-03-11 RX ORDER — HYDRALAZINE HCL 50 MG
25 TABLET ORAL EVERY 8 HOURS
Refills: 0 | Status: DISCONTINUED | OUTPATIENT
Start: 2022-03-11 | End: 2022-03-18

## 2022-03-11 RX ORDER — CLOPIDOGREL BISULFATE 75 MG/1
75 TABLET, FILM COATED ORAL DAILY
Refills: 0 | Status: DISCONTINUED | OUTPATIENT
Start: 2022-03-11 | End: 2022-03-18

## 2022-03-11 RX ADMIN — Medication 25 MILLIGRAM(S): at 22:39

## 2022-03-11 RX ADMIN — CLOPIDOGREL BISULFATE 75 MILLIGRAM(S): 75 TABLET, FILM COATED ORAL at 20:26

## 2022-03-11 RX ADMIN — LOSARTAN POTASSIUM 50 MILLIGRAM(S): 100 TABLET, FILM COATED ORAL at 22:39

## 2022-03-11 RX ADMIN — Medication 25 MILLIGRAM(S): at 20:25

## 2022-03-11 RX ADMIN — Medication 0.2 MILLIGRAM(S): at 20:26

## 2022-03-11 RX ADMIN — Medication 81 MILLIGRAM(S): at 18:54

## 2022-03-11 RX ADMIN — ATORVASTATIN CALCIUM 10 MILLIGRAM(S): 80 TABLET, FILM COATED ORAL at 22:38

## 2022-03-11 NOTE — ED PROVIDER NOTE - PHYSICAL EXAMINATION
Constitutional: NAD AAOx3  Eyes: PERRLA EOMI  Head: Normocephalic atraumatic  ENT: No astorga sign, no raccoon eyes, no hemotympanum, no csf rhinorrhea, no nasal septal hematoma  Mouth: MMM  Cardiac: regular rate   Resp: Lungs CTAB  GI: Abd s/nt/nd  Neuro: CN2-12 intact GCS 15  Skin: No rashes, no bruising to chest, back, abdomen, ecchymosis to left forehead, lac to right forearm, superficial lac between 2nd and 3rd digit   Msk: No midline spinal ttp, full ROM of neck, c-collar cleared clinically and with provocative testing, no ttp of facial bones, no ttp to chest wall, pelvis stable, left hip ttp Constitutional: NAD AAOx3  Eyes: PERRLA EOMI  Head: see below  ENT: No astorga sign, no raccoon eyes, no csf rhinorrhea, no nasal septal hematoma  Mouth: MMM  Cardiac: regular rate   Resp: Lungs CTAB  GI: Abd s/nt/nd  Neuro: CN2-12 intact GCS 15  Skin: No rashes, no bruising to chest, back, abdomen, ecchymosis to left forehead, lac to right forearm, superficial lac between 2nd and 3rd digit   Msk: No midline spinal ttp, no ttp of facial bones, no ttp to chest wall, pelvis stable, left hip ttp

## 2022-03-11 NOTE — ED ADULT NURSE NOTE - NSIMPLEMENTINTERV_GEN_ALL_ED
Implemented All Fall with Harm Risk Interventions:  [place] to call system. Call bell, personal items and telephone within reach. Instruct patient to call for assistance. Room bathroom lighting operational. Non-slip footwear when patient is off stretcher. Physically safe environment: no spills, clutter or unnecessary equipment. Stretcher in lowest position, wheels locked, appropriate side rails in place. Provide visual cue, wrist band, yellow gown, etc. Monitor gait and stability. Monitor for mental status changes and reorient to person, place, and time. Review medications for side effects contributing to fall risk. Reinforce activity limits and safety measures with patient and family. Provide visual clues: red socks.

## 2022-03-11 NOTE — H&P ADULT - HISTORY OF PRESENT ILLNESS
89/Fwith PMHx of inguinal hernia, CHF, renal artery stenosis s/p stent, basal cell carcinoma, CVA, hyponatremia, dyslipidemia, HTN presents to the ED s/p fall. Pt states she doesn't know what happened. Denies dizziness, lightheadedness, SOB, CP. +Head trauma. No LOC. Pt reports she in on Plavix and able to ambulate after fall. She denies cp/sob/abd pain    Had skin lacerations, right for arm laceration sutured in ED.

## 2022-03-11 NOTE — ED PROVIDER NOTE - CCCP TRG CHIEF CMPLNT
"  Child & Teen Check Up Month 04       HPI        Growth Percentile:   Wt Readings from Last 3 Encounters:   12/05/19 7.654 kg (16 lb 14 oz) (74 %)*   10/01/19 6.152 kg (13 lb 9 oz) (76 %)*   08/16/19 4.097 kg (9 lb 0.5 oz) (56 %)*     * Growth percentiles are based on WHO (Boys, 0-2 years) data.     Ht Readings from Last 2 Encounters:   12/05/19 0.654 m (2' 1.75\") (69 %)*   10/01/19 0.597 m (1' 11.5\") (68 %)*     * Growth percentiles are based on WHO (Boys, 0-2 years) data.     68 %ile based on WHO (Boys, 0-2 years) weight-for-recumbent length based on body measurements available as of 2019.     31 %ile based on WHO (Boys, 0-2 years) head circumference-for-age based on Head Circumference recorded on 2019.    Visit Vitals: Pulse 169   Temp 99  F (37.2  C) (Tympanic)   Resp (!) 42   Ht 0.654 m (2' 1.75\")   Wt 7.654 kg (16 lb 14 oz)   HC 41.3 cm (16.25\")   SpO2 97%   BMI 17.89 kg/m      Informant: Mother  Family speaks English and so an  was not used.    Family History:   Family History   Problem Relation Age of Onset     Cancer No family hx of      Diabetes No family hx of      Coronary Artery Disease No family hx of      Heart Disease No family hx of      Hypertension No family hx of      Social History: Lives with Mother, Father and older brother        Did the family/guardian worry about wether their food would run out before they got money to buy more? No  Did the family/guardian find that the food they bought didn't last long enough and they didn't have money to get more?  No    Social History     Socioeconomic History     Marital status: Single     Spouse name: None     Number of children: None     Years of education: None     Highest education level: None   Occupational History     None   Social Needs     Financial resource strain: None     Food insecurity:     Worry: None     Inability: None     Transportation needs:     Medical: None     Non-medical: None   Tobacco Use     Smoking " status: Never Smoker     Smokeless tobacco: Never Used   Substance and Sexual Activity     Alcohol use: None     Drug use: None     Sexual activity: None   Lifestyle     Physical activity:     Days per week: None     Minutes per session: None     Stress: None   Relationships     Social connections:     Talks on phone: None     Gets together: None     Attends Rastafarian service: None     Active member of club or organization: None     Attends meetings of clubs or organizations: None     Relationship status: None     Intimate partner violence:     Fear of current or ex partner: None     Emotionally abused: None     Physically abused: None     Forced sexual activity: None   Other Topics Concern     None   Social History Narrative     None       Medical History:   History reviewed. No pertinent past medical history.    Family History and past Medical History reviewed and unchanged/updated.    Parental concerns: None    Mental Health  Parent-Child Interaction: Normal    Daily Activities:   NUTRITION: breastfeeding going well, every 1-3 hrs, 8-12 times/24 hours  SLEEP: Arrangements: Bassinet  Patterns: Wakes at night for feedings  Position: On back  Has at least 1-2 waking periods during a day  ELIMINATION: Stools: Normal breast milk stools  Urination: Normal wet diapers    Environmental Risks:  Lead exposure: No  TB exposure: No  Guns in house: None    Immunizations:  Hx immunization reactions?  No    Guidance:  Nutrition:  Solid foods at six months, One new food at a time. Milk and honey off limits until 1 year, Safety:  Car seat: face backwards until at least 2 years old, Small objects/choking (coins, balloons, small toy parts)  and Sun exposure and Guidance:  Parenting  talk to baby, respond to vocalizations. and Teething care: massage, teething ring, cold teethers.         ROS   GENERAL: no recent fevers and activity level has been normal  SKIN: Negative for rash, birthmarks, acne, pigmentation changes  HEENT:  "Negative for hearing problems, vision problems, nasal congestion, eye discharge and eye redness  RESP: No cough, wheezing, difficulty breathing  CV: No cyanosis, fatigue with feeding  GI: Normal stools for age, no diarrhea or constipation   : Normal urination, no disharge or painful urination  MS: No swelling, muscle weakness, joint problems  NEURO: Moves all extremeties normally, normal activity for age  ALLERGY/IMMUNE: See allergy in history         Physical Exam:   Pulse 169   Temp 99  F (37.2  C) (Tympanic)   Resp (!) 42   Ht 0.654 m (2' 1.75\")   Wt 7.654 kg (16 lb 14 oz)   HC 41.3 cm (16.25\")   SpO2 97%   BMI 17.89 kg/m      GENERAL: Alert, well appearing, no distress  SKIN: Xerosis diffusely, worse on cheeks bilaterally, no significant rash, abnormal pigmentation or lesions  HEAD: Plagiocephaly with flattening over right posterior occiput   EYES:  Symmetric light reflex and no eye movement on cover/uncover test. Normal conjunctivae.  EARS: Normal canals. Tympanic membranes are normal; gray and translucent.  NOSE: Normal without discharge.  MOUTH/THROAT: Clear. No oral lesions. No teeth present   NECK: Supple, no masses.  No thyromegaly.  LYMPH NODES: No adenopathy  LUNGS: Clear. No rales, rhonchi, wheezing or retractions  HEART: Regular rhythm. Normal S1/S2. No murmurs. Normal pulses.  ABDOMEN: Soft, non-tender, not distended, no masses or hepatosplenomegaly. Bowel sounds normal.   GENITALIA: Normal male external genitalia. Suhas stage I,  both testes descended, no hernia or hydrocele.    EXTREMITIES: Full range of motion, no deformities  NEUROLOGIC: Cranial nerves grossly intact. Normal gait, strength and tone        Assessment & Plan:     Development: PEDS Results:  Path E (No concerns): Plan to retest at next Well Child Check.    Maternal Depression Screening: Mother of Hao [place] screened for depression.  No concerns with the PHQ-9 data.    Following immunizations advised:  - DTAP HEPB & POLIO " VIRUS, INACTIVATED (<7Y), (PEDIARIX)  - HIB, PRP-T, ACTHIB, IM  - ROTAVIRUS VACC 2 DOSE ORAL  - Pneumococcal vaccine 13 valent PCV13 IM (Prevnar) [24564]  - Recommended everyone in the household get flu shots this year as patient cannot get his until 6 months   Discussed risks and benefits of vaccination.VIS forms were provided to parent(s).   Parent(s) accepted all recommended vaccinations.    2. Acquired positional plagiocephaly  Positional plagiocephaly with flattening over right posterior occiput. Normal head circumference. Recommended increasing tummy time to reduce time on that side of his head     Schedule 6 month visit   Poly-vi-sol, 1 dropper/day (this gives 400 IU vitamin D daily) Yes  Referrals: No referrals were made today.  Patient precepted with MD Qing Stafford DO ([id number])  [facility] Resident  Pager: [phone]     fall

## 2022-03-11 NOTE — H&P ADULT - PATIENT'S GENDER IDENTITY
Poorly controlled.  Blood pressure markedly elevated on presentation.  Review of prior clinical encounter suggested he has chronically poorly controlled blood pressure.  Restart previously prescribed blood pressure medications.  Will monitor and adjust as needed to achieve reasonable blood pressure control.   Female

## 2022-03-11 NOTE — ED PROVIDER NOTE - OBJECTIVE STATEMENT
89 y/o female with PMHx of inguinal hernia, CHF, renal artery stenosis, basal cell carcinoma, CVA, hyponatremia, dyslipidemia, HTN presents to the ED s/p fall. Pt states she doesn't know what happened. Denies dizziness, lightheadedness, SOB, CP. +Head trauma. No LOC. Pt reports she in on Plavix and able to ambulate after fall. No EtOH use. Nonsmoker. NKDA. Pt was seen PTA by Dr. Riley.

## 2022-03-11 NOTE — H&P ADULT - ASSESSMENT
89/Fwith PMHx of inguinal hernia, CHF, renal artery stenosis s/p stent, basal cell carcinoma, CVA, hyponatremia, dyslipidemia, HTN presents to the ED s/p fall. Pt states she doesn't know what happened. Denies dizziness, lightheadedness, SOB, CP. +Head trauma. No LOC. Pt reports she in on Plavix and able to ambulate after fall. She denies cp/sob/abd pain    Had skin lacerations, right for arm laceration sutured in ED.     Pt admitted with     1) Fall + Syncope + Abn Trop:  admit to tele  fall precautions  serial cardiac enz  asa 81 added  cont plavix  echo  cardio consult  PT consult  check ua to r/o uti  check orthostatics    2) Anemia :  Hb 8.1  baseline 10.1  stool could not be obtained in ED for guaiac test  FOBT  cbc in am    3) Skin lacerations:, right fore arm laceration sutured in ED.     4) HTN: cont home meds    5) DVT PPX: venodynes    poc discussed with pt, her son,  team

## 2022-03-11 NOTE — ED PROVIDER NOTE - PROGRESS NOTE DETAILS
Nathaniel Velazquez for attending Dr. THEA Cross   Lot: 211 Exp: 8/31/22. Brown stool, guaiac negative. discussed lower h/h with patient and son at bedside. recommended admission given ? syncope lower h/h. pt declines. understands risk. will send 2nd trop awaiting, xray and reassess Narinder Cross M.D., Attending Physician pt endorsed to Dr. More alfred. Narinder Cross M.D., Attending Physician

## 2022-03-11 NOTE — ED ADULT NURSE REASSESSMENT NOTE - NS ED NURSE REASSESS COMMENT FT1
hoc received from previous rn s yolis. Placed pt on monitor, vital signs stable. pt resting comfortably no s&s of distress daughter at bedside. wcteresa.

## 2022-03-11 NOTE — ED ADULT NURSE NOTE - OBJECTIVE STATEMENT
pt presents to ED s/p unwitnessed fall. Unknown if mechanical or syncopal. Pt does not remember how she fell or why. pt normally ambulates with a walker. Son at bedside denies fall in the past 3 months. Has bruises to b/l arms, and hematoma to left forehead. pt on AC. Denies headache/dizziness at this time.  Pt in no acute distress at this time. will ctm

## 2022-03-11 NOTE — ASSESSMENT
[FreeTextEntry1] : 89 yo female with weeping right leg edema. Pt has severe lipodermatosclerosis of lower legs bilaterally. Pt fell and hit her head this morning. She does not remember the fall. Normal neuro exam. \par \par Pt counseled to go to the ER for head CT and possible suturing of right arm. \par Pt counseled on above diagnosis.\par BLE UNNA boot placed today. \par RTC 1 week for bandage change and venous duplex. \par Will refer to flexitouch for lymphedema massage pumps \par \par A total of 30 minutes was spent with patient and coordinating care\par \par

## 2022-03-11 NOTE — ED PROVIDER NOTE - NS ED ROS FT
Constitutional: No fever or chills, no dizziness, no lightheadedness   Eyes: No visual changes  HEENT: No throat pain  CV: No chest pain  Resp: No SOB no cough  GI: No abd pain, nausea or vomiting  : No dysuria  MSK: No musculoskeletal pain  Skin: No rash, +ecchymosis to left forehead   Neuro: No headache

## 2022-03-11 NOTE — ED PROVIDER NOTE - CLINICAL SUMMARY MEDICAL DECISION MAKING FREE TEXT BOX
89 y/o female with PMHx of inguinal hernia, CHF, renal artery stenosis, basal cell carcinoma, CVA, hyponatremia, dyslipidemia, HTN presents to the ED s/p fall at home, likely mechanical. Will obtain CT, reassess. 89 y/o female with PMHx of inguinal hernia, CHF, renal artery stenosis, basal cell carcinoma, CVA, hyponatremia, dyslipidemia, HTN presents to the ED s/p fall at home  - pt unsure what happened Will obtain CT trop syncope work up, reassess.

## 2022-03-11 NOTE — PHYSICAL EXAM
[1+] : left 1+ [Ankle Swelling (On Exam)] : present [] : bilaterally [Ankle Swelling Bilaterally] : severe [Skin Ulcer] : ulcer [Alert] : alert [Oriented to Person] : oriented to person [Oriented to Place] : oriented to place [Oriented to Time] : oriented to time [Calm] : calm [JVD] : no jugular venous distention  [Varicose Veins Of Lower Extremities] : not present [de-identified] : A&O x 3.  [de-identified] : ecchymosis on left anterior forehead  [FreeTextEntry1] : Severe lipodermatosclerosis of legs bilaterally\par right posterior calf weeping ulcer 1 cm x 1 cm.

## 2022-03-11 NOTE — H&P ADULT - NSHPPOADEEPVENOUSTHROMB_GEN_A_CORE
Patient here today for Centering session 6  1 hour GTT lab drawn and sent to the lab for results, patient tolerated well. no

## 2022-03-11 NOTE — HISTORY OF PRESENT ILLNESS
[FreeTextEntry1] : 3/4/22: New 89 yo female PMHx HTN, Stroke (1998), arthritis, CKD 3, mitral and tricuspid regurgitation, presents with right lower leg weeping edema x 4 months. Patient denies any pain, fever or chills. She has been applying non-stick bandages to the right posterior calf area of weeping and wrapping the leg with an ACE wrap. Patient has had weeping of her left leg in the past, not currently. She takes furosemide 10-20 mg every other day. Deniex hx of DVT. \par \par 3/11/22: Pt fell this morning and hit her head, right arm, left hip. She is not sure how she fell, she cannot remember the fall. She has bruising on her left hip, laceration on right forearm and right dorsal hand. Pt feels her right leg weeping has improved since last visit. She denies any fever or chills.

## 2022-03-11 NOTE — ED PROVIDER NOTE - PRO INTERPRETER NEED 2
Visit Date: 5/7/2019     Blanca Villar is a 80 y.o. male who presents today for:  Chief Complaint   Patient presents with    Chest Congestion    Cough         HPI:     HPI     Coughing since mid March and was ERX Augmentin since then, he is now on his second round of Augmentin. Hx of COPD.  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 1 packet 0    predniSONE (DELTASONE) 5 MG tablet 2 tablets 2 times a day for 3 days then 1  Tablet 2 times a day for 3 days, then 1 tablet daily till gone 21 tablet 0    amoxicillin-clavulanate (AUGMENTIN) 875-125 MG per tablet Take 1 tablet by mouth 2 times daily for 10 days 20 tablet 0    apixaban (ELIQUIS) 5 MG TABS tablet Take 1 tablet by mouth 2 times daily 180 tablet 1    lidocaine viscous (XYLOCAINE) 2 % solution       carvedilol (COREG) 6.25 MG tablet       doxazosin (CARDURA) 4 MG tablet Take 1 tablet by mouth nightly 30 tablet 3    calcium carbonate-vitamin D (CALCIUM 600 + D) 600-400 MG-UNIT TABS per tab Take 1 tablet by mouth 2 times daily 60 tablet 5    Multiple Vitamins-Minerals (THERAPEUTIC MULTIVITAMIN-MINERALS) tablet Take 1 tablet by mouth daily      carvedilol (COREG) 3.125 MG tablet Take 1 tablet by mouth 2 times daily (with meals) 60 tablet 3    albuterol sulfate HFA (PROAIR HFA) 108 (90 Base) MCG/ACT inhaler Inhale 2 puffs into the lungs 2 times daily 1 Inhaler 3    finasteride (PROSCAR) 5 MG tablet Take 1 tablet by mouth daily 30 tablet 3    tiZANidine (ZANAFLEX) 2 MG tablet Take 1 tablet by mouth every 8 hours as needed (spasm)      glycopyrrolate-formoterol (BEVESPI) 9-4.8 MCG/ACT AERO Inhale 2 puffs into the lungs 2 times daily      docusate sodium (COLACE, DULCOLAX) 100 MG CAPS Take 100 mg by mouth 2 times daily      acetaminophen (TYLENOL) 500 MG tablet Take 500 mg by mouth every 8 hours as needed for Pain      aspirin 325 MG EC tablet Take 1 tablet by mouth daily       No current facility-administered medications for this visit. Subjective:     Review of Systems   Constitutional: Negative for appetite change, chills, diaphoresis, fatigue and fever. HENT: Negative for congestion, ear discharge, ear pain, postnasal drip, rhinorrhea, sinus pressure, sore throat, trouble swallowing and voice change. Respiratory: Positive for cough. Negative for chest tightness, shortness of breath and wheezing. Cardiovascular: Negative for chest pain, palpitations and leg swelling. Gastrointestinal: Negative for abdominal pain, constipation, diarrhea, nausea and vomiting. Musculoskeletal: Negative for arthralgias, back pain, joint swelling, myalgias, neck pain and neck stiffness. Skin: Negative for rash. Neurological: Negative for dizziness, syncope, weakness, light-headedness, numbness and headaches. Objective:     /68 (Site: Right Upper Arm, Position: Sitting, Cuff Size: Medium Adult)   Pulse 104   Resp 20   Ht 5' 7\" (1.702 m)   Wt 118 lb 12.8 oz (53.9 kg)   BMI 18.61 kg/m²   BP Readings from Last 3 Encounters:   04/18/19 106/68   03/26/19 (!) 112/58   03/13/19 128/70     Wt Readings from Last 3 Encounters:   04/18/19 118 lb 12.8 oz (53.9 kg)   03/13/19 118 lb 2 oz (53.6 kg)   03/11/19 115 lb (52.2 kg)        Physical Exam   Constitutional: He is oriented to person, place, and time. He appears well-developed and well-nourished. He is cooperative. HENT:   Head: Normocephalic and atraumatic. Right Ear: External ear normal.   Left Ear: External ear normal.   Nose: Nose normal.   Mouth/Throat: Oropharynx is clear and moist.   Eyes: Pupils are equal, round, and reactive to light. Conjunctivae and EOM are normal. No scleral icterus. Neck: Normal range of motion. Neck supple.  No JVD English

## 2022-03-11 NOTE — ED PROVIDER NOTE - NS_ ATTENDINGSCRIBEDETAILS _ED_A_ED_FT
I, Narinder Cross MD,  performed the initial face to face bedside interview with this patient regarding history of present illness, review of symptoms and relevant past medical, social and family history.  I completed an independent physical examination.  I was the initial provider who evaluated this patient.   I personally saw the patient and performed a substantive portion of the visit including all aspects of the medical decision making.  The history, relevant review of systems, past medical and surgical history, medical decision making, and physical examination was documented by the scribe in my presence and I attest to the accuracy of the documentation.

## 2022-03-11 NOTE — ED ADULT TRIAGE NOTE - CHIEF COMPLAINT QUOTE
pt BIB son s/p fall this morning around 6a. +head trauma w/ bruising noted to forehead. unknown LOC. pt taking Plavix. pt denies pain or discomfort. As per son pt also has bruising to left hip and skin tear to right arm; pt able to ambulate. neuro alert called; MD Guillermo examined pt in triage.

## 2022-03-11 NOTE — ED PROVIDER NOTE - SHIFT CHANGE DETAILS
pt signed out to Dr. Aviles pending 2nd trop xray - offered admission - if patient changes mind admit. Narinder Cross M.D., Attending Physician

## 2022-03-12 DIAGNOSIS — R55 SYNCOPE AND COLLAPSE: ICD-10-CM

## 2022-03-12 LAB
APPEARANCE UR: CLEAR — SIGNIFICANT CHANGE UP
BILIRUB UR-MCNC: NEGATIVE — SIGNIFICANT CHANGE UP
COLOR SPEC: YELLOW — SIGNIFICANT CHANGE UP
DIFF PNL FLD: NEGATIVE — SIGNIFICANT CHANGE UP
GLUCOSE UR QL: NEGATIVE — SIGNIFICANT CHANGE UP
HCT VFR BLD CALC: 24.2 % — LOW (ref 34.5–45)
HCT VFR BLD CALC: 26.1 % — LOW (ref 34.5–45)
HGB BLD-MCNC: 7.1 G/DL — LOW (ref 11.5–15.5)
HGB BLD-MCNC: 8 G/DL — LOW (ref 11.5–15.5)
KETONES UR-MCNC: NEGATIVE — SIGNIFICANT CHANGE UP
LEUKOCYTE ESTERASE UR-ACNC: ABNORMAL
MCHC RBC-ENTMCNC: 24.9 PG — LOW (ref 27–34)
MCHC RBC-ENTMCNC: 29.3 GM/DL — LOW (ref 32–36)
MCV RBC AUTO: 84.9 FL — SIGNIFICANT CHANGE UP (ref 80–100)
NITRITE UR-MCNC: NEGATIVE — SIGNIFICANT CHANGE UP
PH UR: 7 — SIGNIFICANT CHANGE UP (ref 5–8)
PLATELET # BLD AUTO: 358 K/UL — SIGNIFICANT CHANGE UP (ref 150–400)
PROT UR-MCNC: 100
RBC # BLD: 2.85 M/UL — LOW (ref 3.8–5.2)
RBC # FLD: 14.1 % — SIGNIFICANT CHANGE UP (ref 10.3–14.5)
SP GR SPEC: 1 — LOW (ref 1.01–1.02)
TROPONIN I, HIGH SENSITIVITY RESULT: 38.23 NG/L — SIGNIFICANT CHANGE UP
UROBILINOGEN FLD QL: NEGATIVE — SIGNIFICANT CHANGE UP
WBC # BLD: 5.45 K/UL — SIGNIFICANT CHANGE UP (ref 3.8–10.5)
WBC # FLD AUTO: 5.45 K/UL — SIGNIFICANT CHANGE UP (ref 3.8–10.5)

## 2022-03-12 PROCEDURE — 93306 TTE W/DOPPLER COMPLETE: CPT | Mod: 26

## 2022-03-12 PROCEDURE — 99232 SBSQ HOSP IP/OBS MODERATE 35: CPT

## 2022-03-12 PROCEDURE — 99222 1ST HOSP IP/OBS MODERATE 55: CPT

## 2022-03-12 RX ORDER — PANTOPRAZOLE SODIUM 20 MG/1
40 TABLET, DELAYED RELEASE ORAL DAILY
Refills: 0 | Status: DISCONTINUED | OUTPATIENT
Start: 2022-03-12 | End: 2022-03-14

## 2022-03-12 RX ADMIN — CLOPIDOGREL BISULFATE 75 MILLIGRAM(S): 75 TABLET, FILM COATED ORAL at 10:59

## 2022-03-12 RX ADMIN — LOSARTAN POTASSIUM 50 MILLIGRAM(S): 100 TABLET, FILM COATED ORAL at 22:05

## 2022-03-12 RX ADMIN — PANTOPRAZOLE SODIUM 40 MILLIGRAM(S): 20 TABLET, DELAYED RELEASE ORAL at 13:09

## 2022-03-12 RX ADMIN — Medication 10 MILLIGRAM(S): at 10:59

## 2022-03-12 RX ADMIN — Medication 25 MILLIGRAM(S): at 05:45

## 2022-03-12 RX ADMIN — Medication 25 MILLIGRAM(S): at 22:05

## 2022-03-12 RX ADMIN — ATORVASTATIN CALCIUM 10 MILLIGRAM(S): 80 TABLET, FILM COATED ORAL at 22:05

## 2022-03-12 RX ADMIN — Medication 81 MILLIGRAM(S): at 10:59

## 2022-03-12 RX ADMIN — LOSARTAN POTASSIUM 50 MILLIGRAM(S): 100 TABLET, FILM COATED ORAL at 10:59

## 2022-03-12 RX ADMIN — Medication 25 MILLIGRAM(S): at 13:09

## 2022-03-12 RX ADMIN — Medication 0.2 MILLIGRAM(S): at 05:44

## 2022-03-12 RX ADMIN — Medication 0.2 MILLIGRAM(S): at 22:06

## 2022-03-12 RX ADMIN — Medication 25 MILLIGRAM(S): at 10:59

## 2022-03-12 RX ADMIN — Medication 0.2 MILLIGRAM(S): at 13:09

## 2022-03-12 RX ADMIN — Medication 25 MILLIGRAM(S): at 22:06

## 2022-03-12 NOTE — PATIENT PROFILE ADULT - FUNCTIONAL ASSESSMENT - BASIC MOBILITY 5.
Hide Anesthesia Volume?: No Size Of Lesion In Cm: 0.3 Anesthesia Volume In Cc: 0.5 Biopsy Method: double edge Personna blade Curettage Text: The wound bed was treated with curettage after the biopsy was performed. Silver Nitrate Text: The wound bed was treated with silver nitrate after the biopsy was performed. Billing Type: Third-Party Bill 3 = A little assistance Anesthesia Type: 1% lidocaine with 1:100,000 epinephrine and a 1:10 solution of 8.4% sodium bicarbonate Was A Bandage Applied: Yes Notification Instructions: Patient will be notified of biopsy results. However, patient instructed to call the office if not contacted within 2 weeks. Wound Care: Aquaphor Additional Anesthesia Volume In Cc (Will Not Render If 0): 0 Depth Of Biopsy: dermis Dressing: bandage Hemostasis: Aluminum Chloride Post-Care Instructions: I reviewed with the patient in detail post-care instructions. Patient is to keep the biopsy site dry overnight, and then apply bacitracin twice daily until healed. Patient may apply hydrogen peroxide soaks to remove any crusting. Cryotherapy Text: The wound bed was treated with cryotherapy after the biopsy was performed. Body Location Override (Optional - Billing Will Still Be Based On Selected Body Map Location If Applicable): right scrotum Electrodesiccation Text: The wound bed was treated with electrodesiccation after the biopsy was performed. Consent: Written consent was obtained and risks were reviewed including but not limited to scarring, infection, bleeding, scabbing, incomplete removal, nerve damage and allergy to anesthesia. Biopsy Type: H and E Type Of Destruction Used: Curettage Detail Level: Detailed Electrodesiccation And Curettage Text: The wound bed was treated with electrodesiccation and curettage after the biopsy was performed.

## 2022-03-12 NOTE — PATIENT PROFILE ADULT - FALL HARM RISK - HARM RISK INTERVENTIONS
Assistance OOB with selected safe patient handling equipment/Communicate Risk of Fall with Harm to all staff/Discuss with provider need for PT consult/Monitor gait and stability/Provide patient with walking aids - walker, cane, crutches/Reinforce activity limits and safety measures with patient and family/Review medications for side effects contributing to fall risk/Sit up slowly, dangle for a short time, stand at bedside before walking/Tailored Fall Risk Interventions/Toileting schedule using arm’s reach rule for commode and bathroom/Visual Cue: Yellow wristband and red socks/Bed in lowest position, wheels locked, appropriate side rails in place/Call bell, personal items and telephone in reach/Instruct patient to call for assistance before getting out of bed or chair/Non-slip footwear when patient is out of bed/[place] to call system/Physically safe environment - no spills, clutter or unnecessary equipment/Purposeful Proactive Rounding/Room/bathroom lighting operational, light cord in reach

## 2022-03-12 NOTE — PROGRESS NOTE ADULT - SUBJECTIVE AND OBJECTIVE BOX
HPI: 89/Fwith PMHx of inguinal hernia, CHF, renal artery stenosis s/p stent, basal cell carcinoma, CVA, hyponatremia, dyslipidemia, HTN presents to the ED s/p fall. Pt states she doesn't know what happened. Denies dizziness, lightheadedness, SOB, CP. +Head trauma. No LOC. Pt reports she in on Plavix and able to ambulate after fall. She denies cp/sob/abd pain    Had skin lacerations, right for arm laceration sutured in ED.     3/12: Hb 7.1, decreased  d/c asa 81  FOBT awaited  GI consult  iv protonix  orthostatics neg  cardio awaited      PHYSICAL EXAM:    Daily Height in cm: 154.94 (11 Mar 2022 12:36)    Daily Weight in k.5 (12 Mar 2022 06:21)    Vital Signs Last 24 Hrs  T(C): 36.8 (12 Mar 2022 08:27), Max: 37.2 (11 Mar 2022 13:38)  T(F): 98.2 (12 Mar 2022 08:27), Max: 98.9 (11 Mar 2022 13:38)  HR: 71 (12 Mar 2022 08:27) (65 - 91)  BP: 133/55 (12 Mar 2022 08:27) (133/55 - 194/79)  BP(mean): --  RR: 18 (12 Mar 2022 08:27) (16 - 18)  SpO2: 96% (12 Mar 2022 08:27) (96% - 99%)    Constitutional: Weak and ill appearing  HEENT: Atraumatic, KWAKU,   Respiratory: Breath Sounds normal, no rhonchi/wheeze  Cardiovascular: N S1S2;   Gastrointestinal: Abdomen soft, non tender, Bowel Sounds present  Extremities: No edema, peripheral pulses present  Neurological: AAO x 3, no gross focal motor deficits  Skin: Non cellulitic, no rash, ulcers  Lymph Nodes: No lymphadenopathy noted  Back: No CVA tenderness   Musculoskeletal: non tender  Breasts: Deferred  Genitourinary: deferred  Rectal: Deferred    All Labs/EKG/Radiology/Meds reviewed by me                          7.1    5.45  )-----------( 358      ( 12 Mar 2022 08:27 )             24.2       CBC Full  -  ( 12 Mar 2022 08:27 )  WBC Count : 5.45 K/uL  RBC Count : 2.85 M/uL  Hemoglobin : 7.1 g/dL  Hematocrit : 24.2 %  Platelet Count - Automated : 358 K/uL  Mean Cell Volume : 84.9 fl  Mean Cell Hemoglobin : 24.9 pg  Mean Cell Hemoglobin Concentration : 29.3 gm/dL  Auto Neutrophil # : x  Auto Lymphocyte # : x  Auto Monocyte # : x  Auto Eosinophil # : x  Auto Basophil # : x  Auto Neutrophil % : x  Auto Lymphocyte % : x  Auto Monocyte % : x  Auto Eosinophil % : x  Auto Basophil % : x          137  |  107  |  39<H>  ----------------------------<  115<H>  4.6   |  25  |  1.13    Ca    8.4<L>      11 Mar 2022 13:27    TPro  5.7<L>  /  Alb  2.8<L>  /  TBili  0.7  /  DBili  x   /  AST  27  /  ALT  19  /  AlkPhos  115  11      LIVER FUNCTIONS - ( 11 Mar 2022 13:27 )  Alb: 2.8 g/dL / Pro: 5.7 gm/dL / ALK PHOS: 115 U/L / ALT: 19 U/L / AST: 27 U/L / GGT: x             PT/INR - ( 11 Mar 2022 13:27 )   PT: 11.7 sec;   INR: 1.01 ratio         PTT - ( 11 Mar 2022 13:27 )  PTT:29.9 sec          Urinalysis Basic - ( 11 Mar 2022 23:30 )    Color: Yellow / Appearance: Clear / S.005 / pH: x  Gluc: x / Ketone: Negative  / Bili: Negative / Urobili: Negative   Blood: x / Protein: 100 / Nitrite: Negative   Leuk Esterase: Trace / RBC: Negative /HPF / WBC 0-2   Sq Epi: x / Non Sq Epi: Occasional / Bacteria: Negative            MEDICATIONS  (STANDING):  atorvastatin 10 milliGRAM(s) Oral at bedtime  cloNIDine 0.2 milliGRAM(s) Oral three times a day  clopidogrel Tablet 75 milliGRAM(s) Oral daily  hydrALAZINE 25 milliGRAM(s) Oral every 8 hours  losartan 50 milliGRAM(s) Oral two times a day  metoprolol succinate ER 25 milliGRAM(s) Oral two times a day  pantoprazole  Injectable 40 milliGRAM(s) IV Push daily  torsemide 10 milliGRAM(s) Oral daily    MEDICATIONS  (PRN):

## 2022-03-12 NOTE — PHYSICAL THERAPY INITIAL EVALUATION ADULT - GENERAL OBSERVATIONS, REHAB EVAL
Patient received in bed on 3E, +HM, +B ANDREA boots.  Daughter in during session. Patient denied pain.

## 2022-03-12 NOTE — PROGRESS NOTE ADULT - ASSESSMENT
89/Fwith PMHx of inguinal hernia, CHF, renal artery stenosis s/p stent, basal cell carcinoma, CVA, hyponatremia, dyslipidemia, HTN presents to the ED s/p fall. Pt states she doesn't know what happened. Denies dizziness, lightheadedness, SOB, CP. +Head trauma. No LOC. Pt reports she in on Plavix and able to ambulate after fall. She denies cp/sob/abd pain    Had skin lacerations, right for arm laceration sutured in ED.     Pt admitted with     1) Fall + Syncope + Abn Trop:  admitted to tele  fall precautions  serial cardiac enz  cont plavix  echo awaited  cardio consult awaited  PT consult  check ua to r/o uti; NO uti  check orthostatics; neg    2) Anemia :  Hb 8.1  baseline 10.1  stool could not be obtained in ED for guaiac test  FOBT awaited  cbc in am; Hb 7.1  serial H/H; transfuse if less than 7 and if family agrees  GI consult to r/o GI bleed/ EGD/colonoscopy  asa d/nathan    3) Skin lacerations:, right fore arm laceration sutured in ED.     4) HTN: cont home meds    5) DVT PPX: venodynes    poc discussed with pt, her daughter, Brittney,  team

## 2022-03-12 NOTE — PATIENT PROFILE ADULT - ABILITY TO HEAR (WITH HEARING AID OR HEARING APPLIANCE IF NORMALLY USED):
used Mildly to Moderately Impaired: difficulty hearing in some environments or speaker may need to increase volume or speak distinctly

## 2022-03-12 NOTE — PHYSICAL THERAPY INITIAL EVALUATION ADULT - PERTINENT HX OF CURRENT PROBLEM, REHAB EVAL
89/Fwith PMHx of inguinal hernia, CHF, renal artery stenosis s/p stent, basal cell carcinoma, CVA, hyponatremia, dyslipidemia, HTN presents to the ED s/p fall at home. X-rays (-), CT head (-) acute changes.

## 2022-03-12 NOTE — PHYSICAL THERAPY INITIAL EVALUATION ADULT - MODALITIES TREATMENT COMMENTS
Patient  left in chair with CBIR and chair alarm active. Patient instructed to call for assistance back to bed or the bathroom, understands same. Daughter at bedside, discussed discharge planning. RN informed of session/status.

## 2022-03-12 NOTE — PATIENT PROFILE ADULT - FOOD INSECURITY
Telephone Encounter by Paula Hale RN at 12/18/17 04:28 PM     Author:  Paula Hale RN Service:  (none) Author Type:  Registered Nurse     Filed:  12/18/17 04:28 PM Encounter Date:  12/18/2017 Status:  Signed     :  Paula Hale RN (Registered Nurse)       From: Ailin JEFFREY Woody  To: Bob Estes MD  Sent: 12/18/2017  4:09 PM CST  Subject: Upcoming Appointment    May I have make an appointment to see Dr. Estes at 3:20 pm this Thursday?    Thank you, [facility]       Revision History        Date/Time User Provider Type Action    > 12/18/17 04:28 PM Paula Hale RN Registered Nurse Sign    Attribution information within the note text is not available.            
no

## 2022-03-13 LAB
FERRITIN SERPL-MCNC: 16 NG/ML — SIGNIFICANT CHANGE UP (ref 15–150)
HCT VFR BLD CALC: 24.3 % — LOW (ref 34.5–45)
HCT VFR BLD CALC: 29 % — LOW (ref 34.5–45)
HGB BLD-MCNC: 7.3 G/DL — LOW (ref 11.5–15.5)
HGB BLD-MCNC: 8.7 G/DL — LOW (ref 11.5–15.5)
IRON SATN MFR SERPL: 12 UG/DL — LOW (ref 30–160)
IRON SATN MFR SERPL: 4 % — LOW (ref 14–50)
TIBC SERPL-MCNC: 286 UG/DL — SIGNIFICANT CHANGE UP (ref 220–430)
UIBC SERPL-MCNC: 275 UG/DL — SIGNIFICANT CHANGE UP (ref 110–370)

## 2022-03-13 PROCEDURE — 99233 SBSQ HOSP IP/OBS HIGH 50: CPT

## 2022-03-13 PROCEDURE — 99232 SBSQ HOSP IP/OBS MODERATE 35: CPT

## 2022-03-13 PROCEDURE — 93010 ELECTROCARDIOGRAM REPORT: CPT

## 2022-03-13 RX ORDER — SOD SULF/SODIUM/NAHCO3/KCL/PEG
2000 SOLUTION, RECONSTITUTED, ORAL ORAL ONCE
Refills: 0 | Status: COMPLETED | OUTPATIENT
Start: 2022-03-13 | End: 2022-03-13

## 2022-03-13 RX ORDER — FERROUS SULFATE 325(65) MG
325 TABLET ORAL DAILY
Refills: 0 | Status: DISCONTINUED | OUTPATIENT
Start: 2022-03-13 | End: 2022-03-16

## 2022-03-13 RX ADMIN — Medication 0.2 MILLIGRAM(S): at 05:27

## 2022-03-13 RX ADMIN — PANTOPRAZOLE SODIUM 40 MILLIGRAM(S): 20 TABLET, DELAYED RELEASE ORAL at 10:22

## 2022-03-13 RX ADMIN — Medication 2000 MILLILITER(S): at 14:06

## 2022-03-13 RX ADMIN — Medication 10 MILLIGRAM(S): at 10:22

## 2022-03-13 RX ADMIN — Medication 25 MILLIGRAM(S): at 05:26

## 2022-03-13 RX ADMIN — Medication 0.2 MILLIGRAM(S): at 14:36

## 2022-03-13 RX ADMIN — Medication 25 MILLIGRAM(S): at 21:27

## 2022-03-13 RX ADMIN — LOSARTAN POTASSIUM 50 MILLIGRAM(S): 100 TABLET, FILM COATED ORAL at 21:27

## 2022-03-13 RX ADMIN — Medication 325 MILLIGRAM(S): at 18:05

## 2022-03-13 RX ADMIN — Medication 0.2 MILLIGRAM(S): at 21:27

## 2022-03-13 RX ADMIN — Medication 25 MILLIGRAM(S): at 10:21

## 2022-03-13 RX ADMIN — ATORVASTATIN CALCIUM 10 MILLIGRAM(S): 80 TABLET, FILM COATED ORAL at 21:28

## 2022-03-13 RX ADMIN — CLOPIDOGREL BISULFATE 75 MILLIGRAM(S): 75 TABLET, FILM COATED ORAL at 10:21

## 2022-03-13 RX ADMIN — Medication 25 MILLIGRAM(S): at 14:36

## 2022-03-13 RX ADMIN — LOSARTAN POTASSIUM 50 MILLIGRAM(S): 100 TABLET, FILM COATED ORAL at 10:21

## 2022-03-13 NOTE — PROGRESS NOTE ADULT - ASSESSMENT
89/Fwith PMHx of inguinal hernia, CHF, renal artery stenosis s/p stent, basal cell carcinoma, CVA, hyponatremia, dyslipidemia, HTN presents to the ED s/p fall. Pt states she doesn't know what happened. Denies dizziness, lightheadedness, SOB, CP. +Head trauma. No LOC. Pt reports she in on Plavix and able to ambulate after fall. She denies cp/sob/abd pain    Had skin lacerations, right for arm laceration sutured in ED.     Pt admitted with     1) Fall + Syncope + Abn Trop:  admitted to tele  fall precautions  serial cardiac enz  cont plavix  echo :  Estimated left ventricular ejection fraction is 60-65 %. E to A reversal  cardio consult appreciated  PT consult  check ua to r/o uti; NO uti  check orthostatics; neg    2) Anemia : acute on chronic, iron def  Hb 8.1  baseline 10.1  stool could not be obtained in ED for guaiac test  FOBT awaited  cbc in am; Hb 7.1  serial H/H; transfuse if less than 7 and if family agrees  GI consult to r/o GI bleed/ EGD/colonoscopy: EGD/colonoscopy on Monday  asa d/nathan  ferrous sulfate for iron def anemia    3) Skin lacerations:, right fore arm laceration sutured in ED.     4) HTN: cont home meds    5) DVT PPX: venodynes    poc discussed with pt, her son,,  team

## 2022-03-13 NOTE — PROVIDER CONTACT NOTE (OTHER) - SITUATION
DR. LIU'S ANSWERING SERVICE AWARE OF CONSULT. SPOKE WITH RUKHSANA
Pt had brief episode of PAf with HR in the 150s for 4.4 seconds

## 2022-03-13 NOTE — PROGRESS NOTE ADULT - SUBJECTIVE AND OBJECTIVE BOX
89/Fwith PMHx of inguinal hernia,CHF, renal artery stenosis s/p stent, basal cell carcinoma, CVA, hyponatremia, dyslipidemia, HTN presents to the ED s/p fall.   Pt states she doesn't know what happened. Denies dizziness, lightheadedness, SOB, CP. +Head trauma. No LOC. Pt reports she in on Plavix and able to ambulate after fall. She denies cp/sob/abd pain Had skin lacerations, right for arm laceration sutured in ED.     Admitted for syncope, consulted for same. Reviewed history: pt was walking w/ walker in house not on feet for prolonged period.Then felt "weird" not lightheaded.Then woke up on ground.  Duration of LOC min-sec.No witnesses.  Urinated on herself no tongue biting.Knew where she was.  Called son who helped herwent to vascular clinic apt mentioned fall & referred to ED.    3/13/22 Pt alert and awake in bed, no acute distress noted. No c/o chest pain, sob, dizziness, light headed and other cardiac symptoms. Tele No adverse events overnight. Awaiting results of echo      PAST MEDICAL AND SURGICAL HISTORY:  PAST MEDICAL & SURGICAL HISTORY:  Essential hypertension    Dyslipidemia    Hyponatremia  ON HCTZ , h/o Hypokelemia    Cerebrovascular accident (CVA), unspecified mechanism    Edema, unspecified type    Mitral valve insufficiency, unspecified etiology  Cardiac cath on 11/21/18    Basal cell carcinoma    Renal artery stenosis  right side, stent    CHF (congestive heart failure)    Inguinal hernia, right    Blood pressure instability    S/P Mohs surgery for basal cell carcinoma    Status post hysterectomy  and bladder lift with mesh 1990s    History of cholecystectomy  1980    H/O bilateral hip replacements  2013, 2014    Other elective surgery  right renal artery stent    S/P colonoscopy        ALLERGIES:  Allergies    IV Contrast (Pruritus; Rash)  Keflex (Other)  verapamil (Other)    Intolerances        SOCIAL HISTORY:  Social History:  non smoker  non alcoholic (11 Mar 2022 18:14)      FAMILY  HISTORY:  FAMILY HISTORY:  Family history of uterine cancer (Sibling)    Family history of carotid artery stenosis (Sibling)        MEDICATIONS:  OUTPATIENT:  Home Medications:  cloNIDine 0.2 mg oral tablet: 1 tab(s) orally 3 times a day  HOLD for SBP &lt; 150   Keep SBP between  150- 180     ***6am, 1pm, 8pm*** (11 Mar 2022 18:19)  clopidogrel 75 mg oral tablet: 1 tab(s) orally once a day (11 Mar 2022 18:19)  losartan 50 mg oral tablet: 1 tab(s) orally 2 times a day  ***6am, 6pm*** (11 Mar 2022 18:19)  Metoprolol Succinate ER 25 mg oral tablet, extended release: 1 tab(s) orally 2 times a day  ***9am, 9pm*** (11 Mar 2022 18:19)  pravastatin 20 mg oral tablet: 1 tab(s) orally once a day (at bedtime) (11 Mar 2022 18:19)  torsemide 10 mg oral tablet: 1 tab(s) orally once a day  ***may take 2nd dose 12 hours later if necessary*** (11 Mar 2022 18:19)      INPATIENT:  MEDICATIONS  (STANDING):  atorvastatin 10 milliGRAM(s) Oral at bedtime  cloNIDine 0.2 milliGRAM(s) Oral three times a day  clopidogrel Tablet 75 milliGRAM(s) Oral daily  hydrALAZINE 25 milliGRAM(s) Oral every 8 hours  losartan 50 milliGRAM(s) Oral two times a day  metoprolol succinate ER 25 milliGRAM(s) Oral two times a day  pantoprazole  Injectable 40 milliGRAM(s) IV Push daily  torsemide 10 milliGRAM(s) Oral daily    MEDICATIONS  (PRN):    MEDICATIONS  (PRN):    REVIEW OF SYSTEMS:  ===============================  ===============================  CONSTITUTIONAL: No weakness, fevers or chills  EYES/ENT: No visual changes;  No vertigo or throat pain   NECK: No pain or stiffness  RESPIRATORY: No cough, wheezing, hemoptysis; No shortness of breath  CARDIOVASCULAR: No chest pain or palpitations  GASTROINTESTINAL: No abdominal or epigastric pain. No nausea, vomiting, or hematemesis;   No diarrhea or constipation. No melena or hematochezia.  GENITOURINARY: No dysuria, frequency or hematuria  NEUROLOGICAL: No numbness or weakness  SKIN: No itching, burning, rashes, or lesions   All other review of systems is negative unless indicated above    Vital Signs Last 24 Hrs  T(C): 36.3 (13 Mar 2022 08:58), Max: 36.7 (12 Mar 2022 21:02)  T(F): 97.4 (13 Mar 2022 08:58), Max: 98 (12 Mar 2022 21:02)  HR: 75 (13 Mar 2022 08:58) (62 - 75)  BP: 138/62 (13 Mar 2022 08:58) (138/49 - 168/60)  BP(mean): --  RR: 18 (13 Mar 2022 08:58) (16 - 18)  SpO2: 97% (13 Mar 2022 08:58) (95% - 98%)    Orthostatic VS    03-12-22 @ 06:21  Lying BP: 145/49 HR: 70   Sitting BP: 150/52 HR: 74  Standing BP: 177/76 HR: 86  Site: upper left arm   Mode: electronic      I&O's Summary    I&O's Detail      I&O's Detail    11 Mar 2022 07:01  -  12 Mar 2022 07:00  --------------------------------------------------------  IN:  Total IN: 0 mL    OUT:    Voided (mL): 350 mL  Total OUT: 350 mL    Total NET: -350 mL          PHYSICAL EXAM:    Constitutional: NAD, awake and alert,   HEENT: PERR, EOMI,  No oral cyananosis.  Neck:  supple,  No JVD  Respiratory: Breath sounds are clear bilaterally, No wheezing, rales or rhonchi  Cardiovascular: S1 and S2, regular rate and rhythm, no Murmurs, gallops or rubs  Gastrointestinal: Bowel Sounds present, soft, nontender.   Extremities: No peripheral edema. No clubbing or cyanosis.  Vascular: 2+ peripheral pulses  Neurological: A/O x 3, no focal deficits  Musculoskeletal: no calf tenderness.  Skin: No rashes.    ===============================  ===============================  LABS:                         7.3    x     )-----------( x        ( 13 Mar 2022 07:32 )             24.3                             7.1    5.45  )-----------( 358      ( 12 Mar 2022 08:27 )             24.2                         8.1    9.41  )-----------( 407      ( 11 Mar 2022 13:27 )             26.2     11 Mar 2022 13:27    137    |  107    |  39     ----------------------------<  115    4.6     |  25     |  1.13     Ca    8.4        11 Mar 2022 13:27    TPro  5.7    /  Alb  2.8    /  TBili  0.7    /  DBili  x      /  AST  27     /  ALT  19     /  AlkPhos  115    11 Mar 2022 13:27    PT/INR - ( 11 Mar 2022 13:27 )   PT: 11.7 sec;   INR: 1.01 ratio         PTT - ( 11 Mar 2022 13:27 )  PTT:29.9 sec    THYROID STUDIES:    ===============================  ===============================  CARDIAC BIOMARKERS:  -BNP VALUES:      -TROPONIN VALUES:  -------  lab item   Troponin I, High Sensitivity Result: 38.23 ng/L (03-12-22 @ 11:59)  Troponin I, High Sensitivity Result: 60.14 ng/L *H* (03-11-22 @ 16:55)  Troponin I, High Sensitivity Result: 49.47 ng/L (03-11-22 @ 13:27)    ===============================  ===============================  EKG: NSR, no ischemic changes    ===============================  ECHO:  ------------------------------------------------------------------------  Conclusions:  1. Mild mitral annular calcification. Mitral annular  dilatation with tethered mitral valve leaflets with normal  opening. Mild-moderate mitral regurgitation.  2. Thickened trileaflet aortic valve with normal opening.  Mild aortic regurgitation.  3. Eccentric left ventricular hypertrophy (dilated left  ventricle with normal relative wall thickness).  4. Mild global left ventricular systolic dysfunction.  5. Mild diastolic dysfunction (Stage I).  6. Normal right ventricular size and function.  7. Right pleural effusion.  *** No previous Echo exam.  ------------------------------------------------------------------------  Confirmed on  12/14/2018 - 18:28:01 by Sally Thurman M.D.  ------------------------------------------------------------------------  ===============================    Matt Allen M.D.  Cardiology, [facility]  Cell: [phone]  Office:    ([facility] Office)  [phone] ([facility] Office)      =================                       89/Fwith PMHx of inguinal hernia,CHF, renal artery stenosis s/p stent, basal cell carcinoma, CVA, hyponatremia, dyslipidemia, HTN presents to the ED s/p fall.   Pt states she doesn't know what happened. Denies dizziness, lightheadedness, SOB, CP. +Head trauma. No LOC. Pt reports she in on Plavix and able to ambulate after fall. She denies cp/sob/abd pain Had skin lacerations, right for arm laceration sutured in ED.     Admitted for syncope, consulted for same. Reviewed history: pt was walking w/ walker in house not on feet for prolonged period.Then felt "weird" not lightheaded.Then woke up on ground.  Duration of LOC min-sec.No witnesses.  Urinated on herself no tongue biting.Knew where she was.  Called son who helped herwent to vascular clinic apt mentioned fall & referred to ED.    3/13/22 Pt alert and awake in bed, no acute distress noted. No c/o chest pain, sob, dizziness, light headed and other cardiac symptoms. Tele No adverse events overnight.     PAST MEDICAL AND SURGICAL HISTORY:  PAST MEDICAL & SURGICAL HISTORY:  Essential hypertension    Dyslipidemia    Hyponatremia  ON HCTZ , h/o Hypokelemia    Cerebrovascular accident (CVA), unspecified mechanism    Edema, unspecified type    Mitral valve insufficiency, unspecified etiology  Cardiac cath on 11/21/18    Basal cell carcinoma    Renal artery stenosis  right side, stent    CHF (congestive heart failure)    Inguinal hernia, right    Blood pressure instability    S/P Mohs surgery for basal cell carcinoma    Status post hysterectomy  and bladder lift with mesh 1990s    History of cholecystectomy  1980    H/O bilateral hip replacements  2013, 2014    Other elective surgery  right renal artery stent    S/P colonoscopy        ALLERGIES:  Allergies    IV Contrast (Pruritus; Rash)  Keflex (Other)  verapamil (Other)    Intolerances        SOCIAL HISTORY:  Social History:  non smoker  non alcoholic (11 Mar 2022 18:14)      FAMILY  HISTORY:  FAMILY HISTORY:  Family history of uterine cancer (Sibling)    Family history of carotid artery stenosis (Sibling)        MEDICATIONS:  OUTPATIENT:  Home Medications:  cloNIDine 0.2 mg oral tablet: 1 tab(s) orally 3 times a day  HOLD for SBP &lt; 150   Keep SBP between  150- 180     ***6am, 1pm, 8pm*** (11 Mar 2022 18:19)  clopidogrel 75 mg oral tablet: 1 tab(s) orally once a day (11 Mar 2022 18:19)  losartan 50 mg oral tablet: 1 tab(s) orally 2 times a day  ***6am, 6pm*** (11 Mar 2022 18:19)  Metoprolol Succinate ER 25 mg oral tablet, extended release: 1 tab(s) orally 2 times a day  ***9am, 9pm*** (11 Mar 2022 18:19)  pravastatin 20 mg oral tablet: 1 tab(s) orally once a day (at bedtime) (11 Mar 2022 18:19)  torsemide 10 mg oral tablet: 1 tab(s) orally once a day  ***may take 2nd dose 12 hours later if necessary*** (11 Mar 2022 18:19)      INPATIENT:  MEDICATIONS  (STANDING):  atorvastatin 10 milliGRAM(s) Oral at bedtime  cloNIDine 0.2 milliGRAM(s) Oral three times a day  clopidogrel Tablet 75 milliGRAM(s) Oral daily  hydrALAZINE 25 milliGRAM(s) Oral every 8 hours  losartan 50 milliGRAM(s) Oral two times a day  metoprolol succinate ER 25 milliGRAM(s) Oral two times a day  pantoprazole  Injectable 40 milliGRAM(s) IV Push daily  torsemide 10 milliGRAM(s) Oral daily    MEDICATIONS  (PRN):    MEDICATIONS  (PRN):    REVIEW OF SYSTEMS:  ===============================  ===============================  CONSTITUTIONAL: No weakness, fevers or chills  EYES/ENT: No visual changes;  No vertigo or throat pain   NECK: No pain or stiffness  RESPIRATORY: No cough, wheezing, hemoptysis; No shortness of breath  CARDIOVASCULAR: No chest pain or palpitations  GASTROINTESTINAL: No abdominal or epigastric pain. No nausea, vomiting, or hematemesis;   No diarrhea or constipation. No melena or hematochezia.  GENITOURINARY: No dysuria, frequency or hematuria  NEUROLOGICAL: No numbness or weakness  SKIN: No itching, burning, rashes, or lesions   All other review of systems is negative unless indicated above    Vital Signs Last 24 Hrs  T(C): 36.3 (13 Mar 2022 08:58), Max: 36.7 (12 Mar 2022 21:02)  T(F): 97.4 (13 Mar 2022 08:58), Max: 98 (12 Mar 2022 21:02)  HR: 75 (13 Mar 2022 08:58) (62 - 75)  BP: 138/62 (13 Mar 2022 08:58) (138/49 - 168/60)  BP(mean): --  RR: 18 (13 Mar 2022 08:58) (16 - 18)  SpO2: 97% (13 Mar 2022 08:58) (95% - 98%)    Orthostatic VS    03-12-22 @ 06:21  Lying BP: 145/49 HR: 70   Sitting BP: 150/52 HR: 74  Standing BP: 177/76 HR: 86  Site: upper left arm   Mode: electronic      I&O's Summary    I&O's Detail      I&O's Detail    11 Mar 2022 07:01  -  12 Mar 2022 07:00  --------------------------------------------------------  IN:  Total IN: 0 mL    OUT:    Voided (mL): 350 mL  Total OUT: 350 mL    Total NET: -350 mL          PHYSICAL EXAM:    Constitutional: NAD, awake and alert,   HEENT: PERR, EOMI,  No oral cyananosis.  Neck:  supple,  No JVD  Respiratory: Breath sounds are clear bilaterally, No wheezing, rales or rhonchi  Cardiovascular: S1 and S2, regular rate and rhythm, no Murmurs, gallops or rubs  Gastrointestinal: Bowel Sounds present, soft, nontender.   Extremities: No peripheral edema. No clubbing or cyanosis.  Vascular: 2+ peripheral pulses  Neurological: A/O x 3, no focal deficits  Musculoskeletal: no calf tenderness.  Skin: No rashes.    ===============================  ===============================  LABS:                         7.3    x     )-----------( x        ( 13 Mar 2022 07:32 )             24.3                             7.1    5.45  )-----------( 358      ( 12 Mar 2022 08:27 )             24.2                         8.1    9.41  )-----------( 407      ( 11 Mar 2022 13:27 )             26.2     11 Mar 2022 13:27    137    |  107    |  39     ----------------------------<  115    4.6     |  25     |  1.13     Ca    8.4        11 Mar 2022 13:27    TPro  5.7    /  Alb  2.8    /  TBili  0.7    /  DBili  x      /  AST  27     /  ALT  19     /  AlkPhos  115    11 Mar 2022 13:27    PT/INR - ( 11 Mar 2022 13:27 )   PT: 11.7 sec;   INR: 1.01 ratio         PTT - ( 11 Mar 2022 13:27 )  PTT:29.9 sec    THYROID STUDIES:    ===============================  ===============================  CARDIAC BIOMARKERS:  -BNP VALUES:      -TROPONIN VALUES:  -------  lab item   Troponin I, High Sensitivity Result: 38.23 ng/L (03-12-22 @ 11:59)  Troponin I, High Sensitivity Result: 60.14 ng/L *H* (03-11-22 @ 16:55)  Troponin I, High Sensitivity Result: 49.47 ng/L (03-11-22 @ 13:27)    ===============================  ===============================  EKG: NSR, no ischemic changes    ===============================  ECHO:  < from: TTE Echo Complete w/o Contrast w/ Doppler (03.12.22 @ 09:34) >  Impression     Summary     The left ventricle is normal in size, wall thickness, wall motion and   contractility.   Estimated left ventricular ejection fraction is 60-65 %.   The left atrium is mildly dilated.   Mild to moderate aortic regurgitation   Mild (1+) tricuspid valve regurgitation. Normal PA systolic pressures.   EA reversal of the mitral inflow consistent with reduced compliance of   the   left ventricle.     Signature      < end of copied text >    ================

## 2022-03-13 NOTE — PROGRESS NOTE ADULT - SUBJECTIVE AND OBJECTIVE BOX
HPI: 89/Fwith PMHx of inguinal hernia, CHF, renal artery stenosis s/p stent, basal cell carcinoma, CVA, hyponatremia, dyslipidemia, HTN presents to the ED s/p fall. Pt states she doesn't know what happened. Denies dizziness, lightheadedness, SOB, CP. +Head trauma. No LOC. Pt reports she in on Plavix and able to ambulate after fall. She denies cp/sob/abd pain    Had skin lacerations, right for arm laceration sutured in ED.     3/12: Hb 7.1, decreased  d/c asa 81  FOBT awaited  GI consult  iv protonix  orthostatics neg  cardio awaited    3/13: no complaints  Hb 7.3  iron def anemia, ferrous sulfate started      PHYSICAL EXAM:    Vital Signs Last 24 Hrs  T(C): 36.3 (13 Mar 2022 08:58), Max: 36.7 (12 Mar 2022 21:02)  T(F): 97.4 (13 Mar 2022 08:58), Max: 98 (12 Mar 2022 21:02)  HR: 75 (13 Mar 2022 08:58) (62 - 75)  BP: 138/62 (13 Mar 2022 08:58) (138/49 - 153/60)  BP(mean): --  RR: 18 (13 Mar 2022 08:58) (16 - 18)  SpO2: 97% (13 Mar 2022 08:58) (95% - 98%)    Constitutional: Weak  appearing  HEENT: Atraumatic, KWAKU,   Respiratory: Breath Sounds normal, no rhonchi/wheeze  Cardiovascular: N S1S2;   Gastrointestinal: Abdomen soft, non tender, Bowel Sounds present  Extremities: No edema, peripheral pulses present  Neurological: AAO x 3, no gross focal motor deficits  Skin: Non cellulitic, no rash, ulcers  Lymph Nodes: No lymphadenopathy noted  Back: No CVA tenderness   Musculoskeletal: non tender  Breasts: Deferred  Genitourinary: deferred  Rectal: Deferred    All Labs/EKG/Radiology/Meds reviewed by me                          7.3    x     )-----------( x        ( 13 Mar 2022 07:32 )             24.3                           7.1    5.45  )-----------( 358      ( 12 Mar 2022 08:27 )             24.2       CBC Full  -  ( 12 Mar 2022 08:27 )  WBC Count : 5.45 K/uL  RBC Count : 2.85 M/uL  Hemoglobin : 7.1 g/dL  Hematocrit : 24.2 %  Platelet Count - Automated : 358 K/uL  Mean Cell Volume : 84.9 fl  Mean Cell Hemoglobin : 24.9 pg  Mean Cell Hemoglobin Concentration : 29.3 gm/dL  Auto Neutrophil # : x  Auto Lymphocyte # : x  Auto Monocyte # : x  Auto Eosinophil # : x  Auto Basophil # : x  Auto Neutrophil % : x  Auto Lymphocyte % : x  Auto Monocyte % : x  Auto Eosinophil % : x  Auto Basophil % : x          137  |  107  |  39<H>  ----------------------------<  115<H>  4.6   |  25  |  1.13    Ca    8.4<L>      11 Mar 2022 13:27    TPro  5.7<L>  /  Alb  2.8<L>  /  TBili  0.7  /  DBili  x   /  AST  27  /  ALT  19  /  AlkPhos  115  03-11      LIVER FUNCTIONS - ( 11 Mar 2022 13:27 )  Alb: 2.8 g/dL / Pro: 5.7 gm/dL / ALK PHOS: 115 U/L / ALT: 19 U/L / AST: 27 U/L / GGT: x             PT/INR - ( 11 Mar 2022 13:27 )   PT: 11.7 sec;   INR: 1.01 ratio         PTT - ( 11 Mar 2022 13:27 )  PTT:29.9 sec          Urinalysis Basic - ( 11 Mar 2022 23:30 )    Color: Yellow / Appearance: Clear / S.005 / pH: x  Gluc: x / Ketone: Negative  / Bili: Negative / Urobili: Negative   Blood: x / Protein: 100 / Nitrite: Negative   Leuk Esterase: Trace / RBC: Negative /HPF / WBC 0-2   Sq Epi: x / Non Sq Epi: Occasional / Bacteria: Negative            MEDICATIONS  (STANDING):  atorvastatin 10 milliGRAM(s) Oral at bedtime  cloNIDine 0.2 milliGRAM(s) Oral three times a day  clopidogrel Tablet 75 milliGRAM(s) Oral daily  hydrALAZINE 25 milliGRAM(s) Oral every 8 hours  losartan 50 milliGRAM(s) Oral two times a day  metoprolol succinate ER 25 milliGRAM(s) Oral two times a day  pantoprazole  Injectable 40 milliGRAM(s) IV Push daily  torsemide 10 milliGRAM(s) Oral daily    MEDICATIONS  (PRN):

## 2022-03-13 NOTE — PROGRESS NOTE ADULT - PROBLEM SELECTOR PLAN 1
-Etiology unclear. Awaiting Echo  Orthostatics negative  elevated trop most likely due to demand ischemia  -recc workup for anemia  3/13 H/H 7.3 - gastro indicates colonoscopy/ endoscopy for 3/14 for further evaluation   pt cleared for procedure -Etiology unclear. Awaiting Echo  Orthostatics negative  elevated trop most likely due to demand ischemia  -recc workup for anemia  3/13 H/H 7.3 - gastro indicates colonoscopy/ endoscopy for 3/14 for further evaluation   pt cleared from cardiology  for colonoscopy/endoscopy -Etiology unclear. Orthostatics negative.  Tele unremarkable.  Echo w/o significant abnormalities.  Suspect hypovolemia 2/2 anemia.  cont. tele monitoring,  will consider OP ambulatory tele monitoring on discharge.  -elevated trop most likely due to demand ischemia 2/2 anemia    -recc workup for anemia  3/13 H/H 7.3 - GI plans for colonoscopy/ endoscopy for 3/14 for further evaluation   pt cleared from cardiology  for colonoscopy/endoscopy - low   risk for cardiac events for low risk procedure.

## 2022-03-14 ENCOUNTER — TRANSCRIPTION ENCOUNTER (OUTPATIENT)
Age: 87
End: 2022-03-14

## 2022-03-14 ENCOUNTER — RESULT REVIEW (OUTPATIENT)
Age: 87
End: 2022-03-14

## 2022-03-14 LAB
HCT VFR BLD CALC: 29.7 % — LOW (ref 34.5–45)
HGB BLD-MCNC: 8.5 G/DL — LOW (ref 11.5–15.5)

## 2022-03-14 PROCEDURE — 99232 SBSQ HOSP IP/OBS MODERATE 35: CPT

## 2022-03-14 PROCEDURE — 88342 IMHCHEM/IMCYTCHM 1ST ANTB: CPT | Mod: 26

## 2022-03-14 PROCEDURE — 88313 SPECIAL STAINS GROUP 2: CPT | Mod: 26

## 2022-03-14 PROCEDURE — 88305 TISSUE EXAM BY PATHOLOGIST: CPT | Mod: 26

## 2022-03-14 PROCEDURE — 88312 SPECIAL STAINS GROUP 1: CPT | Mod: 26

## 2022-03-14 RX ORDER — PANTOPRAZOLE SODIUM 20 MG/1
40 TABLET, DELAYED RELEASE ORAL
Refills: 0 | Status: DISCONTINUED | OUTPATIENT
Start: 2022-03-15 | End: 2022-03-18

## 2022-03-14 RX ADMIN — CLOPIDOGREL BISULFATE 75 MILLIGRAM(S): 75 TABLET, FILM COATED ORAL at 10:50

## 2022-03-14 RX ADMIN — Medication 0.2 MILLIGRAM(S): at 20:41

## 2022-03-14 RX ADMIN — Medication 25 MILLIGRAM(S): at 15:02

## 2022-03-14 RX ADMIN — Medication 25 MILLIGRAM(S): at 20:42

## 2022-03-14 RX ADMIN — Medication 25 MILLIGRAM(S): at 05:45

## 2022-03-14 RX ADMIN — Medication 0.2 MILLIGRAM(S): at 15:02

## 2022-03-14 RX ADMIN — PANTOPRAZOLE SODIUM 40 MILLIGRAM(S): 20 TABLET, DELAYED RELEASE ORAL at 10:51

## 2022-03-14 RX ADMIN — Medication 10 MILLIGRAM(S): at 10:52

## 2022-03-14 RX ADMIN — Medication 0.2 MILLIGRAM(S): at 05:45

## 2022-03-14 RX ADMIN — LOSARTAN POTASSIUM 50 MILLIGRAM(S): 100 TABLET, FILM COATED ORAL at 20:41

## 2022-03-14 RX ADMIN — LOSARTAN POTASSIUM 50 MILLIGRAM(S): 100 TABLET, FILM COATED ORAL at 10:51

## 2022-03-14 RX ADMIN — ATORVASTATIN CALCIUM 10 MILLIGRAM(S): 80 TABLET, FILM COATED ORAL at 20:42

## 2022-03-14 RX ADMIN — Medication 25 MILLIGRAM(S): at 10:51

## 2022-03-14 RX ADMIN — Medication 325 MILLIGRAM(S): at 10:51

## 2022-03-14 NOTE — DISCHARGE NOTE NURSING/CASE MANAGEMENT/SOCIAL WORK - NSDCVIVACCINE_GEN_ALL_CORE_FT
influenza, injectable, quadrivalent, preservative free; 20-Oct-2017 08:49; Fahrbach, Carey L (RN); [employer]; [id number]; IntraMuscular; Deltoid Left.; 0.5 milliLiter(s); VIS (VIS Published: 07-Aug-2015, VIS Presented: 20-Oct-2017);

## 2022-03-14 NOTE — PROGRESS NOTE ADULT - SUBJECTIVE AND OBJECTIVE BOX
HPI: 89/Fwith PMHx of inguinal hernia, CHF, renal artery stenosis s/p stent, basal cell carcinoma, CVA, hyponatremia, dyslipidemia, HTN presents to the ED s/p fall. Pt states she doesn't know what happened. Denies dizziness, lightheadedness, SOB, CP. +Head trauma. No LOC. Pt reports she in on Plavix and able to ambulate after fall. She denies cp/sob/abd pain    Had skin lacerations, right for arm laceration sutured in ED.     3/12: Hb 7.1, decreased  d/c asa 81  FOBT awaited  GI consult  iv protonix  orthostatics neg  cardio awaited    3/13: no complaints  Hb 7.3  iron def anemia, ferrous sulfate started    3/14: EGD/ colonoscopy neg  r/o celiac disease  BP elevated 170  Hb 8.5      PHYSICAL EXAM:    Vital Signs Last 24 Hrs  T(C): 36.8 (14 Mar 2022 13:46), Max: 36.8 (14 Mar 2022 13:46)  T(F): 98.2 (14 Mar 2022 13:46), Max: 98.2 (14 Mar 2022 13:46)  HR: 66 (14 Mar 2022 13:46) (61 - 71)  BP: 168/65 (14 Mar 2022 13:46) (133/62 - 191/72)  BP(mean): --  RR: 20 (14 Mar 2022 13:46) (18 - 20)  SpO2: 99% (14 Mar 2022 13:46) (96% - 99%)    Constitutional: Well  appearing  HEENT: Atraumatic, KWAKU,   Respiratory: Breath Sounds normal, no rhonchi/wheeze  Cardiovascular: N S1S2;   Gastrointestinal: Abdomen soft, non tender, Bowel Sounds present  Extremities: No edema, peripheral pulses present  Neurological: AAO x 3, no gross focal motor deficits  Skin: Non cellulitic, no rash, ulcers  Lymph Nodes: No lymphadenopathy noted  Back: No CVA tenderness   Musculoskeletal: non tender  Breasts: Deferred  Genitourinary: deferred  Rectal: Deferred    All Labs/EKG/Radiology/Meds reviewed by me                          8.5    x     )-----------( x        ( 14 Mar 2022 12:18 )             29.7                           7.3    x     )-----------( x        ( 13 Mar 2022 07:32 )             24.3                           7.1    5.45  )-----------( 358      ( 12 Mar 2022 08:27 )             24.2       CBC Full  -  ( 12 Mar 2022 08:27 )  WBC Count : 5.45 K/uL  RBC Count : 2.85 M/uL  Hemoglobin : 7.1 g/dL  Hematocrit : 24.2 %  Platelet Count - Automated : 358 K/uL  Mean Cell Volume : 84.9 fl  Mean Cell Hemoglobin : 24.9 pg  Mean Cell Hemoglobin Concentration : 29.3 gm/dL  Auto Neutrophil # : x  Auto Lymphocyte # : x  Auto Monocyte # : x  Auto Eosinophil # : x  Auto Basophil # : x  Auto Neutrophil % : x  Auto Lymphocyte % : x  Auto Monocyte % : x  Auto Eosinophil % : x  Auto Basophil % : x      0311    137  |  107  |  39<H>  ----------------------------<  115<H>  4.6   |  25  |  1.13    Ca    8.4<L>      11 Mar 2022 13:27    TPro  5.7<L>  /  Alb  2.8<L>  /  TBili  0.7  /  DBili  x   /  AST  27  /  ALT  19  /  AlkPhos  115  03-11      LIVER FUNCTIONS - ( 11 Mar 2022 13:27 )  Alb: 2.8 g/dL / Pro: 5.7 gm/dL / ALK PHOS: 115 U/L / ALT: 19 U/L / AST: 27 U/L / GGT: x             PT/INR - ( 11 Mar 2022 13:27 )   PT: 11.7 sec;   INR: 1.01 ratio         PTT - ( 11 Mar 2022 13:27 )  PTT:29.9 sec          Urinalysis Basic - ( 11 Mar 2022 23:30 )    Color: Yellow / Appearance: Clear / S.005 / pH: x  Gluc: x / Ketone: Negative  / Bili: Negative / Urobili: Negative   Blood: x / Protein: 100 / Nitrite: Negative   Leuk Esterase: Trace / RBC: Negative /HPF / WBC 0-2   Sq Epi: x / Non Sq Epi: Occasional / Bacteria: Negative      MEDICATIONS  (STANDING):  atorvastatin 10 milliGRAM(s) Oral at bedtime  cloNIDine 0.2 milliGRAM(s) Oral three times a day  clopidogrel Tablet 75 milliGRAM(s) Oral daily  ferrous    sulfate 325 milliGRAM(s) Oral daily  hydrALAZINE 25 milliGRAM(s) Oral every 8 hours  losartan 50 milliGRAM(s) Oral two times a day  metoprolol succinate ER 25 milliGRAM(s) Oral two times a day  torsemide 10 milliGRAM(s) Oral daily

## 2022-03-14 NOTE — DISCHARGE NOTE NURSING/CASE MANAGEMENT/SOCIAL WORK - PATIENT PORTAL LINK FT
You can access the FollowMyHealth Patient Portal offered by [facility] by registering at the following website: [email or web address]. By joining [employer]’s FollowMyHealth portal, you will also be able to view your health information using other applications (apps) compatible with our system.

## 2022-03-14 NOTE — DISCHARGE NOTE NURSING/CASE MANAGEMENT/SOCIAL WORK - [id number]_GEN_ALL_CORE
For information on Fall & Injury Prevention, visit: [email or web address] OR  [email or web address] OR  https://www.cdc.gov/steadi/patient.html

## 2022-03-14 NOTE — PROGRESS NOTE ADULT - SUBJECTIVE AND OBJECTIVE BOX
EGD findings of duodenal atrophy and scalloping suspicious for celiac. Esophagitis noted.   Colonoscopy several small polyps. Mild diverticulosis.    Rec:  -check celiac abs and follow up bx  -resume diet  -resume plavix tomorrow  -trend CBC

## 2022-03-14 NOTE — PROGRESS NOTE ADULT - ASSESSMENT
89/Fwith PMHx of inguinal hernia, CHF, renal artery stenosis s/p stent, basal cell carcinoma, CVA, hyponatremia, dyslipidemia, HTN presents to the ED s/p fall. Pt states she doesn't know what happened. Denies dizziness, lightheadedness, SOB, CP. +Head trauma. No LOC. Pt reports she in on Plavix and able to ambulate after fall. She denies cp/sob/abd pain    Had skin lacerations, right for arm laceration sutured in ED.     Pt admitted with     1) Fall + Syncope + Abn Trop:  admitted to tele  fall precautions  serial cardiac enz  cont plavix  echo :  Estimated left ventricular ejection fraction is 60-65 %. E to A reversal  cardio consult appreciated  PT consult  check ua to r/o uti; NO uti  check orthostatics; neg    2) Anemia : acute on chronic, iron def  Hb 8.1  baseline 10.1  stool could not be obtained in ED for guaiac test  FOBT awaited  cbc in am; Hb 7.1  serial H/H; transfuse if less than 7 and if family agrees  GI consult to r/o GI bleed/ EGD/colonoscopy: EGD/colonoscopy on Monday; neg  asa d/nathan  ferrous sulfate for iron def anemia  r/o Celiac disease; work up ordered as per GI    3) Skin lacerations:, right fore arm laceration sutured in ED.     4) HTN: cont home meds    5) DVT PPX: venodynes    poc discussed with pt, her son, Nick, [phone],  team

## 2022-03-14 NOTE — PHARMACOTHERAPY INTERVENTION NOTE - COMMENTS
change to PO
Medication history complete, reviewed medication with patients son and list provided and confirmed with DrFirst.

## 2022-03-15 LAB
HCT VFR BLD CALC: 26.3 % — LOW (ref 34.5–45)
HGB BLD-MCNC: 7.6 G/DL — LOW (ref 11.5–15.5)
MCHC RBC-ENTMCNC: 24.9 PG — LOW (ref 27–34)
MCHC RBC-ENTMCNC: 28.9 GM/DL — LOW (ref 32–36)
MCV RBC AUTO: 86.2 FL — SIGNIFICANT CHANGE UP (ref 80–100)
PLATELET # BLD AUTO: 403 K/UL — HIGH (ref 150–400)
RBC # BLD: 3.05 M/UL — LOW (ref 3.8–5.2)
RBC # FLD: 14.4 % — SIGNIFICANT CHANGE UP (ref 10.3–14.5)
WBC # BLD: 7.14 K/UL — SIGNIFICANT CHANGE UP (ref 3.8–10.5)
WBC # FLD AUTO: 7.14 K/UL — SIGNIFICANT CHANGE UP (ref 3.8–10.5)

## 2022-03-15 PROCEDURE — 99232 SBSQ HOSP IP/OBS MODERATE 35: CPT

## 2022-03-15 RX ADMIN — Medication 0.2 MILLIGRAM(S): at 05:51

## 2022-03-15 RX ADMIN — Medication 25 MILLIGRAM(S): at 05:50

## 2022-03-15 RX ADMIN — Medication 10 MILLIGRAM(S): at 10:35

## 2022-03-15 RX ADMIN — LOSARTAN POTASSIUM 50 MILLIGRAM(S): 100 TABLET, FILM COATED ORAL at 21:31

## 2022-03-15 RX ADMIN — Medication 25 MILLIGRAM(S): at 16:27

## 2022-03-15 RX ADMIN — Medication 0.2 MILLIGRAM(S): at 16:27

## 2022-03-15 RX ADMIN — Medication 325 MILLIGRAM(S): at 10:36

## 2022-03-15 RX ADMIN — Medication 25 MILLIGRAM(S): at 21:30

## 2022-03-15 RX ADMIN — Medication 0.2 MILLIGRAM(S): at 21:30

## 2022-03-15 RX ADMIN — CLOPIDOGREL BISULFATE 75 MILLIGRAM(S): 75 TABLET, FILM COATED ORAL at 10:35

## 2022-03-15 RX ADMIN — Medication 25 MILLIGRAM(S): at 10:35

## 2022-03-15 RX ADMIN — LOSARTAN POTASSIUM 50 MILLIGRAM(S): 100 TABLET, FILM COATED ORAL at 10:35

## 2022-03-15 RX ADMIN — ATORVASTATIN CALCIUM 10 MILLIGRAM(S): 80 TABLET, FILM COATED ORAL at 21:31

## 2022-03-15 RX ADMIN — PANTOPRAZOLE SODIUM 40 MILLIGRAM(S): 20 TABLET, DELAYED RELEASE ORAL at 05:51

## 2022-03-15 NOTE — PROGRESS NOTE ADULT - PROBLEM SELECTOR PLAN 1
-Etiology unclear. Orthostatics negative.  Tele shows SR  Echo w/o significant abnormalities.  Suspect hypovolemia 2/2 anemia.    will consider OP ambulatory tele monitoring on discharge.  -elevated trop most likely due to demand ischemia 2/2 anemia    GI to manage anemia (H&H today 7.6 & 26.3 -Etiology unclear. Orthostatics negative.  Tele shows SR  Echo w/o significant abnormalities.  Suspect hypovolemia 2/2 anemia.    will consider OP ambulatory tele monitoring on discharge.  -elevated trop most likely due to demand ischemia 2/2 anemia Normal ventricular function     GI to manage anemia (H&H today 7.6 & 26.3)    further work up as per hospitalist and GI

## 2022-03-15 NOTE — PROGRESS NOTE ADULT - SUBJECTIVE AND OBJECTIVE BOX
89/Fwith PMHx of inguinal hernia,CHF, renal artery stenosis s/p stent, basal cell carcinoma, CVA, hyponatremia, dyslipidemia, HTN presents to the ED s/p fall.   Pt states she doesn't know what happened. Denies dizziness, lightheadedness, SOB, CP. +Head trauma. No LOC. Pt reports she in on Plavix and able to ambulate after fall. She denies cp/sob/abd pain Had skin lacerations, right for arm laceration sutured in ED.     Admitted for syncope, consulted for same. Reviewed history: pt was walking w/ walker in house not on feet for prolonged period.Then felt "weird" not lightheaded.Then woke up on ground.  Duration of LOC min-sec.No witnesses.  Urinated on herself no tongue biting.Knew where she was.  Called son who helped herwent to vascular clinic apt mentioned fall & referred to ED.    3/13/22 Pt alert and awake in bed, no acute distress noted. No c/o chest pain, sob, dizziness, light headed and other cardiac symptoms. Tele No adverse events overnight.   3/15/22: Patient sitting in chair without complaints of cp,sob,palpitations, lightheadedness    ALLERGIES:  Allergies    IV Contrast (Pruritus; Rash)  Keflex (Other)  verapamil (Other)    Intolerances    MEDICATIONS:  OUTPATIENT:  Home Medications:  cloNIDine 0.2 mg oral tablet: 1 tab(s) orally 3 times a day  HOLD for SBP &lt; 150   Keep SBP between  150- 180     ***6am, 1pm, 8pm*** (11 Mar 2022 18:19)  clopidogrel 75 mg oral tablet: 1 tab(s) orally once a day (11 Mar 2022 18:19)  losartan 50 mg oral tablet: 1 tab(s) orally 2 times a day  ***6am, 6pm*** (11 Mar 2022 18:19)  Metoprolol Succinate ER 25 mg oral tablet, extended release: 1 tab(s) orally 2 times a day  ***9am, 9pm*** (11 Mar 2022 18:19)  pravastatin 20 mg oral tablet: 1 tab(s) orally once a day (at bedtime) (11 Mar 2022 18:19)  torsemide 10 mg oral tablet: 1 tab(s) orally once a day  ***may take 2nd dose 12 hours later if necessary*** (11 Mar 2022 18:19)      MEDICATIONS  (STANDING):  atorvastatin 10 milliGRAM(s) Oral at bedtime  cloNIDine 0.2 milliGRAM(s) Oral three times a day  clopidogrel Tablet 75 milliGRAM(s) Oral daily  ferrous    sulfate 325 milliGRAM(s) Oral daily  hydrALAZINE 25 milliGRAM(s) Oral every 8 hours  losartan 50 milliGRAM(s) Oral two times a day  metoprolol succinate ER 25 milliGRAM(s) Oral two times a day  pantoprazole    Tablet 40 milliGRAM(s) Oral before breakfast  torsemide 10 milliGRAM(s) Oral daily    MEDICATIONS  (PRN):      Vital Signs Last 24 Hrs  T(C): 36.8 (15 Mar 2022 07:06), Max: 36.8 (14 Mar 2022 13:46)  T(F): 98.3 (15 Mar 2022 07:06), Max: 98.3 (14 Mar 2022 20:14)  HR: 64 (15 Mar 2022 07:06) (62 - 76)  BP: 130/53 (15 Mar 2022 07:06) (130/53 - 171/66)  BP(mean): --  RR: 19 (15 Mar 2022 07:06) (19 - 20)  SpO2: 94% (15 Mar 2022 07:06) (94% - 99%)    Orthostatic VS    03-12-22 @ 06:21  Lying BP: 145/49 HR: 70   Sitting BP: 150/52 HR: 74  Standing BP: 177/76 HR: 86  Site: upper left arm   Mode: electronic      I&O's Detail    11 Mar 2022 07:01  -  12 Mar 2022 07:00  --------------------------------------------------------  IN:  Total IN: 0 mL    OUT:    Voided (mL): 350 mL  Total OUT: 350 mL    Total NET: -350 mL      PHYSICAL EXAM:    Constitutional: NAD, awake and alert,   HEENT: PERR, EOMI,  No oral cyananosis.  Neck:  supple,  No JVD  Respiratory: Breath sounds are clear bilaterally, No wheezing, rales or rhonchi  Cardiovascular: S1 and S2, regular rate and rhythm, no Murmurs, gallops or rubs  Gastrointestinal: Bowel Sounds present, soft, nontender.   Extremities: No peripheral edema. No clubbing or cyanosis.  Vascular: 2+ peripheral pulses  Neurological: A/O x 3, no focal deficits  Musculoskeletal: no calf tenderness.  Skin: No rashes.    ===============================  ===============================  LABS:                           7.6    7.14  )-----------( 403      ( 15 Mar 2022 07:59 )             26.3                           7.3    x     )-----------( x        ( 13 Mar 2022 07:32 )             24.3                             7.1    5.45  )-----------( 358      ( 12 Mar 2022 08:27 )             24.2                         8.1    9.41  )-----------( 407      ( 11 Mar 2022 13:27 )             26.2         11 Mar 2022 13:27    137    |  107    |  39     ----------------------------<  115    4.6     |  25     |  1.13     Ca    8.4        11 Mar 2022 13:27    TPro  5.7    /  Alb  2.8    /  TBili  0.7    /  DBili  x      /  AST  27     /  ALT  19     /  AlkPhos  115    11 Mar 2022 13:27    PT/INR - ( 11 Mar 2022 13:27 )   PT: 11.7 sec;   INR: 1.01 ratio         PTT - ( 11 Mar 2022 13:27 )  PTT:29.9 sec    THYROID STUDIES:    ===============================  ===============================  CARDIAC BIOMARKERS:  -BNP VALUES:      -TROPONIN VALUES:  -------  lab item   Troponin I, High Sensitivity Result: 38.23 ng/L (03-12-22 @ 11:59)  Troponin I, High Sensitivity Result: 60.14 ng/L *H* (03-11-22 @ 16:55)  Troponin I, High Sensitivity Result: 49.47 ng/L (03-11-22 @ 13:27)    ===============================  ===============================  EKG: NSR, no ischemic changes    ===============================  ECHO:  < from: TTE Echo Complete w/o Contrast w/ Doppler (03.12.22 @ 09:34) >  Impression     Summary     The left ventricle is normal in size, wall thickness, wall motion and   contractility.   Estimated left ventricular ejection fraction is 60-65 %.   The left atrium is mildly dilated.   Mild to moderate aortic regurgitation   Mild (1+) tricuspid valve regurgitation. Normal PA systolic pressures.   EA reversal of the mitral inflow consistent with reduced compliance of   the   left ventricle.     Signature      < end of copied text >    ================                       89/Fwith PMHx of inguinal hernia,CHF, renal artery stenosis s/p stent, basal cell carcinoma, CVA, hyponatremia, dyslipidemia, HTN presents to the ED s/p fall.   Pt states she doesn't know what happened. Denies dizziness, lightheadedness, SOB, CP. +Head trauma. No LOC. Pt reports she in on Plavix and able to ambulate after fall. She denies cp/sob/abd pain Had skin lacerations, right for arm laceration sutured in ED.     Admitted for syncope, consulted for same. Reviewed history: pt was walking w/ walker in house not on feet for prolonged period.Then felt "weird" not lightheaded.Then woke up on ground.  Duration of LOC min-sec.No witnesses.  Urinated on herself no tongue biting.Knew where she was.  Called son who helped herwent to vascular clinic apt mentioned fall & referred to ED.    3/13/22 Pt alert and awake in bed, no acute distress noted. No c/o chest pain, sob, dizziness, light headed and other cardiac symptoms. Tele No adverse events overnight.   3/15/22: Patient sitting in chair without complaints of cp,sob,palpitations, lightheadedness   dropped hemoglobin     ALLERGIES:  Allergies    IV Contrast (Pruritus; Rash)  Keflex (Other)  verapamil (Other)    Intolerances    MEDICATIONS:  OUTPATIENT:  Home Medications:  cloNIDine 0.2 mg oral tablet: 1 tab(s) orally 3 times a day  HOLD for SBP &lt; 150   Keep SBP between  150- 180     ***6am, 1pm, 8pm*** (11 Mar 2022 18:19)  clopidogrel 75 mg oral tablet: 1 tab(s) orally once a day (11 Mar 2022 18:19)  losartan 50 mg oral tablet: 1 tab(s) orally 2 times a day  ***6am, 6pm*** (11 Mar 2022 18:19)  Metoprolol Succinate ER 25 mg oral tablet, extended release: 1 tab(s) orally 2 times a day  ***9am, 9pm*** (11 Mar 2022 18:19)  pravastatin 20 mg oral tablet: 1 tab(s) orally once a day (at bedtime) (11 Mar 2022 18:19)  torsemide 10 mg oral tablet: 1 tab(s) orally once a day  ***may take 2nd dose 12 hours later if necessary*** (11 Mar 2022 18:19)      MEDICATIONS  (STANDING):  atorvastatin 10 milliGRAM(s) Oral at bedtime  cloNIDine 0.2 milliGRAM(s) Oral three times a day  clopidogrel Tablet 75 milliGRAM(s) Oral daily  ferrous    sulfate 325 milliGRAM(s) Oral daily  hydrALAZINE 25 milliGRAM(s) Oral every 8 hours  losartan 50 milliGRAM(s) Oral two times a day  metoprolol succinate ER 25 milliGRAM(s) Oral two times a day  pantoprazole    Tablet 40 milliGRAM(s) Oral before breakfast  torsemide 10 milliGRAM(s) Oral daily    MEDICATIONS  (PRN):      Vital Signs Last 24 Hrs  T(C): 36.8 (15 Mar 2022 07:06), Max: 36.8 (14 Mar 2022 13:46)  T(F): 98.3 (15 Mar 2022 07:06), Max: 98.3 (14 Mar 2022 20:14)  HR: 64 (15 Mar 2022 07:06) (62 - 76)  BP: 130/53 (15 Mar 2022 07:06) (130/53 - 171/66)  BP(mean): --  RR: 19 (15 Mar 2022 07:06) (19 - 20)  SpO2: 94% (15 Mar 2022 07:06) (94% - 99%)    Orthostatic VS    03-12-22 @ 06:21  Lying BP: 145/49 HR: 70   Sitting BP: 150/52 HR: 74  Standing BP: 177/76 HR: 86  Site: upper left arm   Mode: electronic      I&O's Detail    11 Mar 2022 07:01  -  12 Mar 2022 07:00  --------------------------------------------------------  IN:  Total IN: 0 mL    OUT:    Voided (mL): 350 mL  Total OUT: 350 mL    Total NET: -350 mL      PHYSICAL EXAM:    Constitutional: NAD, awake and alert,   HEENT: PERR, EOMI,  No oral cyananosis.  Neck:  supple,  No JVD  Respiratory: Breath sounds are clear bilaterally, No wheezing, rales or rhonchi  Cardiovascular: S1 and S2, regular rate and rhythm, no Murmurs, gallops or rubs  Gastrointestinal: Bowel Sounds present, soft, nontender.   Extremities: No peripheral edema. No clubbing or cyanosis.  Vascular: 2+ peripheral pulses  Neurological: A/O x 3, no focal deficits  Musculoskeletal: no calf tenderness.  Skin: No rashes.    ===============================  ===============================  LABS:                           7.6    7.14  )-----------( 403      ( 15 Mar 2022 07:59 )             26.3                           7.3    x     )-----------( x        ( 13 Mar 2022 07:32 )             24.3                             7.1    5.45  )-----------( 358      ( 12 Mar 2022 08:27 )             24.2                         8.1    9.41  )-----------( 407      ( 11 Mar 2022 13:27 )             26.2         11 Mar 2022 13:27    137    |  107    |  39     ----------------------------<  115    4.6     |  25     |  1.13     Ca    8.4        11 Mar 2022 13:27    TPro  5.7    /  Alb  2.8    /  TBili  0.7    /  DBili  x      /  AST  27     /  ALT  19     /  AlkPhos  115    11 Mar 2022 13:27    PT/INR - ( 11 Mar 2022 13:27 )   PT: 11.7 sec;   INR: 1.01 ratio         PTT - ( 11 Mar 2022 13:27 )  PTT:29.9 sec    THYROID STUDIES:    ===============================  ===============================  CARDIAC BIOMARKERS:  -BNP VALUES:      -TROPONIN VALUES:  -------  lab item   Troponin I, High Sensitivity Result: 38.23 ng/L (03-12-22 @ 11:59)  Troponin I, High Sensitivity Result: 60.14 ng/L *H* (03-11-22 @ 16:55)  Troponin I, High Sensitivity Result: 49.47 ng/L (03-11-22 @ 13:27)    ===============================  ===============================  EKG: NSR, no ischemic changes    ===============================  ECHO:  < from: TTE Echo Complete w/o Contrast w/ Doppler (03.12.22 @ 09:34) >  Impression     Summary     The left ventricle is normal in size, wall thickness, wall motion and   contractility.   Estimated left ventricular ejection fraction is 60-65 %.   The left atrium is mildly dilated.   Mild to moderate aortic regurgitation   Mild (1+) tricuspid valve regurgitation. Normal PA systolic pressures.   EA reversal of the mitral inflow consistent with reduced compliance of   the   left ventricle.     Signature      < end of copied text >    ================

## 2022-03-15 NOTE — PROGRESS NOTE ADULT - SUBJECTIVE AND OBJECTIVE BOX
HPI: 89/Fwith PMHx of inguinal hernia, CHF, renal artery stenosis s/p stent, basal cell carcinoma, CVA, hyponatremia, dyslipidemia, HTN presents to the ED s/p fall. Pt states she doesn't know what happened. Denies dizziness, lightheadedness, SOB, CP. +Head trauma. No LOC. Pt reports she in on Plavix and able to ambulate after fall. She denies cp/sob/abd pain    Had skin lacerations, right for arm laceration sutured in ED.     3/12: Hb 7.1, decreased  d/c asa 81  FOBT awaited  GI consult  iv protonix  orthostatics neg  cardio awaited    3/13: no complaints  Hb 7.3  iron def anemia, ferrous sulfate started    3/14: EGD/ colonoscopy neg  r/o celiac disease  BP elevated 170  Hb 8.5    3/15:  Hb dropped to 7.6  no GI bleed reported  keep in hospital today cbc in am tomorrow  GI f/u, Celiac Ab f/u; discussed with Dr. Laughlin    PHYSICAL EXAM:    Vital Signs Last 24 Hrs  T(C): 36.6 (15 Mar 2022 16:23), Max: 36.8 (14 Mar 2022 20:14)  T(F): 97.8 (15 Mar 2022 16:23), Max: 98.3 (14 Mar 2022 20:14)  HR: 79 (15 Mar 2022 16:23) (62 - 79)  BP: 163/85 (15 Mar 2022 16:23) (130/53 - 171/66)  BP(mean): --  RR: 19 (15 Mar 2022 16:23) (19 - 20)  SpO2: 98% (15 Mar 2022 16:23) (94% - 99%)    Constitutional: Well  appearing  HEENT: Atraumatic, KWAKU,   Respiratory: Breath Sounds normal, no rhonchi/wheeze  Cardiovascular: N S1S2;   Gastrointestinal: Abdomen soft, non tender, Bowel Sounds present  Extremities: No edema, peripheral pulses present  Neurological: AAO x 3, no gross focal motor deficits  Skin: Non cellulitic, no rash, ulcers  Lymph Nodes: No lymphadenopathy noted  Back: No CVA tenderness   Musculoskeletal: non tender  Breasts: Deferred  Genitourinary: deferred  Rectal: Deferred    All Labs/EKG/Radiology/Meds reviewed by me                        7.6    7.14  )-----------( 403      ( 15 Mar 2022 07:59 )             26.3                           8.5    x     )-----------( x        ( 14 Mar 2022 12:18 )             29.7                           7.3    x     )-----------( x        ( 13 Mar 2022 07:32 )             24.3                           7.1    5.45  )-----------( 358      ( 12 Mar 2022 08:27 )             24.2       CBC Full  -  ( 12 Mar 2022 08:27 )  WBC Count : 5.45 K/uL  RBC Count : 2.85 M/uL  Hemoglobin : 7.1 g/dL  Hematocrit : 24.2 %  Platelet Count - Automated : 358 K/uL  Mean Cell Volume : 84.9 fl  Mean Cell Hemoglobin : 24.9 pg  Mean Cell Hemoglobin Concentration : 29.3 gm/dL  Auto Neutrophil # : x  Auto Lymphocyte # : x  Auto Monocyte # : x  Auto Eosinophil # : x  Auto Basophil # : x  Auto Neutrophil % : x  Auto Lymphocyte % : x  Auto Monocyte % : x  Auto Eosinophil % : x  Auto Basophil % : x      03-11    137  |  107  |  39<H>  ----------------------------<  115<H>  4.6   |  25  |  1.13    Ca    8.4<L>      11 Mar 2022 13:27    TPro  5.7<L>  /  Alb  2.8<L>  /  TBili  0.7  /  DBili  x   /  AST  27  /  ALT  19  /  AlkPhos  115  03-11      LIVER FUNCTIONS - ( 11 Mar 2022 13:27 )  Alb: 2.8 g/dL / Pro: 5.7 gm/dL / ALK PHOS: 115 U/L / ALT: 19 U/L / AST: 27 U/L / GGT: x             PT/INR - ( 11 Mar 2022 13:27 )   PT: 11.7 sec;   INR: 1.01 ratio         PTT - ( 11 Mar 2022 13:27 )  PTT:29.9 sec          Urinalysis Basic - ( 11 Mar 2022 23:30 )    Color: Yellow / Appearance: Clear / S.005 / pH: x  Gluc: x / Ketone: Negative  / Bili: Negative / Urobili: Negative   Blood: x / Protein: 100 / Nitrite: Negative   Leuk Esterase: Trace / RBC: Negative /HPF / WBC 0-2   Sq Epi: x / Non Sq Epi: Occasional / Bacteria: Negative      MEDICATIONS  (STANDING):  atorvastatin 10 milliGRAM(s) Oral at bedtime  cloNIDine 0.2 milliGRAM(s) Oral three times a day  clopidogrel Tablet 75 milliGRAM(s) Oral daily  ferrous    sulfate 325 milliGRAM(s) Oral daily  hydrALAZINE 25 milliGRAM(s) Oral every 8 hours  losartan 50 milliGRAM(s) Oral two times a day  metoprolol succinate ER 25 milliGRAM(s) Oral two times a day  torsemide 10 milliGRAM(s) Oral daily

## 2022-03-15 NOTE — PROGRESS NOTE ADULT - ASSESSMENT
89/Fwith PMHx of inguinal hernia, CHF, renal artery stenosis s/p stent, basal cell carcinoma, CVA, hyponatremia, dyslipidemia, HTN presents to the ED s/p fall. Pt states she doesn't know what happened. Denies dizziness, lightheadedness, SOB, CP. +Head trauma. No LOC. Pt reports she in on Plavix and able to ambulate after fall. She denies cp/sob/abd pain    Had skin lacerations, right for arm laceration sutured in ED.     Pt admitted with     1) Fall + Syncope + Abn Trop:  admitted to tele  fall precautions  serial cardiac enz  cont plavix  echo :  Estimated left ventricular ejection fraction is 60-65 %. E to A reversal  cardio consult appreciated  PT consult  check ua to r/o uti; NO uti  check orthostatics; neg    2) Anemia : acute on chronic, iron def  Hb 8.1  baseline 10.1  stool could not be obtained in ED for guaiac test  FOBT awaited  cbc in am; Hb 7.1  serial H/H; transfuse if less than 7 and if family agrees  GI consult to r/o GI bleed/ EGD/colonoscopy: EGD/colonoscopy on Monday; neg  asa d/nathan  ferrous sulfate for iron def anemia  r/o Celiac disease; work up ordered as per GI    3) Skin lacerations:, right fore arm laceration sutured in ED.     4) HTN: cont home meds    5) DVT PPX: venodynes    poc discussed with pt, her son, Nick, [phone],  team

## 2022-03-16 LAB
ADD ON TEST-SPECIMEN IN LAB: SIGNIFICANT CHANGE UP
ALBUMIN SERPL ELPH-MCNC: 2.3 G/DL — LOW (ref 3.3–5)
ALP SERPL-CCNC: 101 U/L — SIGNIFICANT CHANGE UP (ref 40–120)
ALT FLD-CCNC: 17 U/L — SIGNIFICANT CHANGE UP (ref 12–78)
ANION GAP SERPL CALC-SCNC: 6 MMOL/L — SIGNIFICANT CHANGE UP (ref 5–17)
AST SERPL-CCNC: 19 U/L — SIGNIFICANT CHANGE UP (ref 15–37)
BILIRUB SERPL-MCNC: 0.6 MG/DL — SIGNIFICANT CHANGE UP (ref 0.2–1.2)
BUN SERPL-MCNC: 25 MG/DL — HIGH (ref 7–23)
CALCIUM SERPL-MCNC: 8.1 MG/DL — LOW (ref 8.5–10.1)
CHLORIDE SERPL-SCNC: 116 MMOL/L — HIGH (ref 96–108)
CO2 SERPL-SCNC: 23 MMOL/L — SIGNIFICANT CHANGE UP (ref 22–31)
CREAT SERPL-MCNC: 1.08 MG/DL — SIGNIFICANT CHANGE UP (ref 0.5–1.3)
EGFR: 49 ML/MIN/1.73M2 — LOW
GLUCOSE SERPL-MCNC: 121 MG/DL — HIGH (ref 70–99)
HCT VFR BLD CALC: 24.8 % — LOW (ref 34.5–45)
HCT VFR BLD CALC: 26.5 % — LOW (ref 34.5–45)
HCT VFR BLD CALC: 29.7 % — LOW (ref 34.5–45)
HGB BLD-MCNC: 7.3 G/DL — LOW (ref 11.5–15.5)
HGB BLD-MCNC: 7.8 G/DL — LOW (ref 11.5–15.5)
HGB BLD-MCNC: 8.7 G/DL — LOW (ref 11.5–15.5)
MCHC RBC-ENTMCNC: 25.1 PG — LOW (ref 27–34)
MCHC RBC-ENTMCNC: 29.4 GM/DL — LOW (ref 32–36)
MCV RBC AUTO: 85.2 FL — SIGNIFICANT CHANGE UP (ref 80–100)
PLATELET # BLD AUTO: 370 K/UL — SIGNIFICANT CHANGE UP (ref 150–400)
POTASSIUM SERPL-MCNC: 3.7 MMOL/L — SIGNIFICANT CHANGE UP (ref 3.5–5.3)
POTASSIUM SERPL-SCNC: 3.7 MMOL/L — SIGNIFICANT CHANGE UP (ref 3.5–5.3)
PROT SERPL-MCNC: 5 GM/DL — LOW (ref 6–8.3)
RBC # BLD: 2.91 M/UL — LOW (ref 3.8–5.2)
RBC # FLD: 14.6 % — HIGH (ref 10.3–14.5)
SODIUM SERPL-SCNC: 145 MMOL/L — SIGNIFICANT CHANGE UP (ref 135–145)
WBC # BLD: 5.05 K/UL — SIGNIFICANT CHANGE UP (ref 3.8–10.5)
WBC # FLD AUTO: 5.05 K/UL — SIGNIFICANT CHANGE UP (ref 3.8–10.5)

## 2022-03-16 PROCEDURE — 99232 SBSQ HOSP IP/OBS MODERATE 35: CPT

## 2022-03-16 PROCEDURE — 99221 1ST HOSP IP/OBS SF/LOW 40: CPT

## 2022-03-16 PROCEDURE — 99233 SBSQ HOSP IP/OBS HIGH 50: CPT

## 2022-03-16 RX ORDER — IRON SUCROSE 20 MG/ML
200 INJECTION, SOLUTION INTRAVENOUS EVERY 24 HOURS
Refills: 0 | Status: COMPLETED | OUTPATIENT
Start: 2022-03-16 | End: 2022-03-18

## 2022-03-16 RX ORDER — CHOLECALCIFEROL (VITAMIN D3) 125 MCG
2000 CAPSULE ORAL DAILY
Refills: 0 | Status: DISCONTINUED | OUTPATIENT
Start: 2022-03-16 | End: 2022-03-18

## 2022-03-16 RX ADMIN — Medication 25 MILLIGRAM(S): at 06:02

## 2022-03-16 RX ADMIN — Medication 10 MILLIGRAM(S): at 10:04

## 2022-03-16 RX ADMIN — Medication 25 MILLIGRAM(S): at 21:18

## 2022-03-16 RX ADMIN — Medication 25 MILLIGRAM(S): at 14:48

## 2022-03-16 RX ADMIN — PANTOPRAZOLE SODIUM 40 MILLIGRAM(S): 20 TABLET, DELAYED RELEASE ORAL at 06:02

## 2022-03-16 RX ADMIN — Medication 2000 UNIT(S): at 21:18

## 2022-03-16 RX ADMIN — LOSARTAN POTASSIUM 50 MILLIGRAM(S): 100 TABLET, FILM COATED ORAL at 10:04

## 2022-03-16 RX ADMIN — CLOPIDOGREL BISULFATE 75 MILLIGRAM(S): 75 TABLET, FILM COATED ORAL at 10:04

## 2022-03-16 RX ADMIN — LOSARTAN POTASSIUM 50 MILLIGRAM(S): 100 TABLET, FILM COATED ORAL at 21:18

## 2022-03-16 RX ADMIN — Medication 325 MILLIGRAM(S): at 10:04

## 2022-03-16 RX ADMIN — Medication 25 MILLIGRAM(S): at 10:04

## 2022-03-16 RX ADMIN — IRON SUCROSE 200 MILLIGRAM(S): 20 INJECTION, SOLUTION INTRAVENOUS at 10:04

## 2022-03-16 RX ADMIN — ATORVASTATIN CALCIUM 10 MILLIGRAM(S): 80 TABLET, FILM COATED ORAL at 21:18

## 2022-03-16 RX ADMIN — Medication 0.2 MILLIGRAM(S): at 21:17

## 2022-03-16 RX ADMIN — Medication 0.2 MILLIGRAM(S): at 06:01

## 2022-03-16 RX ADMIN — Medication 0.2 MILLIGRAM(S): at 14:48

## 2022-03-16 NOTE — PROGRESS NOTE ADULT - PROBLEM SELECTOR PLAN 1
-Etiology unclear. Orthostatics negative.  Tele shows SR  Echo w/o significant abnormalities.  Suspect hypovolemia. 2/2 anemia?   will consider OP ambulatory tele monitoring on discharge.  -elevated trop most likely due to demand ischemia 2/2 anemia Normal ventricular function     GI to manage anemia. Hemodynamically stable at this time but would benefit from a higher Hgb for coronary perfusion  further work up as per hospitalist and GI

## 2022-03-16 NOTE — PROGRESS NOTE ADULT - SUBJECTIVE AND OBJECTIVE BOX
cc - weakness, unsteady gait     HPI:      89/Fwith PMHx of CVA, HFpEF  , renal artery stenosis s/p stent,  inguinal hernia, basal cell carcinoma, HLD, HTN presents to the ED on 3/11/22  s/p fall. Pt states she doesn't know what happened. Denies dizziness, lightheadedness, SOB, CP. +Head trauma. No LOC. Pt reports she in on Plavix and able to ambulate after fall. She denies cp/sob/abd pain Had skin lacerations, right for arm laceration sutured in ED.      Hospital course :   3/12: Hb 7.1, decreased, d/c asa 81, GI consult, iv protonix, orthostatics neg   3/13: no complaints, Hb 7.3, iron def anemia, ferrous sulfate started   3/14: s/p EGD/ colonoscopy -esophagitis, gastritis , duodenal atrophy s/p biopsy  r/o celiac disease, sigmoid polyp s/p removal  BP elevated 170, Hb 8.5  3/15:  Hb dropped to 7.6, no GI bleed reported, GI f/u, Celiac Ab f/u; GI follows Dr. Laughlin    3/16 - Hb <8 , unsteady gait, poor historian, denies cp, dizziness, palpitations, afebrile, no abdominal pain , plan for 1 unit PRBC, IV iron, d/w daughter Felipa [phone] at bedside     Review of system- Rest of the review of system are negative except mentioned in HPI    Vitals reviewed for last 24 hours  T(C): 36.7 (03-16-22 @ 17:00), Max: 37.2 (03-16-22 @ 07:06)  T(F): 98.1 (03-16-22 @ 17:00), Max: 99 (03-16-22 @ 07:06)  HR: 60 (03-16-22 @ 17:00) (60 - 72)  BP: 164/62 (03-16-22 @ 17:00) (144/56 - 164/62)  RR: 18 (03-16-22 @ 17:00) (18 - 19)  SpO2: 96% (03-16-22 @ 17:00) (95% - 97%)  Wt(kg): --    PHYSICAL EXAM:  Constitutional: NAD, awake and alert , frail elderly lady  HEENT: PERR, EOMI, Normal Hearing, MMM , forehead bruise   Neck: Soft and supple, No LAD, No JVD  Respiratory: Breath sounds are clear bilaterally, No wheezing, rales or rhonchi  Cardiovascular: S1 and S2, regular rate and rhythm, no Murmurs, gallops or rubs  Gastrointestinal: Bowel Sounds present, soft, nontender, nondistended, no guarding, no rebound  Extremities: + LE  peripheral edema with fareed boots   Vascular: 2+ peripheral pulses  Neurological: A/O x 2, no focal deficits  Musculoskeletal: 5/5 strength b/l upper and lower extremities  Skin: No rashes  rectal : deferred, not indicated    all Labs/EKG/Radiology/Meds reviewed by me  03-16    145  |  116<H>  |  25<H>  ----------------------------<  121<H>  3.7   |  23  |  1.08    Ca    8.1<L>      16 Mar 2022 07:31    TPro  5.0<L>  /  Alb  2.3<L>  /  TBili  0.6  /  DBili  x   /  AST  19  /  ALT  17  /  AlkPhos  101  03-16                       7.8    x     )-----------( x        ( 16 Mar 2022 14:38 )             26.5     LIVER FUNCTIONS - ( 16 Mar 2022 07:31 )  Alb: 2.3 g/dL / Pro: 5.0 gm/dL / ALK PHOS: 101 U/L / ALT: 17 U/L / AST: 19 U/L / GGT: x           Urinalysis Basic - ( 11 Mar 2022 23:30 ) - wnl      12 Lead ECG (03.13.22 @ 23:27) >  Ventricular Rate 58 BPM  Diagnosis Line Sinus bradycardia  Voltage criteria for left ventricular hypertrophy  Abnormal ECG    - TroponinI hsT: <-38.23, <-60.14, <-49.47       Xray Femur 2 Views, Left (03.11.22 @ 14:49) >  Midline sutures. Arterial calcifications.  There are bilateral hip replacements with good alignment. No bone   destruction or fracture.  Findings are similar to February 21, 2021.  Left femur. 4 views.  No knee effusion. Femur is intact. Mildly degeneration.  Findings similar to February 21, 2021  IMPRESSION: No acute chest finding or change. No fracture.    CT Head No Cont (03.11.22 @ 12:53) >   IMPRESSION:  CT brain:  No acute intracranial hemorrhage, brain edema, or mass effect.  No displaced calvarial fracture.    Increased size of high right anterior frontal extra-axial hyperdense   mass, currently 2.3 x 1.6 cm, previously 1.7 x 1.2 cm. This likely   represents a meningioma. No underlying vasogenic edema.    CT cervical spine:  No acute fracture or traumatic subluxation.  No prevertebral soft tissue swelling.  Degenerative changes.    TTE Echo Complete w/o Contrast w/ Doppler (03.12.22 @ 09:34) >   The left ventricle is normal in size, wall thickness, wall motion and   contractility.   Estimated left ventricular ejection fraction is 60-65 %.   The left atrium is mildly dilated.   Mild to moderate aortic regurgitation   Mild (1+) tricuspid valve regurgitation. Normal PA systolic pressures.   EA reversal of the mitral inflow consistent with reduced compliance of   the   left ventricle.     Upper Endoscopy (03.14.22 @ 07:23) >  IMPRESS Impression:          - Non-severe esophagitis. Biopsied.  IMPRESS                      - Z-line regular, 35 cm from the incisors.  IMPRESS                      - Gastritis. Biopsied.  IMPRESS                      - Duodenal mucosal atrophy. Biopsied.     Colonoscopy (03.14.22 @ 07:02) >  IMPRESS Impression:          - A few small right sided polyps. Biopsied.  IMPRESS                      - One 8 mm polyp in the sigmoid colon, removed with a   IMPRESS                      hot snare. Resected and retrieved. Clip was placed.  IMPRESS                      - Diverticulosis in the sigmoid colon.  IMPRESS                      - The examination was otherwise normal.  IMPRESS                      - The examined portion of the ileum was normal.      MEDICATIONS  (STANDING):  atorvastatin 10 milliGRAM(s) Oral at bedtime  cloNIDine 0.2 milliGRAM(s) Oral three times a day  clopidogrel Tablet 75 milliGRAM(s) Oral daily  ferrous    sulfate 325 milliGRAM(s) Oral daily  hydrALAZINE 25 milliGRAM(s) Oral every 8 hours  iron sucrose Injectable 200 milliGRAM(s) IV Push every 24 hours  losartan 50 milliGRAM(s) Oral two times a day  metoprolol succinate ER 25 milliGRAM(s) Oral two times a day  pantoprazole    Tablet 40 milliGRAM(s) Oral before breakfast  torsemide 10 milliGRAM(s) Oral daily    MEDICATIONS  (PRN): cc - weakness, unsteady gait     HPI:      89/Fwith PMHx of CVA, HFpEF  , renal artery stenosis s/p stent,  inguinal hernia, basal cell carcinoma, HLD, HTN presents to the ED on 3/11/22  s/p fall. Pt states she doesn't know what happened. Denies dizziness, lightheadedness, SOB, CP. +Head trauma. No LOC. Pt reports she in on Plavix and able to ambulate after fall. She denies cp/sob/abd pain Had skin lacerations, right for arm laceration sutured in ED.      Hospital course :   3/12: Hb 7.1, decreased, d/c asa 81, GI consult, iv protonix, orthostatics neg   3/13: no complaints, Hb 7.3, iron def anemia, ferrous sulfate started   3/14: s/p EGD/ colonoscopy -esophagitis, gastritis , duodenal atrophy s/p biopsy  r/o celiac disease, sigmoid polyp s/p removal  BP elevated 170, Hb 8.5  3/15:  Hb dropped to 7.6, no GI bleed reported, GI f/u, Celiac Ab f/u; GI follows Dr. Laughlin    3/16 - Hb <8 , unsteady gait, poor historian, denies cp, dizziness, palpitations, afebrile, no abdominal pain , plan for 1 unit PRBC, IV iron, d/w daughter Felipa [phone] at bedside     Review of system- Rest of the review of system are negative except mentioned in HPI    Vitals reviewed for last 24 hours  T(C): 36.7 (03-16-22 @ 17:00), Max: 37.2 (03-16-22 @ 07:06)  T(F): 98.1 (03-16-22 @ 17:00), Max: 99 (03-16-22 @ 07:06)  HR: 60 (03-16-22 @ 17:00) (60 - 72)  BP: 164/62 (03-16-22 @ 17:00) (144/56 - 164/62)  RR: 18 (03-16-22 @ 17:00) (18 - 19)  SpO2: 96% (03-16-22 @ 17:00) (95% - 97%)  Wt(kg): --    PHYSICAL EXAM:  Constitutional: NAD, awake and alert , frail elderly lady  HEENT: PERR, EOMI, Normal Hearing, MMM , forehead bruise   Neck: Soft and supple, No LAD, No JVD  Respiratory: Breath sounds are clear bilaterally, No wheezing, rales or rhonchi  Cardiovascular: S1 and S2, regular rate and rhythm, no Murmurs, gallops or rubs  Gastrointestinal: Bowel Sounds present, soft, nontender, nondistended, no guarding, no rebound  Extremities: + LE  peripheral edema with fareed boots   Vascular: 2+ peripheral pulses  Neurological: A/O x 2, no focal deficits  Musculoskeletal: 5/5 strength b/l upper and lower extremities  Skin: No rashes  rectal : deferred, not indicated    all Labs/EKG/Radiology/Meds reviewed by me  03-16    145  |  116<H>  |  25<H>  ----------------------------<  121<H>  3.7   |  23  |  1.08    Ca    8.1<L>      16 Mar 2022 07:31    TPro  5.0<L>  /  Alb  2.3<L>  /  TBili  0.6  /  DBili  x   /  AST  19  /  ALT  17  /  AlkPhos  101  03-16                       7.8    x     )-----------( x        ( 16 Mar 2022 14:38 )             26.5     LIVER FUNCTIONS - ( 16 Mar 2022 07:31 )  Alb: 2.3 g/dL / Pro: 5.0 gm/dL / ALK PHOS: 101 U/L / ALT: 17 U/L / AST: 19 U/L / GGT: x           Urinalysis Basic - ( 11 Mar 2022 23:30 ) - wnl      12 Lead ECG (03.13.22 @ 23:27) >  Ventricular Rate 58 BPM  Diagnosis Line Sinus bradycardia  Voltage criteria for left ventricular hypertrophy  Abnormal ECG    - TroponinI hsT: <-38.23, <-60.14, <-49.47       Xray Femur 2 Views, Left (03.11.22 @ 14:49) >  Midline sutures. Arterial calcifications.  There are bilateral hip replacements with good alignment. No bone   destruction or fracture.  Findings are similar to February 21, 2021.  Left femur. 4 views.  No knee effusion. Femur is intact. Mildly degeneration.  Findings similar to February 21, 2021  IMPRESSION: No acute chest finding or change. No fracture.    CT Head No Cont (03.11.22 @ 12:53) >   IMPRESSION:  CT brain:  No acute intracranial hemorrhage, brain edema, or mass effect.  No displaced calvarial fracture.    Increased size of high right anterior frontal extra-axial hyperdense   mass, currently 2.3 x 1.6 cm, previously 1.7 x 1.2 cm. This likely   represents a meningioma. No underlying vasogenic edema.    CT cervical spine:  No acute fracture or traumatic subluxation.  No prevertebral soft tissue swelling.  Degenerative changes.    TTE Echo Complete w/o Contrast w/ Doppler (03.12.22 @ 09:34) >   The left ventricle is normal in size, wall thickness, wall motion and   contractility.   Estimated left ventricular ejection fraction is 60-65 %.   The left atrium is mildly dilated.   Mild to moderate aortic regurgitation   Mild (1+) tricuspid valve regurgitation. Normal PA systolic pressures.   EA reversal of the mitral inflow consistent with reduced compliance of   the   left ventricle.     Upper Endoscopy (03.14.22 @ 07:23) >  IMPRESS Impression:          - Non-severe esophagitis. Biopsied.  IMPRESS                      - Z-line regular, 35 cm from the incisors.  IMPRESS                      - Gastritis. Biopsied.  IMPRESS                      - Duodenal mucosal atrophy. Biopsied.     Colonoscopy (03.14.22 @ 07:02) >  IMPRESS Impression:          - A few small right sided polyps. Biopsied.  IMPRESS                      - One 8 mm polyp in the sigmoid colon, removed with a   IMPRESS                      hot snare. Resected and retrieved. Clip was placed.  IMPRESS                      - Diverticulosis in the sigmoid colon.  IMPRESS                      - The examination was otherwise normal.  IMPRESS                      - The examined portion of the ileum was normal.      MEDICATIONS  (STANDING):  atorvastatin 10 milliGRAM(s) Oral at bedtime  cloNIDine 0.2 milliGRAM(s) Oral three times a day  clopidogrel Tablet 75 milliGRAM(s) Oral daily  ferrous    sulfate 325 milliGRAM(s) Oral daily  hydrALAZINE 25 milliGRAM(s) Oral every 8 hours  iron sucrose Injectable 200 milliGRAM(s) IV Push every 24 hours  losartan 50 milliGRAM(s) Oral two times a day  metoprolol succinate ER 25 milliGRAM(s) Oral two times a day  pantoprazole    Tablet 40 milliGRAM(s) Oral before breakfast  torsemide 10 milliGRAM(s) Oral daily    MEDICATIONS  (PRN):

## 2022-03-16 NOTE — PROGRESS NOTE ADULT - ASSESSMENT
89-year-old woman with history of stroke, on Plavix, with subacute anemia and a recent syncopal episode or fall.  There is no overt GI bleeding but given her decreased weight, poor appetite, and dysphagia concern for a GI source of blood loss.    EGD negative except duodenal atrophy, biopsy pending.  Colonoscopy small polyps but unrevealing for bleeding source.    Recommend:  -Okay to continue Plavix but should avoid other blood thinners.  -f/u EGD bx and celiac labs  -would repeat CBC this afternoon and if stable and no melena noted can likely d/c home with outpt follow up

## 2022-03-16 NOTE — PROGRESS NOTE ADULT - SUBJECTIVE AND OBJECTIVE BOX
PCP:    REQUESTING PHYSICIAN:    REASON FOR CONSULT:    CHIEF COMPLAINT:    HPI:  89/Fwith PMHx of inguinal hernia,CHF, renal artery stenosis s/p stent, basal cell carcinoma, CVA, hyponatremia, dyslipidemia, HTN presents to the ED s/p fall.   Pt states she doesn't know what happened. Denies dizziness, lightheadedness, SOB, CP. +Head trauma. No LOC. Pt reports she in on Plavix and able to ambulate after fall. She denies cp/sob/abd pain Had skin lacerations, right for arm laceration sutured in ED.     Admitted for syncope, consulted for same. Reviewed history: pt was walking w/ walker in house not on feet for prolonged period.Then felt "weird" not lightheaded.Then woke up on ground.  Duration of LOC min-sec.No witnesses.  Urinated on herself no tongue biting.Knew where she was.  Called son who helped herwent to vascular clinic apt mentioned fall & referred to ED.    3/13/22 Pt alert and awake in bed, no acute distress noted. No c/o chest pain, sob, dizziness, light headed and other cardiac symptoms. Tele No adverse events overnight.   3/15/22: Patient sitting in chair without complaints of cp,sob,palpitations, lightheadedness   dropped hemoglobin   3/16/22 Pt feels well without dizziness, palpitations or chest pain. Lower Hgb    PAST MEDICAL & SURGICAL HISTORY:  Essential hypertension    Dyslipidemia    Hyponatremia  ON HCTZ , h/o Hypokelemia    Cerebrovascular accident (CVA), unspecified mechanism    Edema, unspecified type    Mitral valve insufficiency, unspecified etiology  Cardiac cath on 18    Basal cell carcinoma    Renal artery stenosis  right side, stent    CHF (congestive heart failure)    Inguinal hernia, right    Blood pressure instability    S/P Mohs surgery for basal cell carcinoma    Status post hysterectomy  and bladder lift with mesh     History of cholecystectomy      H/O bilateral hip replacements  ,     Other elective surgery  right renal artery stent    S/P colonoscopy        SOCIAL HISTORY:    FAMILY HISTORY:  Family history of uterine cancer (Sibling)    Family history of carotid artery stenosis (Sibling)        ALLERGIES:  Allergies    IV Contrast (Pruritus; Rash)  Keflex (Other)  verapamil (Other)    Intolerances        MEDICATIONS:    MEDICATIONS  (STANDING):  atorvastatin 10 milliGRAM(s) Oral at bedtime  cloNIDine 0.2 milliGRAM(s) Oral three times a day  clopidogrel Tablet 75 milliGRAM(s) Oral daily  ferrous    sulfate 325 milliGRAM(s) Oral daily  hydrALAZINE 25 milliGRAM(s) Oral every 8 hours  iron sucrose Injectable 200 milliGRAM(s) IV Push every 24 hours  losartan 50 milliGRAM(s) Oral two times a day  metoprolol succinate ER 25 milliGRAM(s) Oral two times a day  pantoprazole    Tablet 40 milliGRAM(s) Oral before breakfast  torsemide 10 milliGRAM(s) Oral daily    MEDICATIONS  (PRN):        Vital Signs Last 24 Hrs  T(C): 37.2 (16 Mar 2022 07:06), Max: 37.2 (16 Mar 2022 07:06)  T(F): 99 (16 Mar 2022 07:06), Max: 99 (16 Mar 2022 07:06)  HR: 61 (16 Mar 2022 07:06) (61 - 79)  BP: 147/43 (16 Mar 2022 07:06) (144/56 - 163/85)  BP(mean): --  RR: 18 (16 Mar 2022 07:06) (18 - 19)  SpO2: 96% (16 Mar 2022 07:06) (96% - 98%)Daily     Daily Weight in k.5 (16 Mar 2022 06:04)I&O's Summary      PHYSICAL EXAM:    Constitutional: NAD, awake and alert, well-developed  HEENT: PERR, EOMI,  ecchymosis forehead  Neck:  supple,  No JVD  Respiratory: Breath sounds are clear bilaterally, No wheezing, rales or rhonchi  Cardiovascular: S1 and S2, regular rate and rhythm, no Murmurs, gallops or rubs  Gastrointestinal: Bowel Sounds present, soft, nontender.   Extremities: No peripheral edema. No clubbing or cyanosis.  Vascular: 2+ peripheral pulses  Neurological: A/O x 3, no focal deficits  Musculoskeletal: no calf tenderness.  Skin: No rashes.      LABS: All Labs Reviewed:                        7.3    5.05  )-----------( 370      ( 16 Mar 2022 07:31 )             24.8                         7.6    7.14  )-----------( 403      ( 15 Mar 2022 07:59 )             26.3                         8.5    x     )-----------( x        ( 14 Mar 2022 12:18 )             29.7                 Blood Culture:         RADIOLOGY/EKG:< from: 12 Lead ECG (22 @ 23:27) >  Diagnosis Line Sinus bradycardia  Voltage criteria for left ventricular hypertrophy  Abnormal ECG  Confirmed by VANDANA LIRIANO MD ([id number]) on 3/14/2022 7:29:07 AM    < end of copied text >        ECHO/CARDIAC CATHTERIZATION/STRESS TEST:  < from: TTE Echo Complete w/o Contrast w/ Doppler (22 @ 09:34) >   Summary     The left ventricle is normal in size, wall thickness, wall motion and   contractility.   Estimated left ventricular ejection fraction is 60-65 %.   The left atrium is mildly dilated.   Mild to moderate aortic regurgitation   Mild (1+) tricuspid valve regurgitation. Normal PA systolic pressures.   EA reversal of the mitral inflow consistent with reduced compliance of   the   left ventricle.     Signature     ----------------------------------------------------------------   Electronically signed by Matt Allen MD(Interpreting    < end of copied text >

## 2022-03-16 NOTE — CONSULT NOTE ADULT - NSCONSULTADDITIONALINFOA_GEN_ALL_CORE
NEUROSURGERY ATTENDING NOTE:    I have discussed with the neurosurgical PA the presenting H&P. I have reviewed the cranial imaging remotely. I agree with the documentation and plan.     this is a 89 yo female with fall and question of syncope. cranial imaging reveal a right frontal dural based mass consistent with meningioma. There is no adjacent brain edema and no significant mass effect. This would not have been a cause of the patient's acute presentation. there is some growth compared to 2019 images. this is within the natural history of a benign meningioma. In a 89 yo this risk of surgical intervention or radiation outweighs the impact of this incidental finding.     currently there is no indication for surgical intervention. This can be followed up in a conservative observation basis . CT in one year

## 2022-03-16 NOTE — PROGRESS NOTE ADULT - ATTENDING COMMENTS
seen this morning with RODO [place]  no overt bleeding  f/u EGD bx and celiac serology  monitor hgb
Patient with severe anemia with syncopal episode with minimal elevated troponin with normal ventricular function , possible demend related ischemia

## 2022-03-16 NOTE — PROGRESS NOTE ADULT - SUBJECTIVE AND OBJECTIVE BOX
GI    HPI:  feels well  no abd pain  no bleeding  kay po  hgb decreased noted    ROS  As above  Otherwise unremarkable, all systems reviewed    PE:  Vital Signs Last 24 Hrs  T(C): 37.2 (16 Mar 2022 07:06), Max: 37.2 (16 Mar 2022 07:06)  T(F): 99 (16 Mar 2022 07:06), Max: 99 (16 Mar 2022 07:06)  HR: 61 (16 Mar 2022 07:06) (61 - 79)  BP: 147/43 (16 Mar 2022 07:06) (144/56 - 163/85)  BP(mean): --  RR: 18 (16 Mar 2022 07:06) (18 - 19)  SpO2: 96% (16 Mar 2022 07:06) (96% - 98%)    Constitutional: NAD, well-developed, A+Ox3  Anicteric   Respiratory: CTABL, breathing comfortably  Cardiovascular: S1 and S2, RRR  Gastrointestinal: +BS, soft, non tender, non distended, no mass  Extremities: warm, well perfused, no edema  Psychiatric: Normal mood, normal affect  Neuro: moves all extremities, grossly intact  Skin: No rashes or lesions    LABS:                                             7.3    5.05  )-----------( 370      ( 16 Mar 2022 07:31 )             24.8     03-16    145  |  116<H>  |  25<H>  ----------------------------<  121<H>  3.7   |  23  |  1.08    Ca    8.1<L>      16 Mar 2022 07:31    TPro  5.0<L>  /  Alb  2.3<L>  /  TBili  0.6  /  DBili  x   /  AST  19  /  ALT  17  /  AlkPhos  101  03-16

## 2022-03-16 NOTE — PROGRESS NOTE ADULT - ASSESSMENT
89/Fwith PMHx of CVA, HFpEF , HTN,  renal artery stenosis s/p stent,  inguinal hernia, basal cell carcinoma, HLD presents to the ED on 3/11/22  s/p fall. Pt states she doesn't know what happened. Denies dizziness, lightheadedness, SOB, CP. +Head trauma. No LOC. Pt reports she in on Plavix and able to ambulate after fall. She denies cp/sob/abd pain Had skin lacerations, right for arm laceration sutured in ED.      Hospital course :   3/12: Hb 7.1, decreased, d/c asa 81, GI consult, iv protonix, orthostatics neg   3/14: s/p EGD/ colonoscopy - esophagitis, gastritis , duodenal atrophy s/p biopsy  r/o celiac disease, sigmoid polyp s/p removal  BP elevated 170, Hb 8.5    Fall multifactorial resulted in RUE laceration s/p laceration repair in ED on 3/11/22   Syncope cardiac , Bradycardia  Mildly elevated troponin due to demand ischemia  Increased in size right frontal mass on CT likely meningioma  - CT head- increased in size     serial cardiac enz  cont plavix  echo :  Estimated left ventricular ejection fraction is 60-65 %. E to A reversal  cardio consult appreciated  PT consult  check ua to r/o uti; NO uti  check orthostatics; neg    2) Anemia : acute on chronic, iron def  Hb 8.1  baseline 10.1  stool could not be obtained in ED for guaiac test  FOBT awaited  cbc in am; Hb 7.1  serial H/H; transfuse if less than 7 and if family agrees  GI consult to r/o GI bleed/ EGD/colonoscopy: EGD/colonoscopy on Monday; neg  asa d/nathan  ferrous sulfate for iron def anemia  r/o Celiac disease; work up ordered as per GI    3) Skin lacerations:, right fore arm laceration sutured in ED.     4) HTN: cont home meds    5) DVT PPX: venodynes    poc discussed with pt, her son, Nick, [phone],  team    HCP Felipa    89/Fwith PMHx of CVA, HFpEF , HTN,  renal artery stenosis s/p stent,  inguinal hernia, basal cell carcinoma, HLD presents to the ED on 3/11/22  s/p fall. Pt states she doesn't know what happened. Denies dizziness, lightheadedness, SOB, CP. +Head trauma. No LOC. Pt reports she in on Plavix and able to ambulate after fall. She denies cp/sob/abd pain Had skin lacerations, right for arm laceration sutured in ED.      Hospital course :   3/12: Hb 7.1, decreased, d/c asa 81, GI consult, iv protonix, orthostatics neg   3/14: s/p EGD/ colonoscopy - esophagitis, gastritis , duodenal atrophy s/p biopsy  r/o celiac disease, sigmoid polyp s/p removal  BP elevated 170, Hb 8.5    Fall multifactorial resulted in RUE laceration s/p laceration repair in ED on 3/11/22   Syncope cardiac vs neurologic , Bradycardia, Increased in size meningoma   Mildly elevated troponin due to demand ischemia  Increased in size right frontal mass on CT likely meningioma  - CT head- increased in size meningioma, EKG - bradycardia, repeat in am  - - TroponinI hsT: <-38.23, <-60.14, <-49.47  - 2 d echo - EF 60 % , moderate AR , diastolic dysfunction  - ASA d/nathan , c/w plavix , statins, BB   - cardio consult appreciated   - check orthostatics; neg  - tele - episodes of bradycardia resolved   - neurosurgery consult Dr. Black    Acute on chronic normocytic anemia, Iron deficiency, worsening   s/p Upper and lower endoscopy - Gastritis, colonic polyps  Duodenal atrophy r/o celiac disease   - Hb 8.1, baseline 10.1  - 3/16 - 1 unit PRBC , check CBC in am  - GI consult  appretiated   - s/p  EGD/colonoscopy  3/14    - asa d/nathan, ok to continue plavix   - IV iron --> po upon discharge   - r/o Celiac disease; work up ordered as per GI    RUE  Skin lacerations:  -  right fore arm laceration sutured in ED on 3/11/22  - sutures should be removed     Chronic diastolic HF   - c/w torsemide, no signs of acute exacebration    Hypocalcemia due to vitamin D deficiency - vitamin D 2000 daily    Unsteady gait - TSH wnl, check B12, folate, PT evaluation    HTN: cont home meds - clonidine, hydralazine, losartan, toprol     DVT PPX: venodynes    Advanced directives   Full code  HCP daughter Felipa [phone] updated 3/16/22

## 2022-03-17 DIAGNOSIS — D64.9 ANEMIA, UNSPECIFIED: ICD-10-CM

## 2022-03-17 DIAGNOSIS — I10 ESSENTIAL (PRIMARY) HYPERTENSION: ICD-10-CM

## 2022-03-17 LAB
ADD ON TEST-SPECIMEN IN LAB: SIGNIFICANT CHANGE UP
ALBUMIN SERPL ELPH-MCNC: 2.6 G/DL — LOW (ref 3.3–5)
ALP SERPL-CCNC: 99 U/L — SIGNIFICANT CHANGE UP (ref 40–120)
ALT FLD-CCNC: 19 U/L — SIGNIFICANT CHANGE UP (ref 12–78)
ANION GAP SERPL CALC-SCNC: 4 MMOL/L — LOW (ref 5–17)
AST SERPL-CCNC: 17 U/L — SIGNIFICANT CHANGE UP (ref 15–37)
BASOPHILS # BLD AUTO: 0.05 K/UL — SIGNIFICANT CHANGE UP (ref 0–0.2)
BASOPHILS NFR BLD AUTO: 0.8 % — SIGNIFICANT CHANGE UP (ref 0–2)
BILIRUB SERPL-MCNC: 0.8 MG/DL — SIGNIFICANT CHANGE UP (ref 0.2–1.2)
BUN SERPL-MCNC: 26 MG/DL — HIGH (ref 7–23)
CALCIUM SERPL-MCNC: 8.4 MG/DL — LOW (ref 8.5–10.1)
CHLORIDE SERPL-SCNC: 115 MMOL/L — HIGH (ref 96–108)
CHOLEST SERPL-MCNC: 130 MG/DL — SIGNIFICANT CHANGE UP
CO2 SERPL-SCNC: 24 MMOL/L — SIGNIFICANT CHANGE UP (ref 22–31)
CREAT SERPL-MCNC: 1.15 MG/DL — SIGNIFICANT CHANGE UP (ref 0.5–1.3)
EGFR: 45 ML/MIN/1.73M2 — LOW
EOSINOPHIL # BLD AUTO: 0.12 K/UL — SIGNIFICANT CHANGE UP (ref 0–0.5)
EOSINOPHIL NFR BLD AUTO: 2 % — SIGNIFICANT CHANGE UP (ref 0–6)
FOLATE SERPL-MCNC: 8.9 NG/ML — SIGNIFICANT CHANGE UP
GLUCOSE SERPL-MCNC: 124 MG/DL — HIGH (ref 70–99)
HCT VFR BLD CALC: 31.5 % — LOW (ref 34.5–45)
HDLC SERPL-MCNC: 49 MG/DL — LOW
HGB BLD-MCNC: 9.5 G/DL — LOW (ref 11.5–15.5)
IMM GRANULOCYTES NFR BLD AUTO: 1.5 % — SIGNIFICANT CHANGE UP (ref 0–1.5)
LIPID PNL WITH DIRECT LDL SERPL: 55 MG/DL — SIGNIFICANT CHANGE UP
LYMPHOCYTES # BLD AUTO: 1.61 K/UL — SIGNIFICANT CHANGE UP (ref 1–3.3)
LYMPHOCYTES # BLD AUTO: 26.4 % — SIGNIFICANT CHANGE UP (ref 13–44)
MAGNESIUM SERPL-MCNC: 2.1 MG/DL — SIGNIFICANT CHANGE UP (ref 1.6–2.6)
MCHC RBC-ENTMCNC: 26.2 PG — LOW (ref 27–34)
MCHC RBC-ENTMCNC: 30.2 GM/DL — LOW (ref 32–36)
MCV RBC AUTO: 87 FL — SIGNIFICANT CHANGE UP (ref 80–100)
MONOCYTES # BLD AUTO: 0.5 K/UL — SIGNIFICANT CHANGE UP (ref 0–0.9)
MONOCYTES NFR BLD AUTO: 8.2 % — SIGNIFICANT CHANGE UP (ref 2–14)
NEUTROPHILS # BLD AUTO: 3.73 K/UL — SIGNIFICANT CHANGE UP (ref 1.8–7.4)
NEUTROPHILS NFR BLD AUTO: 61.1 % — SIGNIFICANT CHANGE UP (ref 43–77)
NON HDL CHOLESTEROL: 81 MG/DL — SIGNIFICANT CHANGE UP
NT-PROBNP SERPL-SCNC: 2913 PG/ML — HIGH (ref 0–450)
PHOSPHATE SERPL-MCNC: 3.4 MG/DL — SIGNIFICANT CHANGE UP (ref 2.5–4.5)
PLATELET # BLD AUTO: 405 K/UL — HIGH (ref 150–400)
POTASSIUM SERPL-MCNC: 4.5 MMOL/L — SIGNIFICANT CHANGE UP (ref 3.5–5.3)
POTASSIUM SERPL-SCNC: 4.5 MMOL/L — SIGNIFICANT CHANGE UP (ref 3.5–5.3)
PROT SERPL-MCNC: 5.4 GM/DL — LOW (ref 6–8.3)
RBC # BLD: 3.62 M/UL — LOW (ref 3.8–5.2)
RBC # FLD: 14.4 % — SIGNIFICANT CHANGE UP (ref 10.3–14.5)
SODIUM SERPL-SCNC: 143 MMOL/L — SIGNIFICANT CHANGE UP (ref 135–145)
TRIGL SERPL-MCNC: 133 MG/DL — SIGNIFICANT CHANGE UP
VIT B12 SERPL-MCNC: <150 PG/ML — LOW (ref 232–1245)
WBC # BLD: 6.1 K/UL — SIGNIFICANT CHANGE UP (ref 3.8–10.5)
WBC # FLD AUTO: 6.1 K/UL — SIGNIFICANT CHANGE UP (ref 3.8–10.5)

## 2022-03-17 PROCEDURE — 99232 SBSQ HOSP IP/OBS MODERATE 35: CPT

## 2022-03-17 PROCEDURE — 99223 1ST HOSP IP/OBS HIGH 75: CPT

## 2022-03-17 PROCEDURE — 93010 ELECTROCARDIOGRAM REPORT: CPT

## 2022-03-17 PROCEDURE — 95819 EEG AWAKE AND ASLEEP: CPT | Mod: 26

## 2022-03-17 RX ORDER — PREGABALIN 225 MG/1
1000 CAPSULE ORAL DAILY
Refills: 0 | Status: DISCONTINUED | OUTPATIENT
Start: 2022-03-18 | End: 2022-03-18

## 2022-03-17 RX ORDER — PREGABALIN 225 MG/1
1000 CAPSULE ORAL ONCE
Refills: 0 | Status: COMPLETED | OUTPATIENT
Start: 2022-03-17 | End: 2022-03-17

## 2022-03-17 RX ADMIN — PANTOPRAZOLE SODIUM 40 MILLIGRAM(S): 20 TABLET, DELAYED RELEASE ORAL at 05:30

## 2022-03-17 RX ADMIN — PREGABALIN 1000 MICROGRAM(S): 225 CAPSULE ORAL at 17:49

## 2022-03-17 RX ADMIN — LOSARTAN POTASSIUM 50 MILLIGRAM(S): 100 TABLET, FILM COATED ORAL at 21:17

## 2022-03-17 RX ADMIN — LOSARTAN POTASSIUM 50 MILLIGRAM(S): 100 TABLET, FILM COATED ORAL at 09:57

## 2022-03-17 RX ADMIN — Medication 25 MILLIGRAM(S): at 05:29

## 2022-03-17 RX ADMIN — CLOPIDOGREL BISULFATE 75 MILLIGRAM(S): 75 TABLET, FILM COATED ORAL at 09:57

## 2022-03-17 RX ADMIN — Medication 0.2 MILLIGRAM(S): at 21:17

## 2022-03-17 RX ADMIN — Medication 25 MILLIGRAM(S): at 09:57

## 2022-03-17 RX ADMIN — Medication 25 MILLIGRAM(S): at 21:17

## 2022-03-17 RX ADMIN — Medication 25 MILLIGRAM(S): at 13:20

## 2022-03-17 RX ADMIN — IRON SUCROSE 200 MILLIGRAM(S): 20 INJECTION, SOLUTION INTRAVENOUS at 09:57

## 2022-03-17 RX ADMIN — Medication 2000 UNIT(S): at 09:57

## 2022-03-17 RX ADMIN — Medication 0.2 MILLIGRAM(S): at 13:21

## 2022-03-17 RX ADMIN — Medication 10 MILLIGRAM(S): at 09:57

## 2022-03-17 RX ADMIN — ATORVASTATIN CALCIUM 10 MILLIGRAM(S): 80 TABLET, FILM COATED ORAL at 21:17

## 2022-03-17 RX ADMIN — Medication 0.2 MILLIGRAM(S): at 05:29

## 2022-03-17 NOTE — PROGRESS NOTE ADULT - ASSESSMENT
89 year old woman with hx of HTN, HLD, renal artery stenosis s/p stent, hx of CVA, meningioma, basal cell carcinoma, inguinal hernia, presented for further evaluation 3/11 after a syncope, collapse. Patient does not recall the episode or preceding complaints. She was found to have notable anemia which may have contributed to her weakness and collapse. Admitted for further workup of anemia.       Fall, collapse  Patient with physical deconditioning and debility. Also generalized weakness in setting of anemia, see below. A bit unclear whether she had near-syncope / syncope though patient denies LOC. No acute cardiac condition apparent. EKG WNL. No events on tele. TTE unremarkable. Orthostatics negative. Does have known meningioma, bit enlarged compared to prior. Appreciate input from Neurosurgery. Still remains non-surgical. No associated edema. Advised spot EEG, Neurology input. EEG shows no epileptic abnormalities. Neurology advised continued antiplatelet therapy, statin, BP control. Seen by PT. Recommended home with home PT.     Anemia  Appears to be multifactorial with patient diagnosed with iron deficiency, B12 deficiency, gastritis and duodenitis. No signs of active or recent bleed on EGD/ colonoscopy. Continue to manage supportively as described below.     Gastritis, duodenitis  H pylori negative on biopsy. Continue PPI daily. Outpatient GI follow up with Dr Holborok.     Iron deficiency   Continue IV iron    Vit B 12 deficiency  Started on cyanocobalamin 1000mcg daily    HTN  in setting of renal artery stenosis. BP suboptimally controlled but know to be a chronic, challenging issue. On metoprolol, losartan, hydralazine, torsemide and clonidine. Continue regimen for now, trend BPs    Chronic L occipital infarct  Continue Plavix, statin, BP control        Diet: DASH  DVT px: Venodyne boots, early ambulation  Dispo: Anticipate DC to home with home services tomorrow if clinically stable         89 year old woman with hx of HTN, HLD, renal artery stenosis s/p stent, hx of CVA, meningioma, basal cell carcinoma, inguinal hernia, presented for further evaluation 3/11 after a syncope, collapse. Patient does not recall the episode or preceding complaints. She was found to have notable anemia which may have contributed to her weakness and collapse. Admitted for further workup of anemia.       Fall, collapse  Patient with physical deconditioning and debility. Also generalized weakness in setting of anemia, see below. A bit unclear whether she had near-syncope / syncope though patient denies LOC. No acute cardiac condition apparent. EKG WNL. No events on tele. TTE unremarkable. Orthostatics negative. Does have known meningioma, bit enlarged compared to prior. Appreciate input from Neurosurgery. Still remains non-surgical. No associated edema. Advised spot EEG, Neurology input. EEG shows no epileptic abnormalities. Neurology advised continued antiplatelet therapy, statin, BP control. Seen by PT. Recommended home with home PT.     Anemia  Appears to be multifactorial with patient diagnosed with iron deficiency, B12 deficiency, gastritis and duodenitis. No signs of active or recent bleed on EGD/ colonoscopy. Continue to manage supportively as described below.     Gastritis, duodenitis  H pylori negative on biopsy. Continue PPI daily. Outpatient GI follow up with Dr Holbrook.     Iron deficiency   Continue IV iron    Vit B 12 deficiency  Started on cyanocobalamin 1000mcg daily    HTN  in setting of renal artery stenosis. BP suboptimally controlled but know to be a chronic, challenging issue. On metoprolol, losartan, hydralazine, torsemide and clonidine. Continue regimen for now, trend BPs    Chronic L occipital infarct  Continue Plavix, statin, BP control    Peripheral vascular disease  LE ulcer. Unna boot wraps. Follows with Dr Riley, for visit tomorrow.        Diet: DASH  DVT px: Venodyne boots, early ambulation  Dispo: Anticipate DC to home with home services tomorrow if clinically stable         89 year old woman with hx of HTN, HLD, renal artery stenosis s/p stent, hx of CVA, meningioma, basal cell carcinoma, inguinal hernia, presented for further evaluation 3/11 after a fall / collapse. Patient does not recall the episode or preceding complaints. She was found to have notable anemia which may have contributed to her weakness and collapse. Admitted for further workup of anemia.       Fall, collapse  Patient with physical deconditioning and debility. Also generalized weakness in setting of anemia, see below. A bit unclear whether she had near-syncope / syncope though patient denies LOC. No acute cardiac condition apparent. EKG WNL. No events on tele. TTE unremarkable. Orthostatics negative. Does have known meningioma, bit enlarged compared to prior. Appreciate input from Neurosurgery. Still remains non-surgical. No associated edema. Advised spot EEG, Neurology input. EEG shows no epileptic abnormalities. Neurology advised continued antiplatelet therapy, statin, BP control. Seen by PT. Recommended home with home PT.     Anemia  Appears to be multifactorial with patient diagnosed with iron deficiency, B12 deficiency, gastritis and duodenitis. No signs of active or recent bleed on EGD/ colonoscopy. Continue to manage supportively as described below.     Gastritis, duodenitis  H pylori negative on biopsy. Continue PPI daily. Outpatient GI follow up with Dr Holbrook.     Iron deficiency   Continue IV iron    Vit B 12 deficiency  Started on cyanocobalamin 1000mcg daily    HTN  in setting of renal artery stenosis. BP suboptimally controlled but know to be a chronic, challenging issue. On metoprolol, losartan, hydralazine, torsemide and clonidine. Continue regimen for now, trend BPs    Chronic L occipital infarct  Continue Plavix, statin, BP control    Peripheral vascular disease  LE ulcer. Unna boot wraps. Follows with Dr Riley, for visit tomorrow.        Diet: DASH  DVT px: Venodyne boots, early ambulation  Dispo: Anticipate DC to home with home services tomorrow if clinically stable

## 2022-03-17 NOTE — PROGRESS NOTE ADULT - SUBJECTIVE AND OBJECTIVE BOX
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  She was found to be anemic and underwent EGD and colonocopy.    3/13/22 Pt alert and awake in bed, no acute distress noted. No c/o chest pain, sob, dizziness, light headed and other cardiac symptoms. Tele No adverse events overnight.   3/15/22: Patient sitting in chair without complaints of cp,sob,palpitations, lightheadedness   dropped hemoglobin   3/16/22 Pt feels well without dizziness, palpitations or chest pain. Lower Hgb  3/17/22:  Comfortable and without complaint; would like to return home.            PHYSICAL EXAM:    Constitutional: NAD, awake and alert, well-developed  HEENT: PERR, EOMI,  ecchymosis forehead  Neck:  supple,  No JVD  Respiratory: Breath sounds are clear bilaterally, No wheezing, rales or rhonchi  Cardiovascular: S1 and S2, regular rate and rhythm, no Murmurs, gallops or rubs  Gastrointestinal: Bowel Sounds present, soft, nontender.   Extremities: No peripheral edema. No clubbing or cyanosis.  Vascular: 2+ peripheral pulses  Neurological: A/O x 3, no focal deficits  Musculoskeletal: no calf tenderness.  Skin: No rashes.      LABS: All Labs Reviewed:                        7.3    5.05  )-----------( 370      ( 16 Mar 2022 07:31 )             24.8                         7.6    7.14  )-----------( 403      ( 15 Mar 2022 07:59 )             26.3                         8.5    x     )-----------( x        ( 14 Mar 2022 12:18 )             29.7                 Blood Culture:         RADIOLOGY/EKG:< from: 12 Lead ECG (03.13.22 @ 23:27) >  Diagnosis Line Sinus bradycardia  Voltage criteria for left ventricular hypertrophy  Abnormal ECG  Confirmed by VANDANA LIRIANO MD ([id number]) on 3/14/2022 7:29:07 AM    < end of copied text >        ECHO/CARDIAC CATHTERIZATION/STRESS TEST:  < from: TTE Echo Complete w/o Contrast w/ Doppler (03.12.22 @ 09:34) >   Summary     The left ventricle is normal in size, wall thickness, wall motion and   contractility.   Estimated left ventricular ejection fraction is 60-65 %.   The left atrium is mildly dilated.   Mild to moderate aortic regurgitation   Mild (1+) tricuspid valve regurgitation. Normal PA systolic pressures.   EA reversal of the mitral inflow consistent with reduced compliance of   the   left ventricle.     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  She was found to be anemic and underwent EGD and colonocopy.    3/13/22 Pt alert and awake in bed, no acute distress noted. No c/o chest pain, sob, dizziness, light headed and other cardiac symptoms. Tele No adverse events overnight.   3/15/22: Patient sitting in chair without complaints of cp,sob,palpitations, lightheadedness   dropped hemoglobin   3/16/22 Pt feels well without dizziness, palpitations or chest pain. Lower Hgb  3/17/22:  Comfortable and without complaint; would like to return home.    MEDICATIONS  (STANDING):  atorvastatin 10 milliGRAM(s) Oral at bedtime  cholecalciferol 2000 Unit(s) Oral daily  cloNIDine 0.2 milliGRAM(s) Oral three times a day  clopidogrel Tablet 75 milliGRAM(s) Oral daily  hydrALAZINE 25 milliGRAM(s) Oral every 8 hours  iron sucrose Injectable 200 milliGRAM(s) IV Push every 24 hours  losartan 50 milliGRAM(s) Oral two times a day  metoprolol succinate ER 25 milliGRAM(s) Oral two times a day  pantoprazole    Tablet 40 milliGRAM(s) Oral before breakfast  torsemide 10 milliGRAM(s) Oral daily    Vital Signs Last 24 Hrs  T(C): 36.4 (17 Mar 2022 07:23), Max: 36.9 (16 Mar 2022 14:44)  T(F): 97.5 (17 Mar 2022 07:23), Max: 98.5 (16 Mar 2022 14:44)  HR: 67 (17 Mar 2022 07:23) (60 - 75)  BP: 146/54 (17 Mar 2022 07:23) (146/54 - 174/62)  RR: 19 (17 Mar 2022 07:23) (18 - 19)  SpO2: 95% (17 Mar 2022 07:23) (95% - 97%)    PHYSICAL EXAM:  Constitutional: NAD, awake and alert  HEENT: ecchymosis forehead  Neck:  supple,  No JVD  Respiratory: Breath sounds are clear bilaterally, No wheezing, rales or rhonchi  Cardiovascular: S1 and S2, regular rate and rhythm  Gastrointestinal: Bowel Sounds present, soft, nontender.   Extremities: No edema.    LABS:                      7.8    x     )-----------( x        ( 16 Mar 2022 14:38 )             26.5     145  |  116<H>  |  25<H>  ----------------------------<  121<H>  3.7   |  23  |  1.08    Ca    8.1<L>      16 Mar 2022 07:31    TPro  5.0<L>  /  Alb  2.3<L>  /  TBili  0.6  /  DBili  x   /  AST  19  /  ALT  17  /  AlkPhos  101  03-16    TroponinI hsT: <-38.23, <-60.14, <-49.47      12 Lead ECG (03.13.22 @ 23:27) >  Sinus bradycardia  Voltage criteria for left ventricular hypertrophy    TTE Echo Complete w/o Contrast w/ Doppler (03.12.22 @ 09:34) >   The left ventricle is normal in size, wall thickness, wall motion and contractility. Estimated left ventricular ejection fraction is 60-65 %.   The left atrium is mildly dilated.   Mild to moderate aortic regurgitation   Mild tricuspid valve regurgitation. Normal PA systolic pressures.   EA reversal of the mitral inflow consistent with reduced compliance of the left ventricle.

## 2022-03-17 NOTE — CONSULT NOTE ADULT - SUBJECTIVE AND OBJECTIVE BOX
GI consult    HPI:  89/Fwith PMHx of inguinal hernia, CHF, renal artery stenosis s/p stent, basal cell carcinoma, CVA, hyponatremia, dyslipidemia, HTN presents to the ED s/p fall. Pt states she doesn't know what happened. Denies dizziness, lightheadedness, SOB, CP. +Head trauma. No LOC. Pt reports she in on Plavix and able to ambulate after fall. She denies cp/sob/abd pain    Had skin lacerations, right for arm laceration sutured in ED.  (11 Mar 2022 18:14)    Patient not sure if she passed out or simply fell.  She last had a hemoglobin of 10 a few months ago.  She denies overt bleeding, hematochezia or melena.  She does endorse dysphagia that seems to be separate from the dysphagia she has had since her stroke, and weight loss.  Her appetite is poor in general.  She has chronic constipation and takes Senokot.  There is no diarrhea.  She denies NSAID use.  Her last colonoscopy was many years ago.      PAST MEDICAL & SURGICAL HISTORY:  Essential hypertension    Dyslipidemia    Hyponatremia  ON HCTZ , h/o Hypokelemia    Cerebrovascular accident (CVA), unspecified mechanism    Edema, unspecified type    Mitral valve insufficiency, unspecified etiology  Cardiac cath on 11/21/18    Basal cell carcinoma    Renal artery stenosis  right side, stent    CHF (congestive heart failure)    Inguinal hernia, right    Blood pressure instability    S/P Mohs surgery for basal cell carcinoma    Status post hysterectomy  and bladder lift with mesh 1990s    History of cholecystectomy  1980    H/O bilateral hip replacements  2013, 2014    Other elective surgery  right renal artery stent    S/P colonoscopy        Home Medications:  cloNIDine 0.2 mg oral tablet: 1 tab(s) orally 3 times a day  HOLD for SBP &lt; 150   Keep SBP between  150- 180     ***6am, 1pm, 8pm*** (11 Mar 2022 18:19)  clopidogrel 75 mg oral tablet: 1 tab(s) orally once a day (11 Mar 2022 18:19)  losartan 50 mg oral tablet: 1 tab(s) orally 2 times a day  ***6am, 6pm*** (11 Mar 2022 18:19)  Metoprolol Succinate ER 25 mg oral tablet, extended release: 1 tab(s) orally 2 times a day  ***9am, 9pm*** (11 Mar 2022 18:19)  pravastatin 20 mg oral tablet: 1 tab(s) orally once a day (at bedtime) (11 Mar 2022 18:19)  torsemide 10 mg oral tablet: 1 tab(s) orally once a day  ***may take 2nd dose 12 hours later if necessary*** (11 Mar 2022 18:19)      MEDICATIONS  (STANDING):  atorvastatin 10 milliGRAM(s) Oral at bedtime  cloNIDine 0.2 milliGRAM(s) Oral three times a day  clopidogrel Tablet 75 milliGRAM(s) Oral daily  hydrALAZINE 25 milliGRAM(s) Oral every 8 hours  losartan 50 milliGRAM(s) Oral two times a day  metoprolol succinate ER 25 milliGRAM(s) Oral two times a day  pantoprazole  Injectable 40 milliGRAM(s) IV Push daily  torsemide 10 milliGRAM(s) Oral daily    MEDICATIONS  (PRN):      Allergies    IV Contrast (Pruritus; Rash)  Keflex (Other)  verapamil (Other)    Intolerances        SOCIAL HISTORY: NC    FAMILY HISTORY:  Family history of uterine cancer (Sibling)    Family history of carotid artery stenosis (Sibling)        ROS  As above  Otherwise unremarkable, all systems reviewed    PE:  Vital Signs Last 24 Hrs  T(C): 36.8 (12 Mar 2022 08:27), Max: 36.9 (11 Mar 2022 20:20)  T(F): 98.2 (12 Mar 2022 08:27), Max: 98.5 (11 Mar 2022 20:20)  HR: 68 (12 Mar 2022 13:07) (65 - 91)  BP: 168/60 (12 Mar 2022 13:07) (133/55 - 194/79)  BP(mean): --  RR: 18 (12 Mar 2022 08:27) (17 - 18)  SpO2: 96% (12 Mar 2022 08:27) (96% - 99%)    Constitutional: NAD, well-developed, A+Ox3  daughter at bedside  Anicteric   Respiratory: CTABL, breathing comfortably  Cardiovascular: S1 and S2, RRR  Gastrointestinal: +BS, soft, non tender, non distended, no mass  Extremities: warm, well perfused, no edema  Psychiatric: Normal mood, normal affect  Neuro: moves all extremities, grossly intact  Skin: No rashes or lesions    LABS:                        7.1    5.45  )-----------( 358      ( 12 Mar 2022 08:27 )             24.2     03-11    137  |  107  |  39<H>  ----------------------------<  115<H>  4.6   |  25  |  1.13    Ca    8.4<L>      11 Mar 2022 13:27    TPro  5.7<L>  /  Alb  2.8<L>  /  TBili  0.7  /  DBili  x   /  AST  27  /  ALT  19  /  AlkPhos  115  03-11    PT/INR - ( 11 Mar 2022 13:27 )   PT: 11.7 sec;   INR: 1.01 ratio         PTT - ( 11 Mar 2022 13:27 )  PTT:29.9 sec  LIVER FUNCTIONS - ( 11 Mar 2022 13:27 )  Alb: 2.8 g/dL / Pro: 5.7 gm/dL / ALK PHOS: 115 U/L / ALT: 19 U/L / AST: 27 U/L / GGT: x             RADIOLOGY & ADDITIONAL STUDIES:
89/Fwith PMHx of     inguinal hernia,  CHF,   renal artery stenosis s/p stent,   basal cell carcinoma,   CVA,   hyponatremia, dyslipidemia, HTN     presents to the ED s/p fall.   Pt states she doesn't know what happened.   Denies dizziness, lightheadedness, SOB, CP. +Head trauma. No LOC.   Pt reports she in on Plavix   and able to ambulate after fall.   She denies cp/sob/abd pain    Had skin lacerations,   right for arm laceration sutured in ED.     Admitted for syncope, consulted for same.  Reviewed history: pt was walking w/ walker  in house not on feet for prolonged period.  Then felt "weird" not lightheaded.  Then woke up on ground.  Duration of LOC min-sec.  No witnesses.  Urinated on herself no tongue biting.  Knew where she was.  Called son who helped her  went to vascular clinic apt mentioned fall & referred to ED.      PAST MEDICAL AND SURGICAL HISTORY:  PAST MEDICAL & SURGICAL HISTORY:  Essential hypertension    Dyslipidemia    Hyponatremia  ON HCTZ , h/o Hypokelemia    Cerebrovascular accident (CVA), unspecified mechanism    Edema, unspecified type    Mitral valve insufficiency, unspecified etiology  Cardiac cath on 11/21/18    Basal cell carcinoma    Renal artery stenosis  right side, stent    CHF (congestive heart failure)    Inguinal hernia, right    Blood pressure instability    S/P Mohs surgery for basal cell carcinoma    Status post hysterectomy  and bladder lift with mesh 1990s    History of cholecystectomy  1980    H/O bilateral hip replacements  2013, 2014    Other elective surgery  right renal artery stent    S/P colonoscopy        ALLERGIES:  Allergies    IV Contrast (Pruritus; Rash)  Keflex (Other)  verapamil (Other)    Intolerances        SOCIAL HISTORY:  Social History:  non smoker  non alcoholic (11 Mar 2022 18:14)      FAMILY  HISTORY:  FAMILY HISTORY:  Family history of uterine cancer (Sibling)    Family history of carotid artery stenosis (Sibling)        MEDICATIONS:  OUTPATIENT:  Home Medications:  cloNIDine 0.2 mg oral tablet: 1 tab(s) orally 3 times a day  HOLD for SBP &lt; 150   Keep SBP between  150- 180     ***6am, 1pm, 8pm*** (11 Mar 2022 18:19)  clopidogrel 75 mg oral tablet: 1 tab(s) orally once a day (11 Mar 2022 18:19)  losartan 50 mg oral tablet: 1 tab(s) orally 2 times a day  ***6am, 6pm*** (11 Mar 2022 18:19)  Metoprolol Succinate ER 25 mg oral tablet, extended release: 1 tab(s) orally 2 times a day  ***9am, 9pm*** (11 Mar 2022 18:19)  pravastatin 20 mg oral tablet: 1 tab(s) orally once a day (at bedtime) (11 Mar 2022 18:19)  torsemide 10 mg oral tablet: 1 tab(s) orally once a day  ***may take 2nd dose 12 hours later if necessary*** (11 Mar 2022 18:19)      INPATIENT:  MEDICATIONS  (STANDING):  atorvastatin 10 milliGRAM(s) Oral at bedtime  cloNIDine 0.2 milliGRAM(s) Oral three times a day  clopidogrel Tablet 75 milliGRAM(s) Oral daily  hydrALAZINE 25 milliGRAM(s) Oral every 8 hours  losartan 50 milliGRAM(s) Oral two times a day  metoprolol succinate ER 25 milliGRAM(s) Oral two times a day  pantoprazole  Injectable 40 milliGRAM(s) IV Push daily  torsemide 10 milliGRAM(s) Oral daily    MEDICATIONS  (PRN):    MEDICATIONS  (PRN):    REVIEW OF SYSTEMS:  ===============================  ===============================  CONSTITUTIONAL: No weakness, fevers or chills  EYES/ENT: No visual changes;  No vertigo or throat pain   NECK: No pain or stiffness  RESPIRATORY: No cough, wheezing, hemoptysis; No shortness of breath  CARDIOVASCULAR: No chest pain or palpitations  GASTROINTESTINAL: No abdominal or epigastric pain. No nausea, vomiting, or hematemesis;   No diarrhea or constipation. No melena or hematochezia.  GENITOURINARY: No dysuria, frequency or hematuria  NEUROLOGICAL: No numbness or weakness  SKIN: No itching, burning, rashes, or lesions   All other review of systems is negative unless indicated above    Vital Signs Last 24 Hrs  T(C): 36.8 (12 Mar 2022 08:27), Max: 36.9 (11 Mar 2022 20:20)  T(F): 98.2 (12 Mar 2022 08:27), Max: 98.5 (11 Mar 2022 20:20)  HR: 68 (12 Mar 2022 13:07) (65 - 91)  BP: 168/60 (12 Mar 2022 13:07) (133/55 - 194/79)  BP(mean): --  RR: 18 (12 Mar 2022 08:27) (17 - 18)  SpO2: 96% (12 Mar 2022 08:27) (96% - 99%)    I&O's Summary    11 Mar 2022 07:01  -  12 Mar 2022 07:00  --------------------------------------------------------  IN: 0 mL / OUT: 350 mL / NET: -350 mL        I&O's Detail    11 Mar 2022 07:01  -  12 Mar 2022 07:00  --------------------------------------------------------  IN:  Total IN: 0 mL    OUT:    Voided (mL): 350 mL  Total OUT: 350 mL    Total NET: -350 mL          PHYSICAL EXAM:    Constitutional: NAD, awake and alert,   HEENT: PERR, EOMI,  No oral cyananosis.  Neck:  supple,  No JVD  Respiratory: Breath sounds are clear bilaterally, No wheezing, rales or rhonchi  Cardiovascular: S1 and S2, regular rate and rhythm, no Murmurs, gallops or rubs  Gastrointestinal: Bowel Sounds present, soft, nontender.   Extremities: No peripheral edema. No clubbing or cyanosis.  Vascular: 2+ peripheral pulses  Neurological: A/O x 3, no focal deficits  Musculoskeletal: no calf tenderness.  Skin: No rashes.    ===============================  ===============================  LABS:                         7.1    5.45  )-----------( 358      ( 12 Mar 2022 08:27 )             24.2                         8.1    9.41  )-----------( 407      ( 11 Mar 2022 13:27 )             26.2     11 Mar 2022 13:27    137    |  107    |  39     ----------------------------<  115    4.6     |  25     |  1.13     Ca    8.4        11 Mar 2022 13:27    TPro  5.7    /  Alb  2.8    /  TBili  0.7    /  DBili  x      /  AST  27     /  ALT  19     /  AlkPhos  115    11 Mar 2022 13:27    PT/INR - ( 11 Mar 2022 13:27 )   PT: 11.7 sec;   INR: 1.01 ratio         PTT - ( 11 Mar 2022 13:27 )  PTT:29.9 sec    THYROID STUDIES:    ===============================  ===============================  CARDIAC BIOMARKERS:  -BNP VALUES:      -TROPONIN VALUES:  -------  lab item   Troponin I, High Sensitivity Result: 38.23 ng/L (03-12-22 @ 11:59)  Troponin I, High Sensitivity Result: 60.14 ng/L *H* (03-11-22 @ 16:55)  Troponin I, High Sensitivity Result: 49.47 ng/L (03-11-22 @ 13:27)    ===============================  ===============================  EKG: NSR, no ischemic changes    ===============================  ECHO:  ------------------------------------------------------------------------  Conclusions:  1. Mild mitral annular calcification. Mitral annular  dilatation with tethered mitral valve leaflets with normal  opening. Mild-moderate mitral regurgitation.  2. Thickened trileaflet aortic valve with normal opening.  Mild aortic regurgitation.  3. Eccentric left ventricular hypertrophy (dilated left  ventricle with normal relative wall thickness).  4. Mild global left ventricular systolic dysfunction.  5. Mild diastolic dysfunction (Stage I).  6. Normal right ventricular size and function.  7. Right pleural effusion.  *** No previous Echo exam.  ------------------------------------------------------------------------  Confirmed on  12/14/2018 - 18:28:01 by Sally Thurman M.D.  ------------------------------------------------------------------------  ===============================    Matt Allen M.D.  Cardiology, [facility]  Cell: [phone]  Office:   [phone] ([facility] Office)  [phone] ([facility] Office)      =================                      
Neurosurgery:    HPI:  89/Fwith PMHx of inguinal hernia, CHF, renal artery stenosis s/p stent, basal cell carcinoma, CVA, hyponatremia, dyslipidemia, HTN presents to the ED s/p fall. Pt states she doesn't know what happened. Denies dizziness, lightheadedness, SOB, CP. +Head trauma. No LOC. Pt reports she in on Plavix and able to ambulate after fall. She denies cp/sob/abd pain    PAST MEDICAL & SURGICAL HISTORY:  Essential hypertension  Dyslipidemia  Hyponatremia  ON HCTZ , h/o Hypokelemia  Cerebrovascular accident (CVA), unspecified mechanism  Edema, unspecified type  Mitral valve insufficiency, unspecified etiology  Cardiac cath on 11/21/18  Basal cell carcinoma  Renal artery stenosis: right side, stent  CHF (congestive heart failure)  Inguinal hernia, right  Blood pressure instability    S/P Mohs surgery for basal cell carcinoma  Status post hysterectomy: bladder lift with mesh 1990s  History of cholecystectomy: 1980  H/O bilateral hip replacements: 2013, 2014  right renal artery stent  S/P colonoscopy    FAMILY HISTORY:  Family history of uterine cancer (Sibling)  Family history of carotid artery stenosis (Sibling)    Allergies  IV Contrast (Pruritus; Rash)  Keflex (Other)  verapamil (Other)    MEDICATIONS  (STANDING):  atorvastatin 10 milliGRAM(s) Oral at bedtime  cholecalciferol 2000 Unit(s) Oral daily  cloNIDine 0.2 milliGRAM(s) Oral three times a day  clopidogrel Tablet 75 milliGRAM(s) Oral daily  hydrALAZINE 25 milliGRAM(s) Oral every 8 hours  iron sucrose Injectable 200 milliGRAM(s) IV Push every 24 hours  losartan 50 milliGRAM(s) Oral two times a day  metoprolol succinate ER 25 milliGRAM(s) Oral two times a day  pantoprazole    Tablet 40 milliGRAM(s) Oral before breakfast  torsemide 10 milliGRAM(s) Oral daily     ROS: Pertinent positives in HPI, all other ROS were reviewed and are negative.     Vital Signs Last 24 Hrs  T(C): 36.9 (16 Mar 2022 20:32), Max: 37.2 (16 Mar 2022 07:06)  T(F): 98.4 (16 Mar 2022 20:32), Max: 99 (16 Mar 2022 07:06)  HR: 70 (16 Mar 2022 20:32) (60 - 75)  BP: 174/62 (16 Mar 2022 20:32) (147/43 - 174/62)  BP(mean): --  RR: 18 (16 Mar 2022 20:32) (18 - 18)  SpO2: 97% (16 Mar 2022 20:32) (95% - 97%)    Labs:                        7.8    x     )-----------( x        ( 16 Mar 2022 14:38 )             26.5     03-16    145  |  116<H>  |  25<H>  ----------------------------<  121<H>  3.7   |  23  |  1.08    Ca    8.1<L>      16 Mar 2022 07:31    TPro  5.0<L>  /  Alb  2.3<L>  /  TBili  0.6  /  DBili  x   /  AST  19  /  ALT  17  /  AlkPhos  101  03-16    Radiology report:  CT brain:  No acute intracranial hemorrhage, brain edema, or mass effect.  No displaced calvarial fracture.    Increased size of high right anterior frontal extra-axial hyperdense   mass, currently 2.3 x 1.6 cm, previously 1.7 x 1.2 cm. This likely   represents a meningioma. No underlying vasogenic edema.    CT cervical spine:  No acute fracture or traumatic subluxation.  No prevertebral soft tissue swelling.  Degenerative changes.    --- End of Report ---      Physical Exam:  Constitutional: Awake / alert  HEENT: PERRLA, EOMI  Neck: Supple  Respiratory: Breath sounds are clear bilaterally  Cardiovascular: S1 and S2, regular rhythm  Gastrointestinal: Soft, NT/ND  Extremities:  no edema  Vascular: No carotid Bruit  Musculoskeletal: no joint swelling/tenderness, no abnormal movements  Skin: No rashes    Neurological Exam:  HF: A x awake, appropriately interactive.  Naming /repetition intact   CN: PERRL, EOMI, VFF, facial sensation normal, no NLFD, tongue midline  Motor: BANDA adeq  Sens: Intact to light touch  Reflexes: Symmetric and normal, downgoing toes b/l  Coord:  No FNFA, dysmetria, AJIT intact   Gait/Balance: Cannot test    A/P:  89/Fwith PMHx of inguinal hernia, CHF, renal artery stenosis s/p stent, basal cell carcinoma, CVA, hyponatremia, dyslipidemia, HTN presents to the ED s/p fall    - Syncope w/u recommended  - enlarging right frontal meningioma, still remains non-surgical - no associated edema.  - May perform spot EEG and obtain neurology consult  - Surgical considerations at age 89 pose significant risk, low suspicion for szr focus  - no steroids needed  - continue DVT ppx chemical and mechanical  - d/w Dr. Black will continue with serial imaging of benign brain lesion and f/u with patient in the AM on 3/17/22                                                                                                                                               Care time:   35 minutes spent on total encounter; more than 50% of the visit was spent counseling and / or coordinating care by the Neurosurgical team.  While the remainder including evaluating the patient, medical record, imaging studies, and discussions with fellow staff members / patient / and or family.  
Neurology Consult requested by:   Patient is a 89y old  Female who presents with a chief complaint of fall, syncope (17 Mar 2022 08:17)     HPI:  89 year old woman with PMHx of inguinal hernia, CHF, renal artery stenosis s/p stent, basal cell carcinoma, CVA, hyponatremia, dyslipidemia, HTN presents to the ED s/p fall. Pt states she doesn't recall what happened. Denies dizziness, lightheadedness, SOB, CP. +Head trauma. No LOC. Pt reports she in on Plavix and able to ambulate after fall. She denies cp/sob/abd pain  Patient lives alone, her son and daughter look after her.    Had skin lacerations, right for arm laceration sutured in ED.  (11 Mar 2022 18:14)      PAST MEDICAL & SURGICAL HISTORY:  Essential hypertension    Dyslipidemia    Hyponatremia  ON HCTZ , h/o Hypokelemia    Cerebrovascular accident (CVA), unspecified mechanism    Edema, unspecified type    Mitral valve insufficiency, unspecified etiology  Cardiac cath on 11/21/18    Basal cell carcinoma    Renal artery stenosis  right side, stent    CHF (congestive heart failure)    Inguinal hernia, right    Blood pressure instability    S/P Mohs surgery for basal cell carcinoma    Status post hysterectomy  and bladder lift with mesh 1990s    History of cholecystectomy  1980    H/O bilateral hip replacements  2013, 2014    Other elective surgery  right renal artery stent    S/P colonoscopy      FAMILY HISTORY:  Family history of uterine cancer (Sibling)    Family history of carotid artery stenosis (Sibling)      Social Hx:  Nonsmoker, no drug or alcohol use  Medications and Allergies ReviewedMEDICATIONS  (STANDING):  atorvastatin 10 milliGRAM(s) Oral at bedtime  cholecalciferol 2000 Unit(s) Oral daily  cloNIDine 0.2 milliGRAM(s) Oral three times a day  clopidogrel Tablet 75 milliGRAM(s) Oral daily  hydrALAZINE 25 milliGRAM(s) Oral every 8 hours  iron sucrose Injectable 200 milliGRAM(s) IV Push every 24 hours  losartan 50 milliGRAM(s) Oral two times a day  metoprolol succinate ER 25 milliGRAM(s) Oral two times a day  pantoprazole    Tablet 40 milliGRAM(s) Oral before breakfast  torsemide 10 milliGRAM(s) Oral daily     ROS: Pertinent positives in HPI, all other ROS were reviewed and are negative.      Examination:   Vital Signs Last 24 Hrs  T(C): 36.4 (17 Mar 2022 07:23), Max: 36.9 (16 Mar 2022 14:44)  T(F): 97.5 (17 Mar 2022 07:23), Max: 98.5 (16 Mar 2022 14:44)  HR: 66 (17 Mar 2022 13:19) (60 - 75)  BP: 186/66 (17 Mar 2022 13:19) (146/54 - 186/66)  BP(mean): --  RR: 19 (17 Mar 2022 07:23) (18 - 19)  SpO2: 95% (17 Mar 2022 07:23) (95% - 97%)  General: Cooperative, NAD   NECK: supple, no masses  ENT: Normal hearing   Vascular : no carotid bruits,   Lungs: CTAB  Chest: RRR, no murmurs  Extremities: nontender, + edema  Musculoskeletal: no adventitious movements, no joint stiffness  Skin: no rash    Neurological Examination:  NIHSS:  MS: AOx3. Appropriately interactive, normal affect. Speech fluent, names and repeats, follows 2 step commands   CN: VFFTC, PERLL, EOMI, V1-3 sensation intact, face symmetric, hearing intact, palate elevates symmetrically, tongue midline, SCM equal bilaterally  Motor: no drift of the upper extremities, normal bulk and tone, no tremor, rigidity or bradykinesia. mild weakness proximally LLE, ~ 4/5, otherwise 5-/5   Sens: Intact to light touch, DSS.    Reflexes: 0-1/4 all over, downgoing toes b/l  Coord:  No dysmetria, AJIT intact   Gait: Cannot test    Labs: Reviewed  Vitamin B12, Serum: <150: Test Repeated pg/mL (03.17.22 @ 08:20)     Comprehensive Metabolic Panel (03.17.22 @ 08:20)   Sodium, Serum: 143 mmol/L   Potassium, Serum: 4.5 mmol/L   Chloride, Serum: 115 mmol/L   Carbon Dioxide, Serum: 24 mmol/L   Anion Gap, Serum: 4 mmol/L   Blood Urea Nitrogen, Serum: 26 mg/dL   Creatinine, Serum: 1.15 mg/dL   Glucose, Serum: 124 mg/dL   Calcium, Total Serum: 8.4 mg/dL   Protein Total, Serum: 5.4 gm/dL   Albumin, Serum: 2.6 g/dL   Bilirubin Total, Serum: 0.8 mg/dL   Alkaline Phosphatase, Serum: 99 U/L   Aspartate Aminotransferase (AST/SGOT): 17 U/L   Alanine Aminotransferase (ALT/SGPT): 19 U/L   eGFR: 45:    Complete Blood Count + Automated Diff (03.17.22 @ 08:20)   WBC Count: 6.10 K/uL   RBC Count: 3.62 M/uL   Hemoglobin: 9.5 g/dL   Hematocrit: 31.5 %   Mean Cell Volume: 87.0 fl   Mean Cell Hemoglobin: 26.2 pg   Mean Cell Hemoglobin Conc: 30.2 gm/dL   Red Cell Distrib Width: 14.4 %   Platelet Count - Automated: 405 K/uL      Imaging:   < from: CT Head No Cont (03.11.22 @ 12:53) >  IMPRESSION:    CT brain:  No acute intracranial hemorrhage, brain edema, or mass effect.  No displaced calvarial fracture.    Increased size of high right anterior frontal extra-axial hyperdense   mass, currently 2.3 x 1.6 cm, previously 1.7 x 1.2 cm. This likely   represents a meningioma. No underlying vasogenic edema.    CT cervical spine:  No acute fracture or traumatic subluxation.  No prevertebral soft tissue swelling.  Degenerative changes.    < end of copied text >      < from: MR Angio Head No Cont (12.13.18 @ 12:15) >  FINDINGS:  Narrowing of supraclinoid internal carotid arteries noted    The proximal anterior, middle and posterior cerebral arteries are patent.   Multifocal stenosis involving proximal M2 branches of bilateral middle   cerebral arteries and distal M2 branches of left middle cerebral artery.   Mild focal narrowing at the proximal left posterior cerebral artery.    The intracranial vertebral and basilar arteries are patent. Dominant   right vertebral artery noted.     There is no MR evidence of intracranial aneurysm.    IMPRESSION:  Multifocal intracranial stenoses as described above    < end of copied text >    < from: MR Head No Cont (12.13.18 @ 12:13) >  COMPARISON: CT head 12/12/2018    FINDINGS:  There is moderate diffuse parenchymal volume loss. There are T2   prolongation signal abnormalities in the periventricular subcortical   white matter likely related to severe chronic microvascular ischemic   changes.    Chronic infarct left parietal occipital region with ex vacuo dilatation   of adjacent left lateral ventricle.    1.1 cm meningioma noted in the right frontal parietal region.    There is no acute parenchymal hemorrhage, parenchymal mass, or midline   shift. There is no extra-axial fluid collection.  There is no   hydrocephalus.      6 x 3 mm acute infarct noted in the right centrum semiovale. 6 x 3 mm   acute infarct noted in the posterior limb right internal capsule.    Partial opacification of right mastoid air cells. Mucosal thickening   paranasal sinuses.    IMPRESSION:  Small acute infarcts in the right centrum semiovale and posterior limb of   right internal capsule as described above.    No acute intracranial hemorrhage    < end of copied text >      < from: EEG Awake and Asleep (03.17.22 @ 11:30) >    IMPRESSION: Abnormal EEG because of mild slowing of the background   activity, consistent with a diffuse cerebral dysfunction, maybe on the   basis of a diffuse metabolic, toxic or structural abnormality. Noted over   the left hemisphere, theta range activity, consistent with an underlying   structural abnormality. Clinical correlation recommended.  If a seizure disorder is suspected, a prolonged video EEG recording is   recommended.    < end of copied text >

## 2022-03-17 NOTE — PROGRESS NOTE ADULT - SUBJECTIVE AND OBJECTIVE BOX
Chief Complaint: Fall, collapse    Interval History: No acute events overnight. Patient states that she feels well. Eager to be discharged home. No headache, vision, change, speech derangement, focal weakness or numbness. No chest pain or tightness. No dizziness, lightheadedness or palpitations.   No dyspnea, orthopnea PND. No abdominal complaints or genitourinary complaints.     ROS: Multi system review is comprehensively negative x 10 systems     Physical Exam:  T(F): 97.5 (17 Mar 2022 07:23), Max: 98.4 (16 Mar 2022 16:45)  HR: 66 (17 Mar 2022 13:19) (60 - 75)  BP: 186/66 (17 Mar 2022 13:19) (146/54 - 186/66)  RR: 19 (17 Mar 2022 07:23) (18 - 19)  SpO2: 95% (17 Mar 2022 07:23) (95% - 97%)    Comfortable appearing  NCAT PERRL EOMI  Neck Supple no JVD no bruits  Chest CTA B/L  CVS s1 s2 normal RRR  Abd +BS soft NT ND   Ext No edema or calf tenderness  Skin warm, dry  Neuro Awake and alert, answers questions appropriately, follows commands  Mood calm pleasant    Labs:    CBC 3/17                        9.5    6.10 )----------( 405             31.5     BMP 3/17    143  |  115  |  26  ----------------------< 124  4.5   |   24   |  1.15    Ca 8.4   Phos 3.4   Mg  2.1    TPro  5.4  /  Alb  2.6  /  TBili  0.8  /  DBili  x   /  AST  17  /  ALT  19  /  AlkPhos  99      Vit B12 < 150   Iron 12 TIBC 289  Iron sat 4% Ferritin 16    Trop(-)   proBNP 2913    Imaging:  L hip/pelvis XR 3/11: Midline sutures. Arterial calcifications. There are bilateral hip replacements with good alignment. No bone   destruction or fracture.    L femur XR 3/11: Femur is intact. Mildly degeneration.    CXR 3/11: No acute chest finding or change. No fracture.    CT C spine 3/11: No acute fracture or traumatic subluxation. No prevertebral soft tissue swelling. Vertebral body height and facet alignment are maintained. Multilevel minimal grade 1 spondylolistheses. Exaggeration of the normal cervical lordosis. Disc space narrowing at and C6-C7 with sclerotic endplate degenerative changes. Alignment at the craniocervical junction unremarkable. Multilevel degenerative changes. Visualized lung apices clear. Visualized soft tissues unremarkable.    CT head 3/11: No hydrocephalus, mass effect, midline shift, acute intracranial hemorrhage, or brain edema. Redemonstration of chronic left occipital infarct. Extensive white matter microvascular ischemic disease. Increased size of high right anterior frontal extra-axial hyperdense   mass, currently 2.3 x 1.6 cm, previously 1.7 x 1.2 cm. This likely represents a meningioma. No underlying vasogenic edema. No displaced calvarial fracture. Visualized paranasal sinuses and mastoid air cells are clear.    Micro:  COVID19 PCR 3/11: Negative    Cardiac Testing:  TTE 3/12:  The left ventricle is normal in size, wall thickness, wall motion and contractility. Estimated left ventricular ejection fraction is 60-65%. EA reversal of the mitral inflow consistent with reduced compliance of the left ventricle. The left atrium is mildly dilated. Mild to moderate aortic regurgitation. Mild (1+) tricuspid valve regurgitation. Normal PA systolic pressures.     EKG 3/13: Sinus bradycardia. Voltage criteria for left ventricular hypertrophy.     EKG 3/12: Normal sinus rhythm, rate WNL. Minimal voltage criteria for LVH, may be normal variant    Procedures:  EEG 3/17: Abnormal EEG because of mild slowing of the background activity, consistent with a diffuse cerebral dysfunction, maybe on the basis of a diffuse metabolic, toxic or structural abnormality. Noted over the left hemisphere, theta range activity, consistent with an underlying   structural abnormality.     EGD 3/14: Non-severe esophagitis, biopsied. Z-line regular, 35 cm from the incisors. Gastritis, biopsied. Duodenal mucosal atrophy, biopsied.    Colonoscopy 3/14:  A few small right sided polyps, biopsied. One 8 mm polyp in the sigmoid colon, removed with a hot snare. Resected and retrieved. Clip was placed. Diverticulosis in the sigmoid colon. The examination was otherwise normal. The examined portion of the ileum was normal.    Pathology:  Endoscopic biopsies 3/14: Small bowel mucosa with total (severe) villous atrophy, moderate-marked acute and chronic inflammation, and reactive changes consistent with duodenitis. Gastric antral type mucosa with moderate-marked acute and chronic inflammation and reactive changes. Immunohistochemical and Diff Quik stains for H. pylori organisms are negative. Distal esophagus biopsy w/ superficial squamous mucosa and epithelium with patchy mild chronic inflammation, congestion, and reactive changes. Alcian blue stain is noncontributory. Large bowel, proximal ascending adenomatous polyp. Large bowel, sigmoid adenomatous polyp.    Meds:  MEDICATIONS  (STANDING):  atorvastatin 10 milliGRAM(s) Oral at bedtime  cholecalciferol 2000 Unit(s) Oral daily  cloNIDine 0.2 milliGRAM(s) Oral three times a day  clopidogrel Tablet 75 milliGRAM(s) Oral daily  cyanocobalamin Injectable 1000 MICROGram(s) IntraMuscular once  hydrALAZINE 25 milliGRAM(s) Oral every 8 hours  iron sucrose Injectable 200 milliGRAM(s) IV Push every 24 hours  losartan 50 milliGRAM(s) Oral two times a day  metoprolol succinate ER 25 milliGRAM(s) Oral two times a day  pantoprazole    Tablet 40 milliGRAM(s) Oral before breakfast  torsemide 10 milliGRAM(s) Oral daily     Chief Complaint: Fall, collapse    Interval History: No acute events overnight. Patient states that she feels well. Eager to be discharged home. No headache, vision, change, speech derangement, focal weakness or numbness. No chest pain or tightness. No dizziness, lightheadedness or palpitations. No dyspnea, orthopnea PND. No abdominal complaints or genitourinary complaints.     ROS: Multi system review is comprehensively negative x 10 systems     Physical Exam:  T(F): 97.5 (17 Mar 2022 07:23), Max: 98.4 (16 Mar 2022 16:45)  HR: 66 (17 Mar 2022 13:19) (60 - 75)  BP: 186/66 (17 Mar 2022 13:19) (146/54 - 186/66)  RR: 19 (17 Mar 2022 07:23) (18 - 19)  SpO2: 95% (17 Mar 2022 07:23) (95% - 97%)    Comfortable appearing  NCAT PERRL EOMI  Neck Supple no JVD no bruits  Chest CTA B/L  CVS s1 s2 normal RRR  Abd +BS soft NT ND   Ext No edema or calf tenderness  Skin warm, dry  Neuro Awake and alert, answers questions appropriately, follows commands  Mood calm pleasant    Labs:    CBC 3/17                        9.5    6.10 )----------( 405             31.5     BMP 3/17    143  |  115  |  26  ----------------------< 124  4.5   |   24   |  1.15    Ca 8.4   Phos 3.4   Mg  2.1    TPro  5.4  /  Alb  2.6  /  TBili  0.8  /  DBili  x   /  AST  17  /  ALT  19  /  AlkPhos  99      Vit B12 < 150   Iron 12 TIBC 289  Iron sat 4% Ferritin 16    Trop(-)   proBNP 2913    Imaging:  L hip/pelvis XR 3/11: Midline sutures. Arterial calcifications. There are bilateral hip replacements with good alignment. No bone   destruction or fracture.    L femur XR 3/11: Femur is intact. Mildly degeneration.    CXR 3/11: No acute chest finding or change. No fracture.    CT C spine 3/11: No acute fracture or traumatic subluxation. No prevertebral soft tissue swelling. Vertebral body height and facet alignment are maintained. Multilevel minimal grade 1 spondylolistheses. Exaggeration of the normal cervical lordosis. Disc space narrowing at and C6-C7 with sclerotic endplate degenerative changes. Alignment at the craniocervical junction unremarkable. Multilevel degenerative changes. Visualized lung apices clear. Visualized soft tissues unremarkable.    CT head 3/11: No hydrocephalus, mass effect, midline shift, acute intracranial hemorrhage, or brain edema. Redemonstration of chronic left occipital infarct. Extensive white matter microvascular ischemic disease. Increased size of high right anterior frontal extra-axial hyperdense   mass, currently 2.3 x 1.6 cm, previously 1.7 x 1.2 cm. This likely represents a meningioma. No underlying vasogenic edema. No displaced calvarial fracture. Visualized paranasal sinuses and mastoid air cells are clear.    Micro:  COVID19 PCR 3/11: Negative    Cardiac Testing:  TTE 3/12:  The left ventricle is normal in size, wall thickness, wall motion and contractility. Estimated left ventricular ejection fraction is 60-65%. EA reversal of the mitral inflow consistent with reduced compliance of the left ventricle. The left atrium is mildly dilated. Mild to moderate aortic regurgitation. Mild (1+) tricuspid valve regurgitation. Normal PA systolic pressures.     EKG 3/13: Sinus bradycardia. Voltage criteria for left ventricular hypertrophy.     EKG 3/12: Normal sinus rhythm, rate WNL. Minimal voltage criteria for LVH, may be normal variant    Procedures:  EEG 3/17: Abnormal EEG because of mild slowing of the background activity, consistent with a diffuse cerebral dysfunction, maybe on the basis of a diffuse metabolic, toxic or structural abnormality. Noted over the left hemisphere, theta range activity, consistent with an underlying   structural abnormality.     EGD 3/14: Non-severe esophagitis, biopsied. Z-line regular, 35 cm from the incisors. Gastritis, biopsied. Duodenal mucosal atrophy, biopsied.    Colonoscopy 3/14:  A few small right sided polyps, biopsied. One 8 mm polyp in the sigmoid colon, removed with a hot snare. Resected and retrieved. Clip was placed. Diverticulosis in the sigmoid colon. The examination was otherwise normal. The examined portion of the ileum was normal.    Pathology:  Endoscopic biopsies 3/14: Small bowel mucosa with total (severe) villous atrophy, moderate-marked acute and chronic inflammation, and reactive changes consistent with duodenitis. Gastric antral type mucosa with moderate-marked acute and chronic inflammation and reactive changes. Immunohistochemical and Diff Quik stains for H. pylori organisms are negative. Distal esophagus biopsy w/ superficial squamous mucosa and epithelium with patchy mild chronic inflammation, congestion, and reactive changes. Alcian blue stain is noncontributory. Large bowel, proximal ascending adenomatous polyp. Large bowel, sigmoid adenomatous polyp.    Meds:  MEDICATIONS  (STANDING):  atorvastatin 10 milliGRAM(s) Oral at bedtime  cholecalciferol 2000 Unit(s) Oral daily  cloNIDine 0.2 milliGRAM(s) Oral three times a day  clopidogrel Tablet 75 milliGRAM(s) Oral daily  cyanocobalamin Injectable 1000 MICROGram(s) IntraMuscular once  hydrALAZINE 25 milliGRAM(s) Oral every 8 hours  iron sucrose Injectable 200 milliGRAM(s) IV Push every 24 hours  losartan 50 milliGRAM(s) Oral two times a day  metoprolol succinate ER 25 milliGRAM(s) Oral two times a day  pantoprazole    Tablet 40 milliGRAM(s) Oral before breakfast  torsemide 10 milliGRAM(s) Oral daily

## 2022-03-17 NOTE — CONSULT NOTE ADULT - ASSESSMENT
89 year old woman hx of cva, small meningioma, s/p fall, ? LOC. CT head shows no acute changes, bilat subcortical CVA, large chronic CVAs.  on exam has mild LLE, old? EEG shows no epileptic abnormalities. prior w/u shows diffuse atherosclerotic disease.  Suggest:  low dose asa 81 mg po Qd  continue statin  check lipid profile  f/u B/p, aim systolic <140  Vit B 12 supplementation, ->cyanocobalamin 1000 mcg IM QD x 7 then monthly  SS consult, may benefit from home health aide
89-year-old woman with history of stroke, on Plavix, with subacute anemia and a recent syncopal episode or fall.  There is no overt GI bleeding but given her decreased weight, poor appetite, and dysphagia concern for a GI source of blood loss.  Rule out esophagitis or esophageal cancer, ulcer disease, hiatal hernia, colon cancer, polyps, AVMs and other conditions.    Recommend:  -Check iron studies  -Can check stool for occult blood but likely will not   -We will perform upper endoscopy and colonoscopy on Monday morning if cleared by cardiology to proceed.  -Okay to continue Plavix but should avoid other blood thinners.  -Discussed with patient and daughter in detail.  -Clear liquid diet tomorrow and bowel preparation if optimized for upper endoscopy and colonoscopy Monday

## 2022-03-17 NOTE — PROGRESS NOTE ADULT - PROBLEM SELECTOR PLAN 1
-Etiology unclear. Orthostatics negative.  Tele shows SR  Echo w/o significant abnormalities.  Suspect hypovolemia. 2/2 anemia?   will consider OP ambulatory tele monitoring on discharge.  -elevated trop most likely due to demand ischemia 2/2 anemia Normal ventricular function     GI to manage anemia. Hemodynamically stable at this time but would benefit from a higher Hgb for coronary perfusion  further work up as per hospitalist and GI No recurrence; no worrisome events on telemetry; ? anemia contributing  Outpatient f/u with Dr Vila

## 2022-03-18 ENCOUNTER — APPOINTMENT (OUTPATIENT)
Dept: VASCULAR SURGERY | Facility: CLINIC | Age: 87
End: 2022-03-18
Payer: MEDICARE

## 2022-03-18 ENCOUNTER — TRANSCRIPTION ENCOUNTER (OUTPATIENT)
Age: 87
End: 2022-03-18

## 2022-03-18 VITALS — OXYGEN SATURATION: 98 % | HEART RATE: 70 BPM | SYSTOLIC BLOOD PRESSURE: 201 MMHG | DIASTOLIC BLOOD PRESSURE: 95 MMHG

## 2022-03-18 VITALS
DIASTOLIC BLOOD PRESSURE: 47 MMHG | TEMPERATURE: 98 F | HEART RATE: 75 BPM | RESPIRATION RATE: 17 BRPM | SYSTOLIC BLOOD PRESSURE: 158 MMHG | OXYGEN SATURATION: 96 %

## 2022-03-18 LAB
CELIAC DISEASE INTERPRETATION: SIGNIFICANT CHANGE UP
CELIAC GENE PAIRS PRESENT: NO — SIGNIFICANT CHANGE UP
DQ ALPHA 1: SIGNIFICANT CHANGE UP
DQ BETA 1: SIGNIFICANT CHANGE UP
IMMUNOGLOBULIN A (IGA), S: 74 MG/DL — SIGNIFICANT CHANGE UP (ref 61–356)

## 2022-03-18 PROCEDURE — 99239 HOSP IP/OBS DSCHRG MGMT >30: CPT

## 2022-03-18 PROCEDURE — 99232 SBSQ HOSP IP/OBS MODERATE 35: CPT

## 2022-03-18 PROCEDURE — 93010 ELECTROCARDIOGRAM REPORT: CPT

## 2022-03-18 PROCEDURE — 99213 OFFICE O/P EST LOW 20 MIN: CPT

## 2022-03-18 RX ORDER — ASCORBIC ACID 60 MG
1 TABLET,CHEWABLE ORAL
Qty: 30 | Refills: 0
Start: 2022-03-18

## 2022-03-18 RX ORDER — HYDRALAZINE HCL 50 MG
50 TABLET ORAL EVERY 8 HOURS
Refills: 0 | Status: DISCONTINUED | OUTPATIENT
Start: 2022-03-18 | End: 2022-03-18

## 2022-03-18 RX ORDER — PANTOPRAZOLE SODIUM 20 MG/1
1 TABLET, DELAYED RELEASE ORAL
Qty: 30 | Refills: 0
Start: 2022-03-18

## 2022-03-18 RX ORDER — CHOLECALCIFEROL (VITAMIN D3) 125 MCG
1 CAPSULE ORAL
Qty: 30 | Refills: 0
Start: 2022-03-18

## 2022-03-18 RX ORDER — HYDRALAZINE HCL 50 MG
1 TABLET ORAL
Qty: 90 | Refills: 0
Start: 2022-03-18

## 2022-03-18 RX ORDER — PREGABALIN 225 MG/1
1 CAPSULE ORAL
Qty: 30 | Refills: 0
Start: 2022-03-18

## 2022-03-18 RX ORDER — FERROUS SULFATE 325(65) MG
1 TABLET ORAL
Qty: 60 | Refills: 0
Start: 2022-03-18

## 2022-03-18 RX ADMIN — Medication 25 MILLIGRAM(S): at 09:33

## 2022-03-18 RX ADMIN — Medication 25 MILLIGRAM(S): at 05:15

## 2022-03-18 RX ADMIN — Medication 10 MILLIGRAM(S): at 09:33

## 2022-03-18 RX ADMIN — LOSARTAN POTASSIUM 50 MILLIGRAM(S): 100 TABLET, FILM COATED ORAL at 09:33

## 2022-03-18 RX ADMIN — PREGABALIN 1000 MICROGRAM(S): 225 CAPSULE ORAL at 09:32

## 2022-03-18 RX ADMIN — PANTOPRAZOLE SODIUM 40 MILLIGRAM(S): 20 TABLET, DELAYED RELEASE ORAL at 05:15

## 2022-03-18 RX ADMIN — Medication 2000 UNIT(S): at 09:32

## 2022-03-18 RX ADMIN — IRON SUCROSE 200 MILLIGRAM(S): 20 INJECTION, SOLUTION INTRAVENOUS at 11:06

## 2022-03-18 RX ADMIN — CLOPIDOGREL BISULFATE 75 MILLIGRAM(S): 75 TABLET, FILM COATED ORAL at 09:33

## 2022-03-18 RX ADMIN — Medication 0.2 MILLIGRAM(S): at 05:14

## 2022-03-18 NOTE — DISCHARGE NOTE PROVIDER - CARE PROVIDER_API CALL
Marcos Laughlin)  Gastroenterology; Internal Medicine  [address] [place] [address]  Phone: [phone]  Fax: [phone]  Follow Up Time: 2 weeks    Mono Black; PhD)  Neurosurgery  [address] [facility], [address] [place] [address]  Phone: [phone]  Fax: [phone]  Follow Up Time: 1 month    Alex Amaya)  Surgery; Vascular Surgery  [address] [facility], [address] [place] [address]  Phone: [phone]  Fax: [phone]  Follow Up Time:     Rafa Vila)  Cardiovascular Disease  [address] [facility], [address]  [place] [address]  Phone: [phone]  Fax: [phone]  Established Patient  Follow Up Time: 2 weeks    Verónica Funk  HEMATOLOGY  [address] [facility], [place] [address]  Phone: [phone]  Fax: [phone]  Follow Up Time: 2 weeks

## 2022-03-18 NOTE — DISCHARGE NOTE PROVIDER - HOSPITAL COURSE
89 year-old woman with HTN, HLD, renal artery stenosis s/p stent, hx of CVA, meningioma, hx of basal cell carcinoma, presented for further evaluation 3/11 after a fall / collapse. Patient does not recall the episode, denied any preceding complaints. She was found to have anemia with Hgb to 6.5 which may have contributed to her weakness and collapse. No overt bleeding. Admitted for further workup of anemia.       Fall, collapse  Patient with physical deconditioning and debility. Also generalized weakness in setting of anemia, see below. A bit unclear whether she had near-syncope / syncope though patient denies LOC. No acute cardiac condition apparent. EKG WNL. No events on tele. TTE unremarkable. Orthostatics negative. Does have known meningioma, bit enlarged compared to prior. Appreciate input from Neurosurgery. Still remains non-surgical. No associated edema. Advised spot EEG, Neurology input. EEG shows no epileptic abnormalities. Neurology advised continued antiplatelet therapy, statin, BP control. Seen by PT. Recommended home with home PT.     Anemia  Appears to be multifactorial with patient diagnosed with iron deficiency, B12 deficiency, gastritis and duodenitis. No signs of active or recent bleed on EGD/ colonoscopy. Continue to manage supportively as described below.     Iron deficiency   Fe 12. TIBC 286. Fe Sat 4%. Ferritin 16. Continue IV iron. Outpatient follow up with Hematology.     Vit B 12 deficiency  Vit B12 level < 150. Started on cyanocobalamin 1000mcg daily    Gastritis, duodenitis  H.pylori negative on biopsy. Continue PPI daily. Outpatient GI follow up with Dr Laughlin.     HTN  BP suboptimally controlled but know to be a chronic, challenging issue in setting of known renal artery stenosis s/p stent. On metoprolol, losartan, hydralazine, torsemide and clonidine. Continue regimen for now, trend BPs    Chronic L occipital infarct  Continue Plavix, statin, BP control    Peripheral vascular disease  LE ulcer. Unna boot wraps. Follows with Dr Riley, for visit today    Discharge Physical Exam:  Afebrile  -170/60s  HR 60s  RR18  O2 99% on room air  Comfortable appearing  NCAT PERRL EOMI  Neck Supple no JVD no bruits  Chest CTA B/L  CVS s1 s2 normal RRR  Abd +BS soft NT ND   Ext No edema or calf tenderness  Skin warm, dry  Neuro Awake and alert, answers questions appropriately, follows commands  Mood calm pleasant    Labs:                       9.5    6.10 )----------( 405             31.5       143  |  115  |  26  ----------------------< 124  4.5   |   24   |  1.15    Ca 8.4   Phos 3.4   Mg  2.1    TPro  5.4  /  Alb  2.6  /  TBili  0.8  /  DBili  x   /  AST  17  /  ALT  19  /  AlkPhos  99      Vit B12 < 150   Iron 12 TIBC 289  Iron sat 4% Ferritin 16    Trop(-)   proBNP 2913    Imaging:  L hip/pelvis XR 3/11: Midline sutures. Arterial calcifications. There are bilateral hip replacements with good alignment. No bone destruction or fracture.    L femur XR 3/11: Femur is intact. Mildly degeneration.    CXR 3/11: No acute chest finding or change. No fracture.    CT C spine 3/11: No acute fracture or traumatic subluxation. No prevertebral soft tissue swelling. Vertebral body height and facet alignment are maintained. Multilevel minimal grade 1 spondylolistheses. Exaggeration of the normal cervical lordosis. Disc space narrowing at and C6-C7 with sclerotic endplate degenerative changes. Alignment at the craniocervical junction unremarkable. Multilevel degenerative changes. Visualized lung apices clear. Visualized soft tissues unremarkable.    CT head 3/11: No hydrocephalus, mass effect, midline shift, acute intracranial hemorrhage, or brain edema. Re-demonstration of chronic left occipital infarct. Extensive white matter microvascular ischemic disease. Increased size of high right anterior frontal extra-axial hyperdense mass, currently 2.3 x 1.6 cm, previously 1.7 x 1.2 cm. This likely represents a meningioma. No underlying vasogenic edema. No displaced calvarial fracture. Visualized paranasal sinuses and mastoid air cells are clear.    Micro:  COVID19 PCR 3/11: Negative    Cardiac Testing:  TTE 3/12:  The left ventricle is normal in size, wall thickness, wall motion and contractility. Estimated left ventricular ejection fraction is 60-65%. EA reversal of the mitral inflow consistent with reduced compliance of the left ventricle. The left atrium is mildly dilated. Mild to moderate aortic regurgitation. Mild (1+) tricuspid valve regurgitation. Normal PA systolic pressures.     EKG 3/13: Sinus bradycardia. Voltage criteria for left ventricular hypertrophy.     EKG 3/12: Normal sinus rhythm, rate WNL. Minimal voltage criteria for LVH, may be normal variant    Procedures:  EEG 3/17: Abnormal EEG because of mild slowing of the background activity, consistent with a diffuse cerebral dysfunction, maybe on the basis of a diffuse metabolic, toxic or structural abnormality. Noted over the left hemisphere, theta range activity, consistent with an underlying structural abnormality.     EGD 3/14: Non-severe esophagitis, biopsied. Z-line regular, 35 cm from the incisors. Gastritis, biopsied. Duodenal mucosal atrophy, biopsied.    Colonoscopy 3/14:  A few small right sided polyps, biopsied. One 8 mm polyp in the sigmoid colon, removed with a hot snare. Resected and retrieved. Clip was placed. Diverticulosis in the sigmoid colon. The examination was otherwise normal. The examined portion of the ileum was normal.    Pathology:  Endoscopic biopsies 3/14: Small bowel mucosa with total (severe) villous atrophy, moderate-marked acute and chronic inflammation, and reactive changes consistent with duodenitis. Gastric antral type mucosa with moderate-marked acute and chronic inflammation and reactive changes. Immunohistochemical and Diff Quik stains for H. pylori organisms are negative. Distal esophagus biopsy w/ superficial squamous mucosa and epithelium with patchy mild chronic inflammation, congestion, and reactive changes. Alcian blue stain is noncontributory. Large bowel, proximal ascending adenomatous polyp. Large bowel, sigmoid adenomatous polyp. 89 year-old woman with HTN, HLD, renal artery stenosis s/p stent, hx of CVA, meningioma, hx of basal cell carcinoma, presented for further evaluation 3/11 after a fall / collapse. Patient does not recall the episode, denied any preceding complaints. She was found to have anemia with Hgb to 6.5 which may have contributed to her weakness and collapse. No overt bleeding. Admitted for further workup of anemia.       Fall, collapse  Patient with physical deconditioning and debility. Also generalized weakness in setting of anemia, see below. A bit unclear whether she had near-syncope / syncope though patient denies LOC. No acute cardiac condition apparent. EKG WNL. No events on tele. TTE unremarkable. Orthostatics negative. Does have known meningioma, bit enlarged compared to prior. Appreciate input from Neurosurgery. Still remains non-surgical. No associated edema. Advised spot EEG, Neurology input. EEG shows no epileptic abnormalities. Neurology advised continued antiplatelet therapy, statin, BP control. Seen by PT. Recommended home with home PT.     Anemia  Appears to be multifactorial with patient diagnosed with iron deficiency, B12 deficiency, gastritis and duodenitis. No signs of active or recent bleed on EGD/ colonoscopy. Continue to manage supportively as described below.     Iron deficiency   Fe 12. TIBC 286. Fe Sat 4%. Ferritin 16. S/p IV iron x 3 doses. Continue PO iron BID as outpatient with Vit C. Outpatient follow up with Hematology for possible additional IV iron.      Vit B 12 deficiency  Vit B12 level < 150. Started on cyanocobalamin 1000mcg daily    Gastritis, duodenitis  H.pylori negative on biopsy. Continue PPI daily. Outpatient GI follow up with Dr Laughlin.     HTN  BP suboptimally controlled but know to be a chronic, challenging issue in setting of known renal artery stenosis s/p stent. On metoprolol, losartan, hydralazine, torsemide and clonidine. Continue regimen for now, trend BPs    Chronic L occipital infarct  Continue Plavix, statin, BP control    Peripheral vascular disease  LE ulcer. Unna boot wraps. Follows with Dr Riley, for visit today    Discharge Physical Exam:  Afebrile  -170/60s  HR 60s  RR18  O2 99% on room air  Comfortable appearing  NCAT PERRL EOMI  Neck Supple no JVD no bruits  Chest CTA B/L  CVS s1 s2 normal RRR  Abd +BS soft NT ND   Ext No edema or calf tenderness  Skin warm, dry  Neuro Awake and alert, answers questions appropriately, follows commands  Mood calm pleasant    Labs:                       9.5    6.10 )----------( 405             31.5       143  |  115  |  26  ----------------------< 124  4.5   |   24   |  1.15    Ca 8.4   Phos 3.4   Mg  2.1    TPro  5.4  /  Alb  2.6  /  TBili  0.8  /  DBili  x   /  AST  17  /  ALT  19  /  AlkPhos  99      Vit B12 < 150   Iron 12 TIBC 289  Iron sat 4% Ferritin 16    Trop(-)   proBNP 2913    Imaging:  L hip/pelvis XR 3/11: Midline sutures. Arterial calcifications. There are bilateral hip replacements with good alignment. No bone destruction or fracture.    L femur XR 3/11: Femur is intact. Mildly degeneration.    CXR 3/11: No acute chest finding or change. No fracture.    CT C spine 3/11: No acute fracture or traumatic subluxation. No prevertebral soft tissue swelling. Vertebral body height and facet alignment are maintained. Multilevel minimal grade 1 spondylolistheses. Exaggeration of the normal cervical lordosis. Disc space narrowing at and C6-C7 with sclerotic endplate degenerative changes. Alignment at the craniocervical junction unremarkable. Multilevel degenerative changes. Visualized lung apices clear. Visualized soft tissues unremarkable.    CT head 3/11: No hydrocephalus, mass effect, midline shift, acute intracranial hemorrhage, or brain edema. Re-demonstration of chronic left occipital infarct. Extensive white matter microvascular ischemic disease. Increased size of high right anterior frontal extra-axial hyperdense mass, currently 2.3 x 1.6 cm, previously 1.7 x 1.2 cm. This likely represents a meningioma. No underlying vasogenic edema. No displaced calvarial fracture. Visualized paranasal sinuses and mastoid air cells are clear.    Micro:  COVID19 PCR 3/11: Negative    Cardiac Testing:  TTE 3/12:  The left ventricle is normal in size, wall thickness, wall motion and contractility. Estimated left ventricular ejection fraction is 60-65%. EA reversal of the mitral inflow consistent with reduced compliance of the left ventricle. The left atrium is mildly dilated. Mild to moderate aortic regurgitation. Mild (1+) tricuspid valve regurgitation. Normal PA systolic pressures.     EKG 3/13: Sinus bradycardia. Voltage criteria for left ventricular hypertrophy.     EKG 3/12: Normal sinus rhythm, rate WNL. Minimal voltage criteria for LVH, may be normal variant    Procedures:  EEG 3/17: Abnormal EEG because of mild slowing of the background activity, consistent with a diffuse cerebral dysfunction, maybe on the basis of a diffuse metabolic, toxic or structural abnormality. Noted over the left hemisphere, theta range activity, consistent with an underlying structural abnormality.     EGD 3/14: Non-severe esophagitis, biopsied. Z-line regular, 35 cm from the incisors. Gastritis, biopsied. Duodenal mucosal atrophy, biopsied.    Colonoscopy 3/14:  A few small right sided polyps, biopsied. One 8 mm polyp in the sigmoid colon, removed with a hot snare. Resected and retrieved. Clip was placed. Diverticulosis in the sigmoid colon. The examination was otherwise normal. The examined portion of the ileum was normal.    Pathology:  Endoscopic biopsies 3/14: Small bowel mucosa with total (severe) villous atrophy, moderate-marked acute and chronic inflammation, and reactive changes consistent with duodenitis. Gastric antral type mucosa with moderate-marked acute and chronic inflammation and reactive changes. Immunohistochemical and Diff Quik stains for H. pylori organisms are negative. Distal esophagus biopsy w/ superficial squamous mucosa and epithelium with patchy mild chronic inflammation, congestion, and reactive changes. Alcian blue stain is noncontributory. Large bowel, proximal ascending adenomatous polyp. Large bowel, sigmoid adenomatous polyp. 89 year-old woman with HTN, HLD, renal artery stenosis s/p stent, hx of CVA, meningioma, hx of basal cell carcinoma, presented for further evaluation 3/11 after a fall / collapse. Patient does not recall the episode, denied any preceding complaints. She was found to have anemia with Hgb to 6.5 which may have contributed to her weakness and collapse. No overt bleeding. Admitted for further workup of anemia.       Fall, collapse  Patient with physical deconditioning and debility. Also generalized weakness in setting of anemia, see below. A bit unclear whether she had near-syncope / syncope though patient denies LOC. No acute cardiac condition apparent. EKG WNL. No events on tele. TTE unremarkable. Orthostatics negative. Does have known meningioma, bit enlarged compared to prior. Appreciate input from Neurosurgery. Still remains non-surgical. No associated edema. Advised spot EEG, Neurology input. EEG shows no epileptic abnormalities. Neurology advised continued antiplatelet therapy, statin, BP control. Seen by PT. Recommended home with home PT.     Anemia  Appears to be multifactorial with patient diagnosed with iron deficiency, B12 deficiency, gastritis and duodenitis. No signs of active or recent bleed on EGD/ colonoscopy. Continue to manage supportively as described below.     Iron deficiency   Fe 12. TIBC 286. Fe Sat 4%. Ferritin 16. S/p IV iron x 3 doses. Continue PO iron BID as outpatient with Vit C. Outpatient follow up with Hematology for possible additional IV iron.      Vit B 12 deficiency  Vit B12 level < 150. Started on cyanocobalamin 1000mcg daily    Gastritis, duodenitis  H.pylori negative on biopsy. Continue PPI daily. Outpatient GI follow up with Dr Laughlin.     HTN  Suboptimally controlled in setting of renal artery stenosis. Increased hydralazine to 50 q8h as per Cardiology Dr Palla who consulted. Otherwise, continue BP home meds as prescribed.    Chronic L occipital infarct  Continue Plavix, statin, BP control    Peripheral vascular disease  LE ulcer. Unna boot wraps. Follows with Dr Riley, for visit today    Discharge Physical Exam:  Afebrile  -170/60s  HR 60s  RR18  O2 99% on room air  Comfortable appearing  NCAT PERRL EOMI  Neck Supple no JVD no bruits  Chest CTA B/L  CVS s1 s2 normal RRR  Abd +BS soft NT ND   Ext No edema or calf tenderness  Skin warm, dry  Neuro Awake and alert, answers questions appropriately, follows commands  Mood calm pleasant    Labs:                       9.5    6.10 )----------( 405             31.5       143  |  115  |  26  ----------------------< 124  4.5   |   24   |  1.15    Ca 8.4   Phos 3.4   Mg  2.1    TPro  5.4  /  Alb  2.6  /  TBili  0.8  /  DBili  x   /  AST  17  /  ALT  19  /  AlkPhos  99      Vit B12 < 150   Iron 12 TIBC 289  Iron sat 4% Ferritin 16    Trop(-)   proBNP 2913    Imaging:  L hip/pelvis XR 3/11: Midline sutures. Arterial calcifications. There are bilateral hip replacements with good alignment. No bone destruction or fracture.    L femur XR 3/11: Femur is intact. Mildly degeneration.    CXR 3/11: No acute chest finding or change. No fracture.    CT C spine 3/11: No acute fracture or traumatic subluxation. No prevertebral soft tissue swelling. Vertebral body height and facet alignment are maintained. Multilevel minimal grade 1 spondylolistheses. Exaggeration of the normal cervical lordosis. Disc space narrowing at and C6-C7 with sclerotic endplate degenerative changes. Alignment at the craniocervical junction unremarkable. Multilevel degenerative changes. Visualized lung apices clear. Visualized soft tissues unremarkable.    CT head 3/11: No hydrocephalus, mass effect, midline shift, acute intracranial hemorrhage, or brain edema. Re-demonstration of chronic left occipital infarct. Extensive white matter microvascular ischemic disease. Increased size of high right anterior frontal extra-axial hyperdense mass, currently 2.3 x 1.6 cm, previously 1.7 x 1.2 cm. This likely represents a meningioma. No underlying vasogenic edema. No displaced calvarial fracture. Visualized paranasal sinuses and mastoid air cells are clear.    Micro:  COVID19 PCR 3/11: Negative    Cardiac Testing:  TTE 3/12:  The left ventricle is normal in size, wall thickness, wall motion and contractility. Estimated left ventricular ejection fraction is 60-65%. EA reversal of the mitral inflow consistent with reduced compliance of the left ventricle. The left atrium is mildly dilated. Mild to moderate aortic regurgitation. Mild (1+) tricuspid valve regurgitation. Normal PA systolic pressures.     EKG 3/13: Sinus bradycardia. Voltage criteria for left ventricular hypertrophy.     EKG 3/12: Normal sinus rhythm, rate WNL. Minimal voltage criteria for LVH, may be normal variant    Procedures:  EEG 3/17: Abnormal EEG because of mild slowing of the background activity, consistent with a diffuse cerebral dysfunction, maybe on the basis of a diffuse metabolic, toxic or structural abnormality. Noted over the left hemisphere, theta range activity, consistent with an underlying structural abnormality.     EGD 3/14: Non-severe esophagitis, biopsied. Z-line regular, 35 cm from the incisors. Gastritis, biopsied. Duodenal mucosal atrophy, biopsied.    Colonoscopy 3/14:  A few small right sided polyps, biopsied. One 8 mm polyp in the sigmoid colon, removed with a hot snare. Resected and retrieved. Clip was placed. Diverticulosis in the sigmoid colon. The examination was otherwise normal. The examined portion of the ileum was normal.    Pathology:  Endoscopic biopsies 3/14: Small bowel mucosa with total (severe) villous atrophy, moderate-marked acute and chronic inflammation, and reactive changes consistent with duodenitis. Gastric antral type mucosa with moderate-marked acute and chronic inflammation and reactive changes. Immunohistochemical and Diff Quik stains for H. pylori organisms are negative. Distal esophagus biopsy w/ superficial squamous mucosa and epithelium with patchy mild chronic inflammation, congestion, and reactive changes. Alcian blue stain is noncontributory. Large bowel, proximal ascending adenomatous polyp. Large bowel, sigmoid adenomatous polyp. 89 year-old woman with HTN, HLD, renal artery stenosis s/p stent, hx of CVA, meningioma, hx of basal cell carcinoma, presented for further evaluation 3/11 after a fall / collapse. Patient does not recall the episode, denied any preceding complaints. She was found to have anemia with Hgb to 6.5 which may have contributed to her weakness and collapse. No overt bleeding. Admitted for further workup of anemia.       Fall, collapse  Patient with physical deconditioning and debility. Also generalized weakness in setting of anemia, see below. A bit unclear whether she had near-syncope / syncope though patient denies LOC. No acute cardiac condition apparent. EKG WNL. No events on tele. TTE unremarkable. Orthostatics negative. Does have known meningioma, bit enlarged compared to prior. Appreciate input from Neurosurgery. Still remains non-surgical. No associated edema. Advised spot EEG, Neurology input. EEG shows no epileptic abnormalities. Neurology advised continued antiplatelet therapy, statin, BP control. Seen by PT. Recommended home with home PT.     Anemia  Appears to be multifactorial with patient diagnosed with iron deficiency, B12 deficiency, gastritis and duodenitis. No signs of active or recent bleed on EGD/ colonoscopy. Continue to manage supportively as described below.     Iron deficiency   Fe 12. TIBC 286. Fe Sat 4%. Ferritin 16. S/p IV iron x 3 doses. Continue PO iron BID as outpatient with Vit C. Outpatient follow up with Hematology for possible additional IV iron.      Vit B 12 deficiency  Vit B12 level < 150. Started on cyanocobalamin 1000mcg daily    Gastritis, duodenitis  H.pylori negative on biopsy. Continue PPI daily. Outpatient GI follow up with Dr Laughlin.     HTN  Suboptimally controlled in setting of renal artery stenosis. Increased hydralazine to 50 q8h as per Cardiology Dr Palla who consulted. Otherwise, continue BP home meds as prescribed.    Chronic L occipital infarct  Continue Plavix, statin, BP control    Peripheral vascular disease  LE ulcer. Unna boot wraps. Follows with Dr Riley, for visit today    R forearm cellulitis  Erythema in area of sutures on her R forearm. Sutures removed. Wound well approximated but appears to have cellulitis. Prescribed doxycycline 100mg twice a day to complete 1 week.    Discharge Physical Exam:  Afebrile  -170/60s  HR 60s  RR18  O2 99% on room air  Comfortable appearing  NCAT PERRL EOMI  Chest CTA B/L  CVS s1 s2 normal RRR  Abd +BS soft NT ND   Ext No edema or calf tenderness  Skin warm, dry, mild erythema in area of the sutures on her R forearm, removed   Neuro Awake and alert, answers questions appropriately, follows commands  Mood calm pleasant    Labs:                       9.5    6.10 )----------( 405             31.5       143  |  115  |  26  ----------------------< 124  4.5   |   24   |  1.15    Ca 8.4   Phos 3.4   Mg  2.1    TPro  5.4  /  Alb  2.6  /  TBili  0.8  /  DBili  x   /  AST  17  /  ALT  19  /  AlkPhos  99      Vit B12 < 150   Iron 12 TIBC 289  Iron sat 4% Ferritin 16    Trop(-)   proBNP 2913    Imaging:  L hip/pelvis/fever XR 3/11: Midline sutures. Arterial calcifications. There are bilateral hip replacements with good alignment. No bone destruction or fracture. Femur is intact. Mildly degeneration.    CXR 3/11: No acute chest finding or change. No fracture.    CT C spine 3/11: No acute fracture or traumatic subluxation. No prevertebral soft tissue swelling. Vertebral body height and facet alignment are maintained. Multilevel minimal grade 1 spondylolistheses. Exaggeration of the normal cervical lordosis. Disc space narrowing at and C6-C7 with sclerotic endplate degenerative changes. Alignment at the craniocervical junction unremarkable. Multilevel degenerative changes. Visualized lung apices clear. Visualized soft tissues unremarkable.    CT head 3/11: No hydrocephalus, mass effect, midline shift, acute intracranial hemorrhage, or brain edema. Re-demonstration of chronic left occipital infarct. Extensive white matter microvascular ischemic disease. Increased size of high right anterior frontal extra-axial hyperdense mass, currently 2.3 x 1.6 cm, previously 1.7 x 1.2 cm. This likely represents a meningioma. No underlying vasogenic edema. No displaced calvarial fracture. Visualized paranasal sinuses and mastoid air cells are clear.    Micro:  COVID19 PCR 3/11: Negative    Cardiac Testing:  TTE 3/12:  The left ventricle is normal in size, wall thickness, wall motion and contractility. Estimated left ventricular ejection fraction is 60-65%. EA reversal of the mitral inflow consistent with reduced compliance of the left ventricle. The left atrium is mildly dilated. Mild to moderate aortic regurgitation. Mild (1+) tricuspid valve regurgitation. Normal PA systolic pressures.     EKG 3/13: Sinus bradycardia. Voltage criteria for left ventricular hypertrophy.     EKG 3/12: Normal sinus rhythm, rate WNL. Minimal voltage criteria for LVH, may be normal variant    Procedures:  EEG 3/17: Abnormal EEG because of mild slowing of the background activity, consistent with a diffuse cerebral dysfunction, maybe on the basis of a diffuse metabolic, toxic or structural abnormality. Noted over the left hemisphere, theta range activity, consistent with an underlying structural abnormality.     EGD 3/14: Non-severe esophagitis, biopsied. Z-line regular, 35 cm from the incisors. Gastritis, biopsied. Duodenal mucosal atrophy, biopsied.    Colonoscopy 3/14:  A few small right sided polyps, biopsied. One 8 mm polyp in the sigmoid colon, removed with a hot snare. Resected and retrieved. Clip was placed. Diverticulosis in the sigmoid colon. The examination was otherwise normal. The examined portion of the ileum was normal.    Pathology:  Endoscopic biopsies 3/14: Small bowel mucosa with total villous atrophy, moderate acute and chronic inflammation, and reactive changes consistent with duodenitis. Gastric antral type mucosa with moderate acute and chronic inflammation and reactive changes. Immunohistochemical and Diff Quik stains for H. pylori organisms negative. Distal esophagus w/ superficial squamous mucosa and epithelium with patchy mild chronic inflammation, congestion, and reactive changes. Alcian blue stain noncontributory. Large bowel, proximal ascending adenomatous polyp. Large bowel, sigmoid adenomatous polyp.

## 2022-03-18 NOTE — DISCHARGE NOTE PROVIDER - NSDCCAREPROVSEEN_GEN_ALL_CORE_FT
Westley Khanna (Cardiology)  Marcos Laughlin (Gastroenterology)  Mono Black (Neurosurgery)  Don Green (Neurology)  Danny Perez ([facility])  Matt Allen (Cardiology)

## 2022-03-18 NOTE — PROGRESS NOTE ADULT - PROBLEM SELECTOR PLAN 2
Mrs. Mueller has severe HTN; BP modestly elevated and somewhat labile; continue clonidine,  metoprolol, losartan.  increase hydralazine to 50 mg po TID , follow up in office , low salt diet
Mrs. Mueller has severe HTN; BP modestly elevated and somewhat labile; continue clonidine, hydralazine, metoprolol, losartan.

## 2022-03-18 NOTE — PROGRESS NOTE ADULT - PROBLEM SELECTOR PLAN 3
Severe with Hgb < 8 and EGD and colonoscopy without source of bleeding identified; f/u AM labs
Severe with Hgb < 8 and EGD and colonoscopy without source of bleeding identified; f/u AM labs

## 2022-03-18 NOTE — DISCHARGE NOTE PROVIDER - CARE PROVIDERS DIRECT ADDRESSES
,DirectAddress_Unknown,[email or web address]

## 2022-03-18 NOTE — PROGRESS NOTE ADULT - PROVIDER SPECIALTY LIST ADULT
Hospitalist
Hospitalist
Gastroenterology
Hospitalist
Cardiology
Gastroenterology
Hospitalist
Cardiology

## 2022-03-18 NOTE — PROGRESS NOTE ADULT - SUBJECTIVE AND OBJECTIVE BOX
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  She was found to be anemic and underwent EGD and colonocopy.    3/13/22 Pt alert and awake in bed, no acute distress noted. No c/o chest pain, sob, dizziness, light headed and other cardiac symptoms. Tele No adverse events overnight.   3/15/22: Patient sitting in chair without complaints of cp,sob,palpitations, lightheadedness   dropped hemoglobin   3/16/22 Pt feels well without dizziness, palpitations or chest pain. Lower Hgb  3/17/22:  Comfortable and without complaint; would like to return home.  3/18/22 patient feeling fine ,  hemoglobin 9.5   hypertensive     MEDICATIONS  (STANDING):  atorvastatin 10 milliGRAM(s) Oral at bedtime  cholecalciferol 2000 Unit(s) Oral daily  cloNIDine 0.2 milliGRAM(s) Oral three times a day  clopidogrel Tablet 75 milliGRAM(s) Oral daily  cyanocobalamin 1000 MICROGram(s) Oral daily  hydrALAZINE 25 milliGRAM(s) Oral every 8 hours  iron sucrose Injectable 200 milliGRAM(s) IV Push every 24 hours  losartan 50 milliGRAM(s) Oral two times a day  metoprolol succinate ER 25 milliGRAM(s) Oral two times a day  pantoprazole    Tablet 40 milliGRAM(s) Oral before breakfast  torsemide 10 milliGRAM(s) Oral daily    MEDICATIONS  (PRN):      Vital Signs Last 24 Hrs  T(C): 36.6 (18 Mar 2022 08:13), Max: 37.1 (17 Mar 2022 19:31)  T(F): 97.9 (18 Mar 2022 08:13), Max: 98.7 (17 Mar 2022 19:31)  HR: 75 (18 Mar 2022 08:13) (61 - 75)  BP: 158/47 (18 Mar 2022 08:13) (158/47 - 186/66)  BP(mean): --  RR: 17 (18 Mar 2022 08:13) (17 - 18)  SpO2: 96% (18 Mar 2022 08:13) (96% - 99%)    I&O's Summary    PHYSICAL EXAM:  Constitutional: NAD, awake and alert  HEENT: ecchymosis forehead  Neck:  supple,  No JVD  Respiratory: Breath sounds are clear bilaterally, No wheezing, rales or rhonchi  Cardiovascular: S1 and S2, regular rate and rhythm  Gastrointestinal: Bowel Sounds present, soft, nontender.   Extremities: No edema.    LABS:                                  9.5    6.10  )-----------( 405      ( 17 Mar 2022 08:20 )             31.5     03-17    143  |  115<H>  |  26<H>  ----------------------------<  124<H>  4.5   |  24  |  1.15    Ca    8.4<L>      17 Mar 2022 08:20  Phos  3.4     03-17  Mg     2.1     03-17    TPro  5.4<L>  /  Alb  2.6<L>  /  TBili  0.8  /  DBili  x   /  AST  17  /  ALT  19  /  AlkPhos  99  03-17        LIVER FUNCTIONS - ( 17 Mar 2022 08:20 )  Alb: 2.6 g/dL / Pro: 5.4 gm/dL / ALK PHOS: 99 U/L / ALT: 19 U/L / AST: 17 U/L / GGT: x               03-17 Chol 130 LDL -- HDL 49<L> Trig 133    TroponinI hsT: <-38.23, <-60.14, <-49.47      12 Lead ECG (03.13.22 @ 23:27) >  Sinus bradycardia  Voltage criteria for left ventricular hypertrophy    TTE Echo Complete w/o Contrast w/ Doppler (03.12.22 @ 09:34) >   The left ventricle is normal in size, wall thickness, wall motion and contractility. Estimated left ventricular ejection fraction is 60-65 %.   The left atrium is mildly dilated.   Mild to moderate aortic regurgitation   Mild tricuspid valve regurgitation. Normal PA systolic pressures.   EA reversal of the mitral inflow consistent with reduced compliance of the left ventricle.

## 2022-03-18 NOTE — DISCHARGE NOTE PROVIDER - NSDCCPCAREPLAN_GEN_ALL_CORE_FT
PRINCIPAL DISCHARGE DIAGNOSIS  Diagnosis: Fall  Assessment and Plan of Treatment: Patient with physical deconditioning and debility. Also generalized weakness in setting of anemia, see below. A bit unclear whether she had near-syncope / syncope though patient denies LOC. No acute cardiac condition apparent. EKG WNL. No events on tele. TTE unremarkable. Orthostatics negative. Does have known meningioma, bit enlarged compared to prior. Appreciate input from Neurosurgery. Still remains non-surgical. No associated edema. Advised spot EEG, Neurology input. EEG shows no epileptic abnormalities. Neurology advised continued antiplatelet therapy, statin, BP control. Seen by PT. Recommended home with home PT.      SECONDARY DISCHARGE DIAGNOSES  Diagnosis: Normocytic anemia  Assessment and Plan of Treatment: Appears to be multifactorial with patient diagnosed with iron deficiency, B12 deficiency, gastritis and duodenitis. No signs of active or recent bleed on EGD/ colonoscopy. Continue to manage supportively with iron and B12 repletion. Follow up with outpatient hematologist.    Diagnosis: Iron deficiency anemia  Assessment and Plan of Treatment: Fe 12. TIBC 286. Fe Sat 4%. Ferritin 16. Continue IV iron. Outpatient follow up with Hematology.    Diagnosis: Vitamin B12 deficiency  Assessment and Plan of Treatment: Vit B12 level < 150. Started on cyanocobalamin 1000mcg daily    Diagnosis: Gastritis  Assessment and Plan of Treatment: Also duodenitis. as noted on EGD this admission. H.pylori negative on biopsy. Continue PPI daily. Outpatient GI follow up with Dr Laughlin.    Diagnosis: HTN (hypertension)  Assessment and Plan of Treatment: BP suboptimally controlled but know to be a chronic, challenging issue in setting of known renal artery stenosis s/p stent. On metoprolol, losartan, hydralazine, torsemide and clonidine. Continue regimen for now, trend BPs    Diagnosis: Peripheral vascular disease  Assessment and Plan of Treatment: LE ulcer. Unna boot wraps. Follows with Dr Riley, for visit today     PRINCIPAL DISCHARGE DIAGNOSIS  Diagnosis: Fall  Assessment and Plan of Treatment: Patient with physical deconditioning and debility. Also generalized weakness in setting of anemia, see below. A bit unclear whether she had near-syncope / syncope though patient denies LOC. No acute cardiac condition apparent. EKG WNL. No events on tele. TTE unremarkable. Orthostatics negative. Does have known meningioma, bit enlarged compared to prior. Appreciate input from Neurosurgery. Still remains non-surgical. No associated edema. Advised spot EEG, Neurology input. EEG shows no epileptic abnormalities. Neurology advised continued antiplatelet therapy, statin, BP control. Seen by PT. Recommended home with home PT.      SECONDARY DISCHARGE DIAGNOSES  Diagnosis: Normocytic anemia  Assessment and Plan of Treatment: Appears to be multifactorial with patient diagnosed with iron deficiency, B12 deficiency, gastritis and duodenitis. No signs of active or recent bleed on EGD/ colonoscopy. Continue to manage supportively with iron and B12 repletion. Follow up with outpatient hematologist.    Diagnosis: Iron deficiency anemia  Assessment and Plan of Treatment: Fe 12. TIBC 286. Fe Sat 4%. Ferritin 16. S/p IV iron x 3 doses. Continue PO iron BID as outpatient with Vit C. Outpatient follow up with Hematology for possible additional IV iron.    Diagnosis: Vitamin B12 deficiency  Assessment and Plan of Treatment: Vit B12 level < 150. Started on cyanocobalamin 1000mcg daily    Diagnosis: Gastritis  Assessment and Plan of Treatment: Also duodenitis. as noted on EGD this admission. H.pylori negative on biopsy. Continue PPI daily. Outpatient GI follow up with Dr Laughlin.    Diagnosis: HTN (hypertension)  Assessment and Plan of Treatment: BP suboptimally controlled but know to be a chronic, challenging issue in setting of known renal artery stenosis s/p stent. On metoprolol, losartan, hydralazine, torsemide and clonidine. Continue regimen for now, trend BPs    Diagnosis: Peripheral vascular disease  Assessment and Plan of Treatment: LE ulcer. Unna boot wraps. Follows with Dr Riley, for visit today     PRINCIPAL DISCHARGE DIAGNOSIS  Diagnosis: Fall  Assessment and Plan of Treatment: Patient with physical deconditioning and debility. Also generalized weakness in setting of anemia, see below. A bit unclear whether she had near-syncope / syncope though patient denies LOC. No acute cardiac condition apparent. EKG WNL. No events on tele. TTE unremarkable. Orthostatics negative. Does have known meningioma, bit enlarged compared to prior. Appreciate input from Neurosurgery. Still remains non-surgical. No associated edema. Advised spot EEG, Neurology input. EEG shows no epileptic abnormalities. Neurology advised continued antiplatelet therapy, statin, BP control. Seen by PT. Recommended home with home PT.      SECONDARY DISCHARGE DIAGNOSES  Diagnosis: Normocytic anemia  Assessment and Plan of Treatment: Appears to be multifactorial with patient diagnosed with iron deficiency, B12 deficiency, gastritis and duodenitis. No signs of active or recent bleed on EGD/ colonoscopy. Continue to manage supportively with iron and B12 repletion. Follow up with outpatient hematologist.    Diagnosis: Iron deficiency anemia  Assessment and Plan of Treatment: Fe 12. TIBC 286. Fe Sat 4%. Ferritin 16. S/p IV iron x 3 doses. Continue PO iron BID as outpatient with Vit C. Outpatient follow up with Hematology for possible additional IV iron.    Diagnosis: Vitamin B12 deficiency  Assessment and Plan of Treatment: Vit B12 level < 150. Started on cyanocobalamin 1000mcg daily    Diagnosis: Gastritis  Assessment and Plan of Treatment: Also duodenitis. as noted on EGD this admission. H.pylori negative on biopsy. Continue PPI daily. Outpatient GI follow up with Dr Laughlin.    Diagnosis: HTN (hypertension)  Assessment and Plan of Treatment: BP suboptimally controlled but know to be a chronic, challenging issue in setting of known renal artery stenosis s/p stent. On metoprolol, losartan, hydralazine, torsemide and clonidine. Increased hydralazine to 50mg every 8 hours as per Cardiology Dr Palla who consulted. Otherwise, continue home blood pressure meds as prescribed.    Diagnosis: Peripheral vascular disease  Assessment and Plan of Treatment: LE ulcer. Unna boot wraps. Follows with Dr Riley, for visit today     PRINCIPAL DISCHARGE DIAGNOSIS  Diagnosis: Fall  Assessment and Plan of Treatment: Patient with physical deconditioning and debility. Also generalized weakness in setting of anemia, see below. A bit unclear whether she had near-syncope / syncope though patient denies LOC. No acute cardiac condition apparent. EKG WNL. No events on tele. TTE unremarkable. Orthostatics negative. Does have known meningioma, bit enlarged compared to prior. Appreciate input from Neurosurgery. Still remains non-surgical. No associated edema. Advised spot EEG, Neurology input. EEG shows no epileptic abnormalities. Neurology advised continued antiplatelet therapy, statin, BP control. Seen by PT. Recommended home with home PT.      SECONDARY DISCHARGE DIAGNOSES  Diagnosis: Normocytic anemia  Assessment and Plan of Treatment: Appears to be multifactorial with patient diagnosed with iron deficiency, B12 deficiency, gastritis and duodenitis. No signs of active or recent bleed on EGD/ colonoscopy. Continue to manage supportively with iron and B12 repletion. Follow up with outpatient hematologist.    Diagnosis: Iron deficiency anemia  Assessment and Plan of Treatment: Fe 12. TIBC 286. Fe Sat 4%. Ferritin 16. S/p IV iron x 3 doses. Continue PO iron BID as outpatient with Vit C. Outpatient follow up with Hematology for possible additional IV iron.    Diagnosis: Vitamin B12 deficiency  Assessment and Plan of Treatment: Vit B12 level < 150. Started on cyanocobalamin 1000mcg daily    Diagnosis: Gastritis  Assessment and Plan of Treatment: Also duodenitis. as noted on EGD this admission. H.pylori negative on biopsy. Continue PPI daily. Outpatient GI follow up with Dr Laughlin.    Diagnosis: HTN (hypertension)  Assessment and Plan of Treatment: BP suboptimally controlled but know to be a chronic, challenging issue in setting of known renal artery stenosis s/p stent. On metoprolol, losartan, hydralazine, torsemide and clonidine. Increased hydralazine to 50mg every 8 hours as per Cardiology Dr Palla who consulted. Otherwise, continue home blood pressure meds as prescribed.    Diagnosis: Peripheral vascular disease  Assessment and Plan of Treatment: LE ulcer. Unna boot wraps. Follows with Dr Riley, for visit today    Diagnosis: Cellulitis of right upper extremity  Assessment and Plan of Treatment: Erythema in area of sutures on her R forearm. Sutures removed. Wound well approximated but appears to have cellulitis. Prescribed doxycycline 100mg twice a day to complete 1 week.

## 2022-03-18 NOTE — DISCHARGE NOTE PROVIDER - NSDCFUSCHEDAPPT_GEN_ALL_CORE_FT
MARU MOYA ; 03/18/2022 ; [facility] Surg Vasc [address]  MARU MOYA ; 03/24/2022 ; [facility] Cardio [address]   MARU MOYA ; 03/25/2022 ; [facility] Surg Vasc [address]

## 2022-03-18 NOTE — PROGRESS NOTE ADULT - REASON FOR ADMISSION
fall, syncope
Fall, collapse
fall, syncope

## 2022-03-18 NOTE — DISCHARGE NOTE PROVIDER - PROVIDER TOKENS
PROVIDER:[TOKEN:[[id number]],FOLLOWUP:[2 weeks]],PROVIDER:[TOKEN:[[id number]],FOLLOWUP:[1 month]],PROVIDER:[TOKEN:[[id number]]],PROVIDER:[TOKEN:[[id number]],FOLLOWUP:[2 weeks],ESTABLISHEDPATIENT:[T]],PROVIDER:[TOKEN:[[id number]],FOLLOWUP:[2 weeks]]

## 2022-03-18 NOTE — DISCHARGE NOTE PROVIDER - NSDCMRMEDTOKEN_GEN_ALL_CORE_FT
cloNIDine 0.2 mg oral tablet: 1 tab(s) orally 3 times a day  HOLD for SBP &lt; 150   Keep SBP between  150- 180     ***6am, 1pm, 8pm***  clopidogrel 75 mg oral tablet: 1 tab(s) orally once a day  cyanocobalamin 1000 mcg oral tablet: 1 tab(s) orally once a day  hydrALAZINE 25 mg oral tablet: 1 tab(s) orally every 8 hours  ***9am, 4pm, 11pm***  losartan 50 mg oral tablet: 1 tab(s) orally 2 times a day  ***6am, 6pm***  Metoprolol Succinate ER 25 mg oral tablet, extended release: 1 tab(s) orally 2 times a day  ***9am, 9pm***  pantoprazole 40 mg oral delayed release tablet: 1 tab(s) orally once a day (before a meal)  pravastatin 20 mg oral tablet: 1 tab(s) orally once a day (at bedtime)  torsemide 10 mg oral tablet: 1 tab(s) orally once a day  ***may take 2nd dose 12 hours later if necessary***  Vitamin D3 50 mcg (2000 intl units) oral capsule: 1 cap(s) orally once a day    ascorbic acid 500 mg oral tablet: 1 tab(s) orally once a day   cloNIDine 0.2 mg oral tablet: 1 tab(s) orally 3 times a day  HOLD for SBP &lt; 150   Keep SBP between  150- 180     ***6am, 1pm, 8pm***  clopidogrel 75 mg oral tablet: 1 tab(s) orally once a day  cyanocobalamin 1000 mcg oral tablet: 1 tab(s) orally once a day  ferrous sulfate 325 mg (65 mg elemental iron) oral tablet: 1 tab(s) orally 2 times a day   hydrALAZINE 25 mg oral tablet: 1 tab(s) orally every 8 hours  ***9am, 4pm, 11pm***  losartan 50 mg oral tablet: 1 tab(s) orally 2 times a day  ***6am, 6pm***  Metoprolol Succinate ER 25 mg oral tablet, extended release: 1 tab(s) orally 2 times a day  ***9am, 9pm***  pantoprazole 40 mg oral delayed release tablet: 1 tab(s) orally once a day (before a meal)  pravastatin 20 mg oral tablet: 1 tab(s) orally once a day (at bedtime)  torsemide 10 mg oral tablet: 1 tab(s) orally once a day  ***may take 2nd dose 12 hours later if necessary***  Vitamin D3 50 mcg (2000 intl units) oral capsule: 1 cap(s) orally once a day    ascorbic acid 500 mg oral tablet: 1 tab(s) orally once a day   cloNIDine 0.2 mg oral tablet: 1 tab(s) orally 3 times a day  HOLD for SBP &lt; 150   Keep SBP between  150- 180     ***6am, 1pm, 8pm***  clopidogrel 75 mg oral tablet: 1 tab(s) orally once a day  cyanocobalamin 1000 mcg oral tablet: 1 tab(s) orally once a day  ferrous sulfate 325 mg (65 mg elemental iron) oral tablet: 1 tab(s) orally 2 times a day   hydrALAZINE 50 mg oral tablet: 1 tab(s) orally every 8 hours  losartan 50 mg oral tablet: 1 tab(s) orally 2 times a day  ***6am, 6pm***  Metoprolol Succinate ER 25 mg oral tablet, extended release: 1 tab(s) orally 2 times a day  ***9am, 9pm***  pantoprazole 40 mg oral delayed release tablet: 1 tab(s) orally once a day (before a meal)  pravastatin 20 mg oral tablet: 1 tab(s) orally once a day (at bedtime)  torsemide 10 mg oral tablet: 1 tab(s) orally once a day  ***may take 2nd dose 12 hours later if necessary***  Vitamin D3 50 mcg (2000 intl units) oral capsule: 1 cap(s) orally once a day    ascorbic acid 500 mg oral tablet: 1 tab(s) orally once a day   cloNIDine 0.2 mg oral tablet: 1 tab(s) orally 3 times a day  HOLD for SBP &lt; 150   Keep SBP between  150- 180     ***6am, 1pm, 8pm***  clopidogrel 75 mg oral tablet: 1 tab(s) orally once a day  cyanocobalamin 1000 mcg oral tablet: 1 tab(s) orally once a day  doxycycline hyclate 100 mg oral tablet: 1 tab(s) orally 2 times a day   ferrous sulfate 325 mg (65 mg elemental iron) oral tablet: 1 tab(s) orally 2 times a day   hydrALAZINE 50 mg oral tablet: 1 tab(s) orally every 8 hours  losartan 50 mg oral tablet: 1 tab(s) orally 2 times a day  ***6am, 6pm***  Metoprolol Succinate ER 25 mg oral tablet, extended release: 1 tab(s) orally 2 times a day  ***9am, 9pm***  pantoprazole 40 mg oral delayed release tablet: 1 tab(s) orally once a day (before a meal)  pravastatin 20 mg oral tablet: 1 tab(s) orally once a day (at bedtime)  torsemide 10 mg oral tablet: 1 tab(s) orally once a day  ***may take 2nd dose 12 hours later if necessary***  Vitamin D3 50 mcg (2000 intl units) oral capsule: 1 cap(s) orally once a day

## 2022-03-22 ENCOUNTER — NON-APPOINTMENT (OUTPATIENT)
Age: 87
End: 2022-03-22
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
## 2022-03-22 DIAGNOSIS — S51.811A LACERATION WITHOUT FOREIGN BODY OF RIGHT FOREARM, INITIAL ENCOUNTER: ICD-10-CM

## 2022-03-22 DIAGNOSIS — K20.90 ESOPHAGITIS, UNSPECIFIED WITHOUT BLEEDING: ICD-10-CM

## 2022-03-22 DIAGNOSIS — E86.1 HYPOVOLEMIA: ICD-10-CM
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
## 2022-03-22 DIAGNOSIS — D12.5 BENIGN NEOPLASM OF SIGMOID COLON: ICD-10-CM

## 2022-03-22 DIAGNOSIS — E83.51 HYPOCALCEMIA: ICD-10-CM

## 2022-03-22 DIAGNOSIS — R54 AGE-RELATED PHYSICAL DEBILITY: ICD-10-CM

## 2022-03-22 DIAGNOSIS — E53.8 DEFICIENCY OF OTHER SPECIFIED B GROUP VITAMINS: ICD-10-CM

## 2022-03-22 DIAGNOSIS — D50.9 IRON DEFICIENCY ANEMIA, UNSPECIFIED: ICD-10-CM

## 2022-03-22 DIAGNOSIS — L97.909 NON-PRESSURE CHRONIC ULCER OF UNSPECIFIED PART OF UNSPECIFIED LOWER LEG WITH UNSPECIFIED SEVERITY: ICD-10-CM

## 2022-03-22 DIAGNOSIS — E55.9 VITAMIN D DEFICIENCY, UNSPECIFIED: ICD-10-CM

## 2022-03-22 DIAGNOSIS — I73.9 PERIPHERAL VASCULAR DISEASE, UNSPECIFIED: ICD-10-CM

## 2022-03-22 DIAGNOSIS — Z79.02 LONG TERM (CURRENT) USE OF ANTITHROMBOTICS/ANTIPLATELETS: ICD-10-CM

## 2022-03-22 DIAGNOSIS — L03.113 CELLULITIS OF RIGHT UPPER LIMB: ICD-10-CM

## 2022-03-22 DIAGNOSIS — Z95.820 PERIPHERAL VASCULAR ANGIOPLASTY STATUS WITH IMPLANTS AND GRAFTS: ICD-10-CM

## 2022-03-22 DIAGNOSIS — I50.32 CHRONIC DIASTOLIC (CONGESTIVE) HEART FAILURE: ICD-10-CM

## 2022-03-22 DIAGNOSIS — K29.80 DUODENITIS WITHOUT BLEEDING: ICD-10-CM

## 2022-03-22 DIAGNOSIS — R26.81 UNSTEADINESS ON FEET: ICD-10-CM

## 2022-03-22 DIAGNOSIS — Z96.643 PRESENCE OF ARTIFICIAL HIP JOINT, BILATERAL: ICD-10-CM

## 2022-03-22 DIAGNOSIS — Z88.8 ALLERGY STATUS TO OTHER DRUGS, MEDICAMENTS AND BIOLOGICAL SUBSTANCES STATUS: ICD-10-CM

## 2022-03-22 DIAGNOSIS — R00.1 BRADYCARDIA, UNSPECIFIED: ICD-10-CM

## 2022-03-22 DIAGNOSIS — K57.30 DIVERTICULOSIS OF LARGE INTESTINE WITHOUT PERFORATION OR ABSCESS WITHOUT BLEEDING: ICD-10-CM

## 2022-03-22 DIAGNOSIS — R55 SYNCOPE AND COLLAPSE: ICD-10-CM

## 2022-03-22 DIAGNOSIS — Z91.041 RADIOGRAPHIC DYE ALLERGY STATUS: ICD-10-CM

## 2022-03-22 DIAGNOSIS — K29.70 GASTRITIS, UNSPECIFIED, WITHOUT BLEEDING: ICD-10-CM

## 2022-03-22 DIAGNOSIS — I24.8 OTHER FORMS OF ACUTE ISCHEMIC HEART DISEASE: ICD-10-CM

## 2022-03-22 DIAGNOSIS — R71.0 PRECIPITOUS DROP IN HEMATOCRIT: ICD-10-CM

## 2022-03-22 DIAGNOSIS — D32.0 BENIGN NEOPLASM OF CEREBRAL MENINGES: ICD-10-CM

## 2022-03-22 DIAGNOSIS — I11.0 HYPERTENSIVE HEART DISEASE WITH HEART FAILURE: ICD-10-CM

## 2022-03-22 DIAGNOSIS — D12.2 BENIGN NEOPLASM OF ASCENDING COLON: ICD-10-CM

## 2022-03-22 DIAGNOSIS — Z86.73 PERSONAL HISTORY OF TRANSIENT ISCHEMIC ATTACK (TIA), AND CEREBRAL INFARCTION WITHOUT RESIDUAL DEFICITS: ICD-10-CM

## 2022-03-22 DIAGNOSIS — E78.5 HYPERLIPIDEMIA, UNSPECIFIED: ICD-10-CM

## 2022-03-23 NOTE — PROCEDURE
[FreeTextEntry1] : Cleaned right arm wounds with Betadine, applied Steri-Strip to wound, covered with dry gauze and Tegaderm.  hemostasis occurred.

## 2022-03-23 NOTE — PHYSICAL EXAM
[1+] : left 1+ [Ankle Swelling (On Exam)] : present [] : bilaterally [Ankle Swelling Bilaterally] : severe [Skin Ulcer] : ulcer [Alert] : alert [Oriented to Person] : oriented to person [Oriented to Place] : oriented to place [Oriented to Time] : oriented to time [Calm] : calm [JVD] : no jugular venous distention  [Varicose Veins Of Lower Extremities] : not present [de-identified] : A&O x 3.  [FreeTextEntry1] : Severe lipodermatosclerosis of legs bilaterally\par right posterior calf healed. No open wounds on legs bilaterally

## 2022-03-23 NOTE — HISTORY OF PRESENT ILLNESS
[FreeTextEntry1] : 3/4/22: New 89 yo female PMHx HTN, Stroke (1998), arthritis, CKD 3, mitral and tricuspid regurgitation, presents with right lower leg weeping edema x 4 months. Patient denies any pain, fever or chills. She has been applying non-stick bandages to the right posterior calf area of weeping and wrapping the leg with an ACE wrap. Patient has had weeping of her left leg in the past, not currently. She takes furosemide 10-20 mg every other day. Deniex hx of DVT. \par \par 3/11/22: Pt fell this morning and hit her head, right arm, left hip. She is not sure how she fell, she cannot remember the fall. She has bruising on her left hip, laceration on right forearm and right dorsal hand. Pt feels her right leg weeping has improved since last visit. She denies any fever or chills. \par \par 3/18/22: Pt discharged today. She is feeling well. She did not take her mid afternoon dose of BP medication. She had her UNNA boots taken off today. She has less leg swelling.

## 2022-03-23 NOTE — ASSESSMENT
[FreeTextEntry1] : 89 yo female with improved right leg weeping from UNNA boot treatment. Pt still has severe lipodermatosclerosis of lower legs bilaterally. \par \par Pt counseled on above diagnosis.\par Pt given rx for 15-20 mmHg compression stockings with zipper\par RTC 2 weeks for bandage change and venous duplex. \par Will refer to [employer] for lymphedema massage pumps \par \par \par \par Patient has a possible component of primary lymphedema. The patient has been compliant with conservative therapies including compression 20-30mmhg, exercise and leg elevation at rest for over a month.  Despite these therapies symptoms continue to progress and extend into the truncal region. Early signs of hyperpigmentation noted in both calves. I am now recommending a lymphedema pump vended by [employer].\par \par \par A total of 30 minutes was spent with patient and coordinating care\par

## 2022-03-24 ENCOUNTER — APPOINTMENT (OUTPATIENT)
Dept: CARDIOLOGY | Facility: CLINIC | Age: 87
End: 2022-03-24
Payer: MEDICARE

## 2022-03-24 VITALS
DIASTOLIC BLOOD PRESSURE: 80 MMHG | HEART RATE: 70 BPM | SYSTOLIC BLOOD PRESSURE: 180 MMHG | OXYGEN SATURATION: 98 % | HEIGHT: 60 IN | WEIGHT: 96.34 LBS | BODY MASS INDEX: 18.91 KG/M2

## 2022-03-24 PROCEDURE — 99214 OFFICE O/P EST MOD 30 MIN: CPT | Mod: 25

## 2022-03-24 PROCEDURE — 93000 ELECTROCARDIOGRAM COMPLETE: CPT

## 2022-03-24 RX ORDER — METOPROLOL SUCCINATE 25 MG/1
25 TABLET, EXTENDED RELEASE ORAL DAILY
Qty: 90 | Refills: 2 | Status: DISCONTINUED | COMMUNITY
End: 2022-03-24

## 2022-03-24 RX ORDER — HYDRALAZINE HYDROCHLORIDE 25 MG/1
25 TABLET ORAL
Qty: 270 | Refills: 1 | Status: DISCONTINUED | COMMUNITY
Start: 2019-01-07 | End: 2022-03-24

## 2022-03-25 ENCOUNTER — APPOINTMENT (OUTPATIENT)
Dept: VASCULAR SURGERY | Facility: CLINIC | Age: 87
End: 2022-03-25
Payer: MEDICARE

## 2022-03-25 VITALS — HEART RATE: 78 BPM | OXYGEN SATURATION: 99 % | SYSTOLIC BLOOD PRESSURE: 193 MMHG | DIASTOLIC BLOOD PRESSURE: 73 MMHG

## 2022-03-25 PROCEDURE — 99213 OFFICE O/P EST LOW 20 MIN: CPT

## 2022-03-25 NOTE — ASSESSMENT
[FreeTextEntry1] : 89 yo female with improved right leg weeping from UNNA boot treatment. Pt still has severe lipodermatosclerosis of lower legs bilaterally. \par \par Pt counseled on above diagnosis.\par Pt given rx for triamcinolone cream. Counseled to apply to legs twice daily \par Pt counseled to use 15-20 mmHg compression stockings with zipper\par RTC 1 week to monitor legs  \par Will refer to [employer] for lymphedema massage pumps \par \par A total of 30 minutes was spent with patient and coordinating care\par

## 2022-03-25 NOTE — HISTORY OF PRESENT ILLNESS
[FreeTextEntry1] : 3/4/22: New 89 yo female PMHx HTN, Stroke (1998), arthritis, CKD 3, mitral and tricuspid regurgitation, presents with right lower leg weeping edema x 4 months. Patient denies any pain, fever or chills. She has been applying non-stick bandages to the right posterior calf area of weeping and wrapping the leg with an ACE wrap. Patient has had weeping of her left leg in the past, not currently. She takes furosemide 10-20 mg every other day. Deniex hx of DVT. \par \par 3/11/22: Pt fell this morning and hit her head, right arm, left hip. She is not sure how she fell, she cannot remember the fall. She has bruising on her left hip, laceration on right forearm and right dorsal hand. Pt feels her right leg weeping has improved since last visit. She denies any fever or chills. \par \par 3/18/22: Pt discharged today. She is feeling well. She did not take her mid afternoon dose of BP medication. She had her UNNA boots taken off today. She has less leg swelling. \par \par 3/25/22: Pt is doing well. She has had her compression stockings on for the past week. She is unable to bend over at the waist and put on her stockings. She denies any new wounds, fever, chills, weeping.

## 2022-03-25 NOTE — PHYSICAL EXAM
[1+] : left 1+ [Ankle Swelling (On Exam)] : present [] : bilaterally [Ankle Swelling Bilaterally] : severe [Skin Ulcer] : ulcer [Alert] : alert [Oriented to Person] : oriented to person [Oriented to Place] : oriented to place [Oriented to Time] : oriented to time [Calm] : calm [JVD] : no jugular venous distention  [Varicose Veins Of Lower Extremities] : not present [de-identified] : A&O x 3. NAD [FreeTextEntry1] : Severe lipodermatosclerosis of legs bilaterally\par right posterior calf healed. No open wounds on legs bilaterally

## 2022-03-27 ENCOUNTER — RESULT CHARGE (OUTPATIENT)
Age: 87
End: 2022-03-27

## 2022-03-28 ENCOUNTER — NON-APPOINTMENT (OUTPATIENT)
Age: 87
End: 2022-03-28

## 2022-03-28 NOTE — PHYSICAL EXAM
[Normal S1, S2] : normal S1, S2 [Soft] : abdomen soft [Non Tender] : non-tender [No Edema] : no edema [No Rash] : no rash [Normal] : moves all extremities, no focal deficits, normal speech [Alert and Oriented] : alert and oriented [Normal Conjunctiva] : normal conjunctiva [de-identified] : with assistance [de-identified] : well healed wound right forearm

## 2022-03-28 NOTE — HISTORY OF PRESENT ILLNESS
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 seen by neurology who presents today in routine cardiac F/U post hospital discharge admitted on 3/11/22 S/P fall ( Denies LOC ) found to be anemic which may have been a precipitating factor ( She was also wearing ANDREA boots when she fell ) she underwent EGD and colonoscopy diagnosed with iron deficiency, B12 deficiency, gastritis and duodenitis denies active bleeding\par \par Currently she claims to be feeling well no CP/SOB/lightheadedness/syncope\par No orthostasis upon exam today \par Echo 3/12/22 Mild-moderate MR/AI Mild TR EF 60%\par \par CT brain:\par No acute intracranial hemorrhage, brain edema, or mass effect.\par No displaced calvarial fracture\par \par .\par \par

## 2022-03-28 NOTE — DISCUSSION/SUMMARY
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 syncopal episode/fall  patient denies LOC, Echo unremarkable, elevated trop likely demand 2/2 anemia Echo shows VL LV FX patient W/O CP/SOB, No orthostasis,  no significant events on hospital telemetry no reoccurrence of event ? anemia contributing factor  at his time I recommend a 2 week telemetry monitor and hematology follow up \par EKG to asses for ischemia \par \par Anemia: EGD and colonoscopy without source of bleeding was diagnosed with diagnosed with iron deficiency, B12 deficiency, gastritis and duodenitis, has CKD continue with Hematology/GI Management \par iron BID possible additional IV iron.  Hematology Dr Bunch\par  \par Meningioma to be followed with neurology \par \par HTN: Controlled on current medication regimen\par \par Renal artery stenosis. s/p right renal stent  continue plavix statin.\par \par PVD/LE Edema treated with ANDREA boot followed with Neurology \par \par F/U one month\par \par Plan was DW Daughter and patient \par \par \par \par

## 2022-03-30 ENCOUNTER — APPOINTMENT (OUTPATIENT)
Dept: INTERNAL MEDICINE | Facility: CLINIC | Age: 87
End: 2022-03-30

## 2022-03-30 ENCOUNTER — OUTPATIENT (OUTPATIENT)
Dept: OUTPATIENT SERVICES | Facility: HOSPITAL | Age: 87
LOS: 1 days | Discharge: HOME | End: 2022-03-30

## 2022-03-30 DIAGNOSIS — R79.9 ABNORMAL FINDING OF BLOOD CHEMISTRY, UNSPECIFIED: ICD-10-CM

## 2022-03-30 DIAGNOSIS — Z96.643 PRESENCE OF ARTIFICIAL HIP JOINT, BILATERAL: Chronic | ICD-10-CM

## 2022-03-30 DIAGNOSIS — Z41.9 ENCOUNTER FOR PROCEDURE FOR PURPOSES OTHER THAN REMEDYING HEALTH STATE, UNSPECIFIED: Chronic | ICD-10-CM

## 2022-03-30 DIAGNOSIS — Z90.49 ACQUIRED ABSENCE OF OTHER SPECIFIED PARTS OF DIGESTIVE TRACT: Chronic | ICD-10-CM

## 2022-03-30 DIAGNOSIS — Z98.89 OTHER SPECIFIED POSTPROCEDURAL STATES: Chronic | ICD-10-CM

## 2022-03-30 DIAGNOSIS — Z90.710 ACQUIRED ABSENCE OF BOTH CERVIX AND UTERUS: Chronic | ICD-10-CM

## 2022-03-30 DIAGNOSIS — Z98.890 OTHER SPECIFIED POSTPROCEDURAL STATES: Chronic | ICD-10-CM

## 2022-03-31 ENCOUNTER — RESULT REVIEW (OUTPATIENT)
Age: 87
End: 2022-03-31

## 2022-03-31 ENCOUNTER — APPOINTMENT (OUTPATIENT)
Dept: HEMATOLOGY ONCOLOGY | Facility: CLINIC | Age: 87
End: 2022-03-31
Payer: MEDICARE

## 2022-03-31 VITALS
BODY MASS INDEX: 20.81 KG/M2 | HEART RATE: 71 BPM | DIASTOLIC BLOOD PRESSURE: 70 MMHG | OXYGEN SATURATION: 97 % | WEIGHT: 106.56 LBS | RESPIRATION RATE: 17 BRPM | TEMPERATURE: 98.5 F | SYSTOLIC BLOOD PRESSURE: 185 MMHG

## 2022-03-31 DIAGNOSIS — E53.8 DEFICIENCY OF OTHER SPECIFIED B GROUP VITAMINS: ICD-10-CM

## 2022-03-31 LAB
24R-OH-CALCIDIOL SERPL-MCNC: 38 NG/ML — SIGNIFICANT CHANGE UP (ref 30–80)
ALBUMIN SERPL ELPH-MCNC: 3.8 G/DL — SIGNIFICANT CHANGE UP (ref 3.3–5)
ALP SERPL-CCNC: 119 U/L — SIGNIFICANT CHANGE UP (ref 40–120)
ALT FLD-CCNC: 15 U/L — SIGNIFICANT CHANGE UP (ref 10–45)
ANION GAP SERPL CALC-SCNC: 16 MMOL/L — SIGNIFICANT CHANGE UP (ref 5–17)
AST SERPL-CCNC: 19 U/L — SIGNIFICANT CHANGE UP (ref 10–40)
BASOPHILS # BLD AUTO: 0.06 K/UL — SIGNIFICANT CHANGE UP (ref 0–0.2)
BASOPHILS NFR BLD AUTO: 0.7 % — SIGNIFICANT CHANGE UP (ref 0–2)
BILIRUB SERPL-MCNC: 0.4 MG/DL — SIGNIFICANT CHANGE UP (ref 0.2–1.2)
BUN SERPL-MCNC: 48 MG/DL — HIGH (ref 7–23)
CALCIUM SERPL-MCNC: 8.7 MG/DL — SIGNIFICANT CHANGE UP (ref 8.4–10.5)
CHLORIDE SERPL-SCNC: 102 MMOL/L — SIGNIFICANT CHANGE UP (ref 96–108)
CO2 SERPL-SCNC: 16 MMOL/L — LOW (ref 22–31)
CREAT SERPL-MCNC: 1.92 MG/DL — HIGH (ref 0.5–1.3)
EGFR: 24 ML/MIN/1.73M2 — LOW
EOSINOPHIL # BLD AUTO: 0.08 K/UL — SIGNIFICANT CHANGE UP (ref 0–0.5)
EOSINOPHIL NFR BLD AUTO: 0.9 % — SIGNIFICANT CHANGE UP (ref 0–6)
FERRITIN SERPL-MCNC: 262 NG/ML — HIGH (ref 15–150)
FOLATE SERPL-MCNC: 4.6 NG/ML — LOW
GLUCOSE SERPL-MCNC: 118 MG/DL — HIGH (ref 70–99)
HCT VFR BLD CALC: 35.7 % — SIGNIFICANT CHANGE UP (ref 34.5–45)
HGB BLD-MCNC: 11 G/DL — LOW (ref 11.5–15.5)
IMM GRANULOCYTES NFR BLD AUTO: 1 % — SIGNIFICANT CHANGE UP (ref 0–1.5)
INR BLD: 0.91 RATIO — SIGNIFICANT CHANGE UP (ref 0.88–1.16)
IRON SATN MFR SERPL: 22 % — SIGNIFICANT CHANGE UP (ref 14–50)
IRON SATN MFR SERPL: 70 UG/DL — SIGNIFICANT CHANGE UP (ref 30–160)
LDH SERPL L TO P-CCNC: 130 U/L — SIGNIFICANT CHANGE UP (ref 50–242)
LYMPHOCYTES # BLD AUTO: 1.17 K/UL — SIGNIFICANT CHANGE UP (ref 1–3.3)
LYMPHOCYTES # BLD AUTO: 13.3 % — SIGNIFICANT CHANGE UP (ref 13–44)
MAGNESIUM SERPL-MCNC: 2.3 MG/DL — SIGNIFICANT CHANGE UP (ref 1.6–2.6)
MCHC RBC-ENTMCNC: 27 PG — SIGNIFICANT CHANGE UP (ref 27–34)
MCHC RBC-ENTMCNC: 30.8 GM/DL — LOW (ref 32–36)
MCV RBC AUTO: 87.7 FL — SIGNIFICANT CHANGE UP (ref 80–100)
MONOCYTES # BLD AUTO: 0.49 K/UL — SIGNIFICANT CHANGE UP (ref 0–0.9)
MONOCYTES NFR BLD AUTO: 5.6 % — SIGNIFICANT CHANGE UP (ref 2–14)
NEUTROPHILS # BLD AUTO: 6.89 K/UL — SIGNIFICANT CHANGE UP (ref 1.8–7.4)
NEUTROPHILS NFR BLD AUTO: 78.5 % — HIGH (ref 43–77)
NRBC # BLD: 0 /100 WBCS — SIGNIFICANT CHANGE UP (ref 0–0)
PLATELET # BLD AUTO: 419 K/UL — HIGH (ref 150–400)
POTASSIUM SERPL-MCNC: 5.2 MMOL/L — SIGNIFICANT CHANGE UP (ref 3.5–5.3)
POTASSIUM SERPL-SCNC: 5.2 MMOL/L — SIGNIFICANT CHANGE UP (ref 3.5–5.3)
PROT SERPL-MCNC: 5.4 G/DL — LOW (ref 6–8.3)
PROTHROM AB SERPL-ACNC: 10.5 SEC — SIGNIFICANT CHANGE UP (ref 10.5–13.4)
RBC # BLD: 4.07 M/UL — SIGNIFICANT CHANGE UP (ref 3.8–5.2)
RBC # FLD: 18.6 % — HIGH (ref 10.3–14.5)
SODIUM SERPL-SCNC: 134 MMOL/L — LOW (ref 135–145)
T4 FREE SERPL-MCNC: 1.1 NG/DL — SIGNIFICANT CHANGE UP (ref 0.9–1.8)
T4 FREE+ TSH PNL SERPL: 7.66 UIU/ML — HIGH (ref 0.27–4.2)
TIBC SERPL-MCNC: 313 UG/DL — SIGNIFICANT CHANGE UP (ref 220–430)
TRANSFERRIN SERPL-MCNC: 249 MG/DL — SIGNIFICANT CHANGE UP (ref 200–360)
UIBC SERPL-MCNC: 243 UG/DL — SIGNIFICANT CHANGE UP (ref 110–370)
VIT B12 SERPL-MCNC: 611 PG/ML — SIGNIFICANT CHANGE UP (ref 232–1245)
WBC # BLD: 8.78 K/UL — SIGNIFICANT CHANGE UP (ref 3.8–10.5)
WBC # FLD AUTO: 8.78 K/UL — SIGNIFICANT CHANGE UP (ref 3.8–10.5)

## 2022-03-31 PROCEDURE — 99204 OFFICE O/P NEW MOD 45 MIN: CPT

## 2022-03-31 RX ORDER — DOXYCYCLINE HYCLATE 100 MG/1
100 TABLET ORAL
Refills: 0 | Status: DISCONTINUED | COMMUNITY
End: 2022-03-31

## 2022-03-31 NOTE — PHYSICAL EXAM
[Restricted in physically strenuous activity but ambulatory and able to carry out work of a light or sedentary nature] : Status 1- Restricted in physically strenuous activity but ambulatory and able to carry out work of a light or sedentary nature, e.g., light house work, office work [Thin] : thin [Normal] : affect appropriate [de-identified] : halter monitor in place; 2 + edema b/l legs and pain with palpitation  [de-identified] : walking with walker today  [de-identified] : family reports forgetful

## 2022-03-31 NOTE — HISTORY OF PRESENT ILLNESS
[de-identified] : The patient was diagnosed with anemia in March 2022 at the age of 89. She was seen by hematology in 2018 for a mildly elevated platelet count noted on a routine blood work. Since 2015, her platelets have fluctuated between 440-480 K range. During a prior work up by my colleague Dr. 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 Diagnosis of mild iron deficiency was made at that time. She was admitted to  3/11/22 - 3/18/22 for a fall / syncope on 3/11/22.  Patient does not recall the episode and denied any preceding complaints. She was found to have anemia with a Hgb of 6.5 gm/dL, which likely contributed to her weakness and collapse. No overt bleeding. She was admitted for further workup of anemia.  During this admission, no acute cardiac condition found. EKG was WNL. No events noted on telemetry. TTE unremarkable. Orthostatics were negative. EEG shows no epileptic abnormalities. Neurology advised continued antiplatelet therapy, statin, BP control.\par  [de-identified] : Patient reports recently physical deconditioning and debility. Also generalized weakness.  She is unclear whether she had near-syncope / syncope though patient denies LOC.  She was wearing fareed boots at that time.   Daughter reports confusion, and questions fall risk due to this; for eg:  today  took all her BP meds at once today, not as ordered BID, etc. BP remains elevated today.  Denies dizziness, palpitations, fatigue. She reports having to "pay attention" when she walks with her walker.   Currently wearing heart monitor for 2 weeks, has one week left before turning in. \par \par \par PMH: PMH: known meningioma, slightly enlarged compared to prior. No associated edema. CVA 1998; HTN, renal artery stent, HLD, MR/TR, CHF, Hyponatremia/Chronic kidney disease; meningioma, hx basal cell carcinoma, thrombocytosis, LE weeping edema followed with Vascular, Dr. Aly [place], B12 deficiency, gastritis and duodenitis. 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

## 2022-03-31 NOTE — REVIEW OF SYSTEMS
[Lower Ext Edema] : lower extremity edema [Difficulty Walking] : difficulty walking [Negative] : Allergic/Immunologic [Recent Change In Weight] : ~T no recent weight change [Chest Pain] : no chest pain [Palpitations] : no palpitations [FreeTextEntry5] : wearing compression stockings; on halter monitor  [de-identified] : forgetful; walking with walker today

## 2022-03-31 NOTE — RESULTS/DATA
[FreeTextEntry1] :  3/14/22 EGD Colonoscopy: 1. Duodenum (biopsy): - Small bowel mucosa with total (severe) villous atrophy, moderate- marked acute and chronic inflammation, and reactive changes consistent with duodenitis. - See comment.  2. Stomach (biopsy): - Gastric antral type mucosa with moderate-marked acute and chronic inflammation and reactive changes. - Immunohistochemical and Diff Quik stains for H. pylori organisms are negative. - See comment.  3. Distal esophagus (biopsy): - Superficial squamous mucosa and epithelium with patchy mild chronic inflammation, congestion, and reactive changes. - Alcian blue stain is noncontributory.  4. Large bowel, proximal ascending: - Adenomatous polyp.  5. Large bowel, sigmoid: - Adenomatous polyp.

## 2022-03-31 NOTE — CONSULT LETTER
[Dear  ___] : Dear  [unfilled], [Consult Letter:] : I had the pleasure of evaluating your patient, [unfilled]. [Please see my note below.] : Please see my note below. [Consult Closing:] : Thank you very much for allowing me to participate in the care of this patient.  If you have any questions, please do not hesitate to contact me. [Sincerely,] : Sincerely, [DrColleen  ___] : Dr. PERLA [DrColleen ___] : Dr. PERLA [FreeTextEntry3] : Dr. Adamaris Lopez

## 2022-04-01 ENCOUNTER — NON-APPOINTMENT (OUTPATIENT)
Age: 87
End: 2022-04-01

## 2022-04-01 DIAGNOSIS — R41.0 DISORIENTATION, UNSPECIFIED: ICD-10-CM

## 2022-04-01 DIAGNOSIS — D51.9 VITAMIN B12 DEFICIENCY ANEMIA, UNSPECIFIED: ICD-10-CM

## 2022-04-01 DIAGNOSIS — R55 SYNCOPE AND COLLAPSE: ICD-10-CM

## 2022-04-01 DIAGNOSIS — D32.9 BENIGN NEOPLASM OF MENINGES, UNSPECIFIED: ICD-10-CM

## 2022-04-01 DIAGNOSIS — D50.9 IRON DEFICIENCY ANEMIA, UNSPECIFIED: ICD-10-CM

## 2022-04-01 DIAGNOSIS — N39.0 URINARY TRACT INFECTION, SITE NOT SPECIFIED: ICD-10-CM

## 2022-04-02 ENCOUNTER — APPOINTMENT (OUTPATIENT)
Dept: INTERNAL MEDICINE | Facility: CLINIC | Age: 87
End: 2022-04-02
Payer: MEDICARE

## 2022-04-02 VITALS
RESPIRATION RATE: 15 BRPM | HEIGHT: 60 IN | BODY MASS INDEX: 20.81 KG/M2 | WEIGHT: 106 LBS | DIASTOLIC BLOOD PRESSURE: 60 MMHG | HEART RATE: 83 BPM | SYSTOLIC BLOOD PRESSURE: 144 MMHG | TEMPERATURE: 98 F | OXYGEN SATURATION: 98 %

## 2022-04-02 DIAGNOSIS — D75.839 THROMBOCYTOSIS, UNSPECIFIED: ICD-10-CM

## 2022-04-02 DIAGNOSIS — Z71.3 DIETARY COUNSELING AND SURVEILLANCE: ICD-10-CM

## 2022-04-02 PROCEDURE — 99213 OFFICE O/P EST LOW 20 MIN: CPT

## 2022-04-02 NOTE — PHYSICAL EXAM
[Normal] : affect was normal and insight and judgment were intact [de-identified] : LE edema R > L [de-identified] : left lower abdominal hernia, reducible

## 2022-04-02 NOTE — HISTORY OF PRESENT ILLNESS
[Other: _____] : [unfilled] [FreeTextEntry1] : hospital follow up [de-identified] : Pt was hospitalized after a fall and found to be very anemic. She was found to have very flat small intestinal villi. \par Has been having trouble swallowing since she had a stroke in the past. Not eating well. She only eats sweets. She chews her pills. \par Has chronic swelling of her ankles. \par Gets a uti about once a year.

## 2022-04-15 ENCOUNTER — APPOINTMENT (OUTPATIENT)
Dept: VASCULAR SURGERY | Facility: CLINIC | Age: 87
End: 2022-04-15
Payer: MEDICARE

## 2022-04-15 VITALS
HEIGHT: 60 IN | DIASTOLIC BLOOD PRESSURE: 65 MMHG | HEART RATE: 89 BPM | SYSTOLIC BLOOD PRESSURE: 170 MMHG | WEIGHT: 101 LBS | OXYGEN SATURATION: 97 % | BODY MASS INDEX: 19.83 KG/M2

## 2022-04-15 PROCEDURE — 99213 OFFICE O/P EST LOW 20 MIN: CPT

## 2022-04-15 NOTE — ASSESSMENT
[FreeTextEntry1] : 89 yo female with improved right leg weeping from UNNA boot treatment. Pt still has severe lipodermatosclerosis of lower legs bilaterally. \par \par Pt counseled on above diagnosis.\par Pt counseled to continue using triamcinolone cream. Counseled to apply to legs twice daily \par Pt counseled to use 15-20 mmHg compression stockings with zipper\par RTC 2 months to monitor legs  \par Will refer to [employer] for lymphedema massage pumps \par \par A total of 30 minutes was spent with patient and coordinating care\par

## 2022-04-15 NOTE — PHYSICAL EXAM
[1+] : left 1+ [Ankle Swelling (On Exam)] : present [] : bilaterally [Ankle Swelling Bilaterally] : severe [Skin Ulcer] : ulcer [Oriented to Person] : oriented to person [Alert] : alert [Oriented to Place] : oriented to place [Oriented to Time] : oriented to time [Calm] : calm [JVD] : no jugular venous distention  [Varicose Veins Of Lower Extremities] : not present [de-identified] : A&O x 3. NAD [FreeTextEntry1] : Severe lipodermatosclerosis of legs bilaterally\par right posterior calf healed. No open wounds on legs bilaterally

## 2022-04-15 NOTE — HISTORY OF PRESENT ILLNESS
[FreeTextEntry1] : 3/4/22: New 89 yo female PMHx HTN, Stroke (1998), arthritis, CKD 3, mitral and tricuspid regurgitation, presents with right lower leg weeping edema x 4 months. Patient denies any pain, fever or chills. She has been applying non-stick bandages to the right posterior calf area of weeping and wrapping the leg with an ACE wrap. Patient has had weeping of her left leg in the past, not currently. She takes furosemide 10-20 mg every other day. Deniex hx of DVT. \par \par 3/11/22: Pt fell this morning and hit her head, right arm, left hip. She is not sure how she fell, she cannot remember the fall. She has bruising on her left hip, laceration on right forearm and right dorsal hand. Pt feels her right leg weeping has improved since last visit. She denies any fever or chills. \par \par 3/18/22: Pt discharged today. She is feeling well. She did not take her mid afternoon dose of BP medication. She had her UNNA boots taken off today. She has less leg swelling. \par \par 3/25/22: Pt is doing well. She has had her compression stockings on for the past week. She is unable to bend over at the waist and put on her stockings. She denies any new wounds, fever, chills, weeping. \par \par 4/15/22: Pt doing well since last visit. She has no new open wounds. Her legs are more swollen over the past few weeks but she is not bothered by this. She has been using her compression stockings. She has not received her lymphedema massage pumps yet. She denies any fever or chills.

## 2022-04-29 ENCOUNTER — OUTPATIENT (OUTPATIENT)
Dept: OUTPATIENT SERVICES | Facility: HOSPITAL | Age: 87
LOS: 1 days | Discharge: HOME | End: 2022-04-29

## 2022-04-29 DIAGNOSIS — Z96.643 PRESENCE OF ARTIFICIAL HIP JOINT, BILATERAL: Chronic | ICD-10-CM

## 2022-04-29 DIAGNOSIS — Z98.89 OTHER SPECIFIED POSTPROCEDURAL STATES: Chronic | ICD-10-CM

## 2022-04-29 DIAGNOSIS — Z41.9 ENCOUNTER FOR PROCEDURE FOR PURPOSES OTHER THAN REMEDYING HEALTH STATE, UNSPECIFIED: Chronic | ICD-10-CM

## 2022-04-29 DIAGNOSIS — Z90.710 ACQUIRED ABSENCE OF BOTH CERVIX AND UTERUS: Chronic | ICD-10-CM

## 2022-04-29 DIAGNOSIS — Z90.49 ACQUIRED ABSENCE OF OTHER SPECIFIED PARTS OF DIGESTIVE TRACT: Chronic | ICD-10-CM

## 2022-04-29 DIAGNOSIS — R79.9 ABNORMAL FINDING OF BLOOD CHEMISTRY, UNSPECIFIED: ICD-10-CM

## 2022-04-29 DIAGNOSIS — Z98.890 OTHER SPECIFIED POSTPROCEDURAL STATES: Chronic | ICD-10-CM

## 2022-05-02 ENCOUNTER — RESULT REVIEW (OUTPATIENT)
Age: 87
End: 2022-05-02

## 2022-05-02 ENCOUNTER — APPOINTMENT (OUTPATIENT)
Dept: HEMATOLOGY ONCOLOGY | Facility: CLINIC | Age: 87
End: 2022-05-02
Payer: MEDICARE

## 2022-05-02 VITALS
HEART RATE: 74 BPM | DIASTOLIC BLOOD PRESSURE: 72 MMHG | TEMPERATURE: 98.5 F | BODY MASS INDEX: 20.23 KG/M2 | OXYGEN SATURATION: 99 % | RESPIRATION RATE: 18 BRPM | WEIGHT: 103.56 LBS | SYSTOLIC BLOOD PRESSURE: 175 MMHG

## 2022-05-02 DIAGNOSIS — R55 SYNCOPE AND COLLAPSE: ICD-10-CM

## 2022-05-02 DIAGNOSIS — N39.0 URINARY TRACT INFECTION, SITE NOT SPECIFIED: ICD-10-CM

## 2022-05-02 LAB
BASOPHILS # BLD AUTO: 0.07 K/UL — SIGNIFICANT CHANGE UP (ref 0–0.2)
BASOPHILS NFR BLD AUTO: 1 % — SIGNIFICANT CHANGE UP (ref 0–2)
EOSINOPHIL # BLD AUTO: 0.07 K/UL — SIGNIFICANT CHANGE UP (ref 0–0.5)
EOSINOPHIL NFR BLD AUTO: 1 % — SIGNIFICANT CHANGE UP (ref 0–6)
HCT VFR BLD CALC: 36.7 % — SIGNIFICANT CHANGE UP (ref 34.5–45)
HGB BLD-MCNC: 11.7 G/DL — SIGNIFICANT CHANGE UP (ref 11.5–15.5)
IMM GRANULOCYTES NFR BLD AUTO: 1.4 % — SIGNIFICANT CHANGE UP (ref 0–1.5)
LYMPHOCYTES # BLD AUTO: 1.65 K/UL — SIGNIFICANT CHANGE UP (ref 1–3.3)
LYMPHOCYTES # BLD AUTO: 23.6 % — SIGNIFICANT CHANGE UP (ref 13–44)
MCHC RBC-ENTMCNC: 28.6 PG — SIGNIFICANT CHANGE UP (ref 27–34)
MCHC RBC-ENTMCNC: 31.9 GM/DL — LOW (ref 32–36)
MCV RBC AUTO: 89.7 FL — SIGNIFICANT CHANGE UP (ref 80–100)
MONOCYTES # BLD AUTO: 0.45 K/UL — SIGNIFICANT CHANGE UP (ref 0–0.9)
MONOCYTES NFR BLD AUTO: 6.4 % — SIGNIFICANT CHANGE UP (ref 2–14)
NEUTROPHILS # BLD AUTO: 4.64 K/UL — SIGNIFICANT CHANGE UP (ref 1.8–7.4)
NEUTROPHILS NFR BLD AUTO: 66.6 % — SIGNIFICANT CHANGE UP (ref 43–77)
NRBC # BLD: 0 /100 WBCS — SIGNIFICANT CHANGE UP (ref 0–0)
PLATELET # BLD AUTO: 435 K/UL — HIGH (ref 150–400)
RBC # BLD: 4.09 M/UL — SIGNIFICANT CHANGE UP (ref 3.8–5.2)
RBC # FLD: 18.6 % — HIGH (ref 10.3–14.5)
WBC # BLD: 6.98 K/UL — SIGNIFICANT CHANGE UP (ref 3.8–10.5)
WBC # FLD AUTO: 6.98 K/UL — SIGNIFICANT CHANGE UP (ref 3.8–10.5)

## 2022-05-02 PROCEDURE — 99215 OFFICE O/P EST HI 40 MIN: CPT

## 2022-05-02 NOTE — HISTORY OF PRESENT ILLNESS
[de-identified] : The patient was diagnosed with anemia in March 2022 at the age of 89. She was seen by hematology in 2018 for a mildly elevated platelet count noted on a routine blood work. Since 2015, her platelets have fluctuated between 440-480 K range. During a prior work up by my colleague Dr. 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 Diagnosis of mild iron deficiency was made at that time. She was admitted to  3/11/22 - 3/18/22 for a fall / syncope on 3/11/22.  Patient does not recall the episode and denied any preceding complaints. She was found to have anemia with a Hgb of 6.5 gm/dL, which likely contributed to her weakness and collapse. No overt bleeding. She was admitted for further workup of anemia.  During this admission, no acute cardiac condition found. EKG was WNL. No events noted on telemetry. TTE unremarkable. Orthostatics were negative. EEG shows no epileptic abnormalities. Neurology advised continued antiplatelet therapy, statin, BP control.\par  [de-identified] : Patient reports recently physical deconditioning and debility. Also generalized weakness.  She is unclear whether she had near-syncope / syncope though patient denies LOC.  She was wearing fareed boots at that time.   Daughter reports confusion, and questions fall risk due to this; for eg:  today  took all her BP meds at once today, not as ordered BID, etc. BP remains elevated today.  Denies dizziness, palpitations, fatigue. She reports having to "pay attention" when she walks with her walker.   Currently wearing heart monitor for 2 weeks, has one week left before turning in. \par \par \par PMH: PMH: known meningioma, slightly enlarged compared to prior. No associated edema. CVA 1998; HTN, renal artery stent, HLD, MR/TR, CHF, Hyponatremia/Chronic kidney disease; meningioma, hx basal cell carcinoma, thrombocytosis, LE weeping edema followed with Vascular, Dr. Aly [place], B12 deficiency, gastritis and duodenitis. 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

## 2022-05-02 NOTE — PHYSICAL EXAM
[Restricted in physically strenuous activity but ambulatory and able to carry out work of a light or sedentary nature] : Status 1- Restricted in physically strenuous activity but ambulatory and able to carry out work of a light or sedentary nature, e.g., light house work, office work [Thin] : thin [Normal] : affect appropriate [de-identified] : halter monitor in place; 2 + edema b/l legs and pain with palpitation  [de-identified] : walking with walker today  [de-identified] : family reports forgetful

## 2022-05-02 NOTE — REVIEW OF SYSTEMS
[Lower Ext Edema] : lower extremity edema [Difficulty Walking] : difficulty walking [Negative] : Allergic/Immunologic [Recent Change In Weight] : ~T no recent weight change [Chest Pain] : no chest pain [Palpitations] : no palpitations [FreeTextEntry5] : wearing compression stockings; on halter monitor  [de-identified] : forgetful; walking with walker today

## 2022-05-03 DIAGNOSIS — E03.9 HYPOTHYROIDISM, UNSPECIFIED: ICD-10-CM

## 2022-05-03 DIAGNOSIS — N39.0 URINARY TRACT INFECTION, SITE NOT SPECIFIED: ICD-10-CM

## 2022-05-03 DIAGNOSIS — D51.9 VITAMIN B12 DEFICIENCY ANEMIA, UNSPECIFIED: ICD-10-CM

## 2022-05-03 DIAGNOSIS — D32.9 BENIGN NEOPLASM OF MENINGES, UNSPECIFIED: ICD-10-CM

## 2022-05-03 DIAGNOSIS — R55 SYNCOPE AND COLLAPSE: ICD-10-CM

## 2022-05-03 DIAGNOSIS — D50.9 IRON DEFICIENCY ANEMIA, UNSPECIFIED: ICD-10-CM

## 2022-05-03 DIAGNOSIS — R41.0 DISORIENTATION, UNSPECIFIED: ICD-10-CM

## 2022-05-03 LAB
ALBUMIN SERPL ELPH-MCNC: 3.5 G/DL — SIGNIFICANT CHANGE UP (ref 3.3–5)
ALP SERPL-CCNC: 135 U/L — HIGH (ref 40–120)
ALT FLD-CCNC: 13 U/L — SIGNIFICANT CHANGE UP (ref 10–45)
ANION GAP SERPL CALC-SCNC: 12 MMOL/L — SIGNIFICANT CHANGE UP (ref 5–17)
AST SERPL-CCNC: 17 U/L — SIGNIFICANT CHANGE UP (ref 10–40)
BILIRUB SERPL-MCNC: 0.3 MG/DL — SIGNIFICANT CHANGE UP (ref 0.2–1.2)
BUN SERPL-MCNC: 42 MG/DL — HIGH (ref 7–23)
CALCIUM SERPL-MCNC: 8.7 MG/DL — SIGNIFICANT CHANGE UP (ref 8.4–10.5)
CHLORIDE SERPL-SCNC: 103 MMOL/L — SIGNIFICANT CHANGE UP (ref 96–108)
CO2 SERPL-SCNC: 22 MMOL/L — SIGNIFICANT CHANGE UP (ref 22–31)
CREAT SERPL-MCNC: 1.16 MG/DL — SIGNIFICANT CHANGE UP (ref 0.5–1.3)
EGFR: 45 ML/MIN/1.73M2 — LOW
FERRITIN SERPL-MCNC: 161 NG/ML — HIGH (ref 15–150)
FOLATE SERPL-MCNC: 8.4 NG/ML — SIGNIFICANT CHANGE UP
GLUCOSE SERPL-MCNC: 149 MG/DL — HIGH (ref 70–99)
IRON SATN MFR SERPL: 66 UG/DL — SIGNIFICANT CHANGE UP (ref 30–160)
POTASSIUM SERPL-MCNC: 4.8 MMOL/L — SIGNIFICANT CHANGE UP (ref 3.5–5.3)
POTASSIUM SERPL-SCNC: 4.8 MMOL/L — SIGNIFICANT CHANGE UP (ref 3.5–5.3)
PROT SERPL-MCNC: 5.4 G/DL — LOW (ref 6–8.3)
SODIUM SERPL-SCNC: 136 MMOL/L — SIGNIFICANT CHANGE UP (ref 135–145)
VIT B12 SERPL-MCNC: 432 PG/ML — SIGNIFICANT CHANGE UP (ref 232–1245)

## 2022-05-22 NOTE — ED PROVIDER NOTE - NS ED MD DISPO ISOLATION TYPES
Removed 20g PIV from left upper arm due to c/o burning. No redness or swelling to site, cath tip intact, still c/o burning and tenderness. Gauze applied with coban wrapped loosely. Ice pack applied and extremity elevated. Will monitor site. Explained next dose of IV med is scheduled for in morning and would need to have an access. Agreed to have a US guided IV initiated. Notified House Sup Lengby of the situation.    None

## 2022-05-23 NOTE — HISTORY OF PRESENT ILLNESS
Pt states that around 11 am yesterday she started to get a sore throat. Pain increased as the day went on and developed a fever. Pt last took 1000 mg of tylenol around 0500 this morning.  Pt has history of tonsillitis x 2     Triage Assessment     Row Name 05/23/22 0506       Triage Assessment (Adult)    Airway WDL WDL       Respiratory WDL    Respiratory WDL WDL       Skin Circulation/Temperature WDL    Skin Circulation/Temperature WDL temperature    Skin Temperature warm       Cardiac WDL    Cardiac WDL WDL       Peripheral/Neurovascular WDL    Peripheral Neurovascular WDL WDL       Cognitive/Neuro/Behavioral WDL    Cognitive/Neuro/Behavioral WDL WDL               [FreeTextEntry1] : 88 Y/O female PMH: HTN, CVA, RT renal artery stenosis S/P stent, Hyponatremia/Chronic kidney disease followed with Renal Dr Victoria ( not on diuretic therapy ) , Thrombocytosis seen by Hematology who presents today for blood pressure check and to discuss medications.  She was previously VIA Dr Juárez placed on Metoprolol Succinate 25 MG QD 2/2 elevated HR ( hypotension on Labetalol ) EKG today sinus tach rate of 100 ( is on hydralazine ) \par \par Claims to be feeling well no CP/SOB/Palpitations/HA  ( Mild dizziness ) \par \par Pressure today 194/80

## 2022-06-17 ENCOUNTER — APPOINTMENT (OUTPATIENT)
Dept: VASCULAR SURGERY | Facility: CLINIC | Age: 87
End: 2022-06-17
Payer: MEDICARE

## 2022-06-17 VITALS — DIASTOLIC BLOOD PRESSURE: 71 MMHG | OXYGEN SATURATION: 96 % | SYSTOLIC BLOOD PRESSURE: 167 MMHG | HEART RATE: 78 BPM

## 2022-06-17 DIAGNOSIS — I89.0 LYMPHEDEMA, NOT ELSEWHERE CLASSIFIED: ICD-10-CM

## 2022-06-17 PROCEDURE — 99213 OFFICE O/P EST LOW 20 MIN: CPT

## 2022-06-17 NOTE — HISTORY OF PRESENT ILLNESS
[FreeTextEntry1] : 3/4/22: New 89 yo female PMHx HTN, Stroke (1998), arthritis, CKD 3, mitral and tricuspid regurgitation, presents with right lower leg weeping edema x 4 months. Patient denies any pain, fever or chills. She has been applying non-stick bandages to the right posterior calf area of weeping and wrapping the leg with an ACE wrap. Patient has had weeping of her left leg in the past, not currently. She takes furosemide 10-20 mg every other day. Deniex hx of DVT. \par \par 3/11/22: Pt fell this morning and hit her head, right arm, left hip. She is not sure how she fell, she cannot remember the fall. She has bruising on her left hip, laceration on right forearm and right dorsal hand. Pt feels her right leg weeping has improved since last visit. She denies any fever or chills. \par \par 3/18/22: Pt discharged today. She is feeling well. She did not take her mid afternoon dose of BP medication. She had her UNNA boots taken off today. She has less leg swelling. \par \par 3/25/22: Pt is doing well. She has had her compression stockings on for the past week. She is unable to bend over at the waist and put on her stockings. She denies any new wounds, fever, chills, weeping. \par \par 4/15/22: Pt doing well since last visit. She has no new open wounds. Her legs are more swollen over the past few weeks but she is not bothered by this. She has been using her compression stockings. She has not received her lymphedema massage pumps yet. She denies any fever or chills. \par \par 6/17/22: Pt doing well since last visit. She developed a blister on her right lateral calf that is now scabbed over. She feels it was due to the compression stockings so she stopped wearing them 1 week ago.  Her legs are more swollen over the past week but she is not bothered by this. She denies any leg pain. She has the lymphedema pump but has not started using it yet. She denies any fever or chills.

## 2022-06-17 NOTE — ASSESSMENT
[FreeTextEntry1] : 89 yo female with scabbed over blister on right lateral calf. Pt still has severe lipodermatosclerosis of lower legs bilaterally. \par \par Pt counseled on above diagnosis.\par Pt counseled to continue using triamcinolone cream. Counseled to apply to legs twice daily \par Pt counseled to use 15-20 mmHg compression stockings with zipper\par RTC 3 months to monitor legs  \par Pt counseled to use lymphedema massage pumps daily \par \par A total of 20 minutes was spent with patient and coordinating care\par

## 2022-06-17 NOTE — PHYSICAL EXAM
[1+] : left 1+ [Ankle Swelling (On Exam)] : present [] : bilaterally [Ankle Swelling Bilaterally] : severe [Skin Ulcer] : ulcer [Alert] : alert [Oriented to Person] : oriented to person [Oriented to Place] : oriented to place [Oriented to Time] : oriented to time [Calm] : calm [JVD] : no jugular venous distention  [Varicose Veins Of Lower Extremities] : not present [de-identified] : A&O x 3. NAD [FreeTextEntry1] : Severe lipodermatosclerosis of legs bilaterally\par right proximal lateral calf blister 2 cm x 2 cm scabbed over

## 2022-06-21 DIAGNOSIS — N18.31 CHRONIC KIDNEY DISEASE, STAGE 3A: ICD-10-CM

## 2022-06-21 DIAGNOSIS — D63.1 CHRONIC KIDNEY DISEASE, STAGE 3A: ICD-10-CM

## 2022-07-18 ENCOUNTER — NON-APPOINTMENT (OUTPATIENT)
Age: 87
End: 2022-07-18

## 2022-07-31 ENCOUNTER — OUTPATIENT (OUTPATIENT)
Dept: OUTPATIENT SERVICES | Facility: HOSPITAL | Age: 87
LOS: 1 days | Discharge: HOME | End: 2022-07-31

## 2022-07-31 DIAGNOSIS — Z90.49 ACQUIRED ABSENCE OF OTHER SPECIFIED PARTS OF DIGESTIVE TRACT: Chronic | ICD-10-CM

## 2022-07-31 DIAGNOSIS — Z96.643 PRESENCE OF ARTIFICIAL HIP JOINT, BILATERAL: Chronic | ICD-10-CM

## 2022-07-31 DIAGNOSIS — Z41.9 ENCOUNTER FOR PROCEDURE FOR PURPOSES OTHER THAN REMEDYING HEALTH STATE, UNSPECIFIED: Chronic | ICD-10-CM

## 2022-07-31 DIAGNOSIS — R79.9 ABNORMAL FINDING OF BLOOD CHEMISTRY, UNSPECIFIED: ICD-10-CM

## 2022-07-31 DIAGNOSIS — Z98.890 OTHER SPECIFIED POSTPROCEDURAL STATES: Chronic | ICD-10-CM

## 2022-07-31 DIAGNOSIS — Z98.89 OTHER SPECIFIED POSTPROCEDURAL STATES: Chronic | ICD-10-CM

## 2022-07-31 DIAGNOSIS — Z90.710 ACQUIRED ABSENCE OF BOTH CERVIX AND UTERUS: Chronic | ICD-10-CM

## 2022-08-05 ENCOUNTER — RESULT REVIEW (OUTPATIENT)
Age: 87
End: 2022-08-05

## 2022-08-05 ENCOUNTER — APPOINTMENT (OUTPATIENT)
Dept: HEMATOLOGY ONCOLOGY | Facility: CLINIC | Age: 87
End: 2022-08-05

## 2022-08-05 VITALS
WEIGHT: 99.56 LBS | TEMPERATURE: 98.8 F | DIASTOLIC BLOOD PRESSURE: 66 MMHG | OXYGEN SATURATION: 95 % | RESPIRATION RATE: 17 BRPM | SYSTOLIC BLOOD PRESSURE: 132 MMHG | BODY MASS INDEX: 19.44 KG/M2 | HEART RATE: 65 BPM

## 2022-08-05 LAB
ALBUMIN SERPL ELPH-MCNC: 3.6 G/DL — SIGNIFICANT CHANGE UP (ref 3.3–5)
ALP SERPL-CCNC: 109 U/L — SIGNIFICANT CHANGE UP (ref 40–120)
ALT FLD-CCNC: 20 U/L — SIGNIFICANT CHANGE UP (ref 10–45)
ANION GAP SERPL CALC-SCNC: 11 MMOL/L — SIGNIFICANT CHANGE UP (ref 5–17)
AST SERPL-CCNC: 21 U/L — SIGNIFICANT CHANGE UP (ref 10–40)
BASOPHILS # BLD AUTO: 0.04 K/UL — SIGNIFICANT CHANGE UP (ref 0–0.2)
BASOPHILS NFR BLD AUTO: 0.7 % — SIGNIFICANT CHANGE UP (ref 0–2)
BILIRUB SERPL-MCNC: 0.4 MG/DL — SIGNIFICANT CHANGE UP (ref 0.2–1.2)
BUN SERPL-MCNC: 49 MG/DL — HIGH (ref 7–23)
CALCIUM SERPL-MCNC: 8.4 MG/DL — SIGNIFICANT CHANGE UP (ref 8.4–10.5)
CHLORIDE SERPL-SCNC: 103 MMOL/L — SIGNIFICANT CHANGE UP (ref 96–108)
CO2 SERPL-SCNC: 23 MMOL/L — SIGNIFICANT CHANGE UP (ref 22–31)
CREAT SERPL-MCNC: 1.22 MG/DL — SIGNIFICANT CHANGE UP (ref 0.5–1.3)
EGFR: 42 ML/MIN/1.73M2 — LOW
EOSINOPHIL # BLD AUTO: 0.06 K/UL — SIGNIFICANT CHANGE UP (ref 0–0.5)
EOSINOPHIL NFR BLD AUTO: 1.1 % — SIGNIFICANT CHANGE UP (ref 0–6)
FERRITIN SERPL-MCNC: 177 NG/ML — HIGH (ref 15–150)
FOLATE SERPL-MCNC: >20 NG/ML — SIGNIFICANT CHANGE UP
GLUCOSE SERPL-MCNC: 139 MG/DL — HIGH (ref 70–99)
HCT VFR BLD CALC: 30.8 % — LOW (ref 34.5–45)
HGB BLD-MCNC: 10.2 G/DL — LOW (ref 11.5–15.5)
IMM GRANULOCYTES NFR BLD AUTO: 1.8 % — HIGH (ref 0–1.5)
IRON SATN MFR SERPL: 15 % — SIGNIFICANT CHANGE UP (ref 14–50)
IRON SATN MFR SERPL: 36 UG/DL — SIGNIFICANT CHANGE UP (ref 30–160)
LYMPHOCYTES # BLD AUTO: 1.68 K/UL — SIGNIFICANT CHANGE UP (ref 1–3.3)
LYMPHOCYTES # BLD AUTO: 30.8 % — SIGNIFICANT CHANGE UP (ref 13–44)
MAGNESIUM SERPL-MCNC: 2.1 MG/DL — SIGNIFICANT CHANGE UP (ref 1.6–2.6)
MCHC RBC-ENTMCNC: 32 PG — SIGNIFICANT CHANGE UP (ref 27–34)
MCHC RBC-ENTMCNC: 33.1 GM/DL — SIGNIFICANT CHANGE UP (ref 32–36)
MCV RBC AUTO: 96.6 FL — SIGNIFICANT CHANGE UP (ref 80–100)
MONOCYTES # BLD AUTO: 0.41 K/UL — SIGNIFICANT CHANGE UP (ref 0–0.9)
MONOCYTES NFR BLD AUTO: 7.5 % — SIGNIFICANT CHANGE UP (ref 2–14)
NEUTROPHILS # BLD AUTO: 3.17 K/UL — SIGNIFICANT CHANGE UP (ref 1.8–7.4)
NEUTROPHILS NFR BLD AUTO: 58.1 % — SIGNIFICANT CHANGE UP (ref 43–77)
NRBC # BLD: 0 /100 WBCS — SIGNIFICANT CHANGE UP (ref 0–0)
PLATELET # BLD AUTO: 330 K/UL — SIGNIFICANT CHANGE UP (ref 150–400)
POTASSIUM SERPL-MCNC: 5.4 MMOL/L — HIGH (ref 3.5–5.3)
POTASSIUM SERPL-SCNC: 5.4 MMOL/L — HIGH (ref 3.5–5.3)
PROT SERPL-MCNC: 5.3 G/DL — LOW (ref 6–8.3)
RBC # BLD: 3.19 M/UL — LOW (ref 3.8–5.2)
RBC # FLD: 13.4 % — SIGNIFICANT CHANGE UP (ref 10.3–14.5)
SODIUM SERPL-SCNC: 137 MMOL/L — SIGNIFICANT CHANGE UP (ref 135–145)
TIBC SERPL-MCNC: 236 UG/DL — SIGNIFICANT CHANGE UP (ref 220–430)
UIBC SERPL-MCNC: 200 UG/DL — SIGNIFICANT CHANGE UP (ref 110–370)
VIT B12 SERPL-MCNC: 517 PG/ML — SIGNIFICANT CHANGE UP (ref 232–1245)
WBC # BLD: 5.46 K/UL — SIGNIFICANT CHANGE UP (ref 3.8–10.5)
WBC # FLD AUTO: 5.46 K/UL — SIGNIFICANT CHANGE UP (ref 3.8–10.5)

## 2022-08-05 PROCEDURE — 99213 OFFICE O/P EST LOW 20 MIN: CPT

## 2022-08-09 LAB
T3FREE SERPL-MCNC: 1.96 PG/ML — SIGNIFICANT CHANGE UP (ref 1.8–4.6)
T4 FREE SERPL-MCNC: 1 NG/DL — SIGNIFICANT CHANGE UP (ref 0.9–1.8)
TSH SERPL-MCNC: 4.65 UIU/ML — HIGH (ref 0.27–4.2)

## 2022-08-09 NOTE — PHYSICAL EXAM
[Restricted in physically strenuous activity but ambulatory and able to carry out work of a light or sedentary nature] : Status 1- Restricted in physically strenuous activity but ambulatory and able to carry out work of a light or sedentary nature, e.g., light house work, office work [Thin] : thin [Normal] : affect appropriate [de-identified] : wt loss  [de-identified] :  2 + edema b/l legs and pain with palpitation  [de-identified] : walking with walker today  [de-identified] : family reports forgetful

## 2022-08-09 NOTE — HISTORY OF PRESENT ILLNESS
[de-identified] : Patient and son report generalized weakness, but feeling better in PO Iron. She ran out of Iron over 1 week ago, HgB down slightly today.  Continues with baseline confusion, BP improved today. Denies dizziness, palpitations, fatigue, falls or rectal bleeding. She reports having to "pay attention" when she walks with her walker. BM every other day. She states she feels well overall today, son agrees with this. \par \par PMH: PMH: known meningioma, slightly enlarged compared to prior. No associated edema. CVA 1998; HTN, renal artery stent, HLD, MR/TR, CHF, Hyponatremia/Chronic kidney disease; meningioma, hx basal cell carcinoma, thrombocytosis, LE weeping edema followed with Vascular, Dr. Aly [place], B12 deficiency, gastritis and duodenitis. 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 [de-identified] : The patient was diagnosed with anemia in March 2022 at the age of 89. She was seen by hematology in 2018 for a mildly elevated platelet count noted on a routine blood work. Since 2015, her platelets have fluctuated between 440-480 K range. During a prior work up by my colleague Dr. 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 Diagnosis of mild iron deficiency was made at that time. She was admitted to  3/11/22 - 3/18/22 for a fall / syncope on 3/11/22.  Patient does not recall the episode and denied any preceding complaints. She was found to have anemia with a Hgb of 6.5 gm/dL, which likely contributed to her weakness and collapse. No overt bleeding. She was admitted for further workup of anemia.  During this admission, no acute cardiac condition found. EKG was WNL. No events noted on telemetry. TTE unremarkable. Orthostatics were negative. EEG shows no epileptic abnormalities. Neurology advised continued antiplatelet therapy, statin, BP control.\par

## 2022-08-09 NOTE — REVIEW OF SYSTEMS
[Lower Ext Edema] : lower extremity edema [Difficulty Walking] : difficulty walking [Negative] : Allergic/Immunologic [Recent Change In Weight] : ~T no recent weight change [Chest Pain] : no chest pain [Palpitations] : no palpitations [Shortness Of Breath] : no shortness of breath [Abdominal Pain] : no abdominal pain [Constipation] : no constipation [Diarrhea] : no diarrhea [FreeTextEntry2] : wt loss  [FreeTextEntry5] : wearing compression stockings [de-identified] : forgetful; walking with walker today

## 2022-08-10 DIAGNOSIS — D51.9 VITAMIN B12 DEFICIENCY ANEMIA, UNSPECIFIED: ICD-10-CM

## 2022-08-10 DIAGNOSIS — D50.9 IRON DEFICIENCY ANEMIA, UNSPECIFIED: ICD-10-CM

## 2022-08-10 DIAGNOSIS — E03.9 HYPOTHYROIDISM, UNSPECIFIED: ICD-10-CM

## 2022-09-12 ENCOUNTER — NON-APPOINTMENT (OUTPATIENT)
Age: 87
End: 2022-09-12

## 2022-09-12 ENCOUNTER — APPOINTMENT (OUTPATIENT)
Dept: CARDIOLOGY | Facility: CLINIC | Age: 87
End: 2022-09-12

## 2022-09-12 VITALS
DIASTOLIC BLOOD PRESSURE: 72 MMHG | SYSTOLIC BLOOD PRESSURE: 160 MMHG | HEIGHT: 60 IN | BODY MASS INDEX: 20.62 KG/M2 | OXYGEN SATURATION: 96 % | HEART RATE: 64 BPM | WEIGHT: 105 LBS

## 2022-09-12 DIAGNOSIS — I70.1 ATHEROSCLEROSIS OF RENAL ARTERY: ICD-10-CM

## 2022-09-12 PROCEDURE — 93000 ELECTROCARDIOGRAM COMPLETE: CPT

## 2022-09-12 PROCEDURE — 99214 OFFICE O/P EST MOD 30 MIN: CPT | Mod: 25

## 2022-09-12 RX ORDER — HYDRALAZINE HYDROCHLORIDE 50 MG/1
50 TABLET ORAL
Qty: 90 | Refills: 0 | Status: DISCONTINUED | COMMUNITY
Start: 2022-03-18 | End: 2022-09-12

## 2022-09-12 RX ORDER — FERROUS SULFATE 325(65) MG
325 TABLET ORAL TWICE DAILY
Refills: 0 | Status: DISCONTINUED | COMMUNITY
End: 2022-09-12

## 2022-09-12 NOTE — PHYSICAL EXAM
[Normal Conjunctiva] : normal conjunctiva [Normal S1, S2] : normal S1, S2 [Soft] : abdomen soft [Non Tender] : non-tender [No Edema] : no edema [Normal] : moves all extremities, no focal deficits, normal speech [Alert and Oriented] : alert and oriented [de-identified] : with assistance

## 2022-09-12 NOTE — HISTORY OF PRESENT ILLNESS
[FreeTextEntry1] : 89 Y/O female PMH: HLD, MR/TR, CHF,  CVA 1998  seen by neurology to be followed with Dr Mancia,  HTN renal artery stent Hyponatremia/Chronic kidney disease had followed with Renal Dr Escobedo and wishes to continue seeing him, Thrombocytosis/anemia seen by Hematology she underwent EGD and colonoscopy diagnosed with iron deficiency, B12 deficiency, gastritis and duodenitis followed with GI\par \par Currently she claims to be feeling well no CP/SOB/lightheadedness/syncope\par No LE Edema\par Echo 3/12/22 Mild-moderate MR/AI Mild TR EF 60%\par \par \par Pressure at home 120's/70's\par \par \par .\par \par

## 2022-09-12 NOTE — DISCUSSION/SUMMARY
[Hypertension] : hypertension [Not Responding to Treatment] : not responding to treatment [Patient] : the patient [FreeTextEntry1] : Here today in routine cardiac follow up\par Patient is well appearing she offers no new complaints\par medications/testing reviewed\par No change in her medications or treatment \par She will follow in 3 months \par \par \par \par

## 2022-10-30 ENCOUNTER — OUTPATIENT (OUTPATIENT)
Dept: OUTPATIENT SERVICES | Facility: HOSPITAL | Age: 87
LOS: 1 days | Discharge: HOME | End: 2022-10-30

## 2022-10-30 DIAGNOSIS — Z41.9 ENCOUNTER FOR PROCEDURE FOR PURPOSES OTHER THAN REMEDYING HEALTH STATE, UNSPECIFIED: Chronic | ICD-10-CM

## 2022-10-30 DIAGNOSIS — Z90.49 ACQUIRED ABSENCE OF OTHER SPECIFIED PARTS OF DIGESTIVE TRACT: Chronic | ICD-10-CM

## 2022-10-30 DIAGNOSIS — R79.9 ABNORMAL FINDING OF BLOOD CHEMISTRY, UNSPECIFIED: ICD-10-CM

## 2022-10-30 DIAGNOSIS — Z96.643 PRESENCE OF ARTIFICIAL HIP JOINT, BILATERAL: Chronic | ICD-10-CM

## 2022-10-30 DIAGNOSIS — Z98.890 OTHER SPECIFIED POSTPROCEDURAL STATES: Chronic | ICD-10-CM

## 2022-10-30 DIAGNOSIS — Z90.710 ACQUIRED ABSENCE OF BOTH CERVIX AND UTERUS: Chronic | ICD-10-CM

## 2022-10-30 DIAGNOSIS — Z98.89 OTHER SPECIFIED POSTPROCEDURAL STATES: Chronic | ICD-10-CM

## 2022-11-03 ENCOUNTER — RESULT REVIEW (OUTPATIENT)
Age: 87
End: 2022-11-03

## 2022-11-03 ENCOUNTER — APPOINTMENT (OUTPATIENT)
Dept: HEMATOLOGY ONCOLOGY | Facility: CLINIC | Age: 87
End: 2022-11-03

## 2022-11-03 VITALS
SYSTOLIC BLOOD PRESSURE: 136 MMHG | HEART RATE: 72 BPM | TEMPERATURE: 98.4 F | DIASTOLIC BLOOD PRESSURE: 74 MMHG | RESPIRATION RATE: 18 BRPM | WEIGHT: 103.25 LBS | OXYGEN SATURATION: 96 % | BODY MASS INDEX: 20.16 KG/M2

## 2022-11-03 DIAGNOSIS — D32.9 BENIGN NEOPLASM OF MENINGES, UNSPECIFIED: ICD-10-CM

## 2022-11-03 DIAGNOSIS — R41.0 DISORIENTATION, UNSPECIFIED: ICD-10-CM

## 2022-11-03 DIAGNOSIS — E03.9 HYPOTHYROIDISM, UNSPECIFIED: ICD-10-CM

## 2022-11-03 DIAGNOSIS — D50.9 IRON DEFICIENCY ANEMIA, UNSPECIFIED: ICD-10-CM

## 2022-11-03 DIAGNOSIS — D51.9 VITAMIN B12 DEFICIENCY ANEMIA, UNSPECIFIED: ICD-10-CM

## 2022-11-03 LAB
BASOPHILS # BLD AUTO: 0.05 K/UL — SIGNIFICANT CHANGE UP (ref 0–0.2)
BASOPHILS NFR BLD AUTO: 0.6 % — SIGNIFICANT CHANGE UP (ref 0–2)
EOSINOPHIL # BLD AUTO: 0.12 K/UL — SIGNIFICANT CHANGE UP (ref 0–0.5)
EOSINOPHIL NFR BLD AUTO: 1.5 % — SIGNIFICANT CHANGE UP (ref 0–6)
HCT VFR BLD CALC: 31.6 % — LOW (ref 34.5–45)
HGB BLD-MCNC: 10.4 G/DL — LOW (ref 11.5–15.5)
IMM GRANULOCYTES NFR BLD AUTO: 1.3 % — HIGH (ref 0–0.9)
LYMPHOCYTES # BLD AUTO: 1.67 K/UL — SIGNIFICANT CHANGE UP (ref 1–3.3)
LYMPHOCYTES # BLD AUTO: 21 % — SIGNIFICANT CHANGE UP (ref 13–44)
MCHC RBC-ENTMCNC: 31.8 PG — SIGNIFICANT CHANGE UP (ref 27–34)
MCHC RBC-ENTMCNC: 32.9 GM/DL — SIGNIFICANT CHANGE UP (ref 32–36)
MCV RBC AUTO: 96.6 FL — SIGNIFICANT CHANGE UP (ref 80–100)
MONOCYTES # BLD AUTO: 0.66 K/UL — SIGNIFICANT CHANGE UP (ref 0–0.9)
MONOCYTES NFR BLD AUTO: 8.3 % — SIGNIFICANT CHANGE UP (ref 2–14)
NEUTROPHILS # BLD AUTO: 5.36 K/UL — SIGNIFICANT CHANGE UP (ref 1.8–7.4)
NEUTROPHILS NFR BLD AUTO: 67.3 % — SIGNIFICANT CHANGE UP (ref 43–77)
NRBC # BLD: 0 /100 WBCS — SIGNIFICANT CHANGE UP (ref 0–0)
PLATELET # BLD AUTO: 284 K/UL — SIGNIFICANT CHANGE UP (ref 150–400)
RBC # BLD: 3.27 M/UL — LOW (ref 3.8–5.2)
RBC # FLD: 13.1 % — SIGNIFICANT CHANGE UP (ref 10.3–14.5)
WBC # BLD: 7.96 K/UL — SIGNIFICANT CHANGE UP (ref 3.8–10.5)
WBC # FLD AUTO: 7.96 K/UL — SIGNIFICANT CHANGE UP (ref 3.8–10.5)

## 2022-11-03 PROCEDURE — 99215 OFFICE O/P EST HI 40 MIN: CPT

## 2022-11-03 NOTE — REVIEW OF SYSTEMS
[Lower Ext Edema] : lower extremity edema [Difficulty Walking] : difficulty walking [Negative] : Allergic/Immunologic [Recent Change In Weight] : ~T no recent weight change [Chest Pain] : no chest pain [Palpitations] : no palpitations [Shortness Of Breath] : no shortness of breath [Abdominal Pain] : no abdominal pain [Constipation] : no constipation [Diarrhea] : no diarrhea [FreeTextEntry2] : wt loss  [FreeTextEntry5] : wearing compression stockings [de-identified] : forgetful; walking with walker today

## 2022-11-03 NOTE — PHYSICAL EXAM
[Restricted in physically strenuous activity but ambulatory and able to carry out work of a light or sedentary nature] : Status 1- Restricted in physically strenuous activity but ambulatory and able to carry out work of a light or sedentary nature, e.g., light house work, office work [Thin] : thin [Normal] : affect appropriate [de-identified] : wt loss  [de-identified] :  2 + edema b/l legs and pain with palpitation  [de-identified] : walking with walker today  [de-identified] : family reports forgetful

## 2022-11-03 NOTE — HISTORY OF PRESENT ILLNESS
[de-identified] : The patient was diagnosed with anemia in March 2022 at the age of 89. She was seen by hematology in 2018 for a mildly elevated platelet count noted on a routine blood work. Since 2015, her platelets have fluctuated between 440-480 K range. During a prior work up by my colleague Dr. 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 Diagnosis of mild iron deficiency was made at that time. She was admitted to  3/11/22 - 3/18/22 for a fall / syncope on 3/11/22.  Patient does not recall the episode and denied any preceding complaints. She was found to have anemia with a Hgb of 6.5 gm/dL, which likely contributed to her weakness and collapse. No overt bleeding. She was admitted for further workup of anemia.  During this admission, no acute cardiac condition found. EKG was WNL. No events noted on telemetry. TTE unremarkable. Orthostatics were negative. EEG shows no epileptic abnormalities. Neurology advised continued antiplatelet therapy, statin, BP control.\par  [de-identified] : Patient and son report generalized weakness, but feeling better in PO Iron. She ran out of Iron over 1 week ago, HgB down slightly today.  Continues with baseline confusion, BP improved today. Denies dizziness, palpitations, fatigue, falls or rectal bleeding. She reports having to "pay attention" when she walks with her walker. BM every other day. She states she feels well overall today, son agrees with this. \par \par PMH: PMH: known meningioma, slightly enlarged compared to prior. No associated edema. CVA 1998; HTN, renal artery stent, HLD, MR/TR, CHF, Hyponatremia/Chronic kidney disease; meningioma, hx basal cell carcinoma, thrombocytosis, LE weeping edema followed with Vascular, Dr. Aly [place], B12 deficiency, gastritis and duodenitis. 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

## 2022-11-04 ENCOUNTER — NON-APPOINTMENT (OUTPATIENT)
Age: 87
End: 2022-11-04

## 2022-11-04 LAB
24R-OH-CALCIDIOL SERPL-MCNC: 41.9 NG/ML — SIGNIFICANT CHANGE UP (ref 30–80)
ALBUMIN SERPL ELPH-MCNC: 3.6 G/DL — SIGNIFICANT CHANGE UP (ref 3.3–5)
ALP SERPL-CCNC: 107 U/L — SIGNIFICANT CHANGE UP (ref 40–120)
ALT FLD-CCNC: 20 U/L — SIGNIFICANT CHANGE UP (ref 10–45)
ANION GAP SERPL CALC-SCNC: 11 MMOL/L — SIGNIFICANT CHANGE UP (ref 5–17)
AST SERPL-CCNC: 18 U/L — SIGNIFICANT CHANGE UP (ref 10–40)
BILIRUB SERPL-MCNC: 0.7 MG/DL — SIGNIFICANT CHANGE UP (ref 0.2–1.2)
BUN SERPL-MCNC: 46 MG/DL — HIGH (ref 7–23)
CALCIUM SERPL-MCNC: 8.7 MG/DL — SIGNIFICANT CHANGE UP (ref 8.4–10.5)
CHLORIDE SERPL-SCNC: 103 MMOL/L — SIGNIFICANT CHANGE UP (ref 96–108)
CO2 SERPL-SCNC: 22 MMOL/L — SIGNIFICANT CHANGE UP (ref 22–31)
CREAT SERPL-MCNC: 1.18 MG/DL — SIGNIFICANT CHANGE UP (ref 0.5–1.3)
EGFR: 44 ML/MIN/1.73M2 — LOW
FERRITIN SERPL-MCNC: 138 NG/ML — SIGNIFICANT CHANGE UP (ref 15–150)
FOLATE SERPL-MCNC: >20 NG/ML — SIGNIFICANT CHANGE UP
GLUCOSE SERPL-MCNC: 196 MG/DL — HIGH (ref 70–99)
IRON SATN MFR SERPL: 23 UG/DL — LOW (ref 30–160)
IRON SATN MFR SERPL: 8 % — LOW (ref 14–50)
POTASSIUM SERPL-MCNC: 5.4 MMOL/L — HIGH (ref 3.5–5.3)
POTASSIUM SERPL-SCNC: 5.4 MMOL/L — HIGH (ref 3.5–5.3)
PROT SERPL-MCNC: 5.4 G/DL — LOW (ref 6–8.3)
SODIUM SERPL-SCNC: 136 MMOL/L — SIGNIFICANT CHANGE UP (ref 135–145)
T3FREE SERPL-MCNC: 1.93 PG/ML — LOW (ref 2–4.4)
T4 FREE SERPL-MCNC: 1.1 NG/DL — SIGNIFICANT CHANGE UP (ref 0.9–1.8)
T4 FREE+ TSH PNL SERPL: 6.43 UIU/ML — HIGH (ref 0.27–4.2)
TIBC SERPL-MCNC: 271 UG/DL — SIGNIFICANT CHANGE UP (ref 220–430)
UIBC SERPL-MCNC: 248 UG/DL — SIGNIFICANT CHANGE UP (ref 110–370)
VIT B12 SERPL-MCNC: 1959 PG/ML — HIGH (ref 232–1245)

## 2022-11-07 ENCOUNTER — APPOINTMENT (OUTPATIENT)
Dept: RADIOLOGY | Facility: CLINIC | Age: 87
End: 2022-11-07

## 2022-11-07 ENCOUNTER — APPOINTMENT (OUTPATIENT)
Dept: INFUSION THERAPY | Facility: CLINIC | Age: 87
End: 2022-11-07

## 2022-11-07 VITALS
HEART RATE: 78 BPM | DIASTOLIC BLOOD PRESSURE: 70 MMHG | WEIGHT: 102.13 LBS | TEMPERATURE: 99.1 F | HEIGHT: 60 IN | RESPIRATION RATE: 18 BRPM | OXYGEN SATURATION: 95 % | BODY MASS INDEX: 20.05 KG/M2 | SYSTOLIC BLOOD PRESSURE: 176 MMHG

## 2022-11-11 ENCOUNTER — APPOINTMENT (OUTPATIENT)
Dept: PULMONOLOGY | Facility: CLINIC | Age: 87
End: 2022-11-11

## 2022-11-11 ENCOUNTER — APPOINTMENT (OUTPATIENT)
Dept: INFUSION THERAPY | Facility: CLINIC | Age: 87
End: 2022-11-11

## 2022-11-11 VITALS
HEIGHT: 60 IN | OXYGEN SATURATION: 97 % | TEMPERATURE: 98.3 F | HEART RATE: 66 BPM | SYSTOLIC BLOOD PRESSURE: 166 MMHG | DIASTOLIC BLOOD PRESSURE: 73 MMHG | RESPIRATION RATE: 18 BRPM

## 2022-11-18 ENCOUNTER — APPOINTMENT (OUTPATIENT)
Dept: INFUSION THERAPY | Facility: CLINIC | Age: 87
End: 2022-11-18

## 2022-12-31 NOTE — ED PROVIDER NOTE - WR ORDER NAME 3
Stable. C/w famotidine 20 mg BID Stable. C/w famotidine 20 mg QD Stable. C/w escitalopram 10 mg QD Xray Hip w/ Pelvis Min 4 Views, Left

## 2023-01-11 NOTE — H&P ADULT - LYMPH NODES
Pt encountered in radiology, secure in TONA chair. Awake, alert, on room air. Accompanied by granddaughter who served as  as pt is [place]ian speaking. No lymphadedenopathy

## 2023-01-19 LAB
BASOPHILS # BLD AUTO: 0.09 K/UL
BASOPHILS NFR BLD AUTO: 1.1 %
EOSINOPHIL # BLD AUTO: 0.22 K/UL
EOSINOPHIL NFR BLD AUTO: 2.8 %
FERRITIN SERPL-MCNC: 676 NG/ML
FOLATE SERPL-MCNC: >20 NG/ML
HCT VFR BLD CALC: 39.8 %
HGB BLD-MCNC: 12.5 G/DL
IMM GRANULOCYTES NFR BLD AUTO: 3.3 %
IRON SATN MFR SERPL: 24 %
IRON SERPL-MCNC: 58 UG/DL
LYMPHOCYTES # BLD AUTO: 1.76 K/UL
LYMPHOCYTES NFR BLD AUTO: 22.4 %
MAN DIFF?: NORMAL
MCHC RBC-ENTMCNC: 31.4 GM/DL
MCHC RBC-ENTMCNC: 31.8 PG
MCV RBC AUTO: 101.3 FL
MONOCYTES # BLD AUTO: 0.5 K/UL
MONOCYTES NFR BLD AUTO: 6.4 %
NEUTROPHILS # BLD AUTO: 5.01 K/UL
NEUTROPHILS NFR BLD AUTO: 64 %
PLATELET # BLD AUTO: 513 K/UL
RBC # BLD: 3.93 M/UL
RBC # FLD: 13 %
TIBC SERPL-MCNC: 239 UG/DL
UIBC SERPL-MCNC: 181 UG/DL
VIT B12 SERPL-MCNC: 1821 PG/ML
WBC # FLD AUTO: 7.84 K/UL

## 2023-01-24 NOTE — ED ADULT NURSE NOTE - NSSISCREENINGQ1_ED_A_ED
Colonoscopy scheduled at [facility] [place], [place] due to insurance requirement, on 3/21/2023 with Dr. Enmanuel Toussaint.  RX items were ordered, patient was asked to check with his primary or ortho doc regarding pre-procdure oral abx if needed for recent hip replacement.  Prep instructions were mailed to patient.  
No

## 2023-01-27 ENCOUNTER — RX RENEWAL (OUTPATIENT)
Age: 88
End: 2023-01-27

## 2023-03-06 ENCOUNTER — NON-APPOINTMENT (OUTPATIENT)
Age: 88
End: 2023-03-06

## 2023-03-06 ENCOUNTER — APPOINTMENT (OUTPATIENT)
Dept: CARDIOLOGY | Facility: CLINIC | Age: 88
End: 2023-03-06
Payer: MEDICARE

## 2023-03-06 VITALS
HEART RATE: 82 BPM | DIASTOLIC BLOOD PRESSURE: 78 MMHG | BODY MASS INDEX: 19.53 KG/M2 | SYSTOLIC BLOOD PRESSURE: 142 MMHG | OXYGEN SATURATION: 97 % | WEIGHT: 100 LBS

## 2023-03-06 PROCEDURE — 93000 ELECTROCARDIOGRAM COMPLETE: CPT

## 2023-03-06 PROCEDURE — 99214 OFFICE O/P EST MOD 30 MIN: CPT

## 2023-03-06 NOTE — HISTORY OF PRESENT ILLNESS
[FreeTextEntry1] : 89 Y/O female PMH: HLD, MR/TR, CHF,  CVA 1998  seen by neurology to be followed with Dr Mancia,  HTN renal artery stent Hyponatremia/Chronic kidney disease had followed with Renal Dr Escobedo and wishes to continue seeing him, Thrombocytosis/anemia seen by Hematology she underwent EGD and colonoscopy diagnosed with iron deficiency, B12 deficiency, gastritis and duodenitis followed with GI\par \par Currently she claims to be feeling well no CP/SOB/lightheadedness/syncope\par No LE Edema\par Echo 3/12/22 Mild-moderate MR/AI Mild TR EF 60%\par \par \par Pressure at home 120's/70's\par \par \par .\par \par

## 2023-03-06 NOTE — PHYSICAL EXAM
[Normal Conjunctiva] : normal conjunctiva [Normal S1, S2] : normal S1, S2 [Soft] : abdomen soft [Non Tender] : non-tender [No Edema] : no edema [Normal] : moves all extremities, no focal deficits, normal speech [Alert and Oriented] : alert and oriented [de-identified] : with assistance

## 2023-03-06 NOTE — REASON FOR VISIT
[Symptom and Test Evaluation] : symptom and test evaluation [Hyperlipidemia] : hyperlipidemia [Hypertension] : hypertension [Family Member] : family member

## 2023-03-06 NOTE — DISCUSSION/SUMMARY
[Hypertension] : hypertension [Not Responding to Treatment] : not responding to treatment [Patient] : the patient [FreeTextEntry1] : Here today in routine cardiac follow up with her son. \par Patient is well appearing she offers no new complaints\par medications/testing reviewed\par No change in her medications or treatment . Repeat Echo will be ordered to evaluate LV fx\par She will follow in 3 months \par \par \par \par  [EKG obtained to assist in diagnosis and management of assessed problem(s)] : EKG obtained to assist in diagnosis and management of assessed problem(s)

## 2023-03-29 ENCOUNTER — OUTPATIENT (OUTPATIENT)
Dept: OUTPATIENT SERVICES | Facility: HOSPITAL | Age: 88
LOS: 1 days | Discharge: HOME | End: 2023-03-29

## 2023-03-29 DIAGNOSIS — Z41.9 ENCOUNTER FOR PROCEDURE FOR PURPOSES OTHER THAN REMEDYING HEALTH STATE, UNSPECIFIED: Chronic | ICD-10-CM

## 2023-03-29 DIAGNOSIS — Z98.890 OTHER SPECIFIED POSTPROCEDURAL STATES: Chronic | ICD-10-CM

## 2023-03-29 DIAGNOSIS — Z90.710 ACQUIRED ABSENCE OF BOTH CERVIX AND UTERUS: Chronic | ICD-10-CM

## 2023-03-29 DIAGNOSIS — Z98.89 OTHER SPECIFIED POSTPROCEDURAL STATES: Chronic | ICD-10-CM

## 2023-03-29 DIAGNOSIS — Z90.49 ACQUIRED ABSENCE OF OTHER SPECIFIED PARTS OF DIGESTIVE TRACT: Chronic | ICD-10-CM

## 2023-03-29 DIAGNOSIS — D50.9 IRON DEFICIENCY ANEMIA, UNSPECIFIED: ICD-10-CM

## 2023-03-29 DIAGNOSIS — Z96.643 PRESENCE OF ARTIFICIAL HIP JOINT, BILATERAL: Chronic | ICD-10-CM

## 2023-03-29 NOTE — PROGRESS NOTE ADULT - PROBLEM SELECTOR PROBLEM 2
From: Florence Mejía  To: Dr. Orly Canseco: 3/29/2023 12:48 PM EDT  Subject: Need Copy Of Labs for next visit    I see my rheumatologist on April 20th and he runs all my labs. Can your office mail me request for labs for chlosterol that Dr Tony Holland would need for next visit so I can get all labs done on April 20th. Please mail to home address.  Thanks Cerebrovascular accident (CVA), unspecified mechanism

## 2023-03-31 ENCOUNTER — RESULT REVIEW (OUTPATIENT)
Age: 88
End: 2023-03-31

## 2023-03-31 ENCOUNTER — APPOINTMENT (OUTPATIENT)
Dept: HEMATOLOGY ONCOLOGY | Facility: CLINIC | Age: 88
End: 2023-03-31
Payer: MEDICARE

## 2023-03-31 VITALS
WEIGHT: 106 LBS | TEMPERATURE: 98.1 F | RESPIRATION RATE: 18 BRPM | HEART RATE: 66 BPM | DIASTOLIC BLOOD PRESSURE: 78 MMHG | OXYGEN SATURATION: 98 % | SYSTOLIC BLOOD PRESSURE: 169 MMHG | BODY MASS INDEX: 20.7 KG/M2

## 2023-03-31 LAB
BASOPHILS # BLD AUTO: 0.06 K/UL — SIGNIFICANT CHANGE UP (ref 0–0.2)
BASOPHILS NFR BLD AUTO: 0.7 % — SIGNIFICANT CHANGE UP (ref 0–2)
EOSINOPHIL # BLD AUTO: 0.09 K/UL — SIGNIFICANT CHANGE UP (ref 0–0.5)
EOSINOPHIL NFR BLD AUTO: 1.1 % — SIGNIFICANT CHANGE UP (ref 0–6)
HCT VFR BLD CALC: 34.6 % — SIGNIFICANT CHANGE UP (ref 34.5–45)
HGB BLD-MCNC: 11.1 G/DL — LOW (ref 11.5–15.5)
IMM GRANULOCYTES NFR BLD AUTO: 1.5 % — HIGH (ref 0–0.9)
LYMPHOCYTES # BLD AUTO: 1.23 K/UL — SIGNIFICANT CHANGE UP (ref 1–3.3)
LYMPHOCYTES # BLD AUTO: 14.4 % — SIGNIFICANT CHANGE UP (ref 13–44)
MCHC RBC-ENTMCNC: 32.1 GM/DL — SIGNIFICANT CHANGE UP (ref 32–36)
MCHC RBC-ENTMCNC: 32.4 PG — SIGNIFICANT CHANGE UP (ref 27–34)
MCV RBC AUTO: 100.9 FL — HIGH (ref 80–100)
MONOCYTES # BLD AUTO: 0.52 K/UL — SIGNIFICANT CHANGE UP (ref 0–0.9)
MONOCYTES NFR BLD AUTO: 6.1 % — SIGNIFICANT CHANGE UP (ref 2–14)
NEUTROPHILS # BLD AUTO: 6.52 K/UL — SIGNIFICANT CHANGE UP (ref 1.8–7.4)
NEUTROPHILS NFR BLD AUTO: 76.2 % — SIGNIFICANT CHANGE UP (ref 43–77)
NRBC # BLD: 0 /100 WBCS — SIGNIFICANT CHANGE UP (ref 0–0)
PLATELET # BLD AUTO: 332 K/UL — SIGNIFICANT CHANGE UP (ref 150–400)
RBC # BLD: 3.43 M/UL — LOW (ref 3.8–5.2)
RBC # FLD: 13.3 % — SIGNIFICANT CHANGE UP (ref 10.3–14.5)
WBC # BLD: 8.55 K/UL — SIGNIFICANT CHANGE UP (ref 3.8–10.5)
WBC # FLD AUTO: 8.55 K/UL — SIGNIFICANT CHANGE UP (ref 3.8–10.5)

## 2023-03-31 PROCEDURE — 99215 OFFICE O/P EST HI 40 MIN: CPT

## 2023-03-31 NOTE — HISTORY OF PRESENT ILLNESS
[de-identified] : The patient was diagnosed with anemia in March 2022 at the age of 89. She was seen by hematology in 2018 for a mildly elevated platelet count noted on a routine blood work. Since 2015, her platelets have fluctuated between 440-480 K range. During a prior work up by my colleague Dr. 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 Diagnosis of mild iron deficiency was made at that time. She was admitted to  3/11/22 - 3/18/22 for a fall / syncope on 3/11/22.  Patient does not recall the episode and denied any preceding complaints. She was found to have anemia with a Hgb of 6.5 gm/dL, which likely contributed to her weakness and collapse. No overt bleeding. She was admitted for further workup of anemia.  During this admission, no acute cardiac condition found. EKG was WNL. No events noted on telemetry. TTE unremarkable. Orthostatics were negative. EEG shows no epileptic abnormalities. Neurology advised continued antiplatelet therapy, statin, BP control.\par  [de-identified] : Patient and son report generalized weakness, but feeling better in PO Iron. She ran out of Iron over 1 week ago, HgB down slightly today.  Continues with baseline confusion, BP improved today. Denies dizziness, palpitations, fatigue, falls or rectal bleeding. She reports having to "pay attention" when she walks with her walker. BM every other day. She states she feels well overall today, son agrees with this. \par \par PMH: PMH: known meningioma, slightly enlarged compared to prior. No associated edema. CVA 1998; HTN, renal artery stent, HLD, MR/TR, CHF, Hyponatremia/Chronic kidney disease; meningioma, hx basal cell carcinoma, thrombocytosis, LE weeping edema followed with Vascular, Dr. Aly [place], B12 deficiency, gastritis and duodenitis. 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

## 2023-03-31 NOTE — REVIEW OF SYSTEMS
[Lower Ext Edema] : lower extremity edema [Difficulty Walking] : difficulty walking [Negative] : Allergic/Immunologic [Recent Change In Weight] : ~T no recent weight change [Chest Pain] : no chest pain [Palpitations] : no palpitations [Shortness Of Breath] : no shortness of breath [Abdominal Pain] : no abdominal pain [Constipation] : no constipation [Diarrhea] : no diarrhea [FreeTextEntry2] : wt loss  [FreeTextEntry5] : wearing compression stockings [de-identified] : forgetful; walking with walker today

## 2023-03-31 NOTE — PHYSICAL EXAM
[Restricted in physically strenuous activity but ambulatory and able to carry out work of a light or sedentary nature] : Status 1- Restricted in physically strenuous activity but ambulatory and able to carry out work of a light or sedentary nature, e.g., light house work, office work [Thin] : thin [Normal] : affect appropriate [de-identified] : wt loss  [de-identified] :  2 + edema b/l legs and pain with palpitation  [de-identified] : walking with walker today  [de-identified] : family reports forgetful

## 2023-04-01 LAB
24R-OH-CALCIDIOL SERPL-MCNC: 37 NG/ML — SIGNIFICANT CHANGE UP (ref 30–80)
ALBUMIN SERPL ELPH-MCNC: 4 G/DL — SIGNIFICANT CHANGE UP (ref 3.3–5)
ALP SERPL-CCNC: 111 U/L — SIGNIFICANT CHANGE UP (ref 40–120)
ALT FLD-CCNC: 23 U/L — SIGNIFICANT CHANGE UP (ref 10–45)
ANION GAP SERPL CALC-SCNC: 14 MMOL/L — SIGNIFICANT CHANGE UP (ref 5–17)
AST SERPL-CCNC: 22 U/L — SIGNIFICANT CHANGE UP (ref 10–40)
BILIRUB SERPL-MCNC: 0.6 MG/DL — SIGNIFICANT CHANGE UP (ref 0.2–1.2)
BUN SERPL-MCNC: 65 MG/DL — HIGH (ref 7–23)
CALCIUM SERPL-MCNC: 9.1 MG/DL — SIGNIFICANT CHANGE UP (ref 8.4–10.5)
CHLORIDE SERPL-SCNC: 104 MMOL/L — SIGNIFICANT CHANGE UP (ref 96–108)
CO2 SERPL-SCNC: 21 MMOL/L — LOW (ref 22–31)
CREAT SERPL-MCNC: 1.45 MG/DL — HIGH (ref 0.5–1.3)
EGFR: 34 ML/MIN/1.73M2 — LOW
FERRITIN SERPL-MCNC: 244 NG/ML — HIGH (ref 15–150)
FOLATE SERPL-MCNC: >20 NG/ML — SIGNIFICANT CHANGE UP
GLUCOSE SERPL-MCNC: 139 MG/DL — HIGH (ref 70–99)
IRON SATN MFR SERPL: 14 % — SIGNIFICANT CHANGE UP (ref 14–50)
IRON SATN MFR SERPL: 44 UG/DL — SIGNIFICANT CHANGE UP (ref 30–160)
MAGNESIUM SERPL-MCNC: 2.4 MG/DL — SIGNIFICANT CHANGE UP (ref 1.6–2.6)
POTASSIUM SERPL-MCNC: 5.8 MMOL/L — HIGH (ref 3.5–5.3)
POTASSIUM SERPL-SCNC: 5.8 MMOL/L — HIGH (ref 3.5–5.3)
PROT SERPL-MCNC: 5.9 G/DL — LOW (ref 6–8.3)
SODIUM SERPL-SCNC: 138 MMOL/L — SIGNIFICANT CHANGE UP (ref 135–145)
T3FREE SERPL-MCNC: 2.39 PG/ML — SIGNIFICANT CHANGE UP (ref 2–4.4)
T4 FREE SERPL-MCNC: 1.1 NG/DL — SIGNIFICANT CHANGE UP (ref 0.9–1.8)
TIBC SERPL-MCNC: 303 UG/DL — SIGNIFICANT CHANGE UP (ref 220–430)
TSH SERPL-MCNC: 6.69 UIU/ML — HIGH (ref 0.27–4.2)
UIBC SERPL-MCNC: 260 UG/DL — SIGNIFICANT CHANGE UP (ref 110–370)
VIT B12 SERPL-MCNC: 635 PG/ML — SIGNIFICANT CHANGE UP (ref 232–1245)

## 2023-04-03 ENCOUNTER — NON-APPOINTMENT (OUTPATIENT)
Age: 88
End: 2023-04-03

## 2023-04-03 DIAGNOSIS — D51.9 VITAMIN B12 DEFICIENCY ANEMIA, UNSPECIFIED: ICD-10-CM

## 2023-04-03 DIAGNOSIS — D32.9 BENIGN NEOPLASM OF MENINGES, UNSPECIFIED: ICD-10-CM

## 2023-04-03 DIAGNOSIS — E03.9 HYPOTHYROIDISM, UNSPECIFIED: ICD-10-CM

## 2023-04-07 ENCOUNTER — APPOINTMENT (OUTPATIENT)
Dept: CARDIOLOGY | Facility: CLINIC | Age: 88
End: 2023-04-07
Payer: MEDICARE

## 2023-04-07 PROCEDURE — 93306 TTE W/DOPPLER COMPLETE: CPT

## 2023-04-13 NOTE — PHYSICAL THERAPY INITIAL EVALUATION ADULT - FINE MOTOR COORDINATION, RIGHT HAND THUMB/FINGER OPPOSITION SKILLS, OT EVAL
Olumiant Pregnancy And Lactation Text: Based on animal studies, Olumiant may cause embryo-fetal harm when administered to pregnant women.  The medication should not be used in pregnancy.  Breastfeeding is not recommended during treatment. normal performance

## 2023-04-24 ENCOUNTER — RX RENEWAL (OUTPATIENT)
Age: 88
End: 2023-04-24

## 2023-05-17 NOTE — H&P ADULT - SKIN
detailed exam color normal Consent (Temporal Branch)/Introductory Paragraph: The rationale for Mohs was explained to the patient and consent was obtained. The risks, benefits and alternatives to therapy were discussed in detail. Specifically, the risks of damage to the temporal branch of the facial nerve, infection, scarring, bleeding, prolonged wound healing, incomplete removal, allergy to anesthesia, and recurrence were addressed. Prior to the procedure, the treatment site was clearly identified and confirmed by the patient. All components of Universal Protocol/PAUSE Rule completed.

## 2023-05-22 ENCOUNTER — RX RENEWAL (OUTPATIENT)
Age: 88
End: 2023-05-22

## 2023-06-12 ENCOUNTER — NON-APPOINTMENT (OUTPATIENT)
Age: 88
End: 2023-06-12

## 2023-06-12 DIAGNOSIS — Z86.73 PERSONAL HISTORY OF TRANSIENT ISCHEMIC ATTACK (TIA), AND CEREBRAL INFARCTION W/OUT RESIDUAL DEFICITS: ICD-10-CM

## 2023-06-26 ENCOUNTER — NON-APPOINTMENT (OUTPATIENT)
Age: 88
End: 2023-06-26

## 2023-06-26 ENCOUNTER — APPOINTMENT (OUTPATIENT)
Dept: CARDIOLOGY | Facility: CLINIC | Age: 88
End: 2023-06-26
Payer: MEDICARE

## 2023-06-26 VITALS
HEART RATE: 66 BPM | SYSTOLIC BLOOD PRESSURE: 142 MMHG | DIASTOLIC BLOOD PRESSURE: 60 MMHG | BODY MASS INDEX: 20.7 KG/M2 | OXYGEN SATURATION: 99 % | WEIGHT: 106 LBS

## 2023-06-26 DIAGNOSIS — D47.3 ESSENTIAL (HEMORRHAGIC) THROMBOCYTHEMIA: ICD-10-CM

## 2023-06-26 PROCEDURE — 93000 ELECTROCARDIOGRAM COMPLETE: CPT

## 2023-06-26 PROCEDURE — 99214 OFFICE O/P EST MOD 30 MIN: CPT

## 2023-06-26 NOTE — HISTORY OF PRESENT ILLNESS
[FreeTextEntry1] : 89 Y/O female PMH: HLD, MR/TR, CHF,  CVA 1998  seen by neurology to be followed with Dr Mancia,  HTN renal artery stent Hyponatremia/Chronic kidney disease had followed with Renal Dr Escobedo and wishes to continue seeing him, Thrombocytosis/anemia seen by Hematology she underwent EGD and colonoscopy diagnosed with iron deficiency, B12 deficiency, gastritis and duodenitis followed with GI\par \par Currently she claims to be feeling well no CP/SOB/lightheadedness/syncope\par No LE Edema\par Echo 3/12/22 Mild-moderate MR/AI Mild TR EF 60%. Pt has support at home from an aide\par \par \par Pressure at home 120's/70's\par \par \par .\par \par

## 2023-06-26 NOTE — PHYSICAL EXAM
[Normal Conjunctiva] : normal conjunctiva [Normal S1, S2] : normal S1, S2 [Soft] : abdomen soft [Non Tender] : non-tender [No Edema] : no edema [Normal] : moves all extremities, no focal deficits, normal speech [Alert and Oriented] : alert and oriented [de-identified] : with assistance

## 2023-06-26 NOTE — DISCUSSION/SUMMARY
[Hypertension] : hypertension [Not Responding to Treatment] : not responding to treatment [Patient] : the patient [FreeTextEntry1] : Here today in routine cardiac follow up with her son. \par Patient is well appearing she offers no new complaints\par medications/testing reviewed\par No change in her medications or treatment . Repeat Echo will be ordered to evaluate LV fx\par She will follow in 3 months \par \par \par \par

## 2023-07-20 ENCOUNTER — OUTPATIENT (OUTPATIENT)
Dept: OUTPATIENT SERVICES | Facility: HOSPITAL | Age: 88
LOS: 1 days | Discharge: HOME | End: 2023-07-20

## 2023-07-20 DIAGNOSIS — Z41.9 ENCOUNTER FOR PROCEDURE FOR PURPOSES OTHER THAN REMEDYING HEALTH STATE, UNSPECIFIED: Chronic | ICD-10-CM

## 2023-07-20 DIAGNOSIS — D50.9 IRON DEFICIENCY ANEMIA, UNSPECIFIED: ICD-10-CM

## 2023-07-20 DIAGNOSIS — Z96.643 PRESENCE OF ARTIFICIAL HIP JOINT, BILATERAL: Chronic | ICD-10-CM

## 2023-07-20 DIAGNOSIS — Z90.49 ACQUIRED ABSENCE OF OTHER SPECIFIED PARTS OF DIGESTIVE TRACT: Chronic | ICD-10-CM

## 2023-07-20 DIAGNOSIS — Z98.89 OTHER SPECIFIED POSTPROCEDURAL STATES: Chronic | ICD-10-CM

## 2023-07-20 DIAGNOSIS — Z90.710 ACQUIRED ABSENCE OF BOTH CERVIX AND UTERUS: Chronic | ICD-10-CM

## 2023-07-20 DIAGNOSIS — Z98.890 OTHER SPECIFIED POSTPROCEDURAL STATES: Chronic | ICD-10-CM

## 2023-07-21 ENCOUNTER — RESULT REVIEW (OUTPATIENT)
Age: 88
End: 2023-07-21

## 2023-07-21 ENCOUNTER — APPOINTMENT (OUTPATIENT)
Dept: HEMATOLOGY ONCOLOGY | Facility: CLINIC | Age: 88
End: 2023-07-21
Payer: MEDICARE

## 2023-07-21 VITALS
HEART RATE: 68 BPM | DIASTOLIC BLOOD PRESSURE: 68 MMHG | TEMPERATURE: 97.7 F | WEIGHT: 105.25 LBS | RESPIRATION RATE: 17 BRPM | SYSTOLIC BLOOD PRESSURE: 146 MMHG | BODY MASS INDEX: 20.56 KG/M2 | OXYGEN SATURATION: 98 %

## 2023-07-21 DIAGNOSIS — D32.9 BENIGN NEOPLASM OF MENINGES, UNSPECIFIED: ICD-10-CM

## 2023-07-21 DIAGNOSIS — E03.9 HYPOTHYROIDISM, UNSPECIFIED: ICD-10-CM

## 2023-07-21 DIAGNOSIS — D51.9 VITAMIN B12 DEFICIENCY ANEMIA, UNSPECIFIED: ICD-10-CM

## 2023-07-21 LAB
BASOPHILS # BLD AUTO: 0.05 K/UL — SIGNIFICANT CHANGE UP (ref 0–0.2)
BASOPHILS NFR BLD AUTO: 0.6 % — SIGNIFICANT CHANGE UP (ref 0–2)
EOSINOPHIL # BLD AUTO: 0.09 K/UL — SIGNIFICANT CHANGE UP (ref 0–0.5)
EOSINOPHIL NFR BLD AUTO: 1.1 % — SIGNIFICANT CHANGE UP (ref 0–6)
HCT VFR BLD CALC: 33.6 % — LOW (ref 34.5–45)
HGB BLD-MCNC: 10.9 G/DL — LOW (ref 11.5–15.5)
IMM GRANULOCYTES NFR BLD AUTO: 1 % — HIGH (ref 0–0.9)
LYMPHOCYTES # BLD AUTO: 1.44 K/UL — SIGNIFICANT CHANGE UP (ref 1–3.3)
LYMPHOCYTES # BLD AUTO: 17.2 % — SIGNIFICANT CHANGE UP (ref 13–44)
MCHC RBC-ENTMCNC: 31.8 PG — SIGNIFICANT CHANGE UP (ref 27–34)
MCHC RBC-ENTMCNC: 32.4 GM/DL — SIGNIFICANT CHANGE UP (ref 32–36)
MCV RBC AUTO: 98 FL — SIGNIFICANT CHANGE UP (ref 80–100)
MONOCYTES # BLD AUTO: 0.61 K/UL — SIGNIFICANT CHANGE UP (ref 0–0.9)
MONOCYTES NFR BLD AUTO: 7.3 % — SIGNIFICANT CHANGE UP (ref 2–14)
NEUTROPHILS # BLD AUTO: 6.08 K/UL — SIGNIFICANT CHANGE UP (ref 1.8–7.4)
NEUTROPHILS NFR BLD AUTO: 72.8 % — SIGNIFICANT CHANGE UP (ref 43–77)
NRBC # BLD: 0 /100 WBCS — SIGNIFICANT CHANGE UP (ref 0–0)
PLATELET # BLD AUTO: 274 K/UL — SIGNIFICANT CHANGE UP (ref 150–400)
RBC # BLD: 3.43 M/UL — LOW (ref 3.8–5.2)
RBC # FLD: 12.4 % — SIGNIFICANT CHANGE UP (ref 10.3–14.5)
WBC # BLD: 8.35 K/UL — SIGNIFICANT CHANGE UP (ref 3.8–10.5)
WBC # FLD AUTO: 8.35 K/UL — SIGNIFICANT CHANGE UP (ref 3.8–10.5)

## 2023-07-21 PROCEDURE — 99214 OFFICE O/P EST MOD 30 MIN: CPT

## 2023-07-21 NOTE — REVIEW OF SYSTEMS
[Lower Ext Edema] : lower extremity edema [Difficulty Walking] : difficulty walking [Negative] : Allergic/Immunologic [Diarrhea: Grade 0] : Diarrhea: Grade 0 [Recent Change In Weight] : ~T no recent weight change [Chest Pain] : no chest pain [Palpitations] : no palpitations [Shortness Of Breath] : no shortness of breath [Abdominal Pain] : no abdominal pain [Constipation] : no constipation [FreeTextEntry2] : wt stable  [FreeTextEntry5] : 2+  [de-identified] : forgetful; walking with walker today

## 2023-07-21 NOTE — PHYSICAL EXAM
[Restricted in physically strenuous activity but ambulatory and able to carry out work of a light or sedentary nature] : Status 1- Restricted in physically strenuous activity but ambulatory and able to carry out work of a light or sedentary nature, e.g., light house work, office work [Thin] : thin [Normal] : affect appropriate [de-identified] : wt loss  [de-identified] :  2 + edema b/l legs and pain with palpitation  [de-identified] : walking with walker today  [de-identified] : family reports forgetful

## 2023-07-21 NOTE — HISTORY OF PRESENT ILLNESS
[de-identified] : The patient was diagnosed with anemia in March 2022 at the age of 89. She was seen by hematology in 2018 for a mildly elevated platelet count noted on a routine blood work. Since 2015, her platelets have fluctuated between 440-480 K range. During a prior work up by my colleague Dr. 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 Diagnosis of mild iron deficiency was made at that time. She was admitted to  3/11/22 - 3/18/22 for a fall / syncope on 3/11/22.  Patient does not recall the episode and denied any preceding complaints. She was found to have anemia with a Hgb of 6.5 gm/dL, which likely contributed to her weakness and collapse. No overt bleeding. She was admitted for further workup of anemia.  During this admission, no acute cardiac condition found. EKG was WNL. No events noted on telemetry. TTE unremarkable. Orthostatics were negative. EEG shows no epileptic abnormalities. Neurology advised continued antiplatelet therapy, statin, BP control.\par  [de-identified] : Patient and son reports generalized weakness is stable, she continues with her PO Iron daily. Continues with baseline confusion, BP stable today. Denies dizziness, palpitations, fatigue, falls or rectal bleeding. She also follows with cardiology. She reports having to "pay attention" when she walks with her walker. BM every other day. She states she feels well overall today, son agrees with this, states she is on a plateau. \par \par PMH: PMH: known meningioma, slightly enlarged compared to prior. No associated edema. CVA 1998; HTN, renal artery stent, HLD, MR/TR, CHF, Hyponatremia/Chronic kidney disease; meningioma, hx basal cell carcinoma, thrombocytosis, LE weeping edema followed with Vascular, Dr. Aly [place], B12 deficiency, gastritis and duodenitis. 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

## 2023-07-22 LAB
24R-OH-CALCIDIOL SERPL-MCNC: 28.4 NG/ML — LOW (ref 30–80)
ALBUMIN SERPL ELPH-MCNC: 3.5 G/DL — SIGNIFICANT CHANGE UP (ref 3.3–5)
ALP SERPL-CCNC: 107 U/L — SIGNIFICANT CHANGE UP (ref 40–120)
ALT FLD-CCNC: 18 U/L — SIGNIFICANT CHANGE UP (ref 10–45)
ANION GAP SERPL CALC-SCNC: 13 MMOL/L — SIGNIFICANT CHANGE UP (ref 5–17)
AST SERPL-CCNC: 18 U/L — SIGNIFICANT CHANGE UP (ref 10–40)
BILIRUB SERPL-MCNC: 0.6 MG/DL — SIGNIFICANT CHANGE UP (ref 0.2–1.2)
BUN SERPL-MCNC: 72 MG/DL — HIGH (ref 7–23)
CALCIUM SERPL-MCNC: 8.8 MG/DL — SIGNIFICANT CHANGE UP (ref 8.4–10.5)
CHLORIDE SERPL-SCNC: 108 MMOL/L — SIGNIFICANT CHANGE UP (ref 96–108)
CO2 SERPL-SCNC: 19 MMOL/L — LOW (ref 22–31)
CREAT SERPL-MCNC: 1.36 MG/DL — HIGH (ref 0.5–1.3)
EGFR: 37 ML/MIN/1.73M2 — LOW
FERRITIN SERPL-MCNC: 170 NG/ML — SIGNIFICANT CHANGE UP (ref 13–330)
FOLATE SERPL-MCNC: 13.9 NG/ML — SIGNIFICANT CHANGE UP
GLUCOSE SERPL-MCNC: 182 MG/DL — HIGH (ref 70–99)
IRON SATN MFR SERPL: 17 % — SIGNIFICANT CHANGE UP (ref 14–50)
IRON SATN MFR SERPL: 43 UG/DL — SIGNIFICANT CHANGE UP (ref 30–160)
POTASSIUM SERPL-MCNC: 5.1 MMOL/L — SIGNIFICANT CHANGE UP (ref 3.5–5.3)
POTASSIUM SERPL-SCNC: 5.1 MMOL/L — SIGNIFICANT CHANGE UP (ref 3.5–5.3)
PROT SERPL-MCNC: 5.6 G/DL — LOW (ref 6–8.3)
SODIUM SERPL-SCNC: 139 MMOL/L — SIGNIFICANT CHANGE UP (ref 135–145)
TIBC SERPL-MCNC: 258 UG/DL — SIGNIFICANT CHANGE UP (ref 220–430)
UIBC SERPL-MCNC: 214 UG/DL — SIGNIFICANT CHANGE UP (ref 110–370)
VIT B12 SERPL-MCNC: 336 PG/ML — SIGNIFICANT CHANGE UP (ref 232–1245)

## 2023-07-24 ENCOUNTER — NON-APPOINTMENT (OUTPATIENT)
Age: 88
End: 2023-07-24

## 2023-07-24 DIAGNOSIS — E03.9 HYPOTHYROIDISM, UNSPECIFIED: ICD-10-CM

## 2023-07-24 DIAGNOSIS — D51.9 VITAMIN B12 DEFICIENCY ANEMIA, UNSPECIFIED: ICD-10-CM

## 2023-07-24 DIAGNOSIS — D32.9 BENIGN NEOPLASM OF MENINGES, UNSPECIFIED: ICD-10-CM

## 2023-08-27 ENCOUNTER — RX RENEWAL (OUTPATIENT)
Age: 88
End: 2023-08-27

## 2023-10-16 NOTE — ED ADULT NURSE NOTE - CAS TRG GENERAL AIRWAY, MLM
Patent Manual Repair Warning Statement: We plan on removing the manually selected variable below in favor of our much easier automatic structured text blocks found in the previous tab. We decided to do this to help make the flow better and give you the full power of structured data. Manual selection is never going to be ideal in our platform and I would encourage you to avoid using manual selection from this point on, especially since I will be sunsetting this feature. It is important that you do one of two things with the customized text below. First, you can save all of the text in a word file so you can have it for future reference. Second, transfer the text to the appropriate area in the Library tab. Lastly, if there is a flap or graft type which we do not have you need to let us know right away so I can add it in before the variable is hidden. No need to panic, we plan to give you roughly 6 months to make the change.

## 2023-10-21 NOTE — PATIENT PROFILE ADULT - FUNCTIONAL ASSESSMENT - BASIC MOBILITY SCORE.
[facility]  OB/GYN  [address] [place] [address]  Phone: [phone]  Fax:   Follow Up Time: 1-3 Days

## 2023-11-05 NOTE — ED PROVIDER NOTE - EYES, MLM
[facility] Progress Note      Patient Name: Charlette Rai  : 1937  MRN: [id number]  Primary Care Physician:  Rafael Lyons MD  Date of admission: 10/29/2023    Subjective     Interval History:   CKD4    Review of Systems:   C/O RLE pain this AM  No dyspnea or nausea    Objective     Vitals:   Temp:  [97.3 °F (36.3 °C)-98.3 °F (36.8 °C)] 98 °F (36.7 °C)  Heart Rate:  [63-76] 76  Resp:  [16-18] 18  BP: (153-180)/(47-58) 175/52  Flow (L/min):  [1] 1    Intake/Output Summary (Last 24 hours) at 2023 1138  Last data filed at 2023 1400  Gross per 24 hour   Intake 240 ml   Output --   Net 240 ml       Physical Exam:    General Appearance pleasant frail WF on RA no distress  Neck: supple, no JVD  Lungs: CTA bilat no rales  Heart: RRR, normal S1 and S2  Abdomen: soft, nontender, nondistended  Extremities: no edema, cyanosis or clubbing    Scheduled Meds:     amLODIPine, 10 mg, Oral, Q24H  arformoterol, 15 mcg, Nebulization, BID - RT  budesonide, 0.5 mg, Nebulization, BID - RT  carvedilol, 25 mg, Oral, Q12H  cefepime, 2,000 mg, Intravenous, Q24H  famotidine, 20 mg, Oral, Daily  furosemide, 80 mg, Oral, BID  heparin (porcine), 5,000 Units, Subcutaneous, Q8H  hydrALAZINE, 25 mg, Oral, Q12H  insulin lispro, 2-7 Units, Subcutaneous, 4x Daily AC & at Bedtime  senna-docusate sodium, 1 tablet, Oral, BID  sodium chloride, 10 mL, Intravenous, Q12H      IV Meds:   Pharmacy to Dose Cefepime,         Results Reviewed:   I have personally reviewed the results from the time of this admission to 2023 11:38 EST     Results from last 7 days   Lab Units 23  0526 23  0538 23  0541 10/30/23  0801 10/30/23  0249   SODIUM mmol/L 141 140 143   < > 138   SODIUM, ARTERIAL   --   --   --    < >  --    POTASSIUM mmol/L 3.6 3.8 4.2   < > 4.5   CHLORIDE mmol/L 103 100 103   < > 107   CO2 mmol/L 27.7 28.0 29.9*   < > 17.6*   BUN mg/dL 73* 74* 77*   < > 78*   CREATININE mg/dL 2.04* 2.15*  2.07*   < > 2.48*   CALCIUM mg/dL 8.5* 8.5* 8.8   < > 8.2*   BILIRUBIN mg/dL  --   --   --   --  0.2   ALK PHOS U/L  --   --   --   --  193*   ALT (SGPT) U/L  --   --   --   --  18   AST (SGOT) U/L  --   --   --   --  18   GLUCOSE mg/dL 103* 104* 123*   < > 198*   GLUCOSE, ARTERIAL   --   --   --    < >  --     < > = values in this interval not displayed.     Estimated Creatinine Clearance: 20.5 mL/min (A) (by C-G formula based on SCr of 2.04 mg/dL (H)).  Results from last 7 days   Lab Units 11/04/23 0538 11/02/23  0541 11/01/23 0553 10/31/23  0313   MAGNESIUM mg/dL  --  1.8 2.2 2.0   PHOSPHORUS mg/dL 4.0 3.8 3.8 3.7         Results from last 7 days   Lab Units 11/02/23  0541 11/01/23 0553 10/31/23  0313 10/30/23  0249   WBC 10*3/mm3 9.12 8.42 9.66 9.39   HEMOGLOBIN g/dL 9.5* 9.8* 8.7* 7.9*   PLATELETS 10*3/mm3 221 230 231 209           Assessment / Plan     ASSESSMENT:  - CKDIV due to diabetes, hypertension and previous contrast exposure, baseline creatinine between 2.2 and 2.6, here with acute respiratory failure with CHF and pulmonary edema.  Improved.  Cr stable low 2's currently, some prerenal azotemia from higher dose diuretic use, BUN stable low 70s and appears euvolemic on lasix 80mg BID.  K bit low 3.6 and unclear if related to RLE pain ie muscle cramp   -- Previous left upper extremity AV fistula was ligated due to ischemic steal.  -- Etiology of rather sudden onset pulmonary edema is not clear, unsure if renal artery stenosis is a factor. considered adding spironolactone but she had transient hyperkalemia.  - Hypertension, uncontrolled still despite inc norvasc; changed bystolic to coreg 25 BID yesterday PM; ACE/ARB are poor options   - Metabolic acidosis, likely due to renal failure, resolved.    - Hyperkalemia, resolved.    - Severe anemia, iron studies are adequate, transfuse as needed.  May need CASSANDRA.  - Bilateral renal artery stenosis with history of hypertension.  Now blood pressure is reasonable.    - History of lung cancer, status post radiation.  - Diabetes, blood sugars are better.    - Congestive heart failure, 2D echo showed moderate aortic stenosis, EF 56% with LVH and grade 1 diastolic dysfunction.    PLAN:  KCL 20meq PO x1  Added hydralazine 25 BID, titrate as needed  Continue lasix 80mg PO BID  Ok with discharge today, will arrange follow up 2-3 weeks Dr Ricky Morgan MD  11/05/23  11:38 EST    [facility]  [phone]   Clear bilaterally, pupils equal, round and reactive to light.

## 2023-11-16 NOTE — PROGRESS NOTE ADULT - ATTENDING COMMENTS
86 yo F with right RA stenosis and secondary htn (baseline -180s) admitted with htn emergency with acute pulm edema.  Markedly improved with BiPAP, nitro gtt and diuresis.      --htn emergency  likely demand ischemia, trend cardiac enzymes and EKG  continue home meds of clonidine, labetalol, losartan  d/c nitro gtt  f/u echo  continue home plavix  check lipid panel  --respiratory status markedly improved  off BiPAP  no further diuresis now  --advance PO diet  --RA stenosis, currently undergoing outpt eval  --no infectious concerns  --stable for tele if BP remains controlled  --plan discussed with pt
I personally saw and examined the patient in detail.  I have spoken to the above provider regarding the assessment and plan of care.  I reviewed the above assessment and plan of care, and agree.  I have made changes in the body of the note where appropriate.  High risk of decompensation.  Needs cath.  Total time spent speaking to patient, family, physicians at [facility], and arranging transfer in excess of one hour.
ambulatory
plan to  transferred pt to [facility], D/W residents for cardiac cath,
Pt seen, examined, case & care plan d/w pt, residents at detail.  D/W Cardiology, ICU Dr Villalba , D/W Son at detail.

## 2023-11-25 ENCOUNTER — RX RENEWAL (OUTPATIENT)
Age: 88
End: 2023-11-25

## 2023-11-26 ENCOUNTER — RX RENEWAL (OUTPATIENT)
Age: 88
End: 2023-11-26

## 2024-01-08 ENCOUNTER — NON-APPOINTMENT (OUTPATIENT)
Age: 89
End: 2024-01-08

## 2024-01-08 ENCOUNTER — APPOINTMENT (OUTPATIENT)
Dept: CARDIOLOGY | Facility: CLINIC | Age: 89
End: 2024-01-08
Payer: MEDICARE

## 2024-01-08 VITALS
HEIGHT: 60 IN | HEART RATE: 65 BPM | WEIGHT: 90 LBS | SYSTOLIC BLOOD PRESSURE: 140 MMHG | BODY MASS INDEX: 17.67 KG/M2 | DIASTOLIC BLOOD PRESSURE: 90 MMHG | OXYGEN SATURATION: 98 %

## 2024-01-08 DIAGNOSIS — I66.9 OCCLUSION AND STENOSIS OF UNSPECIFIED CEREBRAL ARTERY: ICD-10-CM

## 2024-01-08 DIAGNOSIS — I34.0 NONRHEUMATIC MITRAL (VALVE) INSUFFICIENCY: ICD-10-CM

## 2024-01-08 PROCEDURE — 99214 OFFICE O/P EST MOD 30 MIN: CPT

## 2024-01-08 PROCEDURE — 93000 ELECTROCARDIOGRAM COMPLETE: CPT

## 2024-01-16 NOTE — PATIENT PROFILE ADULT - FUNCTIONAL SCREEN CURRENT LEVEL: BATHING, MLM
[Routine On-Treatment] : a routine on-treatment visit for [Breast Cancer] : breast cancer 3 = assistive equipment and person

## 2024-01-17 RX ORDER — CLOPIDOGREL BISULFATE 75 MG/1
75 TABLET, FILM COATED ORAL
Qty: 90 | Refills: 1 | Status: ACTIVE | COMMUNITY
Start: 2021-01-26 | End: 1900-01-01

## 2024-01-18 ENCOUNTER — INPATIENT (INPATIENT)
Facility: HOSPITAL | Age: 89
LOS: 10 days | Discharge: NURSING, REHAB OR LONG-TERM CARE | DRG: 871 | End: 2024-01-29
Attending: GENERAL PRACTICE | Admitting: FAMILY MEDICINE
Payer: MEDICARE

## 2024-01-18 VITALS
OXYGEN SATURATION: 96 % | TEMPERATURE: 97 F | RESPIRATION RATE: 16 BRPM | SYSTOLIC BLOOD PRESSURE: 170 MMHG | HEART RATE: 78 BPM | DIASTOLIC BLOOD PRESSURE: 60 MMHG

## 2024-01-18 DIAGNOSIS — Z90.49 ACQUIRED ABSENCE OF OTHER SPECIFIED PARTS OF DIGESTIVE TRACT: Chronic | ICD-10-CM

## 2024-01-18 DIAGNOSIS — Z90.710 ACQUIRED ABSENCE OF BOTH CERVIX AND UTERUS: Chronic | ICD-10-CM

## 2024-01-18 DIAGNOSIS — I21.4 NON-ST ELEVATION (NSTEMI) MYOCARDIAL INFARCTION: ICD-10-CM

## 2024-01-18 DIAGNOSIS — Z98.89 OTHER SPECIFIED POSTPROCEDURAL STATES: Chronic | ICD-10-CM

## 2024-01-18 DIAGNOSIS — Z96.643 PRESENCE OF ARTIFICIAL HIP JOINT, BILATERAL: Chronic | ICD-10-CM

## 2024-01-18 DIAGNOSIS — D72.829 ELEVATED WHITE BLOOD CELL COUNT, UNSPECIFIED: ICD-10-CM

## 2024-01-18 DIAGNOSIS — I63.9 CEREBRAL INFARCTION, UNSPECIFIED: ICD-10-CM

## 2024-01-18 DIAGNOSIS — Z98.890 OTHER SPECIFIED POSTPROCEDURAL STATES: Chronic | ICD-10-CM

## 2024-01-18 DIAGNOSIS — N17.9 ACUTE KIDNEY FAILURE, UNSPECIFIED: ICD-10-CM

## 2024-01-18 DIAGNOSIS — M62.82 RHABDOMYOLYSIS: ICD-10-CM

## 2024-01-18 DIAGNOSIS — E78.5 HYPERLIPIDEMIA, UNSPECIFIED: ICD-10-CM

## 2024-01-18 DIAGNOSIS — I70.1 ATHEROSCLEROSIS OF RENAL ARTERY: ICD-10-CM

## 2024-01-18 DIAGNOSIS — Z29.9 ENCOUNTER FOR PROPHYLACTIC MEASURES, UNSPECIFIED: ICD-10-CM

## 2024-01-18 DIAGNOSIS — R79.89 OTHER SPECIFIED ABNORMAL FINDINGS OF BLOOD CHEMISTRY: ICD-10-CM

## 2024-01-18 DIAGNOSIS — R55 SYNCOPE AND COLLAPSE: ICD-10-CM

## 2024-01-18 DIAGNOSIS — I10 ESSENTIAL (PRIMARY) HYPERTENSION: ICD-10-CM

## 2024-01-18 DIAGNOSIS — Z41.9 ENCOUNTER FOR PROCEDURE FOR PURPOSES OTHER THAN REMEDYING HEALTH STATE, UNSPECIFIED: Chronic | ICD-10-CM

## 2024-01-18 DIAGNOSIS — R74.01 ELEVATION OF LEVELS OF LIVER TRANSAMINASE LEVELS: ICD-10-CM

## 2024-01-18 DIAGNOSIS — I50.9 HEART FAILURE, UNSPECIFIED: ICD-10-CM

## 2024-01-18 LAB
ALBUMIN SERPL ELPH-MCNC: 3.5 G/DL — SIGNIFICANT CHANGE UP (ref 3.3–5)
ALP SERPL-CCNC: 182 U/L — HIGH (ref 40–120)
ALT FLD-CCNC: 56 U/L — SIGNIFICANT CHANGE UP (ref 12–78)
ANION GAP SERPL CALC-SCNC: 14 MMOL/L — SIGNIFICANT CHANGE UP (ref 5–17)
APPEARANCE UR: ABNORMAL
AST SERPL-CCNC: 156 U/L — HIGH (ref 15–37)
BASOPHILS # BLD AUTO: 0.05 K/UL — SIGNIFICANT CHANGE UP (ref 0–0.2)
BASOPHILS NFR BLD AUTO: 0.2 % — SIGNIFICANT CHANGE UP (ref 0–2)
BILIRUB SERPL-MCNC: 1.5 MG/DL — HIGH (ref 0.2–1.2)
BILIRUB UR-MCNC: NEGATIVE — SIGNIFICANT CHANGE UP
BUN SERPL-MCNC: 54 MG/DL — HIGH (ref 7–23)
CALCIUM SERPL-MCNC: 9.4 MG/DL — SIGNIFICANT CHANGE UP (ref 8.5–10.1)
CHLORIDE SERPL-SCNC: 107 MMOL/L — SIGNIFICANT CHANGE UP (ref 96–108)
CK MB CFR SERPL CALC: 92.9 NG/ML — HIGH (ref 0–3.6)
CK SERPL-CCNC: 1624 U/L — HIGH (ref 26–192)
CK SERPL-CCNC: 2986 U/L — HIGH (ref 26–192)
CO2 SERPL-SCNC: 20 MMOL/L — LOW (ref 22–31)
COLOR SPEC: YELLOW — SIGNIFICANT CHANGE UP
CREAT SERPL-MCNC: 1.4 MG/DL — HIGH (ref 0.5–1.3)
DIFF PNL FLD: ABNORMAL
EGFR: 35 ML/MIN/1.73M2 — LOW
EOSINOPHIL # BLD AUTO: 0.01 K/UL — SIGNIFICANT CHANGE UP (ref 0–0.5)
EOSINOPHIL NFR BLD AUTO: 0 % — SIGNIFICANT CHANGE UP (ref 0–6)
FLUAV AG NPH QL: SIGNIFICANT CHANGE UP
FLUBV AG NPH QL: SIGNIFICANT CHANGE UP
GLUCOSE SERPL-MCNC: 154 MG/DL — HIGH (ref 70–99)
GLUCOSE UR QL: NEGATIVE MG/DL — SIGNIFICANT CHANGE UP
HCT VFR BLD CALC: 44.2 % — SIGNIFICANT CHANGE UP (ref 34.5–45)
HGB BLD-MCNC: 14.7 G/DL — SIGNIFICANT CHANGE UP (ref 11.5–15.5)
IMM GRANULOCYTES NFR BLD AUTO: 1.3 % — HIGH (ref 0–0.9)
KETONES UR-MCNC: ABNORMAL MG/DL
LACTATE SERPL-SCNC: 2.7 MMOL/L — HIGH (ref 0.7–2)
LEUKOCYTE ESTERASE UR-ACNC: ABNORMAL
LYMPHOCYTES # BLD AUTO: 0.95 K/UL — LOW (ref 1–3.3)
LYMPHOCYTES # BLD AUTO: 4.5 % — LOW (ref 13–44)
MAGNESIUM SERPL-MCNC: 2.4 MG/DL — SIGNIFICANT CHANGE UP (ref 1.6–2.6)
MCHC RBC-ENTMCNC: 31.5 PG — SIGNIFICANT CHANGE UP (ref 27–34)
MCHC RBC-ENTMCNC: 33.3 GM/DL — SIGNIFICANT CHANGE UP (ref 32–36)
MCV RBC AUTO: 94.6 FL — SIGNIFICANT CHANGE UP (ref 80–100)
MONOCYTES # BLD AUTO: 0.94 K/UL — HIGH (ref 0–0.9)
MONOCYTES NFR BLD AUTO: 4.5 % — SIGNIFICANT CHANGE UP (ref 2–14)
NEUTROPHILS # BLD AUTO: 18.82 K/UL — HIGH (ref 1.8–7.4)
NEUTROPHILS NFR BLD AUTO: 89.5 % — HIGH (ref 43–77)
NITRITE UR-MCNC: NEGATIVE — SIGNIFICANT CHANGE UP
NRBC # BLD: 0 /100 WBCS — SIGNIFICANT CHANGE UP (ref 0–0)
NT-PROBNP SERPL-SCNC: HIGH PG/ML (ref 0–450)
PH UR: 6.5 — SIGNIFICANT CHANGE UP (ref 5–8)
PHOSPHATE SERPL-MCNC: 3.8 MG/DL — SIGNIFICANT CHANGE UP (ref 2.5–4.5)
PLATELET # BLD AUTO: 580 K/UL — HIGH (ref 150–400)
POTASSIUM SERPL-MCNC: 4.1 MMOL/L — SIGNIFICANT CHANGE UP (ref 3.5–5.3)
POTASSIUM SERPL-SCNC: 4.1 MMOL/L — SIGNIFICANT CHANGE UP (ref 3.5–5.3)
PROT SERPL-MCNC: 7.4 G/DL — SIGNIFICANT CHANGE UP (ref 6–8.3)
PROT UR-MCNC: >=1000 MG/DL
RBC # BLD: 4.67 M/UL — SIGNIFICANT CHANGE UP (ref 3.8–5.2)
RBC # FLD: 12.5 % — SIGNIFICANT CHANGE UP (ref 10.3–14.5)
RSV RNA NPH QL NAA+NON-PROBE: SIGNIFICANT CHANGE UP
SARS-COV-2 RNA SPEC QL NAA+PROBE: SIGNIFICANT CHANGE UP
SODIUM SERPL-SCNC: 141 MMOL/L — SIGNIFICANT CHANGE UP (ref 135–145)
SP GR SPEC: 1.01 — SIGNIFICANT CHANGE UP (ref 1–1.03)
TROPONIN I, HIGH SENSITIVITY RESULT: 849.5 NG/L — HIGH
TROPONIN I, HIGH SENSITIVITY RESULT: 861.2 NG/L — HIGH
UROBILINOGEN FLD QL: 0.2 MG/DL — SIGNIFICANT CHANGE UP (ref 0.2–1)
WBC # BLD: 21.04 K/UL — HIGH (ref 3.8–10.5)
WBC # FLD AUTO: 21.04 K/UL — HIGH (ref 3.8–10.5)

## 2024-01-18 PROCEDURE — 99285 EMERGENCY DEPT VISIT HI MDM: CPT

## 2024-01-18 PROCEDURE — 70450 CT HEAD/BRAIN W/O DYE: CPT | Mod: 26,MA

## 2024-01-18 PROCEDURE — 71045 X-RAY EXAM CHEST 1 VIEW: CPT | Mod: 26

## 2024-01-18 PROCEDURE — 73030 X-RAY EXAM OF SHOULDER: CPT | Mod: 26,LT

## 2024-01-18 PROCEDURE — 73562 X-RAY EXAM OF KNEE 3: CPT | Mod: 26,RT

## 2024-01-18 PROCEDURE — 99223 1ST HOSP IP/OBS HIGH 75: CPT

## 2024-01-18 PROCEDURE — 72125 CT NECK SPINE W/O DYE: CPT | Mod: 26,MA

## 2024-01-18 PROCEDURE — 73090 X-RAY EXAM OF FOREARM: CPT | Mod: 26,LT

## 2024-01-18 PROCEDURE — 93010 ELECTROCARDIOGRAM REPORT: CPT

## 2024-01-18 PROCEDURE — 72170 X-RAY EXAM OF PELVIS: CPT | Mod: 26

## 2024-01-18 PROCEDURE — 99223 1ST HOSP IP/OBS HIGH 75: CPT | Mod: GC

## 2024-01-18 PROCEDURE — 99497 ADVNCD CARE PLAN 30 MIN: CPT | Mod: GC,25

## 2024-01-18 PROCEDURE — 73060 X-RAY EXAM OF HUMERUS: CPT | Mod: 26,LT

## 2024-01-18 RX ORDER — METOPROLOL TARTRATE 50 MG
25 TABLET ORAL
Refills: 0 | Status: DISCONTINUED | OUTPATIENT
Start: 2024-01-18 | End: 2024-01-19

## 2024-01-18 RX ORDER — HYDRALAZINE HCL 50 MG
25 TABLET ORAL THREE TIMES A DAY
Refills: 0 | Status: DISCONTINUED | OUTPATIENT
Start: 2024-01-18 | End: 2024-01-20

## 2024-01-18 RX ORDER — SIMVASTATIN 20 MG/1
10 TABLET, FILM COATED ORAL AT BEDTIME
Refills: 0 | Status: DISCONTINUED | OUTPATIENT
Start: 2024-01-18 | End: 2024-01-19

## 2024-01-18 RX ORDER — HEPARIN SODIUM 5000 [USP'U]/ML
5000 INJECTION INTRAVENOUS; SUBCUTANEOUS EVERY 8 HOURS
Refills: 0 | Status: DISCONTINUED | OUTPATIENT
Start: 2024-01-18 | End: 2024-01-29

## 2024-01-18 RX ORDER — SODIUM CHLORIDE 9 MG/ML
1000 INJECTION, SOLUTION INTRAVENOUS ONCE
Refills: 0 | Status: COMPLETED | OUTPATIENT
Start: 2024-01-18 | End: 2024-01-18

## 2024-01-18 RX ORDER — LANOLIN ALCOHOL/MO/W.PET/CERES
3 CREAM (GRAM) TOPICAL AT BEDTIME
Refills: 0 | Status: DISCONTINUED | OUTPATIENT
Start: 2024-01-18 | End: 2024-01-29

## 2024-01-18 RX ORDER — ACETAMINOPHEN 500 MG
650 TABLET ORAL EVERY 6 HOURS
Refills: 0 | Status: DISCONTINUED | OUTPATIENT
Start: 2024-01-18 | End: 2024-01-29

## 2024-01-18 RX ORDER — CLOPIDOGREL BISULFATE 75 MG/1
75 TABLET, FILM COATED ORAL DAILY
Refills: 0 | Status: DISCONTINUED | OUTPATIENT
Start: 2024-01-18 | End: 2024-01-29

## 2024-01-18 RX ORDER — SODIUM CHLORIDE 9 MG/ML
1000 INJECTION, SOLUTION INTRAVENOUS
Refills: 0 | Status: DISCONTINUED | OUTPATIENT
Start: 2024-01-18 | End: 2024-01-20

## 2024-01-18 RX ADMIN — HEPARIN SODIUM 5000 UNIT(S): 5000 INJECTION INTRAVENOUS; SUBCUTANEOUS at 23:45

## 2024-01-18 RX ADMIN — SODIUM CHLORIDE 1000 MILLILITER(S): 9 INJECTION, SOLUTION INTRAVENOUS at 16:42

## 2024-01-18 RX ADMIN — Medication 25 MILLIGRAM(S): at 23:44

## 2024-01-18 RX ADMIN — SIMVASTATIN 10 MILLIGRAM(S): 20 TABLET, FILM COATED ORAL at 23:44

## 2024-01-18 RX ADMIN — Medication 25 MILLIGRAM(S): at 23:43

## 2024-01-18 RX ADMIN — SODIUM CHLORIDE 50 MILLILITER(S): 9 INJECTION, SOLUTION INTRAVENOUS at 19:29

## 2024-01-18 RX ADMIN — Medication 0.2 MILLIGRAM(S): at 23:44

## 2024-01-18 NOTE — H&P ADULT - NSHPPHYSICALEXAM_GEN_ALL_CORE
T(C): 36.8 (01-18-24 @ 16:24), Max: 36.8 (01-18-24 @ 16:24)  HR: 114 (01-18-24 @ 16:24) (78 - 114)  BP: 171/100 (01-18-24 @ 16:24) (170/60 - 171/100)  RR: 18 (01-18-24 @ 16:24) (16 - 18)  SpO2: 95% (01-18-24 @ 16:24) (95% - 96%)    GENERAL: patient appears well, no acute distress, appropriately interactive  EYES: sclera clear, no exudates  ENMT: oropharynx clear without erythema, no exudates, moist mucous membranes  NECK: supple, soft  LUNGS: good air entry bilaterally, clear to auscultation, symmetric breath sounds, no wheezing or rhonchi appreciated  HEART: soft S1/S2, regular rate and rhythm, no murmurs noted, no lower extremity edema  GASTROINTESTINAL: abdomen is soft, nontender, nondistended, normoactive bowel sounds  INTEGUMENT: good skin turgor, warm skin, appears well perfused  MUSCULOSKELETAL: no clubbing or cyanosis, no obvious deformity  NEUROLOGIC: awake, alert, oriented x3, good muscle tone in all 4 extremities  HEME/LYMPH: no obvious ecchymosis or petechiae T(C): 36.8 (01-18-24 @ 16:24), Max: 36.8 (01-18-24 @ 16:24)  HR: 114 (01-18-24 @ 16:24) (78 - 114)  BP: 171/100 (01-18-24 @ 16:24) (170/60 - 171/100)  RR: 18 (01-18-24 @ 16:24) (16 - 18)  SpO2: 95% (01-18-24 @ 16:24) (95% - 96%)    GENERAL: patient appears well, no acute distress, appropriately interactive  EYES: sclera clear, no exudates  HEENMT: oropharynx clear without erythema, no exudates, moist mucous membranes, no lesions/lacerations/deformity to skull  NECK: mild ttp to left trapezius, supple, soft  LUNGS: good air entry bilaterally, clear to auscultation, symmetric breath sounds, no wheezing or rhonchi appreciated  HEART: soft S1/S2, tachycardia with regular rhythm, no murmurs noted, no lower extremity edema  GASTROINTESTINAL: mild ttp to RLQ and suprapubic regions, no RT, no guarding abdomen is soft, nondistended, normoactive bowel sounds  INTEGUMENT: Ecchymosis noted circumferentially to distal LUE from elbow to fingers, warm skin, appears well perfused  MUSCULOSKELETAL: no clubbing or cyanosis, no obvious deformity, no ttp to B/L hips, no laxity in B/L knees, Muscle strength 4/5 x4 extremities  NEUROLOGIC: awake, alert, oriented x4, good muscle tone in all 4 extremities

## 2024-01-18 NOTE — H&P ADULT - PROBLEM SELECTOR PLAN 9
- Hx of CVA without residual deficits   - Continue home Clopidogrel and statin   - Neurological imagining as above

## 2024-01-18 NOTE — H&P ADULT - PROBLEM SELECTOR PLAN 8
- Home Medication of Pravastatin 20 mg   - Continue home meds with therapeutic conversion   - AM Lipid Panel

## 2024-01-18 NOTE — ED ADULT NURSE REASSESSMENT NOTE - NS ED NURSE REASSESS COMMENT FT1
straight cath for 700ml urine. pt also had BM. report given to jw on teleN. family aware of pt bed status.

## 2024-01-18 NOTE — H&P ADULT - ATTENDING COMMENTS
89 yo F with PMHx of HTN, HLD, Renal Artery Stenosis s/p stent, CHFpEF Basal Cell Carcinoma, CVA presenting to the ED following mechanical fall on 1/17, found on 1/18.  Pt admitted for rhabdomyolysis, ACS workup, and mechanical fall Plan: trend ck, monitor on tele, aprec cardio recs, monitor renal function, trend trops, monitor clinical course, apprec PT/OT

## 2024-01-18 NOTE — H&P ADULT - PROBLEM SELECTOR PLAN 1
- Found down for likely >12 hours s/p mechanical fall with positive headstrike, no LOC   - CK 2986, continue to trend   - CXR: Heart is enlarged. Right upper quadrant clips are noted. No lung consolidation or layering effusion is evident. No visible fracture   - XR Chest Knee Pelvis: Negative for acute fracture   - CT Head/Neck: negative for acute intracranial pathology, hemorrhage, or fracture   - XR LUE ordered given exam findings to rule out acute fracture   - S/p 1L LR in ED   - Given hx of CHF, will start LR 50 cc/hr for 14 hours pending TTE results

## 2024-01-18 NOTE — H&P ADULT - PROBLEM SELECTOR PLAN 7
- AlkPhos 182,  on admission   - Likely reactive in setting of Rhabdomyolysis  - Continue to minitor - AlkPhos 182,  on admission   - Likely reactive in setting of Rhabdomyolysis  - Continue to monitor

## 2024-01-18 NOTE — H&P ADULT - ASSESSMENT
89 yo F with PMHx of HTN, HLD, Renal Artery Stenosis s/p stent, Basal Cell Carcinoma, CVA presenting to the ED following syncopal episode on 1/17. Patient was found on the floor by her family this morning 1/18.     Denies fever, chills, chest pain, palpitations, SOB, cough, abdominal pain, nausea, vomiting, diarrhea, constipation, urinary frequency, urgency, or dysuria, headaches, changes in vision, dizziness, numbness, tingling.  Denies recent travel, recent antibiotic use, or sick contacts.    ED Course:   Vitals: BP: 170/60, HR: 78, Temp: 97, RR: 16, SpO2: 96% on RA Labs:   WBC: 21.04, H/H 14.7/44.2, Platelet 580, Trop 861.2, CK 2986, Cr 1.40, BUN54, eGFR 35, AlkPhos 182, ,   UA: Pending collection   CXR: Heart is enlarged. Right upper quadrant clips are noted. No lung consolidation or layering effusion is evident. No visible fracture   CT Head/Neck: negative for acute intracranial pathology, hemorrhage, or fracture   EKG:   Received in the ED: 1L LR Bolus   TTE: 3/2022 LVEF 60-65 %, normal LV funciton, contraction, LA mildly dilated, Mild to moderate AR,  Mild (1+) tricuspid valve regurgitation. EA reversal of the mitral inflow consistent with reduced compliance of the left ventricle.   89 yo F with PMHx of HTN, HLD, Renal Artery Stenosis s/p stent, Basal Cell Carcinoma, CVA presenting to the ED following syncopal episode on 1/17. Patient was found on the floor by her family this morning 1/18.      89 yo F with PMHx of HTN, HLD, Renal Artery Stenosis s/p stent, Basal Cell Carcinoma, CVA presenting to the ED following mechanical fall on 1/17, found on 1/18.  Pt admitted for possible NSTEMI     89 yo F with PMHx of HTN, HLD, Renal Artery Stenosis s/p stent, CHFpEF Basal Cell Carcinoma, CVA presenting to the ED following mechanical fall on 1/17, found on 1/18.  Pt admitted for possible NSTEMI     89 yo F with PMHx of HTN, HLD, Renal Artery Stenosis s/p stent, CHFpEF Basal Cell Carcinoma, CVA presenting to the ED following mechanical fall on 1/17, found on 1/18.  Pt admitted for rhabdomyolysis, ACS workup, and mechanical fall

## 2024-01-18 NOTE — H&P ADULT - NSICDXPASTMEDICALHX_GEN_ALL_CORE_FT
Please read the healthy family handout that you were given and share it with your family. Please compare this printed medication list with your medications at home to be sure they are the same. If you have any medications that are different please contact us immediately at [phone]. Also review your allergies that we have listed, these may also include medications that you have not been able to tolerate, make sure everything listed is correct. If you have any allergies that are different please contact us immediately at [phone]. You may receive a survey in the mail or by email asking about your experience during your visit today. Please complete and return to us so we know how we are serving you.
PAST MEDICAL HISTORY:  Basal cell carcinoma     Blood pressure instability     Cerebrovascular accident (CVA), unspecified mechanism     CHF (congestive heart failure)     Dyslipidemia     Edema, unspecified type     Essential hypertension     Hyponatremia ON HCTZ , h/o Hypokelemia    Inguinal hernia, right     Mitral valve insufficiency, unspecified etiology Cardiac cath on 11/21/18    Renal artery stenosis right side, stent

## 2024-01-18 NOTE — H&P ADULT - PROBLEM SELECTOR PLAN 5
- 21.04, H/H 14.7/44.2, Platelet 580,   - Blood and Urine cultures ordered, follow up results   - UA pending

## 2024-01-18 NOTE — H&P ADULT - PROBLEM SELECTOR PLAN 4
- Trop 861.2  - Likely reactive in setting fo Rhabdomyolysis   - Trend to peak   - CKMB ordered, follow up results   - EKG: pending, initial revealing artifact  - Cardiology consulted, Dr. Willingham, follow up recommendations

## 2024-01-18 NOTE — H&P ADULT - CONVERSATION DETAILS
I had a lengthy discussion with patient as well as her son and daughter at bedside regarding DNR/DNI status. Pt states that if  "we think she can be saved, she would like everything done." However, pt states that she is "aware that she is 89 years old" and understands the repercussions of broken ribs during compressions and the difficulty to rehabilitate off of mechanical ventilation following CPR. States that in conclusion, she is comfortable with the decisions made on her behalf by her HCP which she states has been divulged to all 3 of her children. Pt.'s daughter and son at bedside said they have not made a decision yet, but will discuss further during this hospital stay and would like her to be full code until a decision is understood. They would be willing to sign a MOLST form following that discussion. I had a lengthy discussion with patient as well as her son and daughter at bedside regarding DNR/DNI status. Pt states that if  "we think she can be saved, she would like everything done." However, pt states that she is "aware that she is 89 years old" and understands the repercussions of broken ribs during compressions and the difficulty to rehabilitate off of mechanical ventilation following CPR. States that in conclusion, she is comfortable with the decisions made on her behalf by her HCP which she states has been divulged to all 3 of her children. Pt.'s daughter and son at bedside said they have not made a decision yet, but will discuss further during this hospital stay and would like her to be full code until a decision is understood. They would be willing to sign a MOLST form following that discussion.      approx 16 minutes spent

## 2024-01-18 NOTE — CONSULT NOTE ADULT - ASSESSMENT
89-year-old white female presently residing at home alone though does have assistance of an aide approximately 4 to 6 hours/day with past history of heart pretension hyperlipidemia previous CVA basal cell carcinoma renal artery stenosis s/p stent as well as congestive heart failure who was last known to be well and last heard from sometime late afternoon yesterday.  Cardiology consulted for syncopal episode and elevated troponins.    #Syncopal episode  - As per patient it was a mechanical fall although patient slightly confused  - May have been orthostatic syncope; appears to be clinically dry   - Check orthostatics  - EKG revealing infarct; obtain repeat EKG  - Continue to monitor on telemetry   - Monitor and replete lytes, keep K>4, Mg>2.    #Elevated Troponin  - Patient is not complaining of any cardiac symptoms at this time.  - EKG revealing infarct; obtain repeat EKG  - Trop 861.2> trend to peak   - Likely demand ischemia 2/2 rhabdomyolysis  - Continue to monitor for anginal symptoms    #HTN  - Continue home losartan, toprol, hydralazine w/ hold parameters  - Continue to monitor hemodynamics    #CHF  - Check Pro BNP  - TTE 4/23: EF 58%, mod MR, mild- mod TR, mild AR, RI.  - Does not appear to be volume overloaded at this time  - Hold home torsemide    #HLD  - Continue home statin     - Other cardiovascular workup will depend on clinical course.  - All other workup per primary team.  - Will continue to follow.              89-year-old white female presently residing at home alone though does have assistance of an aide approximately 4 to 6 hours/day with past history of heart pretension hyperlipidemia previous CVA basal cell carcinoma renal artery stenosis s/p stent as well as congestive heart failure who was last known to be well and last heard from sometime late afternoon yesterday.  Cardiology consulted for syncopal episode and elevated troponins.    #Syncopal episode  - As per patient it was a mechanical fall although patient slightly confused  - May have been orthostatic syncope; appears to be clinically dry   - Check orthostatics  - EKG revealing infarct; obtain repeat EKG  - Continue to monitor on telemetry   - Monitor and replete lytes, keep K>4, Mg>2.    #Elevated Troponin  - Patient is not complaining of any cardiac symptoms at this time.  - EKG revealing infarct; obtain repeat EKG  - Trop 861.2> trend to peak   - Likely demand ischemia 2/2 rhabdomyolysis  - Continue to monitor for anginal symptoms    #HTN  - Continue home losartan, toprol, hydralazine, clonidine w/ hold parameters  - Continue to monitor hemodynamics    #CHF  - Check Pro BNP  - TTE 4/23: EF 58%, mod MR, mild- mod TR, mild AR, IL.  - Does not appear to be volume overloaded at this time  - Hold home torsemide    #Hx of CVA in 1998  - Continue home plavix    #HLD  - Continue home statin     - Other cardiovascular workup will depend on clinical course.  - All other workup per primary team.  - Will continue to follow.              89-year-old white female presently residing at home alone though does have assistance of an aide approximately 4 to 6 hours/day with past history of heart pretension hyperlipidemia previous CVA basal cell carcinoma renal artery stenosis s/p stent as well as congestive heart failure who was last known to be well and last heard from sometime late afternoon yesterday.  Cardiology consulted for syncopal episode and elevated troponins.    #Syncopal episode  - As per patient it was a mechanical fall although patient slightly confused  - May have been orthostatic syncope; appears to be clinically dry   - Check orthostatics  - EKG revealing artifact; obtain repeat EKG  - Continue to monitor on telemetry   - Monitor and replete lytes, keep K>4, Mg>2.    #Elevated Troponin  - Patient is not complaining of any cardiac symptoms at this time.  - EKG revealing artifact; obtain repeat EKG  - Trop 861.2> trend to peak   - Likely demand ischemia 2/2 rhabdomyolysis  - Continue to monitor for anginal symptoms    #HTN  - Continue home toprol, hydralazine, clonidine w/ hold parameters  - Hold home losartan 2/2 TERESA, resume when able   - Continue to monitor hemodynamics    #CHF  - Check Pro BNP  - TTE 4/23: EF 58%, mod MR, mild- mod TR, mild AR, OK.  - Does not appear to be volume overloaded at this time  - Hold home torsemide    #Hx of CVA in 1998  - Continue home plavix    #HLD  - Continue home statin     - Other cardiovascular workup will depend on clinical course.  - All other workup per primary team.  - Will continue to follow.             89-year-old white female presently residing at home alone though does have assistance of an aide approximately 4 to 6 hours/day with past history of hypertension hyperlipidemia previous CVA basal cell carcinoma renal artery stenosis s/p stent as well as congestive heart failure who was last known to be well and last heard from sometime late afternoon yesterday.  Cardiology consulted for syncopal episode and elevated troponins.    #Syncopal episode  - As per patient it was a mechanical fall although patient slightly confused  - May have been orthostatic syncope; appears to be clinically dry   - Check orthostatics  - EKG revealing irregularity and possible af, though significant artifact, obtain repeat EKG  - Continue to monitor on telemetry   - Monitor and replete lytes, keep K>4, Mg>2.    #Elevated Troponin  - Patient is not complaining of any cardiac symptoms at this time.  - Trop 861.2> trend to peak   - Likely demand ischemia 2/2 rhabdomyolysis  - Continue to monitor for anginal symptoms    #HTN  - Continue home toprol, hydralazine, clonidine w/ hold parameters  - Hold home losartan 2/2 TERESA, resume when able   - Continue to monitor hemodynamics    #CHF  - Check Pro BNP  - TTE 4/23: EF 58%, mod MR, mild- mod TR, mild AR, AR.  - Does not appear to be volume overloaded at this time  - Hold home torsemide    #Hx of CVA in 1998  - Continue home plavix    #HLD  - Continue home statin     - Other cardiovascular workup will depend on clinical course.  - All other workup per primary team.  - Will continue to follow.

## 2024-01-18 NOTE — ED ADULT NURSE NOTE - NSFALLHARMRISKINTERV_ED_ALL_ED

## 2024-01-18 NOTE — ED PROVIDER NOTE - PHYSICAL EXAMINATION
Physical examination reveals a very thin frail elderly white female who appears her stated age lying on a stretcher with a hard cervical collar in place.  HEENT normocephalic atraumatic pupils are equal round reactive to light and accommodation there is bitemporal wasting neck was in a hard cervical [place] collar.  Chest wall was nontender abdomen is completely soft nontender all 4 quadrants bowel sounds are positive lungs are clear heart regular rate and rhythm without any murmurs rubs gallops pelvis is stable hips are nontender with free range of motion there is mild tenderness to palpation over the right knee with normal flexion and extension.  Neurologically alert and oriented x 4 without any focal neurologic deficits NIH score is 0.

## 2024-01-18 NOTE — ED PROVIDER NOTE - PROGRESS NOTE DETAILS
As a result of white count being elevated blood cultures will be sent as well as lactate and I will do a right upper quadrant ultrasound to look at the biliary tree as bilirubin and alk phos are both elevated. Results of CT findings reviewed with patient as well as with son and daughter Case reviewed with cardiologist Dr. Willingham who will see patient in consultation as well.

## 2024-01-18 NOTE — CONSULT NOTE ADULT - SUBJECTIVE AND OBJECTIVE BOX
Patient is a 89y old  Female who presents with a chief complaint of fall.     HPI: 89-year-old white female presently residing at home alone though does have assistance of an aide approximately 4 to 6 hours/day with past history of heart pretension hyperlipidemia previous CVA basal cell carcinoma renal artery stenosis s/p stent as well as congestive heart failure who was last known to be well and last heard from sometime late afternoon yesterday.  Today when daughter could not reach mother she went to patient's home and found her lying on floor.  EMS was called and patient was transported in a hard cervical collar to our emergency department for further evaluation care treatment and management.  Patient at the present time only is complaining of mild neck and right knee pain.  She does not recall how she was on the floor or when she was last upright.  Denies any headache chest pain abdominal pain or hip pain.  Has not been ill at all recently i.e. no recent febrile illnesses no recent cough shortness of breath fever chills nausea vomiting diarrhea dysuria or hematuria.  Daughter is presently with patient and has provided significant amount of this information.    Interval Hx: Patient seen and examined at bedside. Patient states she remembers slipping and falling yesterday. Patient does not know how she slipped or how long she was on the floor for. Patient denies feeling dizzy/lightheaded or any precursor event before falling. Patient denies LOC. Patient states she only drinks 3 glasses of water per day. Of note, patient sees Dr. Gill (last visit 1/8/24). Last TTE was 4/23: EF 58%, mod MR, mild- mod TR, mild AR, NC. Patient currently denies feeling sick. Denies headache, fevers, chills, body aches, ear pain/throat pain, congestion, lightheadedness, chest pain, palpitations, abdominal pain, n/v.     PAST MEDICAL & SURGICAL HISTORY:  Essential hypertension      Dyslipidemia      Hyponatremia  ON HCTZ , h/o Hypokelemia      Cerebrovascular accident (CVA), unspecified mechanism      Edema, unspecified type      Mitral valve insufficiency, unspecified etiology        Basal cell carcinoma      Renal artery stenosis  right side, stent      CHF (congestive heart failure)      Inguinal hernia, right      Blood pressure instability      S/P Mohs surgery for basal cell carcinoma      Status post hysterectomy  and bladder lift with mesh 1990s      History of cholecystectomy  1980      H/O bilateral hip replacements  2013, 2014      Other elective surgery  right renal artery stent      S/P colonoscopy                ECHO  FINDINGS: As above ^      MEDICATIONS  (STANDING):  cloNIDine 0.2 milliGRAM(s) Oral three times a day  clopidogrel Tablet 75 milliGRAM(s) Oral daily  hydrALAZINE 25 milliGRAM(s) Oral three times a day  metoprolol succinate ER 25 milliGRAM(s) Oral two times a day  simvastatin 10 milliGRAM(s) Oral at bedtime    MEDICATIONS  (PRN):      FAMILY HISTORY:  Family history of uterine cancer (Sibling)    Family history of carotid artery stenosis (Sibling)      Admits to hx of CAD for mom and dad      ROS negative except as noted above      SOCIAL HISTORY:    No tobacco, Alcohol or drug use. Lives alone. Has HHA 2x per week. Uses walker.     Vital Signs Last 24 Hrs  T(C): 36.8 (18 Jan 2024 16:24), Max: 36.8 (18 Jan 2024 16:24)  T(F): 98.2 (18 Jan 2024 16:24), Max: 98.2 (18 Jan 2024 16:24)  HR: 114 (18 Jan 2024 16:24) (78 - 114)  BP: 171/100 (18 Jan 2024 16:24) (170/60 - 171/100)  BP(mean): --  RR: 18 (18 Jan 2024 16:24) (16 - 18)  SpO2: 95% (18 Jan 2024 16:24) (95% - 96%)    Parameters below as of 18 Jan 2024 16:24  Patient On (Oxygen Delivery Method): room air        Physical Exam:  General: Thin appearing, NAD  HEENT: NCAT, PERRLA, EOMI bl, dry mucous membranes   Neurology: A&Ox2 to person and place, nonfocal, sensation intact   Respiratory: CTA B/L, No Wheezes   CV: RRR, +S1/S2, no murmurs  Abdominal: Soft, NT, ND  Extremities: No Cyanosis or edema   MSK: Normal ROM, no joint erythema or warmth, no joint swelling   Skin: warm, dry, normal color      I&O's Detail      LABS:                        14.7   21.04 )-----------( 580      ( 18 Jan 2024 15:10 )             44.2     01-18    141  |  107  |  54<H>  ----------------------------<  154<H>  4.1   |  20<L>  |  1.40<H>    Ca    9.4      18 Jan 2024 15:46  Phos  3.8     01-18  Mg     2.4     01-18    TPro  7.4  /  Alb  3.5  /  TBili  1.5<H>  /  DBili  x   /  AST  156<H>  /  ALT  56  /  AlkPhos  182<H>  01-18    CARDIAC MARKERS ( 18 Jan 2024 15:46 )  x     / x     / 2986 U/L / x     / x            Urinalysis Basic - ( 18 Jan 2024 15:46 )    Color: x / Appearance: x / SG: x / pH: x  Gluc: 154 mg/dL / Ketone: x  / Bili: x / Urobili: x   Blood: x / Protein: x / Nitrite: x   Leuk Esterase: x / RBC: x / WBC x   Sq Epi: x / Non Sq Epi: x / Bacteria: x      I&O's Summary    BNP  RADIOLOGY & ADDITIONAL STUDIES:

## 2024-01-18 NOTE — ED ADULT NURSE REASSESSMENT NOTE - NS ED NURSE REASSESS COMMENT FT1
spoke to resident regarding pending urine collection. resident requesting bladder scan and to hold off on straight cath.

## 2024-01-18 NOTE — H&P ADULT - HISTORY OF PRESENT ILLNESS
89 yo F with PMHx of HTN, HLD, CAD s/p stent, Basal Cell Carcinoma, CVA presenting to the ED following syncopal episode on 1/17. Patient was found on the floor by her family this morning 1/18.     Denies fever, chills, chest pain, palpitations, SOB, cough, abdominal pain, nausea, vomiting, diarrhea, constipation, urinary frequency, urgency, or dysuria, headaches, changes in vision, dizziness, numbness, tingling.  Denies recent travel, recent antibiotic use, or sick contacts.    ED Course:   Vitals: BP: 170/60, HR: 78, Temp: 97, RR: 16, SpO2: 96% on RA Labs:   WBC: 21.04, H/H 14.7/44.2, Platelet 580, Trop 861.2, Cr 1.40, BUN54, eGFR 35, AlkPhos 182, ,   UA: Pending collection   CXR: Heart is enlarged. Right upper quadrant clips are noted. No lung consolidation or layering effusion is evident. No visible fracture   CT Head/Neck: negative for acute intracranial pathology, hemorrhage, or fracture   EKG:   Received in the ED: 1L LR Bolus   TTE: 3/2022 LVEF 60-65 %, normal LV funciton, contraction, LA mildly dilated, Mild to moderate AR,  Mild (1+) tricuspid valve regurgitation. EA reversal of the mitral inflow consistent with reduced compliance of the left ventricle. 89 yo F with PMHx of HTN, HLD, Renal Artery Stenosis s/p stent, Basal Cell Carcinoma, CVA presenting to the ED following syncopal episode on 1/17. Patient was found on the floor by her family this morning 1/18.     Denies fever, chills, chest pain, palpitations, SOB, cough, abdominal pain, nausea, vomiting, diarrhea, constipation, urinary frequency, urgency, or dysuria, headaches, changes in vision, dizziness, numbness, tingling.  Denies recent travel, recent antibiotic use, or sick contacts.    ED Course:   Vitals: BP: 170/60, HR: 78, Temp: 97, RR: 16, SpO2: 96% on RA Labs:   WBC: 21.04, H/H 14.7/44.2, Platelet 580, Trop 861.2, CK 2986, Cr 1.40, BUN54, eGFR 35, AlkPhos 182, ,   UA: Pending collection   CXR: Heart is enlarged. Right upper quadrant clips are noted. No lung consolidation or layering effusion is evident. No visible fracture   CT Head/Neck: negative for acute intracranial pathology, hemorrhage, or fracture   EKG: artifact, repeat pending   Received in the ED: 1L LR Bolus   TTE: 3/2022 LVEF 60-65 %, normal LV funciton, contraction, LA mildly dilated, Mild to moderate AR,  Mild (1+) tricuspid valve regurgitation. EA reversal of the mitral inflow consistent with reduced compliance of the left ventricle.     89 yo F with PMHx of HTN, HLD, Renal Artery Stenosis s/p stent, Basal Cell Carcinoma, CVA  presenting to ED s/p mechanical fall early last night. Pt states that she was standing upright in her kitchen when she tripped over "something" on the floor of her kitchen. Pt had an unwitnessed fall on her left side with head involvment, but adamantly denies LOC. States she was in pain and is generally weak so she was unable to get up without her walker throughout the night. Pt.'s daughter was unable to get rosetta contact with the patient, so she went to her house and found her lying on the floor. EMS was then called and the patient was transported to [facility] [place] ED w/ cervical stabilization in a collar. Pt states she currently has neck and B/L knee pain L>R. Endorses increased neck pain before the fall as her arthritis has been "acting up since the weather got cold." States she last had a normal BM yesterday, last urinated without dysuria this afternoon. Denies headache, visual changes, palpitations, cp, abd pain, dysuria, urinary frequency, hip pain, fatigue, focal weakness, n/v/d/c, hematuria. Denies feeling ill recently although endorses having a friend who she recently met with who is sick with an URI.      ED Course:   Vitals: BP: 170/60, HR: 78, Temp: 97, RR: 16, SpO2: 96% on RA Labs:   WBC: 21.04, H/H 14.7/44.2, Platelet 580, Trop 861.2, CK 2986, Cr 1.40, BUN54, eGFR 35, AlkPhos 182, ,   UA: Pending collection   CXR: Heart is enlarged. Right upper quadrant clips are noted. No lung consolidation or layering effusion is evident. No visible fracture   CT Head/Neck: negative for acute intracranial pathology, hemorrhage, or fracture   EKG: artifact, repeat pending   Received in the ED: 1L LR Bolus   TTE: 3/2022 LVEF 60-65 %, normal LV funciton, contraction, LA mildly dilated, Mild to moderate AR,  Mild (1+) tricuspid valve regurgitation. EA reversal of the mitral inflow consistent with reduced compliance of the left ventricle.     89 yo F with PMHx of HTN, HLD, Renal Artery Stenosis s/p stent, Basal Cell Carcinoma, CHFpEF CVA  presenting to ED s/p mechanical fall early last night. Pt states that she was standing upright in her kitchen when she tripped over "something" on the floor of her kitchen. Pt had an unwitnessed fall on her left side with head involvment, but adamantly denies LOC. States she was in pain and is generally weak so she was unable to get up without her walker throughout the night. Pt.'s daughter was unable to get rosetta contact with the patient, so she went to her house and found her lying on the floor. EMS was then called and the patient was transported to [facility] [place] ED w/ cervical stabilization in a collar. Pt states she currently has neck and B/L knee pain L>R. Endorses increased neck pain before the fall as her arthritis has been "acting up since the weather got cold." States she last had a normal BM yesterday, last urinated without dysuria this afternoon. Denies headache, visual changes, palpitations, cp, abd pain, dysuria, urinary frequency, hip pain, fatigue, focal weakness, n/v/d/c, hematuria. Denies feeling ill recently although endorses having a friend who she recently met with who is sick with an URI.      ED Course:   Vitals: BP: 170/60, HR: 78, Temp: 97, RR: 16, SpO2: 96% on RA Labs:   WBC: 21.04, H/H 14.7/44.2, Platelet 580, Trop 861.2, CK 2986, Cr 1.40, BUN54, eGFR 35, AlkPhos 182, ,   UA: Pending collection   CXR: Heart is enlarged. Right upper quadrant clips are noted. No lung consolidation or layering effusion is evident. No visible fracture   CT Head/Neck: negative for acute intracranial pathology, hemorrhage, or fracture   EKG: artifact, repeat pending   Received in the ED: 1L LR Bolus   TTE: 3/2022 LVEF 60-65 %, normal LV funciton, contraction, LA mildly dilated, Mild to moderate AR,  Mild (1+) tricuspid valve regurgitation. EA reversal of the mitral inflow consistent with reduced compliance of the left ventricle.

## 2024-01-18 NOTE — ED ADULT NURSE NOTE - OBJECTIVE STATEMENT
bib ems found on floor. unknown if syncope or accidental fall. uknown if loc or head strike. denies chest pain, sob, distress. no major deformities noted. bruising to BL lower arms. pt is able to move extremities. currently baseline mental status including confusion at times. peripheral pulses present.

## 2024-01-18 NOTE — H&P ADULT - PROBLEM SELECTOR PLAN 2
- Home Medications of   - TTE: 3/2022 LVEF 60-65 %, normal LV funciton, contraction, LA mildly dilated, Mild to moderate AR,  Mild (1+) tricuspid valve regurgitation. EA reversal of the mitral inflow consistent with reduced compliance of the left ventricle  - Repeat TTE ordered, follow up results   - S/p 1L LR in ED   - Hold home Torsemide in setting of TERESA   - Continue to monitor volume status in setting of held diuretics and IVF   - Cardiology consulted, Dr. Willingham, follow up recommendations

## 2024-01-18 NOTE — H&P ADULT - NSHPSOCIALHISTORY_GEN_ALL_CORE
Lives:  ADLs:  Diet:  Vaccination:  Occupation:  Alcohol Use:  Tobacco Use:  Recreational Drug Use: Lives: alone but aide comes 2 days a week to help her clean  ADLs: independent   Diet: no limitations  Alcohol Use: Denies  Tobacco Use: Denies  Recreational Drug Use: Denies

## 2024-01-18 NOTE — H&P ADULT - PROBLEM SELECTOR PLAN 6
- 170/60 on Admission   - Hx of Renal Artery Stenosis s/p stent placement   - Extensive home hypertensive medications, continue with hold parameters   - Hold Losartan and Torsemide given TERESA   - Cardiology consulted, Dr. Willingham, follow up recommendations

## 2024-01-18 NOTE — ED PROVIDER NOTE - OBJECTIVE STATEMENT
Patient is a 89-year-old white female presently residing at home alone though does have assistance of an aide approximately 4 to 6 hours/day with past history of heart pretension hyperlipidemia previous CVA basal cell carcinoma renal artery stenosis as well as congestive heart failure who was last known to be well and last heard from sometime late afternoon yesterday.  Today when daughter could not reach mother she went to patient's home and found her lying on floor.  EMS was called and patient was transported in a hard cervical collar to our emergency department for further evaluation care treatment and management.  Patient at the present time only is complaining of mild neck and right knee pain.  She does not recall how she was on the floor or when she was last upright.  Denies any headache chest pain abdominal pain or hip pain.  Has not been ill at all recently i.e. no recent febrile illnesses no recent cough shortness of breath fever chills nausea vomiting diarrhea dysuria or hematuria.  Daughter is presently with patient and has provided significant amount of this information.

## 2024-01-18 NOTE — H&P ADULT - PROBLEM SELECTOR PLAN 3
- Cr 1.40, BUN54, eGFR 35  - Likely prerenal in setting of decreased PO intake of water + mechanical fall and inability to move off floor for several hours  - S/p 1L LR in ED   - Hold home Torsemide and Losartan  in setting of TERESA   - Given hx of CHF, will start LR 50 cc/hr for 14 hours pending TTE results  - Continue to monitor volume status in setting of held diuretics and IVF Wound Care: Aquaphor

## 2024-01-18 NOTE — ED PROVIDER NOTE - CLINICAL SUMMARY MEDICAL DECISION MAKING FREE TEXT BOX
Patient found in unexplained manner on floor for duration less than or equal to 24 hours here now requiring thorough evaluation labs EKG CT imaging as well as IV fluids.

## 2024-01-18 NOTE — CONSULT NOTE ADULT - ATTENDING COMMENTS
Judy is a 89-year-old white female with past history of hypertension, hyperlipidemia, renal artery stenosis s/p stent, here after being found on the ground.    - she reports a mechanical fall, though her description of the event is unclear, and she remains somnolent  - ekg with notable noise, though irregularity suggests af. Please repeat a 12 lead ekg.  - troponin is elevated, presumably in the setting of rhabdo, and would trend until peak  - add on an MB fraction  - ok with ivf, as she appears dry  - check orthostatics  - echocardiogram  - monitor BP closely. Hold losartan and diuretics  - can continue hydralazine, clonidine and metoprolol  - cont plavix, can hold statin given lft abn  - monitor on telemetry  - trend creatinine and electrolytes. Keep K>4, mg>2  - will follow with you

## 2024-01-18 NOTE — ED PROVIDER NOTE - OTHER FINDINGS
EKG revealed lead to sinus tachycardia with frequent PACs multifocal atrial tachycardia or atrial fibrillation no acute changes.

## 2024-01-18 NOTE — ED PROVIDER NOTE - DIFFERENTIAL DIAGNOSIS
Differential diagnosis includes syncope versus nonsyncopal a this syncope rule out arrhythmia rule out hypotensive episodes such as from GI bleeding or dehydration or possibly TIA versus seizure. Differential Diagnosis

## 2024-01-18 NOTE — H&P ADULT - NSHPREVIEWOFSYSTEMS_GEN_ALL_CORE
CONSTITUTIONAL: denies fever, chills, fatigue, weakness  HEENT: denies blurred vision, sore throat  SKIN: denies new lesions, rash  CARDIOVASCULAR: denies chest pain, chest pressure, palpitations  RESPIRATORY: denies shortness of breath, cough, sputum production  GASTROINTESTINAL: denies nausea, vomiting, diarrhea, abdominal pain, melena or hematochezia  GENITOURINARY: denies dysuria, discharge  NEUROLOGICAL: denies numbness, headache, focal weakness  MUSCULOSKELETAL: denies new joint pain, muscle aches  HEMATOLOGIC: denies gross bleeding, bruising CONSTITUTIONAL: denies fever, chills, fatigue  HEENT: denies blurred vision, sore throat  SKIN: +ecchymosis to LUE, denies new lesions, rash  CARDIOVASCULAR: denies chest pain, chest pressure, palpitations  RESPIRATORY: denies shortness of breath, cough, sputum production  GASTROINTESTINAL: denies nausea, vomiting, diarrhea, abdominal pain, melena or hematochezia  GENITOURINARY: denies dysuria, discharge  NEUROLOGICAL: denies numbness, headache, focal weakness  MUSCULOSKELETAL: +b/l knee pain, neck pain, denies new joint pain
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
## 2024-01-19 DIAGNOSIS — Z71.89 OTHER SPECIFIED COUNSELING: ICD-10-CM

## 2024-01-19 DIAGNOSIS — N39.0 URINARY TRACT INFECTION, SITE NOT SPECIFIED: ICD-10-CM

## 2024-01-19 DIAGNOSIS — R53.81 OTHER MALAISE: ICD-10-CM

## 2024-01-19 DIAGNOSIS — Z51.5 ENCOUNTER FOR PALLIATIVE CARE: ICD-10-CM

## 2024-01-19 LAB
ALBUMIN SERPL ELPH-MCNC: 2.3 G/DL — LOW (ref 3.3–5)
ALP SERPL-CCNC: 194 U/L — HIGH (ref 40–120)
ALT FLD-CCNC: 41 U/L — SIGNIFICANT CHANGE UP (ref 12–78)
ANION GAP SERPL CALC-SCNC: 8 MMOL/L — SIGNIFICANT CHANGE UP (ref 5–17)
AST SERPL-CCNC: 92 U/L — HIGH (ref 15–37)
BASOPHILS # BLD AUTO: 0 K/UL — SIGNIFICANT CHANGE UP (ref 0–0.2)
BASOPHILS NFR BLD AUTO: 0 % — SIGNIFICANT CHANGE UP (ref 0–2)
BILIRUB SERPL-MCNC: 1 MG/DL — SIGNIFICANT CHANGE UP (ref 0.2–1.2)
BUN SERPL-MCNC: 62 MG/DL — HIGH (ref 7–23)
CALCIUM SERPL-MCNC: 8.8 MG/DL — SIGNIFICANT CHANGE UP (ref 8.5–10.1)
CHLORIDE SERPL-SCNC: 113 MMOL/L — HIGH (ref 96–108)
CHOLEST SERPL-MCNC: 138 MG/DL — SIGNIFICANT CHANGE UP
CK MB BLD-MCNC: 4.4 % — HIGH (ref 0–3.5)
CK MB CFR SERPL CALC: 33.3 NG/ML — HIGH (ref 0–3.6)
CK SERPL-CCNC: 758 U/L — HIGH (ref 26–192)
CO2 SERPL-SCNC: 20 MMOL/L — LOW (ref 22–31)
CREAT SERPL-MCNC: 1.7 MG/DL — HIGH (ref 0.5–1.3)
EGFR: 28 ML/MIN/1.73M2 — LOW
EOSINOPHIL # BLD AUTO: 0 K/UL — SIGNIFICANT CHANGE UP (ref 0–0.5)
EOSINOPHIL NFR BLD AUTO: 0 % — SIGNIFICANT CHANGE UP (ref 0–6)
GLUCOSE SERPL-MCNC: 163 MG/DL — HIGH (ref 70–99)
HCT VFR BLD CALC: 37.6 % — SIGNIFICANT CHANGE UP (ref 34.5–45)
HDLC SERPL-MCNC: 69 MG/DL — SIGNIFICANT CHANGE UP
HGB BLD-MCNC: 12.5 G/DL — SIGNIFICANT CHANGE UP (ref 11.5–15.5)
INR BLD: 0.93 RATIO — SIGNIFICANT CHANGE UP (ref 0.85–1.18)
LACTATE SERPL-SCNC: 1.5 MMOL/L — SIGNIFICANT CHANGE UP (ref 0.7–2)
LIPID PNL WITH DIRECT LDL SERPL: 55 MG/DL — SIGNIFICANT CHANGE UP
LYMPHOCYTES # BLD AUTO: 0.26 K/UL — LOW (ref 1–3.3)
LYMPHOCYTES # BLD AUTO: 1 % — LOW (ref 13–44)
MCHC RBC-ENTMCNC: 32.1 PG — SIGNIFICANT CHANGE UP (ref 27–34)
MCHC RBC-ENTMCNC: 33.2 GM/DL — SIGNIFICANT CHANGE UP (ref 32–36)
MCV RBC AUTO: 96.4 FL — SIGNIFICANT CHANGE UP (ref 80–100)
MONOCYTES # BLD AUTO: 1.57 K/UL — HIGH (ref 0–0.9)
MONOCYTES NFR BLD AUTO: 6 % — SIGNIFICANT CHANGE UP (ref 2–14)
NEUTROPHILS # BLD AUTO: 24.05 K/UL — HIGH (ref 1.8–7.4)
NEUTROPHILS NFR BLD AUTO: 84 % — HIGH (ref 43–77)
NON HDL CHOLESTEROL: 69 MG/DL — SIGNIFICANT CHANGE UP
NRBC # BLD: SIGNIFICANT CHANGE UP /100 WBCS (ref 0–0)
NT-PROBNP SERPL-SCNC: HIGH PG/ML (ref 0–450)
PLATELET # BLD AUTO: 443 K/UL — HIGH (ref 150–400)
POTASSIUM SERPL-MCNC: 5.4 MMOL/L — HIGH (ref 3.5–5.3)
POTASSIUM SERPL-SCNC: 5.4 MMOL/L — HIGH (ref 3.5–5.3)
PROT SERPL-MCNC: 5.2 G/DL — LOW (ref 6–8.3)
PROTHROM AB SERPL-ACNC: 10.9 SEC — SIGNIFICANT CHANGE UP (ref 9.5–13)
RBC # BLD: 3.9 M/UL — SIGNIFICANT CHANGE UP (ref 3.8–5.2)
RBC # FLD: 12.7 % — SIGNIFICANT CHANGE UP (ref 10.3–14.5)
SODIUM SERPL-SCNC: 141 MMOL/L — SIGNIFICANT CHANGE UP (ref 135–145)
TRIGL SERPL-MCNC: 70 MG/DL — SIGNIFICANT CHANGE UP
WBC # BLD: 26.14 K/UL — HIGH (ref 3.8–10.5)
WBC # FLD AUTO: 26.14 K/UL — HIGH (ref 3.8–10.5)

## 2024-01-19 PROCEDURE — 99233 SBSQ HOSP IP/OBS HIGH 50: CPT

## 2024-01-19 PROCEDURE — 93010 ELECTROCARDIOGRAM REPORT: CPT

## 2024-01-19 PROCEDURE — 93306 TTE W/DOPPLER COMPLETE: CPT | Mod: 26

## 2024-01-19 PROCEDURE — 99222 1ST HOSP IP/OBS MODERATE 55: CPT

## 2024-01-19 RX ORDER — CEFTRIAXONE 500 MG/1
1000 INJECTION, POWDER, FOR SOLUTION INTRAMUSCULAR; INTRAVENOUS EVERY 24 HOURS
Refills: 0 | Status: DISCONTINUED | OUTPATIENT
Start: 2024-01-19 | End: 2024-01-22

## 2024-01-19 RX ORDER — METOPROLOL TARTRATE 50 MG
50 TABLET ORAL DAILY
Refills: 0 | Status: DISCONTINUED | OUTPATIENT
Start: 2024-01-20 | End: 2024-01-20

## 2024-01-19 RX ORDER — METOPROLOL TARTRATE 50 MG
25 TABLET ORAL ONCE
Refills: 0 | Status: COMPLETED | OUTPATIENT
Start: 2024-01-19 | End: 2024-01-19

## 2024-01-19 RX ORDER — INFLUENZA VIRUS VACCINE 15; 15; 15; 15 UG/.5ML; UG/.5ML; UG/.5ML; UG/.5ML
0.7 SUSPENSION INTRAMUSCULAR ONCE
Refills: 0 | Status: DISCONTINUED | OUTPATIENT
Start: 2024-01-19 | End: 2024-01-29

## 2024-01-19 RX ORDER — SODIUM CHLORIDE 9 MG/ML
1000 INJECTION, SOLUTION INTRAVENOUS
Refills: 0 | Status: DISCONTINUED | OUTPATIENT
Start: 2024-01-19 | End: 2024-01-20

## 2024-01-19 RX ADMIN — Medication 5 MILLIGRAM(S): at 21:39

## 2024-01-19 RX ADMIN — HEPARIN SODIUM 5000 UNIT(S): 5000 INJECTION INTRAVENOUS; SUBCUTANEOUS at 15:37

## 2024-01-19 RX ADMIN — HEPARIN SODIUM 5000 UNIT(S): 5000 INJECTION INTRAVENOUS; SUBCUTANEOUS at 21:39

## 2024-01-19 RX ADMIN — Medication 0.2 MILLIGRAM(S): at 15:37

## 2024-01-19 RX ADMIN — CLOPIDOGREL BISULFATE 75 MILLIGRAM(S): 75 TABLET, FILM COATED ORAL at 11:16

## 2024-01-19 RX ADMIN — Medication 25 MILLIGRAM(S): at 21:39

## 2024-01-19 RX ADMIN — Medication 5 MILLIGRAM(S): at 16:44

## 2024-01-19 RX ADMIN — CEFTRIAXONE 100 MILLIGRAM(S): 500 INJECTION, POWDER, FOR SOLUTION INTRAMUSCULAR; INTRAVENOUS at 15:38

## 2024-01-19 RX ADMIN — SODIUM CHLORIDE 50 MILLILITER(S): 9 INJECTION, SOLUTION INTRAVENOUS at 16:44

## 2024-01-19 RX ADMIN — Medication 0.2 MILLIGRAM(S): at 21:39

## 2024-01-19 RX ADMIN — Medication 25 MILLIGRAM(S): at 16:44

## 2024-01-19 RX ADMIN — Medication 0.2 MILLIGRAM(S): at 04:53

## 2024-01-19 RX ADMIN — HEPARIN SODIUM 5000 UNIT(S): 5000 INJECTION INTRAVENOUS; SUBCUTANEOUS at 06:07

## 2024-01-19 RX ADMIN — Medication 25 MILLIGRAM(S): at 04:54

## 2024-01-19 RX ADMIN — Medication 25 MILLIGRAM(S): at 15:37

## 2024-01-19 RX ADMIN — Medication 25 MILLIGRAM(S): at 04:53

## 2024-01-19 NOTE — CARE COORDINATION ASSESSMENT. - OTHER PERTINENT REFERRAL INFORMATION
CM met with patient and daughter Brittney ([phone])  explain role and transitions of care. Patient lives alone private house with 5 steps to get in. Daughter is the caregiver with his brother Nick ([phone]).  No home care prior to admission.  Patient uses a walker for ambulation but owns a cane, transport WC and a shower bench. As per daughter, she only has a 2 x week aide for 4 hrs that is private pay but she requires help with all ADLs, cooking, cleaning and safety.  Also, daughter will like to speak to Palliative care and will like to speak to SW in regards of rehab. CM to make both aware. Unclear needs at this time.  CM explained  home care expectation, process, insurance provision and home safety with good understanding.  In addition, CM explain choices for G[facility].  Resource folder left at bedside.  All questions -answered to the best of my abilities.  CM remains available throughout the hospital stay.

## 2024-01-19 NOTE — CONSULT NOTE ADULT - TREATMENT GUIDELINE COMMENT
continue medical management  patient is welcome to follow up at [facility]/pall office at [address] [place] [address] Rd.

## 2024-01-19 NOTE — PATIENT PROFILE ADULT - FALL HARM RISK - HARM RISK INTERVENTIONS
Assistance OOB with selected safe patient handling equipment/Communicate Risk of Fall with Harm to all staff/Discuss with provider need for PT consult/Monitor gait and stability/Provide patient with walking aids - walker, cane, crutches/Reinforce activity limits and safety measures with patient and family/Review medications for side effects contributing to fall risk/Sit up slowly, dangle for a short time, stand at bedside before walking/Tailored Fall Risk Interventions/Toileting schedule using arm’s reach rule for commode and bathroom/Visual Cue: Yellow wristband and red socks/Bed in lowest position, wheels locked, appropriate side rails in place/Call bell, personal items and telephone in reach/Instruct patient to call for assistance before getting out of bed or chair/Non-slip footwear when patient is out of bed/[place] to call system/Physically safe environment - no spills, clutter or unnecessary equipment/Purposeful Proactive Rounding/Room/bathroom lighting operational, light cord in reach Assistance with ambulation/Assistance OOB with selected safe patient handling equipment/Communicate Risk of Fall with Harm to all staff/Discuss with provider need for PT consult/Monitor gait and stability/Provide patient with walking aids - walker, cane, crutches/Reinforce activity limits and safety measures with patient and family/Tailored Fall Risk Interventions/Visual Cue: Yellow wristband and red socks/Bed in lowest position, wheels locked, appropriate side rails in place/Call bell, personal items and telephone in reach/Instruct patient to call for assistance before getting out of bed or chair/Non-slip footwear when patient is out of bed/[place] to call system/Physically safe environment - no spills, clutter or unnecessary equipment/Purposeful Proactive Rounding/Room/bathroom lighting operational, light cord in reach

## 2024-01-19 NOTE — PROGRESS NOTE ADULT - SUBJECTIVE AND OBJECTIVE BOX
CC/F/U for: fall, rhabdomyolysis    HPI:  89 yo F with PMHx of HTN, HLD, Renal Artery Stenosis s/p stent, Basal Cell Carcinoma, CHFpEF CVA  presenting to ED s/p mechanical fall early last night. Pt states that she was standing upright in her kitchen when she tripped over "something" on the floor of her kitchen. Pt had an unwitnessed fall on her left side with head involvment, but adamantly denies LOC. States she was in pain and is generally weak so she was unable to get up without her walker throughout the night. Pt.'s daughter was unable to get rosetta contact with the patient, so she went to her house and found her lying on the floor. EMS was then called and the patient was transported to [facility] ED w/ cervical stabilization in a collar. Pt states she currently has neck and B/L knee pain L>R. Endorses increased neck pain before the fall as her arthritis has been "acting up since the weather got cold." States she last had a normal BM yesterday, last urinated without dysuria this afternoon. Denies headache, visual changes, palpitations, cp, abd pain, dysuria, urinary frequency, hip pain, fatigue, focal weakness, n/v/d/c, hematuria. Denies feeling ill recently although endorses having a friend who she recently met with who is sick with an URI.      ED Course:   Vitals: BP: 170/60, HR: 78, Temp: 97, RR: 16, SpO2: 96% on RA Labs:   WBC: 21.04, H/H 14.7/44.2, Platelet 580, Trop 861.2, CK 2986, Cr 1.40, BUN54, eGFR 35, AlkPhos 182, ,   UA: Pending collection   CXR: Heart is enlarged. Right upper quadrant clips are noted. No lung consolidation or layering effusion is evident. No visible fracture   CT Head/Neck: negative for acute intracranial pathology, hemorrhage, or fracture   EKG: artifact, repeat pending   Received in the ED: 1L LR Bolus   TTE: 3/2022 LVEF 60-65 %, normal LV funciton, contraction, LA mildly dilated, Mild to moderate AR,  Mild (1+) tricuspid valve regurgitation. EA reversal of the mitral inflow consistent with reduced compliance of the left ventricle.     (18 Jan 2024 17:07)        INTERVAL HPI/OVERNIGHT EVENTS:  Pt seen and examined at bedside - continues on IVFs; denies pain.     Allergies/Intolerance: IV Contrast (Pruritus; Rash)  verapamil (Other)  Keflex (Other)      MEDICATIONS  (STANDING):  cloNIDine 0.2 milliGRAM(s) Oral three times a day  clopidogrel Tablet 75 milliGRAM(s) Oral daily  heparin   Injectable 5000 Unit(s) SubCutaneous every 8 hours  hydrALAZINE 25 milliGRAM(s) Oral three times a day  influenza  Vaccine (HIGH DOSE) 0.7 milliLiter(s) IntraMuscular once  lactated ringers. 1000 milliLiter(s) (50 mL/Hr) IV Continuous <Continuous>  metoprolol succinate ER 25 milliGRAM(s) Oral two times a day  simvastatin 10 milliGRAM(s) Oral at bedtime    MEDICATIONS  (PRN):  acetaminophen     Tablet .. 650 milliGRAM(s) Oral every 6 hours PRN Mild Pain (1 - 3)  melatonin 3 milliGRAM(s) Oral at bedtime PRN Insomnia        ROS: as above; all other systems reviewed and wnl      PHYSICAL EXAMINATION:  Vital Signs Last 24 Hrs  T(C): 37.1 (19 Jan 2024 04:28), Max: 37.1 (19 Jan 2024 04:28)  T(F): 98.8 (19 Jan 2024 04:28), Max: 98.8 (19 Jan 2024 04:28)  HR: 137 (19 Jan 2024 04:28) (78 - 141)  BP: 158/97 (19 Jan 2024 04:28) (158/97 - 171/100)  BP(mean): --  RR: 17 (19 Jan 2024 04:28) (16 - 19)  SpO2: 91% (19 Jan 2024 04:28) (91% - 98%)    Parameters below as of 19 Jan 2024 04:28  Patient On (Oxygen Delivery Method): room air      GENERAL: elderly female, in NAD  HEENT: mucous membranes dry  RESP: respirations unlabored  HEART: HR 100s  ABDOMEN: soft, ND, NT   EXTREMITIES:  no edema b/l LEs, no calf tenderness; ecchymotic bruising LUE  NEURO: awake, alert, interactive; moves all extremities      LABS:                        12.5   26.14 )-----------( 443      ( 19 Jan 2024 07:03 )             37.6     01-19    141  |  113<H>  |  62<H>  ----------------------------<  163<H>  5.4<H>   |  20<L>  |  1.70<H>    Ca    8.8      19 Jan 2024 07:03  Phos  3.8     01-18  Mg     2.4     01-18    TPro  5.2<L>  /  Alb  2.3<L>  /  TBili  1.0  /  DBili  x   /  AST  92<H>  /  ALT  41  /  AlkPhos  194<H>  01-19    PT/INR - ( 19 Jan 2024 07:03 )   PT: 10.9 sec;   INR: 0.93 ratio           Urinalysis Basic - ( 19 Jan 2024 07:03 )    Color: x / Appearance: x / SG: x / pH: x  Gluc: 163 mg/dL / Ketone: x  / Bili: x / Urobili: x   Blood: x / Protein: x / Nitrite: x   Leuk Esterase: x / RBC: x / WBC x   Sq Epi: x / Non Sq Epi: x / Bacteria: x

## 2024-01-19 NOTE — CONSULT NOTE ADULT - ASSESSMENT
89 yo F with PMHx of HTN, HLD, Renal Artery Stenosis s/p stent, Basal Cell Carcinoma, CHFpEF CVA  presenting to ED s/p mechanical fall. Family requesting information re: advance directives and community resources. palliative consulted to assist with this

## 2024-01-19 NOTE — CONSULT NOTE ADULT - PROBLEM SELECTOR RECOMMENDATION 2
PT evaluation  would likely benefit more from home with home PT and [facility] services  recommend follow up with geriatrician and geriatric SW to assist with home resources and planning

## 2024-01-19 NOTE — CHART NOTE - NSCHARTNOTEFT_GEN_A_CORE
1/19 - SPK TO PT DAUGHTER SULTANA BY  [phone] - ALL DIAGNOSES/MGMT PLANS, AND PT CLINICAL STATUS DISCUSSED IN EXTENSIVE DETAIL; ALL QUESTIONS ANSWERED COMPREHENSIVELY.

## 2024-01-19 NOTE — CONSULT NOTE ADULT - SUBJECTIVE AND OBJECTIVE BOX
HPI:  89 yo F with PMHx of HTN, HLD, Renal Artery Stenosis s/p stent, Basal Cell Carcinoma, CHFpEF CVA  presenting to ED s/p mechanical fall early last night. Pt states that she was standing upright in her kitchen when she tripped over "something" on the floor of her kitchen. Pt had an unwitnessed fall on her left side with head involvment, but adamantly denies LOC. States she was in pain and is generally weak so she was unable to get up without her walker throughout the night. Pt.'s daughter was unable to get rosetta contact with the patient, so she went to her house and found her lying on the floor. EMS was then called and the patient was transported to [facility] ED w/ cervical stabilization in a collar. Pt states she currently has neck and B/L knee pain L>R. Endorses increased neck pain before the fall as her arthritis has been "acting up since the weather got cold." States she last had a normal BM yesterday, last urinated without dysuria this afternoon. Denies headache, visual changes, palpitations, cp, abd pain, dysuria, urinary frequency, hip pain, fatigue, focal weakness, n/v/d/c, hematuria. Denies feeling ill recently although endorses having a friend who she recently met with who is sick with an URI.      ED Course:   Vitals: BP: 170/60, HR: 78, Temp: 97, RR: 16, SpO2: 96% on RA Labs:   WBC: 21.04, H/H 14.7/44.2, Platelet 580, Trop 861.2, CK 2986, Cr 1.40, BUN54, eGFR 35, AlkPhos 182, ,   UA: Pending collection   CXR: Heart is enlarged. Right upper quadrant clips are noted. No lung consolidation or layering effusion is evident. No visible fracture   CT Head/Neck: negative for acute intracranial pathology, hemorrhage, or fracture   EKG: artifact, repeat pending   Received in the ED: 1L LR Bolus   TTE: 3/2022 LVEF 60-65 %, normal LV funciton, contraction, LA mildly dilated, Mild to moderate AR,  Mild (1+) tricuspid valve regurgitation. EA reversal of the mitral inflow consistent with reduced compliance of the left ventricle.     (18 Jan 2024 17:07)    PERTINENT PM/SXH:   UTI (urinary tract infection)    Essential hypertension    Dyslipidemia    Hyponatremia    Cerebrovascular accident (CVA), unspecified mechanism    Edema, unspecified type    Mitral valve insufficiency, unspecified etiology    Basal cell carcinoma    Renal artery stenosis    CHF (congestive heart failure)    Inguinal hernia, right    Blood pressure instability      S/P Mohs surgery for basal cell carcinoma    Status post hysterectomy    History of cholecystectomy    H/O bilateral hip replacements    Other elective surgery    S/P colonoscopy      FAMILY HISTORY:  Family history of uterine cancer (Sibling)    Family history of carotid artery stenosis (Sibling)      Family Hx substance abuse [ ]yes [ ]no  ITEMS NOT CHECKED ARE NOT PRESENT    SOCIAL HISTORY:   Significant other/partner[ ]  Children[ ]  Anabaptist/Spirituality:  Substance hx:  [ ]   Tobacco hx:  [ ]   Alcohol hx: [ ]   Home Opioid hx:  [ ] I-Stop Reference No:  Living Situation: [ ]Home  [ ]Long term care  [ ]Rehab [ ]Other    ADVANCE DIRECTIVES:    DNR/MOLST  [ ]  Living Will  [ ]   DECISION MAKER(s):  [ ] Health Care Proxy(s)  [ ] Surrogate(s)  [ ] Guardian           Name(s): Phone Number(s):    BASELINE (I)ADL(s) (prior to admission):  [place]: [ ]Total  [ ] Moderate [ ]Dependent    Allergies    IV Contrast (Pruritus; Rash)  verapamil (Other)  Keflex (Other)    Intolerances    MEDICATIONS  (STANDING):  cefTRIAXone   IVPB 1000 milliGRAM(s) IV Intermittent every 24 hours  cloNIDine 0.2 milliGRAM(s) Oral three times a day  clopidogrel Tablet 75 milliGRAM(s) Oral daily  heparin   Injectable 5000 Unit(s) SubCutaneous every 8 hours  hydrALAZINE 25 milliGRAM(s) Oral three times a day  influenza  Vaccine (HIGH DOSE) 0.7 milliLiter(s) IntraMuscular once  lactated ringers. 1000 milliLiter(s) (50 mL/Hr) IV Continuous <Continuous>  metoprolol succinate ER 25 milliGRAM(s) Oral two times a day    MEDICATIONS  (PRN):  acetaminophen     Tablet .. 650 milliGRAM(s) Oral every 6 hours PRN Mild Pain (1 - 3)  melatonin 3 milliGRAM(s) Oral at bedtime PRN Insomnia    PRESENT SYMPTOMS: [ ]Unable to self-report  [ ] CPOT [ ] PAINADs [ ] RDOS  Source if other than patient:  [ ]Family   [ ]Team     Pain: [ ]yes [ ]no  QOL impact -   Location -                    Aggravating factors -  Quality -  Radiation -  Timing-  Severity (0-10 scale):  Minimal acceptable level (0-10 scale):     CPOT:    https://www.[facility].org/getattachment/[email or web address]/Critical-Care-Pain-Observation-Tool-(CPOT)    PAIN AD Score:   http://geriatrictoolkit.[facility]/cog/painad.pdf (press ctrl +  left click to view)    Dyspnea:                           [ ]Mild [ ]Moderate [ ]Severe      RDOS:  0 to 2  minimal or no respiratory distress   3  mild distress  4 to 6 moderate distress  >7 severe distress  https://[email or web address].net/handouts/hen/Respiratory_Distress_Observation_Scale.pdf (Ctrl +  left click to view)     Anxiety:                             [ ]Mild [ ]Moderate [ ]Severe  Fatigue:                             [ ]Mild [ ]Moderate [ ]Severe  Nausea:                             [ ]Mild [ ]Moderate [ ]Severe  Loss of appetite:              [ ]Mild [ ]Moderate [ ]Severe  Constipation:                    [ ]Mild [ ]Moderate [ ]Severe    PCSSQ[Palliative Care Spiritual Screening Question]   Severity (0-10):  Score of 4 or > indicate consideration of Chaplaincy referral.  Chaplaincy Referral: [ ] yes [ ] refused [ ] following [ ] Deferred     Caregiver [place]? : [ ] yes [ ] no [ ] Deferred [ ] Declined             Social work referral [ ] Patient & Family Centered Care Referral [ ]     Anticipatory Grief present?:  [ ] yes [ ] no  [ ] Deferred                  Social work referral [ ] Chaplaincy Referral[ ]      Other Symptoms:  [ ]All other review of systems negative     Palliative Performance Status Version 2:         %    http://npcrc.org/files/news/palliative_performance_scale_ppsv2.pdf  PHYSICAL EXAM:  Vital Signs Last 24 Hrs  T(C): 36.3 (19 Jan 2024 12:19), Max: 37.1 (19 Jan 2024 04:28)  T(F): 97.3 (19 Jan 2024 12:19), Max: 98.8 (19 Jan 2024 04:28)  HR: 114 (19 Jan 2024 12:19) (89 - 141)  BP: 165/98 (19 Jan 2024 12:19) (158/97 - 171/100)  BP(mean): --  RR: 20 (19 Jan 2024 12:19) (17 - 20)  SpO2: 94% (19 Jan 2024 12:19) (91% - 98%)    Parameters below as of 19 Jan 2024 12:19  Patient On (Oxygen Delivery Method): room air     I&O's Summary    19 Jan 2024 07:01  -  19 Jan 2024 14:44  --------------------------------------------------------  IN: 720 mL / OUT: 0 mL / NET: 720 mL      GENERAL: [ ]Cachexia    [ ]Alert  [ ]Oriented x   [ ]Lethargic  [ ]Unarousable  [ ]Verbal  [ ]Non-Verbal  Behavioral:   [ ] Anxiety  [ ] Delirium [ ] Agitation [ ] Other  HEENT:  [ ]Normal   [ ]Dry mouth   [ ]ET Tube/Trach  [ ]Oral lesions  PULMONARY:   [ ]Clear [ ]Tachypnea  [ ]Audible excessive secretions   [ ]Rhonchi        [ ]Right [ ]Left [ ]Bilateral  [ ]Crackles        [ ]Right [ ]Left [ ]Bilateral  [ ]Wheezing     [ ]Right [ ]Left [ ]Bilateral  [ ]Diminished breath sounds [ ]right [ ]left [ ]bilateral  CARDIOVASCULAR:    [ ]Regular [ ]Irregular [ ]Tachy  [ ]Ben [ ]Murmur [ ]Other  GASTROINTESTINAL:  [ ]Soft  [ ]Distended   [ ]+BS  [ ]Non tender [ ]Tender  [ ]Other [ ]PEG [ ]OGT/ NGT  Last BM:  GENITOURINARY:  [ ]Normal [ ] Incontinent   [ ]Oliguria/Anuria   [ ]Sanders  MUSCULOSKELETAL:   [ ]Normal   [ ]Weakness  [ ]Bed/Wheelchair bound [ ]Edema  NEUROLOGIC:   [ ]No focal deficits  [ ]Cognitive impairment  [ ]Dysphagia [ ]Dysarthria [ ]Paresis [ ]Other   SKIN:   [ ]Normal  [ ]Rash  [ ]Other  [ ]Pressure ulcer(s)       Present on admission [ ]y [ ]n    CRITICAL CARE:  [ ] Shock Present  [ ]Septic [ ]Cardiogenic [ ]Neurologic [ ]Hypovolemic  [ ]  Vasopressors [ ]  Inotropes   [ ]Respiratory failure present [ ]Mechanical ventilation [ ]Non-invasive ventilatory support [ ]High flow    [ ]Acute  [ ]Chronic [ ]Hypoxic  [ ]Hypercarbic [ ]Other  [ ]Other organ failure     LABS:                        12.5   26.14 )-----------( 443      ( 19 Jan 2024 07:03 )             37.6   01-19    141  |  113<H>  |  62<H>  ----------------------------<  163<H>  5.4<H>   |  20<L>  |  1.70<H>    Ca    8.8      19 Jan 2024 07:03  Phos  3.8     01-18  Mg     2.4     01-18    TPro  5.2<L>  /  Alb  2.3<L>  /  TBili  1.0  /  DBili  x   /  AST  92<H>  /  ALT  41  /  AlkPhos  194<H>  01-19  PT/INR - ( 19 Jan 2024 07:03 )   PT: 10.9 sec;   INR: 0.93 ratio             Urinalysis Basic - ( 19 Jan 2024 07:03 )    Color: x / Appearance: x / SG: x / pH: x  Gluc: 163 mg/dL / Ketone: x  / Bili: x / Urobili: x   Blood: x / Protein: x / Nitrite: x   Leuk Esterase: x / RBC: x / WBC x   Sq Epi: x / Non Sq Epi: x / Bacteria: x      RADIOLOGY & ADDITIONAL STUDIES:    PROTEIN CALORIE MALNUTRITION PRESENT: [ ]mild [ ]moderate [ ]severe [ ]underweight [ ]morbid obesity  https://www.andeal.org/vault/2440/web/files/ONC/Table_Clinical%20Characteristics%20to%20Document%20Malnutrition-White%20JV%20et%20al%202012.pdf      Weight (kg): 47.7406 (07-21-23 @ 16:00)    [ ]PPSV2 < or = to 30% [ ]significant weight loss  [ ]poor nutritional intake  [ ]anasarca[ ]Artificial Nutrition      Other REFERRALS:  [ ]Hospice  [ ]Child Life  [ ]Social Work  [ ]Case management [ ]Holistic Therapy     Goals of Care Document:  HPI:  89 yo F with PMHx of HTN, HLD, Renal Artery Stenosis s/p stent, Basal Cell Carcinoma, CHFpEF CVA  presenting to ED s/p mechanical fall early last night. Pt states that she was standing upright in her kitchen when she tripped over "something" on the floor of her kitchen. Pt had an unwitnessed fall on her left side with head involvment, but adamantly denies LOC. States she was in pain and is generally weak so she was unable to get up without her walker throughout the night. Pt.'s daughter was unable to get rosetta contact with the patient, so she went to her house and found her lying on the floor. EMS was then called and the patient was transported to \[facility]\"" ED w/ cervical stabilization in a collar. Pt states she currently has neck and B/L knee pain L>R. Endorses increased neck pain before the fall as her arthritis has been "acting up since the weather got cold." States she last had a normal BM yesterday, last urinated without dysuria this afternoon. Denies headache, visual changes, palpitations, cp, abd pain, dysuria, urinary frequency, hip pain, fatigue, focal weakness, n/v/d/c, hematuria. Denies feeling ill recently although endorses having a friend who she recently met with who is sick with an URI.      ED Course:   Vitals: BP: 170/60, HR: 78, Temp: 97, RR: 16, SpO2: 96% on RA Labs:   WBC: 21.04, H/H 14.7/44.2, Platelet 580, Trop 861.2, CK 2986, Cr 1.40, BUN54, eGFR 35, AlkPhos 182, ,   UA: Pending collection   CXR: Heart is enlarged. Right upper quadrant clips are noted. No lung consolidation or layering effusion is evident. No visible fracture   CT Head/Neck: negative for acute intracranial pathology, hemorrhage, or fracture   EKG: artifact, repeat pending   Received in the ED: 1L LR Bolus   TTE: 3/2022 LVEF 60-65 %, normal LV funciton, contraction, LA mildly dilated, Mild to moderate AR,  Mild (1+) tricuspid valve regurgitation. EA reversal of the mitral inflow consistent with reduced compliance of the left ventricle.     (18 Jan 2024 17:07)    PERTINENT PM/SXH:   UTI (urinary tract infection)    Essential hypertension    Dyslipidemia    Hyponatremia    Cerebrovascular accident (CVA), unspecified mechanism    Edema, unspecified type    Mitral valve insufficiency, unspecified etiology    Basal cell carcinoma    Renal artery stenosis    CHF (congestive heart failure)    Inguinal hernia, right    Blood pressure instability      S/P Mohs surgery for basal cell carcinoma    Status post hysterectomy    History of cholecystectomy    H/O bilateral hip replacements    Other elective surgery    S/P colonoscopy      FAMILY HISTORY:  Family history of uterine cancer (Sibling)    Family history of carotid artery stenosis (Sibling)      Family Hx substance abuse [ ]yes [ x]no  ITEMS NOT CHECKED ARE NOT PRESENT    SOCIAL HISTORY:   Significant other/partner[ ]  Children[ x]  Adventist/Spirituality:  Substance hx:  [ ]   Tobacco hx:  [ ]   Alcohol hx: [ ]   Home Opioid hx:  [ ] I-Stop Reference No:  Living Situation: [ x]Home  [ ]Long term care  [ ]Rehab [ ]Other    ADVANCE DIRECTIVES:    DNR/MOLST  [ ]  Living Will  [ ]   DECISION MAKER(s):  [ x] Health Care Proxy(s)  [ ] Surrogate(s)  [ ] Guardian           Name(s): Phone Number(s): Brittney, number per EMR    BASELINE (I)ADL(s) (prior to admission):  [place]: [ ]Total  [ x] Moderate [ ]Dependent    Allergies    IV Contrast (Pruritus; Rash)  verapamil (Other)  Keflex (Other)    Intolerances    MEDICATIONS  (STANDING):  cefTRIAXone   IVPB 1000 milliGRAM(s) IV Intermittent every 24 hours  cloNIDine 0.2 milliGRAM(s) Oral three times a day  clopidogrel Tablet 75 milliGRAM(s) Oral daily  heparin   Injectable 5000 Unit(s) SubCutaneous every 8 hours  hydrALAZINE 25 milliGRAM(s) Oral three times a day  influenza  Vaccine (HIGH DOSE) 0.7 milliLiter(s) IntraMuscular once  lactated ringers. 1000 milliLiter(s) (50 mL/Hr) IV Continuous <Continuous>  metoprolol succinate ER 25 milliGRAM(s) Oral two times a day    MEDICATIONS  (PRN):  acetaminophen     Tablet .. 650 milliGRAM(s) Oral every 6 hours PRN Mild Pain (1 - 3)  melatonin 3 milliGRAM(s) Oral at bedtime PRN Insomnia    PRESENT SYMPTOMS: [ ]Unable to self-report  [ ] CPOT [ ] PAINADs [ ] RDOS  Source if other than patient:  [ ]Family   [ ]Team     Pain: [ ]yes [x ]no  QOL impact -   Location -                    Aggravating factors -  Quality -  Radiation -  Timing-  Severity (0-10 scale):  Minimal acceptable level (0-10 scale):     CPOT:    https://www.[facility].org/getattachment/[email or web address]/Critical-Care-Pain-Observation-Tool-(CPOT)    PAIN AD Score:   http://geriatrictoolkit.[facility]/cog/painad.pdf (press ctrl +  left click to view)    Dyspnea:                           [ ]Mild [ ]Moderate [ ]Severe      RDOS:  0 to 2  minimal or no respiratory distress   3  mild distress  4 to 6 moderate distress  >7 severe distress  https://[email or web address].net/handouts/hen/Respiratory_Distress_Observation_Scale.pdf (Ctrl +  left click to view)     Anxiety:                             [ ]Mild [ ]Moderate [ ]Severe  Fatigue:                             [ ]Mild [ ]Moderate [ ]Severe  Nausea:                             [ ]Mild [ ]Moderate [ ]Severe  Loss of appetite:              [ ]Mild [ ]Moderate [ ]Severe  Constipation:                    [ ]Mild [ ]Moderate [ ]Severe    PCSSQ[Palliative Care Spiritual Screening Question]   Severity (0-10):  Score of 4 or > indicate consideration of Chaplaincy referral.  Chaplaincy Referral: [ ] yes [ ] refused [ ] following [ x] Deferred     Caregiver [place]? : [ ] yes [ ] no [ x] Deferred [ ] Declined             Social work referral [ ] Patient & Family Centered Care Referral [ ]     Anticipatory Grief present?:  [ ] yes [ ] no  [x ] Deferred                  Social work referral [ ] Chaplaincy Referral[ ]      Other Symptoms:  [ ]All other review of systems negative     Palliative Performance Status Version 2:    50     %    http://npcrc.org/files/news/palliative_performance_scale_ppsv2.pdf  PHYSICAL EXAM:  Vital Signs Last 24 Hrs  T(C): 36.3 (19 Jan 2024 12:19), Max: 37.1 (19 Jan 2024 04:28)  T(F): 97.3 (19 Jan 2024 12:19), Max: 98.8 (19 Jan 2024 04:28)  HR: 114 (19 Jan 2024 12:19) (89 - 141)  BP: 165/98 (19 Jan 2024 12:19) (158/97 - 171/100)  BP(mean): --  RR: 20 (19 Jan 2024 12:19) (17 - 20)  SpO2: 94% (19 Jan 2024 12:19) (91% - 98%)    Parameters below as of 19 Jan 2024 12:19  Patient On (Oxygen Delivery Method): room air     I&O's Summary    19 Jan 2024 07:01  -  19 Jan 2024 14:44  --------------------------------------------------------  IN: 720 mL / OUT: 0 mL / NET: 720 mL    Limited exam, patient eating lunch  GENERAL: [ ]Cachexia    [ x]Alert  [ x]Oriented x2   [ ]Lethargic  [ ]Unarousable  [ ]Verbal  [ ]Non-Verbal  Behavioral:   [ ] Anxiety  [ ] Delirium [ ] Agitation [ ] Other  HEENT:  [x ]Normal   [ ]Dry mouth   [ ]ET Tube/Trach  [ ]Oral lesions  PULMONARY: even and unlabored  [ ]Clear [ ]Tachypnea  [ ]Audible excessive secretions   [ ]Rhonchi        [ ]Right [ ]Left [ ]Bilateral  [ ]Crackles        [ ]Right [ ]Left [ ]Bilateral  [ ]Wheezing     [ ]Right [ ]Left [ ]Bilateral  [ ]Diminished breath sounds [ ]right [ ]left [ ]bilateral  CARDIOVASCULAR:    [ ]Regular [ ]Irregular [ ]Tachy  [ ]Ben [ ]Murmur [ ]Other  GASTROINTESTINAL:  [ x]Soft  [ ]Distended   [ ]+BS  [x ]Non tender [ ]Tender  [ ]Other [ ]PEG [ ]OGT/ NGT  Last BM:  GENITOURINARY:  [ ]Normal [ ] Incontinent   [ ]Oliguria/Anuria   [ ]Sanders  MUSCULOSKELETAL:   [ ]Normal   [ x]Weakness  [ ]Bed/Wheelchair bound [ ]Edema  NEUROLOGIC:   [ ]No focal deficits  [ ]Cognitive impairment  [ ]Dysphagia [ ]Dysarthria [ ]Paresis [ ]Other   SKIN:   [ ]Normal  [ ]Rash  [ ]Other  [ ]Pressure ulcer(s)       Present on admission [ ]y [ ]n    CRITICAL CARE:  [ ] Shock Present  [ ]Septic [ ]Cardiogenic [ ]Neurologic [ ]Hypovolemic  [ ]  Vasopressors [ ]  Inotropes   [ ]Respiratory failure present [ ]Mechanical ventilation [ ]Non-invasive ventilatory support [ ]High flow    [ ]Acute  [ ]Chronic [ ]Hypoxic  [ ]Hypercarbic [ ]Other  [ ]Other organ failure     LABS:                        12.5   26.14 )-----------( 443      ( 19 Jan 2024 07:03 )             37.6   01-19    141  |  113<H>  |  62<H>  ----------------------------<  163<H>  5.4<H>   |  20<L>  |  1.70<H>    Ca    8.8      19 Jan 2024 07:03  Phos  3.8     01-18  Mg     2.4     01-18    TPro  5.2<L>  /  Alb  2.3<L>  /  TBili  1.0  /  DBili  x   /  AST  92<H>  /  ALT  41  /  AlkPhos  194<H>  01-19  PT/INR - ( 19 Jan 2024 07:03 )   PT: 10.9 sec;   INR: 0.93 ratio             Urinalysis Basic - ( 19 Jan 2024 07:03 )    Color: x / Appearance: x / SG: x / pH: x  Gluc: 163 mg/dL / Ketone: x  / Bili: x / Urobili: x   Blood: x / Protein: x / Nitrite: x   Leuk Esterase: x / RBC: x / WBC x   Sq Epi: x / Non Sq Epi: x / Bacteria: x      RADIOLOGY & ADDITIONAL STUDIES:  < from: TTE Echo Complete w/o Contrast w/ Doppler (03.12.22 @ 09:34) >     EXAM:  ECHO TTE WO CON COMP W DOPP         PROCEDURE DATE:  03/12/2022        INTERPRETATION:  Transthoracic Echocardiography Report (TTE)     Demographics     Patient name           NITA MAHONEY  Age           89 year(s)     Med Rec #       [id number]           Gender        Female     Account #              [id number]        Date of Birth 05/14/1931     Interpreting Physician Zohreh Allen MD     Room Number   0347     Referring Physician    Analy Aguero       Sonographer   Laurence Herrera     Date of study          03/12/2022 09:13 AM     Height                 60.63 in            Weight        105.82 pounds    Type of Study:     TTE procedure: ECHO TTE WO CON COMP W DOP     BP: 138/78 mmHg     [facility]    Indications   1) R55 - Syncope and collapse    M-Mode Measurements (cm)     LVEDd: 4.7 cm             LVESd: 3.23 cm   IVSEd: 1.06 cm   LVPWd: 1.06 cm            AO Root Dimension: 3.2 cm                             LA: 4.1 cm                     LVOT: 2 cm    Doppler Measurements:     AV Velocity:152 cm/s               MV Peak E-Wave: 84.9 cm/s   AV Peak Gradient: 9.24 mmHg        MV Peak A-Wave: 100 cm/s                                      MV E/A Ratio: 0.85 %   TR Velocity:236 cm/s               MV Peak Gradient: 2.88 mmHg   TR Gradient:22.2784 mmHg   Estimated RAP:5 mmHg   RVSP:20 mmHg     Findings     Mitral Valve   Mild mitral annular calcification is present.   There is thickening of both mitral valve leaflets and chordal   calcification noted. The leaflet opening appears to be normal.   Mild (1+) to moderate mitral regurgitation is present.   EA reversal of the mitral inflow consistent with reduced compliance of   the   left ventricle.     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   Mild to moderate aortic regurgitation is present.     Tricuspid Valve   Normal appearing tricuspid valve structure and function.   Mild (1+) tricuspid valve regurgitation is present. Normal PA systolic   pressures.     Pulmonic Valve   Normal appearing pulmonic valve structure.   Trace pulmonic valvular regurgitation is present.     Left Atrium   The left atrium is mildly dilated.     Left Ventricle   The left ventricle is normal in size, wall thickness, wall motion and   contractility.   Estimated left ventricular ejection fraction is 60-65 %.     Right Atrium   Normal appearing right atrium.     Right Ventricle   Normal appearing right ventricle structure and function.     Pericardial Effusion   No evidence of pericardial effusion.     Pleural Effusion   Pleural effusion.     Miscellaneous   All visualized extra cardiac structures appears to be normal.     Impression     Summary     The left ventricle is normal in size, wall thickness, wall motion and   contractility.   Estimated left ventricular ejection fraction is 60-65 %.   The left atrium is mildly dilated.   Mild to moderate aortic regurgitation   Mild (1+) tricuspid valve regurgitation. Normal PA systolic pressures.   EA reversal of the mitral inflow consistent with reduced compliance of   the   left ventricle.     Signature     ----------------------------------------------------------------   Electronically signed by oZhreh Allen MD(Interpreting   physician) on 03/13/2022 11:56 AM   ----------------------------------------------------------------    Valves     Mitral Valve     Peak E-Wave: 84.9 cm/s   Peak A-Wave: 100 cm/s               Deceleration Time: 187 msec   Peak Gradient: 2.88 mmHg                                       E/A Ratio: 0.85     Tissue Doppler     E' Velocity: 8.22 cm/s     Aortic Valve     Peak Velocity: 152 cm/s   Peak Gradient: 9.24 mmHg     Tricuspid Valve     TR Velocity: 236 cm/s                  Estimated RAP: 5 mmHg   TR Gradient: 22.2784 mmHg              Estimated RVSP: 20 mmHg     Pulmonic Valve              Estimated PASP: 27.28 mmHg     LVOT     Peak Velocity: 87 cm/s   Peak Gradient: 3 mmHg   LVOT Diameter: 2 cm    Structures     Left Atrium     LA Dimension: 4.1 cm          LA Area: 19.5 cm^2   LA/Aorta: 1.28                LA Volume/Index: 59 ml /41m^2     Left Ventricle     Diastolic Dimension: 4.7 cm          Systolic Dimension: 3.23 cm   Septum Diastolic: 1.06 cm   PW Diastolic: 1.06 cm     FS: 31.28 %   LVOT Diameter: 2 cm     Right Atrium     RA Systolic Pressure: 5 mmHg     Right Ventricle              RV Systolic Pressure: 27.28 mmHg     Miscellaneous     Aorta     Aortic Root: 3.2 cm   Ascending Aorta: 3.2 cm   LVOT Diameter: 2 cm                ZOHREH ALLEN MD; Attending Cardiologist  This document has been electronically signed. Mar 13 2022 11:56AM    < end of copied text >      PROTEIN CALORIE MALNUTRITION PRESENT: [ ]mild [ ]moderate [ ]severe [ ]underweight [ ]morbid obesity  https://www.andeal.org/vault/2820/web/files/ONC/Table_Clinical%20Characteristics%20to%20Document%20Malnutrition-White%20JV%20et%20al%202012.pdf      Weight (kg): 47.7406 (07-21-23 @ 16:00)    [ ]PPSV2 < or = to 30% [ ]significant weight loss  [ ]poor nutritional intake  [ ]anasarca[ ]Artificial Nutrition      Other REFERRALS:  [ ]Hospice  [ ]Child Life  [ ]Social Work  [ ]Case management [ ]Holistic Therapy     Goals of Care Document:

## 2024-01-19 NOTE — CARE COORDINATION ASSESSMENT. - NSPASTMEDSURGHISTORY_GEN_ALL_CORE_FT
PAST MEDICAL & SURGICAL HISTORY:  Basal cell carcinoma      Mitral valve insufficiency, unspecified etiology  Cardiac cath on 11/21/18      Edema, unspecified type      Cerebrovascular accident (CVA), unspecified mechanism      Hyponatremia  ON HCTZ , h/o Hypokelemia      Dyslipidemia      Essential hypertension      S/P Mohs surgery for basal cell carcinoma      Renal artery stenosis  right side, stent      H/O bilateral hip replacements  2013, 2014      History of cholecystectomy  1980      Status post hysterectomy  and bladder lift with mesh 1990s      Blood pressure instability      Inguinal hernia, right      CHF (congestive heart failure)      S/P colonoscopy      Other elective surgery  right renal artery stent

## 2024-01-19 NOTE — CARE COORDINATION ASSESSMENT. - NSCAREPROVIDERS_GEN_ALL_CORE_FT
CARE PROVIDERS:  Accepting Physician: Doroteo Hernandez  Administration: Jose Luis Mike  Administration: Jarrod Faria  Administration: Tacos Hammond  Administration: Matteo Clemons  Admitting: Doroteo Hernandez  Attending: Doroteo Hernandez  Cardiology Technician: Celine Winslow  Consultant: Curtis Zavala  Consultant: Jen Harrison  Consultant: Yaniv Willingham  Consultant: Russ Gibbons  Consultant: Chica Diaz  Consultant: Luz Elena Garsia  Covering Team: Narinder Leonardo  Covering Team: Ayush Poole  ED Attending: Luigi Steinberg ED Nurse: Kari Johnson  Nurse: Jean Fish  Nurse: Anne Alonzo  Nurse: Lanette Hebert  Nurse: Fani Kaba  Occupational Therapy: Maryjane Guerrier  Oncology: Chapis Rinaldi  Oncology: France Crum  Ordered: Doctor, Unknown  Outpatient Provider: Rafa Vila  Outpatient Provider: Arlin Virgen  Outpatient Provider: Ayush Shultz  Outpatient Provider: Marcos Leal  Override: Alyson Bui  Override: Naila Owens  PCA/Nursing Assistant: Taya Bruno  Physical Therapy: Alberto Edmond  Primary Team: Fani Gatica  Primary Team: Prasad Baumann  Primary Team: Myra Jones  Primary Team: Chelle Zavala  Quality Review: Jazmine Mullen  Registered Dietitian: Perlita Oliva  Research: Kathy Brady  : Lilliana Unger  : eBena Mauro  Team: [facility], Team  Team: [facility], Team

## 2024-01-19 NOTE — PROGRESS NOTE ADULT - SUBJECTIVE AND OBJECTIVE BOX
[facility] -- Ricky Agarwal, Avelina, Ramsey, Maulik, Ady Sams: Office # [phone]    Follow Up: Syncope      Subjective/Observations: Patient awake, alert, resting in bed. No complaints of chest pain, dyspnea, palpitations or dizziness.   No signs of orthopnea or PND. Tolerating room air.     REVIEW OF SYSTEMS: All other review of systems are negative unless indicated above    PAST MEDICAL & SURGICAL HISTORY:  Essential hypertension      Dyslipidemia      Hyponatremia  ON HCTZ , h/o Hypokelemia      Cerebrovascular accident (CVA), unspecified mechanism      Edema, unspecified type      Mitral valve insufficiency, unspecified etiology  Cardiac cath on 11/21/18      Basal cell carcinoma      Renal artery stenosis  right side, stent      CHF (congestive heart failure)      Inguinal hernia, right      Blood pressure instability      S/P Mohs surgery for basal cell carcinoma      Status post hysterectomy  and bladder lift with mesh 1990s      History of cholecystectomy  1980      H/O bilateral hip replacements  2013, 2014      Other elective surgery  right renal artery stent      S/P colonoscopy    MEDICATIONS  (STANDING):  cloNIDine 0.2 milliGRAM(s) Oral three times a day  clopidogrel Tablet 75 milliGRAM(s) Oral daily  heparin   Injectable 5000 Unit(s) SubCutaneous every 8 hours  hydrALAZINE 25 milliGRAM(s) Oral three times a day  influenza  Vaccine (HIGH DOSE) 0.7 milliLiter(s) IntraMuscular once  lactated ringers. 1000 milliLiter(s) (50 mL/Hr) IV Continuous <Continuous>  metoprolol succinate ER 25 milliGRAM(s) Oral two times a day  simvastatin 10 milliGRAM(s) Oral at bedtime    MEDICATIONS  (PRN):  acetaminophen     Tablet .. 650 milliGRAM(s) Oral every 6 hours PRN Mild Pain (1 - 3)  melatonin 3 milliGRAM(s) Oral at bedtime PRN Insomnia    Allergies  IV Contrast (Pruritus; Rash)  verapamil (Other)  Keflex (Other)    Vital Signs Last 24 Hrs  T(C): 37.1 (19 Jan 2024 04:28), Max: 37.1 (19 Jan 2024 04:28)  T(F): 98.8 (19 Jan 2024 04:28), Max: 98.8 (19 Jan 2024 04:28)  HR: 137 (19 Jan 2024 04:28) (78 - 141)  BP: 158/97 (19 Jan 2024 04:28) (158/97 - 171/100)  BP(mean): --  RR: 17 (19 Jan 2024 04:28) (16 - 19)  SpO2: 91% (19 Jan 2024 04:28) (91% - 98%)    Parameters below as of 19 Jan 2024 04:28  Patient On (Oxygen Delivery Method): room air      I&O's Summary        TELE: , was in 1340s, now 100s   PHYSICAL EXAM:  Constitutional: NAD, awake and alert  HEENT: Moist Mucous Membranes, Anicteric  Pulmonary: Non-labored, breath sounds are clear bilaterally, No wheezing, rales or rhonchi  Cardiovascular: Regular, S1 and S2, No murmurs, No rubs, gallops or clicks  Gastrointestinal:  soft, nontender, nondistended   Lymph: No peripheral edema. No lymphadenopathy.   Skin: No visible rashes or ulcers.  Psych:  Mood & affect appropriate      LABS: All Labs Reviewed:                        12.5   26.14 )-----------( 443      ( 19 Jan 2024 07:03 )             37.6                         14.7   21.04 )-----------( 580      ( 18 Jan 2024 15:10 )             44.2     19 Jan 2024 07:03    141    |  113    |  62     ----------------------------<  163    5.4     |  20     |  1.70   18 Jan 2024 15:46    141    |  107    |  54     ----------------------------<  154    4.1     |  20     |  1.40     Ca    8.8        19 Jan 2024 07:03  Ca    9.4        18 Jan 2024 15:46  Phos  3.8       18 Jan 2024 15:46  Mg     2.4       18 Jan 2024 15:46    TPro  5.2    /  Alb  2.3    /  TBili  1.0    /  DBili  x      /  AST  92     /  ALT  41     /  AlkPhos  194    19 Jan 2024 07:03  TPro  7.4    /  Alb  3.5    /  TBili  1.5    /  DBili  x      /  AST  156    /  ALT  56     /  AlkPhos  182    18 Jan 2024 15:46   LIVER FUNCTIONS - ( 19 Jan 2024 07:03 )  Alb: 2.3 g/dL / Pro: 5.2 g/dL / ALK PHOS: 194 U/L / ALT: 41 U/L / AST: 92 U/L / GGT: x           PT/INR - ( 19 Jan 2024 07:03 )   PT: 10.9 sec;   INR: 0.93 ratio         Creatine Kinase, Serum: 758 U/L (01-19-24 @ 07:03)  Creatine Kinase, Serum: 1624 U/L (01-18-24 @ 22:10)  Troponin I, High Sensitivity Result: 835.7 ng/L (01-18-24 @ 22:10)  Troponin I, High Sensitivity Result: 849.5 ng/L (01-18-24 @ 18:55)  Troponin I, High Sensitivity Result: 861.2 ng/L (01-18-24 @ 15:46)  Lactate, Blood: 1.5 mmol/L (01-19-24 @ 00:19)  Lactate, Blood: 2.7 mmol/L (01-18-24 @ 17:32)     EXAM:  ECHO TTE WO CON COMP W DOPP    PROCEDURE DATE:  03/12/2022        INTERPRETATION:  Transthoracic Echocardiography Report (TTE)     Demographics     Patient name           NITA MAHONEY  Age           89 year(s)     Med Rec #       [id number]           Gender        Female     Account #              [id number]        Date of Birth 05/14/1931     Interpreting Physician Zohreh Allen MD     Room Number   0347     Referring Physician    Analy Aguero       Sonographer   Laurence Herrera     Date of study          03/12/2022 09:13 AM     Height                 60.63 in            Weight        105.82 pounds    Type of Study:     TTE procedure: ECHO TTE WO CON COMP W DOP     BP: 138/78 mmHg     Study Location: [facility]ical Quality: Fair    Indications   1) R55 - Syncope and collapse    M-Mode Measurements (cm)     LVEDd: 4.7 cm             LVESd: 3.23 cm   IVSEd: 1.06 cm   LVPWd: 1.06 cm            AO Root Dimension: 3.2 cm                             LA: 4.1 cm                     LVOT: 2 cm    Doppler Measurements:     AV Velocity:152 cm/s               MV Peak E-Wave: 84.9 cm/s   AV Peak Gradient: 9.24 mmHg        MV Peak A-Wave: 100 cm/s                                      MV E/A Ratio: 0.85 %   TR Velocity:236 cm/s               MV Peak Gradient: 2.88 mmHg   TR Gradient:22.2784 mmHg   Estimated RAP:5 mmHg   RVSP:20 mmHg     Findings     Mitral Valve   Mild mitral annular calcification is present.   There is thickening of both mitral valve leaflets and chordal   calcification noted. The leaflet opening appears to be normal.   Mild (1+) to moderate mitral regurgitation is present.   EA reversal of the mitral inflow consistent with reduced compliance of   the   left ventricle.     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   Mild to moderate aortic regurgitation is present.     Tricuspid Valve   Normal appearing tricuspid valve structure and function.   Mild (1+) tricuspid valve regurgitation is present. Normal PA systolic   pressures.     Pulmonic Valve   Normal appearing pulmonic valve structure.   Trace pulmonic valvular regurgitation is present.     Left Atrium   The left atrium is mildly dilated.     Left Ventricle   The left ventricle is normal in size, wall thickness, wall motion and   contractility.   Estimated left ventricular ejection fraction is 60-65 %.     Right Atrium   Normal appearing right atrium.     Right Ventricle   Normal appearing right ventricle structure and function.     Pericardial Effusion   No evidence of pericardial effusion.     Pleural Effusion   Pleural effusion.     Miscellaneous   All visualized extra cardiac structures appears to be normal.     Impression     Summary     The left ventricle is normal in size, wall thickness, wall motion and   contractility.   Estimated left ventricular ejection fraction is 60-65 %.   The left atrium is mildly dilated.   Mild to moderate aortic regurgitation   Mild (1+) tricuspid valve regurgitation. Normal PA systolic pressures.   EA reversal of the mitral inflow consistent with reduced compliance of   the   left ventricle.     Signature     ----------------------------------------------------------------   Electronically signed by Zohreh Allen MD(Interpreting   physician) on 03/13/2022 11:56 AM   ----------------------------------------------------------------    Valves     Mitral Valve     Peak E-Wave: 84.9 cm/s   Peak A-Wave: 100 cm/s               Deceleration Time: 187 msec   Peak Gradient: 2.88 mmHg                                       E/A Ratio: 0.85     Tissue Doppler     E' Velocity: 8.22 cm/s     Aortic Valve     Peak Velocity: 152 cm/s   Peak Gradient: 9.24 mmHg     Tricuspid Valve     TR Velocity: 236 cm/s                  Estimated RAP: 5 mmHg   TR Gradient: 22.2784 mmHg              Estimated RVSP: 20 mmHg     Pulmonic Valve              Estimated PASP: 27.28 mmHg     LVOT     Peak Velocity: 87 cm/s   Peak Gradient: 3 mmHg   LVOT Diameter: 2 cm    Structures     Left Atrium     LA Dimension: 4.1 cm          LA Area: 19.5 cm^2   LA/Aorta: 1.28                LA Volume/Index: 59 ml /41m^2     Left Ventricle     Diastolic Dimension: 4.7 cm          Systolic Dimension: 3.23 cm   Septum Diastolic: 1.06 cm   PW Diastolic: 1.06 cm     FS: 31.28 %   LVOT Diameter: 2 cm     Right Atrium     RA Systolic Pressure: 5 mmHg     Right Ventricle              RV Systolic Pressure: 27.28 mmHg     Miscellaneous     Aorta     Aortic Root: 3.2 cm   Ascending Aorta: 3.2 cm   LVOT Diameter: 2 cm    ZOHREH ALLEN MD; Attending Cardiologist  This document has been electronically signed. Mar 13 2022 11:56AM   [facility] -- Ricky Agarwal, Avelina, Ramsey, Maulik, Ady Sams: Office # [phone]    Follow Up: Syncope      Subjective/Observations: Patient awake, alert, resting in bed. No complaints of chest pain, dyspnea, palpitations or dizziness.   No signs of orthopnea or PND. Tolerating room air.     REVIEW OF SYSTEMS: All other review of systems are negative unless indicated above    PAST MEDICAL & SURGICAL HISTORY:  Essential hypertension      Dyslipidemia      Hyponatremia  ON HCTZ , h/o Hypokelemia      Cerebrovascular accident (CVA), unspecified mechanism      Edema, unspecified type      Mitral valve insufficiency, unspecified etiology  Cardiac cath on 11/21/18      Basal cell carcinoma      Renal artery stenosis  right side, stent      CHF (congestive heart failure)      Inguinal hernia, right      Blood pressure instability      S/P Mohs surgery for basal cell carcinoma      Status post hysterectomy  and bladder lift with mesh 1990s      History of cholecystectomy  1980      H/O bilateral hip replacements  2013, 2014      Other elective surgery  right renal artery stent      S/P colonoscopy    MEDICATIONS  (STANDING):  cloNIDine 0.2 milliGRAM(s) Oral three times a day  clopidogrel Tablet 75 milliGRAM(s) Oral daily  heparin   Injectable 5000 Unit(s) SubCutaneous every 8 hours  hydrALAZINE 25 milliGRAM(s) Oral three times a day  influenza  Vaccine (HIGH DOSE) 0.7 milliLiter(s) IntraMuscular once  lactated ringers. 1000 milliLiter(s) (50 mL/Hr) IV Continuous <Continuous>  metoprolol succinate ER 25 milliGRAM(s) Oral two times a day  simvastatin 10 milliGRAM(s) Oral at bedtime    MEDICATIONS  (PRN):  acetaminophen     Tablet .. 650 milliGRAM(s) Oral every 6 hours PRN Mild Pain (1 - 3)  melatonin 3 milliGRAM(s) Oral at bedtime PRN Insomnia    Allergies  IV Contrast (Pruritus; Rash)  verapamil (Other)  Keflex (Other)    Vital Signs Last 24 Hrs  T(C): 37.1 (19 Jan 2024 04:28), Max: 37.1 (19 Jan 2024 04:28)  T(F): 98.8 (19 Jan 2024 04:28), Max: 98.8 (19 Jan 2024 04:28)  HR: 137 (19 Jan 2024 04:28) (78 - 141)  BP: 158/97 (19 Jan 2024 04:28) (158/97 - 171/100)  BP(mean): --  RR: 17 (19 Jan 2024 04:28) (16 - 19)  SpO2: 91% (19 Jan 2024 04:28) (91% - 98%)    Parameters below as of 19 Jan 2024 04:28  Patient On (Oxygen Delivery Method): room air      I&O's Summary        TELE: , was in 130s, now 100s   PHYSICAL EXAM:  Constitutional: NAD, awake and alert  HEENT: Moist Mucous Membranes, Anicteric  Pulmonary: Non-labored, breath sounds are clear bilaterally, No wheezing, rales or rhonchi  Cardiovascular: Regular, S1 and S2, No murmurs, No rubs, gallops or clicks  Gastrointestinal:  soft, nontender, nondistended   Lymph: No peripheral edema. No lymphadenopathy.   Skin: No visible rashes or ulcers.  Psych:  Mood & affect appropriate      LABS: All Labs Reviewed:                        12.5   26.14 )-----------( 443      ( 19 Jan 2024 07:03 )             37.6                         14.7   21.04 )-----------( 580      ( 18 Jan 2024 15:10 )             44.2     19 Jan 2024 07:03    141    |  113    |  62     ----------------------------<  163    5.4     |  20     |  1.70   18 Jan 2024 15:46    141    |  107    |  54     ----------------------------<  154    4.1     |  20     |  1.40     Ca    8.8        19 Jan 2024 07:03  Ca    9.4        18 Jan 2024 15:46  Phos  3.8       18 Jan 2024 15:46  Mg     2.4       18 Jan 2024 15:46    TPro  5.2    /  Alb  2.3    /  TBili  1.0    /  DBili  x      /  AST  92     /  ALT  41     /  AlkPhos  194    19 Jan 2024 07:03  TPro  7.4    /  Alb  3.5    /  TBili  1.5    /  DBili  x      /  AST  156    /  ALT  56     /  AlkPhos  182    18 Jan 2024 15:46   LIVER FUNCTIONS - ( 19 Jan 2024 07:03 )  Alb: 2.3 g/dL / Pro: 5.2 g/dL / ALK PHOS: 194 U/L / ALT: 41 U/L / AST: 92 U/L / GGT: x           PT/INR - ( 19 Jan 2024 07:03 )   PT: 10.9 sec;   INR: 0.93 ratio         Creatine Kinase, Serum: 758 U/L (01-19-24 @ 07:03)  Creatine Kinase, Serum: 1624 U/L (01-18-24 @ 22:10)  Troponin I, High Sensitivity Result: 835.7 ng/L (01-18-24 @ 22:10)  Troponin I, High Sensitivity Result: 849.5 ng/L (01-18-24 @ 18:55)  Troponin I, High Sensitivity Result: 861.2 ng/L (01-18-24 @ 15:46)  Lactate, Blood: 1.5 mmol/L (01-19-24 @ 00:19)  Lactate, Blood: 2.7 mmol/L (01-18-24 @ 17:32)     EXAM:  ECHO TTE WO CON COMP W DOPP    PROCEDURE DATE:  03/12/2022        INTERPRETATION:  Transthoracic Echocardiography Report (TTE)     Demographics     Patient name           NITA MAHONEY  Age           89 year(s)     Med Rec #       [id number]           Gender        Female     Account #              [id number]        Date of Birth 05/14/1931     Interpreting Physician Zohreh Allen MD     Room Number   0347     Referring Physician    Analy Aguero       Sonographer   Laurence Herrera     Date of study          03/12/2022 09:13 AM     Height                 60.63 in            Weight        105.82 pounds    Type of Study:     TTE procedure: ECHO TTE WO CON COMP W DOP     BP: 138/78 mmHg     Study Location: [facility]ical Quality: Fair    Indications   1) R55 - Syncope and collapse    M-Mode Measurements (cm)     LVEDd: 4.7 cm             LVESd: 3.23 cm   IVSEd: 1.06 cm   LVPWd: 1.06 cm            AO Root Dimension: 3.2 cm                             LA: 4.1 cm                     LVOT: 2 cm    Doppler Measurements:     AV Velocity:152 cm/s               MV Peak E-Wave: 84.9 cm/s   AV Peak Gradient: 9.24 mmHg        MV Peak A-Wave: 100 cm/s                                      MV E/A Ratio: 0.85 %   TR Velocity:236 cm/s               MV Peak Gradient: 2.88 mmHg   TR Gradient:22.2784 mmHg   Estimated RAP:5 mmHg   RVSP:20 mmHg     Findings     Mitral Valve   Mild mitral annular calcification is present.   There is thickening of both mitral valve leaflets and chordal   calcification noted. The leaflet opening appears to be normal.   Mild (1+) to moderate mitral regurgitation is present.   EA reversal of the mitral inflow consistent with reduced compliance of   the   left ventricle.     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   Mild to moderate aortic regurgitation is present.     Tricuspid Valve   Normal appearing tricuspid valve structure and function.   Mild (1+) tricuspid valve regurgitation is present. Normal PA systolic   pressures.     Pulmonic Valve   Normal appearing pulmonic valve structure.   Trace pulmonic valvular regurgitation is present.     Left Atrium   The left atrium is mildly dilated.     Left Ventricle   The left ventricle is normal in size, wall thickness, wall motion and   contractility.   Estimated left ventricular ejection fraction is 60-65 %.     Right Atrium   Normal appearing right atrium.     Right Ventricle   Normal appearing right ventricle structure and function.     Pericardial Effusion   No evidence of pericardial effusion.     Pleural Effusion   Pleural effusion.     Miscellaneous   All visualized extra cardiac structures appears to be normal.     Impression     Summary     The left ventricle is normal in size, wall thickness, wall motion and   contractility.   Estimated left ventricular ejection fraction is 60-65 %.   The left atrium is mildly dilated.   Mild to moderate aortic regurgitation   Mild (1+) tricuspid valve regurgitation. Normal PA systolic pressures.   EA reversal of the mitral inflow consistent with reduced compliance of   the   left ventricle.     Signature     ----------------------------------------------------------------   Electronically signed by Zohreh Allen MD(Interpreting   physician) on 03/13/2022 11:56 AM   ----------------------------------------------------------------    Valves     Mitral Valve     Peak E-Wave: 84.9 cm/s   Peak A-Wave: 100 cm/s               Deceleration Time: 187 msec   Peak Gradient: 2.88 mmHg                                       E/A Ratio: 0.85     Tissue Doppler     E' Velocity: 8.22 cm/s     Aortic Valve     Peak Velocity: 152 cm/s   Peak Gradient: 9.24 mmHg     Tricuspid Valve     TR Velocity: 236 cm/s                  Estimated RAP: 5 mmHg   TR Gradient: 22.2784 mmHg              Estimated RVSP: 20 mmHg     Pulmonic Valve              Estimated PASP: 27.28 mmHg     LVOT     Peak Velocity: 87 cm/s   Peak Gradient: 3 mmHg   LVOT Diameter: 2 cm    Structures     Left Atrium     LA Dimension: 4.1 cm          LA Area: 19.5 cm^2   LA/Aorta: 1.28                LA Volume/Index: 59 ml /41m^2     Left Ventricle     Diastolic Dimension: 4.7 cm          Systolic Dimension: 3.23 cm   Septum Diastolic: 1.06 cm   PW Diastolic: 1.06 cm     FS: 31.28 %   LVOT Diameter: 2 cm     Right Atrium     RA Systolic Pressure: 5 mmHg     Right Ventricle              RV Systolic Pressure: 27.28 mmHg     Miscellaneous     Aorta     Aortic Root: 3.2 cm   Ascending Aorta: 3.2 cm   LVOT Diameter: 2 cm    ZOHREH ALLEN MD; Attending Cardiologist  This document has been electronically signed. Mar 13 2022 11:56AM

## 2024-01-19 NOTE — CONSULT NOTE ADULT - PROBLEM SELECTOR RECOMMENDATION 3
DNR/DNI discussed and completed on this date  MOLST placed on chart  HHA list provided to daughter  outpatient lazarus/pall information given

## 2024-01-19 NOTE — PATIENT PROFILE ADULT - FUNCTIONAL ASSESSMENT - BASIC MOBILITY 6.
3-calculated by average/Not able to assess (calculate score using [facility] averaging method)

## 2024-01-19 NOTE — PROGRESS NOTE ADULT - ASSESSMENT
89F, HTN, HL, chronic diastolic CHF, hx BOB/stent, hx CVA, hx BCC; adm s/p mechanical fall at home - found on floor by family - mgmt for:  -fall - r/o'd traumatic injury - appears to be mechanical fall - CTH neg, CT C-spine neg, Xray pelvis neg, Xray R knee neg  -r/o acute infection given leukocytosis - WBCs 21s - CXR neg; UA PENDING - monitoring off abxs for now as pt afebrile, no signs/sxs of acute infection; f/up cxs  -rhabdomyolysis - in setting of fall and being on ground/unable to get up - CK 2986 on admit - monitoring on IVFs; hold statin for now  -elev trops - 861 on admit - per Cards, likely demand ischemia; TTE pending  -CHF - no e/o vol decompensation - overall appears volume down - monitoring off diuretics for now; continue metoprolol  -renal dysfunction - Cr 1.4 on admit - in setting of rhabdo - trend on IVFs - if persistent, obtain imaging to further eval; holding torsemide and losartan at this time  -elev LFTs - unclear etiology - holding statin - monitoring measures  -HTN, HL, hx CVA, BOB - continue plavix, clonidine, hydralazine, metoprolol; statin on hold w/elev LFTs  -dvt prophy     Orientation to room/Assess eliminations need, assist as needed 89F, HTN, HL, chronic diastolic CHF, hx BOB/stent, hx CVA, hx BCC; adm s/p mechanical fall at home - found on floor by family - mgmt for:  -fall - appears to be mechanical fall? -r/o'd traumatic injury - CTH neg, CT C-spine neg, Xray pelvis neg, Xray R knee neg  -likely UTI, leukocytosis - WBCs 21s, UA contaminated sample - CXR neg; start IV abxs cvrg w/ceftriaxone - plan for 5d course - f/up cxs  -rhabdomyolysis - in setting of fall and being on ground/unable to get up - CK 2986 on admit - monitoring on IVFs; hold statin for now  -elev trops - 861 on admit - per Cards, likely demand ischemia; TTE pending  -CHF - no e/o vol decompensation - overall appears volume down - monitoring off diuretics for now; continue metoprolol  -renal dysfunction - Cr 1.4 on admit - in setting of rhabdo - trend on IVFs - if persistent, obtain imaging to further eval; holding torsemide and losartan at this time  -elev LFTs - unclear etiology - holding statin - monitoring measures  -HTN, HL, hx CVA, BOB - continue plavix, clonidine, hydralazine, metoprolol; statin on hold w/elev LFTs  -dvt prophy     89F, HTN, HL, chronic diastolic CHF, hx BOB/stent, hx CVA, hx BCC; adm s/p mechanical fall at home - found on floor by family - mgmt for:  -fall - appears to be mechanical fall? -r/o'd traumatic injury - CTH neg, CT C-spine neg, Xray pelvis neg, Xray R knee neg  -likely UTI, leukocytosis, tachycardia - sepsis - WBCs 21s, HR 130s - UA contaminated sample - CXR neg; start IV abxs cvrg w/ceftriaxone - plan for 5d course - f/up cxs  -rhabdomyolysis - in setting of fall and being on ground/unable to get up - CK 2986 on admit - monitoring on IVFs; hold statin for now  -elev trops - 861 on admit - per Cards, likely demand ischemia; TTE pending  -CHF - no e/o vol decompensation - overall appears volume down - monitoring off diuretics for now; continue metoprolol  -renal dysfunction - Cr 1.4 on admit - in setting of rhabdo - trend on IVFs - if persistent, obtain imaging to further eval; holding torsemide and losartan at this time  -elev LFTs - unclear etiology - holding statin - monitoring measures  -HTN, HL, hx CVA, BOB - continue plavix, clonidine, hydralazine, metoprolol; statin on hold w/elev LFTs  -dvt prophy     89F, HTN, HL, chronic diastolic CHF, hx BOB/stent, hx CVA, hx BCC; adm s/p mechanical fall at home - found on floor by family - mgmt for:  -fall - appears to be mechanical fall? -r/o'd traumatic injury - CTH neg, CT C-spine neg, Xray pelvis neg, Xray R knee neg  -sepsis - likely UTI assoc w/leukocytosis, tachycardia - WBCs 21s, HR 130s - UA contaminated sample - CXR neg; start IV abxs cvrg w/ceftriaxone for poss UTI - plan for 5d course - f/up cxs  -rhabdomyolysis - in setting of fall and being on ground/unable to get up - CK 2986 on admit - monitoring on IVFs; hold statin for now  -elev trops - 861 on admit - per Cards, likely demand ischemia; TTE pending  -CHF - no e/o vol decompensation - overall appears volume down - monitoring off diuretics for now; continue metoprolol  -renal dysfunction - Cr 1.4 on admit - in setting of rhabdo - trend on IVFs - if persistent, obtain imaging to further eval; holding torsemide and losartan at this time  -elev LFTs - unclear etiology - holding statin - monitoring measures  -HTN, HL, hx CVA, BOB - continue plavix, clonidine, hydralazine, metoprolol; statin on hold w/elev LFTs  -dvt prophy      1/19 - SPK TO PT DAUGHTER SULTANA BY -[phone] - ALL DIAGNOSES/MGMT PLANS, AND PT CLINICAL STATUS DISCUSSED IN EXTENSIVE DETAIL; ALL QUESTIONS ANSWERED COMPREHENSIVELY.

## 2024-01-19 NOTE — PATIENT PROFILE ADULT - FUNCTIONAL ASSESSMENT - BASIC MOBILITY 3.
Otc Regimen: Pt purchasing benzoyl peroxide and adapalene otc Plan: Accutane September 2018 Discontinue Regimen: Minocycline, erythromycin as no improvement Detail Level: Zone 4 = No assist / stand by assistance 3 = A little assistance

## 2024-01-19 NOTE — PROGRESS NOTE ADULT - ASSESSMENT
89-year-old white female presently residing at home alone though does have assistance of an aide approximately 4 to 6 hours/day with past history of hypertension hyperlipidemia previous CVA basal cell carcinoma renal artery stenosis s/p stent as well as congestive heart failure who was last known to be well and last heard from sometime late afternoon yesterday.  Cardiology consulted for syncopal episode and elevated troponins.    Syncopal episode, Elevated Troponin, HTN, HLD, CHF   - As per patient it was a mechanical fall although patient slightly confused  - May have been orthostatic syncope; appears to be clinically dry   - Check orthostatics  - Tele w/ ST 130s-100s     - EKG revealing irregularity and possible af, though significant artifact  - Repeat EKG w/ ST @ 109, Diffuse T wave inversion, new   - Please obtain transthoracic echocardiogram to assess ventricular function, wall motion and valvular abnormalities.   - CK trend: <--758, <--1624, <--2986  - Troponin: <-835.7, <-849.5, <-861.2  - Likely demand ischemia 2/2 rhabdomyolysis  - Continue home statin and Plavix     - BP stable and controlled with -170s   - Continue home Toprol, hydralazine, clonidine w/ hold parameters  - Hold home losartan 2/2 TERESA, resume when able     - BNP:  <-->967092,  <--937248  - TTE 4/23: EF 58%, mod MR, mild- mod TR, mild AR, CO.  - Does not appear to be volume overloaded at this time  - Hold home torsemide    - Creatinine:  <--1.70,  <--1.40  - Agree w/ IV hydration     - Monitor and replete lytes, keep K>4, Mg>2.  - Will continue to follow.    Curtis Zavala, MS FNP, AGACNP  Nurse Practitioner- Cardiology   Please call on TEAMS     89-year-old white female presently residing at home alone though does have assistance of an aide approximately 4 to 6 hours/day with past history of hypertension hyperlipidemia previous CVA basal cell carcinoma renal artery stenosis s/p stent as well as congestive heart failure who was last known to be well and last heard from sometime late afternoon yesterday.  Cardiology consulted for syncopal episode and elevated troponins.    Syncopal episode, Elevated Troponin, HTN, HLD, CHF   - As per patient it was a mechanical fall although patient slightly confused  - May have been orthostatic syncope; appears to be clinically dry   - Check orthostatics  - Tele w/ ST 130s-100s     - EKG revealing irregularity and possible af, though significant artifact  - Repeat EKG w/ ST @ 109, Diffuse T wave inversion, new   - Please obtain transthoracic echocardiogram to assess ventricular function, wall motion and valvular abnormalities, to be done today.   - CK trend: <--758, <--1624, <--2986  - Troponin: <-835.7, <-849.5, <-861.2  - Likely demand ischemia 2/2 rhabdomyolysis  - Continue home statin and Plavix     - BP stable and controlled with -170s   - Continue home Toprol, hydralazine, clonidine w/ hold parameters  - Hold home losartan 2/2 TERESA, resume when able     - BNP:  <-->566576,  <--672987  - TTE 4/23: EF 58%, mod MR, mild- mod TR, mild AR, OK.  - Does not appear to be volume overloaded at this time  - Hold home torsemide    - Creatinine:  <--1.70,  <--1.40  - Agree w/ IV hydration     - Monitor and replete lytes, keep K>4, Mg>2.  - Will continue to follow.    Curtis Zavala, MS FNP, AGACNP  Nurse Practitioner- Cardiology   Please call on TEAMS

## 2024-01-19 NOTE — CONSULT NOTE ADULT - PROBLEM SELECTOR RECOMMENDATION 9
PPS 50%  likely requires more help at home as patient fall risk  has CNA per daughter for a two times a week but needs more  daughter to investigate options through LTC insurance plan  information provided for [employer] private pay

## 2024-01-19 NOTE — CONSULT NOTE ADULT - CONVERSATION DETAILS
Met with patients daughter on this date.  Introduced self and role  Reviewed advance directives- patient states she is POA and HCP.  discussion as family- they are all in agreement with DNR/DNI  MOLST completed on this date to respect these wishes.    information provided re: outpatient [facility] office  information provided for [facility] [employer] for private hire  All questions answered. Dr. Zavala and CM updated.

## 2024-01-20 LAB
ALBUMIN SERPL ELPH-MCNC: 1.9 G/DL — LOW (ref 3.3–5)
ALP SERPL-CCNC: 239 U/L — HIGH (ref 40–120)
ALT FLD-CCNC: 40 U/L — SIGNIFICANT CHANGE UP (ref 12–78)
ANION GAP SERPL CALC-SCNC: 7 MMOL/L — SIGNIFICANT CHANGE UP (ref 5–17)
AST SERPL-CCNC: 62 U/L — HIGH (ref 15–37)
BILIRUB SERPL-MCNC: 0.4 MG/DL — SIGNIFICANT CHANGE UP (ref 0.2–1.2)
BUN SERPL-MCNC: 77 MG/DL — HIGH (ref 7–23)
CALCIUM SERPL-MCNC: 8.1 MG/DL — LOW (ref 8.5–10.1)
CHLORIDE SERPL-SCNC: 109 MMOL/L — HIGH (ref 96–108)
CK SERPL-CCNC: 333 U/L — HIGH (ref 26–192)
CO2 SERPL-SCNC: 22 MMOL/L — SIGNIFICANT CHANGE UP (ref 22–31)
CREAT SERPL-MCNC: 2.1 MG/DL — HIGH (ref 0.5–1.3)
EGFR: 22 ML/MIN/1.73M2 — LOW
GLUCOSE SERPL-MCNC: 195 MG/DL — HIGH (ref 70–99)
HCT VFR BLD CALC: 33.2 % — LOW (ref 34.5–45)
HGB BLD-MCNC: 10.8 G/DL — LOW (ref 11.5–15.5)
MCHC RBC-ENTMCNC: 32 PG — SIGNIFICANT CHANGE UP (ref 27–34)
MCHC RBC-ENTMCNC: 32.5 GM/DL — SIGNIFICANT CHANGE UP (ref 32–36)
MCV RBC AUTO: 98.2 FL — SIGNIFICANT CHANGE UP (ref 80–100)
NRBC # BLD: 0 /100 WBCS — SIGNIFICANT CHANGE UP (ref 0–0)
PLATELET # BLD AUTO: 396 K/UL — SIGNIFICANT CHANGE UP (ref 150–400)
POTASSIUM SERPL-MCNC: 5.1 MMOL/L — SIGNIFICANT CHANGE UP (ref 3.5–5.3)
POTASSIUM SERPL-SCNC: 5.1 MMOL/L — SIGNIFICANT CHANGE UP (ref 3.5–5.3)
PROT SERPL-MCNC: 4.8 G/DL — LOW (ref 6–8.3)
RBC # BLD: 3.38 M/UL — LOW (ref 3.8–5.2)
RBC # FLD: 12.7 % — SIGNIFICANT CHANGE UP (ref 10.3–14.5)
SODIUM SERPL-SCNC: 138 MMOL/L — SIGNIFICANT CHANGE UP (ref 135–145)
WBC # BLD: 23.66 K/UL — HIGH (ref 3.8–10.5)
WBC # FLD AUTO: 23.66 K/UL — HIGH (ref 3.8–10.5)

## 2024-01-20 PROCEDURE — 93010 ELECTROCARDIOGRAM REPORT: CPT

## 2024-01-20 PROCEDURE — 99233 SBSQ HOSP IP/OBS HIGH 50: CPT

## 2024-01-20 PROCEDURE — 76700 US EXAM ABDOM COMPLETE: CPT | Mod: 26

## 2024-01-20 RX ORDER — SODIUM CHLORIDE 9 MG/ML
1000 INJECTION, SOLUTION INTRAVENOUS
Refills: 0 | Status: DISCONTINUED | OUTPATIENT
Start: 2024-01-20 | End: 2024-01-21

## 2024-01-20 RX ORDER — METOPROLOL TARTRATE 50 MG
50 TABLET ORAL
Refills: 0 | Status: DISCONTINUED | OUTPATIENT
Start: 2024-01-20 | End: 2024-01-22

## 2024-01-20 RX ORDER — METOPROLOL TARTRATE 50 MG
5 TABLET ORAL ONCE
Refills: 0 | Status: COMPLETED | OUTPATIENT
Start: 2024-01-20 | End: 2024-01-20

## 2024-01-20 RX ADMIN — HEPARIN SODIUM 5000 UNIT(S): 5000 INJECTION INTRAVENOUS; SUBCUTANEOUS at 21:48

## 2024-01-20 RX ADMIN — SODIUM CHLORIDE 50 MILLILITER(S): 9 INJECTION, SOLUTION INTRAVENOUS at 22:58

## 2024-01-20 RX ADMIN — CEFTRIAXONE 100 MILLIGRAM(S): 500 INJECTION, POWDER, FOR SOLUTION INTRAMUSCULAR; INTRAVENOUS at 13:46

## 2024-01-20 RX ADMIN — CLOPIDOGREL BISULFATE 75 MILLIGRAM(S): 75 TABLET, FILM COATED ORAL at 11:22

## 2024-01-20 RX ADMIN — Medication 650 MILLIGRAM(S): at 10:51

## 2024-01-20 RX ADMIN — Medication 0.2 MILLIGRAM(S): at 21:48

## 2024-01-20 RX ADMIN — Medication 5 MILLIGRAM(S): at 07:56

## 2024-01-20 RX ADMIN — Medication 50 MILLIGRAM(S): at 17:28

## 2024-01-20 RX ADMIN — Medication 650 MILLIGRAM(S): at 09:51

## 2024-01-20 RX ADMIN — Medication 5 MILLIGRAM(S): at 21:48

## 2024-01-20 RX ADMIN — HEPARIN SODIUM 5000 UNIT(S): 5000 INJECTION INTRAVENOUS; SUBCUTANEOUS at 05:54

## 2024-01-20 RX ADMIN — Medication 50 MILLIGRAM(S): at 05:54

## 2024-01-20 RX ADMIN — Medication 25 MILLIGRAM(S): at 05:55

## 2024-01-20 RX ADMIN — HEPARIN SODIUM 5000 UNIT(S): 5000 INJECTION INTRAVENOUS; SUBCUTANEOUS at 13:07

## 2024-01-20 RX ADMIN — Medication 0.2 MILLIGRAM(S): at 05:54

## 2024-01-20 NOTE — PROGRESS NOTE ADULT - SUBJECTIVE AND OBJECTIVE BOX
CC/F/U for:     HPI:  89 yo F with PMHx of HTN, HLD, Renal Artery Stenosis s/p stent, Basal Cell Carcinoma, CHFpEF CVA  presenting to ED s/p mechanical fall early last night. Pt states that she was standing upright in her kitchen when she tripped over "something" on the floor of her kitchen. Pt had an unwitnessed fall on her left side with head involvment, but adamantly denies LOC. States she was in pain and is generally weak so she was unable to get up without her walker throughout the night. Pt.'s daughter was unable to get rosetta contact with the patient, so she went to her house and found her lying on the floor. EMS was then called and the patient was transported to [facility] ED w/ cervical stabilization in a collar. Pt states she currently has neck and B/L knee pain L>R. Endorses increased neck pain before the fall as her arthritis has been "acting up since the weather got cold." States she last had a normal BM yesterday, last urinated without dysuria this afternoon. Denies headache, visual changes, palpitations, cp, abd pain, dysuria, urinary frequency, hip pain, fatigue, focal weakness, n/v/d/c, hematuria. Denies feeling ill recently although endorses having a friend who she recently met with who is sick with an URI.      ED Course:   Vitals: BP: 170/60, HR: 78, Temp: 97, RR: 16, SpO2: 96% on RA Labs:   WBC: 21.04, H/H 14.7/44.2, Platelet 580, Trop 861.2, CK 2986, Cr 1.40, BUN54, eGFR 35, AlkPhos 182, ,   UA: Pending collection   CXR: Heart is enlarged. Right upper quadrant clips are noted. No lung consolidation or layering effusion is evident. No visible fracture   CT Head/Neck: negative for acute intracranial pathology, hemorrhage, or fracture   EKG: artifact, repeat pending   Received in the ED: 1L LR Bolus   TTE: 3/2022 LVEF 60-65 %, normal LV funciton, contraction, LA mildly dilated, Mild to moderate AR,  Mild (1+) tricuspid valve regurgitation. EA reversal of the mitral inflow consistent with reduced compliance of the left ventricle.     (18 Jan 2024 17:07)        INTERVAL HPI/OVERNIGHT EVENTS:  Pt seen and examined at bedside.     Allergies/Intolerance: IV Contrast (Pruritus; Rash)  verapamil (Other)  Keflex (Other)      MEDICATIONS  (STANDING):  bisacodyl 5 milliGRAM(s) Oral at bedtime  cefTRIAXone   IVPB 1000 milliGRAM(s) IV Intermittent every 24 hours  cloNIDine 0.2 milliGRAM(s) Oral three times a day  clopidogrel Tablet 75 milliGRAM(s) Oral daily  heparin   Injectable 5000 Unit(s) SubCutaneous every 8 hours  influenza  Vaccine (HIGH DOSE) 0.7 milliLiter(s) IntraMuscular once  lactated ringers. 1000 milliLiter(s) (50 mL/Hr) IV Continuous <Continuous>  lactated ringers. 1000 milliLiter(s) (50 mL/Hr) IV Continuous <Continuous>  metoprolol tartrate 50 milliGRAM(s) Oral two times a day    MEDICATIONS  (PRN):  acetaminophen     Tablet .. 650 milliGRAM(s) Oral every 6 hours PRN Mild Pain (1 - 3)  melatonin 3 milliGRAM(s) Oral at bedtime PRN Insomnia        ROS: as above; all other systems reviewed and wnl      PHYSICAL EXAMINATION:  Vital Signs Last 24 Hrs  T(C): 36.7 (20 Jan 2024 17:25), Max: 36.7 (19 Jan 2024 19:56)  T(F): 98.1 (20 Jan 2024 17:25), Max: 98.1 (19 Jan 2024 19:56)  HR: 83 (20 Jan 2024 17:25) (82 - 116)  BP: 122/76 (20 Jan 2024 17:25) (91/56 - 135/88)  BP(mean): --  RR: 18 (20 Jan 2024 07:32) (18 - 18)  SpO2: 93% (20 Jan 2024 17:25) (90% - 93%)    Parameters below as of 20 Jan 2024 07:32  Patient On (Oxygen Delivery Method): room air      CAPILLARY BLOOD GLUCOSE          GENERAL: NAD  HEENT: mucous membranes dry  RESP: respirations unlabored  HEART: HR s  ABDOMEN: soft, ND, NT   EXTREMITIES:  no edema b/l LEs, no calf tenderness  NEURO: awake, alert, interactive; moves all extremities      LABS:                        10.8   23.66 )-----------( 396      ( 20 Jan 2024 10:40 )             33.2     01-20    138  |  109<H>  |  77<H>  ----------------------------<  195<H>  5.1   |  22  |  2.10<H>    Ca    8.1<L>      20 Jan 2024 10:40    TPro  4.8<L>  /  Alb  1.9<L>  /  TBili  0.4  /  DBili  x   /  AST  62<H>  /  ALT  40  /  AlkPhos  239<H>  01-20    PT/INR - ( 19 Jan 2024 07:03 )   PT: 10.9 sec;   INR: 0.93 ratio           Urinalysis Basic - ( 20 Jan 2024 10:40 )    Color: x / Appearance: x / SG: x / pH: x  Gluc: 195 mg/dL / Ketone: x  / Bili: x / Urobili: x   Blood: x / Protein: x / Nitrite: x   Leuk Esterase: x / RBC: x / WBC x   Sq Epi: x / Non Sq Epi: x / Bacteria: x          RADIOLOGY & ADDITIONAL TESTS:      ASSESSMENT AND PLAN:  89y Female CC/F/U for: fall, rhabdomyolysis    HPI:  89 yo F with PMHx of HTN, HLD, Renal Artery Stenosis s/p stent, Basal Cell Carcinoma, CHFpEF CVA  presenting to ED s/p mechanical fall early last night. Pt states that she was standing upright in her kitchen when she tripped over "something" on the floor of her kitchen. Pt had an unwitnessed fall on her left side with head involvment, but adamantly denies LOC. States she was in pain and is generally weak so she was unable to get up without her walker throughout the night. Pt.'s daughter was unable to get rosetta contact with the patient, so she went to her house and found her lying on the floor. EMS was then called and the patient was transported to [facility] ED w/ cervical stabilization in a collar. Pt states she currently has neck and B/L knee pain L>R. Endorses increased neck pain before the fall as her arthritis has been "acting up since the weather got cold." States she last had a normal BM yesterday, last urinated without dysuria this afternoon. Denies headache, visual changes, palpitations, cp, abd pain, dysuria, urinary frequency, hip pain, fatigue, focal weakness, n/v/d/c, hematuria. Denies feeling ill recently although endorses having a friend who she recently met with who is sick with an URI.      ED Course:   Vitals: BP: 170/60, HR: 78, Temp: 97, RR: 16, SpO2: 96% on RA Labs:   WBC: 21.04, H/H 14.7/44.2, Platelet 580, Trop 861.2, CK 2986, Cr 1.40, BUN54, eGFR 35, AlkPhos 182, ,   UA: Pending collection   CXR: Heart is enlarged. Right upper quadrant clips are noted. No lung consolidation or layering effusion is evident. No visible fracture   CT Head/Neck: negative for acute intracranial pathology, hemorrhage, or fracture   EKG: artifact, repeat pending   Received in the ED: 1L LR Bolus   TTE: 3/2022 LVEF 60-65 %, normal LV funciton, contraction, LA mildly dilated, Mild to moderate AR,  Mild (1+) tricuspid valve regurgitation. EA reversal of the mitral inflow consistent with reduced compliance of the left ventricle.     (18 Jan 2024 17:07)        INTERVAL HPI/OVERNIGHT EVENTS:  Pt seen and examined at bedside.     Allergies/Intolerance: IV Contrast (Pruritus; Rash)  verapamil (Other)  Keflex (Other)      MEDICATIONS  (STANDING):  bisacodyl 5 milliGRAM(s) Oral at bedtime  cefTRIAXone   IVPB 1000 milliGRAM(s) IV Intermittent every 24 hours  cloNIDine 0.2 milliGRAM(s) Oral three times a day  clopidogrel Tablet 75 milliGRAM(s) Oral daily  heparin   Injectable 5000 Unit(s) SubCutaneous every 8 hours  influenza  Vaccine (HIGH DOSE) 0.7 milliLiter(s) IntraMuscular once  lactated ringers. 1000 milliLiter(s) (50 mL/Hr) IV Continuous <Continuous>  lactated ringers. 1000 milliLiter(s) (50 mL/Hr) IV Continuous <Continuous>  metoprolol tartrate 50 milliGRAM(s) Oral two times a day    MEDICATIONS  (PRN):  acetaminophen     Tablet .. 650 milliGRAM(s) Oral every 6 hours PRN Mild Pain (1 - 3)  melatonin 3 milliGRAM(s) Oral at bedtime PRN Insomnia        ROS: as above; all other systems reviewed and wnl      PHYSICAL EXAMINATION:  Vital Signs Last 24 Hrs  T(C): 36.7 (20 Jan 2024 17:25), Max: 36.7 (19 Jan 2024 19:56)  T(F): 98.1 (20 Jan 2024 17:25), Max: 98.1 (19 Jan 2024 19:56)  HR: 83 (20 Jan 2024 17:25) (82 - 116)  BP: 122/76 (20 Jan 2024 17:25) (91/56 - 135/88)  BP(mean): --  RR: 18 (20 Jan 2024 07:32) (18 - 18)  SpO2: 93% (20 Jan 2024 17:25) (90% - 93%)    Parameters below as of 20 Jan 2024 07:32  Patient On (Oxygen Delivery Method): room air    GENERAL: elderly female, in NAD  HEENT: mucous membranes dry  RESP: respirations unlabored  HEART: HR 80s  ABDOMEN: soft, ND, NT   EXTREMITIES:  no edema b/l LEs, no calf tenderness; ecchymotic bruising LUE  NEURO: awake, alert, interactive; moves all extremities      LABS:                        10.8   23.66 )-----------( 396      ( 20 Jan 2024 10:40 )             33.2     01-20    138  |  109<H>  |  77<H>  ----------------------------<  195<H>  5.1   |  22  |  2.10<H>    Ca    8.1<L>      20 Jan 2024 10:40    TPro  4.8<L>  /  Alb  1.9<L>  /  TBili  0.4  /  DBili  x   /  AST  62<H>  /  ALT  40  /  AlkPhos  239<H>  01-20    PT/INR - ( 19 Jan 2024 07:03 )   PT: 10.9 sec;   INR: 0.93 ratio           Urinalysis Basic - ( 20 Jan 2024 10:40 )    Color: x / Appearance: x / SG: x / pH: x  Gluc: 195 mg/dL / Ketone: x  / Bili: x / Urobili: x   Blood: x / Protein: x / Nitrite: x   Leuk Esterase: x / RBC: x / WBC x   Sq Epi: x / Non Sq Epi: x / Bacteria: x

## 2024-01-20 NOTE — PROGRESS NOTE ADULT - ASSESSMENT
89F, HTN, HL, chronic diastolic CHF, hx BOB/stent, hx CVA, hx BCC; adm s/p mechanical fall at home - found on floor by family - mgmt for:  -fall - appears to be mechanical fall? -r/o'd traumatic injury - CTH neg, CT C-spine neg, Xray pelvis neg, Xray R knee neg, Xray L forearm neg, Xray L humerus neg, Xray L shoulder neg - still w/some ecchymotic bruising L forearm - monitoring; prn analgesia  -sepsis - likely UTI assoc w/leukocytosis, tachycardia - WBCs 21s, HR 130s - UA contaminated sample - CXR neg; on IV abxs cvrg w/ceftriaxone for poss UTI - plan for 5d course - f/up cxs  -rhabdomyolysis - in setting of fall and being on ground/unable to get up - CK 2986 on admit ->downtrending on IVFs -  today - continue monitoring on IVFs; statin on hold statin  -elev trops - 861 on admit - per Cards, likely demand ischemia; TTE pending  -CHF - no e/o vol decompensation - overall appears volume down - monitoring off diuretics for now; continue metoprolol  -TERESA - worsening renal dysfunction - Cr 1.4 on admit ->->2.1 today - likely some contribution from rhabdo - r/o obstruction - bladder scan - renal ultrasound - holding torsemide and losartan - renally dose meds, no nephrotoxics  -elev LFTs - unclear etiology - statin on hold- obtain abd ultrasound to further eval - trending measures w/daily labs  -HTN, HL, hx CVA, hx BOB/stent - continue plavix, clonidine, hydralazine, metoprolol; statin on hold w/elev LFTs  -dvt prophy      1/19 - SPK TO PT DAUGHTER SULTANA BY -[phone] - ALL DIAGNOSES/MGMT PLANS, AND PT CLINICAL STATUS DISCUSSED IN EXTENSIVE DETAIL; ALL QUESTIONS ANSWERED COMPREHENSIVELY.

## 2024-01-20 NOTE — PROGRESS NOTE ADULT - SUBJECTIVE AND OBJECTIVE BOX
[facility] -- Ricky Agarwal, Ramsey Quan, Maulik, Ady Sams: Office # [phone]    Follow Up: Syncope      Subjective/Observations: Patient awake, alert, resting in bed. No complaints of chest pain, dyspnea, palpitations or dizziness.   No signs of orthopnea or PND. Tolerating room air.     REVIEW OF SYSTEMS: All other review of systems are negative unless indicated above    PAST MEDICAL & SURGICAL HISTORY:  Essential hypertension      Dyslipidemia      Hyponatremia  ON HCTZ , h/o Hypokelemia      Cerebrovascular accident (CVA), unspecified mechanism      Edema, unspecified type      Mitral valve insufficiency, unspecified etiology  Cardiac cath on 11/21/18      Basal cell carcinoma      Renal artery stenosis  right side, stent      CHF (congestive heart failure)      Inguinal hernia, right      Blood pressure instability      S/P Mohs surgery for basal cell carcinoma      Status post hysterectomy  and bladder lift with mesh 1990s      History of cholecystectomy  1980      H/O bilateral hip replacements  2013, 2014      Other elective surgery  right renal artery stent      S/P colonoscopy    MEDICATIONS  (STANDING):  bisacodyl 5 milliGRAM(s) Oral at bedtime  cefTRIAXone   IVPB 1000 milliGRAM(s) IV Intermittent every 24 hours  cloNIDine 0.2 milliGRAM(s) Oral three times a day  clopidogrel Tablet 75 milliGRAM(s) Oral daily  heparin   Injectable 5000 Unit(s) SubCutaneous every 8 hours  influenza  Vaccine (HIGH DOSE) 0.7 milliLiter(s) IntraMuscular once  lactated ringers. 1000 milliLiter(s) (50 mL/Hr) IV Continuous <Continuous>  lactated ringers. 1000 milliLiter(s) (50 mL/Hr) IV Continuous <Continuous>  metoprolol tartrate 50 milliGRAM(s) Oral two times a day    MEDICATIONS  (PRN):  acetaminophen     Tablet .. 650 milliGRAM(s) Oral every 6 hours PRN Mild Pain (1 - 3)  melatonin 3 milliGRAM(s) Oral at bedtime PRN Insomnia    Allergies  IV Contrast (Pruritus; Rash)  verapamil (Other)  Keflex (Other)    Vital Signs Last 24 Hrs  T(C): 36.4 (20 Jan 2024 07:32), Max: 36.7 (19 Jan 2024 19:56)  T(F): 97.5 (20 Jan 2024 07:32), Max: 98.1 (19 Jan 2024 19:56)  HR: 116 (20 Jan 2024 07:32) (95 - 120)  BP: 91/56 (20 Jan 2024 07:32) (91/56 - 165/98)  BP(mean): --  RR: 18 (20 Jan 2024 07:32) (18 - 20)  SpO2: 93% (20 Jan 2024 07:32) (90% - 94%)    Parameters below as of 20 Jan 2024 07:32  Patient On (Oxygen Delivery Method): room air      I&O's Summary    19 Jan 2024 07:01  -  20 Jan 2024 07:00  --------------------------------------------------------  IN: 960 mL / OUT: 0 mL / NET: 960 mL      TELE: Was in ST yesterday am, became SR 70-90s, this morning at 6:40 am converted to A fib -120s   PHYSICAL EXAM:  Constitutional: NAD, awake and alert  HEENT: Moist Mucous Membranes, Anicteric  Pulmonary: Non-labored, breath sounds are clear bilaterally, No wheezing, rales or rhonchi  Cardiovascular: Regular, S1 and S2, No murmurs, No rubs, gallops or clicks  Gastrointestinal:  soft, nontender, nondistended   Lymph: No peripheral edema. No lymphadenopathy.   Skin: No visible rashes or ulcers.  Psych:  Mood & affect appropriate      LABS: All Labs Reviewed:                        12.5   26.14 )-----------( 443      ( 19 Jan 2024 07:03 )             37.6                         14.7   21.04 )-----------( 580      ( 18 Jan 2024 15:10 )             44.2     19 Jan 2024 07:03    141    |  113    |  62     ----------------------------<  163    5.4     |  20     |  1.70   18 Jan 2024 15:46    141    |  107    |  54     ----------------------------<  154    4.1     |  20     |  1.40     Ca    8.8        19 Jan 2024 07:03  Ca    9.4        18 Jan 2024 15:46  Phos  3.8       18 Jan 2024 15:46  Mg     2.4       18 Jan 2024 15:46    TPro  5.2    /  Alb  2.3    /  TBili  1.0    /  DBili  x      /  AST  92     /  ALT  41     /  AlkPhos  194    19 Jan 2024 07:03  TPro  7.4    /  Alb  3.5    /  TBili  1.5    /  DBili  x      /  AST  156    /  ALT  56     /  AlkPhos  182    18 Jan 2024 15:46   LIVER FUNCTIONS - ( 19 Jan 2024 07:03 )  Alb: 2.3 g/dL / Pro: 5.2 g/dL / ALK PHOS: 194 U/L / ALT: 41 U/L / AST: 92 U/L / GGT: x           PT/INR - ( 19 Jan 2024 07:03 )   PT: 10.9 sec;   INR: 0.93 ratio         Creatine Kinase, Serum: 758 U/L (01-19-24 @ 07:03)  Creatine Kinase, Serum: 1624 U/L (01-18-24 @ 22:10)  Troponin I, High Sensitivity Result: 835.7 ng/L (01-18-24 @ 22:10)  Troponin I, High Sensitivity Result: 849.5 ng/L (01-18-24 @ 18:55)  Troponin I, High Sensitivity Result: 861.2 ng/L (01-18-24 @ 15:46)  Cholesterol: 138 mg/dL (01-19-24 @ 07:03)  HDL Cholesterol: 69 mg/dL (01-19-24 @ 07:03)  Triglycerides, Serum: 70 mg/dL (01-19-24 @ 07:03)  Lactate, Blood: 1.5 mmol/L (01-19-24 @ 00:19)  Lactate, Blood: 2.7 mmol/L (01-18-24 @ 17:32)    12 Lead ECG:   Ventricular Rate 109 BPM    Atrial Rate 109 BPM    P-R Interval 116 ms    QRS Duration 90 ms    Q-T Interval 406 ms    QTC Calculation(Bazett) 546 ms    P Axis 29 degrees    R Axis 12 degrees    T Axis 193 degrees    Diagnosis Line Sinus tachycardia  Voltage criteria for left ventricular hypertrophy ( R in aVL , Sokolow-Elaine , [place] product )  ST & Marked T wave abnormality, consider anterolateral ischemia  Prolonged QT  Abnormal ECG    Confirmed by Luz Elena Garsia ([id number]) on 1/19/2024 3:25:30 PM (01-19-24 @ 10:12)       EXAM:  ECHO TTE WO CON COMP W DOPP         PROCEDURE DATE:  03/12/2022        INTERPRETATION:  Transthoracic Echocardiography Report (TTE)     Demographics     Patient name           NITA MAHONEY  Age           89 year(s)     Med Rec #       [id number]           Gender        Female     Account #              [id number]        Date of Birth 05/14/1931     Interpreting Physician Zohreh Allen MD     Room Number   0347     Referring Physician    Analy Aguero       Sonographer   Laurence Herrera     Date of study          03/12/2022 09:13 AM     Height                 60.63 in            Weight        105.82 pounds    Type of Study:     TTE procedure: ECHO TTE WO CON COMP W DOP     BP: 138/78 mmHg     Study Location: 3ETechnical Quality: Fair    Indications   1) R55 - Syncope and collapse    M-Mode Measurements (cm)     LVEDd: 4.7 cm             LVESd: 3.23 cm   IVSEd: 1.06 cm   LVPWd: 1.06 cm            AO Root Dimension: 3.2 cm                             LA: 4.1 cm                     LVOT: 2 cm    Doppler Measurements:     AV Velocity:152 cm/s               MV Peak E-Wave: 84.9 cm/s   AV Peak Gradient: 9.24 mmHg        MV Peak A-Wave: 100 cm/s                                      MV E/A Ratio: 0.85 %   TR Velocity:236 cm/s               MV Peak Gradient: 2.88 mmHg   TR Gradient:22.2784 mmHg   Estimated RAP:5 mmHg   RVSP:20 mmHg     Findings     Mitral Valve   Mild mitral annular calcification is present.   There is thickening of both mitral valve leaflets and chordal   calcification noted. The leaflet opening appears to be normal.   Mild (1+) to moderate mitral regurgitation is present.   EA reversal of the mitral inflow consistent with reduced compliance of   the   left ventricle.     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   Mild to moderate aortic regurgitation is present.     Tricuspid Valve   Normal appearing tricuspid valve structure and function.   Mild (1+) tricuspid valve regurgitation is present. Normal PA systolic   pressures.     Pulmonic Valve   Normal appearing pulmonic valve structure.   Trace pulmonic valvular regurgitation is present.     Left Atrium   The left atrium is mildly dilated.     Left Ventricle   The left ventricle is normal in size, wall thickness, wall motion and   contractility.   Estimated left ventricular ejection fraction is 60-65 %.     Right Atrium   Normal appearing right atrium.     Right Ventricle   Normal appearing right ventricle structure and function.     Pericardial Effusion   No evidence of pericardial effusion.     Pleural Effusion   Pleural effusion.     Miscellaneous   All visualized extra cardiac structures appears to be normal.     Impression     Summary     The left ventricle is normal in size, wall thickness, wall motion and   contractility.   Estimated left ventricular ejection fraction is 60-65 %.   The left atrium is mildly dilated.   Mild to moderate aortic regurgitation   Mild (1+) tricuspid valve regurgitation. Normal PA systolic pressures.   EA reversal of the mitral inflow consistent with reduced compliance of   the   left ventricle.     Signature     ----------------------------------------------------------------   Electronically signed by Zohreh Allen MD(Interpreting   physician) on 03/13/2022 11:56 AM   ----------------------------------------------------------------    Valves     Mitral Valve     Peak E-Wave: 84.9 cm/s   Peak A-Wave: 100 cm/s               Deceleration Time: 187 msec   Peak Gradient: 2.88 mmHg                                       E/A Ratio: 0.85     Tissue Doppler     E' Velocity: 8.22 cm/s     Aortic Valve     Peak Velocity: 152 cm/s   Peak Gradient: 9.24 mmHg     Tricuspid Valve     TR Velocity: 236 cm/s                  Estimated RAP: 5 mmHg   TR Gradient: 22.2784 mmHg              Estimated RVSP: 20 mmHg     Pulmonic Valve              Estimated PASP: 27.28 mmHg     LVOT     Peak Velocity: 87 cm/s   Peak Gradient: 3 mmHg   LVOT Diameter: 2 cm    Structures     Left Atrium     LA Dimension: 4.1 cm          LA Area: 19.5 cm^2   LA/Aorta: 1.28                LA Volume/Index: 59 ml /41m^2     Left Ventricle     Diastolic Dimension: 4.7 cm          Systolic Dimension: 3.23 cm   Septum Diastolic: 1.06 cm   PW Diastolic: 1.06 cm     FS: 31.28 %   LVOT Diameter: 2 cm     Right Atrium     RA Systolic Pressure: 5 mmHg     Right Ventricle              RV Systolic Pressure: 27.28 mmHg     Miscellaneous     Aorta     Aortic Root: 3.2 cm   Ascending Aorta: 3.2 cm   LVOT Diameter: 2 cm                ZOHREH ALLEN MD; Attending Cardiologist  This document has been electronically signed. Mar 13 2022 11:56AM

## 2024-01-20 NOTE — PROGRESS NOTE ADULT - ASSESSMENT
89-year-old white female presently residing at home alone though does have assistance of an aide approximately 4 to 6 hours/day with past history of hypertension hyperlipidemia previous CVA basal cell carcinoma renal artery stenosis s/p stent as well as congestive heart failure who was last known to be well and last heard from sometime late afternoon yesterday.  Cardiology consulted for syncopal episode and elevated troponins.    Syncopal episode, Elevated Troponin, HTN, HLD, CHF   - As per patient it was a mechanical fall although patient slightly confused  - May have been orthostatic syncope; appears to be clinically dry   - Check orthostatics  - Tele w/ ST 1/19 am, became SR 70-90s, this morning 1/20 at 6:40 am converted to A fib -120s   - EKG w/ A fib @ 122. w/ continued diffuse TWI  - BP labile 90-160s, more hypotensive in A fib now   - Would hold hydralazine to allow room for AV nodals   - Continue clonidine w/ hold parameters  - Change Metoprolol to 50 mg PO BID  - Give metoprolol 5 mg IV x  1  - CHadsvasc 4-5. Can start FD Lovenox for now and decision for long term AC can be done pending clinical course   - Hold home losartan 2/2 TERESA, resume when able     - EKG revealing irregularity and possible af, though significant artifact  - Repeat EKG w/ ST @ 109, Diffuse T wave inversion, new   - Please obtain transthoracic echocardiogram to assess ventricular function, wall motion and valvular abnormalities, to be done today.   - CK trend: <--758, <--1624, <--2986  - Troponin: <-835.7, <-849.5, <-861.2  - Likely demand ischemia 2/2 rhabdomyolysis  - Continue home statin and Plavix     - BNP:  <-->027315,  <--537613  - TTE 4/23: EF 58%, mod MR, mild- mod TR, mild AR, IN.  - Does not appear to be volume overloaded at this time  - Hold home torsemide    - Creatinine:  <--1.70,  <--1.40    - Monitor and replete lytes, keep K>4, Mg>2.  - Will continue to follow.    Curtis Zavala, MS FNP, AGACNP  Nurse Practitioner- Cardiology   Please call on TEAMS

## 2024-01-21 LAB
-  AMIKACIN: SIGNIFICANT CHANGE UP
-  AZTREONAM: SIGNIFICANT CHANGE UP
-  CEFEPIME: SIGNIFICANT CHANGE UP
-  CEFTAZIDIME: SIGNIFICANT CHANGE UP
-  CIPROFLOXACIN: SIGNIFICANT CHANGE UP
-  IMIPENEM: SIGNIFICANT CHANGE UP
-  LEVOFLOXACIN: SIGNIFICANT CHANGE UP
-  MEROPENEM: SIGNIFICANT CHANGE UP
-  PIPERACILLIN/TAZOBACTAM: SIGNIFICANT CHANGE UP
ALBUMIN SERPL ELPH-MCNC: 1.8 G/DL — LOW (ref 3.3–5)
ALP SERPL-CCNC: 197 U/L — HIGH (ref 40–120)
ALT FLD-CCNC: 38 U/L — SIGNIFICANT CHANGE UP (ref 12–78)
ANION GAP SERPL CALC-SCNC: 5 MMOL/L — SIGNIFICANT CHANGE UP (ref 5–17)
AST SERPL-CCNC: 49 U/L — HIGH (ref 15–37)
BILIRUB SERPL-MCNC: 0.3 MG/DL — SIGNIFICANT CHANGE UP (ref 0.2–1.2)
BUN SERPL-MCNC: 74 MG/DL — HIGH (ref 7–23)
CALCIUM SERPL-MCNC: 7.9 MG/DL — LOW (ref 8.5–10.1)
CHLORIDE SERPL-SCNC: 111 MMOL/L — HIGH (ref 96–108)
CK MB BLD-MCNC: 3.5 % — SIGNIFICANT CHANGE UP (ref 0–3.5)
CK MB BLD-MCNC: 3.6 % — HIGH (ref 0–3.5)
CK MB CFR SERPL CALC: 10.7 NG/ML — HIGH (ref 0–3.6)
CK MB CFR SERPL CALC: 9.6 NG/ML — HIGH (ref 0–3.6)
CK SERPL-CCNC: 273 U/L — HIGH (ref 26–192)
CK SERPL-CCNC: 295 U/L — HIGH (ref 26–192)
CK SERPL-CCNC: 295 U/L — HIGH (ref 26–192)
CO2 SERPL-SCNC: 21 MMOL/L — LOW (ref 22–31)
CREAT SERPL-MCNC: 1.9 MG/DL — HIGH (ref 0.5–1.3)
CULTURE RESULTS: ABNORMAL
EGFR: 24 ML/MIN/1.73M2 — LOW
GLUCOSE SERPL-MCNC: 104 MG/DL — HIGH (ref 70–99)
HCT VFR BLD CALC: 28.7 % — LOW (ref 34.5–45)
HGB BLD-MCNC: 9.4 G/DL — LOW (ref 11.5–15.5)
MCHC RBC-ENTMCNC: 31.5 PG — SIGNIFICANT CHANGE UP (ref 27–34)
MCHC RBC-ENTMCNC: 32.8 GM/DL — SIGNIFICANT CHANGE UP (ref 32–36)
MCV RBC AUTO: 96.3 FL — SIGNIFICANT CHANGE UP (ref 80–100)
METHOD TYPE: SIGNIFICANT CHANGE UP
NRBC # BLD: 0 /100 WBCS — SIGNIFICANT CHANGE UP (ref 0–0)
ORGANISM # SPEC MICROSCOPIC CNT: ABNORMAL
ORGANISM # SPEC MICROSCOPIC CNT: SIGNIFICANT CHANGE UP
PLATELET # BLD AUTO: 324 K/UL — SIGNIFICANT CHANGE UP (ref 150–400)
POTASSIUM SERPL-MCNC: 4.3 MMOL/L — SIGNIFICANT CHANGE UP (ref 3.5–5.3)
POTASSIUM SERPL-SCNC: 4.3 MMOL/L — SIGNIFICANT CHANGE UP (ref 3.5–5.3)
PROT SERPL-MCNC: 4.5 G/DL — LOW (ref 6–8.3)
RBC # BLD: 2.98 M/UL — LOW (ref 3.8–5.2)
RBC # FLD: 12.7 % — SIGNIFICANT CHANGE UP (ref 10.3–14.5)
SODIUM SERPL-SCNC: 137 MMOL/L — SIGNIFICANT CHANGE UP (ref 135–145)
SPECIMEN SOURCE: SIGNIFICANT CHANGE UP
TROPONIN I, HIGH SENSITIVITY RESULT: 640.4 NG/L — HIGH
WBC # BLD: 18.5 K/UL — HIGH (ref 3.8–10.5)
WBC # FLD AUTO: 18.5 K/UL — HIGH (ref 3.8–10.5)

## 2024-01-21 PROCEDURE — 93010 ELECTROCARDIOGRAM REPORT: CPT

## 2024-01-21 PROCEDURE — 99233 SBSQ HOSP IP/OBS HIGH 50: CPT

## 2024-01-21 PROCEDURE — 71045 X-RAY EXAM CHEST 1 VIEW: CPT | Mod: 26

## 2024-01-21 RX ORDER — ATORVASTATIN CALCIUM 80 MG/1
40 TABLET, FILM COATED ORAL AT BEDTIME
Refills: 0 | Status: DISCONTINUED | OUTPATIENT
Start: 2024-01-21 | End: 2024-01-29

## 2024-01-21 RX ADMIN — HEPARIN SODIUM 5000 UNIT(S): 5000 INJECTION INTRAVENOUS; SUBCUTANEOUS at 21:55

## 2024-01-21 RX ADMIN — HEPARIN SODIUM 5000 UNIT(S): 5000 INJECTION INTRAVENOUS; SUBCUTANEOUS at 05:44

## 2024-01-21 RX ADMIN — Medication 5 MILLIGRAM(S): at 21:55

## 2024-01-21 RX ADMIN — Medication 0.2 MILLIGRAM(S): at 13:04

## 2024-01-21 RX ADMIN — CLOPIDOGREL BISULFATE 75 MILLIGRAM(S): 75 TABLET, FILM COATED ORAL at 11:17

## 2024-01-21 RX ADMIN — Medication 50 MILLIGRAM(S): at 05:46

## 2024-01-21 RX ADMIN — Medication 50 MILLIGRAM(S): at 17:17

## 2024-01-21 RX ADMIN — Medication 3 MILLIGRAM(S): at 01:53

## 2024-01-21 RX ADMIN — HEPARIN SODIUM 5000 UNIT(S): 5000 INJECTION INTRAVENOUS; SUBCUTANEOUS at 13:04

## 2024-01-21 RX ADMIN — Medication 0.2 MILLIGRAM(S): at 21:55

## 2024-01-21 RX ADMIN — CEFTRIAXONE 100 MILLIGRAM(S): 500 INJECTION, POWDER, FOR SOLUTION INTRAMUSCULAR; INTRAVENOUS at 13:05

## 2024-01-21 RX ADMIN — ATORVASTATIN CALCIUM 40 MILLIGRAM(S): 80 TABLET, FILM COATED ORAL at 21:55

## 2024-01-21 RX ADMIN — Medication 0.2 MILLIGRAM(S): at 05:44

## 2024-01-21 NOTE — PROVIDER CONTACT NOTE (CHANGE IN STATUS NOTIFICATION) - BACKGROUND
Admitted for  Non-ST elevation, myocardial infarction (NSTEMI)
Patient has no voided since straight cath 1/20 at 1905, bladder is distended with bladder scan revealing greater than 350 residual urine
Patient did not void throughout shift. Bladder scan reveals 450ml with distended bladder. Pt stated she does not feel the urge to void.

## 2024-01-21 NOTE — OCCUPATIONAL THERAPY INITIAL EVALUATION ADULT - ADDITIONAL COMMENTS
Pt reports she lives in a private home alone with 4 SHIV with HRs and no stairs inside the home. Pt has a walk-in shower with grab bars and a shower chair. Pt reports PTA she was ambulating with a RW for longer distances and was independent with all ADLs. As per EMR, pt's daughter reports she requires assist with ADLs, cooking, and cleaning. Pt reports she has 3 children who live nearby who can assist with anything as needed. As per EMR, pt has a HHA that comes only 2x/week for 4 hours to assist with cleaning and other ADLs as needed. Pt owns a wheelchair.

## 2024-01-21 NOTE — PROVIDER CONTACT NOTE (CHANGE IN STATUS NOTIFICATION) - ACTION/TREATMENT ORDERED:
Dr. Zavala made aware of situation and order for straight cath
Dr. Zavala made aware of patient situation. Order for straight cath, will repeat bladder scan at 3pm
No new orders at this time.

## 2024-01-21 NOTE — PROGRESS NOTE ADULT - ASSESSMENT
89F, HTN, HL, chronic diastolic CHF, hx BOB/stent, hx CVA, hx BCC; adm s/p mechanical fall at home - found on floor by family - mgmt for:  -fall - appears to be mechanical fall? -r/o'd traumatic injury - CTH neg, CT C-spine neg, Xray pelvis neg, Xray R knee neg, Xray L forearm neg, Xray L humerus neg, Xray L shoulder neg - still w/some ecchymotic bruising L forearm - monitoring; prn analgesia  -sepsis - likely UTI assoc w/leukocytosis, tachycardia - WBCs 21s, HR 130s - UA contaminated sample - CXR neg; on IV abxs cvrg w/ceftriaxone for poss UTI - plan for 5d course - f/up cxs; WBCs downtrending - monitoring  -rhabdomyolysis - in setting of fall and being on ground/unable to get up - CK 2986 on admit ->downtrending on IVFs -  today - continue monitoring on IVFs; statin on hold statin  -elev trops - 861 on admit - per Cards, likely demand ischemia; TTE pending  -CHF - no e/o vol decompensation - overall appears volume down - monitoring off diuretics for now; continue metoprolol  -TERESA - worsening renal dysfunction - Cr 1.4 on admit ->->2.1 - likely multifactorial 2/2 rhabdo and urinary retention - downtrending p straight cath - renal ultrasound PENDING; holding torsemide and losartan - renally dose meds, no nephrotoxics  -urinary retention - s/p straight cath x2 w/UOP>300s - again bladder scan w/>300 - plan for kwan catheter placement if UOP obtained >300  -elev LFTs - unclear etiology - statin on hold- abd ultrasound PENDING to further eval - trending measures w/daily labs  -HTN, HL, hx CVA, hx BOB/stent - continue plavix, clonidine, hydralazine, metoprolol; statin on hold w/elev LFTs  -dvt prophy      1/19 - SPK TO PT DAUGHTER SULTANA BY [phone] - ALL DIAGNOSES/MGMT PLANS, AND PT CLINICAL STATUS DISCUSSED IN EXTENSIVE DETAIL; ALL QUESTIONS ANSWERED COMPREHENSIVELY.

## 2024-01-21 NOTE — PROGRESS NOTE ADULT - ASSESSMENT
89-year-old white female presently residing at home alone though does have assistance of an aide approximately 4 to 6 hours/day with past history of hypertension hyperlipidemia previous CVA basal cell carcinoma renal artery stenosis s/p stent as well as congestive heart failure who was last known to be well and last heard from sometime late afternoon yesterday.  Cardiology consulted for syncopal episode and elevated troponins.    Syncopal episode, Elevated Troponin, HTN, HLD, CHF   - As per patient it was a mechanical fall although patient slightly confused  - May have been orthostatic syncope; appears to be clinically dry   - Check orthostatics    - BNP:  <-->728896,  <--636863  - TTE 4/23: EF 58%, mod MR, mild- mod TR, mild AR, KS.  - Does not appear to be volume overloaded at this time  - Hold home torsemide  - Creatinine:  <--1.90,  <--2.10,  <--1.70,  <--1.40. ON IV hydration     - Tele w/ ST 1/19 am, converted to A fib -120s 1/19, back to SR 1/20  - Change Metoprolol to 50 mg PO BID  - CHadsvasc 4-5. Can start FD Lovenox for now and decision for long term AC can be done pending clinical course     - BP stable and controlled   - Hold home losartan 2/2 TERESA, resume when able   - Continue clonidine w/ hold parameters    - EKG w/ A fib @ 122. w/ continued diffuse TWI  - Repeat EKG w/ ST @ 109, Diffuse T wave inversion, new   - Please obtain transthoracic echocardiogram to assess ventricular function, wall motion and valvular abnormalities, to be done today.   - CK trend: <--758, <--1624, <--2986  - Troponin: <-835.7, <-849.5, <-861.2  - Likely demand ischemia 2/2 rhabdomyolysis  - Continue home statin and Plavix     - Monitor and replete lytes, keep K>4, Mg>2.  - Will continue to follow.    Curtis Zavala, MS FNP, AGAP  Nurse Practitioner- Cardiology   Please call on TEAMS

## 2024-01-21 NOTE — OCCUPATIONAL THERAPY INITIAL EVALUATION ADULT - PERTINENT HX OF CURRENT PROBLEM, REHAB EVAL
As per H&P: "89 yo F with PMHx of HTN, HLD, Renal Artery Stenosis s/p stent, Basal Cell Carcinoma, CHFpEF CVA  presenting to ED s/p mechanical fall early last night. Pt states that she was standing upright in her kitchen when she tripped over "something" on the floor of her kitchen. Pt had an unwitnessed fall on her left side with head involvment, but adamantly denies LOC. States she was in pain and is generally weak so she was unable to get up without her walker throughout the night. Pt.'s daughter was unable to get rosetta contact with the patient, so she went to her house and found her lying on the floor. EMS was then called and the patient was transported to [facility] ED w/ cervical stabilization in a collar. Pt states she currently has neck and B/L knee pain L>R. Endorses increased neck pain before the fall as her arthritis has been "acting up since the weather got cold." States she last had a normal BM yesterday, last urinated without dysuria this afternoon. Denies headache, visual changes, palpitations, cp, abd pain, dysuria, urinary frequency, hip pain, fatigue, focal weakness, n/v/d/c, hematuria. Denies feeling ill recently although endorses having a friend who she recently met with who is sick with an URI."   Admission dx: NSTEMI  HEAD CT:  No evidence of an acute traumatic intracranial injury.  CERVICAL SPINE CT:  No evidence of an acute cervical spine fracture.  X-Ray LUE: No fracture of the left shoulder, humerus or forearm.

## 2024-01-21 NOTE — PHYSICAL THERAPY INITIAL EVALUATION ADULT - PERTINENT HX OF CURRENT PROBLEM, REHAB EVAL
As per H&P: "89 yo F with PMHx of HTN, HLD, Renal Artery Stenosis s/p stent, Basal Cell Carcinoma, CHFpEF CVA  presenting to ED s/p mechanical fall early last night. Pt states that she was standing upright in her kitchen when she tripped over "something" on the floor of her kitchen. Pt had an unwitnessed fall on her left side with head involvment, but adamantly denies LOC. States she was in pain and is generally weak so she was unable to get up without her walker throughout the night. Pt.'s daughter was unable to get rosetta contact with the patient, so she went to her house and found her lying on the floor. EMS was then called and the patient was transported to [facility] ED w/ cervical stabilization in a collar. Pt states she currently has neck and B/L knee pain L>R. Endorses increased neck pain before the fall as her arthritis has been "acting up since the weather got cold." States she last had a normal BM yesterday, last urinated without dysuria this afternoon. Denies headache, visual changes, palpitations, cp, abd pain, dysuria, urinary frequency, hip pain, fatigue, focal weakness, n/v/d/c, hematuria. Denies feeling ill recently although endorses having a friend who she recently met with who is sick with an URI."   Admission dx: NSTEMI  HEAD CT:  No evidence of an acute traumatic intracranial injury.  CERVICAL SPINE CT:  No evidence of an acute cervical spine fracture.  X-Ray LUE: No fracture of the left shoulder, humerus or forearm.

## 2024-01-21 NOTE — PROGRESS NOTE ADULT - SUBJECTIVE AND OBJECTIVE BOX
[facility] -- Ricky Agarwal, Ramsey Quan, Maulik, Ady Sams: Office # [phone]    Follow Up: Syncope      Subjective/Observations: Patient awake, alert, resting in bed. No complaints of chest pain, dyspnea, palpitations or dizziness.   No signs of orthopnea or PND. Tolerating room air. Converted to SR , IVF @ 50    REVIEW OF SYSTEMS: All other review of systems are negative unless indicated above    PAST MEDICAL & SURGICAL HISTORY:  Essential hypertension      Dyslipidemia      Hyponatremia  ON HCTZ , h/o Hypokelemia      Cerebrovascular accident (CVA), unspecified mechanism      Edema, unspecified type      Mitral valve insufficiency, unspecified etiology  Cardiac cath on 11/21/18      Basal cell carcinoma      Renal artery stenosis  right side, stent      CHF (congestive heart failure)      Inguinal hernia, right      Blood pressure instability      S/P Mohs surgery for basal cell carcinoma      Status post hysterectomy  and bladder lift with mesh 1990s      History of cholecystectomy  1980      H/O bilateral hip replacements  2013, 2014      Other elective surgery  right renal artery stent      S/P colonoscopy          MEDICATIONS  (STANDING):  bisacodyl 5 milliGRAM(s) Oral at bedtime  cefTRIAXone   IVPB 1000 milliGRAM(s) IV Intermittent every 24 hours  cloNIDine 0.2 milliGRAM(s) Oral three times a day  clopidogrel Tablet 75 milliGRAM(s) Oral daily  heparin   Injectable 5000 Unit(s) SubCutaneous every 8 hours  influenza  Vaccine (HIGH DOSE) 0.7 milliLiter(s) IntraMuscular once  metoprolol tartrate 50 milliGRAM(s) Oral two times a day    MEDICATIONS  (PRN):  acetaminophen     Tablet .. 650 milliGRAM(s) Oral every 6 hours PRN Mild Pain (1 - 3)  melatonin 3 milliGRAM(s) Oral at bedtime PRN Insomnia    Allergies    IV Contrast (Pruritus; Rash)  verapamil (Other)  Keflex (Other)    Intolerances      Vital Signs Last 24 Hrs  T(C): 36.7 (21 Jan 2024 10:18), Max: 36.7 (20 Jan 2024 17:25)  T(F): 98.1 (21 Jan 2024 10:18), Max: 98.1 (20 Jan 2024 17:25)  HR: 81 (21 Jan 2024 10:18) (79 - 99)  BP: 124/71 (21 Jan 2024 10:18) (98/63 - 158/87)  BP(mean): --  RR: 18 (21 Jan 2024 10:18) (17 - 18)  SpO2: 91% (21 Jan 2024 10:18) (91% - 96%)    Parameters below as of 21 Jan 2024 10:18  Patient On (Oxygen Delivery Method): room air      I&O's Summary    20 Jan 2024 07:01  -  21 Jan 2024 07:00  --------------------------------------------------------  IN: 0 mL / OUT: 400 mL / NET: -400 mL        TELE: A fib -130s  till 2:30 pm, converted to SR 70-90s 4 beats and 9 beats nSVT  PHYSICAL EXAM:  Constitutional: NAD, awake and alert  HEENT: Moist Mucous Membranes, Anicteric  Pulmonary: Non-labored, breath sounds are clear bilaterally, No wheezing, rales or rhonchi  Cardiovascular: Regular, S1 and S2, No murmurs, No rubs, gallops or clicks  Gastrointestinal:  soft, nontender, nondistended   Lymph: No peripheral edema. No lymphadenopathy.   Skin: No visible rashes or ulcers.  Psych:  Mood & affect appropriate      LABS: All Labs Reviewed:                        9.4    18.50 )-----------( 324      ( 21 Jan 2024 04:25 )             28.7                         10.8   23.66 )-----------( 396      ( 20 Jan 2024 10:40 )             33.2                         12.5   26.14 )-----------( 443      ( 19 Jan 2024 07:03 )             37.6     21 Jan 2024 04:25    137    |  111    |  74     ----------------------------<  104    4.3     |  21     |  1.90   20 Jan 2024 10:40    138    |  109    |  77     ----------------------------<  195    5.1     |  22     |  2.10   19 Jan 2024 07:03    141    |  113    |  62     ----------------------------<  163    5.4     |  20     |  1.70     Ca    7.9        21 Jan 2024 04:25  Ca    8.1        20 Jan 2024 10:40  Ca    8.8        19 Jan 2024 07:03  Phos  3.8       18 Jan 2024 15:46  Mg     2.4       18 Jan 2024 15:46    TPro  4.5    /  Alb  1.8    /  TBili  0.3    /  DBili  x      /  AST  49     /  ALT  38     /  AlkPhos  197    21 Jan 2024 04:25  TPro  4.8    /  Alb  1.9    /  TBili  0.4    /  DBili  x      /  AST  62     /  ALT  40     /  AlkPhos  239    20 Jan 2024 10:40  TPro  5.2    /  Alb  2.3    /  TBili  1.0    /  DBili  x      /  AST  92     /  ALT  41     /  AlkPhos  194    19 Jan 2024 07:03   LIVER FUNCTIONS - ( 21 Jan 2024 04:25 )  Alb: 1.8 g/dL / Pro: 4.5 g/dL / ALK PHOS: 197 U/L / ALT: 38 U/L / AST: 49 U/L / GGT: x           Creatine Kinase, Serum: 295 U/L (01-21-24 @ 04:25)  Creatine Kinase, Serum: 295 U/L (01-21-24 @ 04:25)  Troponin I, High Sensitivity Result: 640.4 ng/L (01-21-24 @ 04:25)  Creatine Kinase, Serum: 333 U/L (01-20-24 @ 10:40)  Creatine Kinase, Serum: 758 U/L (01-19-24 @ 07:03)  Cholesterol: 138 mg/dL (01-19-24 @ 07:03)  HDL Cholesterol: 69 mg/dL (01-19-24 @ 07:03)  Triglycerides, Serum: 70 mg/dL (01-19-24 @ 07:03)  Lactate, Blood: 1.5 mmol/L (01-19-24 @ 00:19)  Lactate, Blood: 2.7 mmol/L (01-18-24 @ 17:32)    12 Lead ECG:   Ventricular Rate 83 BPM    Atrial Rate 83 BPM    P-R Interval 142 ms    QRS Duration 82 ms    Q-T Interval 408 ms    QTC Calculation(Bazett) 479 ms    P Axis 51 degrees    R Axis 43 degrees    T Axis 259 degrees    Diagnosis Line Sinus rhythm with premature atrial complexes  Minimal voltage criteria for LVH, may be normal variant ( Sokolow-Elaine )  ST & Marked T wave abnormality, consider anterolateral ischemia  Prolonged QT  Abnormal ECG  When compared with ECG of 20-JAN-2024 07:55,  Sinus rhythm has replaced Atrial fibrillation  Confirmed by Poly Sams ([id number]) on 1/21/2024 9:06:18 AM (01-21-24 @ 03:09)      TRANSTHORACIC ECHOCARDIOGRAM REPORT  ________________________________________________________________________________                                      _______       Pt. Name:       MARU MOYA Study Date:    1/19/2024  MRN:            [id number]           YOB: 1931  Accession #:    [id number]          Age:           89 years  Account#:       [id number]         Gender:        F  Heart Rate:                        Height:        ( )  Rhythm:                            Weight:        ( )  Blood Pressure: 137/89 mmHg        BSA/BMI:       /  ________________________________________________________________________________________  Referring Physician:    [id number] Doroteo Hernandez  Interpreting Physician: Poly Sams MD  Primary Sonographer:    Edwin Ochoa    CPT:               ECHO TTE WO CON COMP W DOPP - 80938.m  Indication(s):     Heart failure, unspecified - I50.9  Procedure:         Transthoracic echocardiogram with 2-D, M-mode and complete        spectral and color flow Doppler.  Ordering Location: [facility]  Admission Status:  Inpatient  Study Information: Image quality for this study is technically difficult.    _______________________________________________________________________________________     CONCLUSIONS:      1. Technically difficult image quality.   2. Left ventricular systolic function is moderately decreased with an ejection fraction visually estimated at 35 to 40 %. Regional wall motion abnormalities present.   3. Multiple segmental abnormalities exist. See findings.   4. Normal right ventricular cavity size and probably normal systolic function.   5. The left atrium is moderately dilated.   6. The right atrium is normal in size.   7. Tricuspid aortic valve with normal leaflet excursion. There is mild calcification of the aortic valve leaflets.   8. Mild aortic regurgitation.   9. Mild mitral valve leaflet calcification.  10. Moderate mitral regurgitation.  11. Mild tricuspid regurgitation.  12. Trace pulmonic regurgitation.  13. The inferior vena cava is normal in size measuring 1.84 cm in diameter, (normal <2.1cm) with normal inspiratory collapse (normal >50%) consistent with normal right atrial pressure (~3, range 0-5mmHg).  14. Estimated pulmonary artery systolic pressure is 33 mmHg.    ________________________________________________________________________________________  FINDINGS:     Left Ventricle:  Left ventricular systolic function is moderately decreased with an ejection fraction visually estimated at 35 to 40%. There are regional wall motion abnormalities present.  LV Wall Scoring: The entire apex is akinetic. The basal and mid anterior wall,  basal and mid anterior septum, basal and mid inferior septum, basal and mid  inferior wall, andbasal and mid inferolateral wall are hypokinetic. All  remaining scored segments are normal.          Right Ventricle:  The right ventricular cavity is normal in size and probably normal systolic function. Tricuspid annular plane systolic excursion (TAPSE) is 1.9 cm (normal >=1.7 cm).     Left Atrium:  The left atrium is moderately dilated.     Right Atrium:  The right atrium is normal in size.     Aortic Valve:  The aortic valve is tricuspid with normal leaflet excursion. There is mild calcification of the aortic valve leaflets. There is mild aortic regurgitation.     Mitral Valve:  There is mild leaflet calcification. There is moderate mitral regurgitation.     Tricuspid Valve:  There is mild tricuspid regurgitation. Estimated pulmonary artery systolic pressure is 33 mmHg.     Pulmonic Valve:  There is trace pulmonic regurgitation.     Aorta:  The aortic root at the sinuses of Valsalva is normal in size. The ascending aorta diameter is normal in size.     Systemic Veins:  The inferior vena cava is normal in size measuring 1.84 cm in diameter, (normal <2.1cm) with normal inspiratory collapse (normal >50%) consistent with normal right atrial pressure (~3, range 0-5mmHg).  ____________________________________________________________________  QUANTITATIVE DATA:  Left Ventricle Measurements: (Indexed to BSA)     IVSd (2D):   1.2 cm  LVPWd (2D):  1.0 cm  LVIDd (2D):  4.3 cm  LVIDs (2D):  3.3 cm  LV Mass:     160 g  Visualized LV EF%: 35 to 40%     MV E Vmax:    1.02 m/s  MV A Vmax:0.62 m/s  MV E/A:       1.63  e' lateral:   7.07 cm/s  e' medial:    4.57 cm/s  E/e' lateral: 14.43  E/e' medial:  22.32  E/e' Average: 17.53  MV DT:        144 msec    Aorta Measurements: (normal range) (Indexed to BSA)     Sinuses of Valsalva: 2.90cm (2.7 - 3.3 cm)  Ao Asc prox:         2.70 cm       Left Atrium Measurements: (Indexed to BSA)  LA Diam 2D: 4.00 cm    Right Ventricle Measurements:     TAPSE:            1.9 cm  RV Base (RVID1):  2.7 cm  RV Mid (RVID2):   2.4 cm  RV Major (RVID3):5.8 cm       LVOT / RVOT/ Qp/Qs Data: (Indexed to BSA)  LVOT Diameter: 1.80 cm    Aortic Valve Measurements:  AR Vmax  3.51 m/s  AR PHT   356 msec  AR Mitchell 2.89 m/s²    Mitral Valve Measurements:     MV E Vmax: 1.0 m/s         MR Vmax:          4.84m/s  MV A Vmax: 0.6 m/s         MR VTI:           127.00 cm  MV E/A:    1.6             MR Mean Gradient: 53.0 mmHg                             MR Peak Gradient: 93.7 mmHg       Tricuspid Valve Measurements:     TR Vmax:          2.8 m/s  TR Peak Gradient: 30.5 mmHg  RA Pressure:      3 mmHg  PASP:             33 mmHg    ________________________________________________________________________________________  Electronically signed on 1/20/2024 at 3:31:24 PM by Poly Sams MD  *** Final ***

## 2024-01-21 NOTE — OCCUPATIONAL THERAPY INITIAL EVALUATION ADULT - ADL RETRAINING, OT EVAL
Pt will complete LB dressing, with AE as needed, with MinAx1 by 2-5 sessions. Pt will complete grooming activity while standing at sink with MinAx1 by 2-5 sessions.

## 2024-01-21 NOTE — OCCUPATIONAL THERAPY INITIAL EVALUATION ADULT - DIAGNOSIS, OT EVAL
Pt with generalized weakness and poor endurance impacting ability to complete ADLs, IADLs, functional mobility/transfers.

## 2024-01-21 NOTE — PROVIDER CONTACT NOTE (CHANGE IN STATUS NOTIFICATION) - SITUATION
Patient admit for sepsis secondary to UTI receiving ceftriaxone IVPB
Patient admit for sepsis secondary to UTI currently receiving ceftriaxone IVPB
Pt had 9 beats of V-tach at 0134. Pt had no c/o at that time when assessed by her nurse. VS were taken. See vital sign record. O2 sat was 89% on room air. Pt was put on 2L N/C.

## 2024-01-21 NOTE — CHART NOTE - NSCHARTNOTEFT_GEN_A_CORE
Called by RN for 9 beats of Vtach and desaturation SPO2 89%. Patient seen and examined at bedside. Patient sleeping comfortably and on nasal cannula.   Will order STAT EKG, Mag and Phos for AM and CXR. Called by RN for 9 beats of Vtach and desaturation SPO2 89%. Patient seen and examined at bedside. Patient sleeping comfortably and on nasal cannula.   Will order STAT EKG, Mag and Phos for AM and CXR, and repeat cardiac enzymes. Patient on Plavix and Heparin q8hs. Called by RN for 9 beats of Vtach and desaturation SPO2 89%. Patient seen and examined at bedside. Patient sleeping comfortably and on nasal cannula.   Will order STAT EKG, Mag and Phos for AM and CXR, and repeat cardiac enzymes. Patient on Plavix and Heparin q8hs.    Addendum 0610  Repeat Troponin elevated, will order repeat set

## 2024-01-21 NOTE — PROGRESS NOTE ADULT - SUBJECTIVE AND OBJECTIVE BOX
CC/F/U for: fall, rhabdomyolysis    HPI:  89 yo F with PMHx of HTN, HLD, Renal Artery Stenosis s/p stent, Basal Cell Carcinoma, CHFpEF CVA  presenting to ED s/p mechanical fall early last night. Pt states that she was standing upright in her kitchen when she tripped over "something" on the floor of her kitchen. Pt had an unwitnessed fall on her left side with head involvment, but adamantly denies LOC. States she was in pain and is generally weak so she was unable to get up without her walker throughout the night. Pt.'s daughter was unable to get rosetta contact with the patient, so she went to her house and found her lying on the floor. EMS was then called and the patient was transported [facility] ED w/ cervical stabilization in a collar. Pt states she currently has neck and B/L knee pain L>R. Endorses increased neck pain before the fall as her arthritis has been "acting up since the weather got cold." States she last had a normal BM yesterday, last urinated without dysuria this afternoon. Denies headache, visual changes, palpitations, cp, abd pain, dysuria, urinary frequency, hip pain, fatigue, focal weakness, n/v/d/c, hematuria. Denies feeling ill recently although endorses having a friend who she recently met with who is sick with an URI.      ED Course:   Vitals: BP: 170/60, HR: 78, Temp: 97, RR: 16, SpO2: 96% on RA Labs:   WBC: 21.04, H/H 14.7/44.2, Platelet 580, Trop 861.2, CK 2986, Cr 1.40, BUN54, eGFR 35, AlkPhos 182, ,   UA: Pending collection   CXR: Heart is enlarged. Right upper quadrant clips are noted. No lung consolidation or layering effusion is evident. No visible fracture   CT Head/Neck: negative for acute intracranial pathology, hemorrhage, or fracture   EKG: artifact, repeat pending   Received in the ED: 1L LR Bolus   TTE: 3/2022 LVEF 60-65 %, normal LV funciton, contraction, LA mildly dilated, Mild to moderate AR,  Mild (1+) tricuspid valve regurgitation. EA reversal of the mitral inflow consistent with reduced compliance of the left ventricle.     (18 Jan 2024 17:07)        INTERVAL HPI/OVERNIGHT EVENTS:  Pt seen and examined at bedside - urinary retention - s/p straight caths x 2 w/urine volumes >300.     Allergies/Intolerance: IV Contrast (Pruritus; Rash)  verapamil (Other)  Keflex (Other)      MEDICATIONS  (STANDING):  atorvastatin 40 milliGRAM(s) Oral at bedtime  bisacodyl 5 milliGRAM(s) Oral at bedtime  cefTRIAXone   IVPB 1000 milliGRAM(s) IV Intermittent every 24 hours  cloNIDine 0.2 milliGRAM(s) Oral three times a day  clopidogrel Tablet 75 milliGRAM(s) Oral daily  heparin   Injectable 5000 Unit(s) SubCutaneous every 8 hours  influenza  Vaccine (HIGH DOSE) 0.7 milliLiter(s) IntraMuscular once  metoprolol tartrate 50 milliGRAM(s) Oral two times a day    MEDICATIONS  (PRN):  acetaminophen     Tablet .. 650 milliGRAM(s) Oral every 6 hours PRN Mild Pain (1 - 3)  melatonin 3 milliGRAM(s) Oral at bedtime PRN Insomnia        ROS: as above; all other systems reviewed and wnl      PHYSICAL EXAMINATION:  Vital Signs Last 24 Hrs  T(C): 36.4 (21 Jan 2024 11:06), Max: 36.7 (21 Jan 2024 01:40)  T(F): 97.6 (21 Jan 2024 11:06), Max: 98.1 (21 Jan 2024 01:40)  HR: 92 (21 Jan 2024 17:16) (79 - 92)  BP: 147/89 (21 Jan 2024 17:16) (124/71 - 161/82)  BP(mean): --  RR: 17 (21 Jan 2024 11:06) (17 - 18)  SpO2: 91% (21 Jan 2024 11:06) (91% - 96%)    Parameters below as of 21 Jan 2024 11:06  Patient On (Oxygen Delivery Method): room air      GENERAL: elderly female, in NAD  HEENT: mucous membranes dry  RESP: respirations unlabored  HEART: HR 90s  ABDOMEN: soft, ND, NT   EXTREMITIES:  no edema b/l LEs, no calf tenderness; ecchymotic bruising LUE  NEURO: awake, alert, interactive; moves all extremities      LABS:                        9.4    18.50 )-----------( 324      ( 21 Jan 2024 04:25 )             28.7     01-21    137  |  111<H>  |  74<H>  ----------------------------<  104<H>  4.3   |  21<L>  |  1.90<H>    Ca    7.9<L>      21 Jan 2024 04:25    TPro  4.5<L>  /  Alb  1.8<L>  /  TBili  0.3  /  DBili  x   /  AST  49<H>  /  ALT  38  /  AlkPhos  197<H>  01-21      Urinalysis Basic - ( 21 Jan 2024 04:25 )    Color: x / Appearance: x / SG: x / pH: x  Gluc: 104 mg/dL / Ketone: x  / Bili: x / Urobili: x   Blood: x / Protein: x / Nitrite: x   Leuk Esterase: x / RBC: x / WBC x   Sq Epi: x / Non Sq Epi: x / Bacteria: x

## 2024-01-21 NOTE — PHYSICAL THERAPY INITIAL EVALUATION ADULT - ADDITIONAL COMMENTS
Pt reports she lives in a private home alone with 4 SHIV with HRs and nostairs inside the home. Pt has a walk-in shower with grab bars and a shower chair. Pt reports PTA she was ambulating with a RW for longer distances and was independent with all ADLs. As per EMR, pt's daughter reports she requires assist with ADLs, cooking, and cleaning. Pt reports she has 3 children who live nearby who can assist with anything as needed. As per EMR, pt has a HHA that comes only 2x/week for 4 hours to assist with cleaning and other ADLs as needed. Pt owns a wheelchair.

## 2024-01-22 DIAGNOSIS — I48.0 PAROXYSMAL ATRIAL FIBRILLATION: ICD-10-CM

## 2024-01-22 DIAGNOSIS — I50.22 CHRONIC SYSTOLIC (CONGESTIVE) HEART FAILURE: ICD-10-CM

## 2024-01-22 LAB
ALBUMIN SERPL ELPH-MCNC: 1.8 G/DL — LOW (ref 3.3–5)
ALP SERPL-CCNC: 173 U/L — HIGH (ref 40–120)
ALT FLD-CCNC: 39 U/L — SIGNIFICANT CHANGE UP (ref 12–78)
ANION GAP SERPL CALC-SCNC: 5 MMOL/L — SIGNIFICANT CHANGE UP (ref 5–17)
AST SERPL-CCNC: 37 U/L — SIGNIFICANT CHANGE UP (ref 15–37)
BILIRUB SERPL-MCNC: 0.3 MG/DL — SIGNIFICANT CHANGE UP (ref 0.2–1.2)
BUN SERPL-MCNC: 67 MG/DL — HIGH (ref 7–23)
CALCIUM SERPL-MCNC: 8.4 MG/DL — LOW (ref 8.5–10.1)
CHLORIDE SERPL-SCNC: 109 MMOL/L — HIGH (ref 96–108)
CK SERPL-CCNC: 134 U/L — SIGNIFICANT CHANGE UP (ref 26–192)
CO2 SERPL-SCNC: 24 MMOL/L — SIGNIFICANT CHANGE UP (ref 22–31)
CREAT SERPL-MCNC: 1.8 MG/DL — HIGH (ref 0.5–1.3)
EGFR: 26 ML/MIN/1.73M2 — LOW
GLUCOSE SERPL-MCNC: 123 MG/DL — HIGH (ref 70–99)
HCT VFR BLD CALC: 30.4 % — LOW (ref 34.5–45)
HGB BLD-MCNC: 9.5 G/DL — LOW (ref 11.5–15.5)
MAGNESIUM SERPL-MCNC: 2.4 MG/DL — SIGNIFICANT CHANGE UP (ref 1.6–2.6)
MCHC RBC-ENTMCNC: 31 PG — SIGNIFICANT CHANGE UP (ref 27–34)
MCHC RBC-ENTMCNC: 31.3 GM/DL — LOW (ref 32–36)
MCV RBC AUTO: 99.3 FL — SIGNIFICANT CHANGE UP (ref 80–100)
NRBC # BLD: 0 /100 WBCS — SIGNIFICANT CHANGE UP (ref 0–0)
PHOSPHATE SERPL-MCNC: 3.5 MG/DL — SIGNIFICANT CHANGE UP (ref 2.5–4.5)
PLATELET # BLD AUTO: 351 K/UL — SIGNIFICANT CHANGE UP (ref 150–400)
POTASSIUM SERPL-MCNC: 5 MMOL/L — SIGNIFICANT CHANGE UP (ref 3.5–5.3)
POTASSIUM SERPL-SCNC: 5 MMOL/L — SIGNIFICANT CHANGE UP (ref 3.5–5.3)
PROT SERPL-MCNC: 4.8 G/DL — LOW (ref 6–8.3)
RBC # BLD: 3.06 M/UL — LOW (ref 3.8–5.2)
RBC # FLD: 12.5 % — SIGNIFICANT CHANGE UP (ref 10.3–14.5)
SODIUM SERPL-SCNC: 138 MMOL/L — SIGNIFICANT CHANGE UP (ref 135–145)
WBC # BLD: 14.15 K/UL — HIGH (ref 3.8–10.5)
WBC # FLD AUTO: 14.15 K/UL — HIGH (ref 3.8–10.5)

## 2024-01-22 PROCEDURE — 99233 SBSQ HOSP IP/OBS HIGH 50: CPT

## 2024-01-22 PROCEDURE — 93010 ELECTROCARDIOGRAM REPORT: CPT

## 2024-01-22 RX ORDER — PIPERACILLIN AND TAZOBACTAM 4; .5 G/20ML; G/20ML
3.38 INJECTION, POWDER, LYOPHILIZED, FOR SOLUTION INTRAVENOUS ONCE
Refills: 0 | Status: COMPLETED | OUTPATIENT
Start: 2024-01-22 | End: 2024-01-22

## 2024-01-22 RX ORDER — TAMSULOSIN HYDROCHLORIDE 0.4 MG/1
0.4 CAPSULE ORAL AT BEDTIME
Refills: 0 | Status: DISCONTINUED | OUTPATIENT
Start: 2024-01-22 | End: 2024-01-29

## 2024-01-22 RX ORDER — PIPERACILLIN AND TAZOBACTAM 4; .5 G/20ML; G/20ML
3.38 INJECTION, POWDER, LYOPHILIZED, FOR SOLUTION INTRAVENOUS EVERY 8 HOURS
Refills: 0 | Status: DISCONTINUED | OUTPATIENT
Start: 2024-01-22 | End: 2024-01-24

## 2024-01-22 RX ORDER — METOPROLOL TARTRATE 50 MG
50 TABLET ORAL EVERY 12 HOURS
Refills: 0 | Status: DISCONTINUED | OUTPATIENT
Start: 2024-01-22 | End: 2024-01-23

## 2024-01-22 RX ORDER — HYDRALAZINE HCL 50 MG
25 TABLET ORAL THREE TIMES A DAY
Refills: 0 | Status: DISCONTINUED | OUTPATIENT
Start: 2024-01-22 | End: 2024-01-23

## 2024-01-22 RX ADMIN — Medication 0.2 MILLIGRAM(S): at 05:10

## 2024-01-22 RX ADMIN — Medication 50 MILLIGRAM(S): at 17:45

## 2024-01-22 RX ADMIN — Medication 3 MILLIGRAM(S): at 00:07

## 2024-01-22 RX ADMIN — HEPARIN SODIUM 5000 UNIT(S): 5000 INJECTION INTRAVENOUS; SUBCUTANEOUS at 13:30

## 2024-01-22 RX ADMIN — HEPARIN SODIUM 5000 UNIT(S): 5000 INJECTION INTRAVENOUS; SUBCUTANEOUS at 21:19

## 2024-01-22 RX ADMIN — Medication 5 MILLIGRAM(S): at 21:19

## 2024-01-22 RX ADMIN — CLOPIDOGREL BISULFATE 75 MILLIGRAM(S): 75 TABLET, FILM COATED ORAL at 13:30

## 2024-01-22 RX ADMIN — Medication 25 MILLIGRAM(S): at 21:19

## 2024-01-22 RX ADMIN — PIPERACILLIN AND TAZOBACTAM 25 GRAM(S): 4; .5 INJECTION, POWDER, LYOPHILIZED, FOR SOLUTION INTRAVENOUS at 17:44

## 2024-01-22 RX ADMIN — ATORVASTATIN CALCIUM 40 MILLIGRAM(S): 80 TABLET, FILM COATED ORAL at 21:18

## 2024-01-22 RX ADMIN — Medication 650 MILLIGRAM(S): at 03:53

## 2024-01-22 RX ADMIN — TAMSULOSIN HYDROCHLORIDE 0.4 MILLIGRAM(S): 0.4 CAPSULE ORAL at 21:19

## 2024-01-22 RX ADMIN — Medication 50 MILLIGRAM(S): at 05:10

## 2024-01-22 RX ADMIN — Medication 650 MILLIGRAM(S): at 04:23

## 2024-01-22 RX ADMIN — HEPARIN SODIUM 5000 UNIT(S): 5000 INJECTION INTRAVENOUS; SUBCUTANEOUS at 05:10

## 2024-01-22 RX ADMIN — PIPERACILLIN AND TAZOBACTAM 200 GRAM(S): 4; .5 INJECTION, POWDER, LYOPHILIZED, FOR SOLUTION INTRAVENOUS at 13:29

## 2024-01-22 RX ADMIN — Medication 25 MILLIGRAM(S): at 17:47

## 2024-01-22 NOTE — CONSULT NOTE ADULT - SUBJECTIVE AND OBJECTIVE BOX
HPI:  89YO F PMHx of HTN, HLD, Renal Artery Stenosis s/p stent, Basal Cell Carcinoma, CHFpEF CVA admitted sp fall with Rhabdomyolysis found to have UTI. WBC 26K with bandemia and TERESA on CKD. Noted to have AUR Sanders placed 1/21. Ua with pyuria and Uc grew Pseudomonas.    Infectious Disease consult was called to evaluate pt and for antibiotic management.      Past Medical & Surgical Hx:  PAST MEDICAL & SURGICAL HISTORY:  Essential hypertensio  Dyslipidemia  Hyponatremia  ON HCTZ , h/o Hypokelemia  Cerebrovascular accident (CVA), unspecified mechanism  Edema, unspecified type  Mitral valve insufficiency, unspecified etiology  Cardiac cath on 11/21/18  Basal cell carcinoma  Renal artery stenosis  right side, stent  CHF (congestive heart failure)  Inguinal hernia, right  Blood pressure instability  S/P Mohs surgery for basal cell carcinoma  Status post hysterectomy  and bladder lift with mesh 1990s  History of cholecystectomy  1980  H/O bilateral hip replacements  2013, 2014  Other elective surgery  right renal artery stent  S/P colonoscopy      Social History-  EtOH: denies   Tobacco: denies   Drug Use: denies      FAMILY HISTORY:  Family history of uterine cancer (Sibling)    Family history of carotid artery stenosis (Sibling)        Allergies  IV Contrast (Pruritus; Rash)  verapamil (Other)  Keflex (Other)    Intolerances  NONE    Home Medications:  cloNIDine 0.2 mg oral tablet: 1 tab(s) orally 3 times a day  HOLD for SBP &lt; 150   Keep SBP between  150- 180     ***6am, 1pm, 8pm*** (18 Jan 2024 17:26)  clopidogrel 75 mg oral tablet: 1 tab(s) orally once a day (18 Jan 2024 17:26)  hydrALAZINE 25 mg oral tablet: 1 tab(s) orally 3 times a day (18 Jan 2024 17:25)  losartan 50 mg oral tablet: 1 tab(s) orally 2 times a day  ***6am, 6pm*** (18 Jan 2024 17:26)  Metoprolol Succinate ER 25 mg oral tablet, extended release: 1 tab(s) orally 2 times a day  ***9am, 9pm*** (18 Jan 2024 17:26)  pravastatin 20 mg oral tablet: 1 tab(s) orally once a day (at bedtime) (18 Jan 2024 17:26)  torsemide 10 mg oral tablet: 1 tab(s) orally once a day  ***may take 2nd dose 12 hours later if necessary*** (18 Jan 2024 17:26)      Current Inpatient Medications :    ANTIBIOTICS:   piperacillin/tazobactam IVPB.. 3.375 Gram(s) IV Intermittent every 8 hours      OTHER RELEVANT MEDICATIONS :  acetaminophen     Tablet .. 650 milliGRAM(s) Oral every 6 hours PRN  atorvastatin 40 milliGRAM(s) Oral at bedtime  bisacodyl 5 milliGRAM(s) Oral at bedtime  clopidogrel Tablet 75 milliGRAM(s) Oral daily  heparin   Injectable 5000 Unit(s) SubCutaneous every 8 hours  hydrALAZINE 25 milliGRAM(s) Oral three times a day  influenza  Vaccine (HIGH DOSE) 0.7 milliLiter(s) IntraMuscular once  melatonin 3 milliGRAM(s) Oral at bedtime PRN  metoprolol succinate ER 50 milliGRAM(s) Oral every 12 hours  tamsulosin 0.4 milliGRAM(s) Oral at bedtime      ROS:  Unable to obtain due to : poor historian        I&O's Detail    21 Jan 2024 07:01  -  22 Jan 2024 07:00  --------------------------------------------------------  IN:  Total IN: 0 mL    OUT:    Indwelling Catheter - Urethral (mL): 700 mL    Post-Void Residual per Intermittent Catheterization (mL): 400 mL  Total OUT: 1100 mL    Total NET: -1100 mL      Physical Exam:  Vital Signs Last 24 Hrs  T(C): 36.3 (22 Jan 2024 20:21), Max: 36.3 (22 Jan 2024 04:47)  T(F): 97.4 (22 Jan 2024 20:21), Max: 97.4 (22 Jan 2024 04:47)  HR: 87 (22 Jan 2024 20:21) (86 - 91)  BP: 156/81 (22 Jan 2024 20:21) (150/80 - 170/90)  RR: 19 (22 Jan 2024 20:21) (18 - 19)  SpO2: 96% (22 Jan 2024 20:21) (93% - 96%)    Parameters below as of 22 Jan 2024 20:21  Patient On (Oxygen Delivery Method): nasal cannula  O2 Flow (L/min): 2      General:  no acute distress  Neck: supple, trachea midline  Lungs: clear, no wheeze/rhonchi  Cardiovascular: regular rate and rhythm, S1 S2  Abdomen: soft, nontender, ND, bowel sounds normal  Neurological:  awake  Skin: no rash  Extremities: no edema    Labs:                         9.5    14.15 )-----------( 351      ( 22 Jan 2024 07:00 )             30.4   01-22    138  |  109<H>  |  67<H>  ----------------------------<  123<H>  5.0   |  24  |  1.80<H>    Ca    8.4<L>      22 Jan 2024 07:00  Phos  3.5     01-22  Mg     2.4     01-22    TPro  4.8<L>  /  Alb  1.8<L>  /  TBili  0.3  /  DBili  x   /  AST  37  /  ALT  39  /  AlkPhos  173<H>  01-22    Urine Microscopic-Add On (NC) (01.18.24 @ 21:30)    Comment - Urine: occasional wbc clumps   Squamous Epithelial Cells: Present   Red Blood Cell - Urine: 4 /HPF   White Blood Cell - Urine: 45 /HPF   Bacteria: Many /HPF  Urinalysis (01.18.24 @ 21:30)    pH Urine: 6.5   Blood, Urine: Moderate   Glucose Qualitative, Urine: Negative mg/dL   Color: Yellow   Urine Appearance: Cloudy   Bilirubin: Negative   Ketone - Urine: Trace mg/dL   Specific Gravity: 1.014   Protein, Urine: >=1000 mg/dL   Urobilinogen: 0.2 mg/dL   Nitrite: Negative   Leukocyte Esterase Concentration: Small        RECENT CULTURES:  Culture - Blood (01.18.24 @ 17:32)    Specimen Source: .Blood Blood-Peripheral   Culture Results:   No growth at 4 days    Culture - Urine (01.18.24 @ 21:30)    -  Ceftazidime: S <=1   -  Meropenem: S <=1   -  Piperacillin/Tazobactam: S <=8   -  Amikacin: S <=16   -  Aztreonam: S <=4   -  Cefepime: S <=2   -  Ciprofloxacin: S <=0.25   -  Imipenem: S 2   -  Levofloxacin: S <=0.5   Specimen Source: Clean Catch Clean Catch (Midstream)   Culture Results:   >100,000 CFU/ml Pseudomonas aeruginosa   Organism Identification: Pseudomonas aeruginosa   Organism: Pseudomonas aeruginosa   Method Type: LASHAE    RADIOLOGY & ADDITIONAL STUDIES:      Assessment :   89YO F PMHx of HTN, HLD, Renal Artery Stenosis s/p stent, Basal Cell Carcinoma, CHFpEF CVA admitted sp fall with Rhabdomyolysis found to have UTI. WBC 26K with bandemia and TERESA on CKD. Noted to have AUR Sanders placed 1/21. Ua with pyuria and Ucx grew Pseudomonas.  + troponins    Plan :   Cont Zosyn  Trend temps and cbc, renal fx  Sanders per primary team  Pulm toileting  Stable from ID standpoint    Continue with present regiment .  Approptiate use of antibiotics and adverse effects reviewed.        > 45 minutes spent in direct patient care reviewing  the notes, lab data/ imaging , discussion with multidisciplinary team. All questions were addressed and answered to the best of my capacity .    Thank you for allowing me to participate in the care of your patient .      Rebel Rankin MD  Infectious Disease  [phone]

## 2024-01-22 NOTE — SOCIAL WORK PROGRESS NOTE - NSSWPROGRESSNOTE_GEN_ALL_CORE
JUVENCIO spoke with pt. daughter Brittney to discuss PT recommendation of GABRIELLA. Pt. daughter expressed that pt. was home and walked with a walker PTA and hopes that she can return home and have increased aide hours. JUVENCIO expressed understanding and if pt. is still GABRIELLA appropriate when closer to [place] she would like 1. Marilu Gonzales, 2. Huyen 3. Bimal. JUVENCIO requested MD to call pt. daughter and review results. SW remains available.

## 2024-01-22 NOTE — CASE MANAGEMENT PROGRESS NOTE - NSCMPROGRESSNOTE_GEN_ALL_CORE
SW following for GABRIELLA. Still acute IV Zosyn started today 1/22/24. On 2L via NC stephon Sanders for retention.

## 2024-01-22 NOTE — PROGRESS NOTE ADULT - PROBLEM SELECTOR PLAN 5
- Possible renal functional is just fluctuating  - Found to have urinary retention so Sanders placed on 1/22**  - Start Flomax  - Likely TOV should be done at [facility] when more mobile

## 2024-01-22 NOTE — PROGRESS NOTE ADULT - ASSESSMENT
89F with PMHx of HTN, BOB (post-stent), and CVA presenting for mechanical fall from home and admitted for rhabdomyolysis. While here patient found to have UTI, paroxysmal atrial fibrillation, and new chronic HFrEF.      , HTN, HL, chronic diastolic CHF, hx BOB/stent, hx CVA, hx BCC; adm s/p mechanical fall at home - found on floor by family - mgmt for:  -fall - appears to be mechanical fall? -r/o'd traumatic injury - CTH neg, CT C-spine neg, Xray pelvis neg, Xray R knee neg, Xray L forearm neg, Xray L humerus neg, Xray L shoulder neg - still w/some ecchymotic bruising L forearm - monitoring; prn analgesia  -sepsis - likely UTI assoc w/leukocytosis, tachycardia - WBCs 21s, HR 130s - UA contaminated sample - CXR neg; on IV abxs cvrg w/ceftriaxone for poss UTI - plan for 5d course - f/up cxs; WBCs downtrending - monitoring  -rhabdomyolysis - in setting of fall and being on ground/unable to get up - CK 2986 on admit ->downtrending on IVFs -  today - continue monitoring on IVFs; statin on hold statin  -elev trops - 861 on admit - per Cards, likely demand ischemia; TTE pending  -CHF - no e/o vol decompensation - overall appears volume down - monitoring off diuretics for now; continue metoprolol  -TERESA - worsening renal dysfunction - Cr 1.4 on admit ->->2.1 - likely multifactorial 2/2 rhabdo and urinary retention - downtrending p straight cath - renal ultrasound PENDING; holding torsemide and losartan - renally dose meds, no nephrotoxics  -urinary retention - s/p straight cath x2 w/UOP>300s - again bladder scan w/>300 - plan for kwan catheter placement if UOP obtained >300  -elev LFTs - unclear etiology - statin on hold- abd ultrasound PENDING to further eval - trending measures w/daily labs  -HTN, HL, hx CVA, hx BOB/stent - continue plavix, clonidine, hydralazine, metoprolol; statin on hold w/elev LFTs  -dvt prophy      1/19 - SPK TO PT DAUGHTER SULTANA BY [phone] - ALL DIAGNOSES/MGMT PLANS, AND PT CLINICAL STATUS DISCUSSED IN EXTENSIVE DETAIL; ALL QUESTIONS ANSWERED COMPREHENSIVELY. 89F with PMHx of HTN, BOB (post-stent), and CVA presenting for mechanical fall from home and admitted for rhabdomyolysis. While here patient found to have UTI, paroxysmal atrial fibrillation, and new chronic HFrEF.

## 2024-01-22 NOTE — PROGRESS NOTE ADULT - ASSESSMENT
89-year-old white female presently residing at home alone though does have assistance of an aide approximately 4 to 6 hours/day with past history of hypertension hyperlipidemia previous CVA basal cell carcinoma renal artery stenosis s/p stent as well as congestive heart failure who was last known to be well and last heard from sometime late afternoon yesterday.  Cardiology consulted for syncopal episode and elevated troponins.    Syncopal episode, Elevated Troponin, HTN, HLD, HFrEF   - s/p mechanical fall although patient slightly confused  - May have been orthostatic syncope; appears to be clinically dry   - Check orthostatics when able but patient is very confused and uncooperative    - BNP:  <-->028902,  <--892375  - TTE 4/23: EF 58%, mod MR, mild- mod TR, mild AR, [place].  - Repeat TTE showed EF 35-40% with SWMA, LAE, mod MR.  Would recommend LH/RHC down the road.  At this point, in the setting of confusion, she is not a candidate for any ischemic w/u  - Does not appear to be volume overloaded at this time  - Hold home torsemide  - Creatinine:  1.8<--1.90,  <--2.10,  <--1.70,  <--1.40.  Can continue gently IV hydration if intake is not adequate.    - Monitor volume status closely  - Unble to start ACEI/ARB or Entresto in the setting of TERESA    - Initially NSR/Stach.  Went into A fib with -120s 1/19.  converted to NSR, 1/20.  Remains in regular rhythm  - Change Metoprolol to Toprol XL  - CHadsvasc 4-5. Can start FD Lovenox for now and decision for long term AC can be done pending clinical course     - BP stable and controlled   - Hold home Losartan 2/2 TERESA, resume when able   - Continue clonidine w/ hold parameters    - EKG w/ A fib @ 122. w/ continued diffuse TWI  - Repeat EKG w/ ST @ 109, Diffuse T wave inversion, new   - CK trend: <--758, <--1624, <--2986  - Troponin: <-835.7, <-849.5, <-861.2  - Likely demand ischemia 2/2 rhabdomyolysis.  However, possible NSTEMI in the setting of SWMA on TTE  - Continue home statin and Plavix  - For now, would medically manage     - Monitor and replete lytes, keep K>4, Mg>2.  - Will continue to follow.    Jen Harrison DNP, NP-C, AGACNP-C  Cardiology   Call TEAMS        89-year-old white female presently residing at home alone though does have assistance of an aide approximately 4 to 6 hours/day with past history of hypertension hyperlipidemia previous CVA basal cell carcinoma renal artery stenosis s/p stent as well as congestive heart failure who was last known to be well and last heard from sometime late afternoon yesterday.  Cardiology consulted for syncopal episode and elevated troponins.    Syncopal episode, Elevated Troponin, HTN, HLD, HFrEF   - s/p mechanical fall although patient slightly confused  - May have been orthostatic syncope; appears to be clinically dry   - Check orthostatics when able but patient is very confused and uncooperative    - BNP:  <-->352202,  <--570644  - TTE 4/23: EF 58%, mod MR, mild- mod TR, mild AR, [place].  - Repeat TTE showed EF 35-40% with SWMA, LAE, mod MR.  Would recommend LH/RHC down the road.  At this point, in the setting of confusion, she is not a candidate for any ischemic w/u at this time  - Does not appear to be volume overloaded at this time  - Hold home torsemide, cr improving.   - Creatinine:  1.8<--1.90,  <--2.10,  <--1.70,  <--1.40.   - Monitor volume status closely  - Unble to start ACEI/ARB or Entresto in the setting of TERESA    - Initially NSR/Stach.  Went into A fib with -120s 1/20.  converted to NSR, 1/20.  Remains in regular rhythm  - Change Metoprolol to Toprol XL  - CHadsvasc 4-5. Can start heparin gtt for now and decision for long term AC can be done pending clinical course     - BP stable and controlled   - Hold home Losartan 2/2 TERESA, resume when able   - Stop Clonidine and initiate Hydralazine TID for GDMT. Needs afterload reduction.     - EKG w/ A fib @ 122. w/ continued diffuse TWI  - Repeat EKG w/ ST @ 109, Diffuse T wave inversion, new   - CK trend: <--758, <--1624, <--2986  - Troponin: <-835.7, <-849.5, <-861.2  - Likely demand ischemia 2/2 rhabdomyolysis.  However, possible NSTEMI in the setting of SWMA on TTE  - Continue home statin and Plavix  - For now, would medically manage     - Monitor and replete lytes, keep K>4, Mg>2.  - Will continue to follow.    Jen Harrison DNP, NP-C, AGACNP-C  Cardiology   Call TEAMS

## 2024-01-22 NOTE — PROGRESS NOTE ADULT - PROBLEM SELECTOR PLAN 2
- Elevated WBC with pyuria on UA, but does not endorse symptoms  - UCx with 100K CFU Pseudomonas  - Stopped CTX and will start Zosyn (plan for 3 days for cystitis 1/22-25)  - Fluoroquinolones are a potential option, but only if QTc is not prolonged - Elevated WBC with pyuria on UA, but does not endorse symptoms  - Patient with sepsis POA 2/2 to UTI (Cystitis?)  - UCx with 100K CFU Pseudomonas  - Stopped CTX and will start Zosyn (plan for 3 days for cystitis 1/22-25)  - Fluoroquinolones are a potential option, but only if QTc is not prolonged

## 2024-01-22 NOTE — PROGRESS NOTE ADULT - PROBLEM SELECTOR PLAN 8
- TTE performed here shows EF of 35% with WMA  - Cardiology following and recommending consideration for ischemic work up outpatient  - Follows with Rafa Vila?  - Appears euvolemic  - Stop Clonidine and start Hydralazine  - Started on ToprolXL

## 2024-01-22 NOTE — PROGRESS NOTE ADULT - SUBJECTIVE AND OBJECTIVE BOX
Patient is a 89y old  Female who presents with a chief complaint of NSTEMI, Rhabdomyolysis (22 Jan 2024 07:58)      SUBJECTIVE / OVERNIGHT EVENTS: Patient seen and examined at bedside. Found to be retaining urine >300 so Sanders placed. No complaints right now.    MEDICATIONS  (STANDING):  atorvastatin 40 milliGRAM(s) Oral at bedtime  bisacodyl 5 milliGRAM(s) Oral at bedtime  clopidogrel Tablet 75 milliGRAM(s) Oral daily  heparin   Injectable 5000 Unit(s) SubCutaneous every 8 hours  hydrALAZINE 25 milliGRAM(s) Oral three times a day  influenza  Vaccine (HIGH DOSE) 0.7 milliLiter(s) IntraMuscular once  metoprolol succinate ER 50 milliGRAM(s) Oral every 12 hours  piperacillin/tazobactam IVPB. 3.375 Gram(s) IV Intermittent once  piperacillin/tazobactam IVPB.- 3.375 Gram(s) IV Intermittent once  piperacillin/tazobactam IVPB.. 3.375 Gram(s) IV Intermittent every 8 hours  tamsulosin 0.4 milliGRAM(s) Oral at bedtime    MEDICATIONS  (PRN):  acetaminophen     Tablet .. 650 milliGRAM(s) Oral every 6 hours PRN Mild Pain (1 - 3)  melatonin 3 milliGRAM(s) Oral at bedtime PRN Insomnia      CAPILLARY BLOOD GLUCOSE        I&O's Summary    21 Jan 2024 07:01  -  22 Jan 2024 07:00  --------------------------------------------------------  IN: 0 mL / OUT: 1100 mL / NET: -1100 mL        PHYSICAL EXAM:  Vital Signs Last 24 Hrs  T(C): 36.3 (22 Jan 2024 12:33), Max: 36.7 (21 Jan 2024 21:49)  T(F): 97.4 (22 Jan 2024 12:33), Max: 98.1 (21 Jan 2024 21:49)  HR: 87 (22 Jan 2024 12:33) (86 - 92)  BP: 160/83 (22 Jan 2024 12:33) (138/83 - 160/83)  BP(mean): --  RR: 18 (22 Jan 2024 12:33) (18 - 18)  SpO2: 93% (22 Jan 2024 12:33) (93% - 95%)    Parameters below as of 22 Jan 2024 12:33  Patient On (Oxygen Delivery Method): nasal cannula  O2 Flow (L/min): 2      GEN: female in NAD, appears comfortable, no diaphoresis  EYES: No scleral injection, EOMI  ENTM: neck supple & symmetric without tracheal deviation, moist membranes, no gross hearing impairment, thyroid gland not enlarged  CV: +S1/S2, no m/r/g, no abdominal bruit, no LE edema  RESP: breathing comfortably, no respiratory accessory muscle use, CTAB, no w/r/r  GI: normoactive BS, soft, NTND, no rebounding/guarding, no palpable masses    LABS:                        9.5    14.15 )-----------( 351      ( 22 Jan 2024 07:00 )             30.4     01-22    138  |  109<H>  |  67<H>  ----------------------------<  123<H>  5.0   |  24  |  1.80<H>    Ca    8.4<L>      22 Jan 2024 07:00  Phos  3.5     01-22  Mg     2.4     01-22    TPro  4.8<L>  /  Alb  1.8<L>  /  TBili  0.3  /  DBili  x   /  AST  37  /  ALT  39  /  AlkPhos  173<H>  01-22      CARDIAC MARKERS ( 22 Jan 2024 07:00 )  x     / x     / 134 U/L / x     / x      CARDIAC MARKERS ( 21 Jan 2024 10:25 )  x     / x     / 273 U/L / x     / 9.6 ng/mL  CARDIAC MARKERS ( 21 Jan 2024 04:25 )  x     / x     / 295 U/L / x     / 10.7 ng/mL      Urinalysis Basic - ( 22 Jan 2024 07:00 )    Color: x / Appearance: x / SG: x / pH: x  Gluc: 123 mg/dL / Ketone: x  / Bili: x / Urobili: x   Blood: x / Protein: x / Nitrite: x   Leuk Esterase: x / RBC: x / WBC x   Sq Epi: x / Non Sq Epi: x / Bacteria: x          RADIOLOGY & ADDITIONAL TESTS:  Results Reviewed:   Imaging Personally Reviewed:  Electrocardiogram Personally Reviewed:    COORDINATION OF CARE:  Care Discussed with Consultants/Other Providers [Y/N]:  Prior or Outpatient Records Reviewed [Y/N]:

## 2024-01-22 NOTE — PROGRESS NOTE ADULT - PROBLEM SELECTOR PLAN 7
- Per cardiology likely demand  - TTE performed - Per cardiology likely demand  - TTE performed documented below

## 2024-01-22 NOTE — PROGRESS NOTE ADULT - SUBJECTIVE AND OBJECTIVE BOX
[facility] -- Ricky Agarwal, Ramsey Quan Savella, , Latrell Garsia  Office # [phone]    Follow Up:      Subjective/Observations:     REVIEW OF SYSTEMS: All other review of systems is negative unless indicated above  PAST MEDICAL & SURGICAL HISTORY:  Essential hypertension      Dyslipidemia      Hyponatremia  ON HCTZ , h/o Hypokelemia      Cerebrovascular accident (CVA), unspecified mechanism      Edema, unspecified type      Mitral valve insufficiency, unspecified etiology  Cardiac cath on 11/21/18      Basal cell carcinoma      Renal artery stenosis  right side, stent      CHF (congestive heart failure)      Inguinal hernia, right      Blood pressure instability      S/P Mohs surgery for basal cell carcinoma      Status post hysterectomy  and bladder lift with mesh 1990s      History of cholecystectomy  1980      H/O bilateral hip replacements  2013, 2014      Other elective surgery  right renal artery stent      S/P colonoscopy        MEDICATIONS  (STANDING):  atorvastatin 40 milliGRAM(s) Oral at bedtime  bisacodyl 5 milliGRAM(s) Oral at bedtime  cefTRIAXone   IVPB 1000 milliGRAM(s) IV Intermittent every 24 hours  cloNIDine 0.2 milliGRAM(s) Oral three times a day  clopidogrel Tablet 75 milliGRAM(s) Oral daily  heparin   Injectable 5000 Unit(s) SubCutaneous every 8 hours  influenza  Vaccine (HIGH DOSE) 0.7 milliLiter(s) IntraMuscular once  metoprolol tartrate 50 milliGRAM(s) Oral two times a day    MEDICATIONS  (PRN):  acetaminophen     Tablet .. 650 milliGRAM(s) Oral every 6 hours PRN Mild Pain (1 - 3)  melatonin 3 milliGRAM(s) Oral at bedtime PRN Insomnia    Allergies    IV Contrast (Pruritus; Rash)  verapamil (Other)  Keflex (Other)    Intolerances      Vital Signs Last 24 Hrs  T(C): 36.3 (22 Jan 2024 04:47), Max: 36.7 (21 Jan 2024 10:18)  T(F): 97.4 (22 Jan 2024 04:47), Max: 98.1 (21 Jan 2024 10:18)  HR: 86 (22 Jan 2024 04:47) (81 - 92)  BP: 150/80 (22 Jan 2024 04:47) (124/71 - 161/82)  BP(mean): --  RR: 18 (22 Jan 2024 04:47) (17 - 18)  SpO2: 95% (22 Jan 2024 04:47) (91% - 95%)    Parameters below as of 22 Jan 2024 04:47  Patient On (Oxygen Delivery Method): nasal cannula      I&O's Summary    21 Jan 2024 07:01  -  22 Jan 2024 07:00  --------------------------------------------------------  IN: 0 mL / OUT: 1100 mL / NET: -1100 mL        PHYSICAL EXAM:  TELE:   Constitutional: NAD, awake and alert, well-developed  HEENT: Moist Mucous Membranes, Anicteric  Pulmonary: Non-labored, breath sounds are clear bilaterally, No wheezing, rales or rhonchi  Cardiovascular: Regular, S1 and S2, No murmurs, rubs, gallops or clicks  Gastrointestinal: Bowel Sounds present, soft, nontender.   Lymph: No peripheral edema. No lymphadenopathy.  Skin: No visible rashes or ulcers.  Psych:  Mood & affect appropriate  LABS: All Labs Reviewed:                        9.4    18.50 )-----------( 324      ( 21 Jan 2024 04:25 )             28.7                         10.8   23.66 )-----------( 396      ( 20 Jan 2024 10:40 )             33.2     21 Jan 2024 04:25    137    |  111    |  74     ----------------------------<  104    4.3     |  21     |  1.90   20 Jan 2024 10:40    138    |  109    |  77     ----------------------------<  195    5.1     |  22     |  2.10     Ca    7.9        21 Jan 2024 04:25  Ca    8.1        20 Jan 2024 10:40    TPro  4.5    /  Alb  1.8    /  TBili  0.3    /  DBili  x      /  AST  49     /  ALT  38     /  AlkPhos  197    21 Jan 2024 04:25  TPro  4.8    /  Alb  1.9    /  TBili  0.4    /  DBili  x      /  AST  62     /  ALT  40     /  AlkPhos  239    20 Jan 2024 10:40      CARDIAC MARKERS ( 21 Jan 2024 10:25 )  x     / x     / 273 U/L / x     / 9.6 ng/mL  CARDIAC MARKERS ( 21 Jan 2024 04:25 )  x     / x     / 295 U/L / x     / 10.7 ng/mL  CARDIAC MARKERS ( 20 Jan 2024 10:40 )  x     / x     / 333 U/L / x     / x          12 Lead ECG:   Ventricular Rate 83 BPM    Atrial Rate 83 BPM    P-R Interval 142 ms    QRS Duration 82 ms    Q-T Interval 408 ms    QTC Calculation(Bazett) 479 ms    P Axis 51 degrees    R Axis 43 degrees    T Axis 259 degrees    Diagnosis Line Sinus rhythm with premature atrial complexes  Minimal voltage criteria for LVH, may be normal variant ( Sokolow-Elaine )  ST & Marked T wave abnormality, consider anterolateral ischemia  Prolonged QT  Abnormal ECG  When compared with ECG of 20-JAN-2024 07:55,  Sinus rhythm has replaced Atrial fibrillation  Confirmed by Poly Sams ([id number]) on 1/21/2024 9:06:18 AM (01-21-24 @ 03:09)      TRANSTHORACIC ECHOCARDIOGRAM REPORT  ________________________________________________________________________________                                      _______       Pt. Name:       MARU MOYA Study Date:    1/19/2024  MRN:            [id number]           YOB: 1931  Accession #:    [id number]          Age:           89 years  Account#:       [id number]         Gender:        F  Heart Rate:                        Height:        ( )  Rhythm:                            Weight:        ( )  Blood Pressure: 137/89 mmHg        BSA/BMI:       /  ________________________________________________________________________________________  Referring Physician:    [id number] Doroteo Hernandez  Interpreting Physician: Poly Sams MD  Primary Sonographer:    Edwin Ochoa    CPT:               ECHO TTE WO CON COMP W DOPP - 62203.m  Indication(s):     Heart failure, unspecified - I50.9  Procedure:         Transthoracic echocardiogram with 2-D, M-mode and complete        spectral and color flow Doppler.  Ordering Location: [facility]  Admission Status:  Inpatient  Study Information: Image quality for this study is technically difficult.    _______________________________________________________________________________________     CONCLUSIONS:      1. Technically difficult image quality.   2. Left ventricular systolic function is moderately decreased with an ejection fraction visually estimated at 35 to 40 %. Regional wall motion abnormalities present.   3. Multiple segmental abnormalities exist. See findings.   4. Normal right ventricular cavity size and probably normal systolic function.   5. The left atrium is moderately dilated.   6. The right atrium is normal in size.   7. Tricuspid aortic valve with normal leaflet excursion. There is mild calcification of the aortic valve leaflets.   8. Mild aortic regurgitation.   9. Mild mitral valve leaflet calcification.  10. Moderate mitral regurgitation.  11. Mild tricuspid regurgitation.  12. Trace pulmonic regurgitation.  13. The inferior vena cava is normal in size measuring 1.84 cm in diameter, (normal <2.1cm) with normal inspiratory collapse (normal >50%) consistent with normal right atrial pressure (~3, range 0-5mmHg).  14. Estimated pulmonary artery systolic pressure is 33 mmHg.    ________________________________________________________________________________________  FINDINGS:     Left Ventricle:  Left ventricular systolic function is moderately decreased with an ejection fraction visually estimated at 35 to 40%. There are regional wall motion abnormalities present.  LV Wall Scoring: The entire apex is akinetic. The basal and mid anterior wall,  basal and mid anterior septum, basal and mid inferior septum, basal and mid  inferior wall, andbasal and mid inferolateral wall are hypokinetic. All  remaining scored segments are normal.          Right Ventricle:  The right ventricular cavity is normal in size and probably normal systolic function. Tricuspid annular plane systolic excursion (TAPSE) is 1.9 cm (normal >=1.7 cm).     Left Atrium:  The left atrium is moderately dilated.     Right Atrium:  The right atrium is normal in size.     Aortic Valve:  The aortic valve is tricuspid with normal leaflet excursion. There is mild calcification of the aortic valve leaflets. There is mild aortic regurgitation.     Mitral Valve:  There is mild leaflet calcification. There is moderate mitral regurgitation.     Tricuspid Valve:  There is mild tricuspid regurgitation. Estimated pulmonary artery systolic pressure is 33 mmHg.     Pulmonic Valve:  There is trace pulmonic regurgitation.     Aorta:  The aortic root at the sinuses of Valsalva is normal in size. The ascending aorta diameter is normal in size.     Systemic Veins:  The inferior vena cava is normal in size measuring 1.84 cm in diameter, (normal <2.1cm) with normal inspiratory collapse (normal >50%) consistent with normal right atrial pressure (~3, range 0-5mmHg).  ____________________________________________________________________  QUANTITATIVE DATA:  Left Ventricle Measurements: (Indexed to BSA)     IVSd (2D):   1.2 cm  LVPWd (2D):  1.0 cm  LVIDd (2D):  4.3 cm  LVIDs (2D):  3.3 cm  LV Mass:     160 g  Visualized LV EF%: 35 to 40%     MV E Vmax:    1.02 m/s  MV A Vmax:0.62 m/s  MV E/A:       1.63  e' lateral:   7.07 cm/s  e' medial:    4.57 cm/s  E/e' lateral: 14.43  E/e' medial:  22.32  E/e' Average: 17.53  MV DT:        144 msec    Aorta Measurements: (normal range) (Indexed to BSA)     Sinuses of Valsalva: 2.90cm (2.7 - 3.3 cm)  Ao Asc prox:         2.70 cm       Left Atrium Measurements: (Indexed to BSA)  LA Diam 2D: 4.00 cm    Right Ventricle Measurements:     TAPSE:            1.9 cm  RV Base (RVID1):  2.7 cm  RV Mid (RVID2):   2.4 cm  RV Major (RVID3):5.8 cm       LVOT / RVOT/ Qp/Qs Data: (Indexed to BSA)  LVOT Diameter: 1.80 cm    Aortic Valve Measurements:  AR Vmax  3.51 m/s  AR PHT   356 msec  AR [place] 2.89 m/s²    Mitral Valve Measurements:     MV E Vmax: 1.0 m/s         MR Vmax:          4.84m/s  MV A Vmax: 0.6 m/s         MR VTI:           127.00 cm  MV E/A:    1.6             MR Mean Gradient: 53.0 mmHg                             MR Peak Gradient: 93.7 mmHg       Tricuspid Valve Measurements:     TR Vmax:          2.8 m/s  TR Peak Gradient: 30.5 mmHg  RA Pressure:      3 mmHg  PASP:             33 mmHg    ________________________________________________________________________________________  Electronically signed on 1/20/2024 at 3:31:24 PM by Poly Sams MD         *** Final ***      [facility] -- Ricky Agarwal, Ramsey Quan Savella, , Latrell Garsia  Office # [phone]    Follow Up:  HFrEF, Rhabdo, Elevated Troponins, poss. NSTEMI    Subjective/Observations: Awake and alert but very confused.  Combative.  Uncooperative with assessment    REVIEW OF SYSTEMS: All other review of systems is negative unless indicated above  PAST MEDICAL & SURGICAL HISTORY:  Essential hypertension      Dyslipidemia      Hyponatremia  ON HCTZ , h/o Hypokelemia      Cerebrovascular accident (CVA), unspecified mechanism      Edema, unspecified type      Mitral valve insufficiency, unspecified etiology  Cardiac cath on 11/21/18      Basal cell carcinoma      Renal artery stenosis  right side, stent      CHF (congestive heart failure)      Inguinal hernia, right      Blood pressure instability      S/P Mohs surgery for basal cell carcinoma      Status post hysterectomy  and bladder lift with mesh 1990s      History of cholecystectomy  1980      H/O bilateral hip replacements  2013, 2014      Other elective surgery  right renal artery stent      S/P colonoscopy    MEDICATIONS  (STANDING):  atorvastatin 40 milliGRAM(s) Oral at bedtime  bisacodyl 5 milliGRAM(s) Oral at bedtime  cefTRIAXone   IVPB 1000 milliGRAM(s) IV Intermittent every 24 hours  cloNIDine 0.2 milliGRAM(s) Oral three times a day  clopidogrel Tablet 75 milliGRAM(s) Oral daily  heparin   Injectable 5000 Unit(s) SubCutaneous every 8 hours  influenza  Vaccine (HIGH DOSE) 0.7 milliLiter(s) IntraMuscular once  metoprolol tartrate 50 milliGRAM(s) Oral two times a day    MEDICATIONS  (PRN):  acetaminophen     Tablet .. 650 milliGRAM(s) Oral every 6 hours PRN Mild Pain (1 - 3)  melatonin 3 milliGRAM(s) Oral at bedtime PRN Insomnia    Allergies    IV Contrast (Pruritus; Rash)  verapamil (Other)  Keflex (Other)    Intolerances      Vital Signs Last 24 Hrs  T(C): 36.3 (22 Jan 2024 04:47), Max: 36.7 (21 Jan 2024 10:18)  T(F): 97.4 (22 Jan 2024 04:47), Max: 98.1 (21 Jan 2024 10:18)  HR: 86 (22 Jan 2024 04:47) (81 - 92)  BP: 150/80 (22 Jan 2024 04:47) (124/71 - 161/82)  BP(mean): --  RR: 18 (22 Jan 2024 04:47) (17 - 18)  SpO2: 95% (22 Jan 2024 04:47) (91% - 95%)    Parameters below as of 22 Jan 2024 04:47  Patient On (Oxygen Delivery Method): nasal cannula      I&O's Summary    21 Jan 2024 07:01  -  22 Jan 2024 07:00  --------------------------------------------------------  IN: 0 mL / OUT: 1100 mL / NET: -1100 mL     PHYSICAL EXAM:  TELE: NSR/Stach at 110's  Constitutional: NAD, awake and alert  HEENT: Moist Mucous Membranes, Anicteric  Pulmonary: Non-labored, breath sounds are clear bilaterally, No wheezing, rales or rhonchi  Cardiovascular: Regular, S1 and S2, + murmurs, no rubs, gallops or clicks  Gastrointestinal: Bowel Sounds present, soft, nontender.   Lymph: No peripheral edema. No lymphadenopathy.  Skin: No visible rashes or ulcers.  Psych:  Mood & affect: confused and disoriented, restless, combative  LABS: All Labs Reviewed:                        9.4    18.50 )-----------( 324      ( 21 Jan 2024 04:25 )             28.7                         10.8   23.66 )-----------( 396      ( 20 Jan 2024 10:40 )             33.2     21 Jan 2024 04:25    137    |  111    |  74     ----------------------------<  104    4.3     |  21     |  1.90   20 Jan 2024 10:40    138    |  109    |  77     ----------------------------<  195    5.1     |  22     |  2.10     Ca    7.9        21 Jan 2024 04:25  Ca    8.1        20 Jan 2024 10:40    TPro  4.5    /  Alb  1.8    /  TBili  0.3    /  DBili  x      /  AST  49     /  ALT  38     /  AlkPhos  197    21 Jan 2024 04:25  TPro  4.8    /  Alb  1.9    /  TBili  0.4    /  DBili  x      /  AST  62     /  ALT  40     /  AlkPhos  239    20 Jan 2024 10:40      CARDIAC MARKERS ( 21 Jan 2024 10:25 )  x     / x     / 273 U/L / x     / 9.6 ng/mL  CARDIAC MARKERS ( 21 Jan 2024 04:25 )  x     / x     / 295 U/L / x     / 10.7 ng/mL  CARDIAC MARKERS ( 20 Jan 2024 10:40 )  x     / x     / 333 U/L / x     / x          12 Lead ECG:   Ventricular Rate 83 BPM    Atrial Rate 83 BPM    P-R Interval 142 ms    QRS Duration 82 ms    Q-T Interval 408 ms    QTC Calculation(Bazett) 479 ms    P Axis 51 degrees    R Axis 43 degrees    T Axis 259 degrees    Diagnosis Line Sinus rhythm with premature atrial complexes  Minimal voltage criteria for LVH, may be normal variant ( Sokolow-Elaine )  ST & Marked T wave abnormality, consider anterolateral ischemia  Prolonged QT  Abnormal ECG  When compared with ECG of 20-JAN-2024 07:55,  Sinus rhythm has replaced Atrial fibrillation  Confirmed by Poly Sams ([id number]) on 1/21/2024 9:06:18 AM (01-21-24 @ 03:09)    TRANSTHORACIC ECHOCARDIOGRAM REPORT  ________________________________________________________________________________                                      _______  Pt. Name:       MARU MOYA Study Date:    1/19/2024  MRN:            [id number]           YOB: 1931  Accession #:    [id number]          Age:           89 years  Account#:       [id number]         Gender:        F  Heart Rate:                        Height:        ( )  Rhythm:                            Weight:        ( )  Blood Pressure: 137/89 mmHg        BSA/BMI:       /  ________________________________________________________________________________________  Referring Physician:    [id number] Doroteo Hernandez  Interpreting Physician: Poly Sams MD  Primary Sonographer:    Edwin Ochoa    CPT:               ECHO TTE WO CON COMP W DOPP - 95928.m  Indication(s):     Heart failure, unspecified - I50.9  Procedure:         Transthoracic echocardiogram with 2-D, M-mode and complete        spectral and color flow Doppler.  Ordering Location: [facility]  Admission Status:  Inpatient  Study Information: Image quality for this study is technically difficult.    _______________________________________________________________________________________     CONCLUSIONS:      1. Technically difficult image quality.   2. Left ventricular systolic function is moderately decreased with an ejection fraction visually estimated at 35 to 40 %. Regional wall motion abnormalities present.   3. Multiple segmental abnormalities exist. See findings.   4. Normal right ventricular cavity size and probably normal systolic function.   5. The left atrium is moderately dilated.   6. The right atrium is normal in size.   7. Tricuspid aortic valve with normal leaflet excursion. There is mild calcification of the aortic valve leaflets.   8. Mild aortic regurgitation.   9. Mild mitral valve leaflet calcification.  10. Moderate mitral regurgitation.  11. Mild tricuspid regurgitation.  12. Trace pulmonic regurgitation.  13. The inferior vena cava is normal in size measuring 1.84 cm in diameter, (normal <2.1cm) with normal inspiratory collapse (normal >50%) consistent with normal right atrial pressure (~3, range 0-5mmHg).  14. Estimated pulmonary artery systolic pressure is 33 mmHg.    ________________________________________________________________________________________  FINDINGS:     Left Ventricle:  Left ventricular systolic function is moderately decreased with an ejection fraction visually estimated at 35 to 40%. There are regional wall motion abnormalities present.  LV Wall Scoring: The entire apex is akinetic. The basal and mid anterior wall,  basal and mid anterior septum, basal and mid inferior septum, basal and mid  inferior wall, andbasal and mid inferolateral wall are hypokinetic. All  remaining scored segments are normal.          Right Ventricle:  The right ventricular cavity is normal in size and probably normal systolic function. Tricuspid annular plane systolic excursion (TAPSE) is 1.9 cm (normal >=1.7 cm).     Left Atrium:  The left atrium is moderately dilated.     Right Atrium:  The right atrium is normal in size.     Aortic Valve:  The aortic valve is tricuspid with normal leaflet excursion. There is mild calcification of the aortic valve leaflets. There is mild aortic regurgitation.     Mitral Valve:  There is mild leaflet calcification. There is moderate mitral regurgitation.     Tricuspid Valve:  There is mild tricuspid regurgitation. Estimated pulmonary artery systolic pressure is 33 mmHg.     Pulmonic Valve:  There is trace pulmonic regurgitation.     Aorta:  The aortic root at the sinuses of Valsalva is normal in size. The ascending aorta diameter is normal in size.     Systemic Veins:  The inferior vena cava is normal in size measuring 1.84 cm in diameter, (normal <2.1cm) with normal inspiratory collapse (normal >50%) consistent with normal right atrial pressure (~3, range 0-5mmHg).  ____________________________________________________________________  QUANTITATIVE DATA:  Left Ventricle Measurements: (Indexed to BSA)     IVSd (2D):   1.2 cm  LVPWd (2D):  1.0 cm  LVIDd (2D):  4.3 cm  LVIDs (2D):  3.3 cm  LV Mass:     160 g  Visualized LV EF%: 35 to 40%     MV E Vmax:    1.02 m/s  MV A Vmax:0.62 m/s  MV E/A:       1.63  e' lateral:   7.07 cm/s  e' medial:    4.57 cm/s  E/e' lateral: 14.43  E/e' medial:  22.32  E/e' Average: 17.53  MV DT:        144 msec    Aorta Measurements: (normal range) (Indexed to BSA)     Sinuses of Valsalva: 2.90cm (2.7 - 3.3 cm)  Ao Asc prox:         2.70 cm       Left Atrium Measurements: (Indexed to BSA)  LA Diam 2D: 4.00 cm    Right Ventricle Measurements:     TAPSE:            1.9 cm  RV Base (RVID1):  2.7 cm  RV Mid (RVID2):   2.4 cm  RV Major (RVID3):5.8 cm       LVOT / RVOT/ Qp/Qs Data: (Indexed to BSA)  LVOT Diameter: 1.80 cm    Aortic Valve Measurements:  AR Vmax  3.51 m/s  AR PHT   356 msec  AR [place] 2.89 m/s²    Mitral Valve Measurements:     MV E Vmax: 1.0 m/s         MR Vmax:          4.84m/s  MV A Vmax: 0.6 m/s         MR VTI:           127.00 cm  MV E/A:    1.6             MR Mean Gradient: 53.0 mmHg                             MR Peak Gradient: 93.7 mmHg    Tricuspid Valve Measurements:     TR Vmax:          2.8 m/s  TR Peak Gradient: 30.5 mmHg  RA Pressure:      3 mmHg  PASP:             33 mmHg    ________________________________________________________________________________________  Electronically signed on 1/20/2024 at 3:31:24 PM by Poly Sams MD     *** Final ***      [facility] -- Ricky Agarwal, Ramsey Quan Savella, , Latrell Garsia  Office # [phone]    Follow Up:  HFrEF, Rhabdo, Elevated Troponins, poss. NSTEMI    Subjective/Observations: Awake and alert but very confused.  Combative.  Uncooperative with assessment    REVIEW OF SYSTEMS: All other review of systems is negative unless indicated above  PAST MEDICAL & SURGICAL HISTORY:  Essential hypertension      Dyslipidemia      Hyponatremia  ON HCTZ , h/o Hypokelemia      Cerebrovascular accident (CVA), unspecified mechanism      Edema, unspecified type      Mitral valve insufficiency, unspecified etiology  Cardiac cath on 11/21/18      Basal cell carcinoma      Renal artery stenosis  right side, stent      CHF (congestive heart failure)      Inguinal hernia, right      Blood pressure instability      S/P Mohs surgery for basal cell carcinoma      Status post hysterectomy  and bladder lift with mesh 1990s      History of cholecystectomy  1980      H/O bilateral hip replacements  2013, 2014      Other elective surgery  right renal artery stent      S/P colonoscopy    MEDICATIONS  (STANDING):  atorvastatin 40 milliGRAM(s) Oral at bedtime  bisacodyl 5 milliGRAM(s) Oral at bedtime  cefTRIAXone   IVPB 1000 milliGRAM(s) IV Intermittent every 24 hours  cloNIDine 0.2 milliGRAM(s) Oral three times a day  clopidogrel Tablet 75 milliGRAM(s) Oral daily  heparin   Injectable 5000 Unit(s) SubCutaneous every 8 hours  influenza  Vaccine (HIGH DOSE) 0.7 milliLiter(s) IntraMuscular once  metoprolol tartrate 50 milliGRAM(s) Oral two times a day    MEDICATIONS  (PRN):  acetaminophen     Tablet .. 650 milliGRAM(s) Oral every 6 hours PRN Mild Pain (1 - 3)  melatonin 3 milliGRAM(s) Oral at bedtime PRN Insomnia    Allergies    IV Contrast (Pruritus; Rash)  verapamil (Other)  Keflex (Other)    Intolerances      Vital Signs Last 24 Hrs  T(C): 36.3 (22 Jan 2024 04:47), Max: 36.7 (21 Jan 2024 10:18)  T(F): 97.4 (22 Jan 2024 04:47), Max: 98.1 (21 Jan 2024 10:18)  HR: 86 (22 Jan 2024 04:47) (81 - 92)  BP: 150/80 (22 Jan 2024 04:47) (124/71 - 161/82)  BP(mean): --  RR: 18 (22 Jan 2024 04:47) (17 - 18)  SpO2: 95% (22 Jan 2024 04:47) (91% - 95%)    Parameters below as of 22 Jan 2024 04:47  Patient On (Oxygen Delivery Method): nasal cannula      I&O's Summary    21 Jan 2024 07:01  -  22 Jan 2024 07:00  --------------------------------------------------------  IN: 0 mL / OUT: 1100 mL / NET: -1100 mL     PHYSICAL EXAM:  TELE: NSR/Stach at 110's  Constitutional: NAD, awake and alert  HEENT: Moist Mucous Membranes, Anicteric  Pulmonary: Non-labored, breath sounds are clear bilaterally, No wheezing, rales or rhonchi  Cardiovascular: Regular, S1 and S2, + murmurs, no rubs, gallops or clicks  Gastrointestinal: Bowel Sounds present, soft, nontender.   Lymph: No peripheral edema. No lymphadenopathy.  Skin: No visible rashes or ulcers.  Psych:  Mood & affect: confused and disoriented, restless, combative  LABS: All Labs Reviewed:                        9.4    18.50 )-----------( 324      ( 21 Jan 2024 04:25 )             28.7                         10.8   23.66 )-----------( 396      ( 20 Jan 2024 10:40 )             33.2     21 Jan 2024 04:25    137    |  111    |  74     ----------------------------<  104    4.3     |  21     |  1.90   20 Jan 2024 10:40    138    |  109    |  77     ----------------------------<  195    5.1     |  22     |  2.10     Ca    7.9        21 Jan 2024 04:25  Ca    8.1        20 Jan 2024 10:40    TPro  4.5    /  Alb  1.8    /  TBili  0.3    /  DBili  x      /  AST  49     /  ALT  38     /  AlkPhos  197    21 Jan 2024 04:25  TPro  4.8    /  Alb  1.9    /  TBili  0.4    /  DBili  x      /  AST  62     /  ALT  40     /  AlkPhos  239    20 Jan 2024 10:40      CARDIAC MARKERS ( 21 Jan 2024 10:25 )  x     / x     / 273 U/L / x     / 9.6 ng/mL  CARDIAC MARKERS ( 21 Jan 2024 04:25 )  x     / x     / 295 U/L / x     / 10.7 ng/mL  CARDIAC MARKERS ( 20 Jan 2024 10:40 )  x     / x     / 333 U/L / x     / x          12 Lead ECG:   Ventricular Rate 83 BPM    Atrial Rate 83 BPM    P-R Interval 142 ms    QRS Duration 82 ms    Q-T Interval 408 ms    QTC Calculation(Bazett) 479 ms    P Axis 51 degrees    R Axis 43 degrees    T Axis 259 degrees    Diagnosis Line Sinus rhythm with premature atrial complexes  Minimal voltage criteria for LVH, may be normal variant ( Sokolow-Elaine )  ST & Marked T wave abnormality, consider anterolateral ischemia  Prolonged QT  Abnormal ECG  When compared with ECG of 20-JAN-2024 07:55,  Sinus rhythm has replaced Atrial fibrillation  Confirmed by Poly Sams ([id number]) on 1/21/2024 9:06:18 AM (01-21-24 @ 03:09)    TRANSTHORACIC ECHOCARDIOGRAM REPORT  ________________________________________________________________________________                                      _______  Pt. Name:       MARU MOYA Study Date:    1/19/2024  MRN:            [id number]           YOB: 1931  Accession #:    [id number]          Age:           89 years  Account#:       [id number]         Gender:        F  Heart Rate:                        Height:        ( )  Rhythm:                            Weight:        ( )  Blood Pressure: 137/89 mmHg        BSA/BMI:       /  ________________________________________________________________________________________  Referring Physician:    [id number] Doroteo Hernandez  Interpreting Physician: Poly Sams MD  Primary Sonographer:    Edwin Ochoa    CPT:               ECHO TTE WO CON COMP W DOPP - 55872.m  Indication(s):     Heart failure, unspecified - I50.9  Procedure:         Transthoracic echocardiogram with 2-D, M-mode and complete        spectral and color flow Doppler.  Ordering Location: [facility]  Admission Status:  Inpatient  Study Information: Image quality for this study is technically difficult.    _______________________________________________________________________________________     CONCLUSIONS:      1. Technically difficult image quality.   2. Left ventricular systolic function is moderately decreased with an ejection fraction visually estimated at 35 to 40 %. Regional wall motion abnormalities present.   3. Multiple segmental abnormalities exist. See findings.   4. Normal right ventricular cavity size and probably normal systolic function.   5. The left atrium is moderately dilated.   6. The right atrium is normal in size.   7. Tricuspid aortic valve with normal leaflet excursion. There is mild calcification of the aortic valve leaflets.   8. Mild aortic regurgitation.   9. Mild mitral valve leaflet calcification.  10. Moderate mitral regurgitation.  11. Mild tricuspid regurgitation.  12. Trace pulmonic regurgitation.  13. The inferior vena cava is normal in size measuring 1.84 cm in diameter, (normal <2.1cm) with normal inspiratory collapse (normal >50%) consistent with normal right atrial pressure (~3, range 0-5mmHg).  14. Estimated pulmonary artery systolic pressure is 33 mmHg.    ________________________________________________________________________________________  FINDINGS:     Left Ventricle:  Left ventricular systolic function is moderately decreased with an ejection fraction visually estimated at 35 to 40%. There are regional wall motion abnormalities present.  LV Wall Scoring: The entire apex is akinetic. The basal and mid anterior wall,  basal and mid anterior septum, basal and mid inferior septum, basal and mid  inferior wall, andbasal and mid inferolateral wall are hypokinetic. All  remaining scored segments are normal.          Right Ventricle:  The right ventricular cavity is normal in size and probably normal systolic function. Tricuspid annular plane systolic excursion (TAPSE) is 1.9 cm (normal >=1.7 cm).     Left Atrium:  The left atrium is moderately dilated.     Right Atrium:  The right atrium is normal in size.     Aortic Valve:  The aortic valve is tricuspid with normal leaflet excursion. There is mild calcification of the aortic valve leaflets. There is mild aortic regurgitation.     Mitral Valve:  There is mild leaflet calcification. There is moderate mitral regurgitation.     Tricuspid Valve:  There is mild tricuspid regurgitation. Estimated pulmonary artery systolic pressure is 33 mmHg.     Pulmonic Valve:  There is trace pulmonic regurgitation.     Aorta:  The aortic root at the sinuses of Valsalva is normal in size. The ascending aorta diameter is normal in size.     Systemic Veins:  The inferior vena cava is normal in size measuring 1.84 cm in diameter, (normal <2.1cm) with normal inspiratory collapse (normal >50%) consistent with normal right atrial pressure (~3, range 0-5mmHg).  ____________________________________________________________________  QUANTITATIVE DATA:  Left Ventricle Measurements: (Indexed to BSA)     IVSd (2D):   1.2 cm  LVPWd (2D):  1.0 cm  LVIDd (2D):  4.3 cm  LVIDs (2D):  3.3 cm  LV Mass:     160 g  Visualized LV EF%: 35 to 40%     MV E Vmax:    1.02 m/s  MV A Vmax:0.62 m/s  MV E/A:       1.63  e' lateral:   7.07 cm/s  e' medial:    4.57 cm/s  E/e' lateral: 14.43  E/e' medial:  22.32  E/e' Average: 17.53  MV DT:        144 msec    Aorta Measurements: (normal range) (Indexed to BSA)     Sinuses of Valsalva: 2.90cm (2.7 - 3.3 cm)  Ao Asc prox:         2.70 cm       Left Atrium Measurements: (Indexed to BSA)  LA Diam 2D: 4.00 cm    Right Ventricle Measurements:     TAPSE:            1.9 cm  RV Base (RVID1):  2.7 cm  RV Mid (RVID2):   2.4 cm  RV Major (RVID3):5.8 cm       LVOT / RVOT/ Qp/Qs Data: (Indexed to BSA)  LVOT Diameter: 1.80 cm    Aortic Valve Measurements:  AR Vmax  3.51 m/s  AR PHT   356 msec  AR [place] 2.89 m/s²    Mitral Valve Measurements:     MV E Vmax: 1.0 m/s         MR Vmax:          4.84m/s  MV A Vmax: 0.6 m/s         MR VTI:           127.00 cm  MV E/A:    1.6             MR Mean Gradient: 53.0 mmHg                             MR Peak Gradient: 93.7 mmHg    Tricuspid Valve Measurements:     TR Vmax:          2.8 m/s  TR Peak Gradient: 30.5 mmHg  RA Pressure:      3 mmHg  PASP:             33 mmHg    ________________________________________________________________________________________  Electronically signed on 1/20/2024 at 3:31:24 PM by Poly Sams MD     *** Final ***

## 2024-01-23 LAB
ANION GAP SERPL CALC-SCNC: 6 MMOL/L — SIGNIFICANT CHANGE UP (ref 5–17)
BUN SERPL-MCNC: 54 MG/DL — HIGH (ref 7–23)
CALCIUM SERPL-MCNC: 8.5 MG/DL — SIGNIFICANT CHANGE UP (ref 8.5–10.1)
CHLORIDE SERPL-SCNC: 109 MMOL/L — HIGH (ref 96–108)
CK SERPL-CCNC: 71 U/L — SIGNIFICANT CHANGE UP (ref 26–192)
CO2 SERPL-SCNC: 24 MMOL/L — SIGNIFICANT CHANGE UP (ref 22–31)
CREAT SERPL-MCNC: 1.7 MG/DL — HIGH (ref 0.5–1.3)
CULTURE RESULTS: SIGNIFICANT CHANGE UP
CULTURE RESULTS: SIGNIFICANT CHANGE UP
EGFR: 28 ML/MIN/1.73M2 — LOW
GLUCOSE SERPL-MCNC: 159 MG/DL — HIGH (ref 70–99)
HCT VFR BLD CALC: 37.4 % — SIGNIFICANT CHANGE UP (ref 34.5–45)
HGB BLD-MCNC: 11.8 G/DL — SIGNIFICANT CHANGE UP (ref 11.5–15.5)
MAGNESIUM SERPL-MCNC: 2.4 MG/DL — SIGNIFICANT CHANGE UP (ref 1.6–2.6)
MCHC RBC-ENTMCNC: 31 PG — SIGNIFICANT CHANGE UP (ref 27–34)
MCHC RBC-ENTMCNC: 31.6 GM/DL — LOW (ref 32–36)
MCV RBC AUTO: 98.2 FL — SIGNIFICANT CHANGE UP (ref 80–100)
NRBC # BLD: 0 /100 WBCS — SIGNIFICANT CHANGE UP (ref 0–0)
PHOSPHATE SERPL-MCNC: 3.4 MG/DL — SIGNIFICANT CHANGE UP (ref 2.5–4.5)
PLATELET # BLD AUTO: 370 K/UL — SIGNIFICANT CHANGE UP (ref 150–400)
POTASSIUM SERPL-MCNC: 4.4 MMOL/L — SIGNIFICANT CHANGE UP (ref 3.5–5.3)
POTASSIUM SERPL-SCNC: 4.4 MMOL/L — SIGNIFICANT CHANGE UP (ref 3.5–5.3)
RBC # BLD: 3.81 M/UL — SIGNIFICANT CHANGE UP (ref 3.8–5.2)
RBC # FLD: 12.1 % — SIGNIFICANT CHANGE UP (ref 10.3–14.5)
SODIUM SERPL-SCNC: 139 MMOL/L — SIGNIFICANT CHANGE UP (ref 135–145)
SPECIMEN SOURCE: SIGNIFICANT CHANGE UP
SPECIMEN SOURCE: SIGNIFICANT CHANGE UP
WBC # BLD: 12.72 K/UL — HIGH (ref 3.8–10.5)
WBC # FLD AUTO: 12.72 K/UL — HIGH (ref 3.8–10.5)

## 2024-01-23 PROCEDURE — 93010 ELECTROCARDIOGRAM REPORT: CPT

## 2024-01-23 PROCEDURE — 99232 SBSQ HOSP IP/OBS MODERATE 35: CPT

## 2024-01-23 PROCEDURE — 99291 CRITICAL CARE FIRST HOUR: CPT

## 2024-01-23 RX ORDER — HYDRALAZINE HCL 50 MG
50 TABLET ORAL EVERY 8 HOURS
Refills: 0 | Status: DISCONTINUED | OUTPATIENT
Start: 2024-01-23 | End: 2024-01-25

## 2024-01-23 RX ORDER — AMIODARONE HYDROCHLORIDE 400 MG/1
150 TABLET ORAL ONCE
Refills: 0 | Status: COMPLETED | OUTPATIENT
Start: 2024-01-23 | End: 2024-01-23

## 2024-01-23 RX ORDER — METOPROLOL TARTRATE 50 MG
100 TABLET ORAL EVERY 12 HOURS
Refills: 0 | Status: DISCONTINUED | OUTPATIENT
Start: 2024-01-23 | End: 2024-01-29

## 2024-01-23 RX ORDER — ACETAMINOPHEN 500 MG
1000 TABLET ORAL ONCE
Refills: 0 | Status: COMPLETED | OUTPATIENT
Start: 2024-01-23 | End: 2024-01-23

## 2024-01-23 RX ORDER — TRAMADOL HYDROCHLORIDE 50 MG/1
25 TABLET ORAL ONCE
Refills: 0 | Status: DISCONTINUED | OUTPATIENT
Start: 2024-01-23 | End: 2024-01-23

## 2024-01-23 RX ORDER — METOPROLOL TARTRATE 50 MG
5 TABLET ORAL ONCE
Refills: 0 | Status: COMPLETED | OUTPATIENT
Start: 2024-01-23 | End: 2024-01-23

## 2024-01-23 RX ADMIN — HEPARIN SODIUM 5000 UNIT(S): 5000 INJECTION INTRAVENOUS; SUBCUTANEOUS at 21:31

## 2024-01-23 RX ADMIN — Medication 50 MILLIGRAM(S): at 13:08

## 2024-01-23 RX ADMIN — TAMSULOSIN HYDROCHLORIDE 0.4 MILLIGRAM(S): 0.4 CAPSULE ORAL at 21:31

## 2024-01-23 RX ADMIN — AMIODARONE HYDROCHLORIDE 600 MILLIGRAM(S): 400 TABLET ORAL at 14:29

## 2024-01-23 RX ADMIN — Medication 5 MILLIGRAM(S): at 16:31

## 2024-01-23 RX ADMIN — Medication 25 MILLIGRAM(S): at 05:45

## 2024-01-23 RX ADMIN — TRAMADOL HYDROCHLORIDE 25 MILLIGRAM(S): 50 TABLET ORAL at 06:56

## 2024-01-23 RX ADMIN — Medication 1000 MILLIGRAM(S): at 02:46

## 2024-01-23 RX ADMIN — Medication 650 MILLIGRAM(S): at 21:32

## 2024-01-23 RX ADMIN — Medication 50 MILLIGRAM(S): at 05:46

## 2024-01-23 RX ADMIN — Medication 100 MILLIGRAM(S): at 17:57

## 2024-01-23 RX ADMIN — CLOPIDOGREL BISULFATE 75 MILLIGRAM(S): 75 TABLET, FILM COATED ORAL at 11:47

## 2024-01-23 RX ADMIN — PIPERACILLIN AND TAZOBACTAM 25 GRAM(S): 4; .5 INJECTION, POWDER, LYOPHILIZED, FOR SOLUTION INTRAVENOUS at 10:18

## 2024-01-23 RX ADMIN — Medication 50 MILLIGRAM(S): at 21:31

## 2024-01-23 RX ADMIN — HEPARIN SODIUM 5000 UNIT(S): 5000 INJECTION INTRAVENOUS; SUBCUTANEOUS at 13:03

## 2024-01-23 RX ADMIN — Medication 650 MILLIGRAM(S): at 22:20

## 2024-01-23 RX ADMIN — HEPARIN SODIUM 5000 UNIT(S): 5000 INJECTION INTRAVENOUS; SUBCUTANEOUS at 05:46

## 2024-01-23 RX ADMIN — PIPERACILLIN AND TAZOBACTAM 25 GRAM(S): 4; .5 INJECTION, POWDER, LYOPHILIZED, FOR SOLUTION INTRAVENOUS at 17:57

## 2024-01-23 RX ADMIN — Medication 5 MILLIGRAM(S): at 21:32

## 2024-01-23 RX ADMIN — TRAMADOL HYDROCHLORIDE 25 MILLIGRAM(S): 50 TABLET ORAL at 05:52

## 2024-01-23 RX ADMIN — PIPERACILLIN AND TAZOBACTAM 25 GRAM(S): 4; .5 INJECTION, POWDER, LYOPHILIZED, FOR SOLUTION INTRAVENOUS at 01:38

## 2024-01-23 RX ADMIN — ATORVASTATIN CALCIUM 40 MILLIGRAM(S): 80 TABLET, FILM COATED ORAL at 21:32

## 2024-01-23 RX ADMIN — Medication 400 MILLIGRAM(S): at 01:38

## 2024-01-23 RX ADMIN — Medication 3 MILLIGRAM(S): at 21:32

## 2024-01-23 NOTE — PROGRESS NOTE ADULT - PROBLEM SELECTOR PLAN 8
- TTE performed here shows EF of 35% with WMA  - Cardiology following and recommending consideration for ischemic work up as outpatient  - Follows with Rafa Vila  - Appears euvolemic  - Stop Clonidine and start Hydralazine (titrate to response)  - Started on ToprolXL  - Deferring ACE/ARB given fluctuating kidney function

## 2024-01-23 NOTE — PROGRESS NOTE ADULT - SUBJECTIVE AND OBJECTIVE BOX
Patient is a 89y old  Female who presents with a chief complaint of NSTEMI, Rhabdomyolysis (23 Jan 2024 08:59)      SUBJECTIVE / OVERNIGHT EVENTS: Patient seen and examined at bedside. No acute events overnight. Patient with leg pain, but muscular. NO leg swelling. Discussed with RN to get patient out of bed and provide assistance. Will try to wean her supplemental O2 which she likely doesn't need.    MEDICATIONS  (STANDING):  atorvastatin 40 milliGRAM(s) Oral at bedtime  bisacodyl 5 milliGRAM(s) Oral at bedtime  clopidogrel Tablet 75 milliGRAM(s) Oral daily  heparin   Injectable 5000 Unit(s) SubCutaneous every 8 hours  hydrALAZINE 50 milliGRAM(s) Oral every 8 hours  influenza  Vaccine (HIGH DOSE) 0.7 milliLiter(s) IntraMuscular once  metoprolol succinate ER 50 milliGRAM(s) Oral every 12 hours  piperacillin/tazobactam IVPB.. 3.375 Gram(s) IV Intermittent every 8 hours  tamsulosin 0.4 milliGRAM(s) Oral at bedtime    MEDICATIONS  (PRN):  acetaminophen     Tablet .. 650 milliGRAM(s) Oral every 6 hours PRN Mild Pain (1 - 3)  melatonin 3 milliGRAM(s) Oral at bedtime PRN Insomnia      CAPILLARY BLOOD GLUCOSE        I&O's Summary    22 Jan 2024 07:01  -  23 Jan 2024 07:00  --------------------------------------------------------  IN: 0 mL / OUT: 300 mL / NET: -300 mL    23 Jan 2024 07:01  -  23 Jan 2024 11:26  --------------------------------------------------------  IN: 177 mL / OUT: 0 mL / NET: 177 mL        PHYSICAL EXAM:  Vital Signs Last 24 Hrs  T(C): 36.4 (23 Jan 2024 04:25), Max: 36.4 (23 Jan 2024 04:25)  T(F): 97.6 (23 Jan 2024 04:25), Max: 97.6 (23 Jan 2024 04:25)  HR: 100 (23 Jan 2024 04:25) (87 - 100)  BP: 162/90 (23 Jan 2024 04:25) (156/81 - 170/90)  BP(mean): --  RR: 18 (23 Jan 2024 04:25) (18 - 19)  SpO2: 94% (23 Jan 2024 04:25) (93% - 96%)    Parameters below as of 23 Jan 2024 04:25  Patient On (Oxygen Delivery Method): nasal cannula  O2 Flow (L/min): 2      GEN: female in NAD, appears comfortable, no diaphoresis  EYES: No scleral injection, EOMI  ENTM: neck supple & symmetric without tracheal deviation, moist membranes, no gross hearing impairment, thyroid gland not enlarged  CV: +S1/S2, no m/r/g, no abdominal bruit, no LE edema  RESP: breathing comfortably, no respiratory accessory muscle use, CTAB, no w/r/r  GI: normoactive BS, soft, NTND, no rebounding/guarding, no palpable masses    LABS:                        11.8   12.72 )-----------( 370      ( 23 Jan 2024 09:31 )             37.4     01-22    138  |  109<H>  |  67<H>  ----------------------------<  123<H>  5.0   |  24  |  1.80<H>    Ca    8.4<L>      22 Jan 2024 07:00  Phos  3.5     01-22  Mg     2.4     01-22    TPro  4.8<L>  /  Alb  1.8<L>  /  TBili  0.3  /  DBili  x   /  AST  37  /  ALT  39  /  AlkPhos  173<H>  01-22      CARDIAC MARKERS ( 22 Jan 2024 07:00 )  x     / x     / 134 U/L / x     / x          Urinalysis Basic - ( 22 Jan 2024 07:00 )    Color: x / Appearance: x / SG: x / pH: x  Gluc: 123 mg/dL / Ketone: x  / Bili: x / Urobili: x   Blood: x / Protein: x / Nitrite: x   Leuk Esterase: x / RBC: x / WBC x   Sq Epi: x / Non Sq Epi: x / Bacteria: x          RADIOLOGY & ADDITIONAL TESTS:  Results Reviewed:   Imaging Personally Reviewed:  Electrocardiogram Personally Reviewed:    COORDINATION OF CARE:  Care Discussed with Consultants/Other Providers [Y/N]:  Prior or Outpatient Records Reviewed [Y/N]:

## 2024-01-23 NOTE — PROVIDER CONTACT NOTE (OTHER) - SITUATION
Patient has not voided yet. Bladder scan showed 210 ml.
Pt. in bed, grabbing her left leg stating "her muscles are cramping". Pt. states pain is radiating from the middle of her L calf up to her thigh.

## 2024-01-23 NOTE — CHART NOTE - NSCHARTNOTEFT_GEN_A_CORE
EVENT:  Patient remains in rapid Afib at 140's after Amio 150 mg IVSS.    SUBJECTIVE: Patient is comfortable.  Denies CP or palpitations.  Denies SOB or RASCON.  Denies pain    OBJECTIVE: Appears comfortable    ASSESSMENT: 89 F with HTN, HLD, previous CVA, basal cell carcinoma, renal artery stenosis s/p stent prsented after a syncopal episode, course c/w NSTEMI with acute systolic HF, NSTEMI, and transient Afib    PLAN: Give Lopressor 5 mg IV x 1 now and increase Toprol XL from 50 mg q 12H to 100 mg q12H.  If needed room for AVN, can reduce Hydralazine

## 2024-01-23 NOTE — PROGRESS NOTE ADULT - ASSESSMENT
89-year-old white female presently residing at home alone though does have assistance of an aide approximately 4 to 6 hours/day with past history of hypertension hyperlipidemia previous CVA basal cell carcinoma renal artery stenosis s/p stent as well as congestive heart failure who was last known to be well and last heard from sometime late afternoon yesterday.  Cardiology consulted for syncopal episode and elevated troponins.    Syncopal episode, Elevated Troponin/NSTEMI, HTN, HFrEF   - s/p mechanical fall  - May have been orthostatic syncope; appears to be clinically dry   - Check orthostatics when able but patient is very confused and uncooperative    - BNP:  <-->879764,  <--932065  - Does not appear to be volume overloaded at this time  - TTE 4/23: EF 58%, mod MR, mild- mod TR, mild AR, MT.  - Repeat TTE showed EF 35-40% with SWMA, LAE, mod MR.  Would recommend LH/RHC down the road.  At this point, in the setting of confusion, she is not a candidate for any ischemic w/u at this time  - Hold home torsemide for now.  Creatinine continues to improve  - Creatinine:  1.8<--1.90,  <--2.10  - Monitor volume status closely  - Unable to start ACEI/ARB or Entresto in the setting of TERESA    - Initially NSR/Stach.  Went into A fib with -120s 1/20.  Converted to NSR, 1/20.  Remains in regular rhythm  - Continue Toprol XL but would increase to 100 mg daily.  Tachycardic at times, though, with reactive component as patient restless at times  - CHadsvasc 4-5.  Would hold off on AC in the setting of downtrending Hgb.  Either way, she does not appear to be a good candidate for long term AC    - BP on the high side.  Increase Hydralazine to 50 mg q8H for afterload reduction  - Hold home Losartan 2/2 TERESA, resume when able   - Keep off Clonidine and uptitrate GDMT as tolerated    - EKG w/ A fib @ 122. w/ continued diffuse TWI  - Repeat EKG w/ ST @ 109, Diffuse T wave inversion, new   - CK trend: <--758, <--1624, <--2986  - Troponin: <-835.7, <-849.5, <-861.2  - Likely demand ischemia 2/2 rhabdomyolysis.  However, possible NSTEMI in the setting of SWMA on TTE  - Continue home statin and Plavix  - For now, would medically manage.  Her TERESA and AMS preclude us in proceeding with cardiac cath    - Monitor and replete lytes, keep K>4, Mg>2.  - Will continue to follow.    Jen Harrison DNP, NP-C, AGACNP-C  Cardiology   Call TEAMS

## 2024-01-23 NOTE — PROGRESS NOTE ADULT - ASSESSMENT
89F with PMHx of HTN, BOB (post-stent), and CVA presenting for mechanical fall from home and admitted for rhabdomyolysis. While here patient found to have UTI, paroxysmal atrial fibrillation, and new chronic HFrEF.

## 2024-01-23 NOTE — PROGRESS NOTE ADULT - PROBLEM SELECTOR PLAN 2
- Elevated WBC with pyuria on UA, but does not endorse symptoms  - Patient with sepsis POA 2/2 to UTI (Cystitis?)  - UCx with 100K CFU Pseudomonas  - Stopped CTX and will start Zosyn (plan for 3 days for cystitis 1/22-24)  - Fluoroquinolones are a potential option, (QTc borderline at 482)

## 2024-01-23 NOTE — PROGRESS NOTE ADULT - SUBJECTIVE AND OBJECTIVE BOX
MARU MOYA is a 89yFemale , patient examined and chart reviewed.     INTERVAL HPI/ OVERNIGHT EVENTS:   Feeling better. In chair.  Son at bedside.    PAST MEDICAL & SURGICAL HISTORY:  Essential hypertension  Dyslipidemia  Hyponatremia  ON HCTZ , h/o Hypokelemia  Cerebrovascular accident (CVA), unspecified mechanism  Edema, unspecified type  Mitral valve insufficiency, unspecified etiology  Cardiac cath on 11/21/18  Basal cell carcinoma  Renal artery stenosis  right side, stent  CHF (congestive heart failure)  Inguinal hernia, right  Blood pressure instability  S/P Mohs surgery for basal cell carcinoma  Status post hysterectomy  and bladder lift with mesh 1990s  History of cholecystectomy  1980  H/O bilateral hip replacements  2013, 2014  Other elective surgery  right renal artery stent  S/P colonoscopy    For details regarding the patient's social history, family history, and other miscellaneous elements, please refer the initial infectious diseases consultation and/or the admitting history and physical examination for this admission.    ROS:  CONSTITUTIONAL:  Negative fever or chills  EYES:  Negative  blurry vision or double vision  CARDIOVASCULAR:  Negative for chest pain or palpitations  RESPIRATORY:  Negative for cough, wheezing, or SOB   GASTROINTESTINAL:  Negative for nausea, vomiting, diarrhea, constipation, or abdominal pain  GENITOURINARY:  Negative frequency, urgency or dysuria  NEUROLOGIC:  No headache, confusion, dizziness, lightheadedness  All other systems were reviewed and are negative     ALLERGIES  IV Contrast (Pruritus; Rash)  verapamil (Other)  Keflex (Other)      Current inpatient medications :    ANTIBIOTICS/RELEVANT:  piperacillin/tazobactam IVPB.. 3.375 Gram(s) IV Intermittent every 8 hours      acetaminophen     Tablet .. 650 milliGRAM(s) Oral every 6 hours PRN  atorvastatin 40 milliGRAM(s) Oral at bedtime  bisacodyl 5 milliGRAM(s) Oral at bedtime  clopidogrel Tablet 75 milliGRAM(s) Oral daily  heparin   Injectable 5000 Unit(s) SubCutaneous every 8 hours  hydrALAZINE 50 milliGRAM(s) Oral every 8 hours  melatonin 3 milliGRAM(s) Oral at bedtime PRN  metoprolol succinate  milliGRAM(s) Oral every 12 hours  tamsulosin 0.4 milliGRAM(s) Oral at bedtime      Objective:    01-22 @ 07:01  -  01-23 @ 07:00  --------------------------------------------------------  IN: 0 mL / OUT: 300 mL / NET: -300 mL    01-23 @ 07:01  -  01-23 @ 18:13  --------------------------------------------------------  IN: 177 mL / OUT: 0 mL / NET: 177 mL      T(C): 36.9 (01-23-24 @ 16:03), Max: 36.9 (01-23-24 @ 16:03)  HR: 108 (01-23-24 @ 17:51) (87 - 140)  BP: 148/86 (01-23-24 @ 17:54) (130/82 - 180/95)  RR: 15 (01-23-24 @ 18:04) (15 - 20)  SpO2: 96% (01-23-24 @ 18:04) (94% - 97%)      Physical Exam:  General: no acute distress  Neck: supple, trachea midline  Lungs: clear, no wheeze/rhonchi  Cardiovascular: regular rate and rhythm, S1 S2  Abdomen: soft, nontender,  bowel sounds normal  Neurological: alert and oriented x3  Skin: no rash  Extremities: no edema    LABS:                       11.8   12.72 )-----------( 370      ( 23 Jan 2024 09:31 )             37.4       01-23    139  |  109<H>  |  54<H>  ----------------------------<  159<H>  4.4   |  24  |  1.70<H>    Ca    8.5      23 Jan 2024 09:31  Phos  3.4     01-23  Mg     2.4     01-23    TPro  4.8<L>  /  Alb  1.8<L>  /  TBili  0.3  /  DBili  x   /  AST  37  /  ALT  39  /  AlkPhos  173<H>  01-22      Urine Microscopic-Add On (NC) (01.18.24 @ 21:30)    Comment - Urine: occasional wbc clumps   Squamous Epithelial Cells: Present   Red Blood Cell - Urine: 4 /HPF   White Blood Cell - Urine: 45 /HPF   Bacteria: Many /HPF  Urinalysis (01.18.24 @ 21:30)    pH Urine: 6.5   Blood, Urine: Moderate   Glucose Qualitative, Urine: Negative mg/dL   Color: Yellow   Urine Appearance: Cloudy   Bilirubin: Negative   Ketone - Urine: Trace mg/dL   Specific Gravity: 1.014   Protein, Urine: >=1000 mg/dL   Urobilinogen: 0.2 mg/dL   Nitrite: Negative   Leukocyte Esterase Concentration: Small        RECENT CULTURES:  Culture - Blood (01.18.24 @ 17:32)    Specimen Source: .Blood Blood-Peripheral   Culture Results:   No growth at 4 days    Culture - Urine (01.18.24 @ 21:30)    -  Ceftazidime: S <=1   -  Meropenem: S <=1   -  Piperacillin/Tazobactam: S <=8   -  Amikacin: S <=16   -  Aztreonam: S <=4   -  Cefepime: S <=2   -  Ciprofloxacin: S <=0.25   -  Imipenem: S 2   -  Levofloxacin: S <=0.5   Specimen Source: Clean Catch Clean Catch (Midstream)   Culture Results:   >100,000 CFU/ml Pseudomonas aeruginosa   Organism Identification: Pseudomonas aeruginosa   Organism: Pseudomonas aeruginosa   Method Type: LASHAE    RADIOLOGY & ADDITIONAL STUDIES:      Assessment :   89YO F PMHx of HTN, HLD, Renal Artery Stenosis s/p stent, Basal Cell Carcinoma, CHFpEF CVA admitted sp fall with Rhabdomyolysis found to have UTI. WBC 26K with bandemia and TERESA on CKD. Noted to have AUR Sanders placed 1/21. Ua with pyuria and Ucx grew Pseudomonas.  + troponins    Plan :   Cont Zosyn  Trend temps and cbc, renal fx  Sanders per primary team  Pulm toileting  Asp precautions  Stable from ID standpoint      Continue with present regiment.  Appropriate use of antibiotics and adverse effects reviewed.      I have discussed the above plan of care with patient/ family in detail. They expressed understanding of the  treatment plan . Risks, benefits and alternatives discussed in detail. I have asked if they have any questions or concerns and appropriately addressed them to the best of my ability .    > 35 minutes were spent in direct patient care reviewing notes, medications ,labs data/ imaging , discussion with multidisciplinary team.    Thank you for allowing me to participate in care of your patient .    Rebel Rankin MD  Infectious Disease  [phone]

## 2024-01-23 NOTE — PROGRESS NOTE ADULT - PROBLEM SELECTOR PLAN 5
- Possible renal functional is just fluctuating  - Found to have urinary retention so Sanders placed on 1/22**  - Start Flomax  - Likely TOV should be done at [facility] when more mobile, but will consider on 1/24 after 2 nights of Flomax

## 2024-01-23 NOTE — PROGRESS NOTE ADULT - SUBJECTIVE AND OBJECTIVE BOX
[facility] -- Ricky Agarwal, Ramsey Quan Savella, , Latrell Garsia  Office # [phone]    Follow Up:   HFrEF, Rhabdo, Elevated Troponins, poss. NSTEMI    Subjective/Observations: Awake but confused.  NC at  3L/min in use but not in respiratory distress.  Denies CP or palpitations.  Denies any form of pain.  However, overnight events noted.  c/o leg tightness    REVIEW OF SYSTEMS: All other review of systems is negative unless indicated above  PAST MEDICAL & SURGICAL HISTORY:  Essential hypertension      Dyslipidemia      Hyponatremia  ON HCTZ , h/o Hypokelemia      Cerebrovascular accident (CVA), unspecified mechanism      Edema, unspecified type    Mitral valve insufficiency, unspecified etiology  Cardiac cath on 11/21/18      Basal cell carcinoma      Renal artery stenosis  right side, stent      CHF (congestive heart failure)      Inguinal hernia, right      Blood pressure instability      S/P Mohs surgery for basal cell carcinoma      Status post hysterectomy  and bladder lift with mesh 1990s      History of cholecystectomy  1980      H/O bilateral hip replacements  2013, 2014      Other elective surgery  right renal artery stent      S/P colonoscopy        MEDICATIONS  (STANDING):  atorvastatin 40 milliGRAM(s) Oral at bedtime  bisacodyl 5 milliGRAM(s) Oral at bedtime  clopidogrel Tablet 75 milliGRAM(s) Oral daily  heparin   Injectable 5000 Unit(s) SubCutaneous every 8 hours  hydrALAZINE 50 milliGRAM(s) Oral every 8 hours  influenza  Vaccine (HIGH DOSE) 0.7 milliLiter(s) IntraMuscular once  metoprolol succinate ER 50 milliGRAM(s) Oral every 12 hours  piperacillin/tazobactam IVPB.. 3.375 Gram(s) IV Intermittent every 8 hours  tamsulosin 0.4 milliGRAM(s) Oral at bedtime    MEDICATIONS  (PRN):  acetaminophen     Tablet .. 650 milliGRAM(s) Oral every 6 hours PRN Mild Pain (1 - 3)  melatonin 3 milliGRAM(s) Oral at bedtime PRN Insomnia    Allergies    IV Contrast (Pruritus; Rash)  verapamil (Other)  Keflex (Other)    Intolerances      Vital Signs Last 24 Hrs  T(C): 36.4 (23 Jan 2024 04:25), Max: 36.4 (23 Jan 2024 04:25)  T(F): 97.6 (23 Jan 2024 04:25), Max: 97.6 (23 Jan 2024 04:25)  HR: 100 (23 Jan 2024 04:25) (87 - 100)  BP: 162/90 (23 Jan 2024 04:25) (156/81 - 170/90)  BP(mean): --  RR: 18 (23 Jan 2024 04:25) (18 - 19)  SpO2: 94% (23 Jan 2024 04:25) (93% - 96%)    Parameters below as of 23 Jan 2024 04:25  Patient On (Oxygen Delivery Method): nasal cannula  O2 Flow (L/min): 2    I&O's Summary    22 Jan 2024 07:01  -  23 Jan 2024 07:00  --------------------------------------------------------  IN: 0 mL / OUT: 300 mL / NET: -300 mL    PHYSICAL EXAM:  TELE: NSR/Stach at 120's  Constitutional: NAD, awake and alert  HEENT: Moist Mucous Membranes, Anicteric  Pulmonary: Non-labored, breath sounds are clear bilaterally, No wheezing, rales or rhonchi  Cardiovascular: Regular, S1 and S2, + murmurs, no rubs, gallops or clicks  Gastrointestinal: Bowel Sounds present, soft, nontender.   Lymph: No peripheral edema. No lymphadenopathy.  Skin: No visible rashes or ulcers.  +ecchymoses BUE  Psych:  Mood & affect: confused and disoriented, restless at times      LABS: All Labs Reviewed:                        9.5    14.15 )-----------( 351      ( 22 Jan 2024 07:00 )             30.4                         9.4    18.50 )-----------( 324      ( 21 Jan 2024 04:25 )             28.7                         10.8   23.66 )-----------( 396      ( 20 Jan 2024 10:40 )             33.2     22 Jan 2024 07:00    138    |  109    |  67     ----------------------------<  123    5.0     |  24     |  1.80   21 Jan 2024 04:25    137    |  111    |  74     ----------------------------<  104    4.3     |  21     |  1.90   20 Jan 2024 10:40    138    |  109    |  77     ----------------------------<  195    5.1     |  22     |  2.10     Ca    8.4        22 Jan 2024 07:00  Ca    7.9        21 Jan 2024 04:25  Ca    8.1        20 Jan 2024 10:40  Phos  3.5       22 Jan 2024 07:00  Mg     2.4       22 Jan 2024 07:00    TPro  4.8    /  Alb  1.8    /  TBili  0.3    /  DBili  x      /  AST  37     /  ALT  39     /  AlkPhos  173    22 Jan 2024 07:00  TPro  4.5    /  Alb  1.8    /  TBili  0.3    /  DBili  x      /  AST  49     /  ALT  38     /  AlkPhos  197    21 Jan 2024 04:25  TPro  4.8    /  Alb  1.9    /  TBili  0.4    /  DBili  x      /  AST  62     /  ALT  40     /  AlkPhos  239    20 Jan 2024 10:40      CARDIAC MARKERS ( 22 Jan 2024 07:00 )  x     / x     / 134 U/L / x     / x      CARDIAC MARKERS ( 21 Jan 2024 10:25 )  x     / x     / 273 U/L / x     / 9.6 ng/mL      12 Lead ECG:   Ventricular Rate 83 BPM    Atrial Rate 83 BPM    P-R Interval 142 ms    QRS Duration 82 ms    Q-T Interval 408 ms    QTC Calculation(Bazett) 479 ms    P Axis 51 degrees    R Axis 43 degrees    T Axis 259 degrees    Diagnosis Line Sinus rhythm with premature atrial complexes  Minimal voltage criteria for LVH, may be normal variant ( Sokolow-Elaine )  ST & Marked T wave abnormality, consider anterolateral ischemia  Prolonged QT  Abnormal ECG  When compared with ECG of 20-JAN-2024 07:55,  Sinus rhythm has replaced Atrial fibrillation  Confirmed by Poly Sams ([id number]) on 1/21/2024 9:06:18 AM (01-21-24 @ 03:09)      TRANSTHORACIC ECHOCARDIOGRAM REPORT  ________________________________________________________________________________                                      _______       Pt. Name:       MARU MOYA Study Date:    1/19/2024  MRN:            [id number]           YOB: 1931  Accession #:    [id number]          Age:           89 years  Account#:       [id number]         Gender:        F  Heart Rate:                        Height:        ( )  Rhythm:                            Weight:        ( )  Blood Pressure: 137/89 mmHg        BSA/BMI:       /  ________________________________________________________________________________________  Referring Physician:    [id number] Doroteo Hernandez  Interpreting Physician: Poly Sams MD  Primary Sonographer:    Edwin Ochoa    CPT:               ECHO TTE WO CON COMP W DOPP - 26373.m  Indication(s):     Heart failure, unspecified - I50.9  Procedure:         Transthoracic echocardiogram with 2-D, M-mode and complete        spectral and color flow Doppler.  Ordering Location: [facility]  Admission Status:  Inpatient  Study Information: Image quality for this study is technically difficult.    _______________________________________________________________________________________     CONCLUSIONS:      1. Technically difficult image quality.   2. Left ventricular systolic function is moderately decreased with an ejection fraction visually estimated at 35 to 40 %. Regional wall motion abnormalities present.   3. Multiple segmental abnormalities exist. See findings.   4. Normal right ventricular cavity size and probably normal systolic function.   5. The left atrium is moderately dilated.   6. The right atrium is normal in size.   7. Tricuspid aortic valve with normal leaflet excursion. There is mild calcification of the aortic valve leaflets.   8. Mild aortic regurgitation.   9. Mild mitral valve leaflet calcification.  10. Moderate mitral regurgitation.  11. Mild tricuspid regurgitation.  12. Trace pulmonic regurgitation.  13. The inferior vena cava is normal in size measuring 1.84 cm in diameter, (normal <2.1cm) with normal inspiratory collapse (normal >50%) consistent with normal right atrial pressure (~3, range 0-5mmHg).  14. Estimated pulmonary artery systolic pressure is 33 mmHg.    ________________________________________________________________________________________  FINDINGS:     Left Ventricle:  Left ventricular systolic function is moderately decreased with an ejection fraction visually estimated at 35 to 40%. There are regional wall motion abnormalities present.  LV Wall Scoring: The entire apex is akinetic. The basal and mid anterior wall,  basal and mid anterior septum, basal and mid inferior septum, basal and mid  inferior wall, andbasal and mid inferolateral wall are hypokinetic. All  remaining scored segments are normal.     Right Ventricle:  The right ventricular cavity is normal in size and probably normal systolic function. Tricuspid annular plane systolic excursion (TAPSE) is 1.9 cm (normal >=1.7 cm).     Left Atrium:  The left atrium is moderately dilated.     Right Atrium:  The right atrium is normal in size.     Aortic Valve:  The aortic valve is tricuspid with normal leaflet excursion. There is mild calcification of the aortic valve leaflets. There is mild aortic regurgitation.     Mitral Valve:  There is mild leaflet calcification. There is moderate mitral regurgitation.     Tricuspid Valve:  There is mild tricuspid regurgitation. Estimated pulmonary artery systolic pressure is 33 mmHg.     Pulmonic Valve:  There is trace pulmonic regurgitation.     Aorta:  The aortic root at the sinuses of Valsalva is normal in size. The ascending aorta diameter is normal in size.     Systemic Veins:  The inferior vena cava is normal in size measuring 1.84 cm in diameter, (normal <2.1cm) with normal inspiratory collapse (normal >50%) consistent with normal right atrial pressure (~3, range 0-5mmHg).  ____________________________________________________________________  QUANTITATIVE DATA:  Left Ventricle Measurements: (Indexed to BSA)     IVSd (2D):   1.2 cm  LVPWd (2D):  1.0 cm  LVIDd (2D):  4.3 cm  LVIDs (2D):  3.3 cm  LV Mass:     160 g  Visualized LV EF%: 35 to 40%     MV E Vmax:    1.02 m/s  MV A Vmax:0.62 m/s  MV E/A:       1.63  e' lateral:   7.07 cm/s  e' medial:    4.57 cm/s  E/e' lateral: 14.43  E/e' medial:  22.32  E/e' Average: 17.53  MV DT:        144 msec    Aorta Measurements: (normal range) (Indexed to BSA)     Sinuses of Valsalva: 2.90cm (2.7 - 3.3 cm)  Ao Asc prox:         2.70 cm       Left Atrium Measurements: (Indexed to BSA)  LA Diam 2D: 4.00 cm    Right Ventricle Measurements:     TAPSE:            1.9 cm  RV Base (RVID1):  2.7 cm  RV Mid (RVID2):   2.4 cm  RV Major (RVID3):5.8 cm     LVOT / RVOT/ Qp/Qs Data: (Indexed to BSA)  LVOT Diameter: 1.80 cm    Aortic Valve Measurements:  AR Vmax  3.51 m/s  AR PHT   356 msec  AR [place] 2.89 m/s²    Mitral Valve Measurements:     MV E Vmax: 1.0 m/s         MR Vmax:          4.84m/s  MV A Vmax: 0.6 m/s         MR VTI:           127.00 cm  MV E/A:    1.6             MR Mean Gradient: 53.0 mmHg                             MR Peak Gradient: 93.7 mmHg  Tricuspid Valve Measurements:     TR Vmax:          2.8 m/s  TR Peak Gradient: 30.5 mmHg  RA Pressure:      3 mmHg  PASP:             33 mmHg  ________________________________________________________________________________________  Electronically signed on 1/20/2024 at 3:31:24 PM by Poly Sams MD    *** Final ***

## 2024-01-23 NOTE — PROGRESS NOTE ADULT - CRITICAL CARE ATTENDING COMMENT
89-year-old white female presently residing at home alone though does have assistance of an aide approximately 4 to 6 hours/day with past history of hypertension hyperlipidemia previous CVA basal cell carcinoma renal artery stenosis s/p stent as well as congestive heart failure who was last known to be well and last heard from sometime late afternoon yesterday.  Cardiology consulted for syncopal episode and elevated troponins.    s/p mechanical fall  patient is very confused and uncooperative  no clear vol ol  ef 35 with segmental wma, not a good candidate for cath noting age, renal fxn, ms   elevated cpk and trops, med mgmt with statin and plavix  hf meds limited by gfr   paf rvr, recurrent today  giving amio, not a good candidate for ac noting hgb trends  at risk of abrupt decompensation    Upon my evaluation, this patient is at high risk for imminent or life threatening deterioration due to rapid af, ischemia,  and other active medical issues which require my direct attention, intervention, and personal management.  I have personally spent >35 minutes  of critical care time exclusive of time spent on separate billing procedures. This includes review of laboratory data, radiology results, discussion with primary team\patient, and monitoring for potential decompensation Interventions were performed as documented above.

## 2024-01-23 NOTE — SOCIAL WORK PROGRESS NOTE - NSSWPROGRESSNOTE_GEN_ALL_CORE
FREDDY faxed to Bimal MOREIRA and Huyen. julio spoke with pts dtr Brittney [phone], will continue to follow fort transition to [facility] when stable FREDDY faxed to Bimal MOREIRA and Huyen. sw spoke with pts dtr Brittney [phone], will continue to follow for transition to [facility] when stable

## 2024-01-23 NOTE — H&P ADULT - HIV OFFER
FAMILY HISTORY:  Sibling  Still living? Unknown  Family history of acute myocardial infarction, Age at diagnosis: Age Unknown     Opt out

## 2024-01-23 NOTE — CHART NOTE - NSCHARTNOTEFT_GEN_A_CORE
Called by RN for Pt c/o right leg cramping and pain  Patient seen and examined at bedside. Patient reports right leg feels like it's "tight".        T(C): 36.4 (01-23-24 @ 04:25), Max: 36.4 (01-23-24 @ 04:25)  HR: 100 (01-23-24 @ 04:25) (87 - 100)  BP: 162/90 (01-23-24 @ 04:25) (156/81 - 170/90)  RR: 18 (01-23-24 @ 04:25) (18 - 19)  SpO2: 94% (01-23-24 @ 04:25) (93% - 96%)  Wt(kg): --    Physical :  Gen- NAD, ncat  Cardio - s+1,s+2  Lung - unlabored respirations  Ext- right calf muscle and right lateral thigh firm and occasionally twitching, tender to palpation  Neuro- appropriately answers questions and follows commands    LABS:                        9.5    14.15 )-----------( 351      ( 22 Jan 2024 07:00 )             30.4     01-22    138  |  109<H>  |  67<H>  ----------------------------<  123<H>  5.0   |  24  |  1.80<H>    Ca    8.4<L>      22 Jan 2024 07:00  Phos  3.5     01-22  Mg     2.4     01-22    TPro  4.8<L>  /  Alb  1.8<L>  /  TBili  0.3  /  DBili  x   /  AST  37  /  ALT  39  /  AlkPhos  173<H>  01-22      Urinalysis Basic - ( 22 Jan 2024 07:00 )    Color: x / Appearance: x / SG: x / pH: x  Gluc: 123 mg/dL / Ketone: x  / Bili: x / Urobili: x   Blood: x / Protein: x / Nitrite: x   Leuk Esterase: x / RBC: x / WBC x   Sq Epi: x / Non Sq Epi: x / Bacteria: x        CARDIAC MARKERS ( 22 Jan 2024 07:00 )  x     / x     / 134 U/L / x     / x      CARDIAC MARKERS ( 21 Jan 2024 10:25 )  x     / x     / 273 U/L / x     / 9.6 ng/mL        Assessment/Plan  89F with PMHx of HTN, BOB (post-stent), and CVA presenting for mechanical fall from home and admitted for rhabdomyolysis. While here patient found to have UTI, paroxysmal atrial fibrillation, and new chronic HFrEF.  1. Stat CK ordered  2. b/l LE duplex ordered  3. PO Tramadol 25mg x1

## 2024-01-23 NOTE — PROVIDER CONTACT NOTE (OTHER) - ACTION/TREATMENT ORDERED:
Resident at bedside assessing pt. Tramadol x1 given with relief. Additional labs added for this morning. Will continue to monitor.
No orders at the moment.  recommended to wait for patient to void on her own.

## 2024-01-23 NOTE — CHART NOTE - NSCHARTNOTEFT_GEN_A_CORE
Called by RN re: patient's HR of sustained 160's.  Tele reviewed, noted to have PAT, initially, which became PAfib with RVR.  Rates peaked at 180, though, not sustained.  Would give Amiodarone 150 mg IVSS x 1 now.  Would obtain EKG and TSH.  BB increased today, can uptitrate as necessary.  Not a good candidate for AC.

## 2024-01-24 DIAGNOSIS — E03.9 HYPOTHYROIDISM, UNSPECIFIED: ICD-10-CM

## 2024-01-24 LAB
HCT VFR BLD CALC: 34 % — LOW (ref 34.5–45)
HGB BLD-MCNC: 11 G/DL — LOW (ref 11.5–15.5)
MCHC RBC-ENTMCNC: 31.8 PG — SIGNIFICANT CHANGE UP (ref 27–34)
MCHC RBC-ENTMCNC: 32.4 GM/DL — SIGNIFICANT CHANGE UP (ref 32–36)
MCV RBC AUTO: 98.3 FL — SIGNIFICANT CHANGE UP (ref 80–100)
NRBC # BLD: 0 /100 WBCS — SIGNIFICANT CHANGE UP (ref 0–0)
PLATELET # BLD AUTO: 395 K/UL — SIGNIFICANT CHANGE UP (ref 150–400)
RBC # BLD: 3.46 M/UL — LOW (ref 3.8–5.2)
RBC # FLD: 12.1 % — SIGNIFICANT CHANGE UP (ref 10.3–14.5)
WBC # BLD: 10.14 K/UL — SIGNIFICANT CHANGE UP (ref 3.8–10.5)
WBC # FLD AUTO: 10.14 K/UL — SIGNIFICANT CHANGE UP (ref 3.8–10.5)

## 2024-01-24 PROCEDURE — 99233 SBSQ HOSP IP/OBS HIGH 50: CPT

## 2024-01-24 PROCEDURE — 99233 SBSQ HOSP IP/OBS HIGH 50: CPT | Mod: GC

## 2024-01-24 RX ORDER — PANTOPRAZOLE SODIUM 20 MG/1
2 TABLET, DELAYED RELEASE ORAL
Refills: 0 | DISCHARGE

## 2024-01-24 RX ORDER — CIPROFLOXACIN LACTATE 400MG/40ML
500 VIAL (ML) INTRAVENOUS DAILY
Refills: 0 | Status: COMPLETED | OUTPATIENT
Start: 2024-01-25 | End: 2024-01-29

## 2024-01-24 RX ADMIN — TAMSULOSIN HYDROCHLORIDE 0.4 MILLIGRAM(S): 0.4 CAPSULE ORAL at 21:08

## 2024-01-24 RX ADMIN — HEPARIN SODIUM 5000 UNIT(S): 5000 INJECTION INTRAVENOUS; SUBCUTANEOUS at 14:00

## 2024-01-24 RX ADMIN — Medication 5 MILLIGRAM(S): at 21:08

## 2024-01-24 RX ADMIN — Medication 3 MILLIGRAM(S): at 22:39

## 2024-01-24 RX ADMIN — Medication 50 MILLIGRAM(S): at 05:51

## 2024-01-24 RX ADMIN — PIPERACILLIN AND TAZOBACTAM 25 GRAM(S): 4; .5 INJECTION, POWDER, LYOPHILIZED, FOR SOLUTION INTRAVENOUS at 01:23

## 2024-01-24 RX ADMIN — Medication 50 MILLIGRAM(S): at 14:00

## 2024-01-24 RX ADMIN — HEPARIN SODIUM 5000 UNIT(S): 5000 INJECTION INTRAVENOUS; SUBCUTANEOUS at 05:51

## 2024-01-24 RX ADMIN — CLOPIDOGREL BISULFATE 75 MILLIGRAM(S): 75 TABLET, FILM COATED ORAL at 11:55

## 2024-01-24 RX ADMIN — HEPARIN SODIUM 5000 UNIT(S): 5000 INJECTION INTRAVENOUS; SUBCUTANEOUS at 21:07

## 2024-01-24 RX ADMIN — PIPERACILLIN AND TAZOBACTAM 25 GRAM(S): 4; .5 INJECTION, POWDER, LYOPHILIZED, FOR SOLUTION INTRAVENOUS at 17:43

## 2024-01-24 RX ADMIN — Medication 650 MILLIGRAM(S): at 23:30

## 2024-01-24 RX ADMIN — Medication 100 MILLIGRAM(S): at 05:51

## 2024-01-24 RX ADMIN — Medication 100 MILLIGRAM(S): at 17:42

## 2024-01-24 RX ADMIN — ATORVASTATIN CALCIUM 40 MILLIGRAM(S): 80 TABLET, FILM COATED ORAL at 21:08

## 2024-01-24 RX ADMIN — Medication 50 MILLIGRAM(S): at 21:08

## 2024-01-24 RX ADMIN — PIPERACILLIN AND TAZOBACTAM 25 GRAM(S): 4; .5 INJECTION, POWDER, LYOPHILIZED, FOR SOLUTION INTRAVENOUS at 09:58

## 2024-01-24 NOTE — PROGRESS NOTE ADULT - ATTENDING COMMENTS
patient seen at bedside  anxious   on NC   had period of afib RVR     + kwan    A/P:  NSTEMI    - cardio following. plan for medical management.     - ECHO with LVEF 35-40% with SWMA. eventual cath however not a candidate for intervention at this time given age and TERESA.      - cont hydralazine 50mg q8h and plavix      - toprol xl increased to 100mg BID      - AC deferred at this time given anemia and poor long term candidate.      - clonidine discontinued     pseudomonas UTI     - ID following    - on zosyn     Acute on chronic renal disease    - losartan and torsemide held for now given TERESA     - TERESA improved. TOV in AM.     continue PT   DVT proph: heparin 5000 units TID   updated daughter over the phone   discharge planning to [facility]

## 2024-01-24 NOTE — PROGRESS NOTE ADULT - PROBLEM SELECTOR PLAN 5
- Possible renal functional is just fluctuating  - Found to have urinary retention so Sanders placed on 1/22**  - Start Flomax  - Likely TOV should be done at [facility] when more mobile, but will consider on 1/24 after 2 nights of Flomax - Possible renal functional is just fluctuating  - Found to have urinary retention so Sanders placed on 1/22**  - Continue Flomax  - Will TOV tomorrow w/ PT

## 2024-01-24 NOTE — PROGRESS NOTE ADULT - ASSESSMENT
89-year-old white female presently residing at home alone though does have assistance of an aide approximately 4 to 6 hours/day with past history of hypertension hyperlipidemia previous CVA basal cell carcinoma renal artery stenosis s/p stent as well as congestive heart failure who was last known to be well and last heard from sometime late afternoon yesterday.  Cardiology consulted for syncopal episode and elevated troponins.    Syncopal episode, Elevated Troponin/NSTEMI, HTN, HFrEF   - s/p mechanical fall. May have been orthostatic syncope; appears to be clinically dry   - BNP:  <-->848147,  <--207873  - TTE 4/23: EF 58%, mod MR, mild- mod TR, mild AR, [place].  - Repeat TTE showed EF 35-40% with SWMA, LAE, mod MR.  Would recommend LH/RHC down the road.  At this point, in the setting of confusion, she is not a candidate for any ischemic w/u at this time  - Hold home torsemide for now.  Creatinine continues to improve  - Does not appear to be volume overloaded at this time  - Monitor volume status closely  - Continue BB   - Unable to start ACEI/ARB or Entresto in the setting of TERESA  - Holding home Losartan 2/2 TERESA, resume when able   - Continue Hydralazine to 50 mg q8H for afterload reduction    - Initially NSR/ Stach.  Went into A fib with -120s 1/20.  Converted to NSR, 1/20.    - Tele w/ SR 90s, p A fib 130-140s, nonsustained 180s from 1:30 to 4:30 pm, now back in SR  - Continue Toprol  BID  - CHadsvasc 4-5.  Would hold off on AC in the setting of downtrending Hgb.  Either way, she does not appear to be a good candidate for long term AC    - BP labile 130-180s   - Keep off Clonidine and up titrate GDMT as tolerated    - EKG w/ A fib @ 122. w/ continued diffuse TWI  - Repeat EKG w/ ST @ 109, Diffuse T wave inversion, new   - CK peak @ 2986  - Troponin peaked at 861.2, now down trending, no need to trend further  - Possible NSTEMI in the setting of SWMA on TTE  - Continue home statin and Plavix  - For now, would medically manage.  Her TERESA and AMS preclude us in proceeding with cardiac cath    - Monitor and replete lytes, keep K>4, Mg>2.  - Will continue to follow.    Curtis Zavala, MS FNP, AGACNP  Nurse Practitioner- Cardiology   Please call on TEAMS

## 2024-01-24 NOTE — PROGRESS NOTE ADULT - SUBJECTIVE AND OBJECTIVE BOX
Patient is a 89y old  Female who presents with a chief complaint of NSTEMI, Rhabdomyolysis (23 Jan 2024 12:13)      INTERVAL HPI/OVERNIGHT EVENTS: Patient seen and examined at bedside. Had on tele PAT which became Afib w/ RVR, HR of sustained 160s w/ nonsustained peaks to 180s. Was given amiodarone 150 mg IVSS x 1 and lopressor 5 mg IV x1, Toprol XL increased 50mg q12 to 100mg q12. This AM patient has no complaints at this time. Denies fevers, chills, headache, lightheadedness, chest pain, dyspnea, abdominal pain, n/v/d/c.    MEDICATIONS  (STANDING):  atorvastatin 40 milliGRAM(s) Oral at bedtime  bisacodyl 5 milliGRAM(s) Oral at bedtime  clopidogrel Tablet 75 milliGRAM(s) Oral daily  heparin   Injectable 5000 Unit(s) SubCutaneous every 8 hours  hydrALAZINE 50 milliGRAM(s) Oral every 8 hours  influenza  Vaccine (HIGH DOSE) 0.7 milliLiter(s) IntraMuscular once  metoprolol succinate  milliGRAM(s) Oral every 12 hours  piperacillin/tazobactam IVPB.. 3.375 Gram(s) IV Intermittent every 8 hours  tamsulosin 0.4 milliGRAM(s) Oral at bedtime    MEDICATIONS  (PRN):  acetaminophen     Tablet .. 650 milliGRAM(s) Oral every 6 hours PRN Mild Pain (1 - 3)  melatonin 3 milliGRAM(s) Oral at bedtime PRN Insomnia      Allergies    IV Contrast (Pruritus; Rash)  verapamil (Other)  Keflex (Other)    Intolerances        REVIEW OF SYSTEMS:  CONSTITUTIONAL: No fever or chills  HEENT:  No headache, no sore throat  RESPIRATORY: No cough, wheezing, or shortness of breath  CARDIOVASCULAR: No chest pain, palpitations  GASTROINTESTINAL: No abd pain, nausea, vomiting, or diarrhea  GENITOURINARY: No dysuria, frequency, or hematuria  NEUROLOGICAL: no focal weakness or dizziness  MUSCULOSKELETAL: no myalgias     Vital Signs Last 24 Hrs  T(C): 36.3 (24 Jan 2024 05:20), Max: 36.9 (23 Jan 2024 16:03)  T(F): 97.4 (24 Jan 2024 05:20), Max: 98.4 (23 Jan 2024 16:03)  HR: 96 (24 Jan 2024 05:20) (93 - 140)  BP: 172/91 (24 Jan 2024 05:20) (130/82 - 180/95)  BP(mean): --  RR: 18 (24 Jan 2024 05:20) (15 - 20)  SpO2: 94% (24 Jan 2024 05:20) (94% - 97%)    Parameters below as of 24 Jan 2024 05:20  Patient On (Oxygen Delivery Method): room air        PHYSICAL EXAM:  GEN: female in NAD, appears comfortable, no diaphoresis  EYES: No scleral injection, EOMI  ENTM: neck supple & symmetric without tracheal deviation, moist membranes, no gross hearing impairment, thyroid gland not enlarged  CV: +S1/S2, no m/r/g, no abdominal bruit, no LE edema  RESP: breathing comfortably, no respiratory accessory muscle use, CTAB, no w/r/r  GI: normoactive BS, soft, NTND, no rebounding/guarding, no palpable masses    LABS:                        11.0   10.14 )-----------( 395      ( 24 Jan 2024 06:45 )             34.0     CBC Full  -  ( 24 Jan 2024 06:45 )  WBC Count : 10.14 K/uL  Hemoglobin : 11.0 g/dL  Hematocrit : 34.0 %  Platelet Count - Automated : 395 K/uL  Mean Cell Volume : 98.3 fl  Mean Cell Hemoglobin : 31.8 pg  Mean Cell Hemoglobin Concentration : 32.4 gm/dL  Auto Neutrophil # : x  Auto Lymphocyte # : x  Auto Monocyte # : x  Auto Eosinophil # : x  Auto Basophil # : x  Auto Neutrophil % : x  Auto Lymphocyte % : x  Auto Monocyte % : x  Auto Eosinophil % : x  Auto Basophil % : x    24 Jan 2024 08:45    138    |  108    |  47     ----------------------------<  142    4.7     |  23     |  1.60     Ca    8.7        24 Jan 2024 08:45    TPro  5.6    /  Alb  2.2    /  TBili  0.6    /  DBili  x      /  AST  30     /  ALT  40     /  AlkPhos  157    24 Jan 2024 08:45      Urinalysis Basic - ( 24 Jan 2024 08:45 )    Color: x / Appearance: x / SG: x / pH: x  Gluc: 142 mg/dL / Ketone: x  / Bili: x / Urobili: x   Blood: x / Protein: x / Nitrite: x   Leuk Esterase: x / RBC: x / WBC x   Sq Epi: x / Non Sq Epi: x / Bacteria: x      CAPILLARY BLOOD GLUCOSE            Culture - Urine (collected 01-18-24 @ 21:30)  Source: Clean Catch Clean Catch (Midstream)  Final Report (01-21-24 @ 17:02):    >100,000 CFU/ml Pseudomonas aeruginosa  Organism: Pseudomonas aeruginosa (01-21-24 @ 17:02)  Organism: Pseudomonas aeruginosa (01-21-24 @ 17:02)      -  Levofloxacin: S <=0.5      -  Aztreonam: S <=4      -  Cefepime: S <=2      -  Piperacillin/Tazobactam: S <=8      -  Ciprofloxacin: S <=0.25      -  Imipenem: S 2      Method Type: LASHAE      -  Meropenem: S <=1      -  Ceftazidime: S <=1      -  Amikacin: S <=16    Culture - Blood (collected 01-18-24 @ 17:32)  Source: .Blood Blood-Peripheral  Final Report (01-23-24 @ 22:01):    No growth at 5 days    Culture - Blood (collected 01-18-24 @ 17:30)  Source: .Blood Blood-Peripheral  Final Report (01-23-24 @ 22:01):    No growth at 5 days        RADIOLOGY & ADDITIONAL TESTS:    Personally reviewed.     Consultant(s) Notes Reviewed:  [x] YES  [ ] NO

## 2024-01-24 NOTE — PROGRESS NOTE ADULT - SUBJECTIVE AND OBJECTIVE BOX
[facility] -- Ricky Agarwal, Ramsey Recinos, Maulik, Ady Sams: Office # [phone]    Follow Up: HFrEF, Rhabdo, Elevated Troponins, poss. NSTEMI, A fib RVR    Subjective/Observations: Patient awake, alert, OOB to chair. No complaints of chest pain, dyspnea, palpitations or dizziness. No signs of orthopnea or PND. Tolerating O2 via nasal cannula.       REVIEW OF SYSTEMS: All other review of systems are negative unless indicated above    PAST MEDICAL & SURGICAL HISTORY:  Essential hypertension      Dyslipidemia      Hyponatremia  ON HCTZ , h/o Hypokelemia      Cerebrovascular accident (CVA), unspecified mechanism      Edema, unspecified type      Mitral valve insufficiency, unspecified etiology  Cardiac cath on 11/21/18      Basal cell carcinoma      Renal artery stenosis  right side, stent      CHF (congestive heart failure)      Inguinal hernia, right      Blood pressure instability      S/P Mohs surgery for basal cell carcinoma      Status post hysterectomy  and bladder lift with mesh 1990s      History of cholecystectomy  1980      H/O bilateral hip replacements  2013, 2014      Other elective surgery  right renal artery stent      S/P colonoscopy          MEDICATIONS  (STANDING):  atorvastatin 40 milliGRAM(s) Oral at bedtime  bisacodyl 5 milliGRAM(s) Oral at bedtime  clopidogrel Tablet 75 milliGRAM(s) Oral daily  heparin   Injectable 5000 Unit(s) SubCutaneous every 8 hours  hydrALAZINE 50 milliGRAM(s) Oral every 8 hours  influenza  Vaccine (HIGH DOSE) 0.7 milliLiter(s) IntraMuscular once  metoprolol succinate  milliGRAM(s) Oral every 12 hours  piperacillin/tazobactam IVPB.. 3.375 Gram(s) IV Intermittent every 8 hours  tamsulosin 0.4 milliGRAM(s) Oral at bedtime    MEDICATIONS  (PRN):  acetaminophen     Tablet .. 650 milliGRAM(s) Oral every 6 hours PRN Mild Pain (1 - 3)  melatonin 3 milliGRAM(s) Oral at bedtime PRN Insomnia    Allergies    IV Contrast (Pruritus; Rash)  verapamil (Other)  Keflex (Other)    Intolerances      Vital Signs Last 24 Hrs  T(C): 36.3 (24 Jan 2024 05:20), Max: 36.9 (23 Jan 2024 16:03)  T(F): 97.4 (24 Jan 2024 05:20), Max: 98.4 (23 Jan 2024 16:03)  HR: 96 (24 Jan 2024 05:20) (93 - 140)  BP: 172/91 (24 Jan 2024 05:20) (130/82 - 180/95)  BP(mean): --  RR: 18 (24 Jan 2024 05:20) (15 - 20)  SpO2: 94% (24 Jan 2024 05:20) (94% - 97%)    Parameters below as of 24 Jan 2024 05:20  Patient On (Oxygen Delivery Method): room air      I&O's Summary    23 Jan 2024 07:01  -  24 Jan 2024 07:00  --------------------------------------------------------  IN: 177 mL / OUT: 0 mL / NET: 177 mL          TELE: SR 90s, p A fib 130-140s, nonsustained 180s from 1:30 to 4:30 pm, now back in SR  PHYSICAL EXAM:  Constitutional: NAD, awake and alert, Frail   HEENT: Moist Mucous Membranes, Anicteric  Pulmonary: Non-labored, breath sounds are clear bilaterally, No wheezing, rales or rhonchi  Cardiovascular: Regular, S1 and S2, + murmurs, No rubs, gallops or clicks  Gastrointestinal:  soft, nontender, nondistended   Lymph: No peripheral edema. No lymphadenopathy.   Skin: No visible rashes  +ecchymoses BUE  Psych:  : confused and disoriented, restless at times      LABS: All Labs Reviewed:                        11.0   10.14 )-----------( 395      ( 24 Jan 2024 06:45 )             34.0                         11.8   12.72 )-----------( 370      ( 23 Jan 2024 09:31 )             37.4                         9.5    14.15 )-----------( 351      ( 22 Jan 2024 07:00 )             30.4     24 Jan 2024 08:45    138    |  108    |  47     ----------------------------<  142    4.7     |  23     |  1.60   23 Jan 2024 09:31    139    |  109    |  54     ----------------------------<  159    4.4     |  24     |  1.70   22 Jan 2024 07:00    138    |  109    |  67     ----------------------------<  123    5.0     |  24     |  1.80     Ca    8.7        24 Jan 2024 08:45  Ca    8.5        23 Jan 2024 09:31  Ca    8.4        22 Jan 2024 07:00  Phos  3.4       23 Jan 2024 09:31  Phos  3.5       22 Jan 2024 07:00  Mg     2.4       23 Jan 2024 09:31  Mg     2.4       22 Jan 2024 07:00    TPro  5.6    /  Alb  2.2    /  TBili  0.6    /  DBili  x      /  AST  30     /  ALT  40     /  AlkPhos  157    24 Jan 2024 08:45  TPro  4.8    /  Alb  1.8    /  TBili  0.3    /  DBili  x      /  AST  37     /  ALT  39     /  AlkPhos  173    22 Jan 2024 07:00   LIVER FUNCTIONS - ( 24 Jan 2024 08:45 )  Alb: 2.2 g/dL / Pro: 5.6 g/dL / ALK PHOS: 157 U/L / ALT: 40 U/L / AST: 30 U/L / GGT: x           Creatine Kinase, Serum: 71 U/L (01-23-24 @ 09:31)  Creatine Kinase, Serum: 134 U/L (01-22-24 @ 07:00)  Creatine Kinase, Serum: 273 U/L (01-21-24 @ 10:25)  Creatine Kinase, Serum: 295 U/L (01-21-24 @ 04:25)  Creatine Kinase, Serum: 295 U/L (01-21-24 @ 04:25)  Cholesterol: 138 mg/dL (01-19-24 @ 07:03)  HDL Cholesterol: 69 mg/dL (01-19-24 @ 07:03)  Triglycerides, Serum: 70 mg/dL (01-19-24 @ 07:03)    12 Lead ECG:   Ventricular Rate 147 BPM    QRS Duration 72 ms    Q-T Interval 314 ms    QTC Calculation(Bazett) 491 ms    R Axis 40 degrees    T Axis 241 degrees    Diagnosis Line *** Critical Test Result: High HR  Atrial fibrillation with rapid ventricular response  Minimal voltage criteria for LVH, may be normal variant ( Sokolow-Elaine )  ST & Marked T wave abnormality, consider anterolateral ischemia  Confirmed by ANGELA RECINOS ([id number]) on 1/24/2024 6:18:13 AM (01-23-24 @ 14:30)      TRANSTHORACIC ECHOCARDIOGRAM REPORT  ________________________________________________________________________________                                      _______       Pt. Name:       MARU MOYA Study Date:    1/19/2024  MRN:            [id number]           YOB: 1931  Accession #:    [id number]          Age:           89 years  Account#:       [id number]         Gender:        F  Heart Rate:                        Height:        ( )  Rhythm:                            Weight:        ( )  Blood Pressure: 137/89 mmHg        BSA/BMI:       /  ________________________________________________________________________________________  Referring Physician:    [id number] Doroteo Hernandez  Interpreting Physician: Poly Sams MD  Primary Sonographer:    Edwin Ochoa    CPT:               ECHO TTE WO CON COMP W DOPP - 80121.m  Indication(s):     Heart failure, unspecified - I50.9  Procedure:         Transthoracic echocardiogram with 2-D, M-mode and complete        spectral and color flow Doppler.  Ordering Location: [facility]  Admission Status:  Inpatient  Study Information: Image quality for this study is technically difficult.    _______________________________________________________________________________________     CONCLUSIONS:      1. Technically difficult image quality.   2. Left ventricular systolic function is moderately decreased with an ejection fraction visually estimated at 35 to 40 %. Regional wall motion abnormalities present.   3. Multiple segmental abnormalities exist. See findings.   4. Normal right ventricular cavity size and probably normal systolic function.   5. The left atrium is moderately dilated.   6. The right atrium is normal in size.   7. Tricuspid aortic valve with normal leaflet excursion. There is mild calcification of the aortic valve leaflets.   8. Mild aortic regurgitation.   9. Mild mitral valve leaflet calcification.  10. Moderate mitral regurgitation.  11. Mild tricuspid regurgitation.  12. Trace pulmonic regurgitation.  13. The inferior vena cava is normal in size measuring 1.84 cm in diameter, (normal <2.1cm) with normal inspiratory collapse (normal >50%) consistent with normal right atrial pressure (~3, range 0-5mmHg).  14. Estimated pulmonary artery systolic pressure is 33 mmHg.    ________________________________________________________________________________________  FINDINGS:     Left Ventricle:  Left ventricular systolic function is moderately decreased with an ejection fraction visually estimated at 35 to 40%. There are regional wall motion abnormalities present.  LV Wall Scoring: The entire apex is akinetic. The basal and mid anterior wall,  basal and mid anterior septum, basal and mid inferior septum, basal and mid  inferior wall, andbasal and mid inferolateral wall are hypokinetic. All  remaining scored segments are normal.          Right Ventricle:  The right ventricular cavity is normal in size and probably normal systolic function. Tricuspid annular plane systolic excursion (TAPSE) is 1.9 cm (normal >=1.7 cm).     Left Atrium:  The left atrium is moderately dilated.     Right Atrium:  The right atrium is normal in size.     Aortic Valve:  The aortic valve is tricuspid with normal leaflet excursion. There is mild calcification of the aortic valve leaflets. There is mild aortic regurgitation.     Mitral Valve:  There is mild leaflet calcification. There is moderate mitral regurgitation.     Tricuspid Valve:  There is mild tricuspid regurgitation. Estimated pulmonary artery systolic pressure is 33 mmHg.     Pulmonic Valve:  There is trace pulmonic regurgitation.     Aorta:  The aortic root at the sinuses of Valsalva is normal in size. The ascending aorta diameter is normal in size.     Systemic Veins:  The inferior vena cava is normal in size measuring 1.84 cm in diameter, (normal <2.1cm) with normal inspiratory collapse (normal >50%) consistent with normal right atrial pressure (~3, range 0-5mmHg).  ____________________________________________________________________  QUANTITATIVE DATA:  Left Ventricle Measurements: (Indexed to BSA)     IVSd (2D):   1.2 cm  LVPWd (2D):  1.0 cm  LVIDd (2D):  4.3 cm  LVIDs (2D):  3.3 cm  LV Mass:     160 g  Visualized LV EF%: 35 to 40%     MV E Vmax:    1.02 m/s  MV A Vmax:0.62 m/s  MV E/A:       1.63  e' lateral:   7.07 cm/s  e' medial:    4.57 cm/s  E/e' lateral: 14.43  E/e' medial:  22.32  E/e' Average: 17.53  MV DT:        144 msec    Aorta Measurements: (normal range) (Indexed to BSA)     Sinuses of Valsalva: 2.90cm (2.7 - 3.3 cm)  Ao Asc prox:         2.70 cm       Left Atrium Measurements: (Indexed to BSA)  LA Diam 2D: 4.00 cm    Right Ventricle Measurements:     TAPSE:            1.9 cm  RV Base (RVID1):  2.7 cm  RV Mid (RVID2):   2.4 cm  RV Major (RVID3):5.8 cm       LVOT / RVOT/ Qp/Qs Data: (Indexed to BSA)  LVOT Diameter: 1.80 cm    Aortic Valve Measurements:  AR Vmax  3.51 m/s  AR PHT   356 msec  AR [place] 2.89 m/s²    Mitral Valve Measurements:     MV E Vmax: 1.0 m/s         MR Vmax:          4.84m/s  MV A Vmax: 0.6 m/s         MR VTI:           127.00 cm  MV E/A:    1.6             MR Mean Gradient: 53.0 mmHg                             MR Peak Gradient: 93.7 mmHg       Tricuspid Valve Measurements:     TR Vmax:          2.8 m/s  TR Peak Gradient: 30.5 mmHg  RA Pressure:      3 mmHg  PASP:             33 mmHg    ________________________________________________________________________________________  Electronically signed on 1/20/2024 at 3:31:24 PM by Poly Sams MD         *** Final ***

## 2024-01-24 NOTE — PROGRESS NOTE ADULT - SUBJECTIVE AND OBJECTIVE BOX
MARU MOYA is a 89yFemale , patient examined and chart reviewed.     INTERVAL HPI/ OVERNIGHT EVENTS:   Afebrile No events.    PAST MEDICAL & SURGICAL HISTORY:  Essential hypertension  Dyslipidemia  Hyponatremia  ON HCTZ , h/o Hypokelemia  Cerebrovascular accident (CVA), unspecified mechanism  Edema, unspecified type  Mitral valve insufficiency, unspecified etiology  Cardiac cath on 11/21/18  Basal cell carcinoma  Renal artery stenosis  right side, stent  CHF (congestive heart failure)  Inguinal hernia, right  Blood pressure instability  S/P Mohs surgery for basal cell carcinoma  Status post hysterectomy  and bladder lift with mesh 1990s  History of cholecystectomy  1980  H/O bilateral hip replacements  2013, 2014  Other elective surgery  right renal artery stent  S/P colonoscopy    For details regarding the patient's social history, family history, and other miscellaneous elements, please refer the initial infectious diseases consultation and/or the admitting history and physical examination for this admission.    ROS:  CONSTITUTIONAL:  Negative fever or chills  EYES:  Negative  blurry vision or double vision  CARDIOVASCULAR:  Negative for chest pain or palpitations  RESPIRATORY:  Negative for cough, wheezing, or SOB   GASTROINTESTINAL:  Negative for nausea, vomiting, diarrhea, constipation, or abdominal pain  GENITOURINARY:  Negative frequency, urgency or dysuria  NEUROLOGIC:  No headache, confusion, dizziness, lightheadedness  All other systems were reviewed and are negative     ALLERGIES  IV Contrast (Pruritus; Rash)  verapamil (Other)  Keflex (Other)      Current inpatient medications :    ANTIBIOTICS/RELEVANT:  piperacillin/tazobactam IVPB.. 3.375 Gram(s) IV Intermittent every 8 hours    MEDICATIONS  (STANDING):  atorvastatin 40 milliGRAM(s) Oral at bedtime  bisacodyl 5 milliGRAM(s) Oral at bedtime  clopidogrel Tablet 75 milliGRAM(s) Oral daily  heparin   Injectable 5000 Unit(s) SubCutaneous every 8 hours  hydrALAZINE 50 milliGRAM(s) Oral every 8 hours  influenza  Vaccine (HIGH DOSE) 0.7 milliLiter(s) IntraMuscular once  metoprolol succinate  milliGRAM(s) Oral every 12 hours  tamsulosin 0.4 milliGRAM(s) Oral at bedtime    MEDICATIONS  (PRN):  acetaminophen     Tablet .. 650 milliGRAM(s) Oral every 6 hours PRN Mild Pain (1 - 3)  melatonin 3 milliGRAM(s) Oral at bedtime PRN Insomnia        Objective:  Vital Signs Last 24 Hrs  T(C): 36.6 (24 Jan 2024 12:15), Max: 36.6 (23 Jan 2024 20:35)  T(F): 97.9 (24 Jan 2024 12:15), Max: 97.9 (24 Jan 2024 12:15)  HR: 99 (24 Jan 2024 17:43) (91 - 100)  BP: 170/83 (24 Jan 2024 17:43) (136/79 - 172/91)  RR: 18 (24 Jan 2024 12:15) (16 - 18)  SpO2: 96% (24 Jan 2024 12:15) (94% - 96%)    Parameters below as of 24 Jan 2024 12:15  Patient On (Oxygen Delivery Method): room air      Physical Exam:  General: no acute distress  Neck: supple, trachea midline  Lungs: clear, no wheeze/rhonchi  Cardiovascular: regular rate and rhythm, S1 S2  Abdomen: soft, nontender,  bowel sounds normal  Neurological: alert and oriented x3  Skin: no rash  Extremities: no edema    LABS:                        11.0   10.14 )-----------( 395      ( 24 Jan 2024 06:45 )             34.0   01-24    138  |  108  |  47<H>  ----------------------------<  142<H>  4.7   |  23  |  1.60<H>    Ca    8.7      24 Jan 2024 08:45  Phos  3.4     01-23  Mg     2.4     01-23    TPro  5.6<L>  /  Alb  2.2<L>  /  TBili  0.6  /  DBili  x   /  AST  30  /  ALT  40  /  AlkPhos  157<H>  01-24        Urine Microscopic-Add On (NC) (01.18.24 @ 21:30)    Comment - Urine: occasional wbc clumps   Squamous Epithelial Cells: Present   Red Blood Cell - Urine: 4 /HPF   White Blood Cell - Urine: 45 /HPF   Bacteria: Many /HPF  Urinalysis (01.18.24 @ 21:30)    pH Urine: 6.5   Blood, Urine: Moderate   Glucose Qualitative, Urine: Negative mg/dL   Color: Yellow   Urine Appearance: Cloudy   Bilirubin: Negative   Ketone - Urine: Trace mg/dL   Specific Gravity: 1.014   Protein, Urine: >=1000 mg/dL   Urobilinogen: 0.2 mg/dL   Nitrite: Negative   Leukocyte Esterase Concentration: Small        RECENT CULTURES:  Culture - Blood (01.18.24 @ 17:32)    Specimen Source: .Blood Blood-Peripheral   Culture Results:   No growth at 4 days    Culture - Urine (01.18.24 @ 21:30)    -  Ceftazidime: S <=1   -  Meropenem: S <=1   -  Piperacillin/Tazobactam: S <=8   -  Amikacin: S <=16   -  Aztreonam: S <=4   -  Cefepime: S <=2   -  Ciprofloxacin: S <=0.25   -  Imipenem: S 2   -  Levofloxacin: S <=0.5   Specimen Source: Clean Catch Clean Catch (Midstream)   Culture Results:   >100,000 CFU/ml Pseudomonas aeruginosa   Organism Identification: Pseudomonas aeruginosa   Organism: Pseudomonas aeruginosa   Method Type: LASHAE    RADIOLOGY & ADDITIONAL STUDIES:      Assessment :   89YO F PMHx of HTN, HLD, Renal Artery Stenosis s/p stent, Basal Cell Carcinoma, CHFpEF CVA admitted sp fall with Rhabdomyolysis found to have UTI. WBC 26K with bandemia and TERESA on CKD. Noted to have AUR Sanders placed 1/21. Ua with pyuria and Ucx grew Pseudomonas.  + troponins    Plan :   On Zosyn  Can change to po Cipro x 5 days till 1/29  Trend temps and cbc, renal fx  Sanders per primary team  Pulm toileting  Asp precautions  Stable from ID standpoint      Continue with present regiment.  Appropriate use of antibiotics and adverse effects reviewed.      I have discussed the above plan of care with patient in detail. She expressed understanding of the  treatment plan . Risks, benefits and alternatives discussed in detail. I have asked if she has any questions or concerns and appropriately addressed them to the best of my ability .    > 35 minutes were spent in direct patient care reviewing notes, medications ,labs data/ imaging , discussion with multidisciplinary team.    Thank you for allowing me to participate in care of your patient .    Rbeel Rankin MD  Infectious Disease  [phone]

## 2024-01-24 NOTE — H&P ADULT - NSCORESITESY/N_GEN_A_CORE_RD
Patient: Sanket Davis  YOB: 1942  Date of Service: 2024    Chief Complaints: Left shoulder pain    Subjective:    History of Present Illness: Pt is seen in the office today with complaints of left shoulder pain I last saw him in the fall on his MRI he does have some changes of the labrum also has some glenohumeral arthritis.  We started with a subacromial injection in August that did not give him much relief however the glenohumeral injection in October gave him pretty significant relief.  That tells him most of his symptoms are probably related to his arthritis he is not interested in think surgical at this time      Allergies: No Known Allergies    Medications:   Home Medications:  Current Outpatient Medications on File Prior to Visit   Medication Sig    glucosamine-chondroitin 500-400 MG capsule capsule Take 2 capsules by mouth Daily.    Multiple Vitamins-Minerals (MULTIVITAMIN ADULT PO) Take 1 tablet by mouth Daily.     No current facility-administered medications on file prior to visit.     Current Medications:  Scheduled Meds:  Continuous Infusions:No current facility-administered medications for this visit.    PRN Meds:.    I have reviewed the patient's medical history in detail and updated the computerized patient record.  Review and summarization of old records include:    Past Medical History:   Diagnosis Date    Umbilical hernia         Past Surgical History:   Procedure Laterality Date    HIP ARTHROPLASTY      JOINT REPLACEMENT      SHOULDER SURGERY      TONSILLECTOMY      UMBILICAL HERNIA REPAIR N/A 2017    Procedure: UMBILICAL HERNIA REPAIR W/ MESH;  Surgeon: Raymond Cevallos MD;  Location: [facility] [place];  Service:         Social History     Occupational History    Not on file   Tobacco Use    Smoking status: Former     Years: 40     Types: Cigarettes     Quit date: 1997     Years since quittin.1     Passive exposure: Past    Smokeless tobacco: Never   Vaping  Use    Vaping Use: Never used   Substance and Sexual Activity    Alcohol use: Yes     Comment: TWO DAILY    Drug use: No    Sexual activity: Defer      Social History     Social History Narrative    Not on file        Family History   Problem Relation Age of Onset    Heart disease Father     Steve Hyperthermia Neg Hx        ROS: 14 point review of systems was performed and was negative except for documented findings in HPI and today's encounter.     Allergies: No Known Allergies  Constitutional:  Denies fever, shaking or chills   Eyes:  Denies change in visual acuity   HENT:  Denies nasal congestion or sore throat   Respiratory:  Denies cough or shortness of breath   Cardiovascular:  Denies chest pain or severe LE edema   GI:  Denies abdominal pain, nausea, vomiting, bloody stools or diarrhea   Musculoskeletal:  Numbness, tingling, or loss of motor function only as noted above in history of present illness.  : Denies painful urination or hematuria  Integument:  Denies rash, lesion or ulceration   Neurologic:  Denies headache or focal weakness  Endocrine:  Denies lymphadenopathy  Psych:  Denies confusion or change in mental status   Hem:  Denies active bleeding      Physical Exam: 81 y.o. male  Wt Readings from Last 3 Encounters:   11/30/23 83 kg (183 lb)   11/05/23 83 kg (183 lb)   10/26/23 85.3 kg (188 lb)       There is no height or weight on file to calculate BMI.    There were no vitals filed for this visit.  Vital signs reviewed.   General Appearance:    Alert, cooperative, in no acute distress                    Ortho exam  Exam is unchanged    He can flex to 180 with mild symptoms external rotation is to 50 internal rotation to his lower lumbar spine with mild symptoms he has good rotator cuff strength 4+/5           Assessment: Left shoulder pain I really think this is more probably degenerative I did review his MRI we reviewed his x-rays we reviewed his last injections and his response to those I think a  glenohumeral injection is most reasonable.  This was not simply an injection I did have to review his chart review his studies to make a clinical decision    Plan: Is as above  Follow up as indicated.  Ice, elevate, and rest as needed.  Discussed conservative measures of pain control including ice, bracing.  Also talked about the importance of strengthening and maintaining ideal body weight    Large Joint Arthrocentesis: L glenohumeral  Date/Time: 1/26/2024 8:14 AM  Consent given by: patient  Site marked: site marked  Timeout: Immediately prior to procedure a time out was called to verify the correct patient, procedure, equipment, support staff and site/side marked as required   Supporting Documentation  Indications: pain   Procedure Details  Location: shoulder - L glenohumeral  Preparation: Patient was prepped and draped in the usual sterile fashion  Needle gauge: 21G.  Approach: posterior  Medications administered: 1 mL methylPREDNISolone acetate 80 MG/ML; 2 mL lidocaine PF 2% 2 %  Patient tolerance: patient tolerated the procedure well with no immediate complications         Shyanne Membreno M.D.         No

## 2024-01-24 NOTE — PHARMACOTHERAPY INTERVENTION NOTE - COMMENTS
Patient is a 89 yo female admitted for pseudomonal UTI, currently ordered for Zosyn 3.375 g Q8H. Discussed with ID Dr. Rankin and recommended de-escalation since culture resulted with pan-sensitive pseudomonas. Based on current renal function, entered order for PO ciprofloxacin 500 mg daily x 5 days per discussion with Dr. Rankin.

## 2024-01-25 LAB
ALBUMIN SERPL ELPH-MCNC: 2 G/DL — LOW (ref 3.3–5)
ALP SERPL-CCNC: 133 U/L — HIGH (ref 40–120)
ALT FLD-CCNC: 38 U/L — SIGNIFICANT CHANGE UP (ref 12–78)
ANION GAP SERPL CALC-SCNC: 5 MMOL/L — SIGNIFICANT CHANGE UP (ref 5–17)
AST SERPL-CCNC: 31 U/L — SIGNIFICANT CHANGE UP (ref 15–37)
BILIRUB SERPL-MCNC: 0.5 MG/DL — SIGNIFICANT CHANGE UP (ref 0.2–1.2)
BUN SERPL-MCNC: 46 MG/DL — HIGH (ref 7–23)
CALCIUM SERPL-MCNC: 8.5 MG/DL — SIGNIFICANT CHANGE UP (ref 8.5–10.1)
CHLORIDE SERPL-SCNC: 109 MMOL/L — HIGH (ref 96–108)
CO2 SERPL-SCNC: 24 MMOL/L — SIGNIFICANT CHANGE UP (ref 22–31)
CREAT SERPL-MCNC: 1.5 MG/DL — HIGH (ref 0.5–1.3)
EGFR: 32 ML/MIN/1.73M2 — LOW
GLUCOSE SERPL-MCNC: 131 MG/DL — HIGH (ref 70–99)
HCT VFR BLD CALC: 33.8 % — LOW (ref 34.5–45)
HGB BLD-MCNC: 10.8 G/DL — LOW (ref 11.5–15.5)
MCHC RBC-ENTMCNC: 31 PG — SIGNIFICANT CHANGE UP (ref 27–34)
MCHC RBC-ENTMCNC: 32 GM/DL — SIGNIFICANT CHANGE UP (ref 32–36)
MCV RBC AUTO: 97.1 FL — SIGNIFICANT CHANGE UP (ref 80–100)
NRBC # BLD: 0 /100 WBCS — SIGNIFICANT CHANGE UP (ref 0–0)
PLATELET # BLD AUTO: 432 K/UL — HIGH (ref 150–400)
POTASSIUM SERPL-MCNC: 4.5 MMOL/L — SIGNIFICANT CHANGE UP (ref 3.5–5.3)
POTASSIUM SERPL-SCNC: 4.5 MMOL/L — SIGNIFICANT CHANGE UP (ref 3.5–5.3)
PROT SERPL-MCNC: 5.2 G/DL — LOW (ref 6–8.3)
RBC # BLD: 3.48 M/UL — LOW (ref 3.8–5.2)
RBC # FLD: 12.3 % — SIGNIFICANT CHANGE UP (ref 10.3–14.5)
SODIUM SERPL-SCNC: 138 MMOL/L — SIGNIFICANT CHANGE UP (ref 135–145)
T4 FREE SERPL-MCNC: 1.2 NG/DL — SIGNIFICANT CHANGE UP (ref 0.9–1.8)
TSH SERPL-MCNC: 8.23 UIU/ML — HIGH (ref 0.36–3.74)
WBC # BLD: 9.06 K/UL — SIGNIFICANT CHANGE UP (ref 3.8–10.5)
WBC # FLD AUTO: 9.06 K/UL — SIGNIFICANT CHANGE UP (ref 3.8–10.5)

## 2024-01-25 PROCEDURE — 99232 SBSQ HOSP IP/OBS MODERATE 35: CPT

## 2024-01-25 PROCEDURE — 99233 SBSQ HOSP IP/OBS HIGH 50: CPT | Mod: GC

## 2024-01-25 RX ORDER — HYDRALAZINE HCL 50 MG
75 TABLET ORAL EVERY 8 HOURS
Refills: 0 | Status: DISCONTINUED | OUTPATIENT
Start: 2024-01-25 | End: 2024-01-29

## 2024-01-25 RX ADMIN — HEPARIN SODIUM 5000 UNIT(S): 5000 INJECTION INTRAVENOUS; SUBCUTANEOUS at 21:13

## 2024-01-25 RX ADMIN — Medication 75 MILLIGRAM(S): at 21:15

## 2024-01-25 RX ADMIN — Medication 50 MILLIGRAM(S): at 05:20

## 2024-01-25 RX ADMIN — Medication 500 MILLIGRAM(S): at 13:19

## 2024-01-25 RX ADMIN — Medication 100 MILLIGRAM(S): at 18:13

## 2024-01-25 RX ADMIN — Medication 650 MILLIGRAM(S): at 00:30

## 2024-01-25 RX ADMIN — Medication 100 MILLIGRAM(S): at 05:20

## 2024-01-25 RX ADMIN — TAMSULOSIN HYDROCHLORIDE 0.4 MILLIGRAM(S): 0.4 CAPSULE ORAL at 21:14

## 2024-01-25 RX ADMIN — Medication 75 MILLIGRAM(S): at 13:47

## 2024-01-25 RX ADMIN — HEPARIN SODIUM 5000 UNIT(S): 5000 INJECTION INTRAVENOUS; SUBCUTANEOUS at 05:20

## 2024-01-25 RX ADMIN — Medication 5 MILLIGRAM(S): at 21:14

## 2024-01-25 RX ADMIN — ATORVASTATIN CALCIUM 40 MILLIGRAM(S): 80 TABLET, FILM COATED ORAL at 21:15

## 2024-01-25 NOTE — DIETITIAN INITIAL EVALUATION ADULT - PROBLEM SELECTOR PLAN 7
- AlkPhos 182,  on admission   - Likely reactive in setting of Rhabdomyolysis  - Continue to monitor

## 2024-01-25 NOTE — DIETITIAN INITIAL EVALUATION ADULT - PROBLEM SELECTOR PLAN 4
numerical 0-10 - Trop 861.2  - Likely reactive in setting fo Rhabdomyolysis   - Trend to peak   - CKMB ordered, follow up results   - EKG: pending, initial revealing artifact  - Cardiology consulted, Dr. Willingham, follow up recommendations

## 2024-01-25 NOTE — DIETITIAN INITIAL EVALUATION ADULT - PROBLEM SELECTOR PLAN 3
- Cr 1.40, BUN54, eGFR 35  - Likely prerenal in setting of decreased PO intake of water + mechanical fall and inability to move off floor for several hours  - S/p 1L LR in ED   - Hold home Torsemide and Losartan  in setting of TERESA   - Given hx of CHF, will start LR 50 cc/hr for 14 hours pending TTE results  - Continue to monitor volume status in setting of held diuretics and IVF

## 2024-01-25 NOTE — DIETITIAN INITIAL EVALUATION ADULT - PERTINENT LABORATORY DATA
01-25    138  |  109<H>  |  46<H>  ----------------------------<  131<H>  4.5   |  24  |  1.50<H>    Ca    8.5      25 Jan 2024 08:10    TPro  5.2<L>  /  Alb  2.0<L>  /  TBili  0.5  /  DBili  x   /  AST  31  /  ALT  38  /  AlkPhos  133<H>  01-25  A1C with Estimated Average Glucose Result: 5.9 % (01-20-24 @ 10:40)

## 2024-01-25 NOTE — DIETITIAN INITIAL EVALUATION ADULT - ADD RECOMMEND
1) Continue current diet as ordered; honor food preferences as able to optimize po intake/tolerance, assistance/encouragement at meal times  2) Recommend MVI daily   3) Monitor po intake, diet tolerance, weight trends, labs, GI function, skin integrity

## 2024-01-25 NOTE — PROGRESS NOTE ADULT - PROBLEM SELECTOR PLAN 8
- TTE performed here shows EF of 35% with WMA  - Cardiology following and recommending consideration for ischemic work up as outpatient  - Follows with Rafa Vila  - Appears euvolemic  - Stop Clonidine and start Hydralazine (titrate to response)  - Started on ToprolXL  - Deferring ACE/ARB given fluctuating kidney function - TTE performed here shows EF of 35% with WMA  - Cardiology following and recommending consideration for ischemic work up as outpatient  - Follows with Rafa Vila  - Appears euvolemic  - Continue Hydralazine (titrate to response) -- increased to 75mg q8  - Continue ToprolXL 100mg q12  - Deferring ACE/ARB given fluctuating kidney function

## 2024-01-25 NOTE — DIETITIAN INITIAL EVALUATION ADULT - PERTINENT MEDS FT
MEDICATIONS  (STANDING):  atorvastatin 40 milliGRAM(s) Oral at bedtime  bisacodyl 5 milliGRAM(s) Oral at bedtime  ciprofloxacin     Tablet 500 milliGRAM(s) Oral daily  clopidogrel Tablet 75 milliGRAM(s) Oral daily  heparin   Injectable 5000 Unit(s) SubCutaneous every 8 hours  hydrALAZINE 75 milliGRAM(s) Oral every 8 hours  influenza  Vaccine (HIGH DOSE) 0.7 milliLiter(s) IntraMuscular once  metoprolol succinate  milliGRAM(s) Oral every 12 hours  tamsulosin 0.4 milliGRAM(s) Oral at bedtime    MEDICATIONS  (PRN):  acetaminophen     Tablet .. 650 milliGRAM(s) Oral every 6 hours PRN Mild Pain (1 - 3)  melatonin 3 milliGRAM(s) Oral at bedtime PRN Insomnia

## 2024-01-25 NOTE — PROGRESS NOTE ADULT - SUBJECTIVE AND OBJECTIVE BOX
Patient is a 89y old  Female who presents with a chief complaint of NSTEMI, Rhabdomyolysis (24 Jan 2024 13:09)      INTERVAL HPI/OVERNIGHT EVENTS: Patient seen and examined at bedside. No overnight events occurred. Patient has no complaints at this time. Denies fevers, chills, headache, lightheadedness, chest pain, dyspnea, abdominal pain, n/v/d/c.    MEDICATIONS  (STANDING):  atorvastatin 40 milliGRAM(s) Oral at bedtime  bisacodyl 5 milliGRAM(s) Oral at bedtime  ciprofloxacin     Tablet 500 milliGRAM(s) Oral daily  clopidogrel Tablet 75 milliGRAM(s) Oral daily  heparin   Injectable 5000 Unit(s) SubCutaneous every 8 hours  hydrALAZINE 50 milliGRAM(s) Oral every 8 hours  influenza  Vaccine (HIGH DOSE) 0.7 milliLiter(s) IntraMuscular once  metoprolol succinate  milliGRAM(s) Oral every 12 hours  tamsulosin 0.4 milliGRAM(s) Oral at bedtime    MEDICATIONS  (PRN):  acetaminophen     Tablet .. 650 milliGRAM(s) Oral every 6 hours PRN Mild Pain (1 - 3)  melatonin 3 milliGRAM(s) Oral at bedtime PRN Insomnia      Allergies    IV Contrast (Pruritus; Rash)  verapamil (Other)  Keflex (Other)    Intolerances        REVIEW OF SYSTEMS:  CONSTITUTIONAL: No fever or chills  HEENT:  No headache, no sore throat  RESPIRATORY: No cough, wheezing, or shortness of breath  CARDIOVASCULAR: No chest pain, palpitations  GASTROINTESTINAL: No abd pain, nausea, vomiting, or diarrhea  GENITOURINARY: No dysuria, frequency, or hematuria  NEUROLOGICAL: no focal weakness or dizziness  MUSCULOSKELETAL: no myalgias     Vital Signs Last 24 Hrs  T(C): 36.5 (25 Jan 2024 04:57), Max: 36.7 (24 Jan 2024 20:28)  T(F): 97.7 (25 Jan 2024 04:57), Max: 98 (24 Jan 2024 20:28)  HR: 90 (25 Jan 2024 04:57) (90 - 99)  BP: 172/86 (25 Jan 2024 04:57) (144/76 - 172/86)  BP(mean): --  RR: 18 (25 Jan 2024 04:57) (18 - 18)  SpO2: 93% (25 Jan 2024 04:57) (93% - 97%)    Parameters below as of 25 Jan 2024 04:57  Patient On (Oxygen Delivery Method): room air        PHYSICAL EXAM:  GEN: female in NAD, appears comfortable, no diaphoresis  EYES: No scleral injection, EOMI  ENTM: neck supple & symmetric without tracheal deviation, moist membranes, no gross hearing impairment, thyroid gland not enlarged  CV: +S1/S2, no m/r/g, no abdominal bruit, no LE edema  RESP: breathing comfortably, no respiratory accessory muscle use, CTAB, no w/r/r  GI: normoactive BS, soft, NTND, no rebounding/guarding, no palpable masses    LABS:    CBC Full  -  ( 24 Jan 2024 06:45 )  WBC Count : 10.14 K/uL  Hemoglobin : 11.0 g/dL  Hematocrit : 34.0 %  Platelet Count - Automated : 395 K/uL  Mean Cell Volume : 98.3 fl  Mean Cell Hemoglobin : 31.8 pg  Mean Cell Hemoglobin Concentration : 32.4 gm/dL  Auto Neutrophil # : x  Auto Lymphocyte # : x  Auto Monocyte # : x  Auto Eosinophil # : x  Auto Basophil # : x  Auto Neutrophil % : x  Auto Lymphocyte % : x  Auto Monocyte % : x  Auto Eosinophil % : x  Auto Basophil % : x      Ca    8.7        24 Jan 2024 08:45        Urinalysis Basic - ( 24 Jan 2024 08:45 )    Color: x / Appearance: x / SG: x / pH: x  Gluc: 142 mg/dL / Ketone: x  / Bili: x / Urobili: x   Blood: x / Protein: x / Nitrite: x   Leuk Esterase: x / RBC: x / WBC x   Sq Epi: x / Non Sq Epi: x / Bacteria: x      CAPILLARY BLOOD GLUCOSE            Culture - Urine (collected 01-18-24 @ 21:30)  Source: Clean Catch Clean Catch (Midstream)  Final Report (01-21-24 @ 17:02):    >100,000 CFU/ml Pseudomonas aeruginosa  Organism: Pseudomonas aeruginosa (01-21-24 @ 17:02)  Organism: Pseudomonas aeruginosa (01-21-24 @ 17:02)      Method Type: LASHAE      -  Amikacin: S <=16      -  Aztreonam: S <=4      -  Cefepime: S <=2      -  Ceftazidime: S <=1      -  Ciprofloxacin: S <=0.25      -  Imipenem: S 2      -  Levofloxacin: S <=0.5      -  Meropenem: S <=1      -  Piperacillin/Tazobactam: S <=8    Culture - Blood (collected 01-18-24 @ 17:32)  Source: .Blood Blood-Peripheral  Final Report (01-23-24 @ 22:01):    No growth at 5 days    Culture - Blood (collected 01-18-24 @ 17:30)  Source: .Blood Blood-Peripheral  Final Report (01-23-24 @ 22:01):    No growth at 5 days        RADIOLOGY & ADDITIONAL TESTS:    Personally reviewed.     Consultant(s) Notes Reviewed:  [x] YES  [ ] NO

## 2024-01-25 NOTE — PROGRESS NOTE ADULT - ATTENDING COMMENTS
patient seen at bedside  kwan removed. monitoring for TOV trial today.   awake   denies any pain    A/P:  NSTEMI    - cardio following. plan for medical management.     - ECHO with LVEF 35-40% with SWMA. eventual cath however not a candidate for intervention at this time given age and TERESA.      - cont hydralazine, increased to 75mg q8h and plavix      - toprol xl increased to 100mg BID      - AC deferred at this time given anemia and poor long term candidate.      - clonidine discontinued      - BP elevated.     pseudomonas UTI     - ID following    - abx changed to cipro     Acute on chronic renal disease    - losartan and torsemide held for now given TERESA     - TERESA improved. TOV in progress    continue PT   DVT proph: heparin 5000 units TID     discharge planning to [facility] .

## 2024-01-25 NOTE — PROGRESS NOTE ADULT - ASSESSMENT
89-year-old white female presently residing at home alone though does have assistance of an aide approximately 4 to 6 hours/day with past history of hypertension hyperlipidemia previous CVA basal cell carcinoma renal artery stenosis s/p stent as well as congestive heart failure who was last known to be well and last heard from sometime late afternoon yesterday.  Cardiology consulted for syncopal episode and elevated troponins.      atorvastatin 40 milliGRAM(s) Oral at bedtime  clopidogrel Tablet 75 milliGRAM(s) Oral daily  heparin   Injectable 5000 Unit(s) SubCutaneous every 8 hours      Syncopal episode, Elevated Troponin/NSTEMI, HTN, HFrEF   - s/p mechanical fall. May have been orthostatic syncope; appears to be clinically dry   - BNP:  <-- >004567,  <--738744  - TTE 4/23: EF 58%, mod MR, mild- mod TR, mild AR, [place].  - Repeat TTE showed EF 35-40% with SWMA, LAE, mod MR.  Would recommend LH/RHC down the road.  At this point, in the setting of confusion, she is not a candidate for any ischemic w/u at this time  - Hold home torsemide for now.  Creatinine continues to improve  - Does not appear to be volume overloaded at this time  - Monitor volume status closely  - Continue BB   - Unable to start ACEI/ARB or Entresto in the setting of TERESA  - -170s  - Up titrate Hydralazine to 75 mg q8H for afterload reduction  - Holding home Losartan 2/2 TERESA, resume when able   - Keep off Clonidine and up titrate GDMT as tolerated    - Initially NSR/ Stach.  Went into A fib with -120s 1/20.  Converted to NSR, 1/20.    - Tele w/ SR 90s,   - Continue Toprol  BID  - CHadsvasc 4-5.  Would hold off on AC in the setting of downtrending Hgb.  Either way, she does not appear to be a good candidate for long term AC    - EKG w/ A fib @ 122. w/ continued diffuse TWI  - Repeat EKG w/ ST @ 109, Diffuse T wave inversion, new   - CK peak @ 2986  - Troponin peaked at 861.2, now down trending, no need to trend further  - Possible NSTEMI in the setting of SWMA on TTE  - Continue home statin and Plavix  - For now, would medically manage.  Her TERESA and AMS preclude us in proceeding with cardiac cath    - Monitor and replete lytes, keep K>4, Mg>2.  - Will continue to follow.    Curtis Zavala, MS FNP, AGACNP  Nurse Practitioner- Cardiology   Please call on TEAMS

## 2024-01-25 NOTE — PROGRESS NOTE ADULT - SUBJECTIVE AND OBJECTIVE BOX
[facility] -- Ricky Agarwal, Ramsey Recinos, Maulik, Ady Sams: Office # [phone]    Follow Up: HFrEF, Rhabdo, Elevated Troponins, poss. NSTEMI, A fib RVR    Subjective/Observations: Patient awake, alert, resting in bed. No complaints of chest pain, dyspnea, palpitations or dizziness. No signs of orthopnea or PND. Tolerating O2 via nasal cannula.     REVIEW OF SYSTEMS: All other review of systems are negative unless indicated above    PAST MEDICAL & SURGICAL HISTORY:  Essential hypertension      Dyslipidemia    Hyponatremia  ON HCTZ , h/o Hypokelemia      Cerebrovascular accident (CVA), unspecified mechanism      Edema, unspecified type      Mitral valve insufficiency, unspecified etiology  Cardiac cath on 11/21/18      Basal cell carcinoma      Renal artery stenosis  right side, stent      CHF (congestive heart failure)      Inguinal hernia, right      Blood pressure instability      S/P Mohs surgery for basal cell carcinoma      Status post hysterectomy  and bladder lift with mesh 1990s      History of cholecystectomy  1980      H/O bilateral hip replacements  2013, 2014      Other elective surgery  right renal artery stent      S/P colonoscopy          MEDICATIONS  (STANDING):  atorvastatin 40 milliGRAM(s) Oral at bedtime  bisacodyl 5 milliGRAM(s) Oral at bedtime  ciprofloxacin     Tablet 500 milliGRAM(s) Oral daily  clopidogrel Tablet 75 milliGRAM(s) Oral daily  heparin   Injectable 5000 Unit(s) SubCutaneous every 8 hours  hydrALAZINE 50 milliGRAM(s) Oral every 8 hours  influenza  Vaccine (HIGH DOSE) 0.7 milliLiter(s) IntraMuscular once  metoprolol succinate  milliGRAM(s) Oral every 12 hours  tamsulosin 0.4 milliGRAM(s) Oral at bedtime    MEDICATIONS  (PRN):  acetaminophen     Tablet .. 650 milliGRAM(s) Oral every 6 hours PRN Mild Pain (1 - 3)  melatonin 3 milliGRAM(s) Oral at bedtime PRN Insomnia    Allergies    IV Contrast (Pruritus; Rash)  verapamil (Other)  Keflex (Other)    Intolerances      Vital Signs Last 24 Hrs  T(C): 36.5 (25 Jan 2024 04:57), Max: 36.7 (24 Jan 2024 20:28)  T(F): 97.7 (25 Jan 2024 04:57), Max: 98 (24 Jan 2024 20:28)  HR: 90 (25 Jan 2024 04:57) (90 - 99)  BP: 172/86 (25 Jan 2024 04:57) (144/76 - 172/86)  BP(mean): --  RR: 18 (25 Jan 2024 04:57) (18 - 18)  SpO2: 93% (25 Jan 2024 04:57) (93% - 97%)    Parameters below as of 25 Jan 2024 04:57  Patient On (Oxygen Delivery Method): room air      I&O's Summary    24 Jan 2024 07:01  -  25 Jan 2024 07:00  --------------------------------------------------------  IN: 0 mL / OUT: 1000 mL / NET: -1000 mL          TELE: [facility] 80-90s   PHYSICAL EXAM:  Constitutional: NAD, awake and alert, Frail   HEENT: Moist Mucous Membranes, Anicteric  Pulmonary: Non-labored, breath sounds are clear bilaterally, No wheezing, rales or rhonchi  Cardiovascular: Regular, S1 and S2, + murmurs, No rubs, gallops or clicks  Gastrointestinal:  soft, nontender, nondistended   Lymph: No peripheral edema. No lymphadenopathy.   Skin: No visible rashes  +ecchymoses BUE  Psych:  : confused and disoriented, restless at times    LABS: All Labs Reviewed:                        11.0   10.14 )-----------( 395      ( 24 Jan 2024 06:45 )             34.0                         11.8   12.72 )-----------( 370      ( 23 Jan 2024 09:31 )             37.4     24 Jan 2024 08:45    138    |  108    |  47     ----------------------------<  142    4.7     |  23     |  1.60   23 Jan 2024 09:31    139    |  109    |  54     ----------------------------<  159    4.4     |  24     |  1.70     Ca    8.7        24 Jan 2024 08:45  Ca    8.5        23 Jan 2024 09:31  Phos  3.4       23 Jan 2024 09:31  Mg     2.4       23 Jan 2024 09:31    TPro  5.6    /  Alb  2.2    /  TBili  0.6    /  DBili  x      /  AST  30     /  ALT  40     /  AlkPhos  157    24 Jan 2024 08:45   LIVER FUNCTIONS - ( 24 Jan 2024 08:45 )  Alb: 2.2 g/dL / Pro: 5.6 g/dL / ALK PHOS: 157 U/L / ALT: 40 U/L / AST: 30 U/L / GGT: x           Creatine Kinase, Serum: 71 U/L (01-23-24 @ 09:31)  Creatine Kinase, Serum: 134 U/L (01-22-24 @ 07:00)  Creatine Kinase, Serum: 273 U/L (01-21-24 @ 10:25)  Creatine Kinase, Serum: 295 U/L (01-21-24 @ 04:25)  Creatine Kinase, Serum: 295 U/L (01-21-24 @ 04:25)  Cholesterol: 138 mg/dL (01-19-24 @ 07:03)  HDL Cholesterol: 69 mg/dL (01-19-24 @ 07:03)  Triglycerides, Serum: 70 mg/dL (01-19-24 @ 07:03)    12 Lead ECG:   Ventricular Rate 147 BPM    QRS Duration 72 ms    Q-T Interval 314 ms    QTC Calculation(Bazett) 491 ms    R Axis 40 degrees    T Axis 241 degrees    Diagnosis Line *** Critical Test Result: High HR  Atrial fibrillation with rapid ventricular response  Minimal voltage criteria for LVH, may be normal variant ( Sokolow-Elaine )  ST & Marked T wave abnormality, consider anterolateral ischemia  Confirmed by ANGELA RECINOS ([id number]) on 1/24/2024 6:18:13 AM (01-23-24 @ 14:30)      TRANSTHORACIC ECHOCARDIOGRAM REPORT  ________________________________________________________________________________                                      _______       Pt. Name:       MARU MOYA Study Date:    1/19/2024  MRN:            [id number]           YOB: 1931  Accession #:    [id number]          Age:           89 years  Account#:       [id number]         Gender:        F  Heart Rate:                        Height:        ( )  Rhythm:                            Weight:        ( )  Blood Pressure: 137/89 mmHg        BSA/BMI:       /  ________________________________________________________________________________________  Referring Physician:    [id number] Doroteo Hernandez  Interpreting Physician: Poly Sams MD  Primary Sonographer:    Edwin Ochoa    CPT:               ECHO TTE WO CON COMP W DOPP - 04295.m  Indication(s):     Heart failure, unspecified - I50.9  Procedure:         Transthoracic echocardiogram with 2-D, M-mode and complete        spectral and color flow Doppler.  Ordering Location: [facility]  Admission Status:  Inpatient  Study Information: Image quality for this study is technically difficult.    _______________________________________________________________________________________     CONCLUSIONS:      1. Technically difficult image quality.   2. Left ventricular systolic function is moderately decreased with an ejection fraction visually estimated at 35 to 40 %. Regional wall motion abnormalities present.   3. Multiple segmental abnormalities exist. See findings.   4. Normal right ventricular cavity size and probably normal systolic function.   5. The left atrium is moderately dilated.   6. The right atrium is normal in size.   7. Tricuspid aortic valve with normal leaflet excursion. There is mild calcification of the aortic valve leaflets.   8. Mild aortic regurgitation.   9. Mild mitral valve leaflet calcification.  10. Moderate mitral regurgitation.  11. Mild tricuspid regurgitation.  12. Trace pulmonic regurgitation.  13. The inferior vena cava is normal in size measuring 1.84 cm in diameter, (normal <2.1cm) with normal inspiratory collapse (normal >50%) consistent with normal right atrial pressure (~3, range 0-5mmHg).  14. Estimated pulmonary artery systolic pressure is 33 mmHg.    ________________________________________________________________________________________  FINDINGS:     Left Ventricle:  Left ventricular systolic function is moderately decreased with an ejection fraction visually estimated at 35 to 40%. There are regional wall motion abnormalities present.  LV Wall Scoring: The entire apex is akinetic. The basal and mid anterior wall,  basal and mid anterior septum, basal and mid inferior septum, basal and mid  inferior wall, andbasal and mid inferolateral wall are hypokinetic. All  remaining scored segments are normal.          Right Ventricle:  The right ventricular cavity is normal in size and probably normal systolic function. Tricuspid annular plane systolic excursion (TAPSE) is 1.9 cm (normal >=1.7 cm).     Left Atrium:  The left atrium is moderately dilated.     Right Atrium:  The right atrium is normal in size.     Aortic Valve:  The aortic valve is tricuspid with normal leaflet excursion. There is mild calcification of the aortic valve leaflets. There is mild aortic regurgitation.     Mitral Valve:  There is mild leaflet calcification. There is moderate mitral regurgitation.     Tricuspid Valve:  There is mild tricuspid regurgitation. Estimated pulmonary artery systolic pressure is 33 mmHg.     Pulmonic Valve:  There is trace pulmonic regurgitation.     Aorta:  The aortic root at the sinuses of Valsalva is normal in size. The ascending aorta diameter is normal in size.     Systemic Veins:  The inferior vena cava is normal in size measuring 1.84 cm in diameter, (normal <2.1cm) with normal inspiratory collapse (normal >50%) consistent with normal right atrial pressure (~3, range 0-5mmHg).  ____________________________________________________________________  QUANTITATIVE DATA:  Left Ventricle Measurements: (Indexed to BSA)     IVSd (2D):   1.2 cm  LVPWd (2D):  1.0 cm  LVIDd (2D):  4.3 cm  LVIDs (2D):  3.3 cm  LV Mass:     160 g  Visualized LV EF%: 35 to 40%     MV E Vmax:    1.02 m/s  MV A Vmax:0.62 m/s  MV E/A:       1.63  e' lateral:   7.07 cm/s  e' medial:    4.57 cm/s  E/e' lateral: 14.43  E/e' medial:  22.32  E/e' Average: 17.53  MV DT:        144 msec    Aorta Measurements: (normal range) (Indexed to BSA)     Sinuses of Valsalva: 2.90cm (2.7 - 3.3 cm)  Ao Asc prox:         2.70 cm       Left Atrium Measurements: (Indexed to BSA)  LA Diam 2D: 4.00 cm    Right Ventricle Measurements:     TAPSE:            1.9 cm  RV Base (RVID1):  2.7 cm  RV Mid (RVID2):   2.4 cm  RV Major (RVID3):5.8 cm       LVOT / RVOT/ Qp/Qs Data: (Indexed to BSA)  LVOT Diameter: 1.80 cm    Aortic Valve Measurements:  AR Vmax  3.51 m/s  AR PHT   356 msec  AR Sauk 2.89 m/s²    Mitral Valve Measurements:     MV E Vmax: 1.0 m/s         MR Vmax:          4.84m/s  MV A Vmax: 0.6 m/s         MR VTI:           127.00 cm  MV E/A:    1.6             MR Mean Gradient: 53.0 mmHg                             MR Peak Gradient: 93.7 mmHg       Tricuspid Valve Measurements:     TR Vmax:          2.8 m/s  TR Peak Gradient: 30.5 mmHg  RA Pressure:      3 mmHg  PASP:             33 mmHg    ________________________________________________________________________________________  Electronically signed on 1/20/2024 at 3:31:24 PM by Poly Sams MD         *** Final ***

## 2024-01-25 NOTE — DIETITIAN INITIAL EVALUATION ADULT - SIGNS/SYMPTOMS
as evidenced by elevated Troponin/NSTEMI, HTN, HFrEF.  as evidenced by poor po intake in house (0-50% of meals).

## 2024-01-25 NOTE — PROGRESS NOTE ADULT - PROBLEM SELECTOR PLAN 2
- Elevated WBC with pyuria on UA, but does not endorse symptoms  - Patient with sepsis POA 2/2 to UTI (Cystitis?)  - UCx with 100K CFU Pseudomonas  - Stopped CTX and will start Zosyn (plan for 3 days for cystitis 1/22-24)  - Fluoroquinolones are a potential option, (QTc borderline at 482) - Elevated WBC with pyuria on UA, but does not endorse symptoms  - Patient with sepsis POA 2/2 to UTI (Cystitis?)  - UCx with 100K CFU Pseudomonas  - Stopped CTX, s/p Zosyn (1/22-24), transitioned to cipro 500mg q12 x 5 days  - D/C Sanders and f/u TOV  - Monitor QTc (borderline at 482)

## 2024-01-25 NOTE — PROGRESS NOTE ADULT - PROBLEM SELECTOR PLAN 5
- Possible renal functional is just fluctuating  - Found to have urinary retention so Sanders placed on 1/22**  - Continue Flomax  - Will TOV tomorrow w/ PT - Possible renal functional is just fluctuating  - Found to have urinary retention so Sanders placed on 1/22**  - Continue Flomax  - Will TOV w/ PT

## 2024-01-25 NOTE — DIETITIAN INITIAL EVALUATION ADULT - ORAL INTAKE PTA/DIET HISTORY
Pt lives alone. Reports able to prepare own meals at home. Typically consumes 2 meals/day. Follows a low salt diet. Does not add any salt to food.

## 2024-01-25 NOTE — PROGRESS NOTE ADULT - SUBJECTIVE AND OBJECTIVE BOX
MARU MOYA is a 89yFemale , patient examined and chart reviewed.     INTERVAL HPI/ OVERNIGHT EVENTS:   Afebrile No events.    PAST MEDICAL & SURGICAL HISTORY:  Essential hypertension  Dyslipidemia  Hyponatremia  ON HCTZ , h/o Hypokelemia  Cerebrovascular accident (CVA), unspecified mechanism  Edema, unspecified type  Mitral valve insufficiency, unspecified etiology  Cardiac cath on 11/21/18  Basal cell carcinoma  Renal artery stenosis  right side, stent  CHF (congestive heart failure)  Inguinal hernia, right  Blood pressure instability  S/P Mohs surgery for basal cell carcinoma  Status post hysterectomy  and bladder lift with mesh 1990s  History of cholecystectomy  1980  H/O bilateral hip replacements  2013, 2014  Other elective surgery  right renal artery stent  S/P colonoscopy    For details regarding the patient's social history, family history, and other miscellaneous elements, please refer the initial infectious diseases consultation and/or the admitting history and physical examination for this admission.    ROS:  CONSTITUTIONAL:  Negative fever or chills  EYES:  Negative  blurry vision or double vision  CARDIOVASCULAR:  Negative for chest pain or palpitations  RESPIRATORY:  Negative for cough, wheezing, or SOB   GASTROINTESTINAL:  Negative for nausea, vomiting, diarrhea, constipation, or abdominal pain  GENITOURINARY:  Negative frequency, urgency or dysuria  NEUROLOGIC:  No headache, confusion, dizziness, lightheadedness  All other systems were reviewed and are negative     ALLERGIES  IV Contrast (Pruritus; Rash)  verapamil (Other)  Keflex (Other)      Current inpatient medications :    ANTIBIOTICS/RELEVANT:  ciprofloxacin     Tablet 500 milliGRAM(s) Oral daily    MEDICATIONS  (STANDING):  atorvastatin 40 milliGRAM(s) Oral at bedtime  bisacodyl 5 milliGRAM(s) Oral at bedtime  clopidogrel Tablet 75 milliGRAM(s) Oral daily  heparin   Injectable 5000 Unit(s) SubCutaneous every 8 hours  hydrALAZINE 75 milliGRAM(s) Oral every 8 hours  influenza  Vaccine (HIGH DOSE) 0.7 milliLiter(s) IntraMuscular once  metoprolol succinate  milliGRAM(s) Oral every 12 hours  tamsulosin 0.4 milliGRAM(s) Oral at bedtime    MEDICATIONS  (PRN):  acetaminophen     Tablet .. 650 milliGRAM(s) Oral every 6 hours PRN Mild Pain (1 - 3)  melatonin 3 milliGRAM(s) Oral at bedtime PRN Insomnia          Objective:  Vital Signs Last 24 Hrs  T(C): 36.8 (25 Jan 2024 12:17), Max: 36.8 (25 Jan 2024 12:17)  T(F): 98.2 (25 Jan 2024 12:17), Max: 98.2 (25 Jan 2024 12:17)  HR: 89 (25 Jan 2024 12:17) (89 - 99)  BP: 189/93 (25 Jan 2024 12:17) (153/74 - 189/93)  RR: 18 (25 Jan 2024 12:17) (18 - 18)  SpO2: 92% (25 Jan 2024 12:17) (92% - 97%)    Parameters below as of 25 Jan 2024 12:17  Patient On (Oxygen Delivery Method): room air        Physical Exam:  General: no acute distress  Neck: supple, trachea midline  Lungs: clear, no wheeze/rhonchi  Cardiovascular: regular rate and rhythm, S1 S2  Abdomen: soft, nontender,  bowel sounds normal  Neurological: alert and oriented x3  Skin: no rash  Extremities: no edema    LABS:                        10.8   9.06  )-----------( 432      ( 25 Jan 2024 08:10 )             33.8   01-25    138  |  109<H>  |  46<H>  ----------------------------<  131<H>  4.5   |  24  |  1.50<H>    Ca    8.5      25 Jan 2024 08:10    TPro  5.2<L>  /  Alb  2.0<L>  /  TBili  0.5  /  DBili  x   /  AST  31  /  ALT  38  /  AlkPhos  133<H>  01-25      Urine Microscopic-Add On (NC) (01.18.24 @ 21:30)    Comment - Urine: occasional wbc clumps   Squamous Epithelial Cells: Present   Red Blood Cell - Urine: 4 /HPF   White Blood Cell - Urine: 45 /HPF   Bacteria: Many /HPF  Urinalysis (01.18.24 @ 21:30)    pH Urine: 6.5   Blood, Urine: Moderate   Glucose Qualitative, Urine: Negative mg/dL   Color: Yellow   Urine Appearance: Cloudy   Bilirubin: Negative   Ketone - Urine: Trace mg/dL   Specific Gravity: 1.014   Protein, Urine: >=1000 mg/dL   Urobilinogen: 0.2 mg/dL   Nitrite: Negative   Leukocyte Esterase Concentration: Small        RECENT CULTURES:  Culture - Blood (01.18.24 @ 17:32)    Specimen Source: .Blood Blood-Peripheral   Culture Results:   No growth at 4 days    Culture - Urine (01.18.24 @ 21:30)    -  Ceftazidime: S <=1   -  Meropenem: S <=1   -  Piperacillin/Tazobactam: S <=8   -  Amikacin: S <=16   -  Aztreonam: S <=4   -  Cefepime: S <=2   -  Ciprofloxacin: S <=0.25   -  Imipenem: S 2   -  Levofloxacin: S <=0.5   Specimen Source: Clean Catch Clean Catch (Midstream)   Culture Results:   >100,000 CFU/ml Pseudomonas aeruginosa   Organism Identification: Pseudomonas aeruginosa   Organism: Pseudomonas aeruginosa   Method Type: LASHAE    RADIOLOGY & ADDITIONAL STUDIES:      Assessment :   89YO F PMHx of HTN, HLD, Renal Artery Stenosis s/p stent, Basal Cell Carcinoma, CHFpEF CVA admitted sp fall with Rhabdomyolysis found to have UTI. WBC 26K with bandemia and TERESA on CKD. Noted to have AUR Sanders placed 1/21. Ua with pyuria and Ucx grew Pseudomonas.  + troponins  Sanders dc'd TOV in progress  Clinically stable    Plan :   Off  Zosyn--> po Cipro x 5 days till 1/29  Trend temps and cbc, renal fx  TOV monitor for retention  Pulm toileting  Asp precautions  Stable from ID standpoint      Continue with present regiment.  Appropriate use of antibiotics and adverse effects reviewed.    > 35 minutes were spent in direct patient care reviewing notes, medications ,labs data/ imaging , discussion with multidisciplinary team.    Thank you for allowing me to participate in care of your patient .    Rebel Rankin MD  Infectious Disease  [phone]

## 2024-01-26 LAB
ALBUMIN SERPL ELPH-MCNC: 2.2 G/DL — LOW (ref 3.3–5)
ALP SERPL-CCNC: 142 U/L — HIGH (ref 40–120)
ALT FLD-CCNC: 60 U/L — SIGNIFICANT CHANGE UP (ref 12–78)
ANION GAP SERPL CALC-SCNC: 4 MMOL/L — LOW (ref 5–17)
AST SERPL-CCNC: 57 U/L — HIGH (ref 15–37)
BILIRUB SERPL-MCNC: 0.4 MG/DL — SIGNIFICANT CHANGE UP (ref 0.2–1.2)
BUN SERPL-MCNC: 42 MG/DL — HIGH (ref 7–23)
CALCIUM SERPL-MCNC: 8.7 MG/DL — SIGNIFICANT CHANGE UP (ref 8.5–10.1)
CHLORIDE SERPL-SCNC: 111 MMOL/L — HIGH (ref 96–108)
CO2 SERPL-SCNC: 24 MMOL/L — SIGNIFICANT CHANGE UP (ref 22–31)
CREAT SERPL-MCNC: 1.5 MG/DL — HIGH (ref 0.5–1.3)
EGFR: 32 ML/MIN/1.73M2 — LOW
GLUCOSE SERPL-MCNC: 163 MG/DL — HIGH (ref 70–99)
HCT VFR BLD CALC: 35.6 % — SIGNIFICANT CHANGE UP (ref 34.5–45)
HGB BLD-MCNC: 11.4 G/DL — LOW (ref 11.5–15.5)
MAGNESIUM SERPL-MCNC: 2.2 MG/DL — SIGNIFICANT CHANGE UP (ref 1.6–2.6)
MCHC RBC-ENTMCNC: 31.6 PG — SIGNIFICANT CHANGE UP (ref 27–34)
MCHC RBC-ENTMCNC: 32 GM/DL — SIGNIFICANT CHANGE UP (ref 32–36)
MCV RBC AUTO: 98.6 FL — SIGNIFICANT CHANGE UP (ref 80–100)
NRBC # BLD: 0 /100 WBCS — SIGNIFICANT CHANGE UP (ref 0–0)
PLATELET # BLD AUTO: 488 K/UL — HIGH (ref 150–400)
POTASSIUM SERPL-MCNC: 5 MMOL/L — SIGNIFICANT CHANGE UP (ref 3.5–5.3)
POTASSIUM SERPL-SCNC: 5 MMOL/L — SIGNIFICANT CHANGE UP (ref 3.5–5.3)
PROT SERPL-MCNC: 5.4 G/DL — LOW (ref 6–8.3)
RBC # BLD: 3.61 M/UL — LOW (ref 3.8–5.2)
RBC # FLD: 12.3 % — SIGNIFICANT CHANGE UP (ref 10.3–14.5)
SODIUM SERPL-SCNC: 139 MMOL/L — SIGNIFICANT CHANGE UP (ref 135–145)
WBC # BLD: 9.8 K/UL — SIGNIFICANT CHANGE UP (ref 3.8–10.5)
WBC # FLD AUTO: 9.8 K/UL — SIGNIFICANT CHANGE UP (ref 3.8–10.5)

## 2024-01-26 PROCEDURE — 99233 SBSQ HOSP IP/OBS HIGH 50: CPT | Mod: GC

## 2024-01-26 PROCEDURE — 99232 SBSQ HOSP IP/OBS MODERATE 35: CPT

## 2024-01-26 RX ADMIN — CLOPIDOGREL BISULFATE 75 MILLIGRAM(S): 75 TABLET, FILM COATED ORAL at 11:30

## 2024-01-26 RX ADMIN — Medication 75 MILLIGRAM(S): at 13:56

## 2024-01-26 RX ADMIN — Medication 500 MILLIGRAM(S): at 11:30

## 2024-01-26 RX ADMIN — ATORVASTATIN CALCIUM 40 MILLIGRAM(S): 80 TABLET, FILM COATED ORAL at 21:37

## 2024-01-26 RX ADMIN — Medication 5 MILLIGRAM(S): at 21:36

## 2024-01-26 RX ADMIN — HEPARIN SODIUM 5000 UNIT(S): 5000 INJECTION INTRAVENOUS; SUBCUTANEOUS at 05:03

## 2024-01-26 RX ADMIN — Medication 75 MILLIGRAM(S): at 05:06

## 2024-01-26 RX ADMIN — HEPARIN SODIUM 5000 UNIT(S): 5000 INJECTION INTRAVENOUS; SUBCUTANEOUS at 13:56

## 2024-01-26 RX ADMIN — Medication 100 MILLIGRAM(S): at 18:57

## 2024-01-26 RX ADMIN — Medication 10 MILLIGRAM(S): at 16:00

## 2024-01-26 RX ADMIN — HEPARIN SODIUM 5000 UNIT(S): 5000 INJECTION INTRAVENOUS; SUBCUTANEOUS at 21:35

## 2024-01-26 RX ADMIN — Medication 75 MILLIGRAM(S): at 21:36

## 2024-01-26 RX ADMIN — Medication 100 MILLIGRAM(S): at 05:04

## 2024-01-26 RX ADMIN — TAMSULOSIN HYDROCHLORIDE 0.4 MILLIGRAM(S): 0.4 CAPSULE ORAL at 21:36

## 2024-01-26 NOTE — PROGRESS NOTE ADULT - PROBLEM SELECTOR PLAN 2
- Elevated WBC with pyuria on UA, but does not endorse symptoms  - Patient with sepsis POA 2/2 to UTI (Cystitis?)  - UCx with 100K CFU Pseudomonas  - Stopped CTX, s/p Zosyn (1/22-24), transitioned to cipro 500mg q12 x 5 days (1/25- )  - Lower than expected UOP s/p Sanders removal, will obtain PVRs  - Monitor QTc (borderline at 482)

## 2024-01-26 NOTE — SOCIAL WORK PROGRESS NOTE - NSSWPROGRESSNOTE_GEN_ALL_CORE
As per MD pt is for trial of void. [facility] currently has no beds available. [facility] unable accept on the weekend but will consider taking pt on Monday if stable. Jonnathan will continue to follow for safe dc planning.

## 2024-01-26 NOTE — PROGRESS NOTE ADULT - SUBJECTIVE AND OBJECTIVE BOX
MARU MOYA is a 89yFemale , patient examined and chart reviewed.     INTERVAL HPI/ OVERNIGHT EVENTS:   Afebrile No events.  Weak.    PAST MEDICAL & SURGICAL HISTORY:  Essential hypertension  Dyslipidemia  Hyponatremia  ON HCTZ , h/o Hypokelemia  Cerebrovascular accident (CVA), unspecified mechanism  Edema, unspecified type  Mitral valve insufficiency, unspecified etiology  Cardiac cath on 11/21/18  Basal cell carcinoma  Renal artery stenosis  right side, stent  CHF (congestive heart failure)  Inguinal hernia, right  Blood pressure instability  S/P Mohs surgery for basal cell carcinoma  Status post hysterectomy  and bladder lift with mesh 1990s  History of cholecystectomy  1980  H/O bilateral hip replacements  2013, 2014  Other elective surgery  right renal artery stent  S/P colonoscopy    For details regarding the patient's social history, family history, and other miscellaneous elements, please refer the initial infectious diseases consultation and/or the admitting history and physical examination for this admission.    ROS:  CONSTITUTIONAL:  Negative fever or chills  EYES:  Negative  blurry vision or double vision  CARDIOVASCULAR:  Negative for chest pain or palpitations  RESPIRATORY:  Negative for cough, wheezing, or SOB   GASTROINTESTINAL:  Negative for nausea, vomiting, diarrhea, constipation, or abdominal pain  GENITOURINARY:  Negative frequency, urgency or dysuria  NEUROLOGIC:  No headache, confusion, dizziness, lightheadedness  All other systems were reviewed and are negative     ALLERGIES  IV Contrast (Pruritus; Rash)  verapamil (Other)  Keflex (Other)      Current inpatient medications :    ANTIBIOTICS/RELEVANT:  ciprofloxacin     Tablet 500 milliGRAM(s) Oral daily    MEDICATIONS  (STANDING):  atorvastatin 40 milliGRAM(s) Oral at bedtime  bisacodyl 5 milliGRAM(s) Oral at bedtime  clopidogrel Tablet 75 milliGRAM(s) Oral daily  heparin   Injectable 5000 Unit(s) SubCutaneous every 8 hours  hydrALAZINE 75 milliGRAM(s) Oral every 8 hours  influenza  Vaccine (HIGH DOSE) 0.7 milliLiter(s) IntraMuscular once  metoprolol succinate  milliGRAM(s) Oral every 12 hours  tamsulosin 0.4 milliGRAM(s) Oral at bedtime  torsemide 10 milliGRAM(s) Oral daily    MEDICATIONS  (PRN):  acetaminophen     Tablet .. 650 milliGRAM(s) Oral every 6 hours PRN Mild Pain (1 - 3)  melatonin 3 milliGRAM(s) Oral at bedtime PRN Insomnia    Objective:  Vital Signs Last 24 Hrs  T(C): 36.7 (26 Jan 2024 12:28), Max: 36.8 (25 Jan 2024 20:11)  T(F): 98.1 (26 Jan 2024 12:28), Max: 98.2 (25 Jan 2024 20:11)  HR: 76 (26 Jan 2024 13:56) (67 - 93)  BP: 156/78 (26 Jan 2024 13:56) (152/81 - 176/80)  RR: 18 (26 Jan 2024 12:28) (18 - 18)  SpO2: 94% (26 Jan 2024 12:28) (93% - 94%)    Parameters below as of 26 Jan 2024 12:28  Patient On (Oxygen Delivery Method): room air      Physical Exam:  General: no acute distress  Neck: supple, trachea midline  Lungs: clear, no wheeze/rhonchi  Cardiovascular: regular rate and rhythm, S1 S2  Abdomen: soft, nontender,  bowel sounds normal  Neurological: alert and oriented x3  Skin: no rash  Extremities: no edema    LABS:                        11.4   9.80  )-----------( 488      ( 26 Jan 2024 11:20 )             35.6   01-26    139  |  111<H>  |  42<H>  ----------------------------<  163<H>  5.0   |  24  |  1.50<H>    Ca    8.7      26 Jan 2024 11:20  Mg     2.2     01-26    TPro  5.4<L>  /  Alb  2.2<L>  /  TBili  0.4  /  DBili  x   /  AST  57<H>  /  ALT  60  /  AlkPhos  142<H>  01-26    Urine Microscopic-Add On (NC) (01.18.24 @ 21:30)    Comment - Urine: occasional wbc clumps   Squamous Epithelial Cells: Present   Red Blood Cell - Urine: 4 /HPF   White Blood Cell - Urine: 45 /HPF   Bacteria: Many /HPF  Urinalysis (01.18.24 @ 21:30)    pH Urine: 6.5   Blood, Urine: Moderate   Glucose Qualitative, Urine: Negative mg/dL   Color: Yellow   Urine Appearance: Cloudy   Bilirubin: Negative   Ketone - Urine: Trace mg/dL   Specific Gravity: 1.014   Protein, Urine: >=1000 mg/dL   Urobilinogen: 0.2 mg/dL   Nitrite: Negative   Leukocyte Esterase Concentration: Small        RECENT CULTURES:  Culture - Blood (01.18.24 @ 17:32)    Specimen Source: .Blood Blood-Peripheral   Culture Results:   No growth at 4 days    Culture - Urine (01.18.24 @ 21:30)    -  Ceftazidime: S <=1   -  Meropenem: S <=1   -  Piperacillin/Tazobactam: S <=8   -  Amikacin: S <=16   -  Aztreonam: S <=4   -  Cefepime: S <=2   -  Ciprofloxacin: S <=0.25   -  Imipenem: S 2   -  Levofloxacin: S <=0.5   Specimen Source: Clean Catch Clean Catch (Midstream)   Culture Results:   >100,000 CFU/ml Pseudomonas aeruginosa   Organism Identification: Pseudomonas aeruginosa   Organism: Pseudomonas aeruginosa   Method Type: LASHAE    RADIOLOGY & ADDITIONAL STUDIES:      Assessment :   89YO F PMHx of HTN, HLD, Renal Artery Stenosis s/p stent, Basal Cell Carcinoma, CHFpEF CVA admitted sp fall with Rhabdomyolysis found to have UTI. WBC 26K with bandemia and TERESA on CKD. Noted to have AUR Sanders placed 1/21. Ua with pyuria and Ucx grew Pseudomonas.  + troponins  Sanders dc'd 1/25 TOV in progress  Clinically stable    Plan :   Off  Zosyn--> po Cipro x 5 days till 1/29  Trend temps and cbc, renal fx  Monitor for retention  Pulm toileting  Asp precautions  Stable from ID standpoint  Dc planning to [facility] per primary team      Continue with present regiment.  Appropriate use of antibiotics and adverse effects reviewed.    > 35 minutes were spent in direct patient care reviewing notes, medications ,labs data/ imaging , discussion with multidisciplinary team.    Thank you for allowing me to participate in care of your patient .    Rebel Rankin MD  Infectious Disease  [phone]

## 2024-01-26 NOTE — PATIENT CHOICE NOTE. - NSPTCHOICESTATE_GEN_ALL_CORE
I have met with the patient and/or caregiver to discuss discharge goals and treatment plan. Patient and/or caregiver also provided with instructions on accessing the CMS Compare websites for additional information related to Post Acute Provider quality and resource use measures to assist them in evaluation of the providers and in selecting their post-acute provider of choice. Patient and caregiver were informed of the facilities that are owned and/or operated by [facility]. I have discussed with the patient the availability of in-network facilities and providers. Patient and caregiver provided with a list of post-acute providers whose services are appropriate to the discharge plans and patient needs.     For patient requiring durable medical equipment, patient and/or caregiver were informed that they have the right to request who provides the required equipment.
I have met with the patient and/or caregiver to discuss discharge goals and treatment plan. Patient and/or caregiver also provided with instructions on accessing the CMS Compare websites for additional information related to Post Acute Provider quality and resource use measures to assist them in evaluation of the providers and in selecting their post-acute provider of choice. Patient and caregiver were informed of the facilities that are owned and/or operated by [facility]. I have discussed with the patient the availability of in-network facilities and providers. Patient and caregiver provided with a list of post-acute providers whose services are appropriate to the discharge plans and patient needs.     For patient requiring durable medical equipment, patient and/or caregiver were informed that they have the right to request who provides the required equipment.

## 2024-01-26 NOTE — PROGRESS NOTE ADULT - ASSESSMENT
89-year-old white female presently residing at home alone though does have assistance of an aide approximately 4 to 6 hours/day with past history of hypertension hyperlipidemia previous CVA basal cell carcinoma renal artery stenosis s/p stent as well as congestive heart failure who was last known to be well and last heard from sometime late afternoon yesterday.  Cardiology consulted for syncopal episode and elevated troponins.    Syncope, Elevated Troponin/NSTEMI, HTN, HFrEF   - s/p mechanical fall. May have been orthostatic syncope    - BNP:  <-- >816911,  <--[id number]  - TTE 4/23: EF 58%, mod MR, mild- mod TR, mild AR, OR.  - Repeat TTE showed EF 35-40% with SWMA, LAE, mod MR.    - Hold home diuretics. Creatinine continues to improve  - compensated from HF POV    - Monitor volume status closely    - EKG: Afib @ 122 w/ continued diffuse TWI  - Repeat EKG w/ ST @ 109, Diffuse TWI (new)  - CK peaked @ 2986  - Troponin peaked at 861.2, now down trending, no need to trend further  - Possible NSTEMI in the setting of SWMA on TTE  - Continue home statin and Plavix  - Would recommend LHC/RHC down the road.  At this point, in the setting of confusion and TERESA, she is not a candidate for any ischemic w/u at this time  - would medically manage for now     - -170s  - Telemetry: SR , PACs 80's, new onset Afib with -120s (1/20) converted and remained NSR  since (1/20), no events x48 hours would dc tele   - Continue Toprol  BID  - continue Hydralazine to 75 mg q8H for afterload reduction, increase PRN for better BP control  - Unable to start ACEI/ARB or Entresto in the setting of TERESA  - Holding home Losartan 2/2 TERESA, resume when able   - Keep off Clonidine and up titrate GDMT as tolerated  - Monitor and replete Lytes. Keep K > 4 and Mg > 2    - CHADSVASc: 4-5.  Would hold off on AC in the setting of downtrending Hgb.  Either way, she does not appear to be a good candidate for long term AC    - Will continue to follow.    SidraCASH GonzalesP  Nurse Practitioner - Cardiology   call TEAMS

## 2024-01-26 NOTE — PROGRESS NOTE ADULT - ATTENDING COMMENTS
patient seen at bedside  kwan removed 1/25. voiding but intermittent.   family reports she voids better with sitting/standing   BP elevated    + rales on the LLL   on RA    A/P:  NSTEMI     - cardio following. plan for medical management.      - ECHO with LVEF 35-40% with SWMA. eventual cath however not a candidate for intervention at this time given age and TERESA.      - cont hydralazine 75mg q8h and plavix      - toprol xl 100mg BID      - AC deferred at this time given anemia and poor long term candidate.      - clonidine discontinued      - BP elevated. restarted torsemide 10mg daily     pseudomonas UTI     - ID following    - abx changed to cipro     Acute on chronic renal disease    - losartan and torsemide held for now given TERESA     - TERESA improved. TOV in progress    continue PT   DVT proph: heparin 5000 units TID     discharge planning to [facility] . patient seen at bedside  kwan removed 1/25. voiding but intermittent.   family reports she voids better with sitting/standing   BP elevated    + rales on the LLL   on RA    A/P:  NSTEMI     - cardio following. plan for medical management.      - ECHO with LVEF 35-40% with SWMA. eventual cath however not a candidate for intervention at this time given age and TERESA.      - cont hydralazine 75mg q8h and plavix      - toprol xl 100mg BID      - AC deferred at this time given anemia and poor long term candidate.      - clonidine discontinued      - BP elevated. restarted torsemide 10mg daily given rales on exam     pseudomonas UTI     - ID following    - abx changed to cipro     Acute on chronic renal disease    - losartan held for now. Cr improved.      - bladder scans today to monitor urine retention.     - restarted torsemide.     continue PT   DVT proph: heparin 5000 units TID     discharge planning to [facility] .

## 2024-01-26 NOTE — PROGRESS NOTE ADULT - PROBLEM SELECTOR PLAN 5
- Possible renal functional is just fluctuating  - Found to have urinary retention so Sanders placed on 1/22 and removed 1/25  - Continue Flomax

## 2024-01-26 NOTE — PROGRESS NOTE ADULT - PROBLEM SELECTOR PLAN 8
- TTE performed here shows EF of 35% with WMA  - Cardiology following and recommending consideration for ischemic work up as outpatient  - Follows with Rafa Vila  - Appears euvolemic yet rales appreciated on exam this AM -- will start home Bumex given downtrending Cr  - Continue Hydralazine 75mg q8 (titrate to response)  - Continue ToprolXL 100mg q12  - Deferring ACE/ARB given fluctuating kidney function

## 2024-01-26 NOTE — PROGRESS NOTE ADULT - SUBJECTIVE AND OBJECTIVE BOX
[facility] -- Ricky Agarwal, Ramsey Recinos Savella, Goodger, Cohen  Office # [phone]    Follow Up:  HFrEF, Rhabdo, Elevated Troponins, poss. NSTEMI, A fib RVR    Subjective/Observations: seen and examined, awake, alert, resting in bed, denies chest pain, dyspnea, palpitations or dizziness, orthopnea and PND. Tolerating RA. no events overnight     REVIEW OF SYSTEMS: All other review of systems is negative unless indicated above  PAST MEDICAL & SURGICAL HISTORY:  Essential hypertension      Dyslipidemia      Hyponatremia  ON HCTZ , h/o Hypokelemia      Cerebrovascular accident (CVA), unspecified mechanism      Edema, unspecified type      Mitral valve insufficiency, unspecified etiology  Cardiac cath on 11/21/18      Basal cell carcinoma      Renal artery stenosis  right side, stent      CHF (congestive heart failure)      Inguinal hernia, right      Blood pressure instability      S/P Mohs surgery for basal cell carcinoma      Status post hysterectomy  and bladder lift with mesh 1990s      History of cholecystectomy  1980      H/O bilateral hip replacements  2013, 2014      Other elective surgery  right renal artery stent      S/P colonoscopy        MEDICATIONS  (STANDING):  atorvastatin 40 milliGRAM(s) Oral at bedtime  bisacodyl 5 milliGRAM(s) Oral at bedtime  ciprofloxacin     Tablet 500 milliGRAM(s) Oral daily  clopidogrel Tablet 75 milliGRAM(s) Oral daily  heparin   Injectable 5000 Unit(s) SubCutaneous every 8 hours  hydrALAZINE 75 milliGRAM(s) Oral every 8 hours  influenza  Vaccine (HIGH DOSE) 0.7 milliLiter(s) IntraMuscular once  metoprolol succinate  milliGRAM(s) Oral every 12 hours  tamsulosin 0.4 milliGRAM(s) Oral at bedtime    MEDICATIONS  (PRN):  acetaminophen     Tablet .. 650 milliGRAM(s) Oral every 6 hours PRN Mild Pain (1 - 3)  melatonin 3 milliGRAM(s) Oral at bedtime PRN Insomnia    Allergies    IV Contrast (Pruritus; Rash)  verapamil (Other)  Keflex (Other)    Intolerances      Vital Signs Last 24 Hrs  T(C): 36.6 (26 Jan 2024 04:56), Max: 36.8 (25 Jan 2024 12:17)  T(F): 97.8 (26 Jan 2024 04:56), Max: 98.2 (25 Jan 2024 12:17)  HR: 85 (26 Jan 2024 04:56) (67 - 89)  BP: 172/82 (26 Jan 2024 04:56) (152/81 - 189/93)  BP(mean): --  RR: 18 (26 Jan 2024 04:56) (18 - 18)  SpO2: 94% (26 Jan 2024 04:56) (92% - 94%)    Parameters below as of 26 Jan 2024 04:56  Patient On (Oxygen Delivery Method): room air      I&O's Summary    25 Jan 2024 07:01  -  26 Jan 2024 07:00  --------------------------------------------------------  IN: 0 mL / OUT: 200 mL / NET: -200 mL          TELE: [facility] 80-90s   PHYSICAL EXAM:  Constitutional: NAD, awake and alert, Frail   HEENT: Moist Mucous Membranes, Anicteric  Pulmonary: Non-labored, breath sounds are clear bilaterally, No wheezing, rales or rhonchi  Cardiovascular: Regular, S1 and S2, + murmurs, No rubs, gallops or clicks  Gastrointestinal:  soft, nontender, nondistended   Lymph: No peripheral edema. No lymphadenopathy.   Skin: No visible rashes  +ecchymoses B/L UE  Psych: confused and disoriented, restless at times  LABS: All Labs Reviewed:                        10.8   9.06  )-----------( 432      ( 25 Jan 2024 08:10 )             33.8                         11.0   10.14 )-----------( 395      ( 24 Jan 2024 06:45 )             34.0     25 Jan 2024 08:10    138    |  109    |  46     ----------------------------<  131    4.5     |  24     |  1.50   24 Jan 2024 08:45    138    |  108    |  47     ----------------------------<  142    4.7     |  23     |  1.60     Ca    8.5        25 Jan 2024 08:10  Ca    8.7        24 Jan 2024 08:45    TPro  5.2    /  Alb  2.0    /  TBili  0.5    /  DBili  x      /  AST  31     /  ALT  38     /  AlkPhos  133    25 Jan 2024 08:10  TPro  5.6    /  Alb  2.2    /  TBili  0.6    /  DBili  x      /  AST  30     /  ALT  40     /  AlkPhos  157    24 Jan 2024 08:45          12 Lead ECG:   Ventricular Rate 147 BPM    QRS Duration 72 ms    Q-T Interval 314 ms    QTC Calculation(Bazett) 491 ms    R Axis 40 degrees    T Axis 241 degrees    Diagnosis Line *** Critical Test Result: High HR  Atrial fibrillation with rapid ventricular response  Minimal voltage criteria for LVH, may be normal variant ( Sokolow-Elaine )  ST & Marked T wave abnormality, consider anterolateral ischemia  Confirmed by ANGELA RECINOS ([id number]) on 1/24/2024 6:18:13 AM (01-23-24 @ 14:30)      TRANSTHORACIC ECHOCARDIOGRAM REPORT  ________________________________________________________________________________                                      _______       Pt. Name:       MARU MOYA Study Date:    1/19/2024  MRN:            [id number]           YOB: 1931  Accession #:    [id number]          Age:           89 years  Account#:       [id number]         Gender:        F  Heart Rate:                        Height:        ( )  Rhythm:                            Weight:        ( )  Blood Pressure: 137/89 mmHg        BSA/BMI:       /  ________________________________________________________________________________________  Referring Physician:    [id number] Doroteo Hernandez  Interpreting Physician: Poly Sams MD  Primary Sonographer:    Edwin Ochoa    CPT:               ECHO TTE WO CON COMP W DOPP - 14857.m  Indication(s):     Heart failure, unspecified - I50.9  Procedure:         Transthoracic echocardiogram with 2-D, M-mode and complete        spectral and color flow Doppler.  Ordering Location: [facility]  Admission Status:  Inpatient  Study Information: Image quality for this study is technically difficult.    _______________________________________________________________________________________     CONCLUSIONS:      1. Technically difficult image quality.   2. Left ventricular systolic function is moderately decreased with an ejection fraction visually estimated at 35 to 40 %. Regional wall motion abnormalities present.   3. Multiple segmental abnormalities exist. See findings.   4. Normal right ventricular cavity size and probably normal systolic function.   5. The left atrium is moderately dilated.   6. The right atrium is normal in size.   7. Tricuspid aortic valve with normal leaflet excursion. There is mild calcification of the aortic valve leaflets.   8. Mild aortic regurgitation.   9. Mild mitral valve leaflet calcification.  10. Moderate mitral regurgitation.  11. Mild tricuspid regurgitation.  12. Trace pulmonic regurgitation.  13. The inferior vena cava is normal in size measuring 1.84 cm in diameter, (normal <2.1cm) with normal inspiratory collapse (normal >50%) consistent with normal right atrial pressure (~3, range 0-5mmHg).  14. Estimated pulmonary artery systolic pressure is 33 mmHg.    ________________________________________________________________________________________  FINDINGS:     Left Ventricle:  Left ventricular systolic function is moderately decreased with an ejection fraction visually estimated at 35 to 40%. There are regional wall motion abnormalities present.  LV Wall Scoring: The entire apex is akinetic. The basal and mid anterior wall,  basal and mid anterior septum, basal and mid inferior septum, basal and mid  inferior wall, andbasal and mid inferolateral wall are hypokinetic. All  remaining scored segments are normal.          Right Ventricle:  The right ventricular cavity is normal in size and probably normal systolic function. Tricuspid annular plane systolic excursion (TAPSE) is 1.9 cm (normal >=1.7 cm).     Left Atrium:  The left atrium is moderately dilated.     Right Atrium:  The right atrium is normal in size.     Aortic Valve:  The aortic valve is tricuspid with normal leaflet excursion. There is mild calcification of the aortic valve leaflets. There is mild aortic regurgitation.     Mitral Valve:  There is mild leaflet calcification. There is moderate mitral regurgitation.     Tricuspid Valve:  There is mild tricuspid regurgitation. Estimated pulmonary artery systolic pressure is 33 mmHg.     Pulmonic Valve:  There is trace pulmonic regurgitation.     Aorta:  The aortic root at the sinuses of Valsalva is normal in size. The ascending aorta diameter is normal in size.     Systemic Veins:  The inferior vena cava is normal in size measuring 1.84 cm in diameter, (normal <2.1cm) with normal inspiratory collapse (normal >50%) consistent with normal right atrial pressure (~3, range 0-5mmHg).  ____________________________________________________________________  QUANTITATIVE DATA:  Left Ventricle Measurements: (Indexed to BSA)     IVSd (2D):   1.2 cm  LVPWd (2D):  1.0 cm  LVIDd (2D):  4.3 cm  LVIDs (2D):  3.3 cm  LV Mass:     160 g  Visualized LV EF%: 35 to 40%     MV E Vmax:    1.02 m/s  MV A Vmax:0.62 m/s  MV E/A:       1.63  e' lateral:   7.07 cm/s  e' medial:    4.57 cm/s  E/e' lateral: 14.43  E/e' medial:  22.32  E/e' Average: 17.53  MV DT:        144 msec    Aorta Measurements: (normal range) (Indexed to BSA)     Sinuses of Valsalva: 2.90cm (2.7 - 3.3 cm)  Ao Asc prox:         2.70 cm       Left Atrium Measurements: (Indexed to BSA)  LA Diam 2D: 4.00 cm    Right Ventricle Measurements:     TAPSE:            1.9 cm  RV Base (RVID1):  2.7 cm  RV Mid (RVID2):   2.4 cm  RV Major (RVID3):5.8 cm       LVOT / RVOT/ Qp/Qs Data: (Indexed to BSA)  LVOT Diameter: 1.80 cm    Aortic Valve Measurements:  AR Vmax  3.51 m/s  AR PHT   356 msec  AR [place] 2.89 m/s²    Mitral Valve Measurements:     MV E Vmax: 1.0 m/s         MR Vmax:          4.84m/s  MV A Vmax: 0.6 m/s         MR VTI:           127.00 cm  MV E/A:    1.6             MR Mean Gradient: 53.0 mmHg                             MR Peak Gradient: 93.7 mmHg       Tricuspid Valve Measurements:     TR Vmax:          2.8 m/s  TR Peak Gradient: 30.5 mmHg  RA Pressure:      3 mmHg  PASP:             33 mmHg    ________________________________________________________________________________________  Electronically signed on 1/20/2024 at 3:31:24 PM by Poly Sams MD         *** Final ***

## 2024-01-26 NOTE — PROGRESS NOTE ADULT - SUBJECTIVE AND OBJECTIVE BOX
Patient is a 89y old  Female who presents with a chief complaint of NSTEMI, Rhabdomyolysis (26 Jan 2024 09:34)      INTERVAL HPI/OVERNIGHT EVENTS: Patient seen and examined at bedside. No overnight events occurred. Patient has no complaints at this time. Denies fevers, chills, headache, lightheadedness, chest pain, dyspnea, abdominal pain, n/v/d/c.    MEDICATIONS  (STANDING):  atorvastatin 40 milliGRAM(s) Oral at bedtime  bisacodyl 5 milliGRAM(s) Oral at bedtime  ciprofloxacin     Tablet 500 milliGRAM(s) Oral daily  clopidogrel Tablet 75 milliGRAM(s) Oral daily  heparin   Injectable 5000 Unit(s) SubCutaneous every 8 hours  hydrALAZINE 75 milliGRAM(s) Oral every 8 hours  influenza  Vaccine (HIGH DOSE) 0.7 milliLiter(s) IntraMuscular once  metoprolol succinate  milliGRAM(s) Oral every 12 hours  tamsulosin 0.4 milliGRAM(s) Oral at bedtime  torsemide 10 milliGRAM(s) Oral daily    MEDICATIONS  (PRN):  acetaminophen     Tablet .. 650 milliGRAM(s) Oral every 6 hours PRN Mild Pain (1 - 3)  melatonin 3 milliGRAM(s) Oral at bedtime PRN Insomnia      Allergies    IV Contrast (Pruritus; Rash)  verapamil (Other)  Keflex (Other)    Intolerances        REVIEW OF SYSTEMS:  CONSTITUTIONAL: No fever or chills  HEENT:  No headache, no sore throat  RESPIRATORY: No cough, wheezing, or shortness of breath  CARDIOVASCULAR: No chest pain, palpitations  GASTROINTESTINAL: No abd pain, nausea, vomiting, or diarrhea  GENITOURINARY: No dysuria, frequency, or hematuria  NEUROLOGICAL: no focal weakness or dizziness  MUSCULOSKELETAL: no myalgias     Vital Signs Last 24 Hrs  T(C): 36.7 (26 Jan 2024 12:28), Max: 36.8 (25 Jan 2024 20:11)  T(F): 98.1 (26 Jan 2024 12:28), Max: 98.2 (25 Jan 2024 20:11)  HR: 93 (26 Jan 2024 12:28) (67 - 93)  BP: 175/81 (26 Jan 2024 12:28) (152/81 - 176/80)  BP(mean): --  RR: 18 (26 Jan 2024 12:28) (18 - 18)  SpO2: 94% (26 Jan 2024 12:28) (93% - 94%)    Parameters below as of 26 Jan 2024 12:28  Patient On (Oxygen Delivery Method): room air        PHYSICAL EXAM:  Constitutional: NAD, awake and alert, Frail   HEENT: Moist Mucous Membranes, Anicteric  Pulmonary: Non-labored, breath sounds audible bilaterally, +mild rales, No wheezing or rhonchi  Cardiovascular: Regular, S1 and S2, + murmurs, No rubs, gallops or clicks  Gastrointestinal:  soft, nontender, nondistended   Lymph: No peripheral edema. No lymphadenopathy.   Skin: No visible rashes  +ecchymoses B/L UE    LABS:                        11.4   9.80  )-----------( 488      ( 26 Jan 2024 11:20 )             35.6     CBC Full  -  ( 26 Jan 2024 11:20 )  WBC Count : 9.80 K/uL  Hemoglobin : 11.4 g/dL  Hematocrit : 35.6 %  Platelet Count - Automated : 488 K/uL  Mean Cell Volume : 98.6 fl  Mean Cell Hemoglobin : 31.6 pg  Mean Cell Hemoglobin Concentration : 32.0 gm/dL  Auto Neutrophil # : x  Auto Lymphocyte # : x  Auto Monocyte # : x  Auto Eosinophil # : x  Auto Basophil # : x  Auto Neutrophil % : x  Auto Lymphocyte % : x  Auto Monocyte % : x  Auto Eosinophil % : x  Auto Basophil % : x    26 Jan 2024 11:20    139    |  111    |  42     ----------------------------<  163    5.0     |  24     |  1.50     Ca    8.7        26 Jan 2024 11:20  Mg     2.2       26 Jan 2024 11:20    TPro  5.4    /  Alb  2.2    /  TBili  0.4    /  DBili  x      /  AST  57     /  ALT  60     /  AlkPhos  142    26 Jan 2024 11:20      Urinalysis Basic - ( 26 Jan 2024 11:20 )    Color: x / Appearance: x / SG: x / pH: x  Gluc: 163 mg/dL / Ketone: x  / Bili: x / Urobili: x   Blood: x / Protein: x / Nitrite: x   Leuk Esterase: x / RBC: x / WBC x   Sq Epi: x / Non Sq Epi: x / Bacteria: x      CAPILLARY BLOOD GLUCOSE              RADIOLOGY & ADDITIONAL TESTS:    Personally reviewed.     Consultant(s) Notes Reviewed:  [x] YES  [ ] NO

## 2024-01-27 DIAGNOSIS — R33.9 RETENTION OF URINE, UNSPECIFIED: ICD-10-CM

## 2024-01-27 DIAGNOSIS — I10 ESSENTIAL (PRIMARY) HYPERTENSION: ICD-10-CM

## 2024-01-27 LAB
ALBUMIN SERPL ELPH-MCNC: 2 G/DL — LOW (ref 3.3–5)
ALP SERPL-CCNC: 123 U/L — HIGH (ref 40–120)
ALT FLD-CCNC: 49 U/L — SIGNIFICANT CHANGE UP (ref 12–78)
ANION GAP SERPL CALC-SCNC: 5 MMOL/L — SIGNIFICANT CHANGE UP (ref 5–17)
AST SERPL-CCNC: 41 U/L — HIGH (ref 15–37)
BILIRUB SERPL-MCNC: 0.4 MG/DL — SIGNIFICANT CHANGE UP (ref 0.2–1.2)
BUN SERPL-MCNC: 41 MG/DL — HIGH (ref 7–23)
CALCIUM SERPL-MCNC: 8.3 MG/DL — LOW (ref 8.5–10.1)
CHLORIDE SERPL-SCNC: 112 MMOL/L — HIGH (ref 96–108)
CO2 SERPL-SCNC: 20 MMOL/L — LOW (ref 22–31)
CREAT SERPL-MCNC: 1.4 MG/DL — HIGH (ref 0.5–1.3)
EGFR: 35 ML/MIN/1.73M2 — LOW
GLUCOSE SERPL-MCNC: 123 MG/DL — HIGH (ref 70–99)
HCT VFR BLD CALC: 33.3 % — LOW (ref 34.5–45)
HGB BLD-MCNC: 10.4 G/DL — LOW (ref 11.5–15.5)
MAGNESIUM SERPL-MCNC: 2 MG/DL — SIGNIFICANT CHANGE UP (ref 1.6–2.6)
MCHC RBC-ENTMCNC: 31.1 PG — SIGNIFICANT CHANGE UP (ref 27–34)
MCHC RBC-ENTMCNC: 31.2 GM/DL — LOW (ref 32–36)
MCV RBC AUTO: 99.7 FL — SIGNIFICANT CHANGE UP (ref 80–100)
NRBC # BLD: 0 /100 WBCS — SIGNIFICANT CHANGE UP (ref 0–0)
PHOSPHATE SERPL-MCNC: 3.7 MG/DL — SIGNIFICANT CHANGE UP (ref 2.5–4.5)
PLATELET # BLD AUTO: 438 K/UL — HIGH (ref 150–400)
POTASSIUM SERPL-MCNC: 4.5 MMOL/L — SIGNIFICANT CHANGE UP (ref 3.5–5.3)
POTASSIUM SERPL-SCNC: 4.5 MMOL/L — SIGNIFICANT CHANGE UP (ref 3.5–5.3)
PROT SERPL-MCNC: 4.9 G/DL — LOW (ref 6–8.3)
RBC # BLD: 3.34 M/UL — LOW (ref 3.8–5.2)
RBC # FLD: 12.6 % — SIGNIFICANT CHANGE UP (ref 10.3–14.5)
SODIUM SERPL-SCNC: 137 MMOL/L — SIGNIFICANT CHANGE UP (ref 135–145)
WBC # BLD: 8.41 K/UL — SIGNIFICANT CHANGE UP (ref 3.8–10.5)
WBC # FLD AUTO: 8.41 K/UL — SIGNIFICANT CHANGE UP (ref 3.8–10.5)

## 2024-01-27 PROCEDURE — 99232 SBSQ HOSP IP/OBS MODERATE 35: CPT

## 2024-01-27 PROCEDURE — 99233 SBSQ HOSP IP/OBS HIGH 50: CPT | Mod: GC

## 2024-01-27 RX ORDER — LOSARTAN POTASSIUM 100 MG/1
25 TABLET, FILM COATED ORAL DAILY
Refills: 0 | Status: DISCONTINUED | OUTPATIENT
Start: 2024-01-27 | End: 2024-01-29

## 2024-01-27 RX ORDER — FLUTICASONE PROPIONATE 50 MCG
1 SPRAY, SUSPENSION NASAL
Refills: 0 | Status: DISCONTINUED | OUTPATIENT
Start: 2024-01-27 | End: 2024-01-29

## 2024-01-27 RX ADMIN — Medication 75 MILLIGRAM(S): at 13:01

## 2024-01-27 RX ADMIN — Medication 5 MILLIGRAM(S): at 21:54

## 2024-01-27 RX ADMIN — Medication 100 MILLIGRAM(S): at 05:26

## 2024-01-27 RX ADMIN — LOSARTAN POTASSIUM 25 MILLIGRAM(S): 100 TABLET, FILM COATED ORAL at 11:46

## 2024-01-27 RX ADMIN — Medication 75 MILLIGRAM(S): at 21:54

## 2024-01-27 RX ADMIN — Medication 500 MILLIGRAM(S): at 11:06

## 2024-01-27 RX ADMIN — CLOPIDOGREL BISULFATE 75 MILLIGRAM(S): 75 TABLET, FILM COATED ORAL at 11:07

## 2024-01-27 RX ADMIN — Medication 100 MILLIGRAM(S): at 17:10

## 2024-01-27 RX ADMIN — HEPARIN SODIUM 5000 UNIT(S): 5000 INJECTION INTRAVENOUS; SUBCUTANEOUS at 05:25

## 2024-01-27 RX ADMIN — HEPARIN SODIUM 5000 UNIT(S): 5000 INJECTION INTRAVENOUS; SUBCUTANEOUS at 21:55

## 2024-01-27 RX ADMIN — HEPARIN SODIUM 5000 UNIT(S): 5000 INJECTION INTRAVENOUS; SUBCUTANEOUS at 13:00

## 2024-01-27 RX ADMIN — Medication 10 MILLIGRAM(S): at 05:27

## 2024-01-27 RX ADMIN — ATORVASTATIN CALCIUM 40 MILLIGRAM(S): 80 TABLET, FILM COATED ORAL at 21:54

## 2024-01-27 RX ADMIN — TAMSULOSIN HYDROCHLORIDE 0.4 MILLIGRAM(S): 0.4 CAPSULE ORAL at 21:55

## 2024-01-27 RX ADMIN — Medication 75 MILLIGRAM(S): at 05:25

## 2024-01-27 RX ADMIN — Medication 1 SPRAY(S): at 17:47

## 2024-01-27 NOTE — PROGRESS NOTE ADULT - PROBLEM SELECTOR PLAN 7
- Newly found  - CHADSVASC 4-5 --> discussed with daughter r/b of AC  - Patient now in sinus rhythm.   - Had recent cardiac monitor for one week, but results not known.   - For now will defer full dose AC as patient is weak and fall risk, has been sinus. She will need to follow up closely with OP cardiologist  - Continue toprol XL 100mg BID.

## 2024-01-27 NOTE — PROGRESS NOTE ADULT - PROBLEM SELECTOR PLAN 2
- Sanders placed on 1/21 and Flomax was started on 1/22  - Sanders removed on 1/25  - Urinary retention noted again, bladder scan this am with ~350cc.   - Discussed with family and urology Dr. Humphrey.   - Sanders cath ordered today, continue Flomax.   - TOV in facility once pt is improving mobility

## 2024-01-27 NOTE — PROGRESS NOTE ADULT - ATTENDING COMMENTS
patient seen at bedside  kwan removed 1/25. Still with episodes of retention.   this morning was concern with lower abdominal pain.  sono showed 350 cc.   minimal output from purewick.     on RA    A/P:  NSTEMI     - cardio following. plan for medical management.      - ECHO with LVEF 35-40% with SWMA. eventual cath however not a candidate for intervention at this time given age and TERESA.      - cont hydralazine 75mg q8h and plavix      - toprol xl 100mg BID      - AC deferred at this time given anemia and poor long term candidate.      - clonidine discontinued      - BP elevated. cont torsemide 10mg daily. Start losartan 25mg daily.     pseudomonas UTI     - ID following    - abx changed to cipro     Acute on chronic renal disease    - Cr improved.      - restarted torsemide and losartan today    urinary retention    - kwan from 1/21 to 1/25. still with episodes of retention.     - d/w urology Dr Allen (covering for Dr Molina). will replace kwan. Follow up with urology outpatient once ambulatory.     - cont flomax.     continue PT   DVT proph: heparin 5000 units TID     discharge planning to [facility] . spoke with family at bedside. in agreement with [facility]. working on 24h aide coverage.

## 2024-01-27 NOTE — PROGRESS NOTE ADULT - PROBLEM SELECTOR PLAN 10
DVT ppx:  continue heparin SC. DVT ppx:  continue heparin SC.    Dispo: possible discharge Monday, pending bed available for [facility].

## 2024-01-27 NOTE — PROGRESS NOTE ADULT - PROBLEM SELECTOR PLAN 5
- Possible NSTEMI in the setting of SWMA on TTE  - EKG with diffuse TWI (new)  - Continue home statin and Plavix.  - Cardio rec further outpatient ischemic eval.

## 2024-01-27 NOTE — PROGRESS NOTE ADULT - SUBJECTIVE AND OBJECTIVE BOX
Patient is a 89y old  Female who presents with a chief complaint of NSTEMI, Rhabdomyolysis (27 Jan 2024 10:08)    Subjective:  INTERVAL HPI/OVERNIGHT EVENTS: Patient seen and examined at bedside. No overnight events occurred. Patient is tolerating PO, unsure how is her urination but denies dysuria or pelvic pain. Reports very slow walking with support. Denies fevers, chills, lightheadedness, chest pain, palpitations, dyspnea, abdominal pain, nausea, vomiting, diarrhea/constipation.    MEDICATIONS  (STANDING):  atorvastatin 40 milliGRAM(s) Oral at bedtime  bisacodyl 5 milliGRAM(s) Oral at bedtime  ciprofloxacin     Tablet 500 milliGRAM(s) Oral daily  clopidogrel Tablet 75 milliGRAM(s) Oral daily  heparin   Injectable 5000 Unit(s) SubCutaneous every 8 hours  hydrALAZINE 75 milliGRAM(s) Oral every 8 hours  influenza  Vaccine (HIGH DOSE) 0.7 milliLiter(s) IntraMuscular once  losartan 25 milliGRAM(s) Oral daily  metoprolol succinate  milliGRAM(s) Oral every 12 hours  tamsulosin 0.4 milliGRAM(s) Oral at bedtime  torsemide 10 milliGRAM(s) Oral daily    MEDICATIONS  (PRN):  acetaminophen     Tablet .. 650 milliGRAM(s) Oral every 6 hours PRN Mild Pain (1 - 3)  melatonin 3 milliGRAM(s) Oral at bedtime PRN Insomnia      Allergies  IV Contrast (Pruritus; Rash)  verapamil (Other)  Keflex (Other)    Intolerances      REVIEW OF SYSTEMS:  CONSTITUTIONAL: No fever or chills  HEENT:  No headache  RESPIRATORY: No cough, or shortness of breath  CARDIOVASCULAR: No chest pain, palpitations  GASTROINTESTINAL: No abd pain, nausea, vomiting, or diarrhea  GENITOURINARY: No dysuria  NEUROLOGICAL: no focal weakness or dizziness  MUSCULOSKELETAL: no myalgias     Objective:  Vital Signs Last 24 Hrs  T(C): 36.3 (27 Jan 2024 09:59), Max: 36.7 (27 Jan 2024 05:01)  T(F): 97.3 (27 Jan 2024 09:59), Max: 98.1 (27 Jan 2024 05:01)  HR: 77 (27 Jan 2024 13:04) (76 - 88)  BP: 162/72 (27 Jan 2024 13:04) (135/70 - 166/75)  BP(mean): 93 (26 Jan 2024 20:51) (93 - 94)  RR: 17 (27 Jan 2024 05:01) (17 - 18)  SpO2: 92% (27 Jan 2024 09:59) (92% - 93%)    Parameters below as of 27 Jan 2024 09:59  Patient On (Oxygen Delivery Method): room air    GENERAL: NAD, lying in bed comfortably  HEAD:  Atraumatic, Normocephalic  EYES: EOMI, PERRLA, conjunctiva and sclera clear  ENT: Moist oral mucous   NECK: Supple, No JVD  CHEST/LUNG: Clear to auscultation bilaterally  HEART: S1,S2. Regular rate and rhythm.   ABDOMEN: Bowel sounds present. Soft, nontender, nondistended.   EXTREMITIES:  No cyanosis, or edema  NERVOUS SYSTEM:  Alert, answer questions properly  SKIN: Warm.     LABS:                        10.4   8.41  )-----------( 438      ( 27 Jan 2024 07:46 )             33.3     CBC Full  -  ( 27 Jan 2024 07:46 )  WBC Count : 8.41 K/uL  Hemoglobin : 10.4 g/dL  Hematocrit : 33.3 %  Platelet Count - Automated : 438 K/uL  Mean Cell Volume : 99.7 fl  Mean Cell Hemoglobin : 31.1 pg  Mean Cell Hemoglobin Concentration : 31.2 gm/dL      27 Jan 2024 07:46    137    |  112    |  41     ----------------------------<  123    4.5     |  20     |  1.40     Ca    8.3        27 Jan 2024 07:46  Phos  3.7       27 Jan 2024 07:46  Mg     2.0       27 Jan 2024 07:46    TPro  4.9    /  Alb  2.0    /  TBili  0.4    /  DBili  x      /  AST  41     /  ALT  49     /  AlkPhos  123    27 Jan 2024 07:46      Urinalysis Basic - ( 27 Jan 2024 07:46 )    Color: x / Appearance: x / SG: x / pH: x  Gluc: 123 mg/dL / Ketone: x  / Bili: x / Urobili: x   Blood: x / Protein: x / Nitrite: x   Leuk Esterase: x / RBC: x / WBC x   Sq Epi: x / Non Sq Epi: x / Bacteria: x      CAPILLARY BLOOD GLUCOSE  POCT Blood Glucose.: 179 mg/dL (26 Jan 2024 21:26)        RADIOLOGY & ADDITIONAL TESTS:    Personally reviewed.     Consultant(s) Notes Reviewed:  [x] YES  [ ] NO

## 2024-01-27 NOTE — PROGRESS NOTE ADULT - PROBLEM SELECTOR PLAN 8
- Chronic.  - SBP up to 175 since yesterday   - Continue hydralazine 75mg q8h  - Re-start home losartan only 25mg for now (home 50mg)   - Continue home torsemide  - Continue to Hold clonidine.   - Monitor BP.

## 2024-01-27 NOTE — PROGRESS NOTE ADULT - ASSESSMENT
89-year-old white female presently residing at home alone though does have assistance of an aide approximately 4 to 6 hours/day with past history of hypertension hyperlipidemia previous CVA basal cell carcinoma renal artery stenosis s/p stent as well as congestive heart failure who was last known to be well and last heard from sometime late afternoon yesterday.  Cardiology consulted for syncopal episode and elevated troponins.    Syncope, Elevated Troponin/NSTEMI, HTN, HFrEF   - s/p mechanical fall. May have been orthostatic syncope    - BNP:  <-- >056842,  <--083125  - TTE 4/23: EF 58%, mod MR, mild- mod TR, mild [place], [place].  - Repeat TTE showed EF 35-40% with SWMA, LAE, mod MR.    - Hold home diuretics. Creatinine continues to improve  - compensated from HF POV    - Monitor volume status closely    - EKG: Afib @ 122 w/ continued diffuse TWI  - Repeat EKG w/ ST @ 109, Diffuse TWI (new)  - CK peaked @ 2986  - Troponin peaked and downtrended, no need to trend further  - Possible NSTEMI in the setting of SWMA on TTE  - Continue home statin and Plavix  - Would recommend LHC/RHC down the road.  At this point, in the setting of confusion and TERESA, she is not a candidate for any ischemic w/u at this time  - would medically manage for now     - -160s, HR 's   - Continue Toprol  BID  - continue Hydralazine increase to 75 mg q8H for afterload reduction  - Unable to start ACEI/ARB or Entresto in the setting of TERESA  - Holding home Losartan 2/2 TERESA, resume when able   - Keep off Clonidine and up titrate GDMT as tolerated  - Monitor and replete Lytes. Keep K > 4 and Mg > 2    - CHADSVASc: 4-5.  Would hold off on AC in the setting of downtrending Hgb.  Either way, she does not appear to be a good candidate for long term AC    - Will continue to follow.    Sidra Epperson [facility]  Nurse Practitioner - Cardiology   call TEAMS   89-year-old white female presently residing at home alone though does have assistance of an aide approximately 4 to 6 hours/day with past history of hypertension hyperlipidemia previous CVA basal cell carcinoma renal artery stenosis s/p stent as well as congestive heart failure who was last known to be well and last heard from sometime late afternoon yesterday.  Cardiology consulted for syncopal episode and elevated troponins.    Syncope, Elevated Troponin/NSTEMI, HTN, HFrEF   - s/p mechanical fall. May have been orthostatic syncope    - BNP:  <-- >667463,  <--707676  - TTE 4/23: EF 58%, mod MR, mild- mod TR, mild AR, [place].  - Repeat TTE showed EF 35-40% with SWMA, LAE, mod MR.    - Hold home diuretics. Creatinine continues to improve  - compensated from HF POV    - Monitor volume status closely    - EKG: Afib @ 122 w/ continued diffuse TWI  - Repeat EKG w/ ST @ 109, Diffuse TWI (new)  - CK peaked @ 2986  - Troponin peaked and downtrended, no need to trend further  - Possible NSTEMI in the setting of SWMA on TTE  - Continue home statin and Plavix  - Would recommend LHC/RHC down the road.  At this point, in the setting of confusion and TERESA, she is not a candidate for any ischemic w/u at this time  - would medically manage for now     - -160s, HR 's   - Continue Toprol  BID  - continue Hydralazine increase to 100 mg q8H for afterload reduction  - Unable to start ACEI/ARB or Entresto in the setting of TERESA  - Holding home Losartan 2/2 TERESA, resume when able   - Keep off Clonidine and up titrate GDMT as tolerated  - Monitor and replete Lytes. Keep K > 4 and Mg > 2    - CHADSVASc: 4-5.  Would hold off on AC in the setting of downtrending Hgb.  Either way, she does not appear to be a good candidate for long term AC    - Will continue to follow.    Sidra Epperson [facility]  Nurse Practitioner - Cardiology   call TEAMS   89-year-old white female presently residing at home alone though does have assistance of an aide approximately 4 to 6 hours/day with past history of hypertension hyperlipidemia previous CVA basal cell carcinoma renal artery stenosis s/p stent as well as congestive heart failure who was last known to be well and last heard from sometime late afternoon yesterday.  Cardiology consulted for syncopal episode and elevated troponins.    Syncope, Elevated Troponin/NSTEMI, HTN, HFrEF   - s/p mechanical fall. May have been orthostatic syncope    - BNP:  <-- >374107,  <--907145  - TTE 4/23: EF 58%, mod MR, mild- mod TR, mild AR, [place].  - Repeat TTE showed EF 35-40% with SWMA, LAE, mod MR.    - home torsemide resumed, creat 1.4 today (improving), continue to trend renal indices   - would resume Losartan (low dose)   - compensated from HF POV    - Monitor volume status closely    - EKG: Afib @ 122 w/ continued diffuse TWI  - Repeat EKG w/ ST @ 109, Diffuse TWI (new)  - CK peaked @ 2986  - Troponin peaked and downtrended, no need to trend further  - Possible NSTEMI in the setting of SWMA on TTE  - Continue home statin and Plavix  - Would recommend LHC/RHC down the road.  At this point, in the setting of confusion and TERESA, she is not a candidate for any ischemic w/u at this time  - optimizing GDMT and medically manage for now     - -160s, HR 70-90's   - Continue Toprol  BID  - continue Hydralazine 75 mg q8H for afterload reduction  - Monitor and replete Lytes. Keep K > 4 and Mg > 2    - CHADSVASc: 4-5.  Would hold off on AC in the setting of downtrending Hgb.  Either way, she does not appear to be a good candidate for long term AC    - Will continue to follow.    Sidra Epperson, [facility]  Nurse Practitioner - Cardiology   call TEAMS

## 2024-01-27 NOTE — SOCIAL WORK PROGRESS NOTE - NSSWPROGRESSNOTE_GEN_ALL_CORE
I contacted [facility] at [facility] today and confirmed no available beds this weekend. Per ACM response from [facility], their  admissions dept. is not open on weekend. I left message for daughter Brittney requesting back up choices. Social work to follow.    I contacted [facility] admissions at [facility] today and confirmed no available beds this weekend. Per AC response from [facility], their  admissions dept. is not open on weekend. I spoke with daughter Brittney by phone, updated her and requested back up choices. Educated her re. CMS site for ratings of facilities. She agreed to referral to [facility], referral sent. Social work to follow.

## 2024-01-27 NOTE — PROGRESS NOTE ADULT - SUBJECTIVE AND OBJECTIVE BOX
[facility] -- Ricky Agarwal, Ramsey Recinos Savella, Goodger, Cohen  Office # [phone]    Follow Up:   HFrEF, Rhabdo, Elevated Troponins, poss. NSTEMI, A fib RVR    Subjective/Observations:  seen and examined, awake, alert, resting in bed, denies chest pain, dyspnea, palpitations or dizziness, orthopnea and PND.  Tolerating RA. no events overnight daughter at bedside, concern for urine retention     REVIEW OF SYSTEMS: All other review of systems is negative unless indicated above  PAST MEDICAL & SURGICAL HISTORY:  Essential hypertension      Dyslipidemia      Hyponatremia  ON HCTZ , h/o Hypokelemia      Cerebrovascular accident (CVA), unspecified mechanism      Edema, unspecified type      Mitral valve insufficiency, unspecified etiology  Cardiac cath on 11/21/18      Basal cell carcinoma      Renal artery stenosis  right side, stent      CHF (congestive heart failure)      Inguinal hernia, right      Blood pressure instability      S/P Mohs surgery for basal cell carcinoma      Status post hysterectomy  and bladder lift with mesh 1990s      History of cholecystectomy  1980      H/O bilateral hip replacements  2013, 2014      Other elective surgery  right renal artery stent      S/P colonoscopy        MEDICATIONS  (STANDING):  atorvastatin 40 milliGRAM(s) Oral at bedtime  bisacodyl 5 milliGRAM(s) Oral at bedtime  ciprofloxacin     Tablet 500 milliGRAM(s) Oral daily  clopidogrel Tablet 75 milliGRAM(s) Oral daily  heparin   Injectable 5000 Unit(s) SubCutaneous every 8 hours  hydrALAZINE 75 milliGRAM(s) Oral every 8 hours  influenza  Vaccine (HIGH DOSE) 0.7 milliLiter(s) IntraMuscular once  metoprolol succinate  milliGRAM(s) Oral every 12 hours  tamsulosin 0.4 milliGRAM(s) Oral at bedtime  torsemide 10 milliGRAM(s) Oral daily    MEDICATIONS  (PRN):  acetaminophen     Tablet .. 650 milliGRAM(s) Oral every 6 hours PRN Mild Pain (1 - 3)  melatonin 3 milliGRAM(s) Oral at bedtime PRN Insomnia    Allergies    IV Contrast (Pruritus; Rash)  verapamil (Other)  Keflex (Other)    Intolerances      Vital Signs Last 24 Hrs  T(C): 36.3 (27 Jan 2024 09:59), Max: 36.7 (26 Jan 2024 12:28)  T(F): 97.3 (27 Jan 2024 09:59), Max: 98.1 (26 Jan 2024 12:28)  HR: 76 (27 Jan 2024 09:59) (76 - 93)  BP: 135/70 (27 Jan 2024 09:59) (135/70 - 175/81)  BP(mean): 93 (26 Jan 2024 20:51) (93 - 94)  RR: 17 (27 Jan 2024 05:01) (17 - 18)  SpO2: 92% (27 Jan 2024 09:59) (92% - 94%)    Parameters below as of 27 Jan 2024 09:59  Patient On (Oxygen Delivery Method): room air      I&O's Summary      TELE: Not on telemetry   PHYSICAL EXAM:  Constitutional: NAD, awake and alert, Frail   HEENT: Moist Mucous Membranes, Anicteric  Pulmonary: Non-labored, breath sounds are clear bilaterally, No wheezing, rales or rhonchi  Cardiovascular: Regular, S1 and S2, + murmurs, No rubs, gallops or clicks  Gastrointestinal:  soft, nontender, nondistended   Lymph: No peripheral edema. No lymphadenopathy.   Skin: No visible rashes  +ecchymoses B/L UE  Psych: confused and disoriented, restless at times  LABS: All Labs Reviewed:                                   10.4   8.41  )-----------( 438      ( 27 Jan 2024 07:46 )             33.3                         11.4   9.80  )-----------( 488      ( 26 Jan 2024 11:20 )             35.6                         10.8   9.06  )-----------( 432      ( 25 Jan 2024 08:10 )             33.8     27 Jan 2024 07:46    137    |  112    |  41     ----------------------------<  123    4.5     |  20     |  1.40   26 Jan 2024 11:20    139    |  111    |  42     ----------------------------<  163    5.0     |  24     |  1.50   25 Jan 2024 08:10    138    |  109    |  46     ----------------------------<  131    4.5     |  24     |  1.50     Ca    8.3        27 Jan 2024 07:46  Ca    8.7        26 Jan 2024 11:20  Ca    8.5        25 Jan 2024 08:10  Phos  3.7       27 Jan 2024 07:46  Mg     2.0       27 Jan 2024 07:46  Mg     2.2       26 Jan 2024 11:20    TPro  4.9    /  Alb  2.0    /  TBili  0.4    /  DBili  x      /  AST  41     /  ALT  49     /  AlkPhos  123    27 Jan 2024 07:46  TPro  5.4    /  Alb  2.2    /  TBili  0.4    /  DBili  x      /  AST  57     /  ALT  60     /  AlkPhos  142    26 Jan 2024 11:20  TPro  5.2    /  Alb  2.0    /  TBili  0.5    /  DBili  x      /  AST  31     /  ALT  38     /  AlkPhos  133    25 Jan 2024 08:10          12 Lead ECG:   Ventricular Rate 147 BPM    QRS Duration 72 ms    Q-T Interval 314 ms    QTC Calculation(Bazett) 491 ms    R Axis 40 degrees    T Axis 241 degrees    Diagnosis Line *** Critical Test Result: High HR  Atrial fibrillation with rapid ventricular response  Minimal voltage criteria for LVH, may be normal variant ( Sokolow-Elaine )  ST & Marked T wave abnormality, consider anterolateral ischemia  Confirmed by ANGELA RECINOS ([id number]) on 1/24/2024 6:18:13 AM (01-23-24 @ 14:30)      TRANSTHORACIC ECHOCARDIOGRAM REPORT  ________________________________________________________________________________                                      _______       Pt. Name:       MARU MOYA Study Date:    1/19/2024  MRN:            [id number]           YOB: 1931  Accession #:    [id number]          Age:           89 years  Account#:       [id number]         Gender:        F  Heart Rate:                        Height:        ( )  Rhythm:                            Weight:        ( )  Blood Pressure: 137/89 mmHg        BSA/BMI:       /  ________________________________________________________________________________________  Referring Physician:    [id number] Doroteo Hernandez  Interpreting Physician: Poly Sams MD  Primary Sonographer:    Edwin Ochoa    CPT:               ECHO TTE WO CON COMP W DOPP - 98996.m  Indication(s):     Heart failure, unspecified - I50.9  Procedure:         Transthoracic echocardiogram with 2-D, M-mode and complete        spectral and color flow Doppler.  Ordering Location: [facility]  Admission Status:  Inpatient  Study Information: Image quality for this study is technically difficult.    _______________________________________________________________________________________     CONCLUSIONS:      1. Technically difficult image quality.   2. Left ventricular systolic function is moderately decreased with an ejection fraction visually estimated at 35 to 40 %. Regional wall motion abnormalities present.   3. Multiple segmental abnormalities exist. See findings.   4. Normal right ventricular cavity size and probably normal systolic function.   5. The left atrium is moderately dilated.   6. The right atrium is normal in size.   7. Tricuspid aortic valve with normal leaflet excursion. There is mild calcification of the aortic valve leaflets.   8. Mild aortic regurgitation.   9. Mild mitral valve leaflet calcification.  10. Moderate mitral regurgitation.  11. Mild tricuspid regurgitation.  12. Trace pulmonic regurgitation.  13. The inferior vena cava is normal in size measuring 1.84 cm in diameter, (normal <2.1cm) with normal inspiratory collapse (normal >50%) consistent with normal right atrial pressure (~3, range 0-5mmHg).  14. Estimated pulmonary artery systolic pressure is 33 mmHg.    ________________________________________________________________________________________  FINDINGS:     Left Ventricle:  Left ventricular systolic function is moderately decreased with an ejection fraction visually estimated at 35 to 40%. There are regional wall motion abnormalities present.  LV Wall Scoring: The entire apex is akinetic. The basal and mid anterior wall,  basal and mid anterior septum, basal and mid inferior septum, basal and mid  inferior wall, andbasal and mid inferolateral wall are hypokinetic. All  remaining scored segments are normal.          Right Ventricle:  The right ventricular cavity is normal in size and probably normal systolic function. Tricuspid annular plane systolic excursion (TAPSE) is 1.9 cm (normal >=1.7 cm).     Left Atrium:  The left atrium is moderately dilated.     Right Atrium:  The right atrium is normal in size.     Aortic Valve:  The aortic valve is tricuspid with normal leaflet excursion. There is mild calcification of the aortic valve leaflets. There is mild aortic regurgitation.     Mitral Valve:  There is mild leaflet calcification. There is moderate mitral regurgitation.     Tricuspid Valve:  There is mild tricuspid regurgitation. Estimated pulmonary artery systolic pressure is 33 mmHg.     Pulmonic Valve:  There is trace pulmonic regurgitation.     Aorta:  The aortic root at the sinuses of Valsalva is normal in size. The ascending aorta diameter is normal in size.     Systemic Veins:  The inferior vena cava is normal in size measuring 1.84 cm in diameter, (normal <2.1cm) with normal inspiratory collapse (normal >50%) consistent with normal right atrial pressure (~3, range 0-5mmHg).  ____________________________________________________________________  QUANTITATIVE DATA:  Left Ventricle Measurements: (Indexed to BSA)     IVSd (2D):   1.2 cm  LVPWd (2D):  1.0 cm  LVIDd (2D):  4.3 cm  LVIDs (2D):  3.3 cm  LV Mass:     160 g  Visualized LV EF%: 35 to 40%     MV E Vmax:    1.02 m/s  MV A Vmax:0.62 m/s  MV E/A:       1.63  e' lateral:   7.07 cm/s  e' medial:    4.57 cm/s  E/e' lateral: 14.43  E/e' medial:  22.32  E/e' Average: 17.53  MV DT:        144 msec    Aorta Measurements: (normal range) (Indexed to BSA)     Sinuses of Valsalva: 2.90cm (2.7 - 3.3 cm)  Ao Asc prox:         2.70 cm       Left Atrium Measurements: (Indexed to BSA)  LA Diam 2D: 4.00 cm    Right Ventricle Measurements:     TAPSE:            1.9 cm  RV Base (RVID1):  2.7 cm  RV Mid (RVID2):   2.4 cm  RV Major (RVID3):5.8 cm       LVOT / RVOT/ Qp/Qs Data: (Indexed to BSA)  LVOT Diameter: 1.80 cm    Aortic Valve Measurements:  AR Vmax  3.51 m/s  AR PHT   356 msec  AR Russell 2.89 m/s²    Mitral Valve Measurements:     MV E Vmax: 1.0 m/s         MR Vmax:          4.84m/s  MV A Vmax: 0.6 m/s         MR VTI:           127.00 cm  MV E/A:    1.6             MR Mean Gradient: 53.0 mmHg                             MR Peak Gradient: 93.7 mmHg       Tricuspid Valve Measurements:     TR Vmax:          2.8 m/s  TR Peak Gradient: 30.5 mmHg  RA Pressure:      3 mmHg  PASP:             33 mmHg    ________________________________________________________________________________________  Electronically signed on 1/20/2024 at 3:31:24 PM by Poly Sams MD         *** Final ***

## 2024-01-28 ENCOUNTER — TRANSCRIPTION ENCOUNTER (OUTPATIENT)
Age: 89
End: 2024-01-28

## 2024-01-28 DIAGNOSIS — R33.8 OTHER RETENTION OF URINE: ICD-10-CM

## 2024-01-28 LAB
ANION GAP SERPL CALC-SCNC: 6 MMOL/L — SIGNIFICANT CHANGE UP (ref 5–17)
BUN SERPL-MCNC: 36 MG/DL — HIGH (ref 7–23)
CALCIUM SERPL-MCNC: 8 MG/DL — LOW (ref 8.5–10.1)
CHLORIDE SERPL-SCNC: 111 MMOL/L — HIGH (ref 96–108)
CO2 SERPL-SCNC: 21 MMOL/L — LOW (ref 22–31)
CREAT SERPL-MCNC: 1.5 MG/DL — HIGH (ref 0.5–1.3)
EGFR: 32 ML/MIN/1.73M2 — LOW
GLUCOSE SERPL-MCNC: 168 MG/DL — HIGH (ref 70–99)
HCT VFR BLD CALC: 32.6 % — LOW (ref 34.5–45)
HGB BLD-MCNC: 10.3 G/DL — LOW (ref 11.5–15.5)
MAGNESIUM SERPL-MCNC: 2 MG/DL — SIGNIFICANT CHANGE UP (ref 1.6–2.6)
MCHC RBC-ENTMCNC: 30.7 PG — SIGNIFICANT CHANGE UP (ref 27–34)
MCHC RBC-ENTMCNC: 31.6 GM/DL — LOW (ref 32–36)
MCV RBC AUTO: 97.3 FL — SIGNIFICANT CHANGE UP (ref 80–100)
NRBC # BLD: 0 /100 WBCS — SIGNIFICANT CHANGE UP (ref 0–0)
PHOSPHATE SERPL-MCNC: 4.2 MG/DL — SIGNIFICANT CHANGE UP (ref 2.5–4.5)
PLATELET # BLD AUTO: 420 K/UL — HIGH (ref 150–400)
POTASSIUM SERPL-MCNC: 3.9 MMOL/L — SIGNIFICANT CHANGE UP (ref 3.5–5.3)
POTASSIUM SERPL-SCNC: 3.9 MMOL/L — SIGNIFICANT CHANGE UP (ref 3.5–5.3)
RBC # BLD: 3.35 M/UL — LOW (ref 3.8–5.2)
RBC # FLD: 12.6 % — SIGNIFICANT CHANGE UP (ref 10.3–14.5)
SODIUM SERPL-SCNC: 138 MMOL/L — SIGNIFICANT CHANGE UP (ref 135–145)
WBC # BLD: 10.07 K/UL — SIGNIFICANT CHANGE UP (ref 3.8–10.5)
WBC # FLD AUTO: 10.07 K/UL — SIGNIFICANT CHANGE UP (ref 3.8–10.5)

## 2024-01-28 PROCEDURE — 99232 SBSQ HOSP IP/OBS MODERATE 35: CPT

## 2024-01-28 RX ORDER — METOPROLOL TARTRATE 50 MG
1 TABLET ORAL
Qty: 0 | Refills: 0 | DISCHARGE

## 2024-01-28 RX ORDER — HYDRALAZINE HCL 50 MG
1 TABLET ORAL
Refills: 0 | DISCHARGE

## 2024-01-28 RX ORDER — TAMSULOSIN HYDROCHLORIDE 0.4 MG/1
1 CAPSULE ORAL
Qty: 0 | Refills: 0 | DISCHARGE
Start: 2024-01-28

## 2024-01-28 RX ORDER — HYDRALAZINE HCL 50 MG
3 TABLET ORAL
Qty: 0 | Refills: 0 | DISCHARGE
Start: 2024-01-28

## 2024-01-28 RX ORDER — LOSARTAN POTASSIUM 100 MG/1
1 TABLET, FILM COATED ORAL
Qty: 0 | Refills: 0 | DISCHARGE
Start: 2024-01-28

## 2024-01-28 RX ORDER — METOPROLOL TARTRATE 50 MG
1 TABLET ORAL
Qty: 0 | Refills: 0 | DISCHARGE
Start: 2024-01-28

## 2024-01-28 RX ORDER — ACETAMINOPHEN 500 MG
2 TABLET ORAL
Qty: 0 | Refills: 0 | DISCHARGE
Start: 2024-01-28

## 2024-01-28 RX ORDER — ASPIRIN/CALCIUM CARB/MAGNESIUM 324 MG
81 TABLET ORAL DAILY
Refills: 0 | Status: DISCONTINUED | OUTPATIENT
Start: 2024-01-28 | End: 2024-01-29

## 2024-01-28 RX ORDER — FLUTICASONE PROPIONATE 50 MCG
1 SPRAY, SUSPENSION NASAL
Qty: 0 | Refills: 0 | DISCHARGE
Start: 2024-01-28

## 2024-01-28 RX ORDER — LANOLIN ALCOHOL/MO/W.PET/CERES
1 CREAM (GRAM) TOPICAL
Qty: 0 | Refills: 0 | DISCHARGE
Start: 2024-01-28

## 2024-01-28 RX ORDER — CIPROFLOXACIN LACTATE 400MG/40ML
1 VIAL (ML) INTRAVENOUS
Qty: 0 | Refills: 0 | DISCHARGE
Start: 2024-01-28

## 2024-01-28 RX ORDER — LOSARTAN POTASSIUM 100 MG/1
1 TABLET, FILM COATED ORAL
Qty: 0 | Refills: 0 | DISCHARGE

## 2024-01-28 RX ADMIN — Medication 100 MILLIGRAM(S): at 17:15

## 2024-01-28 RX ADMIN — TAMSULOSIN HYDROCHLORIDE 0.4 MILLIGRAM(S): 0.4 CAPSULE ORAL at 21:28

## 2024-01-28 RX ADMIN — Medication 500 MILLIGRAM(S): at 11:17

## 2024-01-28 RX ADMIN — Medication 100 MILLIGRAM(S): at 05:10

## 2024-01-28 RX ADMIN — Medication 1 SPRAY(S): at 17:15

## 2024-01-28 RX ADMIN — ATORVASTATIN CALCIUM 40 MILLIGRAM(S): 80 TABLET, FILM COATED ORAL at 21:28

## 2024-01-28 RX ADMIN — Medication 75 MILLIGRAM(S): at 13:19

## 2024-01-28 RX ADMIN — Medication 75 MILLIGRAM(S): at 05:11

## 2024-01-28 RX ADMIN — Medication 1 SPRAY(S): at 05:10

## 2024-01-28 RX ADMIN — Medication 10 MILLIGRAM(S): at 05:11

## 2024-01-28 RX ADMIN — HEPARIN SODIUM 5000 UNIT(S): 5000 INJECTION INTRAVENOUS; SUBCUTANEOUS at 13:20

## 2024-01-28 RX ADMIN — LOSARTAN POTASSIUM 25 MILLIGRAM(S): 100 TABLET, FILM COATED ORAL at 05:11

## 2024-01-28 RX ADMIN — HEPARIN SODIUM 5000 UNIT(S): 5000 INJECTION INTRAVENOUS; SUBCUTANEOUS at 21:28

## 2024-01-28 RX ADMIN — CLOPIDOGREL BISULFATE 75 MILLIGRAM(S): 75 TABLET, FILM COATED ORAL at 11:17

## 2024-01-28 RX ADMIN — HEPARIN SODIUM 5000 UNIT(S): 5000 INJECTION INTRAVENOUS; SUBCUTANEOUS at 05:10

## 2024-01-28 RX ADMIN — Medication 5 MILLIGRAM(S): at 21:29

## 2024-01-28 RX ADMIN — Medication 81 MILLIGRAM(S): at 21:28

## 2024-01-28 RX ADMIN — Medication 75 MILLIGRAM(S): at 21:28

## 2024-01-28 NOTE — PROGRESS NOTE ADULT - SUBJECTIVE AND OBJECTIVE BOX
[facility] -- Ricky Agarwal, Ramsey Recinos Savella, Ady Sams  Office # [phone]    Follow Up: HFrEF, Rhabdo, Elevated Troponins, poss. NSTEMI, A fib RVR    Subjective/Observations: seen and examined, awake, alert, resting in bed, denies chest pain, dyspnea, palpitations or dizziness, orthopnea and PND.  Tolerating RA.     REVIEW OF SYSTEMS: All other review of systems is negative unless indicated above  PAST MEDICAL & SURGICAL HISTORY:  Essential hypertension      Dyslipidemia      Hyponatremia  ON HCTZ , h/o Hypokelemia      Cerebrovascular accident (CVA), unspecified mechanism      Edema, unspecified type      Mitral valve insufficiency, unspecified etiology  Cardiac cath on 11/21/18      Basal cell carcinoma      Renal artery stenosis  right side, stent      CHF (congestive heart failure)      Inguinal hernia, right      Blood pressure instability      S/P Mohs surgery for basal cell carcinoma      Status post hysterectomy  and bladder lift with mesh 1990s      History of cholecystectomy  1980      H/O bilateral hip replacements  2013, 2014      Other elective surgery  right renal artery stent      S/P colonoscopy        MEDICATIONS  (STANDING):  atorvastatin 40 milliGRAM(s) Oral at bedtime  bisacodyl 5 milliGRAM(s) Oral at bedtime  ciprofloxacin     Tablet 500 milliGRAM(s) Oral daily  clopidogrel Tablet 75 milliGRAM(s) Oral daily  fluticasone propionate 50 MICROgram(s)/spray Nasal Spray 1 Spray(s) Both Nostrils two times a day  heparin   Injectable 5000 Unit(s) SubCutaneous every 8 hours  hydrALAZINE 75 milliGRAM(s) Oral every 8 hours  influenza  Vaccine (HIGH DOSE) 0.7 milliLiter(s) IntraMuscular once  losartan 25 milliGRAM(s) Oral daily  metoprolol succinate  milliGRAM(s) Oral every 12 hours  tamsulosin 0.4 milliGRAM(s) Oral at bedtime  torsemide 10 milliGRAM(s) Oral daily    MEDICATIONS  (PRN):  acetaminophen     Tablet .. 650 milliGRAM(s) Oral every 6 hours PRN Mild Pain (1 - 3)  benzonatate 100 milliGRAM(s) Oral every 8 hours PRN Cough  melatonin 3 milliGRAM(s) Oral at bedtime PRN Insomnia    Allergies    IV Contrast (Pruritus; Rash)  verapamil (Other)  Keflex (Other)    Intolerances      Vital Signs Last 24 Hrs  T(C): 36.7 (28 Jan 2024 09:43), Max: 36.7 (28 Jan 2024 09:43)  T(F): 98 (28 Jan 2024 09:43), Max: 98 (28 Jan 2024 09:43)  HR: 72 (28 Jan 2024 09:43) (72 - 99)  BP: 130/64 (28 Jan 2024 09:43) (126/60 - 162/72)  BP(mean): --  RR: 18 (28 Jan 2024 05:02) (18 - 18)  SpO2: 94% (28 Jan 2024 09:43) (92% - 94%)    Parameters below as of 28 Jan 2024 09:43  Patient On (Oxygen Delivery Method): room air      I&O's Summary    27 Jan 2024 07:01  -  28 Jan 2024 07:00  --------------------------------------------------------  IN: 210 mL / OUT: 1050 mL / NET: -840 mL    TELE: Not on telemetry   PHYSICAL EXAM:  Constitutional: NAD, awake and alert  HEENT: Moist Mucous Membranes, Anicteric  Pulmonary: Non-labored, breath sounds are clear bilaterally, No wheezing, rales or rhonchi  Cardiovascular: Regular, S1 and S2, No murmurs, rubs, gallops or clicks  Gastrointestinal: Bowel Sounds present, soft, nontender.   Lymph: No peripheral edema. No lymphadenopathy.  Skin: No visible rashes or ulcers.  Psych:  Mood & affect appropriate  LABS: All Labs Reviewed:                        10.3   10.07 )-----------( 420      ( 28 Jan 2024 07:32 )             32.6                         10.4   8.41  )-----------( 438      ( 27 Jan 2024 07:46 )             33.3                         11.4   9.80  )-----------( 488      ( 26 Jan 2024 11:20 )             35.6     28 Jan 2024 07:32    138    |  111    |  36     ----------------------------<  168    3.9     |  21     |  1.50   27 Jan 2024 07:46    137    |  112    |  41     ----------------------------<  123    4.5     |  20     |  1.40   26 Jan 2024 11:20    139    |  111    |  42     ----------------------------<  163    5.0     |  24     |  1.50     Ca    8.0        28 Jan 2024 07:32  Ca    8.3        27 Jan 2024 07:46  Ca    8.7        26 Jan 2024 11:20  Phos  4.2       28 Jan 2024 07:32  Phos  3.7       27 Jan 2024 07:46  Mg     2.0       28 Jan 2024 07:32  Mg     2.0       27 Jan 2024 07:46  Mg     2.2       26 Jan 2024 11:20    TPro  4.9    /  Alb  2.0    /  TBili  0.4    /  DBili  x      /  AST  41     /  ALT  49     /  AlkPhos  123    27 Jan 2024 07:46  TPro  5.4    /  Alb  2.2    /  TBili  0.4    /  DBili  x      /  AST  57     /  ALT  60     /  AlkPhos  142    26 Jan 2024 11:20          12 Lead ECG:   Ventricular Rate 147 BPM    QRS Duration 72 ms    Q-T Interval 314 ms    QTC Calculation(Bazett) 491 ms    R Axis 40 degrees    T Axis 241 degrees    Diagnosis Line *** Critical Test Result: High HR  Atrial fibrillation with rapid ventricular response  Minimal voltage criteria for LVH, may be normal variant ( Sokolow-Elaine )  ST & Marked T wave abnormality, consider anterolateral ischemia  Confirmed by ANGELA RECINOS ([id number]) on 1/24/2024 6:18:13 AM (01-23-24 @ 14:30)      TRANSTHORACIC ECHOCARDIOGRAM REPORT  ________________________________________________________________________________                                      _______       Pt. Name:       MARU MOYA Study Date:    1/19/2024  MRN:            [id number]           YOB: 1931  Accession #:    [id number]          Age:           89 years  Account#:       [id number]         Gender:        F  Heart Rate:                        Height:        ( )  Rhythm:                            Weight:        ( )  Blood Pressure: 137/89 mmHg        BSA/BMI:       /  ________________________________________________________________________________________  Referring Physician:    [id number] Doroteo Hernandez  Interpreting Physician: Poly Sams MD  Primary Sonographer:    Edwin Ochoa    CPT:               ECHO TTE WO CON COMP W DOPP - 86204.m  Indication(s):     Heart failure, unspecified - I50.9  Procedure:         Transthoracic echocardiogram with 2-D, M-mode and complete        spectral and color flow Doppler.  Ordering Location: [facility]  Admission Status:  Inpatient  Study Information: Image quality for this study is technically difficult.    _______________________________________________________________________________________     CONCLUSIONS:      1. Technically difficult image quality.   2. Left ventricular systolic function is moderately decreased with an ejection fraction visually estimated at 35 to 40 %. Regional wall motion abnormalities present.   3. Multiple segmental abnormalities exist. See findings.   4. Normal right ventricular cavity size and probably normal systolic function.   5. The left atrium is moderately dilated.   6. The right atrium is normal in size.   7. Tricuspid aortic valve with normal leaflet excursion. There is mild calcification of the aortic valve leaflets.   8. Mild aortic regurgitation.   9. Mild mitral valve leaflet calcification.  10. Moderate mitral regurgitation.  11. Mild tricuspid regurgitation.  12. Trace pulmonic regurgitation.  13. The inferior vena cava is normal in size measuring 1.84 cm in diameter, (normal <2.1cm) with normal inspiratory collapse (normal >50%) consistent with normal right atrial pressure (~3, range 0-5mmHg).  14. Estimated pulmonary artery systolic pressure is 33 mmHg.    ________________________________________________________________________________________  FINDINGS:     Left Ventricle:  Left ventricular systolic function is moderately decreased with an ejection fraction visually estimated at 35 to 40%. There are regional wall motion abnormalities present.  LV Wall Scoring: The entire apex is akinetic. The basal and mid anterior wall,  basal and mid anterior septum, basal and mid inferior septum, basal and mid  inferior wall, andbasal and mid inferolateral wall are hypokinetic. All  remaining scored segments are normal.          Right Ventricle:  The right ventricular cavity is normal in size and probably normal systolic function. Tricuspid annular plane systolic excursion (TAPSE) is 1.9 cm (normal >=1.7 cm).     Left Atrium:  The left atrium is moderately dilated.     Right Atrium:  The right atrium is normal in size.     Aortic Valve:  The aortic valve is tricuspid with normal leaflet excursion. There is mild calcification of the aortic valve leaflets. There is mild aortic regurgitation.     Mitral Valve:  There is mild leaflet calcification. There is moderate mitral regurgitation.     Tricuspid Valve:  There is mild tricuspid regurgitation. Estimated pulmonary artery systolic pressure is 33 mmHg.     Pulmonic Valve:  There is trace pulmonic regurgitation.     Aorta:  The aortic root at the sinuses of Valsalva is normal in size. The ascending aorta diameter is normal in size.     Systemic Veins:  The inferior vena cava is normal in size measuring 1.84 cm in diameter, (normal <2.1cm) with normal inspiratory collapse (normal >50%) consistent with normal right atrial pressure (~3, range 0-5mmHg).  ____________________________________________________________________  QUANTITATIVE DATA:  Left Ventricle Measurements: (Indexed to BSA)     IVSd (2D):   1.2 cm  LVPWd (2D):  1.0 cm  LVIDd (2D):  4.3 cm  LVIDs (2D):  3.3 cm  LV Mass:     160 g  Visualized LV EF%: 35 to 40%     MV E Vmax:    1.02 m/s  MV A Vmax:0.62 m/s  MV E/A:       1.63  e' lateral:   7.07 cm/s  e' medial:    4.57 cm/s  E/e' lateral: 14.43  E/e' medial:  22.32  E/e' Average: 17.53  MV DT:        144 msec    Aorta Measurements: (normal range) (Indexed to BSA)     Sinuses of Valsalva: 2.90cm (2.7 - 3.3 cm)  Ao Asc prox:         2.70 cm       Left Atrium Measurements: (Indexed to BSA)  LA Diam 2D: 4.00 cm    Right Ventricle Measurements:     TAPSE:            1.9 cm  RV Base (RVID1):  2.7 cm  RV Mid (RVID2):   2.4 cm  RV Major (RVID3):5.8 cm       LVOT / RVOT/ Qp/Qs Data: (Indexed to BSA)  LVOT Diameter: 1.80 cm    Aortic Valve Measurements:  AR Vmax  3.51 m/s  AR PHT   356 msec  AR Chaves 2.89 m/s²    Mitral Valve Measurements:     MV E Vmax: 1.0 m/s         MR Vmax:          4.84m/s  MV A Vmax: 0.6 m/s         MR VTI:           127.00 cm  MV E/A:    1.6             MR Mean Gradient: 53.0 mmHg                             MR Peak Gradient: 93.7 mmHg       Tricuspid Valve Measurements:     TR Vmax:          2.8 m/s  TR Peak Gradient: 30.5 mmHg  RA Pressure:      3 mmHg  PASP:             33 mmHg    ________________________________________________________________________________________  Electronically signed on 1/20/2024 at 3:31:24 PM by Poly Sams MD         *** Final ***

## 2024-01-28 NOTE — PROGRESS NOTE ADULT - PROBLEM SELECTOR PLAN 8
- Chronic.  - SBP up to 160s yesterday, now improved  - Continue hydralazine 75mg q8h  - Re-start home losartan only 25mg for now (home 50mg)   - Continue home torsemide  - Continue to Hold clonidine.   - Monitor BP.

## 2024-01-28 NOTE — PROGRESS NOTE ADULT - PROBLEM SELECTOR PROBLEM 4
CVA (cerebrovascular accident)
CVA (cerebrovascular accident)
TERESA (acute kidney injury)
Transaminitis
CVA (cerebrovascular accident)
CVA (cerebrovascular accident)
TERESA (acute kidney injury)
CVA (cerebrovascular accident)

## 2024-01-28 NOTE — PROGRESS NOTE ADULT - PROBLEM SELECTOR PROBLEM 9
Paroxysmal atrial fibrillation
Paroxysmal atrial fibrillation
CVA (cerebrovascular accident)
Paroxysmal atrial fibrillation
CVA (cerebrovascular accident)

## 2024-01-28 NOTE — PROGRESS NOTE ADULT - PROBLEM SELECTOR PLAN 10
DVT ppx:  continue heparin SC.    Dispo: possible discharge Monday, pending bed available for [facility].

## 2024-01-28 NOTE — PROGRESS NOTE ADULT - ATTENDING COMMENTS
patient seen at bedside  feeling overall better since kwan replacement yesterday. had 650 cc output after insertion.   on RA  BP improved     A/P:  NSTEMI     - cardio following. plan for medical management.      - ECHO with LVEF 35-40% with SWMA. eventual cath however not a candidate for intervention at this time given age and TERESA.      - cont hydralazine 75mg q8h and plavix      - toprol xl 100mg BID      - AC deferred at this time given anemia and poor long term candidate.      - clonidine discontinued      - restarted back on torsemide 10mg daily and losartan 25mg daily.      - will need outpatient cardiac workup outpt per discussion with cardio.     pseudomonas UTI     - ID following    - abx changed to cipro     Acute on chronic renal disease    - Cr improved.      - cont torsemide and losartan    urinary retention    - kwan from 1/21 to 1/25. still with episodes of retention.     - d/w urology Dr Allen (covering for Dr Molina). will replace kwan. Follow up with urology outpatient once ambulatory.     - cont flomax.     continue PT   DVT proph: heparin 5000 units TID     discharge planning to [facility] . spoke with family at bedside. in agreement with [facility]. working on 24h aide coverage.

## 2024-01-28 NOTE — DISCHARGE NOTE PROVIDER - PROVIDER TOKENS
PROVIDER:[TOKEN:[[id number]],FOLLOWUP:[2 weeks]] PROVIDER:[TOKEN:[[id number]],FOLLOWUP:[2 weeks]],PROVIDER:[TOKEN:[[id number]],FOLLOWUP:[2 weeks]],PROVIDER:[TOKEN:[[id number]],FOLLOWUP:[2 weeks]] PROVIDER:[TOKEN:[[id number]],FOLLOWUP:[2 weeks]],PROVIDER:[TOKEN:[[id number]],FOLLOWUP:[2 weeks]],PROVIDER:[TOKEN:[[id number]],FOLLOWUP:[2 weeks]],PROVIDER:[TOKEN:[[id number]],FOLLOWUP:[1 week]] PROVIDER:[TOKEN:[[id number]],FOLLOWUP:[2 weeks]],PROVIDER:[TOKEN:[[id number]],FOLLOWUP:[2 weeks]],PROVIDER:[TOKEN:[[id number]:[id number]],FOLLOWUP:[2 weeks]]

## 2024-01-28 NOTE — PROGRESS NOTE ADULT - PROBLEM SELECTOR PLAN 3
- Patient with sepsis POA 2/2 to UTI (Cystitis?)   - UCx with 100K CFU Pseudomonas  - Switch CTX to Zosyn (1/22-24), transitioned to cipro po.   - Continue cipro with last day 1/29.
- CK improved
- CK improved  - Holding home statin
- Patient with sepsis POA 2/2 to UTI (Cystitis?)   - UCx with 100K CFU Pseudomonas  - Switch CTX to Zosyn (1/22-24), transitioned to cipro po.   - Continue cipro with last day 1/29.

## 2024-01-28 NOTE — PROGRESS NOTE ADULT - SUBJECTIVE AND OBJECTIVE BOX
Patient is a 89y old  Female who presents with a chief complaint of NSTEMI, Rhabdomyolysis (28 Jan 2024 10:21)      INTERVAL HPI/OVERNIGHT EVENTS: Patient seen and examined at bedside. Sanders re-inserted 2/2 PVR >300cc. Restarted on home losartan. Patient has no complaints at this time. Denies fevers, chills, headache, lightheadedness, chest pain, dyspnea, abdominal pain, n/v/d/c.    MEDICATIONS  (STANDING):  atorvastatin 40 milliGRAM(s) Oral at bedtime  bisacodyl 5 milliGRAM(s) Oral at bedtime  ciprofloxacin     Tablet 500 milliGRAM(s) Oral daily  clopidogrel Tablet 75 milliGRAM(s) Oral daily  fluticasone propionate 50 MICROgram(s)/spray Nasal Spray 1 Spray(s) Both Nostrils two times a day  heparin   Injectable 5000 Unit(s) SubCutaneous every 8 hours  hydrALAZINE 75 milliGRAM(s) Oral every 8 hours  influenza  Vaccine (HIGH DOSE) 0.7 milliLiter(s) IntraMuscular once  losartan 25 milliGRAM(s) Oral daily  metoprolol succinate  milliGRAM(s) Oral every 12 hours  tamsulosin 0.4 milliGRAM(s) Oral at bedtime  torsemide 10 milliGRAM(s) Oral daily    MEDICATIONS  (PRN):  acetaminophen     Tablet .. 650 milliGRAM(s) Oral every 6 hours PRN Mild Pain (1 - 3)  benzonatate 100 milliGRAM(s) Oral every 8 hours PRN Cough  melatonin 3 milliGRAM(s) Oral at bedtime PRN Insomnia      Allergies    IV Contrast (Pruritus; Rash)  verapamil (Other)  Keflex (Other)    Intolerances        REVIEW OF SYSTEMS:  CONSTITUTIONAL: No fever or chills  HEENT:  No headache, no sore throat  RESPIRATORY: No cough, wheezing, or shortness of breath  CARDIOVASCULAR: No chest pain, palpitations  GASTROINTESTINAL: No abd pain, nausea, vomiting, or diarrhea  GENITOURINARY: No dysuria, frequency, or hematuria  NEUROLOGICAL: no focal weakness or dizziness  MUSCULOSKELETAL: no myalgias     Vital Signs Last 24 Hrs  T(C): 36.7 (28 Jan 2024 09:43), Max: 36.7 (28 Jan 2024 09:43)  T(F): 98 (28 Jan 2024 09:43), Max: 98 (28 Jan 2024 09:43)  HR: 72 (28 Jan 2024 09:43) (72 - 99)  BP: 130/64 (28 Jan 2024 09:43) (126/60 - 162/72)  BP(mean): --  RR: 18 (28 Jan 2024 05:02) (18 - 18)  SpO2: 94% (28 Jan 2024 09:43) (92% - 94%)    Parameters below as of 28 Jan 2024 09:43  Patient On (Oxygen Delivery Method): room air        PHYSICAL EXAM:  GENERAL: NAD, lying in bed comfortably  HEAD:  Atraumatic, Normocephalic  EYES: EOMI, PERRLA, conjunctiva and sclera clear  ENT: Moist oral mucous   NECK: Supple, No JVD  CHEST/LUNG: Clear to auscultation bilaterally  HEART: S1,S2. Regular rate and rhythm.   ABDOMEN: Bowel sounds present. Soft, nontender, nondistended.   EXTREMITIES:  No cyanosis, or edema  NERVOUS SYSTEM:  Alert, answer questions properly  SKIN: Warm.       LABS:                        10.3   10.07 )-----------( 420      ( 28 Jan 2024 07:32 )             32.6     CBC Full  -  ( 28 Jan 2024 07:32 )  WBC Count : 10.07 K/uL  Hemoglobin : 10.3 g/dL  Hematocrit : 32.6 %  Platelet Count - Automated : 420 K/uL  Mean Cell Volume : 97.3 fl  Mean Cell Hemoglobin : 30.7 pg  Mean Cell Hemoglobin Concentration : 31.6 gm/dL  Auto Neutrophil # : x  Auto Lymphocyte # : x  Auto Monocyte # : x  Auto Eosinophil # : x  Auto Basophil # : x  Auto Neutrophil % : x  Auto Lymphocyte % : x  Auto Monocyte % : x  Auto Eosinophil % : x  Auto Basophil % : x    28 Jan 2024 07:32    138    |  111    |  36     ----------------------------<  168    3.9     |  21     |  1.50     Ca    8.0        28 Jan 2024 07:32  Phos  4.2       28 Jan 2024 07:32  Mg     2.0       28 Jan 2024 07:32        Urinalysis Basic - ( 28 Jan 2024 07:32 )    Color: x / Appearance: x / SG: x / pH: x  Gluc: 168 mg/dL / Ketone: x  / Bili: x / Urobili: x   Blood: x / Protein: x / Nitrite: x   Leuk Esterase: x / RBC: x / WBC x   Sq Epi: x / Non Sq Epi: x / Bacteria: x      CAPILLARY BLOOD GLUCOSE              RADIOLOGY & ADDITIONAL TESTS:    Personally reviewed.     Consultant(s) Notes Reviewed:  [x] YES  [ ] NO

## 2024-01-28 NOTE — PROGRESS NOTE ADULT - PROBLEM SELECTOR PROBLEM 6
Transaminitis
Chronic systolic heart failure
Fall
Transaminitis
Chronic systolic heart failure
Fall
Transaminitis
Fall
Transaminitis
Transaminitis

## 2024-01-28 NOTE — PROGRESS NOTE ADULT - PROBLEM SELECTOR PLAN 6
- Improved
- TTE: EF 35-40%, regional wall motion abnormalities, normal RV,    LA is moderately dilated. Mild AR and TR, MR  - Currently euvolemic.   - Continue Toprol 100mg q12  - Continue home torsemide 10mg daily.   - Continue losartan 25mg daily (home dose 50mg daily), monitor BP.   - Cardiology following and recommending consideration for ischemic work up as outpatient
- Improved
- TTE: EF 35-40%, regional wall motion abnormalities, normal RV,    LA is moderately dilated. Mild AR and TR, MR  - Currently euvolemic.   - Continue Toprol 100mg q12  - Continue home torsemide 10mg qd.   - Re-start home losartan, low dose for now and monitor BP.   - Cardiology following and recommending consideration for ischemic work up as outpatient

## 2024-01-28 NOTE — PROGRESS NOTE ADULT - PROBLEM SELECTOR PLAN 4
- Continue with home Plavix  - Started high dose statin here
- Continue with home Plavix  - Holding home statin due to rhabdo
- Continue with home Plavix  - Started high dose statin here
Resolved.   - Cr peak in 2.1 and today 1.5.    - Multifactorial in the setting of sepsis, rhabdo, urinary retention.   - Normal lytes.   - Continue to monitor.
- Continue with home Plavix  - Started high dose statin here
- Continue with home Plavix  - Started high dose statin here
Resolved.   - Cr peak in 2.1 and today 1.4.    - Multifactorial in the setting of sepsis, rhabdo, urinary retention.   - Normal lytes.   - Continue to monitor.

## 2024-01-28 NOTE — DISCHARGE NOTE PROVIDER - NSDCCPCAREPLAN_GEN_ALL_CORE_FT
PRINCIPAL DISCHARGE DIAGNOSIS  Diagnosis: NSTEMI (non-ST elevation myocardial infarction)  Assessment and Plan of Treatment:       SECONDARY DISCHARGE DIAGNOSES  Diagnosis: Chronic systolic heart failure  Assessment and Plan of Treatment:     Diagnosis: Paroxysmal atrial fibrillation  Assessment and Plan of Treatment:     Diagnosis: Acute UTI  Assessment and Plan of Treatment:     Diagnosis: Syncope  Assessment and Plan of Treatment:      PRINCIPAL DISCHARGE DIAGNOSIS  Diagnosis: NSTEMI (non-ST elevation myocardial infarction)  Assessment and Plan of Treatment: You were seen by cardiology during admission.   ECHO showed new left ventricular dysfunction with wall motion abnormality. You were treated with medical management given concerns for acute renal injury and altered mental status.   Your clonidine was discontinued.   Your hydralazine was increased to 75mg three times a day  Your torsemide 10mg was restarted.   Your losartan was restarted but at a lower dose of 25mg daily   Your metoprolol was increased to 100mg twice a day.   You were mantained on asa and plavix  continued your statin  Please follow up with your primary cardiologist. Will need eventual ischemic work up.      SECONDARY DISCHARGE DIAGNOSES  Diagnosis: Acute UTI  Assessment and Plan of Treatment: You were treated for pseudomonas UTI.   Please continue cipro until 1/29    Diagnosis: Acute urinary retention  Assessment and Plan of Treatment: You were started on flomax 0.4mg daily.   You had a kwan catheter inserted and failed one attempt for trial of void.   Continue kwan catheter. please follow up with urology Dr Molina in the office.    Diagnosis: Paroxysmal atrial fibrillation  Assessment and Plan of Treatment: You had periods of atrial fibrillation with rapid ventricular rate. You were not started on anticoagulation therapy at this time given risk for fall and anemia.     PRINCIPAL DISCHARGE DIAGNOSIS  Diagnosis: NSTEMI (non-ST elevation myocardial infarction)  Assessment and Plan of Treatment: You were seen by cardiology during admission.   ECHO showed new left ventricular dysfunction with wall motion abnormality. You were treated with medical management given concerns for acute renal injury and altered mental status.   Your clonidine was discontinued.   Your hydralazine was increased to 75mg three times a day  Your torsemide 10mg was restarted.   Your losartan was restarted but at a lower dose of 25mg daily   Your metoprolol was increased to 100mg twice a day.   You were mantained on asa and plavix  continued your statin  STOP taking Clonidine  Please follow up with your primary cardiologist. Will need eventual ischemic work up.  Follow up with your PCP within 1 week of discharge      SECONDARY DISCHARGE DIAGNOSES  Diagnosis: Acute UTI  Assessment and Plan of Treatment: You were treated for pseudomonas UTI with antibiotic Cipro    Diagnosis: Paroxysmal atrial fibrillation  Assessment and Plan of Treatment: You had periods of atrial fibrillation with rapid ventricular rate. You were not started on anticoagulation therapy at this time given risk for fall and anemia.    Diagnosis: Acute urinary retention  Assessment and Plan of Treatment: You were started on flomax 0.4mg daily.   You had a kwan catheter inserted and failed one attempt for trial of void.   Continue kwan catheter. please follow up with urology Dr Molina in the office.

## 2024-01-28 NOTE — SOCIAL WORK PROGRESS NOTE - NSSWPROGRESSNOTE_GEN_ALL_CORE
MD requested julio speak with daughter Brittney bedside [phone]. She provided choices for [facility], [facility] and possibly [facility]. Daughter states she also has long term care policy and she is looking into using long term care once she transitions home. JULIO provided support and will refer to these additional facilities

## 2024-01-28 NOTE — DISCHARGE NOTE PROVIDER - HOSPITAL COURSE
HPI:  89 yo F with PMHx of HTN, HLD, Renal Artery Stenosis s/p stent, Basal Cell Carcinoma, CHFpEF CVA  presenting to ED s/p mechanical fall early last night. Pt states that she was standing upright in her kitchen when she tripped over "something" on the floor of her kitchen. Pt had an unwitnessed fall on her left side with head involvment, but adamantly denies LOC. States she was in pain and is generally weak so she was unable to get up without her walker throughout the night. Pt.'s daughter was unable to get rosetta contact with the patient, so she went to her house and found her lying on the floor. EMS was then called and the patient was transported to [facility] ED w/ cervical stabilization in a collar. Pt states she currently has neck and B/L knee pain L>R. Endorses increased neck pain before the fall as her arthritis has been "acting up since the weather got cold." States she last had a normal BM yesterday, last urinated without dysuria this afternoon. Denies headache, visual changes, palpitations, cp, abd pain, dysuria, urinary frequency, hip pain, fatigue, focal weakness, n/v/d/c, hematuria. Denies feeling ill recently although endorses having a friend who she recently met with who is sick with an URI.      ED Course:   Vitals: BP: 170/60, HR: 78, Temp: 97, RR: 16, SpO2: 96% on RA Labs:   WBC: 21.04, H/H 14.7/44.2, Platelet 580, Trop 861.2, CK 2986, Cr 1.40, BUN54, eGFR 35, AlkPhos 182, ,   UA: Pending collection   CXR: Heart is enlarged. Right upper quadrant clips are noted. No lung consolidation or layering effusion is evident. No visible fracture   CT Head/Neck: negative for acute intracranial pathology, hemorrhage, or fracture   EKG: artifact, repeat pending   Received in the ED: 1L LR Bolus   TTE: 3/2022 LVEF 60-65 %, normal LV funciton, contraction, LA mildly dilated, Mild to moderate AR,  Mild (1+) tricuspid valve regurgitation. EA reversal of the mitral inflow consistent with reduced compliance of the left ventricle.     (18 Jan 2024 17:07)      ---  HOSPITAL COURSE:     Admitted for rhabdomyolysis s/p mechanical fall. Was also found to have UTI, paroxysmal Afib, and new chronic HFrEF. XR and CT imaging ruled out any acute fractures. Was started on IVF and      Patient is stable for discharge as per primary medical team and consultants.    PT consulted, recommends discharge ______    Patient showed improvement throughout hospitalization. Patient was seen and examined on day of discharge. Patient was medically optimized for discharge with close outpatient follow up.        ---  CONSULTANTS:     ---  TIME SPENT:  I, the attending physician, was physically present for the key portions of the evaluation and management (E/M) service provided. The total amount of time spent reviewing the hospital notes, laboratory values, imaging findings, assessing/counseling the patient, discussing with consultant physicians, social work, nursing staff was -- minutes    ---  Primary care provider was made aware of plan for discharge:      [  ] NO     [  ] YES   HPI:  89 yo F with PMHx of HTN, HLD, Renal Artery Stenosis s/p stent, Basal Cell Carcinoma, CHFpEF CVA  presenting to ED s/p mechanical fall early last night. Pt states that she was standing upright in her kitchen when she tripped over "something" on the floor of her kitchen. Pt had an unwitnessed fall on her left side with head involvment, but adamantly denies LOC. States she was in pain and is generally weak so she was unable to get up without her walker throughout the night. Pt.'s daughter was unable to get rosetta contact with the patient, so she went to her house and found her lying on the floor. EMS was then called and the patient was transported to \[facility] [place] [facility]\"" ED w/ cervical stabilization in a collar. Pt states she currently has neck and B/L knee pain L>R. Endorses increased neck pain before the fall as her arthritis has been "acting up since the weather got cold." States she last had a normal BM yesterday, last urinated without dysuria this afternoon. Denies headache, visual changes, palpitations, cp, abd pain, dysuria, urinary frequency, hip pain, fatigue, focal weakness, n/v/d/c, hematuria. Denies feeling ill recently although endorses having a friend who she recently met with who is sick with an URI.      ED Course:   Vitals: BP: 170/60, HR: 78, Temp: 97, RR: 16, SpO2: 96% on RA Labs:   WBC: 21.04, H/H 14.7/44.2, Platelet 580, Trop 861.2, CK 2986, Cr 1.40, BUN54, eGFR 35, AlkPhos 182, ,   CXR: Heart is enlarged. Right upper quadrant clips are noted. No lung consolidation or layering effusion is evident. No visible fracture   CT Head/Neck: negative for acute intracranial pathology, hemorrhage, or fracture   EKG: artifact, repeat pending   Received in the ED: 1L LR Bolus   TTE: 3/2022 LVEF 60-65 %, normal LV function, contraction, LA mildly dilated, Mild to moderate AR,  Mild (1+) tricuspid valve regurgitation. EA reversal of the mitral inflow consistent with reduced compliance of the left ventricle.     (18 Jan 2024 17:07)    ---  HOSPITAL COURSE:     Admitted for rhabdomyolysis s/p mechanical fall. Was also found to have UTI, paroxysmal Afib, and new chronic HFrEF. XR and CT imaging ruled out any acute fractures. Was started on IVF and his home losartan and torsemide was held initially. Eventually restarted once her renal functions improved. She also had episodes of urinary retention. Was started on flomax. Failed TOV. Sanders reinserted on 1/27 with 650 cc output. Patient to follow up with her urologist Dr Molina outpatient for further management.     Patient had NSTEMI with new ECHO showing LVEF 35-40% and SWMA. Patient was seen by cardiology. at this time, invasive management was deferred secondary to TERESA, age and mental status. Patient was managed medically. She was taken off clonidine. Her hydralazine was titrated to 75mg every 8h. She was continued on asa and plavix. Her metoprolol was increased to 100mg BID. Anticoagulation was deferred given concerns with anemia and patient being a poor long term candidate for a/c.     She was treated for pseudomonas UTI. She was seen by ID. Abx changed to cipro.     Patient is stable for discharge as per primary medical team and consultants.    PT consulted, recommends discharge to [facility].     Patient showed improvement throughout hospitalization. Patient was seen and examined on day of discharge. Patient was medically optimized for discharge with close outpatient follow up.    ---  CONSULTANTS:     ---  TIME SPENT:  I, the attending physician, was physically present for the key portions of the evaluation and management (E/M) service provided. The total amount of time spent reviewing the hospital notes, laboratory values, imaging findings, assessing/counseling the patient, discussing with consultant physicians, social work, nursing staff was 50 minutes   HPI:  89 yo F with PMHx of HTN, HLD, Renal Artery Stenosis s/p stent, Basal Cell Carcinoma, CHFpEF CVA  presenting to ED s/p mechanical fall early last night. Pt states that she was standing upright in her kitchen when she tripped over "something" on the floor of her kitchen. Pt had an unwitnessed fall on her left side with head involvment, but adamantly denies LOC. States she was in pain and is generally weak so she was unable to get up without her walker throughout the night. Pt.'s daughter was unable to get rosetta contact with the patient, so she went to her house and found her lying on the floor. EMS was then called and the patient was transported to [facility] ED w/ cervical stabilization in a collar. Pt states she currently has neck and B/L knee pain L>R. Endorses increased neck pain before the fall as her arthritis has been "acting up since the weather got cold." States she last had a normal BM yesterday, last urinated without dysuria this afternoon. Denies headache, visual changes, palpitations, cp, abd pain, dysuria, urinary frequency, hip pain, fatigue, focal weakness, n/v/d/c, hematuria. Denies feeling ill recently although endorses having a friend who she recently met with who is sick with an URI.      ED Course:   Vitals: BP: 170/60, HR: 78, Temp: 97, RR: 16, SpO2: 96% on RA Labs:   WBC: 21.04, H/H 14.7/44.2, Platelet 580, Trop 861.2, CK 2986, Cr 1.40, BUN54, eGFR 35, AlkPhos 182, ,   CXR: Heart is enlarged. Right upper quadrant clips are noted. No lung consolidation or layering effusion is evident. No visible fracture   CT Head/Neck: negative for acute intracranial pathology, hemorrhage, or fracture   EKG: artifact, repeat pending   Received in the ED: 1L LR Bolus   TTE: 3/2022 LVEF 60-65 %, normal LV function, contraction, LA mildly dilated, Mild to moderate AR,  Mild (1+) tricuspid valve regurgitation. EA reversal of the mitral inflow consistent with reduced compliance of the left ventricle.     (18 Jan 2024 17:07)    ---  HOSPITAL COURSE:     Admitted for rhabdomyolysis s/p mechanical fall. Was also found to have UTI, paroxysmal Afib, and new chronic HFrEF. XR and CT imaging ruled out any acute fractures. Was started on IVF and his home losartan and torsemide was held initially. Eventually restarted once her renal functions improved. She also had episodes of urinary retention. Was started on flomax. Failed TOV. Sanders reinserted on 1/27 with 650 cc output. Patient to follow up with her urologist Dr Molina outpatient for further management.     Patient had NSTEMI with new ECHO showing LVEF 35-40% and SWMA. Patient was seen by cardiology. at this time, invasive management was deferred secondary to TERESA, age and mental status. Patient was managed medically. She was taken off clonidine. Her hydralazine was titrated to 75mg every 8h. She was continued on asa and plavix. Her metoprolol was increased to 100mg BID. Anticoagulation was deferred given concerns with anemia and patient being a poor long term candidate for a/c.     She was treated for pseudomonas UTI. She was seen by ID. Abx changed to cipro, last day 1/29/24.     Patient is stable for discharge to [facility] as per primary medical team and consultants.    PT consulted, recommends discharge to [facility].     Patient showed improvement throughout hospitalization. Patient was seen and examined on day of discharge. Patient was medically optimized for discharge with close outpatient follow up.      Vital Signs Last 24 Hrs  T(C): 36.6 (29 Jan 2024 11:11), Max: 36.6 (29 Jan 2024 11:11)  T(F): 97.9 (29 Jan 2024 11:11), Max: 97.9 (29 Jan 2024 11:11)  HR: 83 (29 Jan 2024 11:11) (76 - 83)  BP: 132/60 (29 Jan 2024 11:53) (132/60 - 188/84)  RR: 20 (29 Jan 2024 11:11) (18 - 20)  SpO2: 93% (29 Jan 2024 11:11) (93% - 96%)    O2 Parameters below as of 29 Jan 2024 11:11  Patient On (Oxygen Delivery Method): room air      CONSTITUTIONAL: awake, alert, no acute distress  HEENT: Atraumatic normocephalic. Moist mucous membranes.  No conjunctival injection.  RESP: No respiratory distress; CTA b/l, no WRR  CV: RRR, +S1S2, no MRG  GI: Bowel sounds present. Soft, nontender, nondistended, no rebound or guarding  MSK: Moving all four extremities spontaneously. No peripheral edema. No calf tenderness.  SKIN: Warm and dry. No rashes noted.  NEURO: AOx3, answering questions and following commands appropriately  PSYCH: Mood and affect appropriate, recent memory intact    ---  CONSULTANTS:   Cardio: Dr. Willingham  ID: Dr. Rankin  ---  TIME SPENT:  I, the attending physician, was physically present for the key portions of the evaluation and management (E/M) service provided. The total amount of time spent reviewing the hospital notes, laboratory values, imaging findings, assessing/counseling the patient, discussing with consultant physicians, social work, nursing staff was 50 minutes   HPI:  89 yo F with PMHx of HTN, HLD, Renal Artery Stenosis s/p stent, Basal Cell Carcinoma, CHFpEF CVA  presenting to ED s/p mechanical fall early last night. Pt states that she was standing upright in her kitchen when she tripped over "something" on the floor of her kitchen. Pt had an unwitnessed fall on her left side with head involvment, but adamantly denies LOC. States she was in pain and is generally weak so she was unable to get up without her walker throughout the night. Pt.'s daughter was unable to get rosetta contact with the patient, so she went to her house and found her lying on the floor. EMS was then called and the patient was transported to [facility] ED w/ cervical stabilization in a collar. Pt states she currently has neck and B/L knee pain L>R. Endorses increased neck pain before the fall as her arthritis has been "acting up since the weather got cold." States she last had a normal BM yesterday, last urinated without dysuria this afternoon. Denies headache, visual changes, palpitations, cp, abd pain, dysuria, urinary frequency, hip pain, fatigue, focal weakness, n/v/d/c, hematuria. Denies feeling ill recently although endorses having a friend who she recently met with who is sick with an URI.      ED Course:   Vitals: BP: 170/60, HR: 78, Temp: 97, RR: 16, SpO2: 96% on RA Labs:   WBC: 21.04, H/H 14.7/44.2, Platelet 580, Trop 861.2, CK 2986, Cr 1.40, BUN54, eGFR 35, AlkPhos 182, ,   CXR: Heart is enlarged. Right upper quadrant clips are noted. No lung consolidation or layering effusion is evident. No visible fracture   CT Head/Neck: negative for acute intracranial pathology, hemorrhage, or fracture   EKG: artifact, repeat pending   Received in the ED: 1L LR Bolus   TTE: 3/2022 LVEF 60-65 %, normal LV function, contraction, LA mildly dilated, Mild to moderate AR,  Mild (1+) tricuspid valve regurgitation. EA reversal of the mitral inflow consistent with reduced compliance of the left ventricle.     (18 Jan 2024 17:07)    ---  HOSPITAL COURSE:     Admitted for rhabdomyolysis s/p mechanical fall. Was also found to have UTI, paroxysmal Afib, and new chronic HFrEF. XR and CT imaging ruled out any acute fractures. Was started on IVF and his home losartan and torsemide was held initially. Eventually restarted once her renal functions improved. She also had episodes of urinary retention. Was started on flomax. Failed TOV. Sanders reinserted on 1/27 with 650 cc output. Patient to follow up with her urologist Dr Molina outpatient for further management.     Patient had NSTEMI with new ECHO showing LVEF 35-40% and SWMA. Patient was seen by cardiology. at this time, invasive management was deferred secondary to TERESA, age and mental status. Patient was managed medically. She was taken off clonidine. Her hydralazine was titrated to 75mg every 8h. She was continued on asa and plavix. Her metoprolol was increased to 100mg BID. Anticoagulation was deferred given concerns with anemia and patient being a poor long term candidate for a/c.     She was treated for pseudomonas UTI. She was seen by ID. Abx changed to cipro, last day 1/29/24.     Patient is stable for discharge to [facility] as per primary medical team and consultants.    PT consulted, recommends discharge to [facility]     Patient showed improvement throughout hospitalization. Patient was seen and examined on day of discharge. Patient was medically optimized for discharge with close outpatient follow up.    PE ON DAY OF DISCHARGE    Vital Signs Last 24 Hrs  T(C): 36.6 (29 Jan 2024 11:11), Max: 36.6 (29 Jan 2024 11:11)  T(F): 97.9 (29 Jan 2024 11:11), Max: 97.9 (29 Jan 2024 11:11)  HR: 83 (29 Jan 2024 11:11) (76 - 83)  BP: 132/60 (29 Jan 2024 11:53) (132/60 - 188/84)  RR: 20 (29 Jan 2024 11:11) (18 - 20)  SpO2: 93% (29 Jan 2024 11:11) (93% - 96%)    O2 Parameters below as of 29 Jan 2024 11:11  Patient On (Oxygen Delivery Method): room air      PHYSICAL EXAM:  GENERAL: NAD, lying in bed comfortably  HEAD:  Atraumatic, Normocephalic  EYES: EOMI, PERRLA, conjunctiva and sclera clear  ENT: Moist oral mucous   NECK: Supple, No JVD  CHEST/LUNG: Clear to auscultation bilaterally  HEART: S1,S2. Regular rate and rhythm.   ABDOMEN: Bowel sounds present. Soft, nontender, nondistended.   EXTREMITIES:  No cyanosis, or edema  NERVOUS SYSTEM:  Alert, answer questions properly  SKIN: Warm.     ---  CONSULTANTS:   Cardio: Dr. Willingham  ID: Dr. Rankin  ---  TIME SPENT:  I, the attending physician, was physically present for the key portions of the evaluation and management (E/M) service provided. The total amount of time spent reviewing the hospital notes, laboratory values, imaging findings, assessing/counseling the patient, discussing with consultant physicians, social work, nursing staff was 50 minutes   HPI:  89 yo F with PMHx of HTN, HLD, Renal Artery Stenosis s/p stent, Basal Cell Carcinoma, CHFpEF CVA  presenting to ED s/p mechanical fall early last night. Pt states that she was standing upright in her kitchen when she tripped over "something" on the floor of her kitchen. Pt had an unwitnessed fall on her left side with head involvment, but adamantly denies LOC. States she was in pain and is generally weak so she was unable to get up without her walker throughout the night. Pt.'s daughter was unable to get rosetta contact with the patient, so she went to her house and found her lying on the floor. EMS was then called and the patient was transported to [facility] [place] [facility] w/ cervical stabilization in a collar. Pt states she currently has neck and B/L knee pain L>R. Endorses increased neck pain before the fall as her arthritis has been "acting up since the weather got cold." States she last had a normal BM yesterday, last urinated without dysuria this afternoon. Denies headache, visual changes, palpitations, cp, abd pain, dysuria, urinary frequency, hip pain, fatigue, focal weakness, n/v/d/c, hematuria. Denies feeling ill recently although endorses having a friend who she recently met with who is sick with an URI.      ED Course:   Vitals: BP: 170/60, HR: 78, Temp: 97, RR: 16, SpO2: 96% on RA Labs:   WBC: 21.04, H/H 14.7/44.2, Platelet 580, Trop 861.2, CK 2986, Cr 1.40, BUN54, eGFR 35, AlkPhos 182, ,   CXR: Heart is enlarged. Right upper quadrant clips are noted. No lung consolidation or layering effusion is evident. No visible fracture   CT Head/Neck: negative for acute intracranial pathology, hemorrhage, or fracture   EKG: artifact, repeat pending   Received in the ED: 1L LR Bolus   TTE: 3/2022 LVEF 60-65 %, normal LV function, contraction, LA mildly dilated, Mild to moderate AR,  Mild (1+) tricuspid valve regurgitation. EA reversal of the mitral inflow consistent with reduced compliance of the left ventricle.     (18 Jan 2024 17:07)    ---  HOSPITAL COURSE:     Admitted for rhabdomyolysis s/p mechanical fall. Was also found to have UTI, paroxysmal Afib, and new chronic HFrEF. XR and CT imaging ruled out any acute fractures. Was started on IVF and his home losartan and torsemide was held initially. Eventually restarted once her renal functions improved. She also had episodes of urinary retention. Was started on flomax. Failed TOV. Kwan reinserted on 1/27 with 650 cc output. Patient to follow up with her urologist Dr Molina outpatient for further management.     Patient had NSTEMI with new ECHO showing LVEF 35-40% and SWMA. Patient was seen by cardiology. at this time, invasive management was deferred secondary to TERESA, age and mental status. Patient was managed medically. She was taken off clonidine. Her hydralazine was titrated to 75mg every 8h. She was continued on asa and plavix. Her metoprolol was increased to 100mg BID. Anticoagulation was deferred given concerns with anemia and patient being a poor long term candidate for a/c.     She was treated for pseudomonas UTI. She was seen by ID. Abx changed to cipro, last day 1/29/24.     Patient is stable for discharge to [facility] as per primary medical team and consultants.    PT consulted, recommends discharge to [facility].     Patient showed improvement throughout hospitalization. Patient was seen and examined on day of discharge. Patient was medically optimized for discharge with close outpatient follow up.    PE ON DAY OF DISCHARGE    Vital Signs Last 24 Hrs  T(C): 36.6 (29 Jan 2024 11:11), Max: 36.6 (29 Jan 2024 11:11)  T(F): 97.9 (29 Jan 2024 11:11), Max: 97.9 (29 Jan 2024 11:11)  HR: 83 (29 Jan 2024 11:11) (76 - 83)  BP: 132/60 (29 Jan 2024 11:53) (132/60 - 188/84)  RR: 20 (29 Jan 2024 11:11) (18 - 20)  SpO2: 93% (29 Jan 2024 11:11) (93% - 96%)    O2 Parameters below as of 29 Jan 2024 11:11  Patient On (Oxygen Delivery Method): room air      PHYSICAL EXAM:  GENERAL: NAD, lying in bed comfortably  HEAD:  Atraumatic, Normocephalic  EYES: EOMI, PERRLA, conjunctiva and sclera clear  ENT: Moist oral mucous   NECK: Supple, No JVD  CHEST/LUNG: Clear to auscultation bilaterally  HEART: S1,S2. Regular rate and rhythm.   ABDOMEN: Bowel sounds present. Soft, nontender, nondistended. + kwan   EXTREMITIES:  No cyanosis, or edema  NERVOUS SYSTEM:  Alert, answer questions properly  SKIN: Warm.     ---  CONSULTANTS:   Cardio: Dr. Willingham  ID: Dr. Rankin    ---  TIME SPENT:  I, the attending physician, was physically present for the key portions of the evaluation and management (E/M) service provided. The total amount of time spent reviewing the hospital notes, laboratory values, imaging findings, assessing/counseling the patient, discussing with consultant physicians, social work, nursing staff was 50 minutes

## 2024-01-28 NOTE — PROGRESS NOTE ADULT - PROBLEM SELECTOR PROBLEM 3
Rhabdomyolysis
TERESA (acute kidney injury)
Rhabdomyolysis
Rhabdomyolysis
TERESA (acute kidney injury)
Acute UTI
Acute UTI
Rhabdomyolysis
TERESA (acute kidney injury)
Rhabdomyolysis

## 2024-01-28 NOTE — PROGRESS NOTE ADULT - PROBLEM SELECTOR PLAN 1
- s/p fall, appears to be mechanical in nature.   - CTH neg, CT C-spine neg, Xray pelvis neg, Xray R knee neg, Xray L forearm neg, Xray L humerus neg, Xray L shoulder neg  - Rhabdomyolisis on admission with CK 2900 and TERESA   - Rhabdo and TERESA resolved after IVF and PO intake.   - PT recommending GABRIELLA.
- Appears to be mechanical in nature  - CTH neg, CT C-spine neg, Xray pelvis neg, Xray R knee neg, Xray L forearm neg, Xray L humerus neg, Xray L shoulder neg  - Tylenol PRN  - PT consulted & recommending GABRIELLA
- s/p fall, appears to be mechanical in nature.   - CTH neg, CT C-spine neg, Xray pelvis neg, Xray R knee neg, Xray L forearm neg, Xray L humerus neg, Xray L shoulder neg  - Rhabdomyolisis on admission with CK 2900 and TERESA   - Rhabdo and TERESA resolved after IVF and PO intake.   - PT recommending GABRIELLA.

## 2024-01-28 NOTE — DISCHARGE NOTE PROVIDER - CARE PROVIDER_API CALL
Mono Molina  Urology  [address] [facility] [address], [address]  [place] [address]  Phone: [phone]  Fax: [phone]  Follow Up Time: 2 weeks   Mono Molina  Urology  [address], [address]  [place] [address]  Phone: [phone]  Fax: [phone]  Follow Up Time: 2 weeks    Rafa Vila  Cardiovascular Disease  [address], [address]  [place] [address]  Phone: [phone]  Fax: [phone]  Follow Up Time: 2 weeks    Rafaela Groves  Physician Assistant Services  Follow Up Time: 2 weeks   Mono Molina  Urology  [address] [facility], [address]  [place] [address]  Phone: [phone]  Fax: [phone]  Follow Up Time: 2 weeks    Rafa Vila  Cardiovascular Disease  [address] [facility] [address], [address]  [place] [address]  Phone: [phone]  Fax: [phone]  Follow Up Time: 2 weeks    Rafaela Groves  Physician Assistant Services  Follow Up Time: 2 weeks    Yvan Mustafa  Cardiology  [address] [place] [address]  Phone: [phone]  Fax: [phone]  Follow Up Time: 1 week   Mono Molina  Urology  [address] [facility], [address]  [place] [address]  Phone: [phone]  Fax: [phone]  Follow Up Time: 2 weeks    Rafa Vila  Cardiovascular Disease  [address] [facility] [address] [facility], [address]  [place] [address]  Phone: [phone]  Fax: [phone]  Follow Up Time: 2 weeks    Rafaela Groves  Physician Assistant Services  Follow Up Time: 2 weeks

## 2024-01-28 NOTE — DISCHARGE NOTE PROVIDER - CARE PROVIDERS DIRECT ADDRESSES
[email or web address]

## 2024-01-28 NOTE — PROGRESS NOTE ADULT - ASSESSMENT
89-year-old white female presently residing at home alone though does have assistance of an aide approximately 4 to 6 hours/day with past history of hypertension hyperlipidemia previous CVA basal cell carcinoma renal artery stenosis s/p stent as well as congestive heart failure who was last known to be well and last heard from sometime late afternoon yesterday.  Cardiology consulted for syncopal episode and elevated troponins.    Syncope, Elevated Troponin/NSTEMI, HTN, HFrEF   - s/p mechanical fall. May have been orthostatic syncope    - BNP: 894156,  <--666105  - TTE 4/23: EF 58%, mod MR, mild- mod TR, mild AR, MT.  - Repeat TTE showed EF 35-40% with SWMA, LAE, mod MR.    - continue torsemide resumed  - creat 1.5, continue to trend renal indices   - compensated from HF POV    - Monitor volume status closely    - EKG: Afib @ 122 w/ continued diffuse TWI  - Repeat EKG w/ ST @ 109, Diffuse TWI (new)  - CK peaked @ 2986  - Troponin peaked and downtrended, no need to trend further  - Possible NSTEMI in the setting of SWMA on TTE  - Continue home statin and Plavix  - Would recommend LHC/RHC down the road.  At this point, in the setting of confusion and TERESA, she is not a candidate for any ischemic w/u at this time  - optimizing GDMT and medically manage for now     - BP, HR stable and controlled   - continue Losartan   - Continue Toprol  BID  - continue Hydralazine 75 mg q8H for afterload reduction  - Monitor and replete Lytes. Keep K > 4 and Mg > 2    - CHADSVASc: 4-5.  Would hold off on AC in the setting of downtrending Hgb.  Either way, she does not appear to be a good candidate for long term AC    - Will continue to follow.    Sidra Epperson [facility]  Nurse Practitioner - Cardiology   call TEAMS

## 2024-01-28 NOTE — PROGRESS NOTE ADULT - PROBLEM SELECTOR PROBLEM 7
Paroxysmal atrial fibrillation
Acute UTI
Elevated troponin
Acute UTI
Elevated troponin
Paroxysmal atrial fibrillation
Acute UTI
Elevated troponin

## 2024-01-28 NOTE — DISCHARGE NOTE PROVIDER - NSDCMRMEDTOKEN_GEN_ALL_CORE_FT
cloNIDine 0.2 mg oral tablet: 1 tab(s) orally 3 times a day  HOLD for SBP &lt; 150   Keep SBP between  150- 180     ***6am, 1pm, 8pm***  clopidogrel 75 mg oral tablet: 1 tab(s) orally once a day  hydrALAZINE 25 mg oral tablet: 1 tab(s) orally 3 times a day  losartan 50 mg oral tablet: 1 tab(s) orally 2 times a day  ***6am, 6pm***  Metoprolol Succinate ER 25 mg oral tablet, extended release: 1 tab(s) orally 2 times a day  ***9am, 9pm***  pravastatin 20 mg oral tablet: 1 tab(s) orally once a day (at bedtime)  Protonix 20 mg oral delayed release tablet: 2 tab(s) orally once a day  torsemide 10 mg oral tablet: 1 tab(s) orally once a day  ***may take 2nd dose 12 hours later if necessary***   acetaminophen 325 mg oral tablet: 2 tab(s) orally every 6 hours As needed Mild Pain (1 - 3)  benzonatate 100 mg oral capsule: 1 cap(s) orally every 8 hours As needed Cough  bisacodyl 5 mg oral delayed release tablet: 1 tab(s) orally once a day (at bedtime)  ciprofloxacin 500 mg oral tablet: 1 tab(s) orally once a day stop 1/29 PM  clopidogrel 75 mg oral tablet: 1 tab(s) orally once a day  fluticasone 50 mcg/inh nasal spray: 1 spray(s) nasal 2 times a day  hydrALAZINE 25 mg oral tablet: 3 tab(s) orally every 8 hours  losartan 25 mg oral tablet: 1 tab(s) orally once a day  melatonin 3 mg oral tablet: 1 tab(s) orally once a day (at bedtime) As needed Insomnia  metoprolol succinate 100 mg oral tablet, extended release: 1 tab(s) orally every 12 hours  Multiple Vitamins oral tablet: 1 tab(s) orally once a day  pravastatin 20 mg oral tablet: 1 tab(s) orally once a day (at bedtime)  Protonix 20 mg oral delayed release tablet: 2 tab(s) orally once a day  tamsulosin 0.4 mg oral capsule: 1 cap(s) orally once a day (at bedtime)  torsemide 10 mg oral tablet: 1 tab(s) orally once a day  ***may take 2nd dose 12 hours later if necessary***   acetaminophen 325 mg oral tablet: 2 tab(s) orally every 6 hours As needed Mild Pain (1 - 3)  benzonatate 100 mg oral capsule: 1 cap(s) orally every 8 hours As needed Cough  bisacodyl 5 mg oral delayed release tablet: 1 tab(s) orally once a day (at bedtime)  clopidogrel 75 mg oral tablet: 1 tab(s) orally once a day  fluticasone 50 mcg/inh nasal spray: 1 spray(s) nasal 2 times a day  hydrALAZINE 25 mg oral tablet: 3 tab(s) orally every 8 hours  losartan 25 mg oral tablet: 1 tab(s) orally once a day  melatonin 3 mg oral tablet: 1 tab(s) orally once a day (at bedtime) As needed Insomnia  metoprolol succinate 100 mg oral tablet, extended release: 1 tab(s) orally every 12 hours  Multiple Vitamins oral tablet: 1 tab(s) orally once a day  pravastatin 20 mg oral tablet: 1 tab(s) orally once a day (at bedtime)  Protonix 20 mg oral delayed release tablet: 2 tab(s) orally once a day  tamsulosin 0.4 mg oral capsule: 1 cap(s) orally once a day (at bedtime)  torsemide 10 mg oral tablet: 1 tab(s) orally once a day  ***may take 2nd dose 12 hours later if necessary***   acetaminophen 325 mg oral tablet: 2 tab(s) orally every 6 hours As needed Mild Pain (1 - 3)  benzonatate 100 mg oral capsule: 1 cap(s) orally every 8 hours As needed Cough  bisacodyl 5 mg oral delayed release tablet: 1 tab(s) orally once a day (at bedtime)  ciprofloxacin 500 mg oral tablet: 1 tab(s) orally once a day stop 1/29 PM  clopidogrel 75 mg oral tablet: 1 tab(s) orally once a day  fluticasone 50 mcg/inh nasal spray: 1 spray(s) nasal 2 times a day  hydrALAZINE 25 mg oral tablet: 3 tab(s) orally every 8 hours  losartan 25 mg oral tablet: 1 tab(s) orally once a day  melatonin 3 mg oral tablet: 1 tab(s) orally once a day (at bedtime) As needed Insomnia  metoprolol succinate 100 mg oral tablet, extended release: 1 tab(s) orally every 12 hours  Multiple Vitamins oral tablet: 1 tab(s) orally once a day  pravastatin 20 mg oral tablet: 1 tab(s) orally once a day (at bedtime)  Protonix 20 mg oral delayed release tablet: 2 tab(s) orally once a day  tamsulosin 0.4 mg oral capsule: 1 cap(s) orally once a day (at bedtime)  torsemide 10 mg oral tablet: 1 tab(s) orally once a day

## 2024-01-28 NOTE — PROGRESS NOTE ADULT - PROBLEM SELECTOR PROBLEM 2
Acute UTI
Urinary retention
CVA (cerebrovascular accident)
CVA (cerebrovascular accident)
Acute UTI
Acute UTI
CVA (cerebrovascular accident)
Urinary retention
Acute UTI
Acute UTI

## 2024-01-28 NOTE — DISCHARGE NOTE PROVIDER - NPI NUMBER (FOR SYSADMIN USE ONLY) :
[[id number]] [[id number]],[[id number]],[[id number]] [[id number]],[[id number]],[[id number]],[[id number]] [[id number]],[[id number]],[[id number]]

## 2024-01-28 NOTE — PROGRESS NOTE ADULT - PROBLEM SELECTOR PROBLEM 5
TERESA (acute kidney injury)
TERESA (acute kidney injury)
Elevated troponin
Elevated troponin
TERESA (acute kidney injury)
TERESA (acute kidney injury)
Elevated troponin
TERESA (acute kidney injury)

## 2024-01-28 NOTE — PROGRESS NOTE ADULT - PROBLEM SELECTOR PLAN 2
- Sanders placed on 1/21 and Flomax was started on 1/22  - Sanders removed on 1/25, replaced 1/27 d/t urinary retention w/ ~350cc on PVR  - Discussed with family and urology Dr. Humphrey.   - Continue Flomax.   - TOV in facility once pt is improving mobility

## 2024-01-28 NOTE — PROGRESS NOTE ADULT - PROBLEM SELECTOR PROBLEM 8
Chronic systolic heart failure
Hypertension
Hypertension
Chronic systolic heart failure

## 2024-01-28 NOTE — PROGRESS NOTE ADULT - PROBLEM SELECTOR PLAN 9
- Continue with home Plavix  - Started high dose statin here
- Newly found  - CHADSVASC 4-5 --> discussed with daughter r/b of AC  - Patient now in sinus rhythm. Had recent cardiac monitor for one week, but results not known. For now will defer full dose AC as patient is weak and fall risk. She will need to follow up closely with OP cardiologist
- Newly found  - [facility] 4-5 --> will discuss with daughter AC  - If daughter okay will start Eliquis 2.5 mg BID
- Newly found  - CHADSVASC 4-5 --> discussed with daughter r/b of AC  - Patient now in sinus rhythm. Had recent cardiac monitor for one week, but results not known. For now will defer full dose AC as patient is weak and fall risk. She will need to follow up closely with OP cardiologist  - Continue toprol XL 100mg BID
- Newly found  - CHADSVASC 4-5 --> discussed with daughter r/b of AC  - Patient now in sinus rhythm. Had recent cardiac monitor for one week, but results not known. For now will defer full dose AC as patient is weak and fall risk. She will need to follow up closely with OP cardiologist  - Continue toprol XL 100mg BID
- Continue with home Plavix  - Started high dose statin here
- Newly found  - CHADSVASC 4-5 --> discussed with daughter r/b of AC  - Patient now in sinus rhythm. Had recent cardiac monitor for one week, but results not known. For now will defer full dose AC as patient is weak and fall risk. She will need to follow up closely with OP cardiologist  - Continue toprol XL 100mg BID

## 2024-01-29 ENCOUNTER — TRANSCRIPTION ENCOUNTER (OUTPATIENT)
Age: 89
End: 2024-01-29

## 2024-01-29 VITALS
HEART RATE: 84 BPM | SYSTOLIC BLOOD PRESSURE: 152 MMHG | DIASTOLIC BLOOD PRESSURE: 62 MMHG | RESPIRATION RATE: 18 BRPM | OXYGEN SATURATION: 93 %

## 2024-01-29 LAB
ALBUMIN SERPL ELPH-MCNC: 2.2 G/DL — LOW (ref 3.3–5)
ALP SERPL-CCNC: 123 U/L — HIGH (ref 40–120)
ALT FLD-CCNC: 35 U/L — SIGNIFICANT CHANGE UP (ref 12–78)
ANION GAP SERPL CALC-SCNC: 6 MMOL/L — SIGNIFICANT CHANGE UP (ref 5–17)
AST SERPL-CCNC: 21 U/L — SIGNIFICANT CHANGE UP (ref 15–37)
BILIRUB SERPL-MCNC: 0.5 MG/DL — SIGNIFICANT CHANGE UP (ref 0.2–1.2)
BUN SERPL-MCNC: 37 MG/DL — HIGH (ref 7–23)
CALCIUM SERPL-MCNC: 8.4 MG/DL — LOW (ref 8.5–10.1)
CHLORIDE SERPL-SCNC: 110 MMOL/L — HIGH (ref 96–108)
CO2 SERPL-SCNC: 23 MMOL/L — SIGNIFICANT CHANGE UP (ref 22–31)
CREAT SERPL-MCNC: 1.5 MG/DL — HIGH (ref 0.5–1.3)
EGFR: 32 ML/MIN/1.73M2 — LOW
GLUCOSE SERPL-MCNC: 128 MG/DL — HIGH (ref 70–99)
HCT VFR BLD CALC: 32.5 % — LOW (ref 34.5–45)
HGB BLD-MCNC: 10.5 G/DL — LOW (ref 11.5–15.5)
MAGNESIUM SERPL-MCNC: 2.1 MG/DL — SIGNIFICANT CHANGE UP (ref 1.6–2.6)
MCHC RBC-ENTMCNC: 32 PG — SIGNIFICANT CHANGE UP (ref 27–34)
MCHC RBC-ENTMCNC: 32.3 GM/DL — SIGNIFICANT CHANGE UP (ref 32–36)
MCV RBC AUTO: 99.1 FL — SIGNIFICANT CHANGE UP (ref 80–100)
NRBC # BLD: 0 /100 WBCS — SIGNIFICANT CHANGE UP (ref 0–0)
PHOSPHATE SERPL-MCNC: 4.2 MG/DL — SIGNIFICANT CHANGE UP (ref 2.5–4.5)
PLATELET # BLD AUTO: 417 K/UL — HIGH (ref 150–400)
POTASSIUM SERPL-MCNC: 4.5 MMOL/L — SIGNIFICANT CHANGE UP (ref 3.5–5.3)
POTASSIUM SERPL-SCNC: 4.5 MMOL/L — SIGNIFICANT CHANGE UP (ref 3.5–5.3)
PROT SERPL-MCNC: 5.2 G/DL — LOW (ref 6–8.3)
RBC # BLD: 3.28 M/UL — LOW (ref 3.8–5.2)
RBC # FLD: 12.7 % — SIGNIFICANT CHANGE UP (ref 10.3–14.5)
SODIUM SERPL-SCNC: 139 MMOL/L — SIGNIFICANT CHANGE UP (ref 135–145)
WBC # BLD: 11.39 K/UL — HIGH (ref 3.8–10.5)
WBC # FLD AUTO: 11.39 K/UL — HIGH (ref 3.8–10.5)

## 2024-01-29 PROCEDURE — 72170 X-RAY EXAM OF PELVIS: CPT

## 2024-01-29 PROCEDURE — 80048 BASIC METABOLIC PNL TOTAL CA: CPT

## 2024-01-29 PROCEDURE — 87186 SC STD MICRODIL/AGAR DIL: CPT

## 2024-01-29 PROCEDURE — 80061 LIPID PANEL: CPT

## 2024-01-29 PROCEDURE — 97116 GAIT TRAINING THERAPY: CPT

## 2024-01-29 PROCEDURE — 73030 X-RAY EXAM OF SHOULDER: CPT

## 2024-01-29 PROCEDURE — 93005 ELECTROCARDIOGRAM TRACING: CPT

## 2024-01-29 PROCEDURE — 99285 EMERGENCY DEPT VISIT HI MDM: CPT

## 2024-01-29 PROCEDURE — 73090 X-RAY EXAM OF FOREARM: CPT

## 2024-01-29 PROCEDURE — 82550 ASSAY OF CK (CPK): CPT

## 2024-01-29 PROCEDURE — 99239 HOSP IP/OBS DSCHRG MGMT >30: CPT

## 2024-01-29 PROCEDURE — 93306 TTE W/DOPPLER COMPLETE: CPT

## 2024-01-29 PROCEDURE — 85027 COMPLETE CBC AUTOMATED: CPT

## 2024-01-29 PROCEDURE — 84100 ASSAY OF PHOSPHORUS: CPT

## 2024-01-29 PROCEDURE — 73060 X-RAY EXAM OF HUMERUS: CPT

## 2024-01-29 PROCEDURE — 85025 COMPLETE CBC W/AUTO DIFF WBC: CPT

## 2024-01-29 PROCEDURE — 76700 US EXAM ABDOM COMPLETE: CPT

## 2024-01-29 PROCEDURE — 80053 COMPREHEN METABOLIC PANEL: CPT

## 2024-01-29 PROCEDURE — 84484 ASSAY OF TROPONIN QUANT: CPT

## 2024-01-29 PROCEDURE — 82553 CREATINE MB FRACTION: CPT

## 2024-01-29 PROCEDURE — 83735 ASSAY OF MAGNESIUM: CPT

## 2024-01-29 PROCEDURE — 72125 CT NECK SPINE W/O DYE: CPT | Mod: MA

## 2024-01-29 PROCEDURE — 36415 COLL VENOUS BLD VENIPUNCTURE: CPT

## 2024-01-29 PROCEDURE — 83605 ASSAY OF LACTIC ACID: CPT

## 2024-01-29 PROCEDURE — 97535 SELF CARE MNGMENT TRAINING: CPT

## 2024-01-29 PROCEDURE — 87637 SARSCOV2&INF A&B&RSV AMP PRB: CPT

## 2024-01-29 PROCEDURE — 83036 HEMOGLOBIN GLYCOSYLATED A1C: CPT

## 2024-01-29 PROCEDURE — 85610 PROTHROMBIN TIME: CPT

## 2024-01-29 PROCEDURE — 84443 ASSAY THYROID STIM HORMONE: CPT

## 2024-01-29 PROCEDURE — 70450 CT HEAD/BRAIN W/O DYE: CPT | Mod: MA

## 2024-01-29 PROCEDURE — 97165 OT EVAL LOW COMPLEX 30 MIN: CPT

## 2024-01-29 PROCEDURE — 99232 SBSQ HOSP IP/OBS MODERATE 35: CPT

## 2024-01-29 PROCEDURE — 71045 X-RAY EXAM CHEST 1 VIEW: CPT

## 2024-01-29 PROCEDURE — 87040 BLOOD CULTURE FOR BACTERIA: CPT

## 2024-01-29 PROCEDURE — 81001 URINALYSIS AUTO W/SCOPE: CPT

## 2024-01-29 PROCEDURE — 73562 X-RAY EXAM OF KNEE 3: CPT

## 2024-01-29 PROCEDURE — 84439 ASSAY OF FREE THYROXINE: CPT

## 2024-01-29 PROCEDURE — 97162 PT EVAL MOD COMPLEX 30 MIN: CPT

## 2024-01-29 PROCEDURE — 94640 AIRWAY INHALATION TREATMENT: CPT

## 2024-01-29 PROCEDURE — 97110 THERAPEUTIC EXERCISES: CPT

## 2024-01-29 PROCEDURE — 97530 THERAPEUTIC ACTIVITIES: CPT

## 2024-01-29 PROCEDURE — 87086 URINE CULTURE/COLONY COUNT: CPT

## 2024-01-29 PROCEDURE — 82962 GLUCOSE BLOOD TEST: CPT

## 2024-01-29 PROCEDURE — 83880 ASSAY OF NATRIURETIC PEPTIDE: CPT

## 2024-01-29 RX ORDER — CIPROFLOXACIN LACTATE 400MG/40ML
1 VIAL (ML) INTRAVENOUS
Qty: 1 | Refills: 0
Start: 2024-01-29

## 2024-01-29 RX ADMIN — Medication 100 MILLIGRAM(S): at 05:17

## 2024-01-29 RX ADMIN — Medication 1 SPRAY(S): at 05:17

## 2024-01-29 RX ADMIN — Medication 81 MILLIGRAM(S): at 11:44

## 2024-01-29 RX ADMIN — CLOPIDOGREL BISULFATE 75 MILLIGRAM(S): 75 TABLET, FILM COATED ORAL at 11:44

## 2024-01-29 RX ADMIN — Medication 10 MILLIGRAM(S): at 05:17

## 2024-01-29 RX ADMIN — HEPARIN SODIUM 5000 UNIT(S): 5000 INJECTION INTRAVENOUS; SUBCUTANEOUS at 05:17

## 2024-01-29 RX ADMIN — Medication 75 MILLIGRAM(S): at 14:00

## 2024-01-29 RX ADMIN — HEPARIN SODIUM 5000 UNIT(S): 5000 INJECTION INTRAVENOUS; SUBCUTANEOUS at 14:01

## 2024-01-29 RX ADMIN — Medication 75 MILLIGRAM(S): at 05:17

## 2024-01-29 RX ADMIN — LOSARTAN POTASSIUM 25 MILLIGRAM(S): 100 TABLET, FILM COATED ORAL at 05:17

## 2024-01-29 RX ADMIN — Medication 500 MILLIGRAM(S): at 11:44

## 2024-01-29 NOTE — PROGRESS NOTE ADULT - REASON FOR ADMISSION
NSTEMI, Rhabdomyolysis

## 2024-01-29 NOTE — PROGRESS NOTE ADULT - PROVIDER SPECIALTY LIST ADULT
Cardiology
Hospitalist
Infectious Disease
Infectious Disease
Cardiology
Cardiology
Infectious Disease
Cardiology
Hospitalist
Infectious Disease
Infectious Disease
Cardiology
Hospitalist
Hospitalist
Internal Medicine
Hospitalist
Internal Medicine
Hospitalist

## 2024-01-29 NOTE — DISCHARGE NOTE NURSING/CASE MANAGEMENT/SOCIAL WORK - NSDCVIVACCINE_GEN_ALL_CORE_FT
influenza, injectable, quadrivalent, preservative free; 20-Oct-2017 08:49; Fahrbach, Carey L (RN); [employer]; [id number]; IntraMuscular; Deltoid Left.; 0.5 milliLiter(s); VIS (VIS Published: 07-Aug-2015, VIS Presented: 20-Oct-2017);

## 2024-01-29 NOTE — CHART NOTE - NSCHARTNOTEFT_GEN_A_CORE
Patient accepted to [facility].   BP repeated manually, improved.   Patient without complaints at home.   Improving mental status and mood.   Sanders inserted.   Afebrile.    d/w cardio and ID, stable for discharge to [facility].   Daughter discussed with yesterday to stress importance of outpatient follow up with cardio and urology.

## 2024-01-29 NOTE — PROGRESS NOTE ADULT - SUBJECTIVE AND OBJECTIVE BOX
[facility] -- Stefano Emmanuel,  Ricky, Ramsey Recinos, Latrell Willingham Cohen  Office # [phone]    Follow Up:  HFrEF, Rhabdo, Elevated Troponins, poss. NSTEMI, A fib RVR    Subjective/Observations: Pt seen and examined, awake, alert, resting in bed, denies chest pain, dyspnea, palpitations or dizziness, orthopnea and PND.  Tolerating RA.       REVIEW OF SYSTEMS: All other review of systems is negative unless indicated above  PAST MEDICAL & SURGICAL HISTORY:  Essential hypertension      Dyslipidemia      Hyponatremia  ON HCTZ , h/o Hypokelemia      Cerebrovascular accident (CVA), unspecified mechanism      Edema, unspecified type      Mitral valve insufficiency, unspecified etiology  Cardiac cath on 11/21/18      Basal cell carcinoma      Renal artery stenosis  right side, stent      CHF (congestive heart failure)      Inguinal hernia, right      Blood pressure instability      S/P Mohs surgery for basal cell carcinoma      Status post hysterectomy  and bladder lift with mesh 1990s      History of cholecystectomy  1980      H/O bilateral hip replacements  2013, 2014      Other elective surgery  right renal artery stent      S/P colonoscopy        MEDICATIONS  (STANDING):  aspirin  chewable 81 milliGRAM(s) Oral daily  atorvastatin 40 milliGRAM(s) Oral at bedtime  bisacodyl 5 milliGRAM(s) Oral at bedtime  ciprofloxacin     Tablet 500 milliGRAM(s) Oral daily  clopidogrel Tablet 75 milliGRAM(s) Oral daily  fluticasone propionate 50 MICROgram(s)/spray Nasal Spray 1 Spray(s) Both Nostrils two times a day  heparin   Injectable 5000 Unit(s) SubCutaneous every 8 hours  hydrALAZINE 75 milliGRAM(s) Oral every 8 hours  influenza  Vaccine (HIGH DOSE) 0.7 milliLiter(s) IntraMuscular once  losartan 25 milliGRAM(s) Oral daily  metoprolol succinate  milliGRAM(s) Oral every 12 hours  tamsulosin 0.4 milliGRAM(s) Oral at bedtime  torsemide 10 milliGRAM(s) Oral daily    MEDICATIONS  (PRN):  acetaminophen     Tablet .. 650 milliGRAM(s) Oral every 6 hours PRN Mild Pain (1 - 3)  benzonatate 100 milliGRAM(s) Oral every 8 hours PRN Cough  melatonin 3 milliGRAM(s) Oral at bedtime PRN Insomnia    Allergies    IV Contrast (Pruritus; Rash)  verapamil (Other)  Keflex (Other)    Intolerances      Vital Signs Last 24 Hrs  T(C): 36.3 (29 Jan 2024 04:50), Max: 36.7 (28 Jan 2024 09:43)  T(F): 97.4 (29 Jan 2024 04:50), Max: 98 (28 Jan 2024 09:43)  HR: 80 (29 Jan 2024 04:50) (72 - 80)  BP: 185/74 (29 Jan 2024 04:50) (130/64 - 188/84)  BP(mean): --  RR: 18 (29 Jan 2024 04:50) (18 - 18)  SpO2: 94% (29 Jan 2024 04:50) (94% - 96%)    Parameters below as of 29 Jan 2024 04:50  Patient On (Oxygen Delivery Method): room air      I&O's Summary    28 Jan 2024 07:01  -  29 Jan 2024 07:00  --------------------------------------------------------  IN: 0 mL / OUT: 300 mL / NET: -300 mL        TELE: Not on telemetry   PHYSICAL EXAM:  Constitutional: NAD, awake and alert  HEENT: Moist Mucous Membranes, Anicteric  Pulmonary: Non-labored, breath sounds are clear bilaterally, No wheezing, rales or rhonchi  Cardiovascular: Regular, S1 and S2, No murmurs, rubs, gallops or clicks  Gastrointestinal: Bowel Sounds present, soft, nontender.   Lymph: No peripheral edema. No lymphadenopathy.  Skin: No visible rashes or ulcers.  Psych:  Mood & affect appropriate    LABS: All Labs Reviewed:                        10.5   11.39 )-----------( 417      ( 29 Jan 2024 05:25 )             32.5                         10.3   10.07 )-----------( 420      ( 28 Jan 2024 07:32 )             32.6                         10.4   8.41  )-----------( 438      ( 27 Jan 2024 07:46 )             33.3     29 Jan 2024 05:25    139    |  110    |  37     ----------------------------<  128    4.5     |  23     |  1.50   28 Jan 2024 07:32    138    |  111    |  36     ----------------------------<  168    3.9     |  21     |  1.50   27 Jan 2024 07:46    137    |  112    |  41     ----------------------------<  123    4.5     |  20     |  1.40     Ca    8.4        29 Jan 2024 05:25  Ca    8.0        28 Jan 2024 07:32  Ca    8.3        27 Jan 2024 07:46  Phos  4.2       29 Jan 2024 05:25  Phos  4.2       28 Jan 2024 07:32  Phos  3.7       27 Jan 2024 07:46  Mg     2.1       29 Jan 2024 05:25  Mg     2.0       28 Jan 2024 07:32  Mg     2.0       27 Jan 2024 07:46    TPro  5.2    /  Alb  2.2    /  TBili  0.5    /  DBili  x      /  AST  21     /  ALT  35     /  AlkPhos  123    29 Jan 2024 05:25  TPro  4.9    /  Alb  2.0    /  TBili  0.4    /  DBili  x      /  AST  41     /  ALT  49     /  AlkPhos  123    27 Jan 2024 07:46  TPro  5.4    /  Alb  2.2    /  TBili  0.4    /  DBili  x      /  AST  57     /  ALT  60     /  AlkPhos  142    26 Jan 2024 11:20          12 Lead ECG:   Ventricular Rate 147 BPM    QRS Duration 72 ms    Q-T Interval 314 ms    QTC Calculation(Bazett) 491 ms    R Axis 40 degrees    T Axis 241 degrees    Diagnosis Line *** Critical Test Result: High HR  Atrial fibrillation with rapid ventricular response  Minimal voltage criteria for LVH, may be normal variant ( Sokolow-Elaine )  ST & Marked T wave abnormality, consider anterolateral ischemia  Confirmed by ANGELA RECINOS ([id number]) on 1/24/2024 6:18:13 AM (01-23-24 @ 14:30)       EXAM:  ECHO TTE WO CON COMP W DOPP         PROCEDURE DATE:  03/12/2022        INTERPRETATION:  Transthoracic Echocardiography Report (TTE)     Demographics     Patient name           NITA MAHONEY  Age           89 year(s)     Med Rec #       [id number]           Gender        Female     Account #              [id number]        Date of Birth 05/14/1931     Interpreting Physician Zohreh Allen MD     Room Number   0347     Referring Physician    Analy Aguero       Sonographer   Laurence Herrera     Date of study          03/12/2022 09:13 AM     Height                 60.63 in            Weight        105.82 pounds    Type of Study:     TTE procedure: ECHO TTE WO CON COMP W DOP     BP: 138/78 mmHg     Study Location: 3ET[facility]chnical Quality: Fair    Indications   1) R55 - Syncope and collapse    M-Mode Measurements (cm)     LVEDd: 4.7 cm             LVESd: 3.23 cm   IVSEd: 1.06 cm   LVPWd: 1.06 cm            AO Root Dimension: 3.2 cm                             LA: 4.1 cm                     LVOT: 2 cm    Doppler Measurements:     AV Velocity:152 cm/s               MV Peak E-Wave: 84.9 cm/s   AV Peak Gradient: 9.24 mmHg        MV Peak A-Wave: 100 cm/s                                      MV E/A Ratio: 0.85 %   TR Velocity:236 cm/s               MV Peak Gradient: 2.88 mmHg   TR Gradient:22.2784 mmHg   Estimated RAP:5 mmHg   RVSP:20 mmHg     Findings     Mitral Valve   Mild mitral annular calcification is present.   There is thickening of both mitral valve leaflets and chordal   calcification noted. The leaflet opening appears to be normal.   Mild (1+) to moderate mitral regurgitation is present.   EA reversal of the mitral inflow consistent with reduced compliance of   the   left ventricle.     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   Mild to moderate aortic regurgitation is present.     Tricuspid Valve   Normal appearing tricuspid valve structure and function.   Mild (1+) tricuspid valve regurgitation is present. Normal PA systolic   pressures.     Pulmonic Valve   Normal appearing pulmonic valve structure.   Trace pulmonic valvular regurgitation is present.     Left Atrium   The left atrium is mildly dilated.     Left Ventricle   The left ventricle is normal in size, wall thickness, wall motion and   contractility.   Estimated left ventricular ejection fraction is 60-65 %.     Right Atrium   Normal appearing right atrium.     Right Ventricle   Normal appearing right ventricle structure and function.     Pericardial Effusion   No evidence of pericardial effusion.     Pleural Effusion   Pleural effusion.     Miscellaneous   All visualized extra cardiac structures appears to be normal.     Impression     Summary     The left ventricle is normal in size, wall thickness, wall motion and   contractility.   Estimated left ventricular ejection fraction is 60-65 %.   The left atrium is mildly dilated.   Mild to moderate aortic regurgitation   Mild (1+) tricuspid valve regurgitation. Normal PA systolic pressures.   EA reversal of the mitral inflow consistent with reduced compliance of   the   left ventricle.     Signature     ----------------------------------------------------------------   Electronically signed by Zohreh Allen MD(Interpreting   physician) on 03/13/2022 11:56 AM   ----------------------------------------------------------------    Valves     Mitral Valve     Peak E-Wave: 84.9 cm/s   Peak A-Wave: 100 cm/s               Deceleration Time: 187 msec   Peak Gradient: 2.88 mmHg                                       E/A Ratio: 0.85     Tissue Doppler     E' Velocity: 8.22 cm/s     Aortic Valve     Peak Velocity: 152 cm/s   Peak Gradient: 9.24 mmHg     Tricuspid Valve     TR Velocity: 236 cm/s                  Estimated RAP: 5 mmHg   TR Gradient: 22.2784 mmHg              Estimated RVSP: 20 mmHg     Pulmonic Valve              Estimated PASP: 27.28 mmHg     LVOT     Peak Velocity: 87 cm/s   Peak Gradient: 3 mmHg   LVOT Diameter: 2 cm    Structures     Left Atrium     LA Dimension: 4.1 cm          LA Area: 19.5 cm^2   LA/Aorta: 1.28                LA Volume/Index: 59 ml /41m^2     Left Ventricle     Diastolic Dimension: 4.7 cm          Systolic Dimension: 3.23 cm   Septum Diastolic: 1.06 cm   PW Diastolic: 1.06 cm     FS: 31.28 %   LVOT Diameter: 2 cm     Right Atrium     RA Systolic Pressure: 5 mmHg     Right Ventricle              RV Systolic Pressure: 27.28 mmHg     Miscellaneous     Aorta     Aortic Root: 3.2 cm   Ascending Aorta: 3.2 cm   LVOT Diameter: 2 cm                ZOHREH ALLEN MD; Attending Cardiologist  This document has been electronically signed. Mar 13 2022 11:56AM      [facility] -- Stefano Emmanuel,  Ricky, Ramsey Recinos Savella, Goodger, Cohen  Office # [phone]    Follow Up:  HFrEF, Rhabdo, Elevated Troponins, poss. NSTEMI, A fib RVR    Subjective/Observations: Pt seen and examined, sleepy, easily arousable, resting in bed.  Denies chest pain, dyspnea, palpitations or dizziness, orthopnea and PND.  Tolerating RA.       REVIEW OF SYSTEMS: All other review of systems is negative unless indicated above  PAST MEDICAL & SURGICAL HISTORY:  Essential hypertension      Dyslipidemia      Hyponatremia  ON HCTZ , h/o Hypokelemia      Cerebrovascular accident (CVA), unspecified mechanism      Edema, unspecified type      Mitral valve insufficiency, unspecified etiology  Cardiac cath on 11/21/18      Basal cell carcinoma      Renal artery stenosis  right side, stent      CHF (congestive heart failure)      Inguinal hernia, right      Blood pressure instability      S/P Mohs surgery for basal cell carcinoma      Status post hysterectomy  and bladder lift with mesh 1990s      History of cholecystectomy  1980      H/O bilateral hip replacements  2013, 2014      Other elective surgery  right renal artery stent      S/P colonoscopy        MEDICATIONS  (STANDING):  aspirin  chewable 81 milliGRAM(s) Oral daily  atorvastatin 40 milliGRAM(s) Oral at bedtime  bisacodyl 5 milliGRAM(s) Oral at bedtime  ciprofloxacin     Tablet 500 milliGRAM(s) Oral daily  clopidogrel Tablet 75 milliGRAM(s) Oral daily  fluticasone propionate 50 MICROgram(s)/spray Nasal Spray 1 Spray(s) Both Nostrils two times a day  heparin   Injectable 5000 Unit(s) SubCutaneous every 8 hours  hydrALAZINE 75 milliGRAM(s) Oral every 8 hours  influenza  Vaccine (HIGH DOSE) 0.7 milliLiter(s) IntraMuscular once  losartan 25 milliGRAM(s) Oral daily  metoprolol succinate  milliGRAM(s) Oral every 12 hours  tamsulosin 0.4 milliGRAM(s) Oral at bedtime  torsemide 10 milliGRAM(s) Oral daily    MEDICATIONS  (PRN):  acetaminophen     Tablet .. 650 milliGRAM(s) Oral every 6 hours PRN Mild Pain (1 - 3)  benzonatate 100 milliGRAM(s) Oral every 8 hours PRN Cough  melatonin 3 milliGRAM(s) Oral at bedtime PRN Insomnia    Allergies    IV Contrast (Pruritus; Rash)  verapamil (Other)  Keflex (Other)    Intolerances      Vital Signs Last 24 Hrs  T(C): 36.3 (29 Jan 2024 04:50), Max: 36.7 (28 Jan 2024 09:43)  T(F): 97.4 (29 Jan 2024 04:50), Max: 98 (28 Jan 2024 09:43)  HR: 80 (29 Jan 2024 04:50) (72 - 80)  BP: 185/74 (29 Jan 2024 04:50) (130/64 - 188/84)  BP(mean): --  RR: 18 (29 Jan 2024 04:50) (18 - 18)  SpO2: 94% (29 Jan 2024 04:50) (94% - 96%)    Parameters below as of 29 Jan 2024 04:50  Patient On (Oxygen Delivery Method): room air      I&O's Summary    28 Jan 2024 07:01  -  29 Jan 2024 07:00  --------------------------------------------------------  IN: 0 mL / OUT: 300 mL / NET: -300 mL        TELE: Not on telemetry   PHYSICAL EXAM:  Constitutional: NAD, sleepy  HEENT: Moist Mucous Membranes, Anicteric  Pulmonary: Non-labored, breath sounds are clear bilaterally, No wheezing, rales or rhonchi  Cardiovascular: IRRegular, S1 and S2, No murmurs, rubs, gallops or clicks  Gastrointestinal: Bowel Sounds present, soft, nontender.   Lymph: No peripheral edema. No lymphadenopathy.  Skin: No visible rashes or ulcers.  Psych:  Mood & affect flat     LABS: All Labs Reviewed:                        10.5   11.39 )-----------( 417      ( 29 Jan 2024 05:25 )             32.5                         10.3   10.07 )-----------( 420      ( 28 Jan 2024 07:32 )             32.6                         10.4   8.41  )-----------( 438      ( 27 Jan 2024 07:46 )             33.3     29 Jan 2024 05:25    139    |  110    |  37     ----------------------------<  128    4.5     |  23     |  1.50   28 Jan 2024 07:32    138    |  111    |  36     ----------------------------<  168    3.9     |  21     |  1.50   27 Jan 2024 07:46    137    |  112    |  41     ----------------------------<  123    4.5     |  20     |  1.40     Ca    8.4        29 Jan 2024 05:25  Ca    8.0        28 Jan 2024 07:32  Ca    8.3        27 Jan 2024 07:46  Phos  4.2       29 Jan 2024 05:25  Phos  4.2       28 Jan 2024 07:32  Phos  3.7       27 Jan 2024 07:46  Mg     2.1       29 Jan 2024 05:25  Mg     2.0       28 Jan 2024 07:32  Mg     2.0       27 Jan 2024 07:46    TPro  5.2    /  Alb  2.2    /  TBili  0.5    /  DBili  x      /  AST  21     /  ALT  35     /  AlkPhos  123    29 Jan 2024 05:25  TPro  4.9    /  Alb  2.0    /  TBili  0.4    /  DBili  x      /  AST  41     /  ALT  49     /  AlkPhos  123    27 Jan 2024 07:46  TPro  5.4    /  Alb  2.2    /  TBili  0.4    /  DBili  x      /  AST  57     /  ALT  60     /  AlkPhos  142    26 Jan 2024 11:20          12 Lead ECG:   Ventricular Rate 147 BPM    QRS Duration 72 ms    Q-T Interval 314 ms    QTC Calculation(Bazett) 491 ms    R Axis 40 degrees    T Axis 241 degrees    Diagnosis Line *** Critical Test Result: High HR  Atrial fibrillation with rapid ventricular response  Minimal voltage criteria for LVH, may be normal variant ( Sokolow-Elaine )  ST & Marked T wave abnormality, consider anterolateral ischemia  Confirmed by ANGELA RECINOS ([id number]) on 1/24/2024 6:18:13 AM (01-23-24 @ 14:30)       EXAM:  ECHO TTE WO CON COMP W DOPP         PROCEDURE DATE:  03/12/2022        INTERPRETATION:  Transthoracic Echocardiography Report (TTE)     Demographics     Patient name           NITA MAHONEY  Age           89 year(s)     Med Rec #       [id number]           Gender        Female     Account #              [id number]        Date of Birth 05/14/1931     Interpreting Physician Zohreh Allen MD     Room Number   0347     Referring Physician    Analy Aguero       Sonographer   Laurence Herrera     Date of study          03/12/2022 09:13 AM     Height                 60.63 in            Weight        105.82 pounds    Type of Study:     TTE procedure: ECHO TTE WO CON COMP W DOP     BP: 138/78 mmHg     Study Location: 3ET[employer]chnical Quality: Fair    Indications   1) R55 - Syncope and collapse    M-Mode Measurements (cm)     LVEDd: 4.7 cm             LVESd: 3.23 cm   IVSEd: 1.06 cm   LVPWd: 1.06 cm            AO Root Dimension: 3.2 cm                             LA: 4.1 cm                     LVOT: 2 cm    Doppler Measurements:     AV Velocity:152 cm/s               MV Peak E-Wave: 84.9 cm/s   AV Peak Gradient: 9.24 mmHg        MV Peak A-Wave: 100 cm/s                                      MV E/A Ratio: 0.85 %   TR Velocity:236 cm/s               MV Peak Gradient: 2.88 mmHg   TR Gradient:22.2784 mmHg   Estimated RAP:5 mmHg   RVSP:20 mmHg     Findings     Mitral Valve   Mild mitral annular calcification is present.   There is thickening of both mitral valve leaflets and chordal   calcification noted. The leaflet opening appears to be normal.   Mild (1+) to moderate mitral regurgitation is present.   EA reversal of the mitral inflow consistent with reduced compliance of   the   left ventricle.     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   Mild to moderate aortic regurgitation is present.     Tricuspid Valve   Normal appearing tricuspid valve structure and function.   Mild (1+) tricuspid valve regurgitation is present. Normal PA systolic   pressures.     Pulmonic Valve   Normal appearing pulmonic valve structure.   Trace pulmonic valvular regurgitation is present.     Left Atrium   The left atrium is mildly dilated.     Left Ventricle   The left ventricle is normal in size, wall thickness, wall motion and   contractility.   Estimated left ventricular ejection fraction is 60-65 %.     Right Atrium   Normal appearing right atrium.     Right Ventricle   Normal appearing right ventricle structure and function.     Pericardial Effusion   No evidence of pericardial effusion.     Pleural Effusion   Pleural effusion.     Miscellaneous   All visualized extra cardiac structures appears to be normal.     Impression     Summary     The left ventricle is normal in size, wall thickness, wall motion and   contractility.   Estimated left ventricular ejection fraction is 60-65 %.   The left atrium is mildly dilated.   Mild to moderate aortic regurgitation   Mild (1+) tricuspid valve regurgitation. Normal PA systolic pressures.   EA reversal of the mitral inflow consistent with reduced compliance of   the   left ventricle.     Signature     ----------------------------------------------------------------   Electronically signed by Zohreh Allen MD(Interpreting   physician) on 03/13/2022 11:56 AM   ----------------------------------------------------------------    Valves     Mitral Valve     Peak E-Wave: 84.9 cm/s   Peak A-Wave: 100 cm/s               Deceleration Time: 187 msec   Peak Gradient: 2.88 mmHg                                       E/A Ratio: 0.85     Tissue Doppler     E' Velocity: 8.22 cm/s     Aortic Valve     Peak Velocity: 152 cm/s   Peak Gradient: 9.24 mmHg     Tricuspid Valve     TR Velocity: 236 cm/s                  Estimated RAP: 5 mmHg   TR Gradient: 22.2784 mmHg              Estimated RVSP: 20 mmHg     Pulmonic Valve              Estimated PASP: 27.28 mmHg     LVOT     Peak Velocity: 87 cm/s   Peak Gradient: 3 mmHg   LVOT Diameter: 2 cm    Structures     Left Atrium     LA Dimension: 4.1 cm          LA Area: 19.5 cm^2   LA/Aorta: 1.28                LA Volume/Index: 59 ml /41m^2     Left Ventricle     Diastolic Dimension: 4.7 cm          Systolic Dimension: 3.23 cm   Septum Diastolic: 1.06 cm   PW Diastolic: 1.06 cm     FS: 31.28 %   LVOT Diameter: 2 cm     Right Atrium     RA Systolic Pressure: 5 mmHg     Right Ventricle              RV Systolic Pressure: 27.28 mmHg     Miscellaneous     Aorta     Aortic Root: 3.2 cm   Ascending Aorta: 3.2 cm   LVOT Diameter: 2 cm                ZOHREH ALLEN MD; Attending Cardiologist  This document has been electronically signed. Mar 13 2022 11:56AM

## 2024-01-29 NOTE — SOCIAL WORK PROGRESS NOTE - NSSWPROGRESSNOTE_GEN_ALL_CORE
Per medical team, pt is cleared for dc today. Pt to be transferred to [facility] today at 4:30pm via [facility] ambulance. SW made pt and pt dtr Brittney aware of dc for today; both in agreement. No further SW needs indicated at this time and will remain available as needed.

## 2024-01-29 NOTE — PROGRESS NOTE ADULT - ASSESSMENT
89-year-old white female presently residing at home alone though does have assistance of an aide approximately 4 to 6 hours/day with past history of hypertension hyperlipidemia previous CVA basal cell carcinoma renal artery stenosis s/p stent as well as congestive heart failure who was last known to be well and last heard from sometime late afternoon yesterday.  Cardiology consulted for syncopal episode and elevated troponins.    Syncope, Elevated Troponin/NSTEMI, HTN, HFrEF   - s/p mechanical fall. May have been orthostatic syncope    - BNP: [id number],  <--859395  - TTE 4/23: EF 58%, mod MR, mild- mod TR, mild AR, CO.  - Repeat TTE showed EF 35-40% with SWMA, LAE, mod MR.    - continue torsemide resumed  - creat 1.5, continue to trend renal indices   - compensated from HF POV    - optimizing GDMT and medically manage for now, c/w BB, Hydralazine and ARB as bp tolerates.    - can keep off Clonidine at this time  - Would recommend LHC/RHC down the road.  At this point, in the setting of confusion and TERESA, she is not a candidate for any ischemic w/u at this time. Mental status improving, alert today    - EKG: Afib @ 122 w/ continued diffuse TWI  - Repeat EKG w/ ST @ 109, Diffuse TWI (new)  - CK peaked @ 2986  - Troponin peaked and downtrended, no need to trend further  - Possible NSTEMI in the setting of SWMA on TTE  - Continue home statin and Plavix  - CHADSVASc: 4-5.  Would hold off on AC in the setting of downtrending Hgb.  Either way, she does not appear to be a good candidate for long term AC    - Monitor and replete lytes, keep K>4, Mg>2.  - Will continue to follow.    Russ Gibbons NP  Nurse Practitioner- Cardiology   Call TEAMS     89-year-old white female presently residing at home alone though does have assistance of an aide approximately 4 to 6 hours/day with past history of hypertension hyperlipidemia previous CVA basal cell carcinoma renal artery stenosis s/p stent as well as congestive heart failure who was last known to be well and last heard from sometime late afternoon yesterday.  Cardiology consulted for syncopal episode and elevated troponins.    Syncope, Elevated Troponin/NSTEMI, HTN, HFrEF   - s/p mechanical fall. May have been orthostatic syncope    - BNP: 066949,  <--849997  - TTE 4/23: EF 58%, mod MR, mild- mod TR, mild AR, AK.  - Repeat TTE showed EF 35-40% with SWMA, LAE, mod MR.    - continue torsemide resumed  - creat 1.5, continue to trend renal indices   - compensated from HF POV    - optimizing GDMT and medically manage for now, c/w BB, Hydralazine and ARB as bp tolerates.    - can keep off Clonidine at this time  - will need eventual LHC/RHC.  At this point, in the setting of confusion, she is not a candidate for any ischemic w/u at this time. Mental status improving, alert today    - EKG: Afib @ 122 w/ continued diffuse TWI  - Repeat EKG w/ ST @ 109, Diffuse TWI (new)  - CK peaked @ 2986  - Troponin peaked and downtrended, no need to trend further  - Possible NSTEMI in the setting of SWMA on TTE  - Continue home statin and Plavix  - CHADSVASc: 4-5.  Would hold off on AC in the setting of downtrending Hgb.  Either way, she does not appear to be a good candidate for long term AC    - Follows with Dr Kaushal otero  - [place] planning per primary team  - Monitor and replete lytes, keep K>4, Mg>2.  - Will continue to follow.    Russ Gibbons NP  Nurse Practitioner- Cardiology   Call TEAMS     89-year-old white female presently residing at home alone though does have assistance of an aide approximately 4 to 6 hours/day with past history of hypertension hyperlipidemia previous CVA basal cell carcinoma renal artery stenosis s/p stent as well as congestive heart failure who was last known to be well and last heard from sometime late afternoon yesterday.  Cardiology consulted for syncopal episode and elevated troponins.    Syncope, Elevated Troponin/NSTEMI, HTN, HFrEF   - s/p mechanical fall. May have been orthostatic syncope    - BNP: [id number],  <--764069  - TTE 4/23: EF 58%, mod MR, mild- mod TR, mild AR, AL.  - Repeat TTE showed EF 35-40% with SWMA, LAE, mod MR.    - continue torsemide resumed  - creat 1.5, continue to trend renal indices   - compensated from HF POV    - optimizing GDMT and medically manage for now, c/w BB, Hydralazine and ARB as bp tolerates.    - can keep off Clonidine at this time  - will need eventual LHC/RHC.  At this point, in the setting of mental status/confusion, she is not a candidate for any ischemic w/u at this time.    - EKG: Afib @ 122 w/ continued diffuse TWI  - Repeat EKG w/ ST @ 109, Diffuse TWI (new)  - CK peaked @ 2986  - Troponin peaked and downtrended, no need to trend further  - Possible NSTEMI in the setting of SWMA on TTE  - Continue home statin and Plavix  - CHADSVASc: 4-5.  Would hold off on AC in the setting of downtrending Hgb.  Either way, she does not appear to be a good candidate for long term AC    - Follows with Dr Kaushal otero  - [place]c planning per primary team  - Monitor and replete lytes, keep K>4, Mg>2.  - Will continue to follow.    Russ Gibbons NP  Nurse Practitioner- Cardiology   Call TEAMS     89-year-old white female presently residing at home alone though does have assistance of an aide approximately 4 to 6 hours/day with past history of hypertension hyperlipidemia previous CVA basal cell carcinoma renal artery stenosis s/p stent as well as congestive heart failure who was last known to be well and last heard from sometime late afternoon yesterday.  Cardiology consulted for syncopal episode and elevated troponins.    Syncope, Elevated Troponin/NSTEMI, HTN, HFrEF   - s/p mechanical fall. May have been orthostatic syncope    - BNP: 554235,  <--091124  - TTE 4/23: EF 58%, mod MR, mild- mod TR, mild AR, MA.  - Repeat TTE showed EF 35-40% with SWMA, LAE, mod MR.    - continue torsemide resumed  - creat 1.5, continue to trend renal indices   - compensated from HF POV    - optimizing GDMT and medically manage for now  - Bps labile. check manual BP.  c/w BB, Hydralazine and ARB as bp tolerates.  if remains hypertensive, can increase ARB   - can keep off Clonidine at this time  - will need eventual LHC/RHC.  At this point, in the setting of mental status/confusion, she is not a candidate for any ischemic w/u at this time.    - EKG: Afib @ 122 w/ continued diffuse TWI  - Repeat EKG w/ ST @ 109, Diffuse TWI (new)  - CK peaked @ 2986  - Troponin peaked and downtrended, no need to trend further  - Possible NSTEMI in the setting of SWMA on TTE  - Continue home statin and Plavix  - CHADSVASc: 4-5.  Would hold off on AC in the setting of downtrending Hgb.  Either way, she does not appear to be a good candidate for long term AC    - Follows with Dr Kaushal otero  - Dc planning per primary team  - Monitor and replete lytes, keep K>4, Mg>2.  - Will continue to follow.    Russ Gibbons NP  Nurse Practitioner- Cardiology   Call TEAMS

## 2024-01-29 NOTE — PROGRESS NOTE ADULT - SUBJECTIVE AND OBJECTIVE BOX
MARU MOYA is a 89yFemale , patient examined and chart reviewed.     INTERVAL HPI/ OVERNIGHT EVENTS:   Afebrile No events.  Weak.    PAST MEDICAL & SURGICAL HISTORY:  Essential hypertension  Dyslipidemia  Hyponatremia  ON HCTZ , h/o Hypokelemia  Cerebrovascular accident (CVA), unspecified mechanism  Edema, unspecified type  Mitral valve insufficiency, unspecified etiology  Cardiac cath on 11/21/18  Basal cell carcinoma  Renal artery stenosis  right side, stent  CHF (congestive heart failure)  Inguinal hernia, right  Blood pressure instability  S/P Mohs surgery for basal cell carcinoma  Status post hysterectomy  and bladder lift with mesh 1990s  History of cholecystectomy  1980  H/O bilateral hip replacements  2013, 2014  Other elective surgery  right renal artery stent  S/P colonoscopy    For details regarding the patient's social history, family history, and other miscellaneous elements, please refer the initial infectious diseases consultation and/or the admitting history and physical examination for this admission.    ROS: Poor historian    ALLERGIES  IV Contrast (Pruritus; Rash)  verapamil (Other)  Keflex (Other)      Current inpatient medications :    ANTIBIOTICS/RELEVANT:    MEDICATIONS  (STANDING):  aspirin  chewable 81 milliGRAM(s) Oral daily  atorvastatin 40 milliGRAM(s) Oral at bedtime  bisacodyl 5 milliGRAM(s) Oral at bedtime  clopidogrel Tablet 75 milliGRAM(s) Oral daily  fluticasone propionate 50 MICROgram(s)/spray Nasal Spray 1 Spray(s) Both Nostrils two times a day  heparin   Injectable 5000 Unit(s) SubCutaneous every 8 hours  hydrALAZINE 75 milliGRAM(s) Oral every 8 hours  influenza  Vaccine (HIGH DOSE) 0.7 milliLiter(s) IntraMuscular once  losartan 25 milliGRAM(s) Oral daily  metoprolol succinate  milliGRAM(s) Oral every 12 hours  tamsulosin 0.4 milliGRAM(s) Oral at bedtime  torsemide 10 milliGRAM(s) Oral daily    MEDICATIONS  (PRN):  acetaminophen     Tablet .. 650 milliGRAM(s) Oral every 6 hours PRN Mild Pain (1 - 3)  benzonatate 100 milliGRAM(s) Oral every 8 hours PRN Cough  melatonin 3 milliGRAM(s) Oral at bedtime PRN Insomnia      Objective:  Vital Signs Last 24 Hrs  T(C): 36.6 (29 Jan 2024 11:11), Max: 36.6 (29 Jan 2024 11:11)  T(F): 97.9 (29 Jan 2024 11:11), Max: 97.9 (29 Jan 2024 11:11)  HR: 83 (29 Jan 2024 11:11) (76 - 83)  BP: 145/63 (29 Jan 2024 13:56) (132/60 - 188/84)  RR: 20 (29 Jan 2024 11:11) (18 - 20)  SpO2: 93% (29 Jan 2024 11:11) (93% - 96%)    Parameters below as of 29 Jan 2024 11:11  Patient On (Oxygen Delivery Method): room air    Physical Exam:  General: no acute distress  Neck: supple, trachea midline  Lungs: clear, no wheeze/rhonchi  Cardiovascular: regular rate and rhythm, S1 S2  Abdomen: soft, nontender,  bowel sounds normal  Neurological: Awake  Skin: no rash  Extremities: no edema    LABS:                        10.5   11.39 )-----------( 417      ( 29 Jan 2024 05:25 )             32.5   01-29    139  |  110<H>  |  37<H>  ----------------------------<  128<H>  4.5   |  23  |  1.50<H>    Ca    8.4<L>      29 Jan 2024 05:25  Phos  4.2     01-29  Mg     2.1     01-29    TPro  5.2<L>  /  Alb  2.2<L>  /  TBili  0.5  /  DBili  x   /  AST  21  /  ALT  35  /  AlkPhos  123<H>  01-29      Urine Microscopic-Add On (NC) (01.18.24 @ 21:30)    Comment - Urine: occasional wbc clumps   Squamous Epithelial Cells: Present   Red Blood Cell - Urine: 4 /HPF   White Blood Cell - Urine: 45 /HPF   Bacteria: Many /HPF  Urinalysis (01.18.24 @ 21:30)    pH Urine: 6.5   Blood, Urine: Moderate   Glucose Qualitative, Urine: Negative mg/dL   Color: Yellow   Urine Appearance: Cloudy   Bilirubin: Negative   Ketone - Urine: Trace mg/dL   Specific Gravity: 1.014   Protein, Urine: >=1000 mg/dL   Urobilinogen: 0.2 mg/dL   Nitrite: Negative   Leukocyte Esterase Concentration: Small        RECENT CULTURES:  Culture - Blood (01.18.24 @ 17:32)    Specimen Source: .Blood Blood-Peripheral   Culture Results:   No growth at 4 days    Culture - Urine (01.18.24 @ 21:30)    -  Ceftazidime: S <=1   -  Meropenem: S <=1   -  Piperacillin/Tazobactam: S <=8   -  Amikacin: S <=16   -  Aztreonam: S <=4   -  Cefepime: S <=2   -  Ciprofloxacin: S <=0.25   -  Imipenem: S 2   -  Levofloxacin: S <=0.5   Specimen Source: Clean Catch Clean Catch (Midstream)   Culture Results:   >100,000 CFU/ml Pseudomonas aeruginosa   Organism Identification: Pseudomonas aeruginosa   Organism: Pseudomonas aeruginosa   Method Type: LASHAE    RADIOLOGY & ADDITIONAL STUDIES:      Assessment :   89YO F PMHx of HTN, HLD, Renal Artery Stenosis s/p stent, Basal Cell Carcinoma, CHFpEF CVA admitted sp fall with Rhabdomyolysis found to have UTI. WBC 26K with bandemia and TERESA on CKD. Noted to have AUR Sanders placed 1/21. Ua with pyuria and Ucx grew Pseudomonas.  + troponins  Sanders dc'd 1/25 Failed TOV Sanders replaced  WBc 11K today noted  Clinically stable    Plan :   Monitor off antibiotics  Off  Zosyn--> po Cipro x 5 days ended 1/29  Trend temps and cbc, renal fx  Pulm toileting  Asp precautions  Stable from ID standpoint  Dc planning to [facility] per primary team    D/w Dr Ambrocio    Continue with present regiment.  Appropriate use of antibiotics and adverse effects reviewed.    > 35 minutes were spent in direct patient care reviewing notes, medications ,labs data/ imaging , discussion with multidisciplinary team.    Thank you for allowing me to participate in care of your patient .    Rebel Rankin MD  Infectious Disease  [phone]

## 2024-01-29 NOTE — DISCHARGE NOTE NURSING/CASE MANAGEMENT/SOCIAL WORK - PATIENT PORTAL LINK FT
You can access the FollowMyHealth Patient Portal offered by [facility] by registering at the following website: [email or web address]. By joining [facility]’s FollowMyHealth portal, you will also be able to view your health information using other applications (apps) compatible with our system.

## 2024-01-29 NOTE — PROGRESS NOTE ADULT - NS ATTEND AMEND GEN_ALL_CORE FT
89-year-old white female presently residing at home alone though does have assistance of an aide approximately 4 to 6 hours/day with past history of hypertension hyperlipidemia previous CVA basal cell carcinoma renal artery stenosis s/p stent as well as congestive heart failure who was last known to be well and last heard from sometime late afternoon yesterday.  Cardiology consulted for syncopal episode and elevated troponins.    - s/p mechanical fall. May have been orthostatic syncope  - Repeat TTE showed EF 35-40% with SWMA, LAE, mod   Would recommend LH/RHC down the road.  At this point, in the setting of confusion, she is not a candidate for any ischemic w/u at this time  - Hold home torsemide for now.  Creatinine continues to improve  - Continue BB   - Unable to start ACEI/ARB or Entresto in the setting of TERESA  - Continue Hydralazine at 75 q8hr  - Tele w/ SR 90s, p A fib 130-140s, nonsustained 180s from 1:30 to 4:30 pm, now back in SR  - Continue Toprol  BID  - CHadsvasc 4-5.  Would hold off on AC in the setting of downtrending Hgb.  Either way, she does not appear to be a good candidate for long term AC  - Keep off Clonidine and up titrate GDMT as tolerated  - Possible NSTEMI in the setting of SWMA on TTE  - Continue home statin and Plavix  - For now, would medically manage.  Her TERESA and AMS preclude us in proceeding with cardiac cath.
89-year-old white female presently residing at home alone though does have assistance of an aide approximately 4 to 6 hours/day with past history of hypertension hyperlipidemia previous CVA basal cell carcinoma renal artery stenosis s/p stent as well as congestive heart failure.  Cardiology consulted for syncopal episode and elevated troponins.    - s/p mechanical fall. May have been orthostatic syncope  - Repeat TTE showed EF 35-40% with SWMA, LAE, mod MR.  Would recommend LH/RHC down the road.  At this point, in the setting of confusion, she is not a candidate for any ischemic w/u at this time  - Creatinine continues to improve  - Continue BB   - Continue Hydralazine at 75 q8hr  - agree with starting back on torsemide and low dose losartan  - Tele w/ SR 90s, p A fib 130-140s, nonsustained 180s from 1:30 to 4:30 pm, now back in SR  - Continue Toprol  BID  - CHadsvasc 4-5.  Would hold off on AC in the setting of downtrending Hgb.  Either way, she does not appear to be a good candidate for long term AC  - Keep off Clonidine and up titrate GDMT as tolerated  - Continue home statin and Plavix  - For now, would medically manage.  Her TERESA and AMS preclude us in proceeding with cardiac cath.  - overall, she is feeling better and more alert.
89-year-old white female presently residing at home alone though does have assistance of an aide approximately 4 to 6 hours/day with past history of hypertension hyperlipidemia previous CVA basal cell carcinoma renal artery stenosis s/p stent as well as congestive heart failure who was last known to be well and last heard from sometime late afternoon yesterday.  Cardiology consulted for syncopal episode and elevated troponins.    - s/p mechanical fall. May have been orthostatic syncope  - Repeat TTE showed EF 35-40% with SWMA, LAE, mod MR.  Would recommend LH/RHC down the road.  At this point, in the setting of confusion, she is not a candidate for any ischemic w/u at this time  - Creatinine continues to improve  - Continue BB   - Continue Hydralazine at 75 q8hr  - agree with starting back on torsemide and low dose losartan  - Tele w/ SR 90s, p A fib 130-140s, nonsustained 180s from 1:30 to 4:30 pm, now back in SR  - Continue Toprol  BID  - CHadsvasc 4-5.  Would hold off on AC in the setting of downtrending Hgb.  Either way, she does not appear to be a good candidate for long term AC  - Keep off Clonidine and up titrate GDMT as tolerated  - Possible NSTEMI in the setting of SWMA on TTE  - Continue home statin and Plavix  - For now, would medically manage.  Her TERESA and AMS preclude us in proceeding with cardiac cath.
89-year-old white female presently residing at home alone though does have assistance of an aide approximately 4 to 6 hours/day with past history of hypertension hyperlipidemia previous CVA basal cell carcinoma renal artery stenosis s/p stent as well as congestive heart failure who was last known to be well and last heard from sometime late afternoon yesterday.  Cardiology consulted for syncopal episode and elevated troponins.    - s/p mechanical fall. May have been orthostatic syncope; appears to be clinically dry   - Repeat TTE showed EF 35-40% with SWMA, LAE, mod MR.  Would recommend LH/RHC down the road.  At this point, in the setting of confusion, she is not a candidate for any ischemic w/u at this time  - Hold home torsemide for now.  Creatinine continues to improve  - Continue BB   - Unable to start ACEI/ARB or Entresto in the setting of TERESA  - Holding home Losartan 2/2 TERESA, resume when able   - Continue Hydralazine to 50 mg q8H for afterload reduction  - Initially NSR/ Stach.  Went into A fib with -120s 1/20.  Converted to NSR, 1/20.    - Tele w/ SR 90s, p A fib 130-140s, nonsustained 180s from 1:30 to 4:30 pm, now back in SR  - Continue Toprol  BID  - CHadsvasc 4-5.  Would hold off on AC in the setting of downtrending Hgb.  Either way, she does not appear to be a good candidate for long term AC  - Keep off Clonidine and up titrate GDMT as tolerated  - Possible NSTEMI in the setting of SWMA on TTE  - Continue home statin and Plavix  - For now, would medically manage.  Her TERESA and AMS preclude us in proceeding with cardiac cath
89-year-old white female presently residing at home alone though does have assistance of an aide approximately 4 to 6 hours/day with past history of hypertension hyperlipidemia previous CVA basal cell carcinoma renal artery stenosis s/p stent as well as congestive heart failure who was last known to be well and last heard from sometime late afternoon yesterday.  Cardiology consulted for syncopal episode and elevated troponins.    - s/p mechanical fall. May have been orthostatic syncope; appears to be clinically dry   - Repeat TTE showed EF 35-40% with SWMA, LAE, mod MR.  Would recommend LH/RHC down the road.  At this point, in the setting of confusion, she is not a candidate for any ischemic w/u at this time  - Hold home torsemide for now.  Creatinine continues to improve  - Continue BB   - Unable to start ACEI/ARB or Entresto in the setting of TERESA  - Holding home Losartan 2/2 TERESA, resume when able   - Continue Hydralazine to 50 mg q8H for afterload reduction. may need to increase to 75mg q8  - Initially NSR/ Stach.  Went into A fib with -120s 1/20.  Converted to NSR, 1/20.    - Tele w/ SR 90s, p A fib 130-140s, nonsustained 180s from 1:30 to 4:30 pm, now back in SR  - Continue Toprol  BID  - CHadsvasc 4-5.  Would hold off on AC in the setting of downtrending Hgb.  Either way, she does not appear to be a good candidate for long term AC  - Keep off Clonidine and up titrate GDMT as tolerated  - Possible NSTEMI in the setting of SWMA on TTE  - Continue home statin and Plavix  - For now, would medically manage.  Her TERESA and AMS preclude us in proceeding with cardiac cath
I have personally seen and examined the patient in detail.  I have spoken to the provider regarding the assessment and plan of care.  I have made changes to the note accordingly.
Judy is a 89-year-old white female with past history of hypertension, hyperlipidemia, renal artery stenosis s/p stent, here after being found on the ground.    - she reports a mechanical fall, though her description of the event is unclear, and she remains somnolent  - ekg with notable noise, though irregularity suggests af. Please repeat a 12 lead ekg.  - troponin is elevated, presumably in the setting of rhabdo, and would trend until peak  - Trops flat, CKs peaked, now downtrending  - ok with IVF  - EKG with new TWI, though she remains completely asymptomatic  - Will obtain TTE today for further evaluation  - monitor BP closely. Hold losartan and diuretics in the setting of TERESA  - can continue hydralazine, clonidine and metoprolol. Uptitrate Hydral for BP control  - cont plavix, can hold statin given lft abn  - monitor on telemetry, episodes of ST  - trend creatinine and electrolytes. Keep K>4, mg>2  - will follow with you .
I have personally seen and examined the patient in detail.  I have spoken to the provider regarding the assessment and plan of care.  I have made changes to the note accordingly.     Pt presents s/p fall  TTE shows EF decreased to 35-40% with SWMA  can consider ischemic eval  Cr 1.9  Pt folows with Dr Vila
Judy is a 89-year-old white female with past history of hypertension, hyperlipidemia, renal artery stenosis s/p stent, here after being found on the ground.    - she reports a mechanical fall, though her description of the event is unclear, and she remains somnolent  - ekg with notable noise, though irregularity suggests af. Please repeat a 12 lead ekg.  - troponin is elevated, presumably in the setting of rhabdo, and would trend until peak  - Trops flat, CKs peaked, now downtrending  - EKG with new TWI, though she remains completely asymptomatic  - TTE with LVEF 35-40%. Confused this AM. not a candidate for ischemic eval at this time.   - No IVF in the setting of trace LE edema and new O2 requirement.   - monitor BP closely. Hold losartan and diuretics in the setting of TERESA  - Stop Clonidine. Resume Hydralazine and uptitrate for improved BPs  - Please switch Lopressor to Metoprolol XL for GDMT  - cont plavix, statin  - Now with episode of AF on 1/20?. CHADSVASc elevated, not on AC at this time.   - trend creatinine and electrolytes. Keep K>4, mg>2  - will follow with you .
89-year-old white female presently residing at home alone though does have assistance of an aide approximately 4 to 6 hours/day with past history of hypertension hyperlipidemia previous CVA basal cell carcinoma renal artery stenosis s/p stent as well as congestive heart failure.  Cardiology consulted for syncopal episode and elevated troponins.    - s/p mechanical fall. May have been orthostatic syncope  - Repeat TTE showed EF 35-40% with SWMA, LAE, mod MR.    - Can consider  LH/RHC down the road.  At this point, in the setting of confusion and TERESA, she is not a candidate for any ischemic w/u at this time  - Creatinine stable  - Continue BB   - Continue Hydralazine at 75 q8hr    - Appears compensated from HF POV.   - cont torsemide pO  - Please continue to maintain strict I/Os, monitor daily weights, Cr, and K.    - Tele w/ SR 90s, p A fib 130-140s, nonsustained 180s from 1:30 to 4:30 pm, now back in SR  - Continue Toprol  BID  - CHadsvasc 4-5.  Would hold off on AC in the setting of downtrending Hgb.  Either way, she does not appear to be a good candidate for long term AC  - Keep off Clonidine and up titrate GDMT as tolerated  - tolerating losartan 25mg Qday    - Continue home statin and Plavix  - For now, would medically manage.  Her TERESA and AMS preclude us in proceeding with cardiac cath.

## 2024-02-28 ENCOUNTER — RX RENEWAL (OUTPATIENT)
Age: 89
End: 2024-02-28

## 2024-03-08 ENCOUNTER — RX RENEWAL (OUTPATIENT)
Age: 89
End: 2024-03-08

## 2024-03-22 ENCOUNTER — APPOINTMENT (OUTPATIENT)
Dept: CARDIOLOGY | Facility: CLINIC | Age: 89
End: 2024-03-22
Payer: MEDICARE

## 2024-03-22 VITALS
HEART RATE: 66 BPM | BODY MASS INDEX: 17.87 KG/M2 | HEIGHT: 60 IN | DIASTOLIC BLOOD PRESSURE: 70 MMHG | WEIGHT: 91 LBS | OXYGEN SATURATION: 97 % | SYSTOLIC BLOOD PRESSURE: 144 MMHG

## 2024-03-22 DIAGNOSIS — R60.0 LOCALIZED EDEMA: ICD-10-CM

## 2024-03-22 DIAGNOSIS — E78.5 HYPERLIPIDEMIA, UNSPECIFIED: ICD-10-CM

## 2024-03-22 PROCEDURE — 99214 OFFICE O/P EST MOD 30 MIN: CPT

## 2024-03-22 PROCEDURE — G2211 COMPLEX E/M VISIT ADD ON: CPT

## 2024-03-22 PROCEDURE — 93000 ELECTROCARDIOGRAM COMPLETE: CPT

## 2024-03-22 RX ORDER — LOSARTAN POTASSIUM 50 MG/1
50 TABLET, FILM COATED ORAL
Qty: 180 | Refills: 3 | Status: DISCONTINUED | COMMUNITY
Start: 2018-07-26 | End: 2024-03-22

## 2024-03-22 RX ORDER — CYANOCOBALAMIN (VITAMIN B-12) 1000 MCG
1000 TABLET ORAL DAILY
Qty: 30 | Refills: 6 | Status: DISCONTINUED | COMMUNITY
End: 2024-03-22

## 2024-03-22 RX ORDER — ASCORBIC ACID 500 MG
500 TABLET ORAL
Qty: 30 | Refills: 0 | Status: DISCONTINUED | COMMUNITY
Start: 2022-03-18 | End: 2024-03-22

## 2024-03-22 RX ORDER — HYDRALAZINE HYDROCHLORIDE 25 MG/1
25 TABLET ORAL
Qty: 270 | Refills: 3 | Status: DISCONTINUED | COMMUNITY
Start: 2022-10-27 | End: 2024-03-22

## 2024-03-22 RX ORDER — TRIAMCINOLONE ACETONIDE 5 MG/G
0.5 CREAM TOPICAL TWICE DAILY
Qty: 60 | Refills: 2 | Status: DISCONTINUED | COMMUNITY
Start: 2022-03-25 | End: 2024-03-22

## 2024-03-22 RX ORDER — ADHESIVE TAPE 3"X 2.3 YD
50 MCG TAPE, NON-MEDICATED TOPICAL DAILY
Refills: 0 | Status: DISCONTINUED | COMMUNITY
End: 2024-03-22

## 2024-03-22 RX ORDER — TAMSULOSIN HYDROCHLORIDE 0.4 MG/1
0.4 CAPSULE ORAL DAILY
Refills: 0 | Status: ACTIVE | COMMUNITY

## 2024-03-22 RX ORDER — FERROUS SULFATE TAB EC 325 MG (65 MG FE EQUIVALENT) 325 (65 FE) MG
325 (65 FE) TABLET DELAYED RESPONSE ORAL DAILY
Qty: 90 | Refills: 3 | Status: DISCONTINUED | COMMUNITY
Start: 2022-03-31 | End: 2024-03-22

## 2024-03-22 RX ORDER — CLONIDINE HYDROCHLORIDE 0.2 MG/1
0.2 TABLET ORAL
Qty: 270 | Refills: 1 | Status: DISCONTINUED | COMMUNITY
Start: 2017-12-15 | End: 2024-03-22

## 2024-03-22 RX ORDER — VITAMIN A 3000 MCG
1000 CAPSULE ORAL
Qty: 30 | Refills: 0 | Status: DISCONTINUED | COMMUNITY
Start: 2022-03-31 | End: 2024-03-22

## 2024-03-22 NOTE — PHYSICAL EXAM
[Normal Conjunctiva] : normal conjunctiva [Soft] : abdomen soft [Normal S1, S2] : normal S1, S2 [Non Tender] : non-tender [No Edema] : no edema [Alert and Oriented] : alert and oriented [Normal] : moves all extremities, no focal deficits, normal speech [de-identified] : with assistance

## 2024-03-22 NOTE — DISCUSSION/SUMMARY
[Hypertension] : hypertension [Not Responding to Treatment] : not responding to treatment [Patient] : the patient [FreeTextEntry1] : Here today in routine cardiac follow up with her son.  Patient is well appearing she offers no new complaints medications/testing reviewed No change in her medications or treatment . She will follow in 3 months      [EKG obtained to assist in diagnosis and management of assessed problem(s)] : EKG obtained to assist in diagnosis and management of assessed problem(s)

## 2024-03-22 NOTE — HISTORY OF PRESENT ILLNESS
[FreeTextEntry1] : 89 Y/O female PMH: HLD, MR/TR, CHF,  CVA 1998  seen by neurology to be followed with Dr Mancia,  HTN renal artery stent Hyponatremia/Chronic kidney disease had followed with Renal Dr Escobedo and wishes to continue seeing him, Thrombocytosis/anemia seen by Hematology she underwent EGD and colonoscopy diagnosed with iron deficiency, B12 deficiency, gastritis and duodenitis followed with GI. Pt was recently admitted to [facility] after a syncopal episode and fall. [facility]. Pt denies chest pain or shortness of breath  C No LE Edema Echo 3/12/22 Mild-moderate MR/AI Mild TR EF 60%. Pt has support at home from an aide   Pressure at home acceptable. Meds were reviewed.   .

## 2024-04-05 RX ORDER — METOPROLOL SUCCINATE 100 MG/1
100 TABLET, EXTENDED RELEASE ORAL
Qty: 180 | Refills: 0 | Status: ACTIVE | COMMUNITY
Start: 2024-04-05 | End: 1900-01-01

## 2024-04-05 RX ORDER — METOPROLOL SUCCINATE 25 MG/1
25 TABLET, EXTENDED RELEASE ORAL
Qty: 180 | Refills: 1 | Status: DISCONTINUED | COMMUNITY
End: 2024-04-05

## 2024-04-30 NOTE — ED ADULT NURSE NOTE - CHPI ED NUR DURATION
Pt arrives to ed reporting sob that began last night, [facility] and instructed to come here due to a pulmonary infusion noted on xray.  
Essential hypertension    Gastroesophageal reflux disease, esophagitis presence not specified    Hypertrophic cardiomyopathy    Hypothyroidism, unspecified type    Prostate CA    Renal calculi
day(s)

## 2024-05-06 ENCOUNTER — RX RENEWAL (OUTPATIENT)
Age: 89
End: 2024-05-06

## 2024-05-06 RX ORDER — PRAVASTATIN SODIUM 20 MG/1
20 TABLET ORAL
Qty: 90 | Refills: 0 | Status: ACTIVE | COMMUNITY
Start: 2018-12-11 | End: 1900-01-01

## 2024-05-06 RX ORDER — TORSEMIDE 10 MG/1
10 TABLET ORAL
Qty: 90 | Refills: 0 | Status: ACTIVE | COMMUNITY
Start: 1900-01-01 | End: 1900-01-01

## 2024-05-06 RX ORDER — HYDRALAZINE HYDROCHLORIDE 25 MG/1
25 TABLET ORAL
Qty: 720 | Refills: 0 | Status: ACTIVE | COMMUNITY
Start: 1900-01-01 | End: 1900-01-01

## 2024-05-15 NOTE — PATIENT PROFILE ADULT - BRADEN SENSORY
Medication Question or Refill    PLEASE SEND RX TO A DIFFERENT PHARMACY - PT IS TRAVELING    metoprolol succinate ER (TOPROL XL) 50 MG 24 hr tablet       What medication are you calling about (include dose and sig)?: Pt calling to order a RX for above medication at a different location (she is traveling)    Preferred Pharmacy:     [facility]  [address] [place] [address], [place]  [phone]      Controlled Substance Agreement on file:   CSA -- Patient Level:    CSA: None found at the patient level.       Who prescribed the medication?: KHUSHI BIRMINGHAM    Do you need a refill? Yes    When did you use the medication last? NA - 2 PILLS LEFT    Patient offered an appointment? No    Do you have any questions or concerns?  No      Could we send this information to you in [facility] or would you prefer to receive a phone call?:   Patient would prefer a phone call   Okay to leave a detailed message?: Yes at Cell number on file:    Telephone Information:   Mobile [phone]     AGUSTIN MATHIAS   (3) slightly limited

## 2024-06-03 ENCOUNTER — EMERGENCY (EMERGENCY)
Facility: HOSPITAL | Age: 89
LOS: 0 days | Discharge: HOME | End: 2024-06-03
Attending: EMERGENCY MEDICINE
Payer: MEDICARE

## 2024-06-03 VITALS
SYSTOLIC BLOOD PRESSURE: 118 MMHG | DIASTOLIC BLOOD PRESSURE: 57 MMHG | OXYGEN SATURATION: 97 % | TEMPERATURE: 98 F | HEART RATE: 61 BPM | RESPIRATION RATE: 18 BRPM

## 2024-06-03 DIAGNOSIS — Z96.643 PRESENCE OF ARTIFICIAL HIP JOINT, BILATERAL: Chronic | ICD-10-CM

## 2024-06-03 DIAGNOSIS — S60.512A ABRASION OF LEFT HAND, INITIAL ENCOUNTER: ICD-10-CM

## 2024-06-03 DIAGNOSIS — S80.812A ABRASION, LEFT LOWER LEG, INITIAL ENCOUNTER: ICD-10-CM
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
## 2024-06-03 DIAGNOSIS — Z90.710 ACQUIRED ABSENCE OF BOTH CERVIX AND UTERUS: Chronic | ICD-10-CM

## 2024-06-03 DIAGNOSIS — Z90.49 ACQUIRED ABSENCE OF OTHER SPECIFIED PARTS OF DIGESTIVE TRACT: Chronic | ICD-10-CM

## 2024-06-03 DIAGNOSIS — Z88.1 ALLERGY STATUS TO OTHER ANTIBIOTIC AGENTS STATUS: ICD-10-CM

## 2024-06-03 DIAGNOSIS — Z91.041 RADIOGRAPHIC DYE ALLERGY STATUS: ICD-10-CM

## 2024-06-03 DIAGNOSIS — Z98.890 OTHER SPECIFIED POSTPROCEDURAL STATES: Chronic | ICD-10-CM

## 2024-06-03 DIAGNOSIS — Z98.89 OTHER SPECIFIED POSTPROCEDURAL STATES: Chronic | ICD-10-CM

## 2024-06-03 DIAGNOSIS — Z41.9 ENCOUNTER FOR PROCEDURE FOR PURPOSES OTHER THAN REMEDYING HEALTH STATE, UNSPECIFIED: Chronic | ICD-10-CM
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
## 2024-06-03 LAB
ANION GAP SERPL CALC-SCNC: 3 MMOL/L — LOW (ref 5–17)
BASOPHILS # BLD AUTO: 0.04 K/UL — SIGNIFICANT CHANGE UP (ref 0–0.2)
BASOPHILS NFR BLD AUTO: 0.6 % — SIGNIFICANT CHANGE UP (ref 0–2)
BUN SERPL-MCNC: 37 MG/DL — HIGH (ref 7–23)
CALCIUM SERPL-MCNC: 8 MG/DL — LOW (ref 8.5–10.1)
CHLORIDE SERPL-SCNC: 107 MMOL/L — SIGNIFICANT CHANGE UP (ref 96–108)
CO2 SERPL-SCNC: 25 MMOL/L — SIGNIFICANT CHANGE UP (ref 22–31)
CREAT SERPL-MCNC: 1.51 MG/DL — HIGH (ref 0.5–1.3)
EGFR: 32 ML/MIN/1.73M2 — LOW
EOSINOPHIL # BLD AUTO: 0.02 K/UL — SIGNIFICANT CHANGE UP (ref 0–0.5)
EOSINOPHIL NFR BLD AUTO: 0.3 % — SIGNIFICANT CHANGE UP (ref 0–6)
GLUCOSE SERPL-MCNC: 111 MG/DL — HIGH (ref 70–99)
HCT VFR BLD CALC: 32.3 % — LOW (ref 34.5–45)
HGB BLD-MCNC: 10.8 G/DL — LOW (ref 11.5–15.5)
IMM GRANULOCYTES NFR BLD AUTO: 0.9 % — SIGNIFICANT CHANGE UP (ref 0–0.9)
LYMPHOCYTES # BLD AUTO: 1.35 K/UL — SIGNIFICANT CHANGE UP (ref 1–3.3)
LYMPHOCYTES # BLD AUTO: 20.5 % — SIGNIFICANT CHANGE UP (ref 13–44)
MCHC RBC-ENTMCNC: 32.8 PG — SIGNIFICANT CHANGE UP (ref 27–34)
MCHC RBC-ENTMCNC: 33.4 GM/DL — SIGNIFICANT CHANGE UP (ref 32–36)
MCV RBC AUTO: 98.2 FL — SIGNIFICANT CHANGE UP (ref 80–100)
MONOCYTES # BLD AUTO: 0.37 K/UL — SIGNIFICANT CHANGE UP (ref 0–0.9)
MONOCYTES NFR BLD AUTO: 5.6 % — SIGNIFICANT CHANGE UP (ref 2–14)
NEUTROPHILS # BLD AUTO: 4.73 K/UL — SIGNIFICANT CHANGE UP (ref 1.8–7.4)
NEUTROPHILS NFR BLD AUTO: 72.1 % — SIGNIFICANT CHANGE UP (ref 43–77)
PLATELET # BLD AUTO: 377 K/UL — SIGNIFICANT CHANGE UP (ref 150–400)
POTASSIUM SERPL-MCNC: 4.4 MMOL/L — SIGNIFICANT CHANGE UP (ref 3.5–5.3)
POTASSIUM SERPL-SCNC: 4.4 MMOL/L — SIGNIFICANT CHANGE UP (ref 3.5–5.3)
RBC # BLD: 3.29 M/UL — LOW (ref 3.8–5.2)
RBC # FLD: 14.5 % — SIGNIFICANT CHANGE UP (ref 10.3–14.5)
SODIUM SERPL-SCNC: 135 MMOL/L — SIGNIFICANT CHANGE UP (ref 135–145)
WBC # BLD: 6.57 K/UL — SIGNIFICANT CHANGE UP (ref 3.8–10.5)
WBC # FLD AUTO: 6.57 K/UL — SIGNIFICANT CHANGE UP (ref 3.8–10.5)

## 2024-06-03 PROCEDURE — 99284 EMERGENCY DEPT VISIT MOD MDM: CPT

## 2024-06-03 PROCEDURE — 93010 ELECTROCARDIOGRAM REPORT: CPT

## 2024-06-03 PROCEDURE — 85025 COMPLETE CBC W/AUTO DIFF WBC: CPT

## 2024-06-03 PROCEDURE — 80048 BASIC METABOLIC PNL TOTAL CA: CPT

## 2024-06-03 PROCEDURE — 96374 THER/PROPH/DIAG INJ IV PUSH: CPT | Mod: XU

## 2024-06-03 PROCEDURE — 36415 COLL VENOUS BLD VENIPUNCTURE: CPT

## 2024-06-03 PROCEDURE — 93005 ELECTROCARDIOGRAM TRACING: CPT

## 2024-06-03 PROCEDURE — 99285 EMERGENCY DEPT VISIT HI MDM: CPT | Mod: 25

## 2024-06-03 PROCEDURE — 71045 X-RAY EXAM CHEST 1 VIEW: CPT

## 2024-06-03 PROCEDURE — 71045 X-RAY EXAM CHEST 1 VIEW: CPT | Mod: 26

## 2024-06-03 RX ORDER — FUROSEMIDE 40 MG
40 TABLET ORAL ONCE
Refills: 0 | Status: COMPLETED | OUTPATIENT
Start: 2024-06-03 | End: 2024-06-03

## 2024-06-03 RX ORDER — AZITHROMYCIN 500 MG/1
1 TABLET, FILM COATED ORAL
Qty: 6 | Refills: 0
Start: 2024-06-03 | End: 2024-06-07

## 2024-06-03 RX ADMIN — Medication 40 MILLIGRAM(S): at 18:42

## 2024-06-03 NOTE — ED ADULT TRIAGE NOTE - CHIEF COMPLAINT QUOTE
pt presenting w/VNS for reported edema, "everywhere" per family "they think she has CHF, she takes Torsemide." pt denying SOB, but "she calls out sometimes, I don't know if it's habit or pain"

## 2024-06-03 NOTE — ED PROVIDER NOTE - PROGRESS NOTE DETAILS
Had long goals of care conversation with family patient's daughter in law patient's son at bedside patient with 24-hour home health aide secondary to having long-term care insurance concern with worsening edema age over the last couple days patient pleasant no overt distress does have worsening edema we reviewed her recent admission in January has been decompensating since this admission did a 40-day stay in rehab since then has been at home not really moving from bed family wishing to address more of a palliative route do not want aggressive treatment would like to limit cardiac workup as well troponin BNP canceled we will try to diurese her to get her symptomatically better patient is DNR/DNI they do not want admission to the hospital they will speak to their long-term care nurse and facility her facilitator that they already have in regards to more resources at home address at bedside with  as well Limited medical workup here no signs of infection on extremities will check basic electrolytes diuresis likely DC home secondary to patient and patient's family wishes After multiple conversations with family and in shared decision-making format with patient's son choosing to care for at home secondary to likely more need towards palliative care has 24-hour home health aide do not want patient mid the hospital I do not disagree that this is her best management and in her best interest patient no overt distress we will double her diuretic take 20 mg torsemide daily will cover possible pneumonia although has no fever no cough no infectious symptoms will cover with azithromycin return to ED if worse speak with home health nursing aides at home to develop any other resources that the patient does not have at home patient's son and daughter-in-law agree to plan of care as this patient

## 2024-06-03 NOTE — ED PROVIDER NOTE - SKIN, MLM
+ pedal edema b/l + skin abrasion left lateral calf and left hand no superimposed infectious exam findings

## 2024-06-03 NOTE — ED PROVIDER NOTE - OBJECTIVE STATEMENT
Patient presents to ED secondary to home health 24-hour nurse concern with left lower extremity wound and developing edema over the last couple weeks and patient patient had mechanical fall 3 days ago has slight abrasion to left lower extremity slight abrasion to left hand normal range of motion of both affected extremities MI evaluation patient [place] any headache no chest pain or shortness of breath no abdominal pain no nausea vomiting diarrhea no motor or sensory deficits no change in mental status per son and daughter-in-law at bedside

## 2024-06-03 NOTE — ED PROVIDER NOTE - PATIENT PORTAL LINK FT
You can access the FollowMyHealth Patient Portal offered by [facility] by registering at the following website: [email or web address]. By joining [facility]’s FollowMyHealth portal, you will also be able to view your health information using other applications (apps) compatible with our system.

## 2024-06-03 NOTE — ED ADULT NURSE NOTE - NSFALLHARMRISKINTERV_ED_ALL_ED

## 2024-06-03 NOTE — ED PROVIDER NOTE - WET READ LAUNCH FT
18 mo WELL CHILD EXAM     Zayda  is a 19 m.o. white female child     History given by mother     CONCERNS/QUESTIONS: Yes   - Intermittent constipation treated with prunes or prune juice.     IMMUNIZATION: up to date and documented. Following spaced out schedule.     NUTRITION HISTORY:   Vegetables? Yes  Fruits? Yes   Meats? Yes  Juice? Occasionally  Water? Yes  Milk? Yes, Type: almond milk 9 oz per day, cheese, yogurt     MULTIVITAMIN:  Yes    ELIMINATION:   Has adequate wet diapers per day.  BM is soft? No, see above     SLEEP PATTERN:   Sleeps through the night? Yes  Sleeps in crib or bed? Yes  Sleeps with parent? No    SOCIAL HISTORY:   The patient lives at home with parents, and does not attend day care. Has 1  siblings.  Smokers at home? Yes outside only  Pets at home? Yes, dogs    DENTAL HISTORY  Brushing teeth twice daily? Yes  Established dental home? Yes      Patient's medications, allergies, past medical, surgical, social and family histories were reviewed and updated as appropriate.    Past Medical History:   Diagnosis Date   • Term birth of female       Patient Active Problem List    Diagnosis Date Noted   • Alternate vaccine schedule 2017     No past surgical history on file.  Pediatric History   Patient Guardian Status   • Mother:  Peggy Lockwood   • Father:  John Peacock     Other Topics Concern   • Second-Hand Smoke Exposure Yes   • Violence Concerns No   • Family Concerns Vehicle Safety No     Social History Narrative   • No narrative on file     Family History   Problem Relation Age of Onset   • No Known Problems Mother    • No Known Problems Father      No current outpatient prescriptions on file.     No current facility-administered medications for this visit.      No Known Allergies    REVIEW OF SYSTEMS:   No complaints of HEENT, chest, GI/, skin, neuro, or musculoskeletal problems.     DEVELOPMENT:  Reviewed Growth Chart in EMR.   Walks backwards? Yes  Scribbles?  "Yes  Removes clothes? Yes  Imitates housework? Yes  Walks up steps? Yes  Climbs? Yes  Number of words? Too many to count   Uses spoon? Yes      ANTICIPATORY GUIDANCE (discussed the following):   Nutrition-Whole milk until 2 years, Limit to 24 ounces/day. Limit juice to 6 ounces/day.   Bedtime routine  Car seat safety  Routine safety measures  Routine toddler care  Signs of illness/when to call doctor   Fever precautions   Tobacco free home/car   Discipline - Time out    PHYSICAL EXAM:   Reviewed vital signs and growth parameters in EMR.     Pulse 132   Temp 36.6 °C (97.9 °F)   Resp 30   Ht 0.838 m (2' 9\")   Wt 10.6 kg (23 lb 5.9 oz)   HC 47.7 cm (18.78\")   BMI 15.09 kg/m²     Length - 76 %ile (Z= 0.71) based on WHO (Girls, 0-2 years) length-for-age data using vitals from 7/10/2018.  Weight - 55 %ile (Z= 0.12) based on WHO (Girls, 0-2 years) weight-for-age data using vitals from 7/10/2018.  HC - 82 %ile (Z= 0.93) based on WHO (Girls, 0-2 years) head circumference-for-age data using vitals from 7/10/2018.      General: This is an alert, active child in no distress.   HEAD: Normocephalic, atraumatic.  EYES: PERRL, positive red reflex bilaterally. No conjunctival injection or discharge.   EARS: TM’s are transparent with good landmarks. Canals are patent.  NOSE: Nares are patent and free of congestion.  THROAT: Oropharynx has no lesions, moist mucus membranes, palate intact. Pharynx without erythema, tonsils normal.   NECK: Supple, no lymphadenopathy or masses.   HEART: Regular rate and rhythm without murmur. Pulses are 2+ and equal.   LUNGS: Clear bilaterally to auscultation, no wheezes or rhonchi. No retractions, nasal flaring, or distress noted.  ABDOMEN: Normal bowel sounds, soft and non-tender without hepatomegaly or splenomegaly or masses.   GENITALIA: Normal female genitalia.   Normal external genitalia, no erythema, no discharge  MUSCULOSKELETAL: Spine is straight. Extremities are without abnormalities. " Moves all extremities well and symmetrically with normal tone.    NEURO: Active, alert, oriented per age.    SKIN: Intact without significant rash or birthmarks. Skin is warm, dry, and pink.     MCHAT: pass    ASQ:  Communication: PASS   Gross Motor: PASS   Fine Motor: PASS   Problem Solving: PASS   Personal Social: PASS    ASSESSMENT:     1. Well Child Exam:  Healthy 19 m.o. with good growth and development.   2. Developmental screening for Autism using MCHAT - Passed  3. READING       During this visit, I prescribed and recommended reading out loud daily with the patient.    PLAN:    1. Anticipatory guidance was reviewed as above and [facility] handout provided.  2. Return to clinic for 24 month well child exam or as needed.  3. Immunizations given today: Hep A  4. Vaccine Information statements given for each vaccine if administered. Discussed benefits and side effects of each vaccine with patient/family, answered all patient /family questions.   5. See Dentist twice yearly.   There are no Wet Read(s) to document.

## 2024-06-03 NOTE — ED PROVIDER NOTE - CARDIAC, MLM
Normal rate, regular rhythm.  Heart sounds S1, S2.  No murmurs, rubs or gallops. No Repair - Repaired With Adjacent Surgical Defect Text (Leave Blank If You Do Not Want): After obtaining clear surgical margins the defect was repaired concurrently with another surgical defect which was in close approximation.

## 2024-06-03 NOTE — ED PROVIDER NOTE - CLINICAL SUMMARY MEDICAL DECISION MAKING FREE TEXT BOX
Had long goals of care conversation with family patient's daughter in law patient's son at bedside patient with 24-hour home health aide secondary to having long-term care insurance concern with worsening edema age over the last couple days patient pleasant no overt distress does have worsening edema we reviewed her recent admission in January has been decompensating since this admission did a 40-day stay in rehab since then has been at home not really moving from bed family wishing to address more of a palliative route do not want aggressive treatment would like to limit cardiac workup as well troponin BNP canceled we will try to diurese her to get her symptomatically better patient is DNR/DNI they do not want admission to the hospital they will speak to their long-term care nurse and facility her facilitator that they already have in regards to more resources at home address at bedside with  as well Limited medical workup here no signs of infection on extremities will check basic electrolytes diuresis likely DC home secondary to patient and patient's family wishes

## 2024-06-03 NOTE — ED PROVIDER NOTE - ENDOCRINE NEGATIVE STATEMENT, MLM
[facility] Medicine Services  HISTORY AND PHYSICAL    Date of Admission: 3/5/2022  Primary Care Physician: Yaron Wiggins APRN    Subjective     Chief Complaint: Found unresponsive    History of Present Illness  Patient is a 53-year-old  female with past medical history significant for end-stage renal disease requiring hemodialysis, insulin-dependent diabetes, hypertension on multiple outpatient antihypertensives, the presented to our hospital via EMS after being found unresponsive.  Patient is currently intubated and sedated on propofol.  I am unable to get any additional information directly from the patient in this clinical circumstance.  Per the emergency room provider patient was found in her home today unresponsive.  It was reported that her last known well was last night.  When EMS arrived they checked her blood glucose and it was found too high to read.  An LMA was placed by EMS, and patient was brought to our facility for further work-up and management.  She was found to have a blood sugar greater than 900 on arrival.  It sounds like that she is due to get dialysis today, and evidently is on a Tuesday, Thursday, Saturday dialysis regimen.  I do not know if she has missed any dialysis sessions leading up to this hospitalization.  It was also reported that she had some blood in her mouth when EMS arrived, concern for tongue laceration.  She has no previous history of seizure disorder.  Upon arrival patient was found to have a GCS of 3.  She is just arrived to the intensive care unit and nursing staff does report that she was awake and able to follow commands shortly after arrival.  Currently she is on sedation with propofol, and has also been started on a Cardene drip.  We just recheck patient's blood sugar upon arrival to the intensive care unit and again remains too high to read.  It looks like 5 units of regular insulin has been administered x1 so far.   Blood pressure trend on arrival as follows:      Review of Systems     Otherwise complete ROS reviewed and negative except as mentioned in the HPI.    Past Medical History:   Past Medical History:   Diagnosis Date   • Anxiety    • Arthritis    • Chronic kidney disease     STAGE V RENAL FAILURE   • Depression    • Diabetes mellitus (HCC)    • HTN (hypertension)    • Hypothyroidism    • Obesity    • Tremors of nervous system      Past Surgical History:  Past Surgical History:   Procedure Laterality Date   • ARTERIOVENOUS FISTULA     • BREAST BIOPSY     • CARPAL TUNNEL RELEASE     • CATH LAB PROCEDURE     • CHOLECYSTECTOMY     • TUBAL ABDOMINAL LIGATION       Social History:  reports that she has been smoking. She has been smoking about 0.50 packs per day. She has never used smokeless tobacco. She reports that she does not drink alcohol and does not use drugs.    Family History: family history includes Cancer in an other family member; Diabetes in her maternal aunt, maternal grandmother, mother, and another family member; Heart disease in an other family member; Hypertension in an other family member; Kidney disease in an other family member.       Allergies:  Allergies   Allergen Reactions   • Penicillins Hives and Shortness Of Breath   • Lamisil [Terbinafine]    • Reglan [Metoclopramide] GI Intolerance   • Stadol [Butorphanol] Nausea And Vomiting       Medications:  Prior to Admission medications    Medication Sig Start Date End Date Taking? Authorizing Provider   acetaminophen (TYLENOL) 325 MG tablet Take 2 tablets by mouth Every 6 (Six) Hours As Needed for Mild Pain . 10/12/21   Harrison Moya MD   albuterol (PROVENTIL HFA;VENTOLIN HFA) 108 (90 BASE) MCG/ACT inhaler Inhale 2 puffs Every 4 (Four) Hours As Needed for wheezing.    Provider, MD Blanche   carvedilol (COREG) 25 MG tablet Take 1 tablet by mouth 2 (Two) Times a Day With Meals. 9/18/21   Alejandra Kruse APRN   cloNIDine (CATAPRES) 0.3 MG  "tablet Take 1 tablet by mouth Every 8 (Eight) Hours. 9/18/21   Alejandra Kruse APRN   DULoxetine (CYMBALTA) 60 MG capsule Take 60 mg by mouth daily.    Blanche Neil MD   famotidine (PEPCID) 20 MG tablet Take 20 mg by mouth 2 (Two) Times a Day.    Blanche Neil MD   hydrALAZINE (APRESOLINE) 50 MG tablet Take 1.5 tablets by mouth 3 (Three) Times a Day. 9/18/21   Alejandra Kruse APRN   insulin aspart (novoLOG) 100 UNIT/ML injection Inject 10 Units under the skin into the appropriate area as directed 3 (Three) Times a Day Before Meals.    Blanche Neil MD   Insulin Glargine (BASAGLAR KWIKPEN) 100 UNIT/ML injection pen Inject 40 Units under the skin into the appropriate area as directed Every Night.    Blanche Neil MD   isosorbide mononitrate (IMDUR) 60 MG 24 hr tablet Take 1 tablet by mouth Daily. 10/13/21   Harrison Moya MD   levothyroxine (SYNTHROID, LEVOTHROID) 50 MCG tablet Take 1 tablet by mouth Daily. 9/18/21   Alejandra Kruse APRN   lisinopril (PRINIVIL,ZESTRIL) 20 MG tablet Take 20 mg by mouth 2 (Two) Times a Day.    Blanche Neil MD   nicotine (NICODERM CQ) 21 MG/24HR patch Place 1 patch on the skin as directed by provider Daily.    Blanche Neil MD   NIFEdipine XL (PROCARDIA XL) 60 MG 24 hr tablet Take 60 mg by mouth 2 (two) times a day.    Blanche Neil MD   rOPINIRole (REQUIP) 4 MG tablet Take 4 mg by mouth Every Night.    Blanche Neil MD   simvastatin (ZOCOR) 20 MG tablet Take 40 mg by mouth Daily.    Blanche Neil MD   spironolactone (ALDACTONE) 25 MG tablet Take 1 tablet by mouth Daily. 10/13/21   Harrison Moya MD     I have utilized all available immediate resources to obtain, update, and review the patient's current medications.    Objective     Vital Signs: /96   Pulse 97   Temp 97.8 °F (36.6 °C) (Axillary)   Resp 18   Ht 157.5 cm (62\")   Wt 75.5 kg (166 lb 7.2 oz)   SpO2 100%   " BMI 30.44 kg/m²   Physical Exam  Vitals reviewed.   Constitutional:       Appearance: She is ill-appearing.   HENT:      Head: Normocephalic.      Mouth/Throat:      Pharynx: No oropharyngeal exudate (ET tube in place).      Comments: Blood stain noted in the mouth.  Poor dentition is present.  Unable to get good visualization of the tongue.  There is evidence of small bleeding area lower lip  Eyes:      Pupils: Pupils are equal, round, and reactive to light.   Cardiovascular:      Rate and Rhythm: Normal rate.   Pulmonary:      Effort: Pulmonary effort is normal.      Breath sounds: No wheezing or rales.      Comments: Currently on mechanical ventilation at 50% FiO2 and 5 of PEEP  Abdominal:      Palpations: Abdomen is soft.   Musculoskeletal:         General: No deformity.      Cervical back: Neck supple.   Skin:     Comments: Left upper extremity AV fistula; peripheral IVs in place on the right upper extremity   Neurological:      Comments: Intubated and sedated   Psychiatric:      Comments: Intubated and sedated              Results Reviewed:  Lab Results (last 24 hours)     Procedure Component Value Units Date/Time    Hemoglobin A1c [[id number]]  (Abnormal) Collected: 03/05/22 1010    Specimen: Blood Updated: 03/05/22 1230     Hemoglobin A1C 9.70 %     Narrative:      Hemoglobin A1C Ranges:    Increased Risk for Diabetes  5.7% to 6.4%  Diabetes                     >= 6.5%  Diabetic Goal                < 7.0%    Comprehensive Metabolic Panel [[id number]]  (Abnormal) Collected: 03/05/22 1042    Specimen: Blood from Arm, Right Updated: 03/05/22 1125     Glucose 972 mg/dL      BUN 49 mg/dL      Creatinine 4.66 mg/dL      Sodium 119 mmol/L      Potassium 4.3 mmol/L      Chloride 82 mmol/L      CO2 21.0 mmol/L      Calcium 8.4 mg/dL      Total Protein 6.7 g/dL      Albumin 3.50 g/dL      ALT (SGPT) 23 U/L      AST (SGOT) 38 U/L      Alkaline Phosphatase 138 U/L      Total Bilirubin 0.4 mg/dL      Globulin 3.2 gm/dL      " A/G Ratio 1.1 g/dL      BUN/Creatinine Ratio 10.5     Anion Gap 16.0 mmol/L      eGFR 10.6 mL/min/1.73      Comment: <15 Indicative of kidney failure       Narrative:      GFR Normal >60  Chronic Kidney Disease <60  Kidney Failure <15      BNP [[id number]]  (Abnormal) Collected: 03/05/22 1042    Specimen: Blood from Arm, Right Updated: 03/05/22 1123     proBNP >70,000.0 pg/mL     Narrative:      Among patients with dyspnea, NT-proBNP is highly sensitive for the detection of acute congestive heart failure. In addition NT-proBNP of <300 pg/ml effectively rules out acute congestive heart failure with 99% negative predictive value.    Results may be falsely decreased if patient taking Biotin.      Procalcitonin [[id number]]  (Abnormal) Collected: 03/05/22 1042    Specimen: Blood from Arm, Right Updated: 03/05/22 1116     Procalcitonin 0.28 ng/mL     Narrative:      As a Marker for Sepsis (Non-Neonates):    1. <0.5 ng/mL represents a low risk of severe sepsis and/or septic shock.  2. >2 ng/mL represents a high risk of severe sepsis and/or septic shock.    As a Marker for Lower Respiratory Tract Infections that require antibiotic therapy:    PCT on Admission    Antibiotic Therapy       6-12 Hrs later    >0.5                Strongly Recommended  >0.25 - <0.5        Recommended   0.1 - 0.25          Discouraged              Remeasure/reassess PCT  <0.1                Strongly Discouraged     Remeasure/reassess PCT    As 28 day mortality risk marker: \"Change in Procalcitonin Result\" (>80% or <=80%) if Day 0 (or Day 1) and Day 4 values are available. Refer to http://www.[employer]-pct-calculator.com    Change in PCT <=80%  A decrease of PCT levels below or equal to 80% defines a positive change in PCT test result representing a higher risk for 28-day all-cause mortality of patients diagnosed with severe sepsis for septic shock.    Change in PCT >80%  A decrease of PCT levels of more than 80% defines a negative change in PCT result " representing a lower risk for 28-day all-cause mortality of patients diagnosed with severe sepsis or septic shock.       C-reactive Protein [[id number]]  (Abnormal) Collected: 03/05/22 1042    Specimen: Blood from Arm, Right Updated: 03/05/22 1111     C-Reactive Protein 8.03 mg/dL     Troponin [[id number]]  (Abnormal) Collected: 03/05/22 1042    Specimen: Blood from Arm, Right Updated: 03/05/22 1108     Troponin T 0.088 ng/mL     Narrative:      Troponin T Reference Range:  <= 0.03 ng/mL-   Negative for AMI  >0.03 ng/mL-     Abnormal for myocardial necrosis.  Clinicians would have to utilize clinical acumen, EKG, Troponin and serial changes to determine if it is an Acute Myocardial Infarction or myocardial injury due to an underlying chronic condition.       Results may be falsely decreased if patient taking Biotin.      COVID-19,[facility] IN-HOUSE,Nasal Swab No Transport Media 3-4 HR TAT - Swab, Nasal Cavity [[id number]]  (Normal) Collected: 03/05/22 1016    Specimen: Swab from Nasal Cavity Updated: 03/05/22 1103     COVID19 Not Detected    Narrative:      Fact sheet for providers: https://www.fda.gov/media/430030/download     Fact sheet for patients: https://www.fda.gov/media/760991/download    Test performed by PCR.    Consider negative results in combination with clinical observations, patient history, and epidemiological information.    Urinalysis, Microscopic Only - Urine, Catheter [[id number]]  (Abnormal) Collected: 03/05/22 1018    Specimen: Urine, Catheter Updated: 03/05/22 1048     RBC, UA 0-2 /HPF      WBC, UA 31-50 /HPF      Bacteria, UA 1+ /HPF      Squamous Epithelial Cells, UA 0-2 /HPF      Hyaline Casts, UA None Seen /LPF      WBC Clumps, UA Small/1+ /HPF      Methodology Manual Light Microscopy    Urinalysis With Culture If Indicated - Urine, Catheter [[id number]]  (Abnormal) Collected: 03/05/22 1018    Specimen: Urine, Catheter Updated: 03/05/22 1048     Color, UA Yellow     Appearance, UA Clear     pH,  UA 6.5     Specific Gravity, UA 1.022     Glucose, UA >=1000 mg/dL (3+)     Ketones, UA Negative     Bilirubin, UA Negative     Blood, UA Small (1+)     Protein,  mg/dL (2+)     Leuk Esterase, UA Moderate (2+)     Nitrite, UA Negative     Urobilinogen, UA 0.2 E.U./dL    Urine Culture - Urine, Urine, Catheter [[id number]] Collected: 03/05/22 1018    Specimen: Urine, Catheter Updated: 03/05/22 1047    Blood Culture - Blood, Arm, Left [[id number]] Collected: 03/05/22 1010    Specimen: Blood from Arm, Left Updated: 03/05/22 1047    Blood Culture - Blood, Arm, Right [[id number]] Collected: 03/05/22 1022    Specimen: Blood from Arm, Right Updated: 03/05/22 1046    Lactic Acid, Plasma [[id number]]  (Normal) Collected: 03/05/22 1010    Specimen: Blood Updated: 03/05/22 1033     Lactate 1.7 mmol/L     Protime-INR [[id number]]  (Normal) Collected: 03/05/22 1010    Specimen: Blood Updated: 03/05/22 1028     Protime 13.0 Seconds      INR 1.02    aPTT [[id number]]  (Normal) Collected: 03/05/22 1010    Specimen: Blood Updated: 03/05/22 1028     PTT 31.4 seconds     Blood Gas, Arterial - [[id number]]  (Abnormal) Collected: 03/05/22 1024    Specimen: Arterial Blood Updated: 03/05/22 1027     Site Right Radial     Aaron's Test Positive     pH, Arterial 7.323 pH units      Comment: 84 Value below reference range        pCO2, Arterial 43.3 mm Hg      pO2, Arterial 256.0 mm Hg      Comment: 83 Value above reference range        HCO3, Arterial 22.4 mmol/L      Base Excess, Arterial -3.5 mmol/L      Comment: 84 Value below reference range        O2 Saturation, Arterial >100.1 %      Comment: 93 Value above reportable range > 100.1        Temperature 37.0 C      Barometric Pressure for Blood Gas 752 mmHg      Modality Ventilator     FIO2 100 %      Ventilator Mode AC     Set Tidal Volume 500     Set Mech Resp Rate 18.0     PEEP 5.0     Collected by [id number]     Comment: Meter: [id number]     :  [id number]        pCO2, Temperature  Corrected 43.3 mm Hg      pH, Temp Corrected 7.323 pH Units      pO2, Temperature Corrected 256 mm Hg     CBC & Differential [[id number]]  (Abnormal) Collected: 03/05/22 1010    Specimen: Blood Updated: 03/05/22 1019    Narrative:      The following orders were created for panel order CBC & Differential.  Procedure                               Abnormality         Status                     ---------                               -----------         ------                     CBC Auto Differential[[id number]]        Abnormal            Final result                 Please view results for these tests on the individual orders.    CBC Auto Differential [[id number]]  (Abnormal) Collected: 03/05/22 1010    Specimen: Blood Updated: 03/05/22 1019     WBC 7.17 10*3/mm3      RBC 3.68 10*6/mm3      Hemoglobin 10.7 g/dL      Hematocrit 33.7 %      MCV 91.6 fL      MCH 29.1 pg      MCHC 31.8 g/dL      RDW 17.4 %      RDW-SD 59.7 fl      MPV 11.0 fL      Platelets 193 10*3/mm3      Neutrophil % 77.4 %      Lymphocyte % 13.8 %      Monocyte % 4.7 %      Eosinophil % 3.1 %      Basophil % 0.7 %      Immature Grans % 0.3 %      Neutrophils, Absolute 5.55 10*3/mm3      Lymphocytes, Absolute 0.99 10*3/mm3      Monocytes, Absolute 0.34 10*3/mm3      Eosinophils, Absolute 0.22 10*3/mm3      Basophils, Absolute 0.05 10*3/mm3      Immature Grans, Absolute 0.02 10*3/mm3      nRBC 0.0 /100 WBC         Imaging Results (Last 24 Hours)     Procedure Component Value Units Date/Time    CT Cervical Spine Without Contrast [[id number]] Collected: 03/05/22 1047     Updated: 03/05/22 1054    Narrative:      EXAMINATION: CT CERVICAL SPINE WO CONTRAST- 3/5/2022 10:47 AM CST     HISTORY: Neck pain, acute, no red flags; R41.82-Altered mental status,  unspecified. Patient found on floor unresponsive. Unwitnessed fall.     DOSE: 362 mGycm (Automatic exposure control technique was implemented in  an effort to keep the radiation dose as low as possible  without  compromising image quality)     REPORT: Spiral CT of the cervical spine was performed without contrast,  reconstructed coronal and sagittal images are also reviewed.     COMPARISON: There are no correlative imaging studies for comparison.     Sagittal images demonstrate straightening of the cervical curvature  without spondylolisthesis. There is moderate degenerative disc disease  at C6-7 with endplate spurring and mild disc space narrowing. No  fracture is identified. The patient is intubated and there is soft  tissue swelling in the hypopharynx. There is normal aeration of the  mastoid air cells. There is mild right-sided neuroforaminal narrowing at  C6-7 related to uncinate spurs. There is mild spinal stenosis at C6-7.  Soft tissue windows show no gross evidence of a traumatic cervical disc  herniation. The visualized upper lungs are clear. There is moderately  heavy calcified plaque within the carotid arteries at the bifurcation.  The       Impression:      No fracture, no acute osseous cervical spine abnormality.  Partial visualization of the endotracheal tube, with soft tissue  swelling in the hypopharynx, not unexpected. Moderate spondylosis at  C6-7, with mild spinal stenosis right-sided neuroforaminal narrowing..              This report was finalized on 03/05/2022 10:51 by Dr. Farhad Munoz MD.    CT Head Without Contrast [[id number]] Collected: 03/05/22 1044     Updated: 03/05/22 1049    Narrative:      EXAMINATION: CT HEAD WO CONTRAST- 3/5/2022 10:44 AM CST     HISTORY: Delirium; R41.82-Altered mental status, unspecified.  Unresponsive.     DOSE: 625 mGycm (Automatic exposure control technique was implemented in  an effort to keep the radiation dose as low as possible without  compromising image quality)     REPORT: Spiral CT of the head was performed without contrast,  reconstructed coronal and sagittal images are also reviewed.     COMPARISON: CT head without contrast 10/5/2021.     No  intracranial hemorrhage, mass or mass effect is identified. There is  mildly decreased attenuation in the periventricular white matter tracts  as before, compatible with mild chronic small vessel white matter  ischemic disease. The ventricles and basal cisterns are within normal  limits. Review of bone windows is negative for fractures. There soft  tissue swelling in the posterior nasopharynx which may be related to  recent endotracheal tube insertion.       Impression:      No acute intracranial abnormality. Mild chronic small vessel  white matter ischemic changes appear stable.     This report was finalized on 03/05/2022 10:46 by Dr. Farhad Munoz MD.    XR Chest 1 View [[id number]] Collected: 03/05/22 1035     Updated: 03/05/22 1040    Narrative:      EXAMINATION: XR CHEST 1 VW- 3/5/2022 10:35 AM CST     HISTORY: intubation; R41.82-Altered mental status, unspecified.     REPORT: A frontal view of the chest was obtained.     COMPARISON: Chest x-ray 10/4/2021.     The patient is intubated, the tip of the endotracheal tube appears in  satisfactory position, approximately 2.8 cm superior to the jessie.  There are interstitial infiltrates in the left midlung and lower lung  zones, with associated volume loss. The right lung is clear. There is  mild cardiomegaly, this may be exaggerated by technique and positioning.  The patient is on a backboard. There is a stent in the left subclavian  and axillary region as before. No pneumothorax or pleural effusion is  identified. The upper abdomen appears unremarkable.       Impression:      Satisfactory position of the endotracheal tube. Interstitial  infiltrates are seen throughout most of the left lung without  consolidation. There is mild cardiomegaly, without evidence of overt  CHF.  This report was finalized on 03/05/2022 10:37 by Dr. Farhad Munoz MD.        I have personally reviewed and interpreted the radiology studies and ECG obtained at time of admission.      Assessment / Plan     Assessment:   Active Hospital Problems    Diagnosis    • Acute encephalopathy    • Elevated troponin    • Hyponatremia    • HHNC (hyperglycemic hyperosmolar nonketotic coma) ([facility])    • Hypertensive emergency    • Hypothyroidism    • Altered mental status    • ESRD (end stage renal disease) ([facility])      Plan:   1.  Cardene IV drip  2.  Start insulin drip per protocol  3.  Serial BMP and electrolyte monitoring  4.  In the setting of patient with end-stage renal disease requiring dialysis I will significantly decrease the rate of fluid administration as posted on the insulin drip protocol  5.  Nephrology consultation; records indicate that patient is on a Tuesday, Thursday, and Saturday dialysis regimen.  It is unclear if she has missed any dialysis sessions recently and will need to further investigate this.  6.  Pulmonary consultation to assist with vent management  7.  Check echocardiogram  8.  Trend troponin  9.  Seizure precautions  10.  IV Rocephin  11.  Follow-up blood and urine cultures.  I am going to check a respiratory PCR panel given interstitial infiltrates seen on chest imaging, left lung greater than right lung.  12.  Review of records, dating back to October 2021, indicates patient had a very similar presentation as to that that occurred today.  13.  Place OG tube.  Patient on multiple antihypertensives as an outpatient.  Check KUB after placement to confirm appropriate positioning prior to use.  14.  Lovenox and Pepcid prophylaxis for now  15.  Patient is critically ill.  Greater than 30 minutes of critical care time spent.  Work-up ongoing.      Electronically signed by Heath Prado MD, 03/05/22, 12:36 CST.             no diabetes and no thyroid trouble.

## 2024-06-06 ENCOUNTER — APPOINTMENT (OUTPATIENT)
Dept: CARDIOLOGY | Facility: CLINIC | Age: 89
End: 2024-06-06
Payer: MEDICARE

## 2024-06-06 VITALS
OXYGEN SATURATION: 93 % | HEIGHT: 60 IN | DIASTOLIC BLOOD PRESSURE: 56 MMHG | SYSTOLIC BLOOD PRESSURE: 130 MMHG | HEART RATE: 72 BPM

## 2024-06-06 DIAGNOSIS — I51.9 HEART DISEASE, UNSPECIFIED: ICD-10-CM
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
## 2024-06-06 DIAGNOSIS — I10 ESSENTIAL (PRIMARY) HYPERTENSION: ICD-10-CM
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
## 2024-06-06 PROCEDURE — 99214 OFFICE O/P EST MOD 30 MIN: CPT

## 2024-06-06 PROCEDURE — 93000 ELECTROCARDIOGRAM COMPLETE: CPT

## 2024-06-06 RX ORDER — PANTOPRAZOLE SODIUM 40 MG/1
40 GRANULE, DELAYED RELEASE ORAL DAILY
Refills: 0 | Status: ACTIVE | COMMUNITY

## 2024-06-10 ENCOUNTER — NON-APPOINTMENT (OUTPATIENT)
Age: 89
End: 2024-06-10
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
## 2024-06-10 PROBLEM — I10 HTN (HYPERTENSION): Status: ACTIVE | Noted: 2020-10-23

## 2024-06-10 PROBLEM — I10 ESSENTIAL HYPERTENSION: Status: ACTIVE | Noted: 2023-06-12

## 2024-06-10 PROBLEM — I51.9 LV DYSFUNCTION: Status: ACTIVE | Noted: 2024-06-10

## 2024-06-10 RX ORDER — SACUBITRIL AND VALSARTAN 24; 26 MG/1; MG/1
24-26 TABLET, FILM COATED ORAL TWICE DAILY
Qty: 60 | Refills: 0 | Status: ACTIVE | COMMUNITY
Start: 2024-06-10 | End: 1900-01-01

## 2024-06-10 RX ORDER — LOSARTAN POTASSIUM 25 MG/1
25 TABLET, FILM COATED ORAL DAILY
Refills: 0 | Status: DISCONTINUED | COMMUNITY

## 2024-06-11 NOTE — PHYSICAL EXAM
[Frail] : frail [Normal S1, S2] : normal S1, S2 [Soft] : abdomen soft [Normal] : moves all extremities, no focal deficits, normal speech [Alert and Oriented] : alert and oriented [de-identified] : wheelchair [de-identified] : 1-2+ LE Edema

## 2024-06-11 NOTE — HISTORY OF PRESENT ILLNESS
[FreeTextEntry1] : 89 yo female PMH: HLD, MR/TR, CHF,  CVA 1998, HTN renal artery stent Hyponatremia/Chronic kidney disease had followed with Renal Dr Victoria Thrombocytosis/anemia she underwent EGD and colonoscopy diagnosed with iron deficiency, B12 deficiency, gastritis and duodenitis, prior hospitalizations records reviewed fall/UTI/ syncope/NSTEMI/AF, recent admission 2/2 edema since these hospitalizations  patient has been declining, she appears very comfortable on exam today., in further discussing patients current health status patient/family wishes are to decline further work up, CW medical  management, wishes to pursue  palliative treatment with no aggressive treatment/measures including any further cardiac work up   patient is DNR/DNI they do not want admission, they will speak to their long-term care nurse, Per daughter patient is homebound and wishes for home care Physician to manage patients care and consult with palliative care     .

## 2024-06-11 NOTE — DISCUSSION/SUMMARY
[EKG obtained to assist in diagnosis and management of assessed problem(s)] : EKG obtained to assist in diagnosis and management of assessed problem(s) [FreeTextEntry1] : Here today post ER follow up PW increasing edema in setting of LV dysfunction, prior NSTEMI.  At this time per family/patient wishes no aggressive measures will maximize medical management, declining further cardiac work up requesting a palliative care route, will suspend Losartan and begin Entresto, will increase diuretic therapy for the next 3 days then resume daily dose with close monitoring of daily weight, repeat labs ordered, family will call with update and I have advised a 2 week OV, CW BB/Diuretic therapy, family to arrange Home care MD Not a candidate for oral AC given patients age/mental status/anemia and fall risk, family in agreement

## 2024-06-17 DIAGNOSIS — D50.9 IRON DEFICIENCY ANEMIA, UNSPECIFIED: ICD-10-CM

## 2024-06-18 ENCOUNTER — LABORATORY RESULT (OUTPATIENT)
Age: 89
End: 2024-06-18

## 2024-06-19 ENCOUNTER — APPOINTMENT (OUTPATIENT)
Dept: ULTRASOUND IMAGING | Facility: CLINIC | Age: 89
End: 2024-06-19
Payer: MEDICARE

## 2024-06-19 ENCOUNTER — TRANSCRIPTION ENCOUNTER (OUTPATIENT)
Age: 89
End: 2024-06-19

## 2024-06-19 PROCEDURE — 93971 EXTREMITY STUDY: CPT | Mod: RT

## 2024-06-25 ENCOUNTER — LABORATORY RESULT (OUTPATIENT)
Age: 89
End: 2024-06-25

## 2024-08-27 NOTE — ED ADULT NURSE NOTE - DOES PATIENT HAVE ADVANCE DIRECTIVE
Please give family Discharge Instructions for ear tubes.  If necessary, Please update office number to [phone].  Thank you.
Yes

## 2024-10-30 NOTE — PHYSICAL EXAM
[General Appearance - Well Developed] : well developed [Normal Appearance] : normal appearance [Well Groomed] : well groomed [General Appearance - Well Nourished] : well nourished [No Deformities] : no deformities [General Appearance - In No Acute Distress] : no acute distress [] : no respiratory distress yes [Respiration, Rhythm And Depth] : normal respiratory rhythm and effort [Exaggerated Use Of Accessory Muscles For Inspiration] : no accessory muscle use [Auscultation Breath Sounds / Voice Sounds] : lungs were clear to auscultation bilaterally [Heart Rate And Rhythm] : heart rate and rhythm were normal [Heart Sounds] : normal S1 and S2 [Abdomen Soft] : soft [Abnormal Walk] : normal gait [Skin Color & Pigmentation] : normal skin color and pigmentation [Oriented To Time, Place, And Person] : oriented to person, place, and time [FreeTextEntry1] : No LE Edema

## 2024-10-31 NOTE — PROVIDER CONTACT NOTE (OTHER) - REASON
"Woc follow up for ostomy education    Surgery date: 10/10/2024    Ostomy type: loop ileostomy     Appliance in place: nessa 2 piece 2 1/4\"  flat with barrier ring.     Patient will most likely need convexity if leakage is not corrected when output is controlled    Last pouch change: 10/29/202    Stoma Assessment: is diagonal and oval, see yesterdays note for measurements    Education performed: Full education provided today from start to finish.  We I typed up the step-by-step instructions and provided to the patient's wife we really went over it using the patient has a visual.  We discussed hydration and how important is Gatorade foods to thicken stool and Imodium.  How to back off Imodium if stool gets too thick.  The importance of protein and adequate nutrition.  Discussed the pouching gave some discharge supplies and talk to the patient about when to call the hospital when to call the outpatient nurse and we will see him on the outpatient clinic on Monday.    Woc will continue to follow.     Please contact with questions or concerns.     " PAf HR 150s

## 2025-03-17 NOTE — ED ADULT NURSE NOTE - CCCP TRG CHIEF CMPLNT
March 17, 2025       Em Marshall MD  [address] [facility] [address]  Via In Basket      Patient: Diana Verde   YOB: 1935   Date of Visit: 3/17/2025       Dear Dr. Marshall:    Thank you for referring Diana Verde to me for evaluation. Below are my notes for this visit with her.    If you have questions, please do not hesitate to call me. I look forward to following your patient along with you.      Sincerely,        Onesimo Au MD        CC: No Recipients  Onesimo Au MD  3/17/2025 11:11 AM  Signed      Patient is seen at the request of Em Huang MD    Subjective    Patient ID: Diana is a 89 year old female.  Reason for Consult:    Chief Complaint   Patient presents with   • Office Visit     cardiology   • Follow-up     LV-11/11/24   us vasc lwr ext leanne dplx left- 12/07/24   • Edema     Occ both ankles         HPI:    ====   Ms. Verde, a 88 y/o F, presents for a f/u visit. She has a hx of HFpEF, Afib, Atrial Flutter, Coronary Artery Calcification, Stenosis of Subclavian Artery, HTN, HLD, and DM. Her last office visit was 11/11/24.    Today, pt presents with a home BP log, which she notes shows a lot of fluctuating in her BP readings. She attributes these fluctuations to her lifestyle.     Pt denies any chest pain, SOB, LING, PND, orthopnea.    Past Medical History:   Diagnosis Date   • Acute on chronic heart failure with preserved ejection fraction (HFpEF)  (CMD) 05/07/2023   • Adrenal crisis syndrome  (CMD) 05/12/2023   • Anatomical narrow angle    • Brain tumor  (CMD)    • Chronic kidney disease    • Congestive cardiac failure  (CMD)    • DM (diabetes mellitus)  (CMD)    • Essential (primary) hypertension    • Glaucoma    • Hearing loss    • Hyperlipoproteinemia    • Hypertensive retinopathy    • Leg ulcer  (CMD)    • Long COVID 05/12/2023   • Need for influenza vaccination 09/13/2010   • Palpitations 12/02/2020   • Peripheral vascular disease (CMD)    • PVD  (posterior vitreous detachment)    • RAD (reactive airway disease) (CMD)    • Tuberculosis    • Venous stasis ulcers  (CMD) 2018       ALLERGIES:   Allergen Reactions   • Ace Inhibitors VOMITING and WEAKNESS   • Amoxicillin Other (See Comments)     Tolerated ceftriaxone 23   • Cephalexin Other (See Comments)     Tolerated ceftriaxone 23        Past Surgical History:   Procedure Laterality Date   • Anes hypophysectomy-transnasal nonstereo     • Appendectomy     • Brain tumor excision         Family History   Problem Relation Age of Onset   • Stroke Mother    • Asthma Mother    • COPD Daughter    • Stroke Son    • COPD Son        Social History     Tobacco Use   • Smoking status: Former     Current packs/day: 0.00     Average packs/day: 0.3 packs/day for 1 year (0.3 ttl pk-yrs)     Types: Cigarettes     Start date:      Quit date:      Years since quittin.2     Passive exposure: Never   • Smokeless tobacco: Never   Vaping Use   • Vaping status: never used   Substance Use Topics   • Alcohol use: No   • Drug use: No        Recent hospitalization: None     Review of Systems:   ================    Constitutional:  No chills, and no malaise  Eyes:  No change in eyesight, no pain  Ears, nose, mouth, throat, and face:  No pain  Respiratory:  No hemoptysis  Cardiovascular:  No palpitation  Gastrointestinal:  No melena  Genitourinary:  No hematuria  Musculoskeletal:  No muscle pain  Neurological:  No change in speech  Behvioral/Psych:  No change in mood      Objective  =========    Vitals:    25 1039   BP: (!) 146/63   BP Location: LUE - Left upper extremity   Patient Position: Sitting   Cuff Size: Regular   Pulse: 62   SpO2: 98%   Weight: 77.7 kg (171 lb 6.5 oz)   Height: 5' 6\" (1.676 m)   PainSc:  0                   Physical Exam:  =============      General: Alert, cooperative, no distress.  Head: No obvious abnormality  Eyes: Normal, no jaundice.  Throat: Lips, mucosa, moist and  palpitations normal.  Neck: Supple.Carotid pulses: Normal without bruits.  Back: Symmetric.  Lungs: Clear, no wheezing, no rhonchi and no rales.  Heart:  Irregularly irregular variable S1 with a soft ejection systolic murmur   Abdomen   No masses, tenderness or hepatosplenomegaly.    Examination of extremities trace peripheral edema.  Support stocking on   Musculoskeletal   Normal gait, normal muscle tone.    Neurologic   Oriented to person, place and time. Normal affect.  No apparent deficit        Lab:  ===      CBC:   Lab Results   Component Value Date    WBC 9.2 10/08/2024    WBC 9.5 08/26/2020    RBC 6.25 (H) 10/08/2024    RBC 5.58 (H) 08/26/2020     BMP:   Lab Results   Component Value Date    GLUCOSE 79 01/28/2025    GLUCOSE 72 08/26/2020    SODIUM 137 01/28/2025    SODIUM 146 (H) 08/26/2020    POTASSIUM 4.5 01/28/2025    POTASSIUM 3.8 08/26/2020    CHLORIDE 104 01/28/2025    CHLORIDE 110 (H) 08/26/2020    BUN 14 01/28/2025    BUN 11 08/26/2020    CREATININE 0.80 01/28/2025    CREATININE 0.60 08/26/2020    CALCIUM 9.7 01/28/2025    CALCIUM 9.0 08/26/2020     CMP:  Lab Results   Component Value Date    SODIUM 137 01/28/2025    SODIUM 146 (H) 08/26/2020    POTASSIUM 4.5 01/28/2025    POTASSIUM 3.8 08/26/2020    CHLORIDE 104 01/28/2025    CHLORIDE 110 (H) 08/26/2020    ANIONGAP 8 01/28/2025    ANIONGAP 12 08/26/2020    GLUCOSE 79 01/28/2025    GLUCOSE 72 08/26/2020    BUN 14 01/28/2025    BUN 11 08/26/2020    CREATININE 0.80 01/28/2025    CREATININE 0.60 08/26/2020    ALBUMIN 3.5 01/28/2025    ALBUMIN 3.2 (L) 08/26/2020    CALCIUM 9.7 01/28/2025    CALCIUM 9.0 08/26/2020    AST 37 01/28/2025    AST 14 08/26/2020    GFRNA 84 08/26/2020    GFRA >90 08/26/2020     Cardiac markers: No results found for: \"CPK\", \"CKMB\", \"TROPONINI\", \"MYOGLOBIN\"  BNP:   Lab Results   Component Value Date     (H) 02/26/2017       Cardiac test :  ==========    Echo 5/7/2023    1. Left ventricle: The cavity size is normal. Wall thickness is  mildly     increased. Systolic function is normal. The ejection fraction was measured     by single plane method of disks. The ejection fraction is 66%.  2. Left atrium: The atrium is severely dilated.  3. Right ventricle: The cavity size is normal. Systolic function is normal.  4. Right atrium: The atrium is severely dilated.       Holter monitor done on 1/11/2021        DISCUSSION:  Baseline ECG: sinus bradycardia  The patient underwent dynamic ambulatory electrocardiography monitoring.      The predominant mechanism was sinus rhythm   The patient's mean overall heart rate, including ectopy, was 67 beats/min  The range was from 51 beats/min to 101 beats/min.  Less than 0.5% of sample was supraventricular ectopy.        Patient diary returned. Sx were not associated w/ arrhythmia         Lizzy Herman MD  1/11/2021  EKG August 25, 2022 atrial fibrillation rate controlled 66 bpm nonspecific ST-T wave abnormalities       Radiology :  =========    Results for orders placed in visit on 10/09/20    XR ADVOCATE PROCEDURE    Impression  Electronically Reviewed and Approved By:           MAYRA, NED EDEN MD  10/09/20 1:58 pm        Assessment:  ==============    Longstanding persistent atrial fibrillation (CMS/HCC)  (primary encounter diagnosis)  Essential hypertension  Chronic heart failure with preserved ejection fraction (CMS/HCC)  Stasis Dermatitis       Plan:  =======  Afib: Continue anticoagulation on Eliquis 5 mg bid.    HTN: BP elevated, 146/53. Home log shows frequently fluctuating BP. Continue Furosemide 20 mg qd, Metoprolol 200 mg qd, Amlodipine 5 mg qd. Increase Losartan to 50 mg qd.    LE edema: Continue Furosemide 40 mg qd. 11/7/24 BNP was 3822. Down from 5344 5/6/23. Instructed pt to elevate legs when seated.    Congestive heart failure: EF 5/7/23 was 66%. Continue Metoprolol 200 mg qd, Furosemide 20 mg qd.    Coronary Artery Calcification: Per 5/4/23 CT chest. Keep LDL below 55 and control BP.      Subclavian Artery Stenosis: Per 5/4/23 CT chest.     HLD: LDL 10/8/24 was 78. Above goal of <55. Not on statin.     Diana   Current Outpatient Medications   Medication Sig Dispense Refill   • albuterol 108 (90 Base) MCG/ACT inhaler Inhale 2 puffs into the lungs every 4 hours as needed for Shortness of Breath or Wheezing. 8.5 g 1   • furosemide (LASIX) 20 MG tablet Take 1 tablet by mouth daily as needed (leg swelling). 90 tablet 1   • Multiple Vitamins-Minerals (HAIR SKIN & NAILS PO)      • gabapentin (NEURONTIN) 100 MG capsule Take 1 capsule by mouth in the morning and 1 capsule at noon and 1 capsule in the evening. 90 capsule 0   • acetaminophen (TYLENOL) 325 MG tablet Take 1 tablet by mouth every 4 hours as needed for Pain. 100 tablet 0   • desmopressin (DDAVP) 0.1 MG tablet TAKE 1 TABLET EVERY MORNING AND TAKE 2 TABLETS EVERY EVENING BEFORE BEDTIME 270 tablet 3   • desmopressin (DDAVP) 0.1 MG tablet Take 1 tablet by mouth in the morning and 2 tablets before bedtime. 21 tablet 0   • potassium CHLORIDE (Klor-Con) 20 MEQ packet Take 1 packet by mouth daily. 90 packet 1   • beclomethasone diprop HFA (Qvar RediHaler) 80 MCG/ACT inhaler Inhale 2 puffs into the lungs 4 times daily. 10.6 g 11   • losartan (COZAAR) 25 MG tablet Take 1 tablet by mouth daily. 30 tablet 5   • apixaBAN (Eliquis) 5 MG Tab Take 1 tablet by mouth in the morning and 1 tablet in the evening. 180 tablet 3   • metoPROLOL succinate (TOPROL-XL) 100 MG 24 hr tablet Take 2 tablets by mouth daily. 180 tablet 3   • amLODIPine (NORVASC) 5 MG tablet Take 1 tablet by mouth nightly. 90 tablet 3   • digoxin (Lanoxin) 0.125 MG tablet Take 1 tablet by mouth daily. 90 tablet 3   • hydroCORTisone (CORTEF) 5 MG tablet TAKE 3 TABLETS IN THE MORNING AND 2 TABLETS IN THE EVENING 450 tablet 3   • omeprazole (PrilOSEC) 20 MG capsule TAKE ONE CAPSULE BY MOUTH DAILY AS NEEDED FOR ACID REFLUX 90 capsule 1   • levothyroxine 100 MCG tablet Take 1 tablet by mouth daily.  90 tablet 3   • docusate sodium (COLACE) 100 MG capsule Take 1 capsule by mouth 2 times daily as needed for Constipation. 20 capsule 11   • acetaminophen (TYLENOL) 500 MG tablet Take 1 tablet by mouth every 6 hours as needed for Pain or Fever. 30 tablet 5   • baclofen (LIORESAL) 10 MG tablet Take 1 tablet by mouth at bedtime as needed (muscle spasm). 15 tablet 3   • prednisoLONE acetate (PRED FORTE) 1 % ophthalmic suspension Instill 1 drop into both eyes four times a day     • timolol (TIMOPTIC) 0.5 % ophthalmic solution Instill 1 drop into both eyes every morning     • Blood Pressure Monitor Kit Blood pressure machine to check 1 time daily as needed 1 kit 1   • Calcium Carbonate (Super Calcium) 1500 (600 Ca) MG Tab every 12 hours.     • folic acid (FOLATE) 1 MG tablet Take 1 tablet by mouth daily. 90 tablet 3   • Cyanocobalamin (B-12) 2000 MCG Tab Take 2,000 mcg by mouth daily. 90 tablet 3   • ketorolac (ACULAR) 0.5 % ophthalmic solution Place 1 drop into both eyes in the morning and 1 drop in the evening.  0   • latanoprost (XALATAN) 0.005 % ophthalmic solution Place 1 drop into both eyes nightly.        No current facility-administered medications for this visit.       [id number] - [facility] [address] [facility]  [address] [facility] [address]  Phone: [phone] Fax: [phone]    [facility] - [place] [id number] [facility] [address]  Phone: [phone] Fax: [phone]      Patient Care Team:  Em Marshall MD as PCP - General  Onesimo Au MD as Consulting Physician (Cardiovascular Disease)  Jeff Noble MD as Consulting Physician (Internal Medicine - Endocrinology,Diabetes,Metabolism)               No orders of the defined types were placed in this encounter.      Follow up in 3 months.    On 3/17/2025, Marbella CARMONA scribed the services personally performed by Onesimo Au MD.    Electronically signed  by:  Onesimo Au MD, 3/17/2025

## 2025-03-25 NOTE — CONSULT NOTE ADULT - PROBLEM SELECTOR RECOMMENDATION 9
Etiology unclear.  cont. tele.  Await echo.  Check orthostatics.  Suspect orthostasis 2/2 hypovolemia 2/2 anemia.  Recc workup for anemia.  Trop minimally elevated likely demand ischemia.
Detail Level: Zone

## 2025-06-29 NOTE — PATIENT PROFILE ADULT - NSPROPTRIGHTBILLOFRIGHTS_GEN_A_NUR
Please read and follow all instructions.  Rest, ice and elevate the right ankle to help with pain and swelling.  Ice may be applied for 30 to 40 minutes 3-4 times daily.  Take alternating doses of Motrin and Tylenol as directed for pain.  Follow-up evaluation with primary care doctor in 5 to 7 days.  Return to the ER for worsening ankle pain, increased swelling, or any other concerning symptoms.   patient

## 2025-08-06 NOTE — PHYSICAL THERAPY INITIAL EVALUATION ADULT - MANUAL MUSCLE TESTING RESULTS, REHAB EVAL
Addended by: DONNA LEE on: 8/6/2025 04:21 PM     Modules accepted: Orders     no strength deficits were identified